# Patient Record
Sex: FEMALE | Race: WHITE | NOT HISPANIC OR LATINO | Employment: OTHER | URBAN - METROPOLITAN AREA
[De-identification: names, ages, dates, MRNs, and addresses within clinical notes are randomized per-mention and may not be internally consistent; named-entity substitution may affect disease eponyms.]

---

## 2023-02-13 ENCOUNTER — HOSPITAL ENCOUNTER (EMERGENCY)
Facility: HOSPITAL | Age: 83
Discharge: HOME | End: 2023-02-14
Attending: EMERGENCY MEDICINE | Admitting: EMERGENCY MEDICINE
Payer: MEDICARE

## 2023-02-13 DIAGNOSIS — R42 VERTIGO: ICD-10-CM

## 2023-02-13 DIAGNOSIS — S00.03XA CONTUSION OF SCALP, INITIAL ENCOUNTER: ICD-10-CM

## 2023-02-13 DIAGNOSIS — N39.0 ACUTE URINARY TRACT INFECTION: Primary | ICD-10-CM

## 2023-02-13 LAB
BASOPHILS # BLD AUTO: 0.1 10*3/MM3 (ref 0–0.2)
BASOPHILS NFR BLD AUTO: 1 % (ref 0–1.5)
DEPRECATED RDW RBC AUTO: 48.6 FL (ref 37–54)
EOSINOPHIL # BLD AUTO: 0.2 10*3/MM3 (ref 0–0.4)
EOSINOPHIL NFR BLD AUTO: 2.9 % (ref 0.3–6.2)
ERYTHROCYTE [DISTWIDTH] IN BLOOD BY AUTOMATED COUNT: 16.2 % (ref 12.3–15.4)
GLUCOSE BLDC GLUCOMTR-MCNC: 190 MG/DL (ref 70–105)
HCT VFR BLD AUTO: 36.3 % (ref 34–46.6)
HGB BLD-MCNC: 11.2 G/DL (ref 12–15.9)
LYMPHOCYTES # BLD AUTO: 2.6 10*3/MM3 (ref 0.7–3.1)
LYMPHOCYTES NFR BLD AUTO: 31.9 % (ref 19.6–45.3)
MCH RBC QN AUTO: 26.7 PG (ref 26.6–33)
MCHC RBC AUTO-ENTMCNC: 31 G/DL (ref 31.5–35.7)
MCV RBC AUTO: 86.2 FL (ref 79–97)
MONOCYTES # BLD AUTO: 0.7 10*3/MM3 (ref 0.1–0.9)
MONOCYTES NFR BLD AUTO: 8.8 % (ref 5–12)
NEUTROPHILS NFR BLD AUTO: 4.6 10*3/MM3 (ref 1.7–7)
NEUTROPHILS NFR BLD AUTO: 55.4 % (ref 42.7–76)
NRBC BLD AUTO-RTO: 0 /100 WBC (ref 0–0.2)
PLATELET # BLD AUTO: 371 10*3/MM3 (ref 140–450)
PMV BLD AUTO: 9.1 FL (ref 6–12)
RBC # BLD AUTO: 4.21 10*6/MM3 (ref 3.77–5.28)
WBC NRBC COR # BLD: 8.3 10*3/MM3 (ref 3.4–10.8)

## 2023-02-13 PROCEDURE — 80053 COMPREHEN METABOLIC PANEL: CPT | Performed by: EMERGENCY MEDICINE

## 2023-02-13 PROCEDURE — 85025 COMPLETE CBC W/AUTO DIFF WBC: CPT | Performed by: EMERGENCY MEDICINE

## 2023-02-13 PROCEDURE — 99284 EMERGENCY DEPT VISIT MOD MDM: CPT

## 2023-02-13 PROCEDURE — 36415 COLL VENOUS BLD VENIPUNCTURE: CPT

## 2023-02-13 PROCEDURE — 82962 GLUCOSE BLOOD TEST: CPT

## 2023-02-13 PROCEDURE — 96365 THER/PROPH/DIAG IV INF INIT: CPT

## 2023-02-13 RX ORDER — MECLIZINE HYDROCHLORIDE 25 MG/1
25 TABLET ORAL ONCE
Status: COMPLETED | OUTPATIENT
Start: 2023-02-13 | End: 2023-02-14

## 2023-02-14 ENCOUNTER — APPOINTMENT (OUTPATIENT)
Dept: CT IMAGING | Facility: HOSPITAL | Age: 83
End: 2023-02-14
Payer: MEDICARE

## 2023-02-14 VITALS
OXYGEN SATURATION: 98 % | TEMPERATURE: 98 F | DIASTOLIC BLOOD PRESSURE: 62 MMHG | HEIGHT: 67 IN | WEIGHT: 135 LBS | SYSTOLIC BLOOD PRESSURE: 118 MMHG | RESPIRATION RATE: 16 BRPM | HEART RATE: 64 BPM | BODY MASS INDEX: 21.19 KG/M2

## 2023-02-14 LAB
ALBUMIN SERPL-MCNC: 4.4 G/DL (ref 3.5–5.2)
ALBUMIN/GLOB SERPL: 1.3 G/DL
ALP SERPL-CCNC: 74 U/L (ref 39–117)
ALT SERPL W P-5'-P-CCNC: 11 U/L (ref 1–33)
ANION GAP SERPL CALCULATED.3IONS-SCNC: 13 MMOL/L (ref 5–15)
AST SERPL-CCNC: 12 U/L (ref 1–32)
BACTERIA UR QL AUTO: ABNORMAL /HPF
BILIRUB SERPL-MCNC: 0.2 MG/DL (ref 0–1.2)
BILIRUB UR QL STRIP: NEGATIVE
BUN SERPL-MCNC: 23 MG/DL (ref 8–23)
BUN/CREAT SERPL: 22.5 (ref 7–25)
CALCIUM SPEC-SCNC: 8.8 MG/DL (ref 8.6–10.5)
CHLORIDE SERPL-SCNC: 105 MMOL/L (ref 98–107)
CLARITY UR: ABNORMAL
CO2 SERPL-SCNC: 25 MMOL/L (ref 22–29)
COLOR UR: YELLOW
CREAT SERPL-MCNC: 1.02 MG/DL (ref 0.57–1)
EGFRCR SERPLBLD CKD-EPI 2021: 54.4 ML/MIN/1.73
GLOBULIN UR ELPH-MCNC: 3.3 GM/DL
GLUCOSE SERPL-MCNC: 110 MG/DL (ref 65–99)
GLUCOSE UR STRIP-MCNC: ABNORMAL MG/DL
HGB UR QL STRIP.AUTO: ABNORMAL
HYALINE CASTS UR QL AUTO: ABNORMAL /LPF
KETONES UR QL STRIP: NEGATIVE
LEUKOCYTE ESTERASE UR QL STRIP.AUTO: ABNORMAL
NITRITE UR QL STRIP: POSITIVE
PH UR STRIP.AUTO: 5.5 [PH] (ref 5–8)
POTASSIUM SERPL-SCNC: 3.8 MMOL/L (ref 3.5–5.2)
PROT SERPL-MCNC: 7.7 G/DL (ref 6–8.5)
PROT UR QL STRIP: ABNORMAL
RBC # UR STRIP: ABNORMAL /HPF
REF LAB TEST METHOD: ABNORMAL
SODIUM SERPL-SCNC: 143 MMOL/L (ref 136–145)
SP GR UR STRIP: 1.01 (ref 1–1.03)
SQUAMOUS #/AREA URNS HPF: ABNORMAL /HPF
UROBILINOGEN UR QL STRIP: ABNORMAL
WBC # UR STRIP: ABNORMAL /HPF
WBC CASTS #/AREA URNS LPF: ABNORMAL /LPF

## 2023-02-14 PROCEDURE — 81001 URINALYSIS AUTO W/SCOPE: CPT | Performed by: EMERGENCY MEDICINE

## 2023-02-14 PROCEDURE — 96365 THER/PROPH/DIAG IV INF INIT: CPT

## 2023-02-14 PROCEDURE — 25010000002 CEFTRIAXONE PER 250 MG: Performed by: EMERGENCY MEDICINE

## 2023-02-14 PROCEDURE — 87086 URINE CULTURE/COLONY COUNT: CPT | Performed by: EMERGENCY MEDICINE

## 2023-02-14 PROCEDURE — 70450 CT HEAD/BRAIN W/O DYE: CPT

## 2023-02-14 RX ORDER — MECLIZINE HYDROCHLORIDE 25 MG/1
25 TABLET ORAL 3 TIMES DAILY PRN
Qty: 15 TABLET | Refills: 0 | Status: SHIPPED | OUTPATIENT
Start: 2023-02-14

## 2023-02-14 RX ORDER — CEFDINIR 300 MG/1
300 CAPSULE ORAL 2 TIMES DAILY
Qty: 10 CAPSULE | Refills: 0 | Status: SHIPPED | OUTPATIENT
Start: 2023-02-14

## 2023-02-14 RX ADMIN — CEFTRIAXONE 1 G: 1 INJECTION, POWDER, FOR SOLUTION INTRAMUSCULAR; INTRAVENOUS at 03:03

## 2023-02-14 RX ADMIN — MECLIZINE HYDROCHLORIDE 25 MG: 25 TABLET ORAL at 00:20

## 2023-02-14 NOTE — ED PROVIDER NOTES
Subjective   History of Present Illness  82 female sent in from [facility] for dizziness and fall.  The patient states that she became lightheaded after she took melatonin and Benadryl.  She states she had had some vertigo recently.  She states she sat down on her buttocks without discomfort to her pelvis or lower back.  She reports then she fell over backwards and struck the back of her head.  She reports no loss of consciousness.  She reports that she was not hypoglycemic.  She reports that she has had no focal neurologic defects or lateralizing neurologic signs she reports she has had some chronic decreased in hearing but nothing acute she.  She denies chest pain shortness of breath diaphoresis or palpitations.  No current nausea or vomiting        Review of Systems   Constitutional: Negative for chills and fever.   Respiratory: Negative for chest tightness.    Cardiovascular: Negative for chest pain.   Gastrointestinal: Negative for abdominal pain and nausea.   Genitourinary: Positive for dysuria. Negative for hematuria.   Neurological: Positive for dizziness, weakness and light-headedness. Negative for syncope.   Hematological: Bruises/bleeds easily.   All other systems reviewed and are negative.      No past medical history on file.  History of hypothyroidism, osteoporosis, hypertension, hypocalcemia, hyperlipidemia, gastric ulcer, degenerative joint disease, gastroesophageal reflux disease, coronary artery disease, anemia, type 2 diabetes, history of toe fracture  Allergies   Allergen Reactions   • Penicillins Unknown - High Severity       No past surgical history on file.    No family history on file.    Social History     Socioeconomic History   • Marital status:        No reported safety issues    Objective   Physical Exam  Alert Jessica Coma Scale 15   HEENT: Pupils equal and reactive to light. Conjunctivae are not injected. Normal tympanic membranes. Oropharynx and nares are normal.   Neck:  Supple. Midline trachea. No JVD. No goiter.   Chest: Clear and equal breath sounds bilaterally, regular rate and rhythm without murmur or rub.   Abdomen: Positive bowel sounds, nontender, nondistended. No rebound or peritoneal signs. No CVA tenderness.   Extremities/neuro: Right-hand-dominant cranial nerves intact and symmetrical.  Some reproduction of vertigo is noted with rapid head motion.  No focal neurologic defects.  No drift no clubbing. cyanosis or edema. Motor sensory exam is normal. The full range of motion is intact   Skin: Warm and dry, no rashes or petechia.   Lymphatic: No regional lymphadenopathy. No calf pain, swelling or Homans sign    Procedures           ED Course      Labs Reviewed   COMPREHENSIVE METABOLIC PANEL - Abnormal; Notable for the following components:       Result Value    Glucose 110 (*)     Creatinine 1.02 (*)     eGFR 54.4 (*)     All other components within normal limits    Narrative:     GFR Normal >60  Chronic Kidney Disease <60  Kidney Failure <15    The GFR formula is only valid for adults with stable renal function between ages 18 and 70.   URINALYSIS W/ CULTURE IF INDICATED - Abnormal; Notable for the following components:    Appearance, UA Cloudy (*)     Glucose,  mg/dL (2+) (*)     Blood, UA Small (1+) (*)     Protein, UA Trace (*)     Leuk Esterase, UA Large (3+) (*)     Nitrite, UA Positive (*)     All other components within normal limits    Narrative:     In absence of clinical symptoms, the presence of pyuria, bacteria, and/or nitrites on the urinalysis result does not correlate with infection.   CBC WITH AUTO DIFFERENTIAL - Abnormal; Notable for the following components:    Hemoglobin 11.2 (*)     MCHC 31.0 (*)     RDW 16.2 (*)     All other components within normal limits   POCT GLUCOSE FINGERSTICK - Abnormal; Notable for the following components:    Glucose 190 (*)     All other components within normal limits   URINALYSIS, MICROSCOPIC ONLY   CBC AND  DIFFERENTIAL    Narrative:     The following orders were created for panel order CBC & Differential.  Procedure                               Abnormality         Status                     ---------                               -----------         ------                     CBC Auto Differential[[id number]]        Abnormal            Final result                 Please view results for these tests on the individual orders.     Medications   cefTRIAXone (ROCEPHIN) 1 g in sodium chloride 0.9 % 100 mL IVPB (has no administration in time range)   meclizine (ANTIVERT) tablet 25 mg (25 mg Oral Given 2/14/23 0020)          CT head negative for stroke intercranial hemorrhage                                Medical Decision Making  The patient was injected with ceftriaxone in the emergency department.  She had previously been given meclizine and felt better.  The patient be given a prescription for cefdinir and meclizine.  The patient was advised to follow-up closely with her primary care provider the patient was stable at discharge and vocalized understanding of discharge instructions warning    Amount and/or Complexity of Data Reviewed  External Data Reviewed: notes.     Details: From [facility] facility documentation was reviewed  Labs: ordered. Decision-making details documented in ED Course.  Radiology: ordered and independent interpretation performed. Decision-making details documented in ED Course.      Risk  Prescription drug management.    Risk Details: Risk of decompensation of neurologic symptoms, risk of sepsis from resistant urinary tract infection were considered        Final diagnoses:   Acute urinary tract infection   Vertigo   Contusion of scalp, initial encounter       ED Disposition  ED Disposition     ED Disposition   Discharge    Condition   Stable    Comment   --             Antonette Boss, APRN  [address] [facility] [place] [address]  [phone]               Medication List       New Prescriptions    cefdinir 300 MG capsule  Commonly known as: OMNICEF  Take 1 capsule by mouth 2 (Two) Times a Day.     meclizine 25 MG tablet  Commonly known as: ANTIVERT  Take 1 tablet by mouth 3 (Three) Times a Day As Needed for Dizziness or Nausea.           Where to Get Your Medications      Information about where to get these medications is not yet available    Ask your nurse or doctor about these medications  · cefdinir 300 MG capsule  · meclizine 25 MG tablet          Anegl Tellez MD  02/14/23 0114

## 2023-02-14 NOTE — DISCHARGE INSTRUCTIONS
Rest next 2 days, plenty of fluids  Make sure to take all of the antibiotic  Meclizine as needed for dizziness  Tylenol or ibuprofen as needed for fever and discomfort  Follow-up with your primary care provider

## 2023-02-14 NOTE — ED NOTES
Patient came in from [facility] and stated that she fell on her buttocks and then fell back and hit her head. Patient reports taking plavix.

## 2023-02-14 NOTE — ED NOTES
Patient reports she took melatonin and benadryl approx 2130 prior to feeling like her blood sugar was low.

## 2023-02-15 LAB — BACTERIA SPEC AEROBE CULT: NORMAL

## 2023-05-02 ENCOUNTER — APPOINTMENT (OUTPATIENT)
Dept: GENERAL RADIOLOGY | Facility: HOSPITAL | Age: 83
DRG: 247 | End: 2023-05-02
Payer: MEDICARE

## 2023-05-02 ENCOUNTER — HOSPITAL ENCOUNTER (INPATIENT)
Facility: HOSPITAL | Age: 83
LOS: 1 days | Discharge: NURSING, REHAB OR LONG-TERM CARE | DRG: 247 | End: 2023-05-04
Attending: EMERGENCY MEDICINE | Admitting: EMERGENCY MEDICINE
Payer: MEDICARE

## 2023-05-02 ENCOUNTER — APPOINTMENT (OUTPATIENT)
Dept: CT IMAGING | Facility: HOSPITAL | Age: 83
DRG: 247 | End: 2023-05-02
Payer: MEDICARE

## 2023-05-02 DIAGNOSIS — R07.9 CHEST PAIN, UNSPECIFIED TYPE: Primary | ICD-10-CM

## 2023-05-02 DIAGNOSIS — Z95.5 STATUS POST CORONARY ARTERY STENT PLACEMENT: ICD-10-CM

## 2023-05-02 DIAGNOSIS — R94.39 ABNORMAL NUCLEAR STRESS TEST: ICD-10-CM

## 2023-05-02 DIAGNOSIS — I25.110 CORONARY ARTERY DISEASE INVOLVING NATIVE CORONARY ARTERY OF NATIVE HEART WITH UNSTABLE ANGINA PECTORIS: ICD-10-CM

## 2023-05-02 LAB
ALBUMIN SERPL-MCNC: 4.4 G/DL (ref 3.5–5.2)
ALBUMIN/GLOB SERPL: 1.6 G/DL
ALP SERPL-CCNC: 84 U/L (ref 39–117)
ALT SERPL W P-5'-P-CCNC: 11 U/L (ref 1–33)
ANION GAP SERPL CALCULATED.3IONS-SCNC: 12 MMOL/L (ref 5–15)
APTT PPP: 25.9 SECONDS (ref 61–76.5)
AST SERPL-CCNC: 16 U/L (ref 1–32)
BASOPHILS # BLD AUTO: 0.1 10*3/MM3 (ref 0–0.2)
BASOPHILS NFR BLD AUTO: 1.1 % (ref 0–1.5)
BILIRUB SERPL-MCNC: 0.3 MG/DL (ref 0–1.2)
BILIRUB UR QL STRIP: NEGATIVE
BUN SERPL-MCNC: 17 MG/DL (ref 8–23)
BUN/CREAT SERPL: 18.3 (ref 7–25)
CALCIUM SPEC-SCNC: 8.1 MG/DL (ref 8.6–10.5)
CHLORIDE SERPL-SCNC: 105 MMOL/L (ref 98–107)
CHOLEST SERPL-MCNC: 146 MG/DL (ref 0–200)
CLARITY UR: CLEAR
CO2 SERPL-SCNC: 26 MMOL/L (ref 22–29)
COLOR UR: YELLOW
CREAT SERPL-MCNC: 0.93 MG/DL (ref 0.57–1)
D DIMER PPP FEU-MCNC: 0.79 MG/L (FEU) (ref 0–0.84)
DEPRECATED RDW RBC AUTO: 46.8 FL (ref 37–54)
EGFRCR SERPLBLD CKD-EPI 2021: 60.7 ML/MIN/1.73
EOSINOPHIL # BLD AUTO: 0.1 10*3/MM3 (ref 0–0.4)
EOSINOPHIL NFR BLD AUTO: 1.6 % (ref 0.3–6.2)
ERYTHROCYTE [DISTWIDTH] IN BLOOD BY AUTOMATED COUNT: 14.8 % (ref 12.3–15.4)
GLOBULIN UR ELPH-MCNC: 2.7 GM/DL
GLUCOSE BLDC GLUCOMTR-MCNC: 130 MG/DL (ref 70–105)
GLUCOSE BLDC GLUCOMTR-MCNC: 132 MG/DL (ref 70–105)
GLUCOSE BLDC GLUCOMTR-MCNC: 156 MG/DL (ref 70–105)
GLUCOSE SERPL-MCNC: 249 MG/DL (ref 65–99)
GLUCOSE UR STRIP-MCNC: ABNORMAL MG/DL
HBA1C MFR BLD: 11 % (ref 4.8–5.6)
HCT VFR BLD AUTO: 34.9 % (ref 34–46.6)
HDLC SERPL-MCNC: 72 MG/DL (ref 40–60)
HGB BLD-MCNC: 11.3 G/DL (ref 12–15.9)
HGB UR QL STRIP.AUTO: NEGATIVE
HOLD SPECIMEN: NORMAL
HOLD SPECIMEN: NORMAL
INR PPP: 0.97 (ref 0.93–1.1)
KETONES UR QL STRIP: NEGATIVE
LDLC SERPL CALC-MCNC: 55 MG/DL (ref 0–100)
LDLC/HDLC SERPL: 0.74 {RATIO}
LEUKOCYTE ESTERASE UR QL STRIP.AUTO: NEGATIVE
LYMPHOCYTES # BLD AUTO: 1.6 10*3/MM3 (ref 0.7–3.1)
LYMPHOCYTES NFR BLD AUTO: 22 % (ref 19.6–45.3)
MAGNESIUM SERPL-MCNC: 1.7 MG/DL (ref 1.6–2.4)
MCH RBC QN AUTO: 27.2 PG (ref 26.6–33)
MCHC RBC AUTO-ENTMCNC: 32.4 G/DL (ref 31.5–35.7)
MCV RBC AUTO: 84.2 FL (ref 79–97)
MONOCYTES # BLD AUTO: 0.4 10*3/MM3 (ref 0.1–0.9)
MONOCYTES NFR BLD AUTO: 5.1 % (ref 5–12)
NEUTROPHILS NFR BLD AUTO: 5.2 10*3/MM3 (ref 1.7–7)
NEUTROPHILS NFR BLD AUTO: 70.2 % (ref 42.7–76)
NITRITE UR QL STRIP: NEGATIVE
NRBC BLD AUTO-RTO: 0 /100 WBC (ref 0–0.2)
NT-PROBNP SERPL-MCNC: 308.5 PG/ML (ref 0–1800)
PH UR STRIP.AUTO: 7.5 [PH] (ref 5–8)
PLATELET # BLD AUTO: 295 10*3/MM3 (ref 140–450)
PMV BLD AUTO: 9.9 FL (ref 6–12)
POTASSIUM SERPL-SCNC: 4.7 MMOL/L (ref 3.5–5.2)
PROT SERPL-MCNC: 7.1 G/DL (ref 6–8.5)
PROT UR QL STRIP: NEGATIVE
PROTHROMBIN TIME: 10.4 SECONDS (ref 9.6–11.7)
RBC # BLD AUTO: 4.14 10*6/MM3 (ref 3.77–5.28)
SODIUM SERPL-SCNC: 143 MMOL/L (ref 136–145)
SP GR UR STRIP: 1.01 (ref 1–1.03)
T3FREE SERPL-MCNC: 1.98 PG/ML (ref 2–4.4)
T4 FREE SERPL-MCNC: 1.49 NG/DL (ref 0.93–1.7)
TRIGL SERPL-MCNC: 105 MG/DL (ref 0–150)
TROPONIN T SERPL HS-MCNC: 22 NG/L
TROPONIN T SERPL HS-MCNC: 25 NG/L
TSH SERPL DL<=0.05 MIU/L-ACNC: 1.9 UIU/ML (ref 0.27–4.2)
UROBILINOGEN UR QL STRIP: ABNORMAL
VLDLC SERPL-MCNC: 19 MG/DL (ref 5–40)
WBC NRBC COR # BLD: 7.4 10*3/MM3 (ref 3.4–10.8)
WHOLE BLOOD HOLD COAG: NORMAL
WHOLE BLOOD HOLD SPECIMEN: NORMAL

## 2023-05-02 PROCEDURE — 71045 X-RAY EXAM CHEST 1 VIEW: CPT

## 2023-05-02 PROCEDURE — 85025 COMPLETE CBC W/AUTO DIFF WBC: CPT | Performed by: NURSE PRACTITIONER

## 2023-05-02 PROCEDURE — 84439 ASSAY OF FREE THYROXINE: CPT | Performed by: NURSE PRACTITIONER

## 2023-05-02 PROCEDURE — 93005 ELECTROCARDIOGRAM TRACING: CPT

## 2023-05-02 PROCEDURE — 97162 PT EVAL MOD COMPLEX 30 MIN: CPT

## 2023-05-02 PROCEDURE — G0378 HOSPITAL OBSERVATION PER HR: HCPCS

## 2023-05-02 PROCEDURE — 83880 ASSAY OF NATRIURETIC PEPTIDE: CPT | Performed by: NURSE PRACTITIONER

## 2023-05-02 PROCEDURE — 85730 THROMBOPLASTIN TIME PARTIAL: CPT | Performed by: NURSE PRACTITIONER

## 2023-05-02 PROCEDURE — 84443 ASSAY THYROID STIM HORMONE: CPT | Performed by: NURSE PRACTITIONER

## 2023-05-02 PROCEDURE — P9612 CATHETERIZE FOR URINE SPEC: HCPCS

## 2023-05-02 PROCEDURE — G0108 DIAB MANAGE TRN  PER INDIV: HCPCS

## 2023-05-02 PROCEDURE — 99285 EMERGENCY DEPT VISIT HI MDM: CPT

## 2023-05-02 PROCEDURE — 82948 REAGENT STRIP/BLOOD GLUCOSE: CPT

## 2023-05-02 PROCEDURE — 83036 HEMOGLOBIN GLYCOSYLATED A1C: CPT | Performed by: NURSE PRACTITIONER

## 2023-05-02 PROCEDURE — 81003 URINALYSIS AUTO W/O SCOPE: CPT | Performed by: NURSE PRACTITIONER

## 2023-05-02 PROCEDURE — 84484 ASSAY OF TROPONIN QUANT: CPT | Performed by: NURSE PRACTITIONER

## 2023-05-02 PROCEDURE — 87102 FUNGUS ISOLATION CULTURE: CPT | Performed by: NURSE PRACTITIONER

## 2023-05-02 PROCEDURE — 83735 ASSAY OF MAGNESIUM: CPT | Performed by: NURSE PRACTITIONER

## 2023-05-02 PROCEDURE — 85379 FIBRIN DEGRADATION QUANT: CPT | Performed by: NURSE PRACTITIONER

## 2023-05-02 PROCEDURE — 85610 PROTHROMBIN TIME: CPT | Performed by: NURSE PRACTITIONER

## 2023-05-02 PROCEDURE — 70486 CT MAXILLOFACIAL W/O DYE: CPT

## 2023-05-02 PROCEDURE — 84481 FREE ASSAY (FT-3): CPT | Performed by: NURSE PRACTITIONER

## 2023-05-02 PROCEDURE — 80061 LIPID PANEL: CPT | Performed by: NURSE PRACTITIONER

## 2023-05-02 PROCEDURE — 80053 COMPREHEN METABOLIC PANEL: CPT | Performed by: NURSE PRACTITIONER

## 2023-05-02 RX ORDER — SODIUM CHLORIDE 0.9 % (FLUSH) 0.9 %
10 SYRINGE (ML) INJECTION AS NEEDED
Status: DISCONTINUED | OUTPATIENT
Start: 2023-05-02 | End: 2023-05-04 | Stop reason: HOSPADM

## 2023-05-02 RX ORDER — ASPIRIN 81 MG/1
324 TABLET, CHEWABLE ORAL ONCE
Status: COMPLETED | OUTPATIENT
Start: 2023-05-02 | End: 2023-05-02

## 2023-05-02 RX ORDER — LACTULOSE 10 G/10G
20 SOLUTION ORAL DAILY
COMMUNITY

## 2023-05-02 RX ORDER — SODIUM CHLORIDE 0.9 % (FLUSH) 0.9 %
10 SYRINGE (ML) INJECTION EVERY 12 HOURS SCHEDULED
Status: DISCONTINUED | OUTPATIENT
Start: 2023-05-02 | End: 2023-05-04 | Stop reason: HOSPADM

## 2023-05-02 RX ORDER — ROSUVASTATIN CALCIUM 5 MG/1
5 TABLET, COATED ORAL DAILY
Status: DISCONTINUED | OUTPATIENT
Start: 2023-05-03 | End: 2023-05-04 | Stop reason: HOSPADM

## 2023-05-02 RX ORDER — LEVOTHYROXINE SODIUM 0.12 MG/1
125 TABLET ORAL DAILY
COMMUNITY

## 2023-05-02 RX ORDER — LISINOPRIL 5 MG/1
5 TABLET ORAL DAILY
COMMUNITY

## 2023-05-02 RX ORDER — FAMOTIDINE 20 MG/1
20 TABLET, FILM COATED ORAL 2 TIMES DAILY PRN
COMMUNITY

## 2023-05-02 RX ORDER — GABAPENTIN 300 MG/1
300 CAPSULE ORAL NIGHTLY
Status: DISCONTINUED | OUTPATIENT
Start: 2023-05-02 | End: 2023-05-04 | Stop reason: HOSPADM

## 2023-05-02 RX ORDER — NICOTINE POLACRILEX 4 MG
15 LOZENGE BUCCAL
Status: DISCONTINUED | OUTPATIENT
Start: 2023-05-02 | End: 2023-05-04 | Stop reason: HOSPADM

## 2023-05-02 RX ORDER — LACTULOSE 10 G/15ML
10 SOLUTION ORAL DAILY
Status: DISCONTINUED | OUTPATIENT
Start: 2023-05-03 | End: 2023-05-04 | Stop reason: HOSPADM

## 2023-05-02 RX ORDER — NITROGLYCERIN 0.4 MG/1
0.4 TABLET SUBLINGUAL
Status: DISCONTINUED | OUTPATIENT
Start: 2023-05-02 | End: 2023-05-04 | Stop reason: HOSPADM

## 2023-05-02 RX ORDER — TRAMADOL HYDROCHLORIDE 50 MG/1
50 TABLET ORAL 4 TIMES DAILY PRN
COMMUNITY

## 2023-05-02 RX ORDER — INSULIN LISPRO 100 [IU]/ML
2-9 INJECTION, SOLUTION INTRAVENOUS; SUBCUTANEOUS
Status: DISCONTINUED | OUTPATIENT
Start: 2023-05-02 | End: 2023-05-04 | Stop reason: HOSPADM

## 2023-05-02 RX ORDER — TRAMADOL HYDROCHLORIDE 50 MG/1
50 TABLET ORAL 4 TIMES DAILY PRN
Status: DISCONTINUED | OUTPATIENT
Start: 2023-05-02 | End: 2023-05-04 | Stop reason: HOSPADM

## 2023-05-02 RX ORDER — TRAZODONE HYDROCHLORIDE 50 MG/1
50 TABLET ORAL NIGHTLY
Status: DISCONTINUED | OUTPATIENT
Start: 2023-05-02 | End: 2023-05-04 | Stop reason: HOSPADM

## 2023-05-02 RX ORDER — BISACODYL 5 MG/1
5 TABLET, DELAYED RELEASE ORAL DAILY PRN
Status: DISCONTINUED | OUTPATIENT
Start: 2023-05-02 | End: 2023-05-04 | Stop reason: HOSPADM

## 2023-05-02 RX ORDER — PANTOPRAZOLE SODIUM 40 MG/1
40 TABLET, DELAYED RELEASE ORAL 2 TIMES DAILY
Status: DISCONTINUED | OUTPATIENT
Start: 2023-05-03 | End: 2023-05-04 | Stop reason: HOSPADM

## 2023-05-02 RX ORDER — LISINOPRIL 5 MG/1
5 TABLET ORAL DAILY
Status: DISCONTINUED | OUTPATIENT
Start: 2023-05-03 | End: 2023-05-04 | Stop reason: HOSPADM

## 2023-05-02 RX ORDER — POLYETHYLENE GLYCOL 3350 17 G/17G
17 POWDER, FOR SOLUTION ORAL DAILY PRN
Status: DISCONTINUED | OUTPATIENT
Start: 2023-05-02 | End: 2023-05-04 | Stop reason: HOSPADM

## 2023-05-02 RX ORDER — FLUTICASONE PROPIONATE 50 MCG
1 SPRAY, SUSPENSION (ML) NASAL 2 TIMES DAILY
COMMUNITY

## 2023-05-02 RX ORDER — ROSUVASTATIN CALCIUM 5 MG/1
5 TABLET, COATED ORAL DAILY
COMMUNITY

## 2023-05-02 RX ORDER — FAMOTIDINE 20 MG/1
20 TABLET, FILM COATED ORAL 2 TIMES DAILY PRN
Status: DISCONTINUED | OUTPATIENT
Start: 2023-05-02 | End: 2023-05-04 | Stop reason: HOSPADM

## 2023-05-02 RX ORDER — ATENOLOL 50 MG/1
50 TABLET ORAL DAILY
COMMUNITY

## 2023-05-02 RX ORDER — IBUPROFEN 600 MG/1
1 TABLET ORAL
Status: DISCONTINUED | OUTPATIENT
Start: 2023-05-02 | End: 2023-05-04 | Stop reason: HOSPADM

## 2023-05-02 RX ORDER — PANTOPRAZOLE SODIUM 40 MG/1
40 TABLET, DELAYED RELEASE ORAL 2 TIMES DAILY
COMMUNITY

## 2023-05-02 RX ORDER — ONDANSETRON 4 MG/1
4 TABLET, FILM COATED ORAL EVERY 6 HOURS PRN
Status: DISCONTINUED | OUTPATIENT
Start: 2023-05-02 | End: 2023-05-04 | Stop reason: HOSPADM

## 2023-05-02 RX ORDER — INSULIN DEGLUDEC 100 U/ML
17 INJECTION, SOLUTION SUBCUTANEOUS EVERY EVENING
COMMUNITY

## 2023-05-02 RX ORDER — ATENOLOL 50 MG/1
50 TABLET ORAL DAILY
Status: DISCONTINUED | OUTPATIENT
Start: 2023-05-03 | End: 2023-05-04 | Stop reason: HOSPADM

## 2023-05-02 RX ORDER — CLOPIDOGREL BISULFATE 75 MG/1
75 TABLET ORAL DAILY
Status: DISCONTINUED | OUTPATIENT
Start: 2023-05-03 | End: 2023-05-04 | Stop reason: HOSPADM

## 2023-05-02 RX ORDER — ACETAMINOPHEN 325 MG/1
650 TABLET ORAL EVERY 4 HOURS PRN
Status: DISCONTINUED | OUTPATIENT
Start: 2023-05-02 | End: 2023-05-04 | Stop reason: HOSPADM

## 2023-05-02 RX ORDER — INSULIN DEGLUDEC 100 U/ML
24 INJECTION, SOLUTION SUBCUTANEOUS EVERY MORNING
COMMUNITY

## 2023-05-02 RX ORDER — BISACODYL 10 MG
10 SUPPOSITORY, RECTAL RECTAL DAILY PRN
Status: DISCONTINUED | OUTPATIENT
Start: 2023-05-02 | End: 2023-05-04 | Stop reason: HOSPADM

## 2023-05-02 RX ORDER — GABAPENTIN 300 MG/1
300 CAPSULE ORAL NIGHTLY
COMMUNITY

## 2023-05-02 RX ORDER — ONDANSETRON 2 MG/ML
4 INJECTION INTRAMUSCULAR; INTRAVENOUS EVERY 6 HOURS PRN
Status: DISCONTINUED | OUTPATIENT
Start: 2023-05-02 | End: 2023-05-04 | Stop reason: HOSPADM

## 2023-05-02 RX ORDER — OXYBUTYNIN CHLORIDE 5 MG/1
5 TABLET ORAL 2 TIMES DAILY
Status: DISCONTINUED | OUTPATIENT
Start: 2023-05-02 | End: 2023-05-04 | Stop reason: HOSPADM

## 2023-05-02 RX ORDER — DEXTROSE MONOHYDRATE 25 G/50ML
25 INJECTION, SOLUTION INTRAVENOUS
Status: DISCONTINUED | OUTPATIENT
Start: 2023-05-02 | End: 2023-05-04 | Stop reason: HOSPADM

## 2023-05-02 RX ORDER — OXYBUTYNIN CHLORIDE 5 MG/1
5 TABLET ORAL 2 TIMES DAILY
COMMUNITY

## 2023-05-02 RX ORDER — LEVOTHYROXINE SODIUM 0.12 MG/1
125 TABLET ORAL DAILY
Status: DISCONTINUED | OUTPATIENT
Start: 2023-05-03 | End: 2023-05-04 | Stop reason: HOSPADM

## 2023-05-02 RX ORDER — CLOPIDOGREL BISULFATE 75 MG/1
75 TABLET ORAL DAILY
COMMUNITY

## 2023-05-02 RX ORDER — CHLORCYCLIZINE HYDROCHLORIDE AND PSEUDOEPHEDRINE HYDROCHLORIDE 25; 60 MG/1; MG/1
1 TABLET ORAL EVERY 8 HOURS PRN
COMMUNITY

## 2023-05-02 RX ORDER — TRAZODONE HYDROCHLORIDE 50 MG/1
50 TABLET ORAL NIGHTLY
COMMUNITY

## 2023-05-02 RX ORDER — SODIUM CHLORIDE 9 MG/ML
40 INJECTION, SOLUTION INTRAVENOUS AS NEEDED
Status: DISCONTINUED | OUTPATIENT
Start: 2023-05-02 | End: 2023-05-04 | Stop reason: HOSPADM

## 2023-05-02 RX ADMIN — ASPIRIN 81 MG CHEWABLE TABLET 324 MG: 81 TABLET CHEWABLE at 09:41

## 2023-05-02 RX ADMIN — GABAPENTIN 300 MG: 300 CAPSULE ORAL at 21:07

## 2023-05-02 RX ADMIN — TRAMADOL HYDROCHLORIDE 50 MG: 50 TABLET, COATED ORAL at 21:06

## 2023-05-02 RX ADMIN — Medication 10 ML: at 21:08

## 2023-05-02 RX ADMIN — Medication 10 ML: at 12:30

## 2023-05-02 RX ADMIN — TRAZODONE HYDROCHLORIDE 50 MG: 50 TABLET ORAL at 21:06

## 2023-05-02 RX ADMIN — OXYBUTYNIN CHLORIDE 5 MG: 5 TABLET ORAL at 21:07

## 2023-05-02 NOTE — Clinical Note
First balloon inflation max pressure = 10 shima. First balloon inflation duration = 4 seconds. Second inflation of balloon - Max pressure = 12 shima. 2nd Inflation of balloon - Duration = 4 seconds. Third inflation of balloon - Max pressure = 10 shima. 3rd Inflation of balloon - Duration = 4 seconds. Fourth inflation of balloon - Max pressure = 12 shima. 4th Inflation of balloon - Duration = 4 seconds.

## 2023-05-02 NOTE — THERAPY EVALUATION
Patient Name: Pamela Zavala  : 1940    MRN: [id number]                              Today's Date: 2023       Admit Date: 2023    Visit Dx:     ICD-10-CM ICD-9-CM   1. Chest pain, unspecified type  R07.9 786.50     Patient Active Problem List   Diagnosis   • Chest pain     History reviewed. No pertinent past medical history.  History reviewed. No pertinent surgical history.   General Information     Row Name 23 1542          Physical Therapy Time and Intention    Document Type evaluation  -CM     Mode of Treatment physical therapy  -CM     Row Name 23 1542          General Information    Patient Profile Reviewed yes  -CM     Prior Level of Function independent:;community mobility;gait  has been using friend's rollator wx around asst living; would like to get one of her own; not on O2 at baseline  -CM     Existing Precautions/Restrictions no known precautions/restrictions  -CM     Barriers to Rehab medically complex  -CM     Row Name 23 1542          Living Environment    People in Home facility resident  mansion on main assisted living  -CM     Row Name 23 1542          Home Main Entrance    Number of Stairs, Main Entrance none  -CM     Row Name 23 1542          Stairs Within Home, Primary    Number of Stairs, Within Home, Primary none  -CM     Row Name 23 1542          Cognition    Orientation Status (Cognition) oriented x 4;other (see comments)  noted to have cognitive deficits; asks same questions repeatedly in sequence at times  -CM     Row Name 23 1542          Safety Issues, Functional Mobility    Impairments Affecting Function (Mobility) balance;strength;cognition  -CM           User Key  (r) = Recorded By, (t) = Taken By, (c) = Cosigned By    Initials Name Provider Type    Litzy Mcmullen, PT Physical Therapist               Mobility     Row Name 23 1544          Bed Mobility    Bed Mobility bed mobility (all) activities  -CM     All  Activities, [place] (Bed Mobility) independent  -CM     Row Name 05/02/23 1544          Bed-Chair Transfer    Bed-Chair [place] (Transfers) standby assist  -CM     Row Name 05/02/23 1544          Sit-Stand Transfer    Sit-Stand [place] (Transfers) standby assist  -CM     Row Name 05/02/23 1544          Gait/Stairs (Locomotion)    [place] Level (Gait) contact guard  -CM     Assistive Device (Gait) other (see comments)  one hand held; non skid socks  -CM     Distance in Feet (Gait) 150 ft; mild forward flexed posture; slow tejal; wide base of support  -CM           User Key  (r) = Recorded By, (t) = Taken By, (c) = Cosigned By    Initials Name Provider Type    Litzy Mcmullen, PT Physical Therapist               Obj/Interventions     Row Name 05/02/23 1545          Range of Motion Comprehensive    General Range of Motion bilateral upper extremity ROM WFL;bilateral lower extremity ROM WFL  -CM     Row Name 05/02/23 1545          Strength Comprehensive (MMT)    General Manual Muscle Testing (MMT) Assessment lower extremity strength deficits identified  -CM     Comment, General Manual Muscle Testing (MMT) Assessment BLEs 3/5 at hips, 3+/5 other LE mm groups  -CM     Row Name 05/02/23 1545          Balance    Balance Assessment sitting static balance;sitting dynamic balance;standing static balance;standing dynamic balance  -CM     Static Sitting Balance independent  -CM     Dynamic Sitting Balance independent  -CM     Position, Sitting Balance unsupported;sitting edge of bed  -CM     Static Standing Balance contact guard  -CM     Dynamic Standing Balance contact guard;minimal assist  -CM     Position/Device Used, Standing Balance supported  -CM     Row Name 05/02/23 154          Sensory Assessment (Somatosensory)    Sensory Assessment (Somatosensory) other (see comments);right-sided sensation intact   reports some peripheral neuropathy in B hands/feet; after amb, began to c/o L sided facial  tingling & in LUE, mild in LLE. No pronator drift; no facial asymmetry/droop. Reported some resolution after BP returned to baseline, but not full resolution  -CM           User Key  (r) = Recorded By, (t) = Taken By, (c) = Cosigned By    Initials Name Provider Type    Litzy Mcmullen, PT Physical Therapist               Goals/Plan    No documentation.                Clinical Impression     Row Name 05/02/23 1548          Pain    Pretreatment Pain Rating 0/10 - no pain  -CM     Posttreatment Pain Rating 0/10 - no pain  -CM     Pain Intervention(s) Ambulation/increased activity;Repositioned;Emotional support  -CM     Row Name 05/02/23 1548          Plan of Care Review    Plan of Care Reviewed With patient  -CM     Outcome Evaluation 81 yo female adm 5/2/23 for acute chest pain, weakness, soa. First 2 troponin readings elevated at 25, then 22. Third not yet taken. BP in ER was 201/89, w/ O2 sats 88%. CT head (-) for sinus infection. Pt reported in ER that she has undergone 4 rounds of abx due to sinus infection. Was also c/o in ER of chest pain radiating into LUE and L jaw. At baseline, pt lives in [facility] and is able to amb around facility w/ a friend's rollator wx. Reports she has had falls in past due to loss of balance, but has never undergone PT for this. Today, pt presents w/ mild LE weakness. Able to amb 150 ft w/ one hand held, mild gait deviations. Her BP did elevate to 195/79 after amb. Pt then began to c/o acute tingling in L side of face, LUE, and mildly in LLE. She showed no other signs of deficits in regard to neuro function (no facial drooping, no asymmetry in strength, etc). Some of her tingling in LUE improved w/ BP returning to more normal level. Recommend back to assisted living w/ home health and rollator wx at d/c. No need for IP PT, as pt is close to her baseline level. Will sign off.  -CM     Row Name 05/02/23 2335          Therapy Assessment/Plan (PT)    Criteria for Skilled  Interventions Met (PT) does not meet criteria for skilled intervention;no  -CM     Therapy Frequency (PT) evaluation only  -CM     Row Name 05/02/23 1554          Vital Signs    Intra Systolic BP Rehab 195  -CM     Intra Treatment Diastolic BP 79  -CM     Post Systolic BP Rehab 160  -CM     Post Treatment Diastolic BP 79  -CM     Pretreatment Heart Rate (beats/min) 74  -CM     Intratreatment Heart Rate (beats/min) 72  -CM     Posttreatment Heart Rate (beats/min) 74  -CM     Pre SpO2 (%) 99  -CM     O2 Delivery Pre Treatment supplemental O2  2L; when placed on room air, maintained sats at 100%  -CM     Intra SpO2 (%) 100  -CM     O2 Delivery Intra Treatment room air  -CM     Post SpO2 (%) 100  -CM     O2 Delivery Post Treatment room air  -CM     Row Name 05/02/23 1554          Positioning and Restraints    Pre-Treatment Position in bed  sitting eob  -CM     Post Treatment Position chair  -CM     In Chair notified nsg;sitting;call light within reach;encouraged to call for assist  -CM           User Key  (r) = Recorded By, (t) = Taken By, (c) = Cosigned By    Initials Name Provider Type    Litzy Mcmullen, PT Physical Therapist               Outcome Measures     Row Name 05/02/23 1556 05/02/23 1200       How much help from another person do you currently need...    Turning from your back to your side while in flat bed without using bedrails? 4  -CM 4  -MR    Moving from lying on back to sitting on the side of a flat bed without bedrails? 4  -CM 4  -MR    Moving to and from a bed to a chair (including a wheelchair)? 4  -CM 4  -MR    Standing up from a chair using your arms (e.g., wheelchair, bedside chair)? 4  -CM 4  -MR    Climbing 3-5 steps with a railing? 3  -CM 3  -MR    To walk in hospital room? 3  -CM 3  -MR    AM-PAC 6 Clicks Score (PT) 22  -CM 22  -MR    Highest level of mobility 7 --> Walked 25 feet or more  -CM 7 --> Walked 25 feet or more  -MR    Row Name 05/02/23 1556          Modified [place] Scale     Pre-Stroke Modified Kyle Scale 6 - Unable to determine (UTD) from the medical record documentation  -     Modified Kyle Scale 3 - Moderate disability.  Requiring some help, but able to walk without assistance.  -     Row Name 05/02/23 1556          Functional Assessment    Outcome Measure Options Modified Kyle  -           User Key  (r) = Recorded By, (t) = Taken By, (c) = Cosigned By    Initials Name Provider Type    CM Litzy Leavitt, PT Physical Therapist    MR Cee Savannah Latham, RN Registered Nurse                             Physical Therapy Education     Title: PT OT SLP Therapies (Done)     Topic: Physical Therapy (Done)     Point: Mobility training (Done)     Learning Progress Summary           Patient Acceptance, E,TB, VU,NR by  at 5/2/2023 1557                               User Key     Initials Effective Dates Name Provider Type Discipline     06/16/21 -  Litzy Leavitt, PT Physical Therapist PT              PT Recommendation and Plan     Plan of Care Reviewed With: patient  Outcome Evaluation: 83 yo female adm 5/2/23 for acute chest pain, weakness, soa. First 2 troponin readings elevated at 25, then 22. Third not yet taken. BP in ER was 201/89, w/ O2 sats 88%. CT head (-) for sinus infection. Pt reported in ER that she has undergone 4 rounds of abx due to sinus infection. Was also c/o in ER of chest pain radiating into LUE and L jaw. At baseline, pt lives in [facility] and is able to amb around facility w/ a friend's rollator wx. Reports she has had falls in past due to loss of balance, but has never undergone PT for this. Today, pt presents w/ mild LE weakness. Able to amb 150 ft w/ one hand held, mild gait deviations. Her BP did elevate to 195/79 after amb. Pt then began to c/o acute tingling in L side of face, LUE, and mildly in LLE. She showed no other signs of deficits in regard to neuro function (no facial drooping, no asymmetry in strength, etc). Some of her tingling in  LUE improved w/ BP returning to more normal level. Recommend back to assisted living w/ home health and rollator wx at d/c. No need for IP PT, as pt is close to her baseline level. Will sign off.     Time Calculation:    PT Charges     Row Name 05/02/23 1557             Time Calculation    Start Time 1410  -CM      Stop Time 1440  -CM      Time Calculation (min) 30 min  -CM      PT Received On 05/02/23  -CM         Time Calculation- PT    Total Timed Code Minutes- PT 0 minute(s)  -CM            User Key  (r) = Recorded By, (t) = Taken By, (c) = Cosigned By    Initials Name Provider Type    Litzy Mcmullen, PT Physical Therapist              Therapy Charges for Today     Code Description Service Date Service Provider Modifiers Qty    81745966810 HC PT EVAL MOD COMPLEXITY 4 5/2/2023 Litzy Leavitt, PT GP 1          PT G-Codes  Outcome Measure Options: Modified [place]  AM-PAC 6 Clicks Score (PT): 22  Modified [place] Scale: 3 - Moderate disability.  Requiring some help, but able to walk without assistance.  PT Discharge Summary  Anticipated Discharge Disposition (PT): assisted living, home with home health    Litzy Leavitt, PT  5/2/2023

## 2023-05-02 NOTE — CONSULTS
"Diabetes Education  Assessment/Teaching    Patient Name:  Pamela Zavala  YOB: 1940  MRN: [id number]  Admit Date:  5/2/2023      Assessment Date:  5/2/2023  Flowsheet Row Most Recent Value   General Information     Referral From: MD order  [Consult received due to A1c >7%. A1c this adm 11.0%. Adm bs 249.]   Height 167.6 cm (66\")   Height Method Stated   Weight 65 kg (143 lb 4.8 oz)   Weight Method Bed scale   Pregnancy Assessment    Diabetes History    What type of diabetes do you have? Type 2   Do you test your blood sugar at home? no  [Pt states has bs meter but it is an older meter and she has not been checking bs.]   Have you had low blood sugar? (<70mg/dl) no   Education Preferences    Nutrition Information    Assessment Topics    DM Goals           Flowsheet Row Most Recent Value   DM Education Needs    Meter Needs meter  [Instructed pt in use of [employer]k Guide Me. Pt needed some help with getting test strip from test strip container. Pt states nursing staff would be able to help pt with procedure.]   Meter Type Accuchek   Frequency of Testing 3 times a day  [Discussed checking bs 2-3 times/day and sharing results with MD.]   Blood Glucose Target Range Reviewed A1c result of 11.0%. Discussed healthy bs range and healthy A1c target. Discussed importance of bs control.   Medication Oral, Insulin, Pen  [Pt taking Tresiba 24 units in am and Metformin 1000 mg bid. Pt states she was told to take 10 units of Tresiba at hs in addition to the am dose. Pt states she was concerned she would go low so she continued taking 24 units daily.]   Problem Solving Hypoglycemia, Signs, Hyperglycemia, Symptoms, Treatment   Reducing Risks A1C testing  [Gave A1c info sheet.]   Healthy Eating --  [Pt eating 3 meals/day]   Motivation Engaged   Teaching Method Explanation, Discussion, Demonstration, Handouts   Patient Response Verbalized understanding, Needs reinforcement            Other Comments:  Met with pt at " bedside. Pt lives at [facility]. She administers her own medications/insulin shots. Pt agreeable to begin checking bs routinely and states she will have nursing staff at [facility] help her with procedure. She states hands are numb and has difficulty with using fingers/hands. Rxs started for Accuchek Guide Me meter, test strips and lancets. Pt states has PCP. Discussed importance of follow up regarding diabetes management. Pt with no additional questions at this time.       Electronically signed by:  Lisa Cadena RN  05/02/23 19:08 EDT

## 2023-05-02 NOTE — PLAN OF CARE
Goal Outcome Evaluation:  Plan of Care Reviewed With: patient           Outcome Evaluation: 81 yo female adm 5/2/23 for acute chest pain, weakness, soa. First 2 troponin readings elevated at 25, then 22. Third not yet taken. BP in ER was 201/89, w/ O2 sats 88%. CT head (-) for sinus infection. Pt reported in ER that she has undergone 4 rounds of abx due to sinus infection. Was also c/o in ER of chest pain radiating into LUE and L jaw. At baseline, pt lives in [facility] and is able to amb around facility w/ a friend's rollator wx. Reports she has had falls in past due to loss of balance, but has never undergone PT for this. Today, pt presents w/ mild LE weakness. Able to amb 150 ft w/ one hand held, mild gait deviations. Her BP did elevate to 195/79 after amb. Pt then began to c/o acute tingling in L side of face, LUE, and mildly in LLE. She showed no other signs of deficits in regard to neuro function (no facial drooping, no asymmetry in strength, etc). Some of her tingling in LUE improved w/ BP returning to more normal level. Recommend back to assisted living w/ home health and rollator wx at d/c. No need for IP PT, as pt is close to her baseline level. Will sign off.     ** NP was notified immediately after event involving onset of tingling in L face and LUE .

## 2023-05-02 NOTE — ED PROVIDER NOTES
Subjective   History of Present Illness  Chief complaint: chest pain      Context: Patient is an 82-year-old female who presents via EMS from [facility] on [facility] with complaints of intermittent chest pain for the last week that is substernal and describes it as a dull ache that radiates to her left jaw and left arm.  She states she typically has some difficulty with mobility but had increased weakness this morning without falls.  Has had some lightheadedness.  She states she has had a sinus infection for the last 6 months with 4 rounds of antibiotics and does continue to have some nasal congestion.  She states she blew her nose twice this morning and did note a blood clot but has not had a persistent bloody nose.  She states she has had 3 stents x10 years ago and reports she was told she had a 70% blockage at that time and it would eventually need to be stented but has not had any further follow-up.  She denies any swelling to her legs or feet prior history of DVT PE or exogenous hormone use.  She also states she is a diabetic and does not check her blood sugar        PCP: philip winn               Review of Systems   Constitutional: Negative for fever.   HENT: Positive for congestion.    Respiratory: Negative for shortness of breath.    Cardiovascular: Positive for chest pain. Negative for palpitations and leg swelling.   Neurological: Positive for weakness. Negative for syncope.       Past medical history significant for hypothyroidism and osteoporosis hypertension hyperlipidemia gastric ulcer arthritis GERD  CAD anemia diabetes      Home medication list per facility atenolol Plavix Flonase gabapentin lactulose lisinopril metformin Mucinex oxybutynin Protonix rosuvastatin Synthroid trazodone Tresiba Pepcid tramadol Stahist    Allergies   Allergen Reactions   • Penicillins Unknown - High Severity       History reviewed. No pertinent surgical history.    History reviewed. No pertinent family history.    Social  History     Socioeconomic History   • Marital status:    Tobacco Use   • Smoking status: Never   • Smokeless tobacco: Never   Vaping Use   • Vaping Use: Never used   Substance and Sexual Activity   • Alcohol use: Yes     Alcohol/week: 1.0 standard drink     Types: 1 Glasses of wine per week   • Drug use: Defer   • Sexual activity: Defer           Objective   Physical Exam     Vital signs and triage nurse note reviewed.   Constitutional: Awake, alert; well-developed and well-nourished.  Nontoxic in appearance  HEENT: Normocephalic, atraumatic;   with intact EOM; oropharynx is pink and moist without exudate or erythema.   Neck: Supple, full range of motion without pain; no JVD   Cardiovascular: Regular rate and rhythm, normal S1-S2.   Pulmonary: Respiratory effort regular nonlabored, breath sounds clear to auscultation all fields.   Abdomen: Soft, nontender nondistended with normoactive bowel sounds; no rebound or guarding.   Musculoskeletal: Independent range of motion of all extremities with no palpable tenderness or edema.   Neuro: Alert oriented x3, speech is clear and appropriate, GCS 15   Skin:  Fleshtone warm, dry, intact;        Procedures                 ED Course  ED Course as of 05/02/23 1206   Tue May 02, 2023   1033 Lab reports they have not yet run her CMP and troponin and are putting it on the instrument now [JW]      ED Course User Index  [JW] Lily Abebe APRN           Labs Reviewed   COMPREHENSIVE METABOLIC PANEL - Abnormal; Notable for the following components:       Result Value    Glucose 249 (*)     Calcium 8.1 (*)     All other components within normal limits    Narrative:     GFR Normal >60  Chronic Kidney Disease <60  Kidney Failure <15    The GFR formula is only valid for adults with stable renal function between ages 18 and 70.   APTT - Abnormal; Notable for the following components:    PTT 25.9 (*)     All other components within normal limits   SINGLE HSTROPONIN T - Abnormal;  Notable for the following components:    HS Troponin T 25 (*)     All other components within normal limits    Narrative:     High Sensitive Troponin T Reference Range:  <10.0 ng/L- Negative Female for AMI  <15.0 ng/L- Negative Male for AMI  >=10 - Abnormal Female indicating possible myocardial injury.  >=15 - Abnormal Male indicating possible myocardial injury.   Clinicians would have to utilize clinical acumen, EKG, Troponin, and serial changes to determine if it is an Acute Myocardial Infarction or myocardial injury due to an underlying chronic condition.        URINALYSIS W/ CULTURE IF INDICATED - Abnormal; Notable for the following components:    Glucose,  mg/dL (Trace) (*)     All other components within normal limits    Narrative:     In absence of clinical symptoms, the presence of pyuria, bacteria, and/or nitrites on the urinalysis result does not correlate with infection.  Urine microscopic not indicated.   CBC WITH AUTO DIFFERENTIAL - Abnormal; Notable for the following components:    Hemoglobin 11.3 (*)     All other components within normal limits   LIPID PANEL - Abnormal; Notable for the following components:    HDL Cholesterol 72 (*)     All other components within normal limits    Narrative:     Cholesterol Reference Ranges  (U.S. Department of Health and Human Services ATP III Classifications)    Desirable          <200 mg/dL  Borderline High    200-239 mg/dL  High Risk          >240 mg/dL      Triglyceride Reference Ranges  (U.S. Department of Health and Human Services ATP III Classifications)    Normal           <150 mg/dL  Borderline High  150-199 mg/dL  High             200-499 mg/dL  Very High        >500 mg/dL    HDL Reference Ranges  (U.S. Department of Health and Human Services ATP III Classifications)    Low     <40 mg/dl (major risk factor for CHD)  High    >60 mg/dl ('negative' risk factor for CHD)        LDL Reference Ranges  (U.S. Department of Health and Human Services ATP III  Classifications)    Optimal          <100 mg/dL  Near Optimal     100-129 mg/dL  Borderline High  130-159 mg/dL  High             160-189 mg/dL  Very High        >189 mg/dL   HEMOGLOBIN A1C - Abnormal; Notable for the following components:    Hemoglobin A1C 11.00 (*)     All other components within normal limits   PROTIME-INR - Normal   TSH - Normal   BNP (IN-HOUSE) - Normal    Narrative:     Among patients with dyspnea, NT-proBNP is highly sensitive for the detection of acute congestive heart failure. In addition NT-proBNP of <300 pg/ml effectively rules out acute congestive heart failure with 99% negative predictive value.    Results may be falsely decreased if patient taking Biotin.     MAGNESIUM - Normal   T4, FREE - Normal    Narrative:     Results may be falsely increased if patient taking Biotin.     RAINBOW DRAW    Narrative:     The following orders were created for panel order [facility] Draw.  Procedure                               Abnormality         Status                     ---------                               -----------         ------                     Green Top (Gel)[[id number]]                                  Final result               Lavender Top[[id number]]                                     Final result               Gold Top - SST[[id number]]                                   Final result               Light Blue Top[[id number]]                                   Final result                 Please view results for these tests on the individual orders.   TROPONIN   T3, FREE   POCT GLUCOSE FINGERSTICK   POCT GLUCOSE FINGERSTICK   POCT GLUCOSE FINGERSTICK   GREEN TOP   LAVENDER TOP   GOLD TOP - SST   LIGHT BLUE TOP   CBC AND DIFFERENTIAL    Narrative:     The following orders were created for panel order CBC & Differential.  Procedure                               Abnormality         Status                     ---------                               -----------         ------                      CBC Auto Differential[[id number]]        Abnormal            Final result                 Please view results for these tests on the individual orders.     Medications   sodium chloride 0.9 % flush 10 mL (has no administration in time range)   nitroglycerin (NITROSTAT) SL tablet 0.4 mg (has no administration in time range)   sodium chloride 0.9 % flush 10 mL (has no administration in time range)   sodium chloride 0.9 % flush 10 mL (has no administration in time range)   sodium chloride 0.9 % infusion 40 mL (has no administration in time range)   acetaminophen (TYLENOL) tablet 650 mg (has no administration in time range)   ondansetron (ZOFRAN) tablet 4 mg (has no administration in time range)     Or   ondansetron (ZOFRAN) injection 4 mg (has no administration in time range)   polyethylene glycol (MIRALAX) packet 17 g (has no administration in time range)     And   bisacodyl (DULCOLAX) EC tablet 5 mg (has no administration in time range)     And   bisacodyl (DULCOLAX) suppository 10 mg (has no administration in time range)   dextrose (GLUTOSE) oral gel 15 g (has no administration in time range)   dextrose (D50W) (25 g/50 mL) IV injection 25 g (has no administration in time range)   glucagon (GLUCAGEN) injection 1 mg (has no administration in time range)   insulin lispro (HUMALOG/ADMELOG) injection 2-9 Units (has no administration in time range)   aspirin chewable tablet 324 mg (324 mg Oral Given 5/2/23 0941)     XR Chest 1 View    Result Date: 5/2/2023  Impression: No acute process. Electronically Signed: Smith Mcege  5/2/2023 9:50 AM EDT  Workstation ID: [id number]    Prior to Admission medications    Medication Sig Start Date End Date Taking? Authorizing Provider   atenolol (TENORMIN) 50 MG tablet Take 1 tablet by mouth Daily.   Yes Provider, MD Andrade   Chlorcyclizine-Pseudoephed (Stahist AD) 25-60 MG tablet Take 1 tablet by mouth Every 8 (Eight) Hours As Needed.   Yes ProviderAndrade MD   clopidogrel  (PLAVIX) 75 MG tablet Take 1 tablet by mouth Daily.   Yes Andrade La MD   famotidine (PEPCID) 20 MG tablet Take 1 tablet by mouth 2 (Two) Times a Day As Needed for Heartburn.   Yes Andrade La MD   fluticasone (FLONASE) 50 MCG/ACT nasal spray 1 spray into the nostril(s) as directed by provider 2 (Two) Times a Day.   Yes Andrade La MD   Insulin Degludec (Tresiba) 100 UNIT/ML solution injection Inject 24 Units under the skin into the appropriate area as directed Every Morning.   Yes Andrade La MD   Insulin Degludec (Tresiba) 100 UNIT/ML solution injection Inject 17 Units under the skin into the appropriate area as directed Every Evening.   Yes Andrade La MD   lactulose (CEPHULAC) 10 g packet Take 2 packets by mouth Daily.   Yes Andrade La MD   levothyroxine (SYNTHROID, LEVOTHROID) 125 MCG tablet Take 1 tablet by mouth Daily.   Yes Andrade La MD   lisinopril (PRINIVIL,ZESTRIL) 5 MG tablet Take 1 tablet by mouth Daily.   Yes Andrade La MD   metFORMIN (GLUCOPHAGE) 1000 MG tablet Take 1 tablet by mouth 2 (Two) Times a Day With Meals.   Yes Andrade La MD   oxybutynin (DITROPAN) 5 MG tablet Take 1 tablet by mouth 2 (Two) Times a Day.   Yes Andrade La MD   pantoprazole (PROTONIX) 40 MG EC tablet Take 1 tablet by mouth 2 (Two) Times a Day.   Yes Andrade La MD   rosuvastatin (CRESTOR) 5 MG tablet Take 1 tablet by mouth Daily.   Yes Andrade La MD   traMADol (ULTRAM) 50 MG tablet Take 1 tablet by mouth 4 (Four) Times a Day As Needed for Moderate Pain.   Yes Andrade La MD   traZODone (DESYREL) 50 MG tablet Take 1 tablet by mouth Every Night.   Yes Andrade La MD   gabapentin (NEURONTIN) 300 MG capsule Take 1 capsule by mouth Every Night.    Andrade La MD   cefdinir (OMNICEF) 300 MG capsule Take 1 capsule by mouth 2 (Two) Times a Day. 2/14/23 5/2/23  Angel Tellez MD  "  meclizine (ANTIVERT) 25 MG tablet Take 1 tablet by mouth 3 (Three) Times a Day As Needed for Dizziness or Nausea. 2/14/23 5/2/23  Angel Telelz MD                                     Medical Decision Making      /78 (BP Location: Left arm, Patient Position: Lying)   Pulse 79   Temp 97.3 °F (36.3 °C) (Oral)   Resp 16   Ht 167.6 cm (66\")   Wt 65 kg (143 lb 4.8 oz)   SpO2 (!) 88% Comment: now on nasal cannula 2 L  BMI 23.13 kg/m²      Chart review: Patient was seen February 2023 for dizziness falls and UTI, urine culture was mixed tip    Radiology interpretation:  X-rays reviewed independently and interpreted by me: No focal infiltrates or pulmonary edema  Further interpretation by radiologist as above  Lab interpretation:  Labs all viewed by me and significant for, urine negative, initial troponin 25, glucose 249    EKG viewed and interpreted by me and corroborated by Dr. Baxter, sinus rhythm without acute ST depression or elevation                Appropriate PPE worn during exam.  Patient had an IV established labs chest x-ray EKG was obtained to evaluate for STEMI non-STEMI angina infection.  She was given aspirin, she states she took her Plavix prior to arrival.  Sublingual nitroglycerin was ordered although patient refused stating she does not have any chest pain and her jaw pain is minor.  On reexam she is sleeping and resting comfortably.  Prior cath report is unavailable but due to her reported history she had a 70% blockage approximately 10 years ago that would eventually need intervention.  She does not currently follow with cardiology locally and will be placed in observation for further evaluation and management, I discussed with Viv BRADFORD who assumed care.         i discussed findings with patient who voices understanding of placement observation  This document is intended for medical expert use only. Reading of this document by patients and/or patient's family without participating " medical staff guidance may result in misinterpretation and unintended morbidity.  Any interpretation of such data is the responsibility of the patient and/or family member responsible for the patient in concert with their primary or specialist providers, not to be left for sources of online searches such as [employer], [employer] or similar queries. Relying on these approaches to knowledge may result in misinterpretation, misguided goals of care and even death should patients or family members try recommendations outside of the realm of   professional medical care in a supervised inpatient environment.       Discussed with Dr. Baxter              Chest pain, unspecified type: acute illness or injury  Amount and/or Complexity of Data Reviewed  Labs: ordered.  Radiology: ordered.      Risk  OTC drugs.  Prescription drug management.  Decision regarding hospitalization.          Final diagnoses:   Chest pain, unspecified type       ED Disposition  ED Disposition     ED Disposition   Decision to Admit    Condition   --    Comment   --             No follow-up provider specified.       Medication List      No changes were made to your prescriptions during this visit.          Lily Abebe, APRN  05/02/23 1201

## 2023-05-02 NOTE — Clinical Note
First balloon inflation max pressure = 12 shima. First balloon inflation duration = 4 seconds. Second inflation of balloon - Max pressure = 14 shima. 2nd Inflation of balloon - Duration = 4 seconds.

## 2023-05-03 ENCOUNTER — APPOINTMENT (OUTPATIENT)
Dept: NUCLEAR MEDICINE | Facility: HOSPITAL | Age: 83
DRG: 247 | End: 2023-05-03
Payer: MEDICARE

## 2023-05-03 PROBLEM — Z87.891 FORMER SMOKER: Status: ACTIVE | Noted: 2023-05-03

## 2023-05-03 PROBLEM — E03.9 HYPOTHYROIDISM: Status: ACTIVE | Noted: 2023-05-03

## 2023-05-03 PROBLEM — I25.110 CORONARY ARTERY DISEASE INVOLVING NATIVE CORONARY ARTERY OF NATIVE HEART WITH UNSTABLE ANGINA PECTORIS: Chronic | Status: ACTIVE | Noted: 2023-05-03

## 2023-05-03 PROBLEM — R94.39 ABNORMAL NUCLEAR STRESS TEST: Status: ACTIVE | Noted: 2023-05-03

## 2023-05-03 PROBLEM — E11.9 TYPE 2 DIABETES MELLITUS: Status: ACTIVE | Noted: 2023-05-03

## 2023-05-03 PROBLEM — I10 ESSENTIAL HYPERTENSION: Status: ACTIVE | Noted: 2023-05-03

## 2023-05-03 PROBLEM — E78.5 HLD (HYPERLIPIDEMIA): Status: ACTIVE | Noted: 2023-05-03

## 2023-05-03 PROBLEM — I25.110 CORONARY ARTERY DISEASE INVOLVING NATIVE CORONARY ARTERY OF NATIVE HEART WITH UNSTABLE ANGINA PECTORIS: Status: ACTIVE | Noted: 2023-05-03

## 2023-05-03 PROBLEM — E11.40 DIABETIC NEUROPATHY: Status: ACTIVE | Noted: 2023-05-03

## 2023-05-03 LAB
ACT BLD: 233 SECONDS (ref 89–137)
ANION GAP SERPL CALCULATED.3IONS-SCNC: 12 MMOL/L (ref 5–15)
BASOPHILS # BLD AUTO: 0.1 10*3/MM3 (ref 0–0.2)
BASOPHILS NFR BLD AUTO: 1.4 % (ref 0–1.5)
BH CV NUCLEAR PRIOR STUDY: 3
BH CV REST NUCLEAR ISOTOPE DOSE: 11 MCI
BH CV STRESS BP STAGE 1: NORMAL
BH CV STRESS COMMENTS STAGE 1: NORMAL
BH CV STRESS DOSE REGADENOSON STAGE 1: 0.4
BH CV STRESS DURATION MIN STAGE 1: 0
BH CV STRESS DURATION SEC STAGE 1: 10
BH CV STRESS HR STAGE 1: 99
BH CV STRESS NUCLEAR ISOTOPE DOSE: 32.6 MCI
BH CV STRESS PROTOCOL 1: NORMAL
BH CV STRESS RECOVERY BP: NORMAL MMHG
BH CV STRESS RECOVERY HR: 94 BPM
BH CV STRESS STAGE 1: 1
BILIRUB UR QL STRIP: NEGATIVE
BUN SERPL-MCNC: 17 MG/DL (ref 8–23)
BUN/CREAT SERPL: 23 (ref 7–25)
CALCIUM SPEC-SCNC: 7.9 MG/DL (ref 8.6–10.5)
CHLORIDE SERPL-SCNC: 103 MMOL/L (ref 98–107)
CLARITY UR: ABNORMAL
CO2 SERPL-SCNC: 22 MMOL/L (ref 22–29)
COLOR UR: YELLOW
CREAT SERPL-MCNC: 0.74 MG/DL (ref 0.57–1)
DEPRECATED RDW RBC AUTO: 44.2 FL (ref 37–54)
DEPRECATED RDW RBC AUTO: 45.9 FL (ref 37–54)
EGFRCR SERPLBLD CKD-EPI 2021: 79.9 ML/MIN/1.73
EOSINOPHIL # BLD AUTO: 0.2 10*3/MM3 (ref 0–0.4)
EOSINOPHIL NFR BLD AUTO: 2.6 % (ref 0.3–6.2)
ERYTHROCYTE [DISTWIDTH] IN BLOOD BY AUTOMATED COUNT: 14.5 % (ref 12.3–15.4)
ERYTHROCYTE [DISTWIDTH] IN BLOOD BY AUTOMATED COUNT: 14.7 % (ref 12.3–15.4)
GLUCOSE BLDC GLUCOMTR-MCNC: 149 MG/DL (ref 70–105)
GLUCOSE BLDC GLUCOMTR-MCNC: 164 MG/DL (ref 70–105)
GLUCOSE BLDC GLUCOMTR-MCNC: 55 MG/DL (ref 70–105)
GLUCOSE SERPL-MCNC: 167 MG/DL (ref 65–99)
GLUCOSE UR STRIP-MCNC: ABNORMAL MG/DL
HCT VFR BLD AUTO: 33 % (ref 34–46.6)
HCT VFR BLD AUTO: 36 % (ref 34–46.6)
HGB BLD-MCNC: 10.8 G/DL (ref 12–15.9)
HGB BLD-MCNC: 11.2 G/DL (ref 12–15.9)
HGB UR QL STRIP.AUTO: ABNORMAL
KETONES UR QL STRIP: NEGATIVE
LEUKOCYTE ESTERASE UR QL STRIP.AUTO: ABNORMAL
LV EF NUC BP: 74 %
LYMPHOCYTES # BLD AUTO: 2.5 10*3/MM3 (ref 0.7–3.1)
LYMPHOCYTES NFR BLD AUTO: 28.4 % (ref 19.6–45.3)
MAXIMAL PREDICTED HEART RATE: 136 BPM
MCH RBC QN AUTO: 26.4 PG (ref 26.6–33)
MCH RBC QN AUTO: 27.7 PG (ref 26.6–33)
MCHC RBC AUTO-ENTMCNC: 31.1 G/DL (ref 31.5–35.7)
MCHC RBC AUTO-ENTMCNC: 32.7 G/DL (ref 31.5–35.7)
MCV RBC AUTO: 84.8 FL (ref 79–97)
MCV RBC AUTO: 84.9 FL (ref 79–97)
MONOCYTES # BLD AUTO: 0.6 10*3/MM3 (ref 0.1–0.9)
MONOCYTES NFR BLD AUTO: 6.5 % (ref 5–12)
NEUTROPHILS NFR BLD AUTO: 5.3 10*3/MM3 (ref 1.7–7)
NEUTROPHILS NFR BLD AUTO: 61.1 % (ref 42.7–76)
NITRITE UR QL STRIP: NEGATIVE
NRBC BLD AUTO-RTO: 0 /100 WBC (ref 0–0.2)
PERCENT MAX PREDICTED HR: 72.79 %
PH UR STRIP.AUTO: 6 [PH] (ref 5–8)
PLATELET # BLD AUTO: 298 10*3/MM3 (ref 140–450)
PLATELET # BLD AUTO: 322 10*3/MM3 (ref 140–450)
PMV BLD AUTO: 10 FL (ref 6–12)
PMV BLD AUTO: 9.8 FL (ref 6–12)
POTASSIUM SERPL-SCNC: 4.3 MMOL/L (ref 3.5–5.2)
PROT UR QL STRIP: ABNORMAL
QT INTERVAL: 423 MS
RBC # BLD AUTO: 3.89 10*6/MM3 (ref 3.77–5.28)
RBC # BLD AUTO: 4.24 10*6/MM3 (ref 3.77–5.28)
SODIUM SERPL-SCNC: 137 MMOL/L (ref 136–145)
SP GR UR STRIP: 1.02 (ref 1–1.03)
STRESS BASELINE BP: NORMAL MMHG
STRESS BASELINE HR: 64 BPM
STRESS PERCENT HR: 86 %
STRESS POST PEAK BP: NORMAL MMHG
STRESS POST PEAK HR: 99 BPM
STRESS TARGET HR: 116 BPM
UROBILINOGEN UR QL STRIP: ABNORMAL
WBC NRBC COR # BLD: 13.6 10*3/MM3 (ref 3.4–10.8)
WBC NRBC COR # BLD: 8.6 10*3/MM3 (ref 3.4–10.8)

## 2023-05-03 PROCEDURE — 93458 L HRT ARTERY/VENTRICLE ANGIO: CPT | Performed by: INTERNAL MEDICINE

## 2023-05-03 PROCEDURE — 25010000002 FENTANYL CITRATE (PF) 100 MCG/2ML SOLUTION: Performed by: INTERNAL MEDICINE

## 2023-05-03 PROCEDURE — 80048 BASIC METABOLIC PNL TOTAL CA: CPT | Performed by: INTERNAL MEDICINE

## 2023-05-03 PROCEDURE — 027034Z DILATION OF CORONARY ARTERY, ONE ARTERY WITH DRUG-ELUTING INTRALUMINAL DEVICE, PERCUTANEOUS APPROACH: ICD-10-PCS | Performed by: INTERNAL MEDICINE

## 2023-05-03 PROCEDURE — C1887 CATHETER, GUIDING: HCPCS | Performed by: INTERNAL MEDICINE

## 2023-05-03 PROCEDURE — 25510000001 IOPAMIDOL PER 1 ML: Performed by: INTERNAL MEDICINE

## 2023-05-03 PROCEDURE — B240ZZ3 ULTRASONOGRAPHY OF SINGLE CORONARY ARTERY, INTRAVASCULAR: ICD-10-PCS | Performed by: INTERNAL MEDICINE

## 2023-05-03 PROCEDURE — 81001 URINALYSIS AUTO W/SCOPE: CPT | Performed by: HOSPITALIST

## 2023-05-03 PROCEDURE — 25010000002 METOCLOPRAMIDE PER 10 MG: Performed by: HOSPITALIST

## 2023-05-03 PROCEDURE — B2111ZZ FLUOROSCOPY OF MULTIPLE CORONARY ARTERIES USING LOW OSMOLAR CONTRAST: ICD-10-PCS | Performed by: INTERNAL MEDICINE

## 2023-05-03 PROCEDURE — 99152 MOD SED SAME PHYS/QHP 5/>YRS: CPT | Performed by: INTERNAL MEDICINE

## 2023-05-03 PROCEDURE — 82948 REAGENT STRIP/BLOOD GLUCOSE: CPT

## 2023-05-03 PROCEDURE — 92978 ENDOLUMINL IVUS OCT C 1ST: CPT | Performed by: INTERNAL MEDICINE

## 2023-05-03 PROCEDURE — 78452 HT MUSCLE IMAGE SPECT MULT: CPT

## 2023-05-03 PROCEDURE — 25010000002 ONDANSETRON PER 1 MG: Performed by: INTERNAL MEDICINE

## 2023-05-03 PROCEDURE — G0378 HOSPITAL OBSERVATION PER HR: HCPCS

## 2023-05-03 PROCEDURE — 85027 COMPLETE CBC AUTOMATED: CPT | Performed by: INTERNAL MEDICINE

## 2023-05-03 PROCEDURE — 4A023N7 MEASUREMENT OF CARDIAC SAMPLING AND PRESSURE, LEFT HEART, PERCUTANEOUS APPROACH: ICD-10-PCS | Performed by: INTERNAL MEDICINE

## 2023-05-03 PROCEDURE — C1894 INTRO/SHEATH, NON-LASER: HCPCS | Performed by: INTERNAL MEDICINE

## 2023-05-03 PROCEDURE — C1725 CATH, TRANSLUMIN NON-LASER: HCPCS | Performed by: INTERNAL MEDICINE

## 2023-05-03 PROCEDURE — 85025 COMPLETE CBC W/AUTO DIFF WBC: CPT | Performed by: STUDENT IN AN ORGANIZED HEALTH CARE EDUCATION/TRAINING PROGRAM

## 2023-05-03 PROCEDURE — 25010000002 HEPARIN (PORCINE) PER 1000 UNITS: Performed by: INTERNAL MEDICINE

## 2023-05-03 PROCEDURE — C1769 GUIDE WIRE: HCPCS | Performed by: INTERNAL MEDICINE

## 2023-05-03 PROCEDURE — C1760 CLOSURE DEV, VASC: HCPCS

## 2023-05-03 PROCEDURE — 93017 CV STRESS TEST TRACING ONLY: CPT

## 2023-05-03 PROCEDURE — C1753 CATH, INTRAVAS ULTRASOUND: HCPCS | Performed by: INTERNAL MEDICINE

## 2023-05-03 PROCEDURE — 25010000002 HYDROMORPHONE 1 MG/ML SOLUTION: Performed by: HOSPITALIST

## 2023-05-03 PROCEDURE — C9600 PERC DRUG-EL COR STENT SING: HCPCS | Performed by: INTERNAL MEDICINE

## 2023-05-03 PROCEDURE — 92928 PRQ TCAT PLMT NTRAC ST 1 LES: CPT | Performed by: INTERNAL MEDICINE

## 2023-05-03 PROCEDURE — 85347 COAGULATION TIME ACTIVATED: CPT

## 2023-05-03 PROCEDURE — C1874 STENT, COATED/COV W/DEL SYS: HCPCS | Performed by: INTERNAL MEDICINE

## 2023-05-03 PROCEDURE — A9502 TC99M TETROFOSMIN: HCPCS | Performed by: EMERGENCY MEDICINE

## 2023-05-03 PROCEDURE — 99153 MOD SED SAME PHYS/QHP EA: CPT | Performed by: INTERNAL MEDICINE

## 2023-05-03 PROCEDURE — 93018 CV STRESS TEST I&R ONLY: CPT | Performed by: INTERNAL MEDICINE

## 2023-05-03 PROCEDURE — 25010000002 REGADENOSON 0.4 MG/5ML SOLUTION: Performed by: EMERGENCY MEDICINE

## 2023-05-03 PROCEDURE — 63710000001 INSULIN GLARGINE PER 5 UNITS: Performed by: INTERNAL MEDICINE

## 2023-05-03 PROCEDURE — 99223 1ST HOSP IP/OBS HIGH 75: CPT | Performed by: INTERNAL MEDICINE

## 2023-05-03 PROCEDURE — 25010000002 MIDAZOLAM PER 1 MG: Performed by: INTERNAL MEDICINE

## 2023-05-03 PROCEDURE — 78452 HT MUSCLE IMAGE SPECT MULT: CPT | Performed by: INTERNAL MEDICINE

## 2023-05-03 PROCEDURE — 0 TECHNETIUM TETROFOSMIN KIT: Performed by: EMERGENCY MEDICINE

## 2023-05-03 DEVICE — XIENCE SKYPOINT™ EVEROLIMUS ELUTING CORONARY STENT SYSTEM 2.25 MM X 33 MM / RAPID-EXCHANGE
Type: IMPLANTABLE DEVICE | Site: CORONARY | Status: FUNCTIONAL
Brand: XIENCE SKYPOINT™

## 2023-05-03 RX ORDER — ASPIRIN 81 MG/1
81 TABLET ORAL DAILY
Status: DISCONTINUED | OUTPATIENT
Start: 2023-05-03 | End: 2023-05-04 | Stop reason: HOSPADM

## 2023-05-03 RX ORDER — REGADENOSON 0.08 MG/ML
0.4 INJECTION, SOLUTION INTRAVENOUS
Status: COMPLETED | OUTPATIENT
Start: 2023-05-03 | End: 2023-05-03

## 2023-05-03 RX ORDER — ONDANSETRON 2 MG/ML
4 INJECTION INTRAMUSCULAR; INTRAVENOUS EVERY 6 HOURS PRN
Status: DISCONTINUED | OUTPATIENT
Start: 2023-05-03 | End: 2023-05-04 | Stop reason: HOSPADM

## 2023-05-03 RX ORDER — METOCLOPRAMIDE HYDROCHLORIDE 5 MG/ML
10 INJECTION INTRAMUSCULAR; INTRAVENOUS ONCE
Status: COMPLETED | OUTPATIENT
Start: 2023-05-03 | End: 2023-05-03

## 2023-05-03 RX ORDER — ONDANSETRON 4 MG/1
4 TABLET, FILM COATED ORAL EVERY 6 HOURS PRN
Status: DISCONTINUED | OUTPATIENT
Start: 2023-05-03 | End: 2023-05-04 | Stop reason: HOSPADM

## 2023-05-03 RX ORDER — SODIUM CHLORIDE 9 MG/ML
INJECTION, SOLUTION INTRAVENOUS
Status: COMPLETED | OUTPATIENT
Start: 2023-05-03 | End: 2023-05-03

## 2023-05-03 RX ORDER — FENTANYL CITRATE 50 UG/ML
INJECTION, SOLUTION INTRAMUSCULAR; INTRAVENOUS
Status: DISCONTINUED | OUTPATIENT
Start: 2023-05-03 | End: 2023-05-03 | Stop reason: HOSPADM

## 2023-05-03 RX ORDER — HEPARIN SODIUM 1000 [USP'U]/ML
INJECTION, SOLUTION INTRAVENOUS; SUBCUTANEOUS
Status: DISCONTINUED | OUTPATIENT
Start: 2023-05-03 | End: 2023-05-03 | Stop reason: HOSPADM

## 2023-05-03 RX ORDER — MIDAZOLAM HYDROCHLORIDE 1 MG/ML
INJECTION INTRAMUSCULAR; INTRAVENOUS
Status: DISCONTINUED | OUTPATIENT
Start: 2023-05-03 | End: 2023-05-03 | Stop reason: HOSPADM

## 2023-05-03 RX ORDER — SODIUM CHLORIDE 0.9 % (FLUSH) 0.9 %
3 SYRINGE (ML) INJECTION EVERY 12 HOURS SCHEDULED
Status: DISCONTINUED | OUTPATIENT
Start: 2023-05-03 | End: 2023-05-04 | Stop reason: HOSPADM

## 2023-05-03 RX ORDER — LIDOCAINE HYDROCHLORIDE 20 MG/ML
INJECTION, SOLUTION INFILTRATION; PERINEURAL
Status: DISCONTINUED | OUTPATIENT
Start: 2023-05-03 | End: 2023-05-03 | Stop reason: HOSPADM

## 2023-05-03 RX ORDER — SODIUM CHLORIDE 9 MG/ML
40 INJECTION, SOLUTION INTRAVENOUS AS NEEDED
Status: DISCONTINUED | OUTPATIENT
Start: 2023-05-03 | End: 2023-05-04 | Stop reason: HOSPADM

## 2023-05-03 RX ORDER — SODIUM CHLORIDE 0.9 % (FLUSH) 0.9 %
3-10 SYRINGE (ML) INJECTION AS NEEDED
Status: DISCONTINUED | OUTPATIENT
Start: 2023-05-03 | End: 2023-05-04 | Stop reason: HOSPADM

## 2023-05-03 RX ORDER — CLOPIDOGREL BISULFATE 75 MG/1
TABLET ORAL
Status: DISCONTINUED | OUTPATIENT
Start: 2023-05-03 | End: 2023-05-03 | Stop reason: HOSPADM

## 2023-05-03 RX ORDER — ACETAMINOPHEN 325 MG/1
650 TABLET ORAL EVERY 4 HOURS PRN
Status: DISCONTINUED | OUTPATIENT
Start: 2023-05-03 | End: 2023-05-04 | Stop reason: HOSPADM

## 2023-05-03 RX ORDER — DIPHENHYDRAMINE HCL 25 MG
25 CAPSULE ORAL EVERY 6 HOURS PRN
Status: DISCONTINUED | OUTPATIENT
Start: 2023-05-03 | End: 2023-05-04 | Stop reason: HOSPADM

## 2023-05-03 RX ADMIN — OXYBUTYNIN CHLORIDE 5 MG: 5 TABLET ORAL at 10:10

## 2023-05-03 RX ADMIN — TETROFOSMIN 1 DOSE: 1.38 INJECTION, POWDER, LYOPHILIZED, FOR SOLUTION INTRAVENOUS at 08:30

## 2023-05-03 RX ADMIN — CLOPIDOGREL BISULFATE 75 MG: 75 TABLET ORAL at 10:10

## 2023-05-03 RX ADMIN — ASPIRIN 81 MG: 81 TABLET, COATED ORAL at 12:40

## 2023-05-03 RX ADMIN — Medication 10 ML: at 22:23

## 2023-05-03 RX ADMIN — OXYBUTYNIN CHLORIDE 5 MG: 5 TABLET ORAL at 22:16

## 2023-05-03 RX ADMIN — METOCLOPRAMIDE 10 MG: 5 INJECTION, SOLUTION INTRAMUSCULAR; INTRAVENOUS at 22:55

## 2023-05-03 RX ADMIN — PANTOPRAZOLE SODIUM 40 MG: 40 TABLET, DELAYED RELEASE ORAL at 22:16

## 2023-05-03 RX ADMIN — ROSUVASTATIN CALCIUM 5 MG: 5 TABLET, COATED ORAL at 10:10

## 2023-05-03 RX ADMIN — INSULIN GLARGINE 17 UNITS: 100 INJECTION, SOLUTION SUBCUTANEOUS at 22:17

## 2023-05-03 RX ADMIN — LEVOTHYROXINE SODIUM 125 MCG: 0.12 TABLET ORAL at 10:10

## 2023-05-03 RX ADMIN — REGADENOSON 0.4 MG: 0.08 INJECTION, SOLUTION INTRAVENOUS at 08:30

## 2023-05-03 RX ADMIN — Medication 3 ML: at 22:23

## 2023-05-03 RX ADMIN — LISINOPRIL 5 MG: 5 TABLET ORAL at 10:10

## 2023-05-03 RX ADMIN — TRAZODONE HYDROCHLORIDE 50 MG: 50 TABLET ORAL at 22:16

## 2023-05-03 RX ADMIN — HYDROMORPHONE HYDROCHLORIDE 0.25 MG: 1 INJECTION, SOLUTION INTRAMUSCULAR; INTRAVENOUS; SUBCUTANEOUS at 23:21

## 2023-05-03 RX ADMIN — ONDANSETRON 4 MG: 2 INJECTION INTRAMUSCULAR; INTRAVENOUS at 22:17

## 2023-05-03 RX ADMIN — TETROFOSMIN 1 DOSE: 1.38 INJECTION, POWDER, LYOPHILIZED, FOR SOLUTION INTRAVENOUS at 07:03

## 2023-05-03 RX ADMIN — DEXTROSE MONOHYDRATE 25 G: 25 INJECTION, SOLUTION INTRAVENOUS at 07:35

## 2023-05-03 RX ADMIN — PANTOPRAZOLE SODIUM 40 MG: 40 TABLET, DELAYED RELEASE ORAL at 10:10

## 2023-05-03 RX ADMIN — Medication 10 ML: at 08:00

## 2023-05-03 RX ADMIN — GABAPENTIN 300 MG: 300 CAPSULE ORAL at 22:17

## 2023-05-03 RX ADMIN — ATENOLOL 50 MG: 50 TABLET ORAL at 10:10

## 2023-05-03 NOTE — ACP (ADVANCE CARE PLANNING)
Patient requested information regarding Advanced Care Planning. CM discussed the importance of Advanced Care Planning and appointing a Health Care Representative that can make decisions if patient is unable to make their own health care decisions. CM explained that the Health Care Representative decisions include but are not limited to: agreeing to medical treatment, refusing medical treatment, stopping medical treatment, and arranging comfort care. Patient aware that the Health Care Representative will be appointed if the physician deems patient unable to make their own decisions. Patient verbalized understanding.     Patient listed their primary Health Care Representative. Patient listed their backup Health Care Representative if the primary Health Care Representative was not able or available to act in patient's behalf.     Patient would NOT like to continue treatment if physician believes that patient will not recover.     Patient stated both Health Care Representatives are aware of the decisions patient has made.      CM signed Advance Directive. CM had 2nd witness sign Advance Directive. CM made a copy for patient. CM made a copy for patient's paper chart. CM faxed copy to [facility]. CM updated HCR in patient's demographics.       Jeanette Lopez RN, BSN  Obs/[facility]uy   [address] [address]  Phone: [phone]  Fax: [phone]

## 2023-05-03 NOTE — H&P
[facility] H&P    Patient Name: Pamela Zavala  : 1940  MRN: [id number]  Primary Care Physician: Tracy Jeffers APRN  Date of admission: 2023     Patient Care Team:  Tracy Jeffers APRN as PCP - General (Nurse Practitioner)          Subjective   History Present Illness     Chief Complaint:   Chief Complaint   Patient presents with   • Chest Pain       History of Present Illness  Obtained from ED provider HPI on 2023:  Context: Patient is an 82-year-old female who presents via EMS from [facility] with complaints of intermittent chest pain for the last week that is substernal and describes it as a dull ache that radiates to her left jaw and left arm.  She states she typically has some difficulty with mobility but had increased weakness this morning without falls.  Has had some lightheadedness.  She states she has had a sinus infection for the last 6 months with 4 rounds of antibiotics and does continue to have some nasal congestion.  She states she blew her nose twice this morning and did note a blood clot but has not had a persistent bloody nose.  She states she has had 3 stents x10 years ago and reports she was told she had a 70% blockage at that time and it would eventually need to be stented but has not had any further follow-up.  She denies any swelling to her legs or feet prior history of DVT PE or exogenous hormone use.  She also states she is a diabetic and does not check her blood sugar     23:  Patient confirms the HPI noted above including several week history of intermittent episodes of dull chest discomfort.  Pain occurs without provoking or palliative factors and is noted as substernal at times radiating towards her left arm and on the day of presentation also radiating on the left side of her face and towards her jaw.  She also notes significant fatigue as well as a chronic nonproductive cough which she relates to some postnasal drip.  Additionally patient  reports she no longer checks her blood sugar and does not follow a diabetic diet closely.      ROS   Review of Systems   Constitutional: Negative.   HENT: Negative.    Eyes: Negative.    Cardiovascular: Positive for chest pain.   Respiratory: Positive for cough. Negative for sputum production.    Skin: Negative.    Musculoskeletal: Negative.    Gastrointestinal: Negative.    Genitourinary: Negative.    Neurological: Negative.    Psychiatric/Behavioral: Negative.        Personal History     Past Medical History:   Past Medical History:   Diagnosis Date   • CAD (coronary artery disease)    • Diabetic neuropathy    • Essential hypertension    • HLD (hyperlipidemia)    • Hypothyroidism    • Recurrent sinusitis    • Renal disease    • Thyroid cancer    • Type 2 diabetes mellitus        Surgical History:      Past Surgical History:   Procedure Laterality Date   • CORONARY STENT PLACEMENT      x 3   • HYSTERECTOMY     • THYROIDECTOMY             Family History: family history includes Cancer in her father; Heart disease in her maternal grandmother and sister. Otherwise pertinent FHx was reviewed and unremarkable.     Social History:  reports that she quit smoking about 23 years ago. Her smoking use included cigarettes. She has a 60.00 pack-year smoking history. She has never used smokeless tobacco. She reports current alcohol use of about 1.0 standard drink per week. Drug use questions deferred to the physician.      Medications:  Prior to Admission medications    Medication Sig Start Date End Date Taking? Authorizing Provider   atenolol (TENORMIN) 50 MG tablet Take 1 tablet by mouth Daily.   Yes ProviderAndrade MD   Chlorcyclizine-Pseudoephed (Stahist AD) 25-60 MG tablet Take 1 tablet by mouth Every 8 (Eight) Hours As Needed.   Yes Andrade La MD   clopidogrel (PLAVIX) 75 MG tablet Take 1 tablet by mouth Daily.   Yes ProviderAndrade MD   famotidine (PEPCID) 20 MG tablet Take 1 tablet by mouth 2 (Two)  Times a Day As Needed for Heartburn.   Yes Andrade La MD   fluticasone (FLONASE) 50 MCG/ACT nasal spray 1 spray into the nostril(s) as directed by provider 2 (Two) Times a Day.   Yes Andrade La MD   Insulin Degludec (Tresiba) 100 UNIT/ML solution injection Inject 24 Units under the skin into the appropriate area as directed Every Morning.   Yes Andrade La MD   Insulin Degludec (Tresiba) 100 UNIT/ML solution injection Inject 17 Units under the skin into the appropriate area as directed Every Evening.   Yes Andrade La MD   lactulose (CEPHULAC) 10 g packet Take 2 packets by mouth Daily.   Yes Andrade La MD   levothyroxine (SYNTHROID, LEVOTHROID) 125 MCG tablet Take 1 tablet by mouth Daily.   Yes Andrade La MD   lisinopril (PRINIVIL,ZESTRIL) 5 MG tablet Take 1 tablet by mouth Daily.   Yes Andrade La MD   metFORMIN (GLUCOPHAGE) 1000 MG tablet Take 1 tablet by mouth 2 (Two) Times a Day With Meals.   Yes Andrade La MD   oxybutynin (DITROPAN) 5 MG tablet Take 1 tablet by mouth 2 (Two) Times a Day.   Yes Andrade La MD   pantoprazole (PROTONIX) 40 MG EC tablet Take 1 tablet by mouth 2 (Two) Times a Day.   Yes Andrade La MD   rosuvastatin (CRESTOR) 5 MG tablet Take 1 tablet by mouth Daily.   Yes Andrade La MD   traMADol (ULTRAM) 50 MG tablet Take 1 tablet by mouth 4 (Four) Times a Day As Needed for Moderate Pain.   Yes Andrade La MD   traZODone (DESYREL) 50 MG tablet Take 1 tablet by mouth Every Night.   Yes Andrade La MD   gabapentin (NEURONTIN) 300 MG capsule Take 1 capsule by mouth Every Night.    ProviderAndrade MD       Allergies:    Allergies   Allergen Reactions   • Penicillins Unknown - High Severity       Objective   Objective     Vital Signs  Temp:  [97.8 °F (36.6 °C)-98.1 °F (36.7 °C)] 97.8 °F (36.6 °C)  Heart Rate:  [60-85] 70  Resp:  [16-18] 16  BP: (126-168)/(54-81)  147/73  SpO2:  [96 %-100 %] 98 %  on  Flow (L/min):  [2] 2;   Device (Oxygen Therapy): room air  Body mass index is 24.2 kg/m².    Physical Exam  Physical Exam  Vitals reviewed.   Constitutional:       General: She is not in acute distress.     Appearance: Normal appearance. She is normal weight. She is not ill-appearing, toxic-appearing or diaphoretic.   HENT:      Head: Normocephalic.      Right Ear: External ear normal.      Left Ear: External ear normal.      Nose: Nose normal.      Mouth/Throat:      Mouth: Mucous membranes are moist.   Eyes:      Extraocular Movements: Extraocular movements intact.   Cardiovascular:      Rate and Rhythm: Normal rate and regular rhythm.      Pulses: Normal pulses.   Pulmonary:      Effort: Pulmonary effort is normal.      Breath sounds: Normal breath sounds.   Abdominal:      General: Bowel sounds are normal.      Palpations: Abdomen is soft.   Musculoskeletal:         General: Normal range of motion.      Cervical back: Normal range of motion.      Right lower leg: No edema.      Left lower leg: No edema.   Skin:     General: Skin is warm and dry.      Capillary Refill: Capillary refill takes less than 2 seconds.   Neurological:      General: No focal deficit present.      Mental Status: She is alert.   Psychiatric:         Mood and Affect: Mood normal.         Behavior: Behavior normal.         Thought Content: Thought content normal.         Judgment: Judgment normal.     Results Review:  I have personally reviewed most recent cardiac tracings, lab results and radiology images and interpretations and agree with findings, most notably: Troponin, proBNP, D-dimer, chest x-ray, EKG, lipid panel, CBC, CMP    Results from last 7 days   Lab Units 05/02/23  0915   WBC 10*3/mm3 7.40   HEMOGLOBIN g/dL 11.3*   HEMATOCRIT % 34.9   PLATELETS 10*3/mm3 295   INR  0.97     Results from last 7 days   Lab Units 05/02/23  1232 05/02/23  0915   SODIUM mmol/L  --  143   POTASSIUM mmol/L  --  4.7    CHLORIDE mmol/L  --  105   CO2 mmol/L  --  26.0   BUN mg/dL  --  17   CREATININE mg/dL  --  0.93   GLUCOSE mg/dL  --  249*   CALCIUM mg/dL  --  8.1*   ALT (SGPT) U/L  --  11   AST (SGOT) U/L  --  16   HSTROP T ng/L 22* 25*   PROBNP pg/mL  --  308.5     Estimated Creatinine Clearance: 48.3 mL/min (by C-G formula based on SCr of 0.93 mg/dL).  Brief Urine Lab Results  (Last result in the past 365 days)      Color   Clarity   Blood   Leuk Est   Nitrite   Protein   CREAT   Urine HCG        05/02/23 0941 Yellow   Clear   Negative   Negative   Negative   Negative                 Microbiology Results (last 10 days)     ** No results found for the last 240 hours. **          ECG/EMG Results (most recent)     Procedure Component Value Units Date/Time    ECG 12 Lead Chest Pain [[id number]] Collected: 05/02/23 0903     Updated: 05/03/23 0623     QT Interval 423 ms     Narrative:      HEART RATE= 71  bpm  RR Interval= 840  ms  NM Interval= 187  ms  P Horizontal Axis= 14  deg  P Front Axis= 61  deg  QRSD Interval= 97  ms  QT Interval= 423  ms  QRS Axis= 19  deg  T Wave Axis= 45  deg  - NORMAL ECG -  Sinus rhythm  No previous ECG available for comparison  Electronically Signed By: Freddy Baxter ([facility]) 03-May-2023 06:23:33  Date and Time of Study: 2023-05-02 09:03:55    SCANNED - TELEMETRY   [[id number]] Resulted: 05/02/23     Updated: 05/03/23 0702    SCANNED - TELEMETRY   [[id number]] Resulted: 05/02/23     Updated: 05/03/23 0705                  XR Chest 1 View    Result Date: 5/2/2023  Impression: No acute process. Electronically Signed: Smith Mcgee  5/2/2023 9:50 AM EDT  Workstation ID: [id number]    CT Facial Bones Without Contrast    Result Date: 5/2/2023  Impression: 1. No abscess or osteomyelitis within the face. Electronically Signed: Eligio Rueda  5/2/2023 2:02 PM EDT  Workstation ID: [id number]        Estimated Creatinine Clearance: 48.3 mL/min (by C-G formula based on SCr of 0.93 mg/dL).    Assessment & Plan   Assessment/Plan        Active Hospital Problems    Diagnosis  POA   • **Chest pain [R07.9]  Yes   • Coronary artery disease involving native coronary artery of native heart with unstable angina pectoris [I25.110]  Yes   • Abnormal nuclear stress test [R94.39]  Yes   • Former smoker [Z87.891]  Not Applicable   • Type 2 diabetes mellitus [E11.9]  Yes   • Diabetic neuropathy [E11.40]  Yes   • Essential hypertension [I10]  Yes   • HLD (hyperlipidemia) [E78.5]  Yes   • Postoperative hypothyroidism [E89.0]  Yes      Resolved Hospital Problems   No resolved problems to display.     Chest pain        Lab Results   Component Value Date     TROPONINT 22 (H) 05/02/2023     TROPONINT 25 (H) 05/02/2023   -proBNP: 308.5  -D-dimer: 0.79  -Chest X-ray: No acute process  -EKG showed sinus rhythm at 71 without obvious acute changes though some baseline artifact is present with a QTc of 462  -Lipid panel showed a total cholesterol of 146 with an LDL of 55 and an HDL of 72  -Stress Test showed a hyperdynamic EF greater than 70% with abnormal left ventricular wall motion consistent with moderate hypokinesis of the inferior wall with perfusion imaging showing a large size infarct in the inferior wall with moderate candy-infarct ischemia consistent with a high risk study  -Cardiology consulted, cardiac catheterization recommended  -Telemetry  -NPO  -Continue Plavix  -Hospitalist consult     Diabetes mellitus  -Poorly controlled         Lab Results   Component Value Date     GLUCOSE 249 (H) 05/02/2023     GLUCOSE 110 (H) 02/13/2023   -Hold metformin  -Basal, prandial and correctional insulin ordered  -Diabetic diet  -Monitor AC and HS  -Diabetes educator    Hypertension  -Well controlled   BP Readings from Last 1 Encounters:   05/03/23 147/73   - Continue atenolol and lisinopril  - Monitor while admitted     Hypothyroidism  -Levothyroxine    GERD  -Pepcid            VTE Prophylaxis -   Mechanical Order History:      Ordered        05/02/23 1115  Place  Sequential Compression Device  Once            05/02/23 1115  Maintain Sequential Compression Device  Continuous                    Pharmalogical Order History:     None          CODE STATUS:    Code Status and Medical Interventions:   Ordered at: 05/02/23 1101     Level Of Support Discussed With:    Patient     Code Status (Patient has no pulse and is not breathing):    CPR (Attempt to Resuscitate)     Medical Interventions (Patient has pulse or is breathing):    Full Support       This patient has been examined wearing personal protective equipment.     I discussed the patient's findings and my recommendations with patient and nursing staff.      Signature:Electronically signed by Lang Mendoza PA-C, 05/03/23, 12:38 PM EDT.

## 2023-05-03 NOTE — CASE MANAGEMENT/SOCIAL WORK
Discharge Planning Assessment   [place]     Patient Name: Pamela Zavala  MRN: [id number]  Today's Date: 5/3/2023    Admit Date: 5/2/2023    Plan: Return to [facility] [place].   Discharge Needs Assessment     Row Name 05/03/23 1356       Living Environment    People in Home other (see comments)  [facility] [place]    Current Living Arrangements assisted living facility    Primary Care Provided by self    Provides Primary Care For no one    Family Caregiver if Needed child([place]), adult;grandchild([place]), adult    Family Caregiver Names Gianna and Markus    Quality of Family Relationships helpful;involved;supportive    Able to Return to Prior Arrangements yes       Resource/Environmental Concerns    Resource/Environmental Concerns none    Transportation Concerns none       Transition Planning    Patient/Family Anticipates Transition to home  [facility] [place]    Patient/Family Anticipated Services at Transition none    Transportation Anticipated family or friend will provide       Discharge Needs Assessment    Readmission Within the Last 30 Days no previous admission in last 30 days    Equipment Currently Used at Home cane, straight;rollator;walker, rolling;glucometer    Concerns to be Addressed discharge planning    Anticipated Changes Related to Illness none    Equipment Needed After Discharge none               Discharge Plan     Row Name 05/03/23 6400       Plan    Plan Return to [facility] [place].    Patient/Family in Agreement with Plan yes    Plan Comments Patient lives at [facility] [place]. Patient able to return and would like to return. Patient does not drive, patient's son will transport at discharge. Patient performs ADL. PCP and pharmacy confirmed. Denies financial assistance needs for medication and/or food. Denies HH and/or rehab needs, patient states she does yoga weekly at [facility]. CM reviewed ACP with patient, please refer to note. D/C barriers: 5/3-cardiac cath               Demographic  Summary     Row Name 05/03/23 1355       General Information    Admission Type observation    Arrived From emergency department    Required Notices Provided Observation Status Notice    Referral Source admission list    Reason for Consult discharge planning;health care directive    Preferred Language English               Functional Status     Row Name 05/03/23 1356       Functional Status    Usual Activity Tolerance good    Current Activity Tolerance good       Functional Status, IADL    Medications independent    Meal Preparation independent    Housekeeping independent    Laundry independent    Shopping independent              Met with patient in room.    Maintained distance greater than six feet and spent less than 15 minutes in the room.    Jeanette Lopez RN, BSN  Obs/3C/Float   [address] [address]  Phone: [phone]  Fax: [phone]

## 2023-05-03 NOTE — CONSULTS
Referring Provider: Freddy Baxter MD    Reason for Consultation:  Chest pain, abnormal stress test      Patient Care Team:  Tracy Jeffers APRN as PCP - General (Nurse Practitioner)      SUBJECTIVE     Chief Complaint:  Chest pain    History of present illness:  Pamela Zavala is a 82 y.o. female with a history of coronary artery disease status post stent placement x 3 approximately 10 years ago, hypertension, hyperlipidemia, Type 2 diabetes mellitus, former tobacco abuse, thyroid cancer status post thyroidectomy, and postoperative hypothyroidism who presented to the ER at [facility] on 5/2/2023 complaining of chest pain. The patient reports a 1-month history of intermittent chest discomfort.  She says it comes and goes and noticed that last night it worsened.  She lives in an assisted living facility and has noticed that she has been able to do less over the last month.  She states last night the pain radiated into her left arm and left jaw which was new for her. She denies any current chest pain, but reports numbness in her left jaw. She denies any shortness of breath, diaphoresis, dizziness, nausea or vomiting.     She states when she had her 3 stents placed approximately 10 years ago she was told one of her arteries had a 70% blockage and that she would need a stent once it got to 80% or more. She states she moved to the area in Jan. 2023 and has not established care with a cardiologist yet.     Workup in the ER revealed flat high sensitivity troponin of 25 and 22. Her EKG showed no acute findings. She was admitted for further evaluation. After admission the patient had a nuclear stress test which showed abnormal LV wall motion consistent with moderate hypokinesis of the inferior wall and a large-sized infarct located in the inferior wall with moderate candy-infarct ischemia. Cardiology was consulted for cardiac catheterization.     When I saw the patient she was chest pain-free but continues to  have jaw numbness.  She does not know if this is similar to her presentation 10 years ago.  Denies any diaphoresis or numbness or tingling in her arms.      Review of systems:  REVIEW OF SYSTEMS:    Constitutional: No weakness,fatigue, fever, rigors, chills   Eyes: No vision changes, eye pain   ENT/oropharynx: No difficulty swallowing, sore throat, epistaxis, changes in hearing   Cardiovascular: +chest pain, chest tightness, palpitations, paroxysmal nocturnal dyspnea, orthopnea, diaphoresis, dizziness / syncopal episode   Respiratory: No shortness of breath, dyspnea on exertion, cough, wheezing hemoptysis   Gastrointestinal: No abdominal pain, nausea, vomiting, diarrhea, bloody stools   Genitourinary: No hematuria, dysuria   Neurological: No headache, tremors, numbness,  one-sided weakness    Musculoskeletal:  Left-sided jaw and neck pain   Integument: No rash, edema         Personal History:      Past Medical History:   Diagnosis Date   • CAD (coronary artery disease)    • Diabetic neuropathy    • Essential hypertension    • HLD (hyperlipidemia)    • Hypothyroidism    • Recurrent sinusitis    • Renal disease    • Thyroid cancer    • Type 2 diabetes mellitus        Past Surgical History:   Procedure Laterality Date   • CORONARY STENT PLACEMENT      x 3   • HYSTERECTOMY     • THYROIDECTOMY         Family History   Problem Relation Age of Onset   • Cancer Father    • Heart disease Sister    • Heart disease Maternal Grandmother        Social History     Tobacco Use   • Smoking status: Former     Packs/day: 2.00     Years: 30.00     Pack years: 60.00     Types: Cigarettes     Quit date:      Years since quittin.3   • Smokeless tobacco: Never   Vaping Use   • Vaping Use: Never used   Substance Use Topics   • Alcohol use: Yes     Alcohol/week: 1.0 standard drink     Types: 1 Glasses of wine per week   • Drug use: Defer        Medications Prior to Admission   Medication Sig Dispense Refill Last Dose   • atenolol  (TENORMIN) 50 MG tablet Take 1 tablet by mouth Daily.   5/2/2023   • Chlorcyclizine-Pseudoephed (Stahist AD) 25-60 MG tablet Take 1 tablet by mouth Every 8 (Eight) Hours As Needed.      • clopidogrel (PLAVIX) 75 MG tablet Take 1 tablet by mouth Daily.   5/2/2023   • famotidine (PEPCID) 20 MG tablet Take 1 tablet by mouth 2 (Two) Times a Day As Needed for Heartburn.      • fluticasone (FLONASE) 50 MCG/ACT nasal spray 1 spray into the nostril(s) as directed by provider 2 (Two) Times a Day.      • Insulin Degludec (Tresiba) 100 UNIT/ML solution injection Inject 24 Units under the skin into the appropriate area as directed Every Morning.   5/2/2023   • Insulin Degludec (Tresiba) 100 UNIT/ML solution injection Inject 17 Units under the skin into the appropriate area as directed Every Evening.      • lactulose (CEPHULAC) 10 g packet Take 2 packets by mouth Daily.   5/2/2023   • levothyroxine (SYNTHROID, LEVOTHROID) 125 MCG tablet Take 1 tablet by mouth Daily.   5/2/2023   • lisinopril (PRINIVIL,ZESTRIL) 5 MG tablet Take 1 tablet by mouth Daily.   5/2/2023   • metFORMIN (GLUCOPHAGE) 1000 MG tablet Take 1 tablet by mouth 2 (Two) Times a Day With Meals.   5/2/2023   • oxybutynin (DITROPAN) 5 MG tablet Take 1 tablet by mouth 2 (Two) Times a Day.   5/2/2023   • pantoprazole (PROTONIX) 40 MG EC tablet Take 1 tablet by mouth 2 (Two) Times a Day.   5/2/2023   • rosuvastatin (CRESTOR) 5 MG tablet Take 1 tablet by mouth Daily.   5/2/2023   • traMADol (ULTRAM) 50 MG tablet Take 1 tablet by mouth 4 (Four) Times a Day As Needed for Moderate Pain.      • traZODone (DESYREL) 50 MG tablet Take 1 tablet by mouth Every Night.      • gabapentin (NEURONTIN) 300 MG capsule Take 1 capsule by mouth Every Night.           Allergies:     Penicillins    Scheduled Meds:atenolol, 50 mg, Oral, Daily  clopidogrel, 75 mg, Oral, Daily  gabapentin, 300 mg, Oral, Nightly  insulin glargine, 17 Units, Subcutaneous, Nightly  insulin glargine, 24 Units,  "Subcutaneous, Daily  insulin lispro, 2-9 Units, Subcutaneous, TID With Meals  lactulose, 10 g, Oral, Daily  levothyroxine, 125 mcg, Oral, Daily  lisinopril, 5 mg, Oral, Daily  oxybutynin, 5 mg, Oral, BID  pantoprazole, 40 mg, Oral, BID  rosuvastatin, 5 mg, Oral, Daily  sodium chloride, 10 mL, Intravenous, Q12H  traZODone, 50 mg, Oral, Nightly      Continuous Infusions:   PRN Meds:•  acetaminophen  •  polyethylene glycol **AND** bisacodyl **AND** bisacodyl  •  dextrose  •  dextrose  •  famotidine  •  glucagon (human recombinant)  •  nitroglycerin  •  ondansetron **OR** ondansetron  •  [COMPLETED] Insert Peripheral IV **AND** sodium chloride  •  sodium chloride  •  sodium chloride  •  traMADol      OBJECTIVE    Vital Signs  Vitals:    05/03/23 0745 05/03/23 0753 05/03/23 0800 05/03/23 1010   BP:  128/72  150/67   BP Location:  Right arm  Right arm   Patient Position:  Lying  Sitting   Pulse: 74 60 84    Resp:       Temp:       TempSrc:       SpO2:       Weight:       Height:           Flowsheet Rows    Flowsheet Row First Filed Value   Admission Height 170.2 cm (67\") Documented at 05/02/2023 0855   Admission Weight 61.2 kg (135 lb) Documented at 05/02/2023 0855            Intake/Output Summary (Last 24 hours) at 5/3/2023 1039  Last data filed at 5/3/2023 1010  Gross per 24 hour   Intake 450 ml   Output 450 ml   Net 0 ml        Telemetry: Sinus rhythm    Physical Exam:  The patient is alert, oriented and in no distress.  Vital signs as noted above.  Head and neck revealed no carotid bruits or jugular venous distention.  No thyromegaly or lymph adenopathy is present  Lungs clear.  No wheezing.  Breath sounds are normal bilaterally.  Heart normal first and second heart sounds.No murmur.  No precordial rub is present.  No gallop is present.  Abdomen soft and nontender.  No organomegaly is present.  Extremities with good peripheral pulses without any pedal edema.  Skin warm and dry.  Musculoskeletal system is grossly " normal  CNS grossly normal        Results Review:  I have personally reviewed the results from the time of this admission to 5/3/2023 10:39 EDT and agree with these findings:  [x]  Laboratory  []  Microbiology  [x]  Radiology  [x]  EKG/Telemetry   [x]  Cardiology/Vascular   []  Pathology  [x]  Old records  []  Other:    Most notable findings include:     Lab Results (last 24 hours)     Procedure Component Value Units Date/Time    POC Glucose Once [[id number]]  (Abnormal) Collected: 05/03/23 0752    Specimen: Blood Updated: 05/03/23 0754     Glucose 164 mg/dL      Comment: Serial Number: [id number]:  [id number]       POC Glucose Once [[id number]]  (Abnormal) Collected: 05/03/23 0724    Specimen: Blood Updated: 05/03/23 0726     Glucose 55 mg/dL      Comment: Serial Number: [id number]:  [id number]       POC Glucose Once [[id number]]  (Abnormal) Collected: 05/02/23 2108    Specimen: Blood Updated: 05/02/23 2109     Glucose 156 mg/dL      Comment: Serial Number: [id number]:  [id number]       POC Glucose Once [[id number]]  (Abnormal) Collected: 05/02/23 1636    Specimen: Blood Updated: 05/02/23 1638     Glucose 130 mg/dL      Comment: Serial Number: [id number]:  [id number]       T3, Free [[id number]]  (Abnormal) Collected: 05/02/23 0915    Specimen: Blood Updated: 05/02/23 1632     T3, Free 1.98 pg/mL     Narrative:      Results may be falsely increased if patient taking Biotin.      High Sensitivity Troponin T 2Hr [[id number]] Updated: 05/02/23 1611    Specimen: Blood from Arm, Right     D-dimer, Quantitative [[id number]]  (Normal) Collected: 05/02/23 1534    Specimen: Blood from Arm, Right Updated: 05/02/23 1606     D-Dimer, Quantitative 0.79 mg/L (FEU)     Narrative:      According to the assay 's published package insert, a normal (<0.50 mg/L (FEU)) D-dimer result in conjunction with a non-high clinical probability assessment, excludes deep vein thrombosis (DVT) and pulmonary embolism  "(PE) with high sensitivity.    D-dimer values increase with age and this can make VTE exclusion of an older population difficult. To address this, the [employer], based on best available evidence and recent guidelines, recommends that clinicians use age-adjusted D-dimer thresholds in patients greater than 50 years of age with: a) a low probability of PE who do not meet all Pulmonary Embolism Rule Out Criteria, or b) in those with intermediate probability of PE.   The formula for an age-adjusted D-dimer cut-off is \"age/100\".  For example, a 60 year old patient would have an age-adjusted cut-off of 0.60 mg/L (FEU) and an 80 year old 0.80 mg/L (FEU).    CANDIDA AURIS SCREEN - Swab, Axilla Right, Axilla Left and Groin [[id number]] Collected: 05/02/23 1503    Specimen: Swab from Axilla Right, Axilla Left and Groin Updated: 05/02/23 1515    High Sensitivity Troponin T [[id number]]  (Abnormal) Collected: 05/02/23 1232    Specimen: Blood from Arm, Right Updated: 05/02/23 1321     HS Troponin T 22 ng/L     Narrative:      High Sensitive Troponin T Reference Range:  <10.0 ng/L- Negative Female for AMI  <15.0 ng/L- Negative Male for AMI  >=10 - Abnormal Female indicating possible myocardial injury.  >=15 - Abnormal Male indicating possible myocardial injury.   Clinicians would have to utilize clinical acumen, EKG, Troponin, and serial changes to determine if it is an Acute Myocardial Infarction or myocardial injury due to an underlying chronic condition.         POC Glucose Once [[id number]]  (Abnormal) Collected: 05/02/23 1231    Specimen: Blood Updated: 05/02/23 1233     Glucose 132 mg/dL      Comment: Serial Number: [id number]:  [id number]       BNP [[id number]]  (Normal) Collected: 05/02/23 0915    Specimen: Blood Updated: 05/02/23 1136     proBNP 308.5 pg/mL     Narrative:      Among patients with dyspnea, NT-proBNP is highly sensitive for the detection of acute congestive heart failure. In addition " NT-proBNP of <300 pg/ml effectively rules out acute congestive heart failure with 99% negative predictive value.    Results may be falsely decreased if patient taking Biotin.      T4, Free [[id number]]  (Normal) Collected: 05/02/23 0915    Specimen: Blood Updated: 05/02/23 1136     Free T4 1.49 ng/dL     Narrative:      Results may be falsely increased if patient taking Biotin.      Hemoglobin A1c [[id number]]  (Abnormal) Collected: 05/02/23 0915    Specimen: Blood Updated: 05/02/23 1134     Hemoglobin A1C 11.00 %     Lipid Panel [[id number]]  (Abnormal) Collected: 05/02/23 0915    Specimen: Blood Updated: 05/02/23 1132     Total Cholesterol 146 mg/dL      Triglycerides 105 mg/dL      HDL Cholesterol 72 mg/dL      LDL Cholesterol  55 mg/dL      VLDL Cholesterol 19 mg/dL      LDL/HDL Ratio 0.74    Narrative:      Cholesterol Reference Ranges  (U.S. Department of Health and Human Services ATP III Classifications)    Desirable          <200 mg/dL  Borderline High    200-239 mg/dL  High Risk          >240 mg/dL      Triglyceride Reference Ranges  (U.S. Department of Health and Human Services ATP III Classifications)    Normal           <150 mg/dL  Borderline High  150-199 mg/dL  High             200-499 mg/dL  Very High        >500 mg/dL    HDL Reference Ranges  (U.S. Department of Health and Human Services ATP III Classifications)    Low     <40 mg/dl (major risk factor for CHD)  High    >60 mg/dl ('negative' risk factor for CHD)        LDL Reference Ranges  (U.S. Department of Health and Human Services ATP III Classifications)    Optimal          <100 mg/dL  Near Optimal     100-129 mg/dL  Borderline High  130-159 mg/dL  High             160-189 mg/dL  Very High        >189 mg/dL    Magnesium [[id number]]  (Normal) Collected: 05/02/23 0915    Specimen: Blood Updated: 05/02/23 1120     Magnesium 1.7 mg/dL     Single High Sensitivity Troponin T [[id number]]  (Abnormal) Collected: 05/02/23 0915    Specimen: Blood Updated:  05/02/23 1054     HS Troponin T 25 ng/L     Narrative:      High Sensitive Troponin T Reference Range:  <10.0 ng/L- Negative Female for AMI  <15.0 ng/L- Negative Male for AMI  >=10 - Abnormal Female indicating possible myocardial injury.  >=15 - Abnormal Male indicating possible myocardial injury.   Clinicians would have to utilize clinical acumen, EKG, Troponin, and serial changes to determine if it is an Acute Myocardial Infarction or myocardial injury due to an underlying chronic condition.         TSH [[id number]]  (Normal) Collected: 05/02/23 0915    Specimen: Blood Updated: 05/02/23 1054     TSH 1.900 uIU/mL     Comprehensive Metabolic Panel [[id number]]  (Abnormal) Collected: 05/02/23 0915    Specimen: Blood Updated: 05/02/23 1047     Glucose 249 mg/dL      BUN 17 mg/dL      Creatinine 0.93 mg/dL      Sodium 143 mmol/L      Potassium 4.7 mmol/L      Chloride 105 mmol/L      CO2 26.0 mmol/L      Calcium 8.1 mg/dL      Total Protein 7.1 g/dL      Albumin 4.4 g/dL      ALT (SGPT) 11 U/L      AST (SGOT) 16 U/L      Alkaline Phosphatase 84 U/L      Total Bilirubin 0.3 mg/dL      Globulin 2.7 gm/dL      A/G Ratio 1.6 g/dL      BUN/Creatinine Ratio 18.3     Anion Gap 12.0 mmol/L      eGFR 60.7 mL/min/1.73     Narrative:      GFR Normal >60  Chronic Kidney Disease <60  Kidney Failure <15    The GFR formula is only valid for adults with stable renal function between ages 18 and 70.          Imaging Results (Last 24 Hours)     Procedure Component Value Units Date/Time    CT Facial Bones Without Contrast [[id number]] Collected: 05/02/23 1358     Updated: 05/02/23 1404    Narrative:      CT FACIAL BONES WO CONTRAST    Date of Exam: 5/2/2023 1:30 PM EDT    Indication: Maxillofacial pain.    Comparison: None available.    Technique: Axial CT images were obtained from the inferior aspect of the mandible through the frontal sinuses without contrast administration.  Sagittal and coronal reconstructions were performed.  Automated  exposure control and iterative reconstruction   methods were used.      Findings:  No acute fracture or dislocation of the facial bones. No evidence of abscess or osteomyelitis is identified. The tongue is normal in appearance. No cervical abnormal fluid collection is identified. No abnormal soft tissue gas. Scattered nonenlarged   cervical lymph nodes. Limited views of the brain parenchyma are unremarkable. No aggressive appearing lytic or sclerotic bone lesions are identified. Maxillofacial sinuses are unremarkable. Mastoid air cells are normal.      Impression:      Impression:    1. No abscess or osteomyelitis within the face.        Electronically Signed: Eligio Rueda    5/2/2023 2:02 PM EDT    Workstation ID: [id number]          LAB RESULTS (LAST 7 DAYS)    CBC  Results from last 7 days   Lab Units 05/02/23  0915   WBC 10*3/mm3 7.40   RBC 10*6/mm3 4.14   HEMOGLOBIN g/dL 11.3*   HEMATOCRIT % 34.9   MCV fL 84.2   PLATELETS 10*3/mm3 295       BMP  Results from last 7 days   Lab Units 05/02/23  0915   SODIUM mmol/L 143   POTASSIUM mmol/L 4.7   CHLORIDE mmol/L 105   CO2 mmol/L 26.0   BUN mg/dL 17   CREATININE mg/dL 0.93   GLUCOSE mg/dL 249*   MAGNESIUM mg/dL 1.7       CMP   Results from last 7 days   Lab Units 05/02/23  0915   SODIUM mmol/L 143   POTASSIUM mmol/L 4.7   CHLORIDE mmol/L 105   CO2 mmol/L 26.0   BUN mg/dL 17   CREATININE mg/dL 0.93   GLUCOSE mg/dL 249*   ALBUMIN g/dL 4.4   BILIRUBIN mg/dL 0.3   ALK PHOS U/L 84   AST (SGOT) U/L 16   ALT (SGPT) U/L 11       BNP        TROPONIN  Results from last 7 days   Lab Units 05/02/23  1232   HSTROP T ng/L 22*       CoAg  Results from last 7 days   Lab Units 05/02/23  0915   INR  0.97   APTT seconds 25.9*       Creatinine Clearance  Estimated Creatinine Clearance: 48.3 mL/min (by C-G formula based on SCr of 0.93 mg/dL).    ABG          Radiology  XR Chest 1 View    Result Date: 5/2/2023  Impression: No acute process. Electronically Signed: Smith Mcgee  5/2/2023 9:50  AM EDT  Workstation ID: [id number]    CT Facial Bones Without Contrast    Result Date: 5/2/2023  Impression: 1. No abscess or osteomyelitis within the face. Electronically Signed: Eligio Rueda  5/2/2023 2:02 PM EDT  Workstation ID: [id number]        EKG  I personally viewed and interpreted the patient's EKG/Telemetry data:  ECG 12 Lead Chest Pain   Final Result   HEART RATE= 71  bpm   RR Interval= 840  ms   KY Interval= 187  ms   P Horizontal Axis= 14  deg   P Front Axis= 61  deg   QRSD Interval= 97  ms   QT Interval= 423  ms   QRS Axis= 19  deg   T Wave Axis= 45  deg   - NORMAL ECG -   Sinus rhythm   No previous ECG available for comparison   Electronically Signed By: Freddy Baxter ([place]) 03-May-2023 06:23:33   Date and Time of Study: 2023-05-02 09:03:55      SCANNED - TELEMETRY     Final Result      SCANNED - TELEMETRY     Final Result            Echocardiogram:          Stress Test:  Results for orders placed during the hospital encounter of 05/02/23    Stress Test With Myocardial Perfusion One Day    Interpretation Summary  •  Left ventricular ejection fraction is hyperdynamic (Calculated EF > 70%).  •  Abnormal LV wall motion consistent with moderate hypokinesis of the inferior wall.  •  Myocardial perfusion imaging indicates a large-sized infarct located in the inferior wall with moderate candy-infarct ischemia.  •  Impressions are consistent with a high risk study.  •  Findings consistent with an abnormal ECG stress test.        Cardiac Catheterization:  No results found for this or any previous visit.        Other:      ASSESSMENT & PLAN:    Principal Problem:    Chest pain  Active Problems:    Coronary artery disease involving native coronary artery of native heart with unstable angina pectoris    Type 2 diabetes mellitus    Diabetic neuropathy    Essential hypertension    HLD (hyperlipidemia)    Postoperative hypothyroidism    Abnormal nuclear stress test    Former smoker    Chest pain  She has had worsening  angina over the last day  Stress test was concerning for inferior lateral ischemia with inferior wall hypokinesis  Continues to have jaw numbness  Plan for cardiac catheterization later on today  Discussed risks and benefits of procedure and she is agreeable to proceed  Continue with aspirin and Plavix  Continue statin    Hypertension  Currently on atenolol and lisinopril  Blood pressure is well controlled    Hyperlipidemia  Currently on rosuvastatin  LDL is 55    Diabetes  Uncontrolled diabetes with an HbA1c of 11  On Lantus and sliding scale insulin  Diabetes educator has been consulted    Hypothyroidism  Continue levothyroxine    Electronically signed by Nuria Mcgraw MD, 05/03/23, 3:39 PM EDT.

## 2023-05-03 NOTE — CONSULTS
Diabetes Education    Patient Name:  Pamela Zavala  YOB: 1940  MRN: [id number]  Admit Date:  5/2/2023        MD consult for A1c >7.0%. Patient was seen by educator yesterday and discussed with PA. Patient does not need to be seen for further education.      Electronically signed by:  Pamela Nolasco RN  05/03/23 07:50 EDT

## 2023-05-03 NOTE — CONSULTS
[facility] Medicine Services   Consult Note    Patient Name: Pamela Zavala  : 1940  MRN: [id number]  Primary Care Physician:  Tracy Jeffers APRN  Referring Physician: CLAUDE Goodman  Date of admission: 2023  Date and Time of Care: 2023 at 12:48 EDT    Inpatient Hospitalist Consult  Consult performed by: Jacey Maloney APRN  Consult ordered by: Lang Mendoza PA-C          Subjective      Reason for Consult/ Chief Complaint: Chest pain and abnormal myoview    Consult Requested By: TIM Kelsey    History of Present Illness: Pamela Zavala is a 82 y.o. female presented to [facility] ED 2023 via EMS from [facility] on [facility] with complaints of intermittent chest pain for the last week that is substernal and describes it as a dull ache that radiates to her left jaw and left arm.  She states she typically has some difficulty with mobility but had increased weakness this morning without falls.  Has had some lightheadedness.  She states she has had a sinus infection for the last 6 months with 4 rounds of antibiotics and does continue to have some nasal congestion.  She states she blew her nose twice this morning and did note a blood clot but has not had a persistent bloody nose.  She states she has had 3 stents x10 years ago and reports she was told she had a 70% blockage at that time and it would eventually need to be stented but has not had any further follow-up.  She denies any swelling to her legs or feet prior history of DVT PE or exogenous hormone use.  She also states she is a diabetic and does not check her blood sugar.    Workup in the ER revealed flat high sensitivity troponin of 25 and 22. Her EKG showed no acute findings. She was admitted for further evaluation. 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 Cardiology was consulted for cardiac catheterization.     XR Chest 1 View    Result Date: 5/2/2023  Impression: No acute process. Electronically Signed: Smith Mcgee  5/2/2023 9:50 AM EDT  Workstation ID: [id number]    CT Facial Bones Without Contrast    Result Date: 5/2/2023  Impression: 1. No abscess or osteomyelitis within the face. Electronically Signed: Eligio Rueda  5/2/2023 2:02 PM EDT  Workstation ID: [id number]    ECG 12 Lead Chest Pain   Final Result   HEART RATE= 71  bpm   RR Interval= 840  ms   NE Interval= 187  ms   P Horizontal Axis= 14  deg   P Front Axis= 61  deg   QRSD Interval= 97  ms   QT Interval= 423  ms   QRS Axis= 19  deg   T Wave Axis= 45  deg   - NORMAL ECG -   Sinus rhythm   No previous ECG available for comparison   Electronically Signed By: Freddy Baxter (Wale) 03-May-2023 06:23:33   Date and Time of Study: 2023-05-02 09:03:55      SCANNED - TELEMETRY     Final Result      SCANNED - TELEMETRY     Final Result            Review of Systems   HENT:        Left side jaw still slightly numb/tingling. Better than when she came in. Denies any falls/injuries to jaw/head.   All other systems reviewed and are negative.       Personal History     Past Medical History:   Diagnosis Date   • CAD (coronary artery disease)    • Diabetic neuropathy    • Essential hypertension    • HLD (hyperlipidemia)    • Hypothyroidism    • Recurrent sinusitis    • Renal disease    • Thyroid cancer    • Type 2 diabetes mellitus        Past Surgical History:   Procedure Laterality Date   • CORONARY STENT PLACEMENT      x 3   • HYSTERECTOMY     • THYROIDECTOMY         Family History: family history includes Cancer in her father; Heart disease in her maternal grandmother and sister. Otherwise pertinent FHx was reviewed and not pertinent to current issue.    Social History:  reports that she quit smoking about 23 years ago. Her smoking use included cigarettes. She has a 60.00 pack-year  smoking history. She has never used smokeless tobacco. She reports current alcohol use of about 1.0 standard drink per week. Drug use questions deferred to the physician.    Home Medications:   Chlorcyclizine-Pseudoephed, Insulin Degludec, atenolol, clopidogrel, famotidine, fluticasone, gabapentin, lactulose, levothyroxine, lisinopril, metFORMIN, oxybutynin, pantoprazole, rosuvastatin, traMADol, and traZODone    Allergies:  Allergies   Allergen Reactions   • Penicillins Unknown - High Severity         Objective      Vitals:  Temp:  [97.8 °F (36.6 °C)-98.1 °F (36.7 °C)] 97.8 °F (36.6 °C)  Heart Rate:  [60-85] 70  Resp:  [16-18] 16  BP: (126-168)/(54-81) 147/73  Flow (L/min):  [2] 2    Physical Exam  Vitals reviewed.   Constitutional:       Appearance: Normal appearance.   HENT:      Mouth/Throat:      Mouth: Mucous membranes are moist.   Eyes:      Pupils: Pupils are equal, round, and reactive to light.   Cardiovascular:      Rate and Rhythm: Normal rate and regular rhythm.      Pulses: Normal pulses.      Heart sounds: Normal heart sounds.   Pulmonary:      Effort: Pulmonary effort is normal.      Breath sounds: Normal breath sounds.   Abdominal:      General: Bowel sounds are normal.      Palpations: Abdomen is soft.   Musculoskeletal:         General: Normal range of motion.   Skin:     General: Skin is warm and dry.   Neurological:      Mental Status: She is alert and oriented to person, place, and time.   Psychiatric:         Behavior: Behavior normal.          Result Review    Result Review:  I have personally reviewed the results from the time of this admission to 5/3/2023 12:47 EDT and agree with these findings:  [x]  Laboratory  [x]  Microbiology  [x]  Radiology  [x]  EKG/Telemetry   []  Cardiology/Vascular   []  Pathology  []  Old records  []  Other:      Assessment & Plan        Active Hospital Problems:  Active Hospital Problems    Diagnosis    • **Chest pain    • Coronary artery disease involving native  coronary artery of native heart with unstable angina pectoris    • Abnormal nuclear stress test    • Former smoker    • Type 2 diabetes mellitus    • Diabetic neuropathy    • Essential hypertension    • HLD (hyperlipidemia)    • Postoperative hypothyroidism      Plan:     Chest pain  Abnormal Myoview  - , Trop 22 and 25  - CXR negative  - Jaw tingling/numbness: likely cardiac related, CT of facial bones neg  -EKG showed sinus rhythm at 71 without obvious acute changes though some baseline artifact is present with a QTc of 462  -Lipid panel showed a total cholesterol of 146 with an LDL of 55 and an HDL of 72  -Stress Test showed a hyperdynamic EF greater than 70% with abnormal left ventricular wall motion consistent with moderate hypokinesis of the inferior wall with perfusion imaging showing a large size infarct in the inferior wall with moderate candy-infarct ischemia consistent with a high risk study  -NPO  -Continue Plavix, ASA, statin  - Cardiology following: Plan for cardiac catheterization this evening  - Continuous cardiac monitoring    Diabetes mellitus  -Poorly controlled: A1c 11  -Hold metformin  -Basal, prandial and correctional insulin ordered  -Diabetic diet  -Monitor AC and HS  -Diabetes educator    Hypothyroidism  S/P thyroidectomy (thyroid cancer)  -TSH 1.9 on 5/2/2023  -Levothyroxine     GERD  -Pepcid    VTE Prophylaxis  - Mechanical SCD's    Code status discussed at length per patient's wishes. She wishes to be a DNR/DNI. She would like to have an advanced directive so she can establish a DNR/DNI at [facility] on [facility] as well. She reports her next of kin are all aware of her wishes too.  consulted for advance directive.    Level Of Support Discussed With: Patient  Code Status (Patient has no pulse and is not breathing): No CPR (Do Not Attempt to Resuscitate)  Medical Interventions (Patient has pulse or is breathing): Full Support  Release to patient: Routine  Release        Signature: Electronically signed by CLAUDE Scales, 05/03/23, 12:47 EDT.  [facility] Hospitalist Team

## 2023-05-03 NOTE — NURSING NOTE
Pt is sitting on side of bed doing her crossword puzzle book. Pt was informed that if she needs to get up to notify the nurse or tech.

## 2023-05-04 VITALS
OXYGEN SATURATION: 98 % | SYSTOLIC BLOOD PRESSURE: 119 MMHG | HEART RATE: 74 BPM | BODY MASS INDEX: 21.72 KG/M2 | TEMPERATURE: 98.1 F | RESPIRATION RATE: 22 BRPM | HEIGHT: 66 IN | WEIGHT: 135.14 LBS | DIASTOLIC BLOOD PRESSURE: 74 MMHG

## 2023-05-04 PROBLEM — R07.9 CHEST PAIN, UNSPECIFIED TYPE: Status: ACTIVE | Noted: 2023-05-04

## 2023-05-04 LAB
ANION GAP SERPL CALCULATED.3IONS-SCNC: 12 MMOL/L (ref 5–15)
BACTERIA UR QL AUTO: ABNORMAL /HPF
BUN SERPL-MCNC: 19 MG/DL (ref 8–23)
BUN/CREAT SERPL: 20.7 (ref 7–25)
CALCIUM SPEC-SCNC: 8.1 MG/DL (ref 8.6–10.5)
CHLORIDE SERPL-SCNC: 102 MMOL/L (ref 98–107)
CO2 SERPL-SCNC: 23 MMOL/L (ref 22–29)
CREAT SERPL-MCNC: 0.92 MG/DL (ref 0.57–1)
EGFRCR SERPLBLD CKD-EPI 2021: 61.5 ML/MIN/1.73
GLUCOSE BLDC GLUCOMTR-MCNC: 178 MG/DL (ref 70–105)
GLUCOSE BLDC GLUCOMTR-MCNC: 241 MG/DL (ref 70–105)
GLUCOSE BLDC GLUCOMTR-MCNC: 273 MG/DL (ref 70–105)
GLUCOSE SERPL-MCNC: 299 MG/DL (ref 65–99)
HYALINE CASTS UR QL AUTO: ABNORMAL /LPF
POTASSIUM SERPL-SCNC: 4.3 MMOL/L (ref 3.5–5.2)
RBC # UR STRIP: ABNORMAL /HPF
REF LAB TEST METHOD: ABNORMAL
SODIUM SERPL-SCNC: 137 MMOL/L (ref 136–145)
SQUAMOUS #/AREA URNS HPF: ABNORMAL /HPF
WBC # UR STRIP: ABNORMAL /HPF

## 2023-05-04 PROCEDURE — 82948 REAGENT STRIP/BLOOD GLUCOSE: CPT

## 2023-05-04 PROCEDURE — 63710000001 INSULIN GLARGINE PER 5 UNITS: Performed by: INTERNAL MEDICINE

## 2023-05-04 PROCEDURE — 63710000001 INSULIN LISPRO (HUMAN) PER 5 UNITS: Performed by: INTERNAL MEDICINE

## 2023-05-04 PROCEDURE — 99232 SBSQ HOSP IP/OBS MODERATE 35: CPT | Performed by: INTERNAL MEDICINE

## 2023-05-04 RX ORDER — LANCETS
1 EACH MISCELLANEOUS
Qty: 100 EACH | Refills: 2 | Status: SHIPPED | OUTPATIENT
Start: 2023-05-04

## 2023-05-04 RX ORDER — ONDANSETRON 4 MG/1
4 TABLET, FILM COATED ORAL EVERY 6 HOURS PRN
Qty: 20 TABLET | Refills: 0 | Status: SHIPPED | OUTPATIENT
Start: 2023-05-04

## 2023-05-04 RX ORDER — ASPIRIN 81 MG/1
81 TABLET ORAL DAILY
Qty: 30 TABLET | Refills: 0 | Status: SHIPPED | OUTPATIENT
Start: 2023-05-05

## 2023-05-04 RX ORDER — SULFAMETHOXAZOLE AND TRIMETHOPRIM 800; 160 MG/1; MG/1
1 TABLET ORAL 2 TIMES DAILY
Qty: 28 TABLET | Refills: 0 | Status: SHIPPED | OUTPATIENT
Start: 2023-05-04

## 2023-05-04 RX ORDER — BLOOD-GLUCOSE METER
1 EACH MISCELLANEOUS ONCE
Qty: 1 KIT | Refills: 0 | Status: SHIPPED | OUTPATIENT
Start: 2023-05-04 | End: 2023-05-04

## 2023-05-04 RX ADMIN — Medication 3 ML: at 08:05

## 2023-05-04 RX ADMIN — CLOPIDOGREL BISULFATE 75 MG: 75 TABLET ORAL at 08:10

## 2023-05-04 RX ADMIN — OXYBUTYNIN CHLORIDE 5 MG: 5 TABLET ORAL at 08:03

## 2023-05-04 RX ADMIN — LEVOTHYROXINE SODIUM 125 MCG: 0.12 TABLET ORAL at 08:03

## 2023-05-04 RX ADMIN — ATENOLOL 50 MG: 50 TABLET ORAL at 08:03

## 2023-05-04 RX ADMIN — INSULIN GLARGINE 24 UNITS: 100 INJECTION, SOLUTION SUBCUTANEOUS at 08:02

## 2023-05-04 RX ADMIN — PANTOPRAZOLE SODIUM 40 MG: 40 TABLET, DELAYED RELEASE ORAL at 08:03

## 2023-05-04 RX ADMIN — ROSUVASTATIN CALCIUM 5 MG: 5 TABLET, COATED ORAL at 08:03

## 2023-05-04 RX ADMIN — INSULIN LISPRO 2 UNITS: 100 INJECTION, SOLUTION INTRAVENOUS; SUBCUTANEOUS at 12:07

## 2023-05-04 RX ADMIN — LACTULOSE 10 G: 20 SOLUTION ORAL at 08:02

## 2023-05-04 RX ADMIN — LISINOPRIL 5 MG: 5 TABLET ORAL at 08:03

## 2023-05-04 RX ADMIN — Medication 10 ML: at 08:03

## 2023-05-04 RX ADMIN — INSULIN LISPRO 4 UNITS: 100 INJECTION, SOLUTION INTRAVENOUS; SUBCUTANEOUS at 07:59

## 2023-05-04 RX ADMIN — ASPIRIN 81 MG: 81 TABLET, COATED ORAL at 08:03

## 2023-05-04 NOTE — PROGRESS NOTES
Referring Provider: Johanny Nagel MD    Reason for follow-up: Coronary artery disease     Patient Care Team:  Tracy Jeffers APRN as PCP - General (Nurse Practitioner)      SUBJECTIVE  Underwent cardiac catheterization with PCI of the RCA yesterday.  No further episodes of chest pain and says that jaw pain has improved.  No access site issues.  Did have some hematuria which has been chronic for her.     ROS  Review of all systems negative except as indicated.    Since I have last seen, the patient has been without any chest discomfort, shortness of breath, palpitations, dizziness or syncope.  Denies having any headache, abdominal pain, nausea, vomiting, diarrhea, constipation, loss of weight or loss of appetite.  Denies having any excessive bruising, hematuria or blood in the stool.  ROS      Personal History:    Past Medical History:   Diagnosis Date   • CAD (coronary artery disease)    • Diabetic neuropathy    • Essential hypertension    • HLD (hyperlipidemia)    • Hypothyroidism    • Recurrent sinusitis    • Renal disease    • Thyroid cancer    • Type 2 diabetes mellitus        Past Surgical History:   Procedure Laterality Date   • CARDIAC CATHETERIZATION N/A 5/3/2023    Procedure: Left Heart Cath and coronary angiogram;  Surgeon: John Baldwin MD;  Location: [facility] INVASIVE LOCATION;  Service: Cardiovascular;  Laterality: N/A;   • CORONARY STENT PLACEMENT      x 3   • HYSTERECTOMY     • THYROIDECTOMY         Family History   Problem Relation Age of Onset   • Cancer Father    • Heart disease Sister    • Heart disease Maternal Grandmother        Social History     Tobacco Use   • Smoking status: Former     Packs/day: 2.00     Years: 30.00     Pack years: 60.00     Types: Cigarettes     Quit date:      Years since quittin.3   • Smokeless tobacco: Never   Vaping Use   • Vaping Use: Never used   Substance Use Topics   • Alcohol use: Yes     Alcohol/week: 1.0 standard drink     Types: 1 Glasses of  wine per week   • Drug use: Defer        Medications Prior to Admission   Medication Sig Dispense Refill Last Dose   • atenolol (TENORMIN) 50 MG tablet Take 1 tablet by mouth Daily.   5/2/2023   • Chlorcyclizine-Pseudoephed (Stahist AD) 25-60 MG tablet Take 1 tablet by mouth Every 8 (Eight) Hours As Needed.      • clopidogrel (PLAVIX) 75 MG tablet Take 1 tablet by mouth Daily.   5/2/2023   • famotidine (PEPCID) 20 MG tablet Take 1 tablet by mouth 2 (Two) Times a Day As Needed for Heartburn.      • fluticasone (FLONASE) 50 MCG/ACT nasal spray 1 spray into the nostril(s) as directed by provider 2 (Two) Times a Day.      • Insulin Degludec (Tresiba) 100 UNIT/ML solution injection Inject 24 Units under the skin into the appropriate area as directed Every Morning.   5/2/2023   • Insulin Degludec (Tresiba) 100 UNIT/ML solution injection Inject 17 Units under the skin into the appropriate area as directed Every Evening.      • lactulose (CEPHULAC) 10 g packet Take 2 packets by mouth Daily.   5/2/2023   • levothyroxine (SYNTHROID, LEVOTHROID) 125 MCG tablet Take 1 tablet by mouth Daily.   5/2/2023   • lisinopril (PRINIVIL,ZESTRIL) 5 MG tablet Take 1 tablet by mouth Daily.   5/2/2023   • metFORMIN (GLUCOPHAGE) 1000 MG tablet Take 1 tablet by mouth 2 (Two) Times a Day With Meals.   5/2/2023   • oxybutynin (DITROPAN) 5 MG tablet Take 1 tablet by mouth 2 (Two) Times a Day.   5/2/2023   • pantoprazole (PROTONIX) 40 MG EC tablet Take 1 tablet by mouth 2 (Two) Times a Day.   5/2/2023   • rosuvastatin (CRESTOR) 5 MG tablet Take 1 tablet by mouth Daily.   5/2/2023   • traMADol (ULTRAM) 50 MG tablet Take 1 tablet by mouth 4 (Four) Times a Day As Needed for Moderate Pain.      • traZODone (DESYREL) 50 MG tablet Take 1 tablet by mouth Every Night.      • gabapentin (NEURONTIN) 300 MG capsule Take 1 capsule by mouth Every Night.          Allergies:  Penicillins    Scheduled Meds:aspirin, 81 mg, Oral, Daily  atenolol, 50 mg, Oral,  "Daily  clopidogrel, 75 mg, Oral, Daily  gabapentin, 300 mg, Oral, Nightly  insulin glargine, 17 Units, Subcutaneous, Nightly  insulin glargine, 24 Units, Subcutaneous, Daily  insulin lispro, 2-9 Units, Subcutaneous, TID With Meals  lactulose, 10 g, Oral, Daily  levothyroxine, 125 mcg, Oral, Daily  lisinopril, 5 mg, Oral, Daily  oxybutynin, 5 mg, Oral, BID  pantoprazole, 40 mg, Oral, BID  rosuvastatin, 5 mg, Oral, Daily  sodium chloride, 10 mL, Intravenous, Q12H  sodium chloride, 3 mL, Intravenous, Q12H  traZODone, 50 mg, Oral, Nightly      Continuous Infusions:   PRN Meds:.•  acetaminophen  •  acetaminophen  •  polyethylene glycol **AND** bisacodyl **AND** bisacodyl  •  dextrose  •  dextrose  •  diphenhydrAMINE  •  famotidine  •  glucagon (human recombinant)  •  nitroglycerin  •  ondansetron **OR** ondansetron  •  ondansetron **OR** ondansetron  •  [COMPLETED] Insert Peripheral IV **AND** sodium chloride  •  sodium chloride  •  sodium chloride  •  sodium chloride  •  sodium chloride  •  traMADol      OBJECTIVE    Vital Signs  Vitals:    05/04/23 0142 05/04/23 0500 05/04/23 0803 05/04/23 1044   BP: 131/64 144/74 115/61 119/74   BP Location: Left arm Left arm  Left arm   Patient Position: Lying Lying  Sitting   Pulse: 73 89 78 74   Resp: 16 22 22   Temp: 97.7 °F (36.5 °C) 98 °F (36.7 °C)  98.1 °F (36.7 °C)   TempSrc: Oral Oral  Oral   SpO2: 94% 98%  98%   Weight:  61.3 kg (135 lb 2.3 oz)     Height:           Flowsheet Rows    Flowsheet Row First Filed Value   Admission Height 170.2 cm (67\") Documented at 05/02/2023 0855   Admission Weight 61.2 kg (135 lb) Documented at 05/02/2023 0855            Intake/Output Summary (Last 24 hours) at 5/4/2023 1222  Last data filed at 5/4/2023 0845  Gross per 24 hour   Intake 720 ml   Output 400 ml   Net 320 ml          Telemetry: Normal sinus rhythm  Physical Exam:  The patient is alert, oriented and in no distress.  Vital signs as noted above.  Head and neck revealed no carotid " bruits or jugular venous distention.  No thyromegaly or lymphadenopathy is present  Lungs clear.  No wheezing.  Breath sounds are normal bilaterally.  Heart normal first and second heart sounds.  No murmur. No precordial rub is present.  No gallop is present.  Abdomen soft and nontender.  No organomegaly is present.  Extremities with good peripheral pulses without any pedal edema.  Skin warm and dry.  Musculoskeletal system is grossly normal.  CNS grossly normal.       Results Review:  I have personally reviewed the results from the time of this admission to 5/4/2023 12:22 EDT and agree with these findings:  []  Laboratory  []  Microbiology  []  Radiology  []  EKG/Telemetry   []  Cardiology/Vascular   []  Pathology  []  Old records  []  Other:    Most notable findings include:    Lab Results (last 24 hours)     Procedure Component Value Units Date/Time    POC Glucose Once [[id number]]  (Abnormal) Collected: 05/04/23 1045    Specimen: Blood Updated: 05/04/23 1046     Glucose 178 mg/dL      Comment: Serial Number: [id number]:  [id number]       POC Glucose Once [[id number]]  (Abnormal) Collected: 05/04/23 0721    Specimen: Blood Updated: 05/04/23 0722     Glucose 241 mg/dL      Comment: Serial Number: [id number]:  [id number]       POC Glucose Once [[id number]]  (Abnormal) Collected: 05/04/23 0212    Specimen: Blood Updated: 05/04/23 0213     Glucose 273 mg/dL      Comment: Serial Number: [id number]:  [id number]       Urinalysis, Microscopic Only - Urine, Clean Catch [[id number]]  (Abnormal) Collected: 05/03/23 2332    Specimen: Urine, Clean Catch Updated: 05/04/23 0020     RBC, UA Too Numerous to Count /HPF      WBC, UA 21-30 /HPF      Bacteria, UA 4+ /HPF      Squamous Epithelial Cells, UA 0-2 /HPF      Hyaline Casts, UA None Seen /LPF      Methodology Manual Light Microscopy    Basic Metabolic Panel [[id number]]  (Abnormal) Collected: 05/03/23 2326    Specimen: Blood Updated: 05/04/23 0000     Glucose  299 mg/dL      BUN 19 mg/dL      Creatinine 0.92 mg/dL      Sodium 137 mmol/L      Potassium 4.3 mmol/L      Chloride 102 mmol/L      CO2 23.0 mmol/L      Calcium 8.1 mg/dL      BUN/Creatinine Ratio 20.7     Anion Gap 12.0 mmol/L      eGFR 61.5 mL/min/1.73     Narrative:      GFR Normal >60  Chronic Kidney Disease <60  Kidney Failure <15    The GFR formula is only valid for adults with stable renal function between ages 18 and 70.    Urinalysis With Microscopic If Indicated (No Culture) - Urine, Clean Catch [[id number]]  (Abnormal) Collected: 05/03/23 2332    Specimen: Urine, Clean Catch Updated: 05/03/23 2359     Color, UA Yellow     Appearance, UA Cloudy     pH, UA 6.0     Specific Gravity, UA 1.016     Glucose,  mg/dL (2+)     Ketones, UA Negative     Bilirubin, UA Negative     Blood, UA Large (3+)     Protein, UA Trace     Leuk Esterase, UA Moderate (2+)     Nitrite, UA Negative     Urobilinogen, UA 0.2 E.U./dL    CBC (No Diff) [[id number]]  (Abnormal) Collected: 05/03/23 2326    Specimen: Blood Updated: 05/03/23 2358     WBC 13.60 10*3/mm3      RBC 4.24 10*6/mm3      Hemoglobin 11.2 g/dL      Hematocrit 36.0 %      MCV 84.9 fL      MCH 26.4 pg      MCHC 31.1 g/dL      RDW 14.7 %      RDW-SD 44.2 fl      MPV 9.8 fL      Platelets 322 10*3/mm3     Basic Metabolic Panel [[id number]]  (Abnormal) Collected: 05/03/23 1947    Specimen: Blood Updated: 05/03/23 2033     Glucose 167 mg/dL      BUN 17 mg/dL      Creatinine 0.74 mg/dL      Sodium 137 mmol/L      Potassium 4.3 mmol/L      Comment: Slight hemolysis detected by analyzer. Results may be affected.        Chloride 103 mmol/L      CO2 22.0 mmol/L      Calcium 7.9 mg/dL      BUN/Creatinine Ratio 23.0     Anion Gap 12.0 mmol/L      eGFR 79.9 mL/min/1.73     Narrative:      GFR Normal >60  Chronic Kidney Disease <60  Kidney Failure <15    The GFR formula is only valid for adults with stable renal function between ages 18 and 70.    POC Glucose Once [[id number]]   (Abnormal) Collected: 05/03/23 2003    Specimen: Blood Updated: 05/03/23 2008     Glucose 149 mg/dL      Comment: Serial Number: [id number]:  [id number]       CBC & Differential [[id number]]  (Abnormal) Collected: 05/03/23 1947    Specimen: Blood Updated: 05/03/23 2004    Narrative:      The following orders were created for panel order CBC & Differential.  Procedure                               Abnormality         Status                     ---------                               -----------         ------                     CBC Auto Differential[[id number]]        Abnormal            Final result                 Please view results for these tests on the individual orders.    CBC Auto Differential [[id number]]  (Abnormal) Collected: 05/03/23 1947    Specimen: Blood Updated: 05/03/23 2004     WBC 8.60 10*3/mm3      RBC 3.89 10*6/mm3      Hemoglobin 10.8 g/dL      Hematocrit 33.0 %      MCV 84.8 fL      MCH 27.7 pg      MCHC 32.7 g/dL      RDW 14.5 %      RDW-SD 45.9 fl      MPV 10.0 fL      Platelets 298 10*3/mm3      Neutrophil % 61.1 %      Lymphocyte % 28.4 %      Monocyte % 6.5 %      Eosinophil % 2.6 %      Basophil % 1.4 %      Neutrophils, Absolute 5.30 10*3/mm3      Lymphocytes, Absolute 2.50 10*3/mm3      Monocytes, Absolute 0.60 10*3/mm3      Eosinophils, Absolute 0.20 10*3/mm3      Basophils, Absolute 0.10 10*3/mm3      nRBC 0.0 /100 WBC     POC Activated Clotting Time [[id number]]  (Abnormal) Collected: 05/03/23 1747    Specimen: Arterial Blood Updated: 05/03/23 1806     Activated Clotting Time  233 Seconds      Comment: Serial Number: [id number]:  [id number]       CANDIDA AURIS SCREEN - Swab, Axilla Right, Axilla Left and Groin [[id number]]  (Normal) Collected: 05/02/23 1503    Specimen: Swab from Axilla Right, Axilla Left and Groin Updated: 05/03/23 1515     Candida Auris Screen Culture No Candida auris isolated at 24 hours          Imaging Results (Last 24 Hours)     ** No results found for the  last 24 hours. **          LAB RESULTS (LAST 7 DAYS)    CBC  Results from last 7 days   Lab Units 05/03/23 2326 05/03/23 1947 05/02/23  0915   WBC 10*3/mm3 13.60* 8.60 7.40   RBC 10*6/mm3 4.24 3.89 4.14   HEMOGLOBIN g/dL 11.2* 10.8* 11.3*   HEMATOCRIT % 36.0 33.0* 34.9   MCV fL 84.9 84.8 84.2   PLATELETS 10*3/mm3 322 298 295       BMP  Results from last 7 days   Lab Units 05/03/23 2326 05/03/23 1947 05/02/23  0915   SODIUM mmol/L 137 137 143   POTASSIUM mmol/L 4.3 4.3 4.7   CHLORIDE mmol/L 102 103 105   CO2 mmol/L 23.0 22.0 26.0   BUN mg/dL 19 17 17   CREATININE mg/dL 0.92 0.74 0.93   GLUCOSE mg/dL 299* 167* 249*   MAGNESIUM mg/dL  --   --  1.7       CMP   Results from last 7 days   Lab Units 05/03/23 2326 05/03/23 1947 05/02/23 0915   SODIUM mmol/L 137 137 143   POTASSIUM mmol/L 4.3 4.3 4.7   CHLORIDE mmol/L 102 103 105   CO2 mmol/L 23.0 22.0 26.0   BUN mg/dL 19 17 17   CREATININE mg/dL 0.92 0.74 0.93   GLUCOSE mg/dL 299* 167* 249*   ALBUMIN g/dL  --   --  4.4   BILIRUBIN mg/dL  --   --  0.3   ALK PHOS U/L  --   --  84   AST (SGOT) U/L  --   --  16   ALT (SGPT) U/L  --   --  11       BNP        TROPONIN  Results from last 7 days   Lab Units 05/02/23  1232   HSTROP T ng/L 22*       CoAg  Results from last 7 days   Lab Units 05/02/23  0915   INR  0.97   APTT seconds 25.9*       Creatinine Clearance  Estimated Creatinine Clearance: 44.1 mL/min (by C-G formula based on SCr of 0.92 mg/dL).    ABG        Radiology  CT Facial Bones Without Contrast    Result Date: 5/2/2023  Impression: 1. No abscess or osteomyelitis within the face. Electronically Signed: Eligio Rueda  5/2/2023 2:02 PM EDT  Workstation ID: [id number]        EKG  I personally viewed and interpreted the patient's EKG/Telemetry data:  ECG 12 Lead Chest Pain   Final Result   HEART RATE= 71  bpm   RR Interval= 840  ms   OR Interval= 187  ms   P Horizontal Axis= 14  deg   P Front Axis= 61  deg   QRSD Interval= 97  ms   QT Interval= 423  ms   QRS Axis= 19   deg   T Wave Axis= 45  deg   - NORMAL ECG -   Sinus rhythm   No previous ECG available for comparison   Electronically Signed By: Freddy Baxter (Wale) 03-May-2023 06:23:33   Date and Time of Study: 2023-05-02 09:03:55      SCANNED - TELEMETRY     Final Result      SCANNED - TELEMETRY     Final Result      SCANNED - TELEMETRY     Final Result      SCANNED - TELEMETRY     Final Result      SCANNED - TELEMETRY     Final Result      SCANNED - TELEMETRY     Final Result      SCANNED - TELEMETRY     Final Result      SCANNED - TELEMETRY     Final Result            Echocardiogram:          Stress Test:  Results for orders placed during the hospital encounter of 05/02/23    Stress Test With Myocardial Perfusion One Day    Interpretation Summary  •  Left ventricular ejection fraction is hyperdynamic (Calculated EF > 70%).  •  Abnormal LV wall motion consistent with moderate hypokinesis of the inferior wall.  •  Myocardial perfusion imaging indicates a large-sized infarct located in the inferior wall with moderate candy-infarct ischemia.  •  Impressions are consistent with a high risk study.  •  Findings consistent with an abnormal ECG stress test.         Cardiac Catheterization:  Results for orders placed during the hospital encounter of 05/02/23    Cardiac Catheterization/Vascular Study    Narrative  OPERATORS  John Baldwin M.D. (Attending Cardiologist)      PROCEDURES PERFORMED  Ultrasound guided Vascular access  Left Heart Catheterization  Coronary Angiogram 86881  PCI with DIRK placement to RCA 40369  IVUS of RCA 98015  Moderate sedation 45mins    INDICATIONS FOR PROCEDURE  82 years old woman with multiple cardiovascular risk factors presented with chest pain/unstable angina.  She had a nuclear stress test which was abnormal in the inferior wall.  After discussing the risk and benefit of the procedure she was brought into the Cath Lab for cardiac catheterization.    PROCEDURE IN DETAIL  Informed consent was obtained from  the patient after explaining the risks, benefits, and alternative options of the procedure. After obtaining informed consent, the patient was brought to the cath lab and was prepped in a sterile fashion. Lidocaine 2% was used for local anesthesia into the right femoral access site. The right femoral artery was accessed with a micropuncture needle via modified Seldinger technique under ultrasound guidance. A 6F was inserted successfully. Afterwards, 6F JR4 and JL4 diagnostic catheters were advanced over a wire into the ascending aorta and were used to engage the ostia of the left main and RCA respectively. JR4 used to cross the AV and obtain LV pressures and gradient across the AV measured via pullback technique. Images of the right and left coronary systems were obtained.    HEMODYNAMICS  LV: 151/2, 7 mmHg  AO: 150/64, 98 mmHg  No significant gradient across aortic valve during pullback of JR4 catheter.  LV gram was not performed due to echocardiogram being available.    FINDINGS    Coronary Angiogram    Right dominant circulation    Left main: Left main is a large caliber vessel which gives rise to the Left Anterior Descending and the Left circumflex.  Left main is angiographically free from any significant disease.    Left Anterior Descending Artery: LAD is a medium caliber vessel which gives rise to several septal perforators and several diagonal branches.  LAD has diffuse luminal irregularities but no significant obstruction.    Left Circumflex: Patent stents in the mid left circumflex and first obtuse marginal branch.  Remaining left circumflex is angiographically free from any significant disease.    Right Coronary Artery: The RCA is a small caliber vessel gives rise to PDA and PLV.  Mid RCA has focal 80 to 85% stenosis with EDITH-3 flow.    Percutaneous coronary intervention  100 units/kg of heparin was administered and ACT of more than 250 was documented.  A 6 Faroese JR4 guide catheter with sideholes was used  to engage the ostium of the RCA.  0.014 run-through wire was advanced past the lesion in the mid RCA into the distal RCA.  I then predilated the lesion with 2 x 12 mm mini trek balloon.  This was followed by placement of 2.25 x 33 mm Xience miryam point stent.  This was followed by advancement of [employer] intravascular ultrasound into the distal RCA and a slow manual pullback was performed.  IVUS confirmed distal RCA size of 2.5 mm and proximal RCA 3.5 mm.  Mid RCA was 3.2 x 2.5 mm in dimension.  I then postdilated the stent with a 3 x 15 mm noncompliant balloon at 14 shima.  Final angiography showed EDITH-3 flow and 0% stenosis in the RCA.    All the catheters were exchanged over a wire and subsequently removed. Angiogram of the femoral access site was obtained and did not show complications. The patient tolerated the procedure well without any complications. The pictures were reviewed at the end of the procedure. A 6 Irish Angio-Seal closure device was applied to achieve hemostasis.    ESTIMATED BLOOD LOSS:  10 ml    COMPLICATIONS:  None    PROCEDURE DATA:  Contrast Used: 50 mL  Sedation Time: 45 minutes    IMPRESSIONS  Patent stents in the left circumflex and obtuse marginal 1.  IVUS guided PCI of mid RCA with placement of drug-eluting stent.  Normal LVEDP    RECOMMENDATIONS  -Start dual antiplatelet therapy.  -High intensity statin, beta-blocker.  - Referral to cardiac rehab         Other:         ASSESSMENT & PLAN:    Principal Problem:    Chest pain  Active Problems:    Coronary artery disease involving native coronary artery of native heart with unstable angina pectoris    Type 2 diabetes mellitus    Diabetic neuropathy    Essential hypertension    HLD (hyperlipidemia)    Postoperative hypothyroidism    Abnormal nuclear stress test    Former smoker    Chest pain, unspecified type    CAD  Status post PCI of the RCA with IVUS guidance.  Xience 2.25 x 33 mm stent postdilated with a 3.0 NC balloon  Continue  with aspirin and Plavix  Continue statin  Cardiac rehab     Hypertension  Currently on atenolol and lisinopril  Blood pressure is well controlled     Hyperlipidemia  Currently on rosuvastatin  LDL is 55     Diabetes  Uncontrolled diabetes with an HbA1c of 11  On Lantus and sliding scale insulin  Diabetes educator has been consulted     Hypothyroidism  Continue levothyroxine       Okay for discharge from cardiac standpoint.  Follow-up outpatient  Nuria Mcgraw MD  05/04/23  12:22 EDT

## 2023-05-04 NOTE — PLAN OF CARE
Goal Outcome Evaluation:  Plan of Care Reviewed With: patient           Outcome Evaluation: Pt is post heart cath with stent placement. Pt arrived to PCU this shift. A short while after arrivng pt started having some abdominal pain that was prettt severe along with N/V. Pt recevied pain meds and zofran, provider was notified. Reglan was ordered as well as some extra liver labs. Pt pain got worse and pt revealed a history of kidney stones several years ago. Provider notified and pain meds were ordered as well as a UA. pain meds helped to calm pt and pt has not had but slight pain since. Pt is currently resting. when UA came back this RN notified the provider of results and increased WBC's. Provider left a note for dayshift hospitalist to decide weather to treat it as a UTI or not. Pts VSS so far this shift. Pts cath site WDL. Pt currently resting comfortably.

## 2023-05-04 NOTE — CASE MANAGEMENT/SOCIAL WORK
Discharge Planning Assessment  [facility]     Patient Name: Pamela Zavala  MRN: [id number]  Today's Date: 5/4/2023    Admit Date: 5/2/2023    Plan: Anticipate return to [facility].         Discharge Plan     Row Name 05/04/23 1328       Plan    Plan Anticipate return to [facility].    Patient/Family in Agreement with Plan yes    Plan Comments  spoke to patient at bedside. IMM letter reviewed with patient, verbal consent and declined copy. Patient reports her son will transport at d/c.    Final Discharge Disposition Code 01 - home or self-care    Final Note [facility]                  Expected Discharge Date and Time     Expected Discharge Date Expected Discharge Time    May 4, 2023              Patient Forms     Row Name 05/04/23 1329       Patient Forms    Important Message from Medicare (IMM) Delivered  5/4/23    Delivered to Patient    Method of delivery In person  reviewed with patient, verbal consent and declined copy              Case Management Discharge Note      Final Note: [facility]         Selected Continued Care - Admitted Since 5/2/2023          Transportation Services  Private: Car    Final Discharge Disposition Code: 01 - home or self-care    Met with patient in room. Maintained distance greater than six feet and spent less than 15 minutes in the room.    ASHLEIGH HaN, [place] [address]    Office: [phone]  Fax: [phone]

## 2023-05-04 NOTE — DISCHARGE SUMMARY
[facility] Medicine Services  Discharge Summary    Date of Service: 2023  Patient Name: Pamela Zavala  : 1940  MRN: [id number]    Date of Admission: 2023  Discharge Diagnosis: Chest pain  Date of Discharge: 2023  Primary Care Physician: Tracy Jeffers APRN      Presenting Problem:   Chest pain [R07.9]  Chest pain, unspecified type [R07.9]    Active and Resolved Hospital Problems:  Active Hospital Problems    Diagnosis POA   • **Chest pain [R07.9] Yes   • Chest pain, unspecified type [R07.9] Yes   • Coronary artery disease involving native coronary artery of native heart with unstable angina pectoris [I25.110] Yes   • Abnormal nuclear stress test [R94.39] Yes   • Former smoker [Z87.891] Not Applicable   • Type 2 diabetes mellitus [E11.9] Yes   • Diabetic neuropathy [E11.40] Yes   • Essential hypertension [I10] Yes   • HLD (hyperlipidemia) [E78.5] Yes   • Postoperative hypothyroidism [E89.0] Yes      Resolved Hospital Problems   No resolved problems to display.         Hospital Course     Hospital Course:  Pamela Zavala is a 82 y.o. female who had cardiac cath on 5/3/2023 and was cleared by cardiology to be discharged home.    Cardiology note 5/3/2023  Chest pain  Active Problems:    Coronary artery disease involving native coronary artery of native heart with unstable angina pectoris    Type 2 diabetes mellitus    Diabetic neuropathy    Essential hypertension    HLD (hyperlipidemia)    Postoperative hypothyroidism    Abnormal nuclear stress test    Former smoker     Chest pain  She has had worsening angina over the last day  Stress test was concerning for inferior lateral ischemia with inferior wall hypokinesis  Continues to have jaw numbness  Plan for cardiac catheterization later on today  Discussed risks and benefits of procedure and she is agreeable to proceed  Continue with aspirin and Plavix  Continue statin     Hypertension  Currently on atenolol and  lisinopril  Blood pressure is well controlled     Hyperlipidemia  Currently on rosuvastatin  LDL is 55     Diabetes  Uncontrolled diabetes with an HbA1c of 11  On Lantus and sliding scale insulin  Diabetes educator has been consulted     Hypothyroidism  Continue levothyroxine      It was reported via nursing staff overnight that patient started having some abdominal pain  along with N/V. Pt recevied pain meds and zofran, provider was notified.  pt revealed a history of kidney stones several years ago. Provider notified and pain meds were ordered as well as a UA.   Pt is currently resting and denies any pain, nausea or vomiting.  She wants very bad to go back to her assisted living home.  UA looks abnormal however by reviewing previous labs seems like this is her baseline.  Patient admits to dysuria every now and then.  Patient states that she has very good nursing staff at her home and insisted to go home today so I will discharge her  on oral antibiotics and will set up any needed outpatient follow-up appointments    Reasons For Change In Medications and Indications for New Medications:      Day of Discharge     Vital Signs:  Temp:  [97.7 °F (36.5 °C)-98.2 °F (36.8 °C)] 98.1 °F (36.7 °C)  Heart Rate:  [60-89] 74  Resp:  [14-22] 22  BP: (115-182)/(59-88) 119/74  Flow (L/min):  [2] 2    Physical Exam:  Constitutional:       Appearance: Normal appearance.   HENT:      Mouth/Throat:      Mouth: Mucous membranes are moist.   Eyes:      Pupils: Pupils are equal, round, and reactive to light.   Cardiovascular:      Rate and Rhythm: Normal rate and regular rhythm.      Pulses: Normal pulses.      Heart sounds: Normal heart sounds.   Pulmonary:      Effort: Pulmonary effort is normal.      Breath sounds: Normal breath sounds.   Abdominal:      General: Bowel sounds are normal.      Palpations: Abdomen is soft.   Musculoskeletal:         General: Normal range of motion.   Skin:     General: Skin is warm and dry.    Neurological:      Mental Status: She is alert and oriented to person, place, and time.   Psychiatric:         Behavior: Behavior normal.       Pertinent  and/or Most Recent Results     LAB RESULTS:      Lab 05/03/23 2326 05/03/23 1947 05/02/23 0915   WBC 13.60* 8.60 7.40   HEMOGLOBIN 11.2* 10.8* 11.3*   HEMATOCRIT 36.0 33.0* 34.9   PLATELETS 322 298 295   NEUTROS ABS  --  5.30 5.20   LYMPHS ABS  --  2.50 1.60   MONOS ABS  --  0.60 0.40   EOS ABS  --  0.20 0.10   MCV 84.9 84.8 84.2   PROTIME  --   --  10.4   APTT  --   --  25.9*         Lab 05/03/23 2326 05/03/23 1947 05/02/23 0915   SODIUM 137 137 143   POTASSIUM 4.3 4.3 4.7   CHLORIDE 102 103 105   CO2 23.0 22.0 26.0   ANION GAP 12.0 12.0 12.0   BUN 19 17 17   CREATININE 0.92 0.74 0.93   EGFR 61.5 79.9 60.7   GLUCOSE 299* 167* 249*   CALCIUM 8.1* 7.9* 8.1*   MAGNESIUM  --   --  1.7   HEMOGLOBIN A1C  --   --  11.00*   TSH  --   --  1.900         Lab 05/02/23 0915   TOTAL PROTEIN 7.1   ALBUMIN 4.4   GLOBULIN 2.7   ALT (SGPT) 11   AST (SGOT) 16   BILIRUBIN 0.3   ALK PHOS 84         Lab 05/02/23  1232 05/02/23 0915   PROBNP  --  308.5   HSTROP T 22* 25*   PROTIME  --  10.4   INR  --  0.97         Lab 05/02/23 0915   CHOLESTEROL 146   LDL CHOL 55   HDL CHOL 72*   TRIGLYCERIDES 105             Brief Urine Lab Results  (Last result in the past 365 days)      Color   Clarity   Blood   Leuk Est   Nitrite   Protein   CREAT   Urine HCG        05/03/23 2332 Yellow   Cloudy   Large (3+)   Moderate (2+)   Negative   Trace               Microbiology Results (last 10 days)     Procedure Component Value - Date/Time    CANDIDA AURIS SCREEN - Swab, Axilla Right, Axilla Left and Groin [[id number]]  (Normal) Collected: 05/02/23 1503    Lab Status: Preliminary result Specimen: Swab from Axilla Right, Axilla Left and Groin Updated: 05/03/23 1515     Candida Auris Screen Culture No Candida auris isolated at 24 hours          XR Chest 1 View    Result Date: 5/2/2023  Impression:  Impression: No acute process. Electronically Signed: Smith Mcgee  5/2/2023 9:50 AM EDT  Workstation ID: [id number]    CT Facial Bones Without Contrast    Result Date: 5/2/2023  Impression: Impression: 1. No abscess or osteomyelitis within the face. Electronically Signed: Eligio Rueda  5/2/2023 2:02 PM EDT  Workstation ID: [id number]                  Labs Pending at Discharge:  Pending Labs     Order Current Status    CANDIDA AURIS SCREEN - Swab, Axilla Right, Axilla Left and Groin Preliminary result          Procedures Performed  Procedure(s):  Left Heart Cath and coronary angiogram         Consults:   Consults     Date and Time Order Name Status Description    5/3/2023 12:40 PM Inpatient Hospitalist Consult Completed     5/3/2023 10:21 AM Inpatient Cardiology Consult Completed             Discharge Details        Discharge Medications      New Medications      Instructions Start Date   Accu-Chek Guide Me w/Device kit   1 kit, Does not apply, Once, Dx: E11.65      Accu-Chek Softclix Lancets lancets   1 each, Other, 3 Times Daily Before Meals, Dx: E11.65. Use as instructed      aspirin 81 MG EC tablet   81 mg, Oral, Daily   Start Date: May 5, 2023     glucose blood test strip  Commonly known as: Accu-Chek Guide   1 each, Other, 3 Times Daily Before Meals, Dx: E11.65. Use as instructed      ondansetron 4 MG tablet  Commonly known as: ZOFRAN   4 mg, Oral, Every 6 Hours PRN      sulfamethoxazole-trimethoprim 800-160 MG per tablet  Commonly known as: Bactrim DS   1 tablet, Oral, 2 Times Daily         Continue These Medications      Instructions Start Date   atenolol 50 MG tablet  Commonly known as: TENORMIN   50 mg, Oral, Daily      clopidogrel 75 MG tablet  Commonly known as: PLAVIX   75 mg, Oral, Daily      famotidine 20 MG tablet  Commonly known as: PEPCID   20 mg, Oral, 2 Times Daily PRN      fluticasone 50 MCG/ACT nasal spray  Commonly known as: FLONASE   1 spray, Nasal, 2 Times Daily      gabapentin 300 MG  capsule  Commonly known as: NEURONTIN   300 mg, Oral, Nightly      lactulose 10 g packet  Commonly known as: CEPHULAC   20 g, Oral, Daily      levothyroxine 125 MCG tablet  Commonly known as: SYNTHROID, LEVOTHROID   125 mcg, Oral, Daily      lisinopril 5 MG tablet  Commonly known as: PRINIVIL,ZESTRIL   5 mg, Oral, Daily      metFORMIN 1000 MG tablet  Commonly known as: GLUCOPHAGE   1,000 mg, Oral, 2 Times Daily With Meals      oxybutynin 5 MG tablet  Commonly known as: DITROPAN   5 mg, Oral, 2 Times Daily      pantoprazole 40 MG EC tablet  Commonly known as: PROTONIX   40 mg, Oral, 2 Times Daily      rosuvastatin 5 MG tablet  Commonly known as: CRESTOR   5 mg, Oral, Daily      Stahist AD 25-60 MG tablet  Generic drug: Chlorcyclizine-Pseudoephed   1 tablet, Oral, Every 8 Hours PRN      traMADol 50 MG tablet  Commonly known as: ULTRAM   50 mg, Oral, 4 Times Daily PRN      traZODone 50 MG tablet  Commonly known as: DESYREL   50 mg, Oral, Nightly      Tresiba 100 UNIT/ML solution injection  Generic drug: Insulin Degludec   24 Units, Subcutaneous, Every Morning      Tresiba 100 UNIT/ML solution injection  Generic drug: Insulin Degludec   17 Units, Subcutaneous, Every Evening             Allergies   Allergen Reactions   • Penicillins Unknown - High Severity         Discharge Disposition:   Skilled Nursing Facility ([place] - External)    Diet:  Hospital:  Diet Order   Procedures   • Diet: Cardiac Diets, Diabetic Diets; Healthy Heart (2-3 Na+); Consistent Carbohydrate; Texture: Regular Texture (IDDSI 7); Fluid Consistency: Thin (IDDSI 0)         Discharge Activity:         CODE STATUS:  Code Status and Medical Interventions:   Ordered at: 05/03/23 5595     Level Of Support Discussed With:    Patient     Code Status (Patient has no pulse and is not breathing):    No CPR (Do Not Attempt to Resuscitate)     Medical Interventions (Patient has pulse or is breathing):    Full Support     Release to patient:    Routine Release          Future Appointments   Date Time Provider Department Center   5/18/2023  3:15 PM Nuria Mcgraw MD [facility]       Additional Instructions for the Follow-ups that You Need to Schedule     Ambulatory Referral to Cardiac Rehab   As directed            Time spent on Discharge including face to face service:  43 minutes        Signature: Electronically signed by Parker Nagel MD, 05/04/23, 11:35 EDT.  [facility] Team

## 2023-05-04 NOTE — PLAN OF CARE
Goal Outcome Evaluation:  Plan of Care Reviewed With: patient        Progress: improving       Problem: Adult Inpatient Plan of Care  Goal: Plan of Care Review  Outcome: Ongoing, Progressing  Flowsheets (Taken 5/4/2023 1103)  Progress: improving  Plan of Care Reviewed With: patient  Goal: Patient-Specific Goal (Individualized)  Outcome: Ongoing, Progressing  Goal: Absence of Hospital-Acquired Illness or Injury  Outcome: Ongoing, Progressing  Intervention: Identify and Manage Fall Risk  Recent Flowsheet Documentation  Taken 5/4/2023 1044 by Rosalie Castillo RN  Safety Promotion/Fall Prevention:   safety round/check completed   room organization consistent   nonskid shoes/slippers when out of bed   fall prevention program maintained   clutter free environment maintained   assistive device/personal items within reach  Taken 5/4/2023 0800 by Rosalie Castillo RN  Safety Promotion/Fall Prevention:   safety round/check completed   room organization consistent   nonskid shoes/slippers when out of bed   fall prevention program maintained   clutter free environment maintained   assistive device/personal items within reach  Intervention: Prevent Skin Injury  Recent Flowsheet Documentation  Taken 5/4/2023 1044 by Rosalie Castillo RN  Body Position:   position changed independently   weight shifting  Taken 5/4/2023 0800 by Rosalie Castillo RN  Body Position:   position changed independently   sitting up in bed   weight shifting  Skin Protection:   adhesive use limited   tubing/devices free from skin contact  Intervention: Prevent and Manage VTE (Venous Thromboembolism) Risk  Recent Flowsheet Documentation  Taken 5/4/2023 1044 by Rosalie Castillo RN  Activity Management:   activity encouraged   up ad brynn   ambulated in room   ambulated to bathroom   up in chair  Taken 5/4/2023 0800 by Rosalie Castillo RN  Activity Management: activity encouraged  VTE Prevention/Management: sequential compression  devices off  Range of Motion: active ROM (range of motion) encouraged  Intervention: Prevent Infection  Recent Flowsheet Documentation  Taken 5/4/2023 1044 by Rosalie Castillo RN  Infection Prevention:   single patient room provided   personal protective equipment utilized   hand hygiene promoted  Taken 5/4/2023 0800 by Rosalie Castillo RN  Infection Prevention:   single patient room provided   personal protective equipment utilized   hand hygiene promoted  Goal: Optimal Comfort and Wellbeing  Outcome: Ongoing, Progressing  Intervention: Monitor Pain and Promote Comfort  Recent Flowsheet Documentation  Taken 5/4/2023 0800 by Rosalie Castillo RN  Pain Management Interventions:   care clustered   pain management plan reviewed with patient/caregiver   pillow support provided   position adjusted   quiet environment facilitated   relaxation techniques promoted  Intervention: Provide Person-Centered Care  Recent Flowsheet Documentation  Taken 5/4/2023 0800 by Rosalie Castillo RN  Trust Relationship/Rapport:   care explained   questions answered   questions encouraged  Goal: Readiness for Transition of Care  Outcome: Ongoing, Progressing     Problem: Chest Pain  Goal: Resolution of Chest Pain Symptoms  Outcome: Ongoing, Progressing     Problem: Fatigue  Goal: Improved Activity Tolerance  Outcome: Ongoing, Progressing  Intervention: Promote Improved Energy  Recent Flowsheet Documentation  Taken 5/4/2023 1044 by Rosalie Castillo RN  Activity Management:   activity encouraged   up ad brynn   ambulated in room   ambulated to bathroom   up in chair  Taken 5/4/2023 0800 by Rosalie Castillo RN  Activity Management: activity encouraged  Sleep/Rest Enhancement:   consistent schedule promoted   regular sleep/rest pattern promoted

## 2023-05-07 LAB — BACTERIA ISLT: NORMAL

## 2023-05-16 ENCOUNTER — TELEPHONE (OUTPATIENT)
Dept: CARDIAC REHAB | Facility: HOSPITAL | Age: 83
End: 2023-05-16
Payer: MEDICARE

## 2023-05-18 ENCOUNTER — OFFICE VISIT (OUTPATIENT)
Dept: CARDIOLOGY | Facility: CLINIC | Age: 83
End: 2023-05-18
Payer: MEDICARE

## 2023-05-18 VITALS
WEIGHT: 138 LBS | DIASTOLIC BLOOD PRESSURE: 65 MMHG | HEIGHT: 66 IN | OXYGEN SATURATION: 97 % | HEART RATE: 80 BPM | BODY MASS INDEX: 22.18 KG/M2 | SYSTOLIC BLOOD PRESSURE: 150 MMHG

## 2023-05-18 DIAGNOSIS — I25.10 CORONARY ARTERY DISEASE INVOLVING NATIVE CORONARY ARTERY OF NATIVE HEART WITHOUT ANGINA PECTORIS: Primary | ICD-10-CM

## 2023-05-18 DIAGNOSIS — R31.9 HEMATURIA, UNSPECIFIED TYPE: ICD-10-CM

## 2023-05-18 DIAGNOSIS — Z79.4 TYPE 2 DIABETES MELLITUS WITH DIABETIC NEUROPATHY, WITH LONG-TERM CURRENT USE OF INSULIN: Chronic | ICD-10-CM

## 2023-05-18 DIAGNOSIS — E11.40 TYPE 2 DIABETES MELLITUS WITH DIABETIC NEUROPATHY, WITH LONG-TERM CURRENT USE OF INSULIN: Chronic | ICD-10-CM

## 2023-05-18 DIAGNOSIS — I10 ESSENTIAL HYPERTENSION: Chronic | ICD-10-CM

## 2023-05-18 DIAGNOSIS — E78.2 MIXED HYPERLIPIDEMIA: Chronic | ICD-10-CM

## 2023-05-18 RX ORDER — LISINOPRIL 10 MG/1
10 TABLET ORAL DAILY
Qty: 90 TABLET | Refills: 3 | Status: SHIPPED | COMMUNITY
Start: 2023-05-18

## 2023-05-18 NOTE — PROGRESS NOTES
Encounter Date:05/18/2023      Patient ID: Pamela Zavala is a 82 y.o. female.    Chief Complaint   Patient presents with   • Follow-up     [facility]          History of Present Illness  Pamela Zavala is a 82 y.o. female with a history of coronary artery disease status post stent placement x 3 approximately 10 years ago, hypertension, hyperlipidemia, Type 2 diabetes mellitus, former tobacco abuse, thyroid cancer status post thyroidectomy, and postoperative hypothyroidism who presented to the ER at [facility] on 5/2/2023 complaining of chest pain.  She had a positive stress test for which she underwent cardiac catheterization and PCI of the RCA with IVUS guidance with a Xience 2.25 x 33 mm stent postdilated with a 3.0 NC balloon.  Her chest pain improved with this but she does still have some residual left-sided jaw numbness.  She has remained compliant with her aspirin and Plavix.  No bleeding issues.  She is not able to do rehab because she lives in an assisted living facility.  She however walks regularly there and does do yoga daily.  They also have therapist that, there are no treadmill in the basement that she would like to start using.  She denies any orthopnea or PND.  No dizziness or lightheadedness.  She does have a headache today and her blood pressure is mildly elevated.    The following portions of the patient's history were reviewed and updated as appropriate: allergies, current medications, past family history, past medical history, past social history, past surgical history, and problem list.    Review of Systems   Constitutional: Negative for malaise/fatigue.   Cardiovascular: Positive for chest pain and leg swelling. Negative for dyspnea on exertion and palpitations.   Respiratory: Negative for cough and shortness of breath.    Gastrointestinal: Positive for nausea. Negative for abdominal pain and vomiting.   Neurological: Positive for numbness. Negative for dizziness, focal  weakness, headaches and light-headedness.   All other systems reviewed and are negative.        Current Outpatient Medications:   •  Accu-Chek Softclix Lancets lancets, 1 each by Other route 3 (Three) Times a Day Before Meals. Dx: E11.65. Use as instructed, Disp: 100 each, Rfl: 2  •  atenolol (TENORMIN) 50 MG tablet, Take 1 tablet by mouth Daily., Disp: , Rfl:   •  Chlorcyclizine-Pseudoephed (Stahist AD) 25-60 MG tablet, Take 1 tablet by mouth Every 8 (Eight) Hours As Needed., Disp: , Rfl:   •  clopidogrel (PLAVIX) 75 MG tablet, Take 1 tablet by mouth Daily., Disp: , Rfl:   •  famotidine (PEPCID) 20 MG tablet, Take 1 tablet by mouth 2 (Two) Times a Day As Needed for Heartburn., Disp: , Rfl:   •  fluticasone (FLONASE) 50 MCG/ACT nasal spray, 1 spray into the nostril(s) as directed by provider 2 (Two) Times a Day., Disp: , Rfl:   •  gabapentin (NEURONTIN) 300 MG capsule, Take 1 capsule by mouth Every Night., Disp: , Rfl:   •  glucose blood (Accu-Chek Guide) test strip, 1 each by Other route 3 (Three) Times a Day Before Meals. Dx: E11.65. Use as instructed, Disp: 100 each, Rfl: 2  •  Insulin Degludec (TRESIBA) 100 UNIT/ML solution injection, Inject 24 Units under the skin into the appropriate area as directed Every Morning., Disp: , Rfl:   •  Insulin Degludec (TRESIBA) 100 UNIT/ML solution injection, Inject 17 Units under the skin into the appropriate area as directed Every Evening., Disp: , Rfl:   •  levothyroxine (SYNTHROID, LEVOTHROID) 125 MCG tablet, Take 1 tablet by mouth Daily., Disp: , Rfl:   •  lisinopril (PRINIVIL,ZESTRIL) 10 MG tablet, Take 1 tablet by mouth Daily., Disp: 90 tablet, Rfl: 3  •  metFORMIN (GLUCOPHAGE) 1000 MG tablet, Take 1 tablet by mouth 2 (Two) Times a Day With Meals., Disp: , Rfl:   •  ondansetron (ZOFRAN) 4 MG tablet, Take 1 tablet by mouth Every 6 (Six) Hours As Needed for Nausea or Vomiting., Disp: 20 tablet, Rfl: 0  •  oxybutynin (DITROPAN) 5 MG tablet, Take 1 tablet by mouth 2 (Two)  Times a Day., Disp: , Rfl:   •  pantoprazole (PROTONIX) 40 MG EC tablet, Take 1 tablet by mouth 2 (Two) Times a Day., Disp: , Rfl:   •  rosuvastatin (CRESTOR) 5 MG tablet, Take 1 tablet by mouth Daily., Disp: , Rfl:   •  traMADol (ULTRAM) 50 MG tablet, Take 1 tablet by mouth 4 (Four) Times a Day As Needed for Moderate Pain., Disp: , Rfl:   •  traZODone (DESYREL) 50 MG tablet, Take 1 tablet by mouth Every Night., Disp: , Rfl:   •  aspirin 81 MG EC tablet, Take 1 tablet by mouth Daily., Disp: 30 tablet, Rfl: 0  •  lactulose (CEPHULAC) 10 g packet, Take 2 packets by mouth Daily., Disp: , Rfl:     Allergies   Allergen Reactions   • Penicillins Unknown - High Severity       Family History   Problem Relation Age of Onset   • Cancer Father    • Heart disease Sister    • Heart disease Maternal Grandmother        Past Surgical History:   Procedure Laterality Date   • CARDIAC CATHETERIZATION N/A 5/3/2023    Procedure: Left Heart Cath and coronary angiogram;  Surgeon: John Baldiwn MD;  Location: [facility] CATH INVASIVE LOC[facility];  Service: Cardiovascular;  Laterality: N/A;   • CORONARY STENT PLACEMENT      x 3   • HYSTERECTOMY     • THYROIDECTOMY         Past Medical History:   Diagnosis Date   • CAD (coronary artery disease)    • Diabetic neuropathy    • Essential hypertension    • HLD (hyperlipidemia)    • Hypothyroidism    • Recurrent sinusitis    • Renal disease    • Thyroid cancer    • Type 2 diabetes mellitus        Social History     Socioeconomic History   • Marital status:    Tobacco Use   • Smoking status: Former     Packs/day: 2.00     Years: 30.00     Pack years: 60.00     Types: Cigarettes     Quit date:      Years since quittin.3     Passive exposure: Past   • Smokeless tobacco: Never   Vaping Use   • Vaping Use: Never used   Substance and Sexual Activity   • Alcohol use: Yes     Alcohol/week: 1.0 standard drink     Types: 1 Glasses of wine per week   • Drug use: Defer   • Sexual activity: Defer  "        Procedures      Objective:       Physical Exam    /65 (BP Location: Right arm, Patient Position: Sitting, Cuff Size: Adult)   Pulse 80   Ht 167.6 cm (66\")   Wt 62.6 kg (138 lb)   SpO2 97%   BMI 22.27 kg/m²   The patient is alert, oriented and in no distress.    Vital signs as noted above.    Head and neck revealed no carotid bruits or jugular venous distension.  No thyromegaly or lymphadenopathy is present.    Lungs clear.  No wheezing.  Breath sounds are normal bilaterally.    Heart normal first and second heart sounds.  No murmur..  No pericardial rub is present.  No gallop is present.    Abdomen soft and nontender.  No organomegaly is present.    Extremities revealed good peripheral pulses without any pedal edema.    Skin warm and dry.    Musculoskeletal system is grossly normal.    CNS grossly normal.           Diagnosis Plan   1. Coronary artery disease involving native coronary artery of native heart without angina pectoris        2. Essential hypertension        3. Mixed hyperlipidemia        4. Type 2 diabetes mellitus with diabetic neuropathy, with long-term current use of insulin        5. Hematuria, unspecified type        LAB RESULTS (LAST 7 DAYS)    CBC        BMP        CMP         BNP        TROPONIN        CoAg        Creatinine Clearance  Estimated Creatinine Clearance: 45 mL/min (by C-G formula based on SCr of 0.92 mg/dL).    ABG        Radiology  No radiology results for the last day         Assessment and Plan       Diagnoses and all orders for this visit:    1. Coronary artery disease involving native coronary artery of native heart without angina pectoris (Primary)    2. Essential hypertension    3. Mixed hyperlipidemia    4. Type 2 diabetes mellitus with diabetic neuropathy, with long-term current use of insulin    5. Hematuria, unspecified type       CAD  Status post PCI of the RCA with IVUS guidance.  Xience 2.25 x 33 mm stent postdilated with a 3.0 NC balloon  Continue " with aspirin and Plavix  Continue statin  Currently not able to do cardiac rehab but is active at her assisted living facility     Hypertension  Currently on atenolol and lisinopril  Increase lisinopril to 10 mg     Hyperlipidemia  Currently on rosuvastatin  LDL is 55     Diabetes  Uncontrolled diabetes with an HbA1c of 11  On Lantus     History of hematuria  Has urology appointment upcoming  Explained to her that her Plavix cannot be held at this time due to recent PCI.       Nuria Mcgraw MD

## 2023-05-22 ENCOUNTER — DOCUMENTATION (OUTPATIENT)
Dept: CARDIAC REHAB | Facility: HOSPITAL | Age: 83
End: 2023-05-22
Payer: MEDICARE

## 2023-05-22 NOTE — PROGRESS NOTES
Pt called and states spoke with her doctor who said she can do rehab at the facility where she lives ([facility] [address])

## 2023-05-25 ENCOUNTER — TELEPHONE (OUTPATIENT)
Dept: DIABETES SERVICES | Facility: HOSPITAL | Age: 83
End: 2023-05-25
Payer: MEDICARE

## 2023-05-25 NOTE — TELEPHONE ENCOUNTER
Son returned educator call. He states pt is at [facility]. He states pt giving own insulin injections and nursing staff is checking pt's blood glucose. Son states he thinks pt did receive the new Accuchek Guide meter and testing supplies that were sent through at discharge. Son states pt not needing additional assistance at this time.

## 2023-07-28 ENCOUNTER — TRANSCRIBE ORDERS (OUTPATIENT)
Dept: ADMINISTRATIVE | Facility: HOSPITAL | Age: 83
End: 2023-07-28
Payer: MEDICARE

## 2023-07-28 DIAGNOSIS — N20.0 RENAL CALCULUS: Primary | ICD-10-CM

## 2023-08-11 ENCOUNTER — OFFICE VISIT (OUTPATIENT)
Dept: ENDOCRINOLOGY | Facility: CLINIC | Age: 83
End: 2023-08-11
Payer: MEDICARE

## 2023-08-11 VITALS
HEIGHT: 65 IN | DIASTOLIC BLOOD PRESSURE: 82 MMHG | WEIGHT: 134 LBS | OXYGEN SATURATION: 98 % | HEART RATE: 86 BPM | BODY MASS INDEX: 22.33 KG/M2 | SYSTOLIC BLOOD PRESSURE: 132 MMHG

## 2023-08-11 DIAGNOSIS — E89.0 POSTSURGICAL HYPOTHYROIDISM: ICD-10-CM

## 2023-08-11 DIAGNOSIS — I25.10 CORONARY ARTERY DISEASE INVOLVING NATIVE CORONARY ARTERY OF NATIVE HEART WITHOUT ANGINA PECTORIS: ICD-10-CM

## 2023-08-11 DIAGNOSIS — Z85.850 HISTORY OF THYROID CANCER: ICD-10-CM

## 2023-08-11 DIAGNOSIS — Z79.4 TYPE 2 DIABETES MELLITUS WITH HYPERGLYCEMIA, WITH LONG-TERM CURRENT USE OF INSULIN: Primary | ICD-10-CM

## 2023-08-11 DIAGNOSIS — I10 PRIMARY HYPERTENSION: ICD-10-CM

## 2023-08-11 DIAGNOSIS — E11.65 TYPE 2 DIABETES MELLITUS WITH HYPERGLYCEMIA, WITH LONG-TERM CURRENT USE OF INSULIN: Primary | ICD-10-CM

## 2023-08-11 DIAGNOSIS — E78.5 HYPERLIPIDEMIA, UNSPECIFIED HYPERLIPIDEMIA TYPE: ICD-10-CM

## 2023-08-11 LAB — GLUCOSE BLDC GLUCOMTR-MCNC: 145 MG/DL (ref 70–105)

## 2023-08-11 PROCEDURE — 82948 REAGENT STRIP/BLOOD GLUCOSE: CPT | Performed by: INTERNAL MEDICINE

## 2023-08-11 RX ORDER — INSULIN ASPART 100 [IU]/ML
6 INJECTION, SOLUTION INTRAVENOUS; SUBCUTANEOUS
Qty: 15 ML | Refills: 5 | Status: SHIPPED | OUTPATIENT
Start: 2023-08-11

## 2023-08-11 RX ORDER — INSULIN DEGLUDEC INJECTION 100 U/ML
25 INJECTION, SOLUTION SUBCUTANEOUS
Qty: 15 ML | Refills: 5 | Status: SHIPPED | OUTPATIENT
Start: 2023-08-11

## 2023-08-11 RX ORDER — INSULIN ASPART 100 [IU]/ML
INJECTION, SOLUTION INTRAVENOUS; SUBCUTANEOUS
Qty: 15 ML | Refills: 5 | Status: SHIPPED | OUTPATIENT
Start: 2023-08-11

## 2023-08-11 NOTE — PROGRESS NOTES
-----------------------------------------------------------------  ENDOCRINE CLINIC NOTE  -----------------------------------------------------------------        PATIENT NAME: Pamela Zavala  PATIENT : 1940 AGE: 82 y.o.  MRN NUMBER: [id number]  PRIMARY CARE: Marisol Rodgers APRN    ==========================================================================    CHIEF COMPLAINT: T2DM  DATE OF SERVICE: 2023         ==========================================================================    HPI / SUBJECTIVE    82 y.o. female is seen in the clinic today for optimization of care for type 2 diabetes.  Patient reports that she was diagnosed with type 2 diabetes around age 58.  Patient last known A1c was 11% in May 2023.  Patient current therapy includes:  Insulin Tresiba 10 units in the evening and 24 units in the morning  Metformin 1000 mg twice a day  NovoLog 4 units with each meal along with sliding scale, 2 units for every 50 above 150  Currently checking blood sugars multiple times a day but at least twice a day and maintaining logs.  Currently consuming 3 meals a day along with a bedtime snack.  Patient reports to have the last eye care around 5 years ago, motivated to get diabetic screening but is currently following urologist for kidney stones.  Patient do have underlying history of neuropathy in both feet's and hands and currently on gabapentin therapy.  Patient is also maintained on rosuvastatin therapy.  Underlying history of coronary artery disease s/p PCI.  Patient also have underlying history of thyroid cancer, at age 33.  Patient is s/p surgery and currently on levothyroxine therapy.  Patient was previously following endocrinologist in the community in [facility], will try to get records.  Currently on levothyroxine 125 mcg p.o. replacement therapy due to postsurgical hypothyroidism.    ==========================================================================                                                 PAST MEDICAL HISTORY    Past Medical History:   Diagnosis Date    CAD (coronary artery disease)     Diabetic neuropathy     Essential hypertension     HLD (hyperlipidemia)     Hypothyroidism     Recurrent sinusitis     Renal disease     Thyroid cancer     Type 2 diabetes mellitus        ==========================================================================    PAST SURGICAL HISTORY    Past Surgical History:   Procedure Laterality Date    CARDIAC CATHETERIZATION N/A 5/3/2023    Procedure: Left Heart Cath and coronary angiogram;  Surgeon: John Baldwin MD;  Location: [facility] INVASIVE LOCATION;  Service: Cardiovascular;  Laterality: N/A;    CORONARY STENT PLACEMENT      x 3    HYSTERECTOMY      THYROIDECTOMY         ==========================================================================    FAMILY HISTORY    Family History   Problem Relation Age of Onset    Cancer Father     Heart disease Sister     Heart disease Maternal Grandmother        ==========================================================================    SOCIAL HISTORY    Social History     Socioeconomic History    Marital status:     Number of children: 2    Highest education level: 11th grade   Tobacco Use    Smoking status: Former     Packs/day: 2.00     Years: 30.00     Pack years: 60.00     Types: Cigarettes     Quit date:      Years since quittin.6     Passive exposure: Past    Smokeless tobacco: Never   Vaping Use    Vaping Use: Never used   Substance and Sexual Activity    Alcohol use: Yes     Alcohol/week: 1.0 standard drink     Types: 1 Glasses of wine per week    Drug use: Defer    Sexual activity: Defer       ==========================================================================    MEDICATIONS      Current Outpatient Medications:     Accu-Chek Softclix Lancets lancets, 1 each by Other route 3 (Three) Times a Day Before Meals. Dx: E11.65. Use as instructed, Disp: 100 each, Rfl: 2    atenolol  (TENORMIN) 50 MG tablet, Take 1 tablet by mouth Daily., Disp: , Rfl:     clopidogrel (PLAVIX) 75 MG tablet, Take 1 tablet by mouth Daily., Disp: , Rfl:     fluticasone (FLONASE) 50 MCG/ACT nasal spray, 1 spray into the nostril(s) as directed by provider 2 (Two) Times a Day., Disp: , Rfl:     gabapentin (NEURONTIN) 300 MG capsule, Take 1 capsule by mouth Every Night., Disp: , Rfl:     glucose blood (Accu-Chek Guide) test strip, 1 each by Other route 3 (Three) Times a Day Before Meals. Dx: E11.65. Use as instructed, Disp: 100 each, Rfl: 2    Insulin Degludec (TRESIBA) 100 UNIT/ML solution injection, Inject 24 Units under the skin into the appropriate area as directed Every Morning., Disp: , Rfl:     Insulin Degludec (TRESIBA) 100 UNIT/ML solution injection, Inject 10 Units under the skin into the appropriate area as directed Every Evening., Disp: , Rfl:     lactulose (CEPHULAC) 10 g packet, Take 2 packets by mouth Daily., Disp: , Rfl:     levothyroxine (SYNTHROID, LEVOTHROID) 125 MCG tablet, Take 1 tablet by mouth Daily., Disp: , Rfl:     lisinopril (PRINIVIL,ZESTRIL) 10 MG tablet, Take 1 tablet by mouth Daily. Pt states she is taking 5mg, Disp: 90 tablet, Rfl: 3    metFORMIN (GLUCOPHAGE) 1000 MG tablet, Take 1 tablet by mouth 2 (Two) Times a Day With Meals., Disp: , Rfl:     oxybutynin (DITROPAN) 5 MG tablet, Take 1 tablet by mouth 2 (Two) Times a Day., Disp: , Rfl:     pantoprazole (PROTONIX) 40 MG EC tablet, Take 1 tablet by mouth 2 (Two) Times a Day., Disp: , Rfl:     rosuvastatin (CRESTOR) 5 MG tablet, Take 1 tablet by mouth Daily., Disp: , Rfl:     traMADol (ULTRAM) 50 MG tablet, Take 1 tablet by mouth 4 (Four) Times a Day As Needed for Moderate Pain., Disp: , Rfl:     traZODone (DESYREL) 50 MG tablet, Take 1 tablet by mouth Every Night., Disp: , Rfl:     aspirin 81 MG EC tablet, Take 1 tablet by mouth Daily. (Patient not taking: Reported on 8/11/2023), Disp: 30 tablet, Rfl: 0    Chlorcyclizine-Pseudoephed  (Stahist AD) 25-60 MG tablet, Take 1 tablet by mouth Every 8 (Eight) Hours As Needed. (Patient not taking: Reported on 8/11/2023), Disp: , Rfl:     famotidine (PEPCID) 20 MG tablet, Take 1 tablet by mouth 2 (Two) Times a Day As Needed for Heartburn. (Patient not taking: Reported on 8/11/2023), Disp: , Rfl:     ondansetron (ZOFRAN) 4 MG tablet, Take 1 tablet by mouth Every 6 (Six) Hours As Needed for Nausea or Vomiting. (Patient not taking: Reported on 8/11/2023), Disp: 20 tablet, Rfl: 0    ==========================================================================    ALLERGIES    Allergies   Allergen Reactions    Penicillins Unknown - High Severity     Lips swell and hives       ==========================================================================    OBJECTIVE    Vitals:    08/11/23 1054   Pulse: 86   SpO2: 98%     Body mass index is 22.3 kg/mý.     General: Alert, cooperative, no acute distress  Thyroid:  no enlargement/tenderness/palpable nodules  Lungs: Clear to auscultation bilaterally, respirations unlabored  Heart: Regular rate and rhythm, S1 and S2 normal, no murmur, rub or gallop  Abdomen: Soft, NT, ND and Bowel sounds Positive  Extremities:  Extremities normal, atraumatic, no cyanosis or edema    ==========================================================================    LAB EVALUATION    Lab Results   Component Value Date    GLUCOSE 299 (H) 05/03/2023    BUN 19 05/03/2023    CREATININE 0.92 05/03/2023    BCR 20.7 05/03/2023    K 4.3 05/03/2023    CO2 23.0 05/03/2023    CALCIUM 8.1 (L) 05/03/2023    ALBUMIN 4.4 05/02/2023    AST 16 05/02/2023    ALT 11 05/02/2023    CHOL 146 05/02/2023    TRIG 105 05/02/2023    HDL 72 (H) 05/02/2023    LDL 55 05/02/2023     Lab Results   Component Value Date    HGBA1C 11.00 (H) 05/02/2023     Lab Results   Component Value Date    CREATININE 0.92 05/03/2023     Lab Results   Component Value Date    TSH 1.900 05/02/2023    FREET4 1.49 05/02/2023        ==========================================================================    ASSESSMENT AND PLAN    Assessment:  # Type 2 diabetes with hyperglycemia but also concerns for hypoglycemia  # Hypertension  # Hyperlipidemia  # Coronary artery disease s/p PCI  # History of thyroid cancer s/p surgery  # Postsurgical hypothyroidism    Plan:  - Patient is currently maintained on metformin and insulin (basal with bolus) therapy  - Reviewed blood glucose readings there is some evidence of early morning hypoglycemia but otherwise blood glucose is within acceptable limit  - Target A1c for this patient is to stay between 7 and 7.5, typically above the age 80 target is to maintain A1c less than 8% but patient have underlying history of coronary artery disease therefore I will try to target between 7 and 7.5%  - Last A1c was 11% in May 2023 but reviewing the number does not look like that patient have A1c around 11 therefore we will repeat A1c today  - Patient is a good candidate for both GLP-1 receptor agonist therapy and SGLT2 inhibitor therapy but patient have underlying history of constipation and given her multiple comorbidities she may not be able to tolerate GLP-1 receptor agonist and also patient have a history of kidney stones followed with history of very frequent recurrent UTIs she may not be a good candidate for SGLT2 inhibitor therapy as well  - For now given the concerns of hypoglycemia we will redistribute insulin therapy and adjusted therapy now is:  Insulin Tresiba 25 units nightly  Insulin NovoLog 6 units with each meal with sliding scale #1  Continue metformin therapy without any changes  -Patient to follow-up in my clinic with this changes in 4 to 6 weeks time  - Patient is currently maintained on levothyroxine therapy, will check thyroid function levels along with thyroglobulin  - Patient will try to get me the name of the physician who was following patient in the community at [facility], then we  "will try to get the records and evaluate about previous history of thyroid cancer      Thank you for courtesy of consultation.    Return to clinic: 4 - 6 weeks    Entire assessment and plan was discussed and counseled the patient in detail to which patient verbalized understanding and agreed with care.  Answered all queries and concerns.    This note was created using voice recognition software and is inherently subject to errors including those of syntax and \"sound-alike\" substitutions which may escape proofreading.  In such instances, original meaning may be extrapolated by contextual derivation.    Note: Portions of this note may have been copied from previous notes but documentation have been reviewed and edited as necessary to support clinical decision making for today's visit.    ==========================================================================    INFORMATION PROVIDED TO PATIENT    Patient Instructions   Please,    # Diabetes Management:    Please start insulin therapy as:    - Insulin Tresiba (Slow acting insulin) 25 Units in the evening before bedtime.  (Please miss the dose of insulin but tonight that is 8/11/2023, missed the morning dose of insulin Tresiba on 8/12/2023 and started on insulin Tresiba 25 units in the evening before bedtime on 8/12/2023.)  - Insulin lispro / aspart (Fast acting / meal time insulin): 6 Units with each meal.    Meal time insulin need to be taken 15 mins before meal. You can bring your insulin with you if you are planning to eat out.    If you plan to miss the meal please miss the meal time insulin as well.    Also take following non-insulin therapies:  - Metformin 1000 mg twice a day    Check your blood glucose four times a day: before breakfast, before lunch, before dinner and before bedtime. Maintain blood glucose logs.    Use correction insulin if your blood is high along with your meal time insulin. Use this correction insulin before meal only, not to be taken " "without meal.    Corrections for High Blood Sugar  Use the following guide to "correct" for pre-meal high blood sugar.  This insulin will help "correct" the high reading.  You will still need to take as per scheduled meal insulin.    If your   Blood Sugar Reading is.. Number of additional   units of Insulin Lispro to Take.  (Correction)    Take 0 additional unit   151-200 Take 1 additional unit   201-250 Take 2 additional unit   251-300 Take 3 additional unit   301-350 Take 4 additional unit   351-400 Take 5 additional unit   More than 400 Take 6 additional unit         Low Blood Glucose:    - If you feel sweaty, anxious, shakiness, feel weak, trouble walking, feels like passing out or trouble seeing thing, please check your blood glucose and if its less than 70 or if your feel symptoms of low blood glucose then take one of the following:    *3 or 4 glucose tablets  *« cup of juice or regular soda (not sugar-free)  *2 tablespoons of raisins  *4 or 5 saltine crackers  *1 tablespoon of sugar  *1 tablespoon of honey or corn syrup  *6 to 8 hard candies     Please get blood work done today.    - Continue your levothyroxine therapy without any changes.    - Please try to provide me with the name of the physician/endocrinologist who was treating in the [place] area, you can either call the office or you can bring the name during the next visit.    Follow-up in my clinic in 4 weeks time.    Thank you for your visit today.    If you have any questions or concerns please feel free to reach out of the office.       ==========================================================================  Driss Dan MD  Department of Endocrine, Diabetes and Metabolism  [facility], [place]  ==========================================================================  "

## 2023-08-11 NOTE — PATIENT INSTRUCTIONS
"Please,    # Diabetes Management:    Please start insulin therapy as:    - Insulin Tresiba (Slow acting insulin) 25 Units in the evening before bedtime.  (Please miss the dose of insulin but tonight that is 8/11/2023, missed the morning dose of insulin Tresiba on 8/12/2023 and started on insulin Tresiba 25 units in the evening before bedtime on 8/12/2023.)  - Insulin lispro / aspart (Fast acting / meal time insulin): 6 Units with each meal.    Meal time insulin need to be taken 15 mins before meal. You can bring your insulin with you if you are planning to eat out.    If you plan to miss the meal please miss the meal time insulin as well.    Also take following non-insulin therapies:  - Metformin 1000 mg twice a day    Check your blood glucose four times a day: before breakfast, before lunch, before dinner and before bedtime. Maintain blood glucose logs.    Use correction insulin if your blood is high along with your meal time insulin. Use this correction insulin before meal only, not to be taken without meal.    Corrections for High Blood Sugar  Use the following guide to "correct" for pre-meal high blood sugar.  This insulin will help "correct" the high reading.  You will still need to take as per scheduled meal insulin.    If your   Blood Sugar Reading is.. Number of additional   units of Insulin Lispro to Take.  (Correction)    Take 0 additional unit   151-200 Take 1 additional unit   201-250 Take 2 additional unit   251-300 Take 3 additional unit   301-350 Take 4 additional unit   351-400 Take 5 additional unit   More than 400 Take 6 additional unit         Low Blood Glucose:    - If you feel sweaty, anxious, shakiness, feel weak, trouble walking, feels like passing out or trouble seeing thing, please check your blood glucose and if its less than 70 or if your feel symptoms of low blood glucose then take one of the following:    *3 or 4 glucose tablets  *« cup of juice or regular soda (not sugar-free)  *2 " tablespoons of raisins  *4 or 5 saltine crackers  *1 tablespoon of sugar  *1 tablespoon of honey or corn syrup  *6 to 8 hard candies     Please get blood work done today.    - Continue your levothyroxine therapy without any changes.    - Please try to provide me with the name of the physician/endocrinologist who was treating in the [place] area, you can either call the office or you can bring the name during the next visit.    Follow-up in my clinic in 4 weeks time.    Thank you for your visit today.    If you have any questions or concerns please feel free to reach out of the office.

## 2023-08-16 ENCOUNTER — HOSPITAL ENCOUNTER (OUTPATIENT)
Dept: CT IMAGING | Facility: HOSPITAL | Age: 83
Discharge: HOME | End: 2023-08-16
Admitting: UROLOGY
Payer: MEDICARE

## 2023-08-16 DIAGNOSIS — N20.0 RENAL CALCULUS: ICD-10-CM

## 2023-08-16 PROCEDURE — 74176 CT ABD & PELVIS W/O CONTRAST: CPT

## 2023-08-21 RX ORDER — INSULIN LISPRO 100 [IU]/ML
6 INJECTION, SOLUTION INTRAVENOUS; SUBCUTANEOUS 3 TIMES DAILY
Qty: 15 ML | Refills: 5 | Status: SHIPPED | OUTPATIENT
Start: 2023-08-21

## 2023-08-21 NOTE — TELEPHONE ENCOUNTER
Hub staff attempted to follow warm transfer process and was unsuccessful     Caller: [facility]    Relationship to patient: NURSING HOME    Best call back number: [phone]    Patient is needing: INSURANCE WONT COVER NOVOLOG AND WILL COVER HUMALOG. CAN THE PATIENT GET A PRESCRIPTION FOR HUMALOG INSTEAD? CAN THEY ALSO GET RID OF 6 UNITS AND JUST DO THE SLIDING SCALE?

## 2023-09-05 ENCOUNTER — LAB (OUTPATIENT)
Dept: LAB | Facility: HOSPITAL | Age: 83
End: 2023-09-05
Payer: MEDICARE

## 2023-09-05 DIAGNOSIS — Z85.850 HISTORY OF THYROID CANCER: ICD-10-CM

## 2023-09-05 DIAGNOSIS — Z79.4 TYPE 2 DIABETES MELLITUS WITH HYPERGLYCEMIA, WITH LONG-TERM CURRENT USE OF INSULIN: ICD-10-CM

## 2023-09-05 DIAGNOSIS — E89.0 POSTSURGICAL HYPOTHYROIDISM: ICD-10-CM

## 2023-09-05 DIAGNOSIS — E11.65 TYPE 2 DIABETES MELLITUS WITH HYPERGLYCEMIA, WITH LONG-TERM CURRENT USE OF INSULIN: ICD-10-CM

## 2023-09-05 LAB
ALBUMIN UR-MCNC: 1.5 MG/DL
ANION GAP SERPL CALCULATED.3IONS-SCNC: 12 MMOL/L (ref 5–15)
BUN SERPL-MCNC: 22 MG/DL (ref 8–23)
BUN/CREAT SERPL: 25.3 (ref 7–25)
CALCIUM SPEC-SCNC: 8.3 MG/DL (ref 8.6–10.5)
CHLORIDE SERPL-SCNC: 103 MMOL/L (ref 98–107)
CO2 SERPL-SCNC: 25 MMOL/L (ref 22–29)
CREAT SERPL-MCNC: 0.87 MG/DL (ref 0.57–1)
CREAT UR-MCNC: 52.3 MG/DL
EGFRCR SERPLBLD CKD-EPI 2021: 65.8 ML/MIN/1.73
GLUCOSE SERPL-MCNC: 165 MG/DL (ref 65–99)
HBA1C MFR BLD: 7.9 % (ref 4.8–5.6)
MICROALBUMIN/CREAT UR: 28.7 MG/G
POTASSIUM SERPL-SCNC: 4 MMOL/L (ref 3.5–5.2)
SODIUM SERPL-SCNC: 140 MMOL/L (ref 136–145)
T4 FREE SERPL-MCNC: 1.76 NG/DL (ref 0.93–1.7)
TSH SERPL DL<=0.05 MIU/L-ACNC: 1.14 UIU/ML (ref 0.27–4.2)

## 2023-09-05 PROCEDURE — 83036 HEMOGLOBIN GLYCOSYLATED A1C: CPT

## 2023-09-05 PROCEDURE — 82043 UR ALBUMIN QUANTITATIVE: CPT

## 2023-09-05 PROCEDURE — 36415 COLL VENOUS BLD VENIPUNCTURE: CPT

## 2023-09-05 PROCEDURE — 84432 ASSAY OF THYROGLOBULIN: CPT

## 2023-09-05 PROCEDURE — 82570 ASSAY OF URINE CREATININE: CPT

## 2023-09-05 PROCEDURE — 84439 ASSAY OF FREE THYROXINE: CPT

## 2023-09-05 PROCEDURE — 80048 BASIC METABOLIC PNL TOTAL CA: CPT

## 2023-09-05 PROCEDURE — 84443 ASSAY THYROID STIM HORMONE: CPT

## 2023-09-08 ENCOUNTER — LAB (OUTPATIENT)
Dept: LAB | Facility: HOSPITAL | Age: 83
End: 2023-09-08
Payer: MEDICARE

## 2023-09-08 ENCOUNTER — TRANSCRIBE ORDERS (OUTPATIENT)
Dept: ADMINISTRATIVE | Facility: HOSPITAL | Age: 83
End: 2023-09-08
Payer: MEDICARE

## 2023-09-08 ENCOUNTER — HOSPITAL ENCOUNTER (OUTPATIENT)
Dept: CARDIOLOGY | Facility: HOSPITAL | Age: 83
Discharge: HOME | End: 2023-09-08
Payer: MEDICARE

## 2023-09-08 DIAGNOSIS — N20.0 RENAL STONE: ICD-10-CM

## 2023-09-08 DIAGNOSIS — N20.0 RENAL STONE: Primary | ICD-10-CM

## 2023-09-08 LAB
ANION GAP SERPL CALCULATED.3IONS-SCNC: 10 MMOL/L (ref 5–15)
BASOPHILS # BLD AUTO: 0.1 10*3/MM3 (ref 0–0.2)
BASOPHILS NFR BLD AUTO: 1.4 % (ref 0–1.5)
BUN SERPL-MCNC: 19 MG/DL (ref 8–23)
BUN/CREAT SERPL: 20.2 (ref 7–25)
CALCIUM SPEC-SCNC: 7.9 MG/DL (ref 8.6–10.5)
CHLORIDE SERPL-SCNC: 104 MMOL/L (ref 98–107)
CO2 SERPL-SCNC: 26 MMOL/L (ref 22–29)
CREAT SERPL-MCNC: 0.94 MG/DL (ref 0.57–1)
DEPRECATED RDW RBC AUTO: 58.2 FL (ref 37–54)
EGFRCR SERPLBLD CKD-EPI 2021: 60 ML/MIN/1.73
EOSINOPHIL # BLD AUTO: 0.3 10*3/MM3 (ref 0–0.4)
EOSINOPHIL NFR BLD AUTO: 3.6 % (ref 0.3–6.2)
ERYTHROCYTE [DISTWIDTH] IN BLOOD BY AUTOMATED COUNT: 18.9 % (ref 12.3–15.4)
GLUCOSE SERPL-MCNC: 127 MG/DL (ref 65–99)
HCT VFR BLD AUTO: 31.3 % (ref 34–46.6)
HGB BLD-MCNC: 9.9 G/DL (ref 12–15.9)
LYMPHOCYTES # BLD AUTO: 3.1 10*3/MM3 (ref 0.7–3.1)
LYMPHOCYTES NFR BLD AUTO: 35.9 % (ref 19.6–45.3)
MCH RBC QN AUTO: 26.4 PG (ref 26.6–33)
MCHC RBC AUTO-ENTMCNC: 31.5 G/DL (ref 31.5–35.7)
MCV RBC AUTO: 83.7 FL (ref 79–97)
MONOCYTES # BLD AUTO: 0.6 10*3/MM3 (ref 0.1–0.9)
MONOCYTES NFR BLD AUTO: 7.4 % (ref 5–12)
NEUTROPHILS NFR BLD AUTO: 4.4 10*3/MM3 (ref 1.7–7)
NEUTROPHILS NFR BLD AUTO: 51.7 % (ref 42.7–76)
NRBC BLD AUTO-RTO: 0 /100 WBC (ref 0–0.2)
PLATELET # BLD AUTO: 333 10*3/MM3 (ref 140–450)
PMV BLD AUTO: 9.7 FL (ref 6–12)
POTASSIUM SERPL-SCNC: 5.1 MMOL/L (ref 3.5–5.2)
RBC # BLD AUTO: 3.74 10*6/MM3 (ref 3.77–5.28)
SODIUM SERPL-SCNC: 140 MMOL/L (ref 136–145)
WBC NRBC COR # BLD: 8.5 10*3/MM3 (ref 3.4–10.8)

## 2023-09-08 PROCEDURE — 36415 COLL VENOUS BLD VENIPUNCTURE: CPT

## 2023-09-08 PROCEDURE — 85025 COMPLETE CBC W/AUTO DIFF WBC: CPT

## 2023-09-08 PROCEDURE — 93005 ELECTROCARDIOGRAM TRACING: CPT | Performed by: UROLOGY

## 2023-09-08 PROCEDURE — 80048 BASIC METABOLIC PNL TOTAL CA: CPT

## 2023-09-09 LAB
QT INTERVAL: 416 MS
QTC INTERVAL: 450 MS

## 2023-09-12 ENCOUNTER — TELEPHONE (OUTPATIENT)
Dept: ENDOCRINOLOGY | Facility: CLINIC | Age: 83
End: 2023-09-12

## 2023-09-12 ENCOUNTER — OFFICE VISIT (OUTPATIENT)
Dept: ENDOCRINOLOGY | Facility: CLINIC | Age: 83
End: 2023-09-12
Payer: MEDICARE

## 2023-09-12 VITALS
WEIGHT: 133.6 LBS | HEART RATE: 79 BPM | HEIGHT: 65 IN | BODY MASS INDEX: 22.26 KG/M2 | OXYGEN SATURATION: 99 % | RESPIRATION RATE: 20 BRPM | SYSTOLIC BLOOD PRESSURE: 146 MMHG | DIASTOLIC BLOOD PRESSURE: 74 MMHG

## 2023-09-12 DIAGNOSIS — Z79.4 TYPE 2 DIABETES MELLITUS WITH HYPERGLYCEMIA, WITH LONG-TERM CURRENT USE OF INSULIN: Primary | ICD-10-CM

## 2023-09-12 DIAGNOSIS — E11.65 TYPE 2 DIABETES MELLITUS WITH HYPERGLYCEMIA, WITH LONG-TERM CURRENT USE OF INSULIN: Primary | ICD-10-CM

## 2023-09-12 DIAGNOSIS — E78.5 HYPERLIPIDEMIA, UNSPECIFIED HYPERLIPIDEMIA TYPE: ICD-10-CM

## 2023-09-12 DIAGNOSIS — Z85.850 HISTORY OF THYROID CANCER: ICD-10-CM

## 2023-09-12 DIAGNOSIS — E89.0 POSTSURGICAL HYPOTHYROIDISM: ICD-10-CM

## 2023-09-12 DIAGNOSIS — I25.10 CORONARY ARTERY DISEASE INVOLVING NATIVE CORONARY ARTERY OF NATIVE HEART WITHOUT ANGINA PECTORIS: ICD-10-CM

## 2023-09-12 DIAGNOSIS — I10 PRIMARY HYPERTENSION: ICD-10-CM

## 2023-09-12 LAB — THYROGLOB SERPL-MCNC: <2 NG/ML

## 2023-09-12 RX ORDER — INSULIN DEGLUDEC INJECTION 100 U/ML
22 INJECTION, SOLUTION SUBCUTANEOUS
Qty: 15 ML | Refills: 5 | Status: SHIPPED | OUTPATIENT
Start: 2023-09-12

## 2023-09-12 RX ORDER — LEVOTHYROXINE SODIUM 112 UG/1
112 TABLET ORAL DAILY
Qty: 30 TABLET | Refills: 11 | Status: SHIPPED | OUTPATIENT
Start: 2023-09-12 | End: 2024-09-11

## 2023-09-12 RX ORDER — INSULIN LISPRO 100 [IU]/ML
7 INJECTION, SOLUTION INTRAVENOUS; SUBCUTANEOUS 3 TIMES DAILY
Qty: 15 ML | Refills: 5 | Status: SHIPPED | OUTPATIENT
Start: 2023-09-12

## 2023-09-12 NOTE — TELEPHONE ENCOUNTER
Caller: Pamela Zavala    Relationship to patient: Self    Best call back number: [phone]    Patient is needing: PT WAS TOLD HAVE METFORMIN PUT ON HOLD FOR 7 DAYS . NURSES CALLED BACK STATING THEY NEED AN ORDER FOR WHAT DR. LOZANO ADVISED PT TODAY. PLEASE CALL BACK [phone] . HARD TO HEAR PT WHEN CALLING FROM PHONE NUMBER LISTED

## 2023-09-12 NOTE — TELEPHONE ENCOUNTER
Caller: Pamela Zavala    Relationship: Self    Best call back number: [phone]    What orders are you requesting (i.e. lab or imaging): ORDERS TO STOP MEDICATION    In what timeframe would the patient need to come in: ASAP    Where will you receive your lab/imaging services: [facility]    Additional notes: [facility] CALLED IN TO ASK FOR ORDERS TO STOP METFORMIN FAXED [phone]

## 2023-09-12 NOTE — PATIENT INSTRUCTIONS
Please,     # Diabetes Management:     Please adjust insulin therapy as:     - Insulin Tresiba (Slow acting insulin) 22 Units in the evening before bedtime.    - Insulin lispro / aspart (Fast acting / meal time insulin): 7 Units with each meal.     Meal time insulin need to be taken 15 mins before meal. You can bring your insulin with you if you are planning to eat out.     If you plan to miss the meal please miss the meal time insulin as well.     Also take following non-insulin therapies:  - Metformin 1000 mg twice a day     Check your blood glucose four times a day: before breakfast, before lunch, before dinner and before bedtime. Maintain blood glucose logs.     Use correction insulin if your blood is high along with your meal time insulin. Use this correction insulin before meal only, not to be taken without meal.     Corrections for High Blood Sugar  Use the following guide to “correct” for pre-meal high blood sugar.  This insulin will help “correct” the high reading.  You will still need to take as per scheduled meal insulin.     If your   Blood Sugar Reading is…. Number of additional   units of Insulin Lispro to Take…  (Correction)    Take 0 additional unit   151-200 Take 1 additional unit   201-250 Take 2 additional unit   251-300 Take 3 additional unit   301-350 Take 4 additional unit   351-400 Take 5 additional unit   More than 400 Take 6 additional unit            Low Blood Glucose:    - If you feel sweaty, anxious, shakiness, feel weak, trouble walking, feels like passing out or trouble seeing thing, please check your blood glucose and if its less than 70 or if your feel symptoms of low blood glucose then take one of the following:     *3 or 4 glucose tablets  *½ cup of juice or regular soda (not sugar-free)  *2 tablespoons of raisins  *4 or 5 saltine crackers  *1 tablespoon of sugar  *1 tablespoon of honey or corn syrup  *6 to 8 hard candies     #Levothyroxine Dosing:     - Change levothyroxine  therapy to 112 mcg one pill by mouth daily at 6:00 AM  - Take it every day, on an empty stomach, 30 minutes before eating  - Avoid taking it with iron, calcium, other medications (blocks absorption), wait at least 4 hrs after taking levothyroxine to take these medications  - If you miss a pill, you can take 2 the next day and return to schedule from next day     Preparing for Surgery:  - Since patient is planned to undergo surgery in the next 1 week time, stop metformin therapy from 9/12/2023 until surgery.  Once patient is discharged from the hospital and is returned home you can restart metformin after 48 hours.      Follow-up in my clinic in 3 months.    Lab work before next visit.     Thank you for your visit today.     If you have any questions or concerns please feel free to reach out of the office.

## 2023-09-12 NOTE — PROGRESS NOTES
-----------------------------------------------------------------  ENDOCRINE CLINIC NOTE  -----------------------------------------------------------------        PATIENT NAME: Pamela Zavala  PATIENT : 1940 AGE: 82 y.o.  MRN NUMBER: [id number]  PRIMARY CARE: Marisol Rodgers APRN    ==========================================================================    CHIEF COMPLAINT: Type 2 Diabetes Management  DATE OF SERVICE: 2023         ==========================================================================    HPI / SUBJECTIVE    82 y.o. female is seen in the clinic today for follow up for T2DM.  Diagnosed with type 2 diabetes around age 58.  Last known A1c 7.9% - 2023.  Patient current therapy includes:  Insulin Tresiba 25 units nightly  Insulin NovoLog 6 units with each meal with sliding scale #1  Metformin 1000 mg twice a day  Patient is currently living in assisted living facility.  Consuming 3 meals a day along with bedtime snack.  Patient's think it is checking blood sugars and maintaining log.  Patient reports to have the last eye care around 5 years ago, motivated to get diabetic screening.  Patient do have underlying history of neuropathy in both feet's and hands and currently on gabapentin therapy.  Patient is also maintained on rosuvastatin therapy.  Underlying history of coronary artery disease s/p PCI.  Patient also have underlying history of thyroid cancer, at age 33.  Patient is s/p surgery and currently on levothyroxine therapy.    Currently on levothyroxine 125 mcg p.o. replacement therapy due to postsurgical hypothyroidism.    ==========================================================================                                                PAST MEDICAL HISTORY    Past Medical History:   Diagnosis Date    CAD (coronary artery disease)     Diabetic neuropathy     Essential hypertension     History of stomach ulcers     HLD (hyperlipidemia)     Hypothyroidism      Kidney stones     Recurrent sinusitis     Renal disease     Thyroid cancer     Type 2 diabetes mellitus        ==========================================================================    PAST SURGICAL HISTORY    Past Surgical History:   Procedure Laterality Date    CARDIAC CATHETERIZATION N/A 5/3/2023    Procedure: Left Heart Cath and coronary angiogram;  Surgeon: John Baldwin MD;  Location: [facility] INVASIVE LOCATION;  Service: Cardiovascular;  Laterality: N/A;    CORONARY STENT PLACEMENT      x 3    HYSTERECTOMY      THYROIDECTOMY         ==========================================================================    FAMILY HISTORY    Family History   Problem Relation Age of Onset    Diabetes Mother     Cancer Father     Heart disease Sister     Diabetes Sister     Heart disease Maternal Grandmother        ==========================================================================    SOCIAL HISTORY    Social History     Socioeconomic History    Marital status:     Number of children: 2    Highest education level: 11th grade   Tobacco Use    Smoking status: Former     Packs/day: 2.00     Years: 30.00     Pack years: 60.00     Types: Cigarettes     Quit date:      Years since quittin.7     Passive exposure: Past    Smokeless tobacco: Never   Vaping Use    Vaping Use: Never used   Substance and Sexual Activity    Alcohol use: Yes     Alcohol/week: 1.0 standard drink     Types: 1 Glasses of wine per week    Drug use: Defer    Sexual activity: Defer       ==========================================================================    MEDICATIONS      Current Outpatient Medications:     Accu-Chek Softclix Lancets lancets, 1 each by Other route 3 (Three) Times a Day Before Meals. Dx: E11.65. Use as instructed, Disp: 100 each, Rfl: 2    atenolol (TENORMIN) 50 MG tablet, Take 1 tablet by mouth Daily., Disp: , Rfl:     clopidogrel (PLAVIX) 75 MG tablet, Take 1 tablet by mouth Daily., Disp: , Rfl:      fluticasone (FLONASE) 50 MCG/ACT nasal spray, 1 spray into the nostril(s) as directed by provider 2 (Two) Times a Day., Disp: , Rfl:     gabapentin (NEURONTIN) 300 MG capsule, Take 1 capsule by mouth Every Night., Disp: , Rfl:     glucose blood (Accu-Chek Guide) test strip, 1 each by Other route 3 (Three) Times a Day Before Meals. Dx: E11.65. Use as instructed, Disp: 100 each, Rfl: 2    insulin degludec (Tresiba FlexTouch) 100 UNIT/ML solution pen-injector injection, Inject 22 Units under the skin into the appropriate area as directed every night at bedtime., Disp: 15 mL, Rfl: 5    Insulin Lispro, 1 Unit Dial, (HumaLOG KwikPen) 100 UNIT/ML solution pen-injector, Inject 7 Units under the skin into the appropriate area as directed 3 (Three) Times a Day. Inject 6 Units under the skin into the appropriate area as directed 3 (Three) Times a Day With Meals., Disp: 15 mL, Rfl: 5    lactulose (CEPHULAC) 10 g packet, Take 2 packets by mouth Daily., Disp: , Rfl:     lisinopril (PRINIVIL,ZESTRIL) 10 MG tablet, Take 1 tablet by mouth Daily. Pt states she is taking 5mg, Disp: 90 tablet, Rfl: 3    metFORMIN (GLUCOPHAGE) 1000 MG tablet, Take 1 tablet by mouth 2 (Two) Times a Day With Meals., Disp: , Rfl:     oxybutynin (DITROPAN) 5 MG tablet, Take 1 tablet by mouth 2 (Two) Times a Day., Disp: , Rfl:     pantoprazole (PROTONIX) 40 MG EC tablet, Take 1 tablet by mouth 2 (Two) Times a Day., Disp: , Rfl:     rosuvastatin (CRESTOR) 5 MG tablet, Take 1 tablet by mouth Daily., Disp: , Rfl:     traMADol (ULTRAM) 50 MG tablet, Take 1 tablet by mouth 4 (Four) Times a Day As Needed for Moderate Pain., Disp: , Rfl:     traZODone (DESYREL) 50 MG tablet, Take 1 tablet by mouth Every Night., Disp: , Rfl:     levothyroxine (Synthroid) 112 MCG tablet, Take 1 tablet by mouth Daily., Disp: 30 tablet, Rfl: 11    ==========================================================================    ALLERGIES    Allergies   Allergen Reactions    Penicillins  Unknown - High Severity     Lips swell and hives       ==========================================================================    OBJECTIVE    Vitals:    09/12/23 1303   BP: 146/74   Pulse: 79   Resp: 20   SpO2: 99%     Body mass index is 22.23 kg/m².     General: Alert, cooperative, no acute distress  Thyroid:  no enlargement/tenderness/palpable nodules  Lungs: Clear to auscultation bilaterally, respirations unlabored  Heart: Regular rate and rhythm, S1 and S2 normal, no murmur, rub or gallop  Abdomen: Soft, NT, ND and Bowel sounds Positive  Extremities:  Extremities normal, atraumatic, no cyanosis or edema    ==========================================================================    LAB EVALUATION    Lab Results   Component Value Date    GLUCOSE 127 (H) 09/08/2023    BUN 19 09/08/2023    CREATININE 0.94 09/08/2023    BCR 20.2 09/08/2023    K 5.1 09/08/2023    CO2 26.0 09/08/2023    CALCIUM 7.9 (L) 09/08/2023    ALBUMIN 4.4 05/02/2023    AST 16 05/02/2023    ALT 11 05/02/2023    CHOL 146 05/02/2023    TRIG 105 05/02/2023    HDL 72 (H) 05/02/2023    LDL 55 05/02/2023     Lab Results   Component Value Date    HGBA1C 7.90 (H) 09/05/2023    HGBA1C 11.00 (H) 05/02/2023     Lab Results   Component Value Date    MICROALBUR 1.5 09/05/2023    CREATININE 0.94 09/08/2023     Lab Results   Component Value Date    TSH 1.140 09/05/2023    FREET4 1.76 (H) 09/05/2023       ==========================================================================    ASSESSMENT AND PLAN    # Type 2 diabetes with hyperglycemia but also concerns for hypoglycemia  # Hypertension  # Hyperlipidemia  # Coronary artery disease s/p PCI  -Reviewed blood glucose readings  - They still concern for morning hypoglycemia but patient have significant postprandial hyperglycemia  - Last A1c is improved as compared to the previous A1c, follow-up A1c 7.9% on September 5, 2023  - Target A1c is to achieve less than 8% in the short-term even though overall target  "is to maintain between 7 and 7.5%  - Patient to have multiple medical problems and have chronic constipation that is why GLP-1 receptor agonist therapy is not ideal for her also patient have a history of recurrent UTIs and therefore SGLT2 inhibitor therapy is currently not being used  - We will further adjust insulin therapy for now and monitor  - New therapy is going to be:  Insulin Tresiba 22 units at night  Insulin NovoLog 7 units with each meal plus sliding scale #1  Continue metformin therapy for now  - Patient is planned to undergo urological intervention in 1 week time therefore will hold metformin therapy from now that is 9/12/2023 till patient had the procedure and once patient is discharged from hospital patient can be resumed on metformin therapy after 48 hours    # History of thyroid cancer s/p surgery  # Postsurgical hypothyroidism  - Lab work reviewed from September 2023 which suggest mild iatrogenic hyperthyroidism, will decrease dose of levothyroxine 125 mcg p.o. daily 212 mcg p.o. daily  - Thyroglobulin levels otherwise negative  - Repeat thyroid work-up before next visit       Thank you for courtesy of consultation.    Return to clinic: 3 months    Entire assessment and plan was discussed and counseled the patient in detail to which patient verbalized understanding and agreed with care.  Answered all queries and concerns.    This note was created using voice recognition software and is inherently subject to errors including those of syntax and \"sound-alike\" substitutions which may escape proofreading.  In such instances, original meaning may be extrapolated by contextual derivation.    Note: Portions of this note may have been copied from previous notes but documentation have been reviewed and edited as necessary to support clinical decision making for today's visit.    ==========================================================================    INFORMATION PROVIDED TO PATIENT    Patient " Instructions   Please,     # Diabetes Management:     Please adjust insulin therapy as:     - Insulin Tresiba (Slow acting insulin) 22 Units in the evening before bedtime.    - Insulin lispro / aspart (Fast acting / meal time insulin): 7 Units with each meal.     Meal time insulin need to be taken 15 mins before meal. You can bring your insulin with you if you are planning to eat out.     If you plan to miss the meal please miss the meal time insulin as well.     Also take following non-insulin therapies:  - Metformin 1000 mg twice a day     Check your blood glucose four times a day: before breakfast, before lunch, before dinner and before bedtime. Maintain blood glucose logs.     Use correction insulin if your blood is high along with your meal time insulin. Use this correction insulin before meal only, not to be taken without meal.     Corrections for High Blood Sugar  Use the following guide to “correct” for pre-meal high blood sugar.  This insulin will help “correct” the high reading.  You will still need to take as per scheduled meal insulin.     If your   Blood Sugar Reading is…. Number of additional   units of Insulin Lispro to Take…  (Correction)    Take 0 additional unit   151-200 Take 1 additional unit   201-250 Take 2 additional unit   251-300 Take 3 additional unit   301-350 Take 4 additional unit   351-400 Take 5 additional unit   More than 400 Take 6 additional unit            Low Blood Glucose:    - If you feel sweaty, anxious, shakiness, feel weak, trouble walking, feels like passing out or trouble seeing thing, please check your blood glucose and if its less than 70 or if your feel symptoms of low blood glucose then take one of the following:     *3 or 4 glucose tablets  *½ cup of juice or regular soda (not sugar-free)  *2 tablespoons of raisins  *4 or 5 saltine crackers  *1 tablespoon of sugar  *1 tablespoon of honey or corn syrup  *6 to 8 hard candies     #Levothyroxine Dosing:     - Change  levothyroxine therapy to 112 mcg one pill by mouth daily at 6:00 AM  - Take it every day, on an empty stomach, 30 minutes before eating  - Avoid taking it with iron, calcium, other medications (blocks absorption), wait at least 4 hrs after taking levothyroxine to take these medications  - If you miss a pill, you can take 2 the next day and return to schedule from next day     Preparing for Surgery:  - Since patient is planned to undergo surgery in the next 1 week time, stop metformin therapy from 9/12/2023 until surgery.  Once patient is discharged from the hospital and is returned home you can restart metformin after 48 hours.      Follow-up in my clinic in 3 months.    Lab work before next visit.     Thank you for your visit today.     If you have any questions or concerns please feel free to reach out of the office.          ==========================================================================  Driss Dan MD  Department of Endocrine, Diabetes and Metabolism  [facility] [place], [place]  ==========================================================================

## 2023-09-25 ENCOUNTER — HOSPITAL ENCOUNTER (OUTPATIENT)
Dept: GENERAL RADIOLOGY | Facility: HOSPITAL | Age: 83
Discharge: HOME | End: 2023-09-25
Admitting: UROLOGY
Payer: MEDICARE

## 2023-09-25 ENCOUNTER — TRANSCRIBE ORDERS (OUTPATIENT)
Dept: ADMINISTRATIVE | Facility: HOSPITAL | Age: 83
End: 2023-09-25
Payer: MEDICARE

## 2023-09-25 DIAGNOSIS — N20.0 CALCULUS, RENAL: Primary | ICD-10-CM

## 2023-09-25 DIAGNOSIS — N20.0 CALCULUS, RENAL: ICD-10-CM

## 2023-09-25 PROCEDURE — 74018 RADEX ABDOMEN 1 VIEW: CPT

## 2023-10-09 RX ORDER — OXYCODONE HYDROCHLORIDE AND ACETAMINOPHEN 5; 325 MG/1; MG/1
1 TABLET ORAL DAILY PRN
Status: ON HOLD | COMMUNITY
End: 2023-10-18 | Stop reason: SDUPTHER

## 2023-10-09 NOTE — PRE-PROCEDURE INSTRUCTIONS
Pre-op, dial antibacterial soap shower, oral care, arrival time,NPO, and meds to take/ hold prior to surgery instructions given to son and written instructions faxed to [facility] facility. Spoke with Tracy [facility]ujillo staff at [facility] to clarify all instructions.

## 2023-10-11 ENCOUNTER — HOSPITAL ENCOUNTER (OUTPATIENT)
Dept: CARDIOLOGY | Facility: HOSPITAL | Age: 83
Discharge: HOME | End: 2023-10-11
Payer: MEDICARE

## 2023-10-11 ENCOUNTER — LAB (OUTPATIENT)
Dept: LAB | Facility: HOSPITAL | Age: 83
End: 2023-10-11
Payer: MEDICARE

## 2023-10-11 LAB
ANION GAP SERPL CALCULATED.3IONS-SCNC: 11.2 MMOL/L (ref 5–15)
BUN SERPL-MCNC: 19 MG/DL (ref 8–23)
BUN/CREAT SERPL: 17.8 (ref 7–25)
CALCIUM SPEC-SCNC: 8.2 MG/DL (ref 8.6–10.5)
CHLORIDE SERPL-SCNC: 105 MMOL/L (ref 98–107)
CO2 SERPL-SCNC: 25.8 MMOL/L (ref 22–29)
CREAT SERPL-MCNC: 1.07 MG/DL (ref 0.57–1)
DEPRECATED RDW RBC AUTO: 48.1 FL (ref 37–54)
EGFRCR SERPLBLD CKD-EPI 2021: 51.3 ML/MIN/1.73
ERYTHROCYTE [DISTWIDTH] IN BLOOD BY AUTOMATED COUNT: 15.7 % (ref 12.3–15.4)
GLUCOSE SERPL-MCNC: 94 MG/DL (ref 65–99)
HCT VFR BLD AUTO: 31.7 % (ref 34–46.6)
HGB BLD-MCNC: 10.3 G/DL (ref 12–15.9)
MCH RBC QN AUTO: 27.5 PG (ref 26.6–33)
MCHC RBC AUTO-ENTMCNC: 32.5 G/DL (ref 31.5–35.7)
MCV RBC AUTO: 84.5 FL (ref 79–97)
PLATELET # BLD AUTO: 464 10*3/MM3 (ref 140–450)
PMV BLD AUTO: 11.3 FL (ref 6–12)
POTASSIUM SERPL-SCNC: 4.5 MMOL/L (ref 3.5–5.2)
RBC # BLD AUTO: 3.75 10*6/MM3 (ref 3.77–5.28)
SODIUM SERPL-SCNC: 142 MMOL/L (ref 136–145)
WBC NRBC COR # BLD: 10.96 10*3/MM3 (ref 3.4–10.8)

## 2023-10-11 PROCEDURE — 93010 ELECTROCARDIOGRAM REPORT: CPT | Performed by: INTERNAL MEDICINE

## 2023-10-11 PROCEDURE — 80048 BASIC METABOLIC PNL TOTAL CA: CPT | Performed by: UROLOGY

## 2023-10-11 PROCEDURE — 93005 ELECTROCARDIOGRAM TRACING: CPT

## 2023-10-11 PROCEDURE — 36415 COLL VENOUS BLD VENIPUNCTURE: CPT | Performed by: UROLOGY

## 2023-10-11 PROCEDURE — 85027 COMPLETE CBC AUTOMATED: CPT | Performed by: UROLOGY

## 2023-10-12 LAB
QT INTERVAL: 379 MS
QTC INTERVAL: 437 MS

## 2023-10-18 ENCOUNTER — ANESTHESIA EVENT (OUTPATIENT)
Dept: PERIOP | Facility: HOSPITAL | Age: 83
End: 2023-10-18
Payer: MEDICARE

## 2023-10-18 ENCOUNTER — HOSPITAL ENCOUNTER (INPATIENT)
Facility: HOSPITAL | Age: 83
LOS: 7 days | Discharge: NURSING, REHAB OR LONG-TERM CARE | DRG: 853 | End: 2023-10-25
Attending: UROLOGY | Admitting: INTERNAL MEDICINE
Payer: MEDICARE

## 2023-10-18 ENCOUNTER — APPOINTMENT (OUTPATIENT)
Dept: CT IMAGING | Facility: HOSPITAL | Age: 83
DRG: 853 | End: 2023-10-18
Payer: MEDICARE

## 2023-10-18 ENCOUNTER — ANESTHESIA (OUTPATIENT)
Dept: PERIOP | Facility: HOSPITAL | Age: 83
End: 2023-10-18
Payer: MEDICARE

## 2023-10-18 DIAGNOSIS — N20.1 URETER, CALCULUS: ICD-10-CM

## 2023-10-18 DIAGNOSIS — N20.0 KIDNEY STONES: Primary | ICD-10-CM

## 2023-10-18 PROBLEM — A41.9 SEPSIS: Status: ACTIVE | Noted: 2023-10-18

## 2023-10-18 PROBLEM — A41.9 SEPSIS WITHOUT ACUTE ORGAN DYSFUNCTION: Status: ACTIVE | Noted: 2023-10-18

## 2023-10-18 PROBLEM — N39.0 UTI (URINARY TRACT INFECTION), BACTERIAL: Status: ACTIVE | Noted: 2023-10-18

## 2023-10-18 PROBLEM — A49.9 UTI (URINARY TRACT INFECTION), BACTERIAL: Status: ACTIVE | Noted: 2023-10-18

## 2023-10-18 LAB
ALBUMIN SERPL-MCNC: 2.9 G/DL (ref 3.5–5.2)
ALBUMIN SERPL-MCNC: 3 G/DL (ref 3.5–5.2)
ALBUMIN/GLOB SERPL: 1.3 G/DL
ALBUMIN/GLOB SERPL: 1.4 G/DL
ALP SERPL-CCNC: 72 U/L (ref 39–117)
ALP SERPL-CCNC: 74 U/L (ref 39–117)
ALT SERPL W P-5'-P-CCNC: 29 U/L (ref 1–33)
ALT SERPL W P-5'-P-CCNC: 66 U/L (ref 1–33)
AMORPH URATE CRY URNS QL MICRO: ABNORMAL /HPF
AMYLASE SERPL-CCNC: 38 U/L (ref 28–100)
ANION GAP SERPL CALCULATED.3IONS-SCNC: 15 MMOL/L (ref 5–15)
ANION GAP SERPL CALCULATED.3IONS-SCNC: 16 MMOL/L (ref 5–15)
ARTERIAL PATENCY WRIST A: POSITIVE
AST SERPL-CCNC: 195 U/L (ref 1–32)
AST SERPL-CCNC: 76 U/L (ref 1–32)
ATMOSPHERIC PRESS: ABNORMAL MM[HG]
BACTERIA UR QL AUTO: ABNORMAL /HPF
BASE EXCESS BLDA CALC-SCNC: -3.4 MMOL/L (ref 0–3)
BASOPHILS # BLD AUTO: 0 10*3/MM3 (ref 0–0.2)
BASOPHILS # BLD MANUAL: 0.08 10*3/MM3 (ref 0–0.2)
BASOPHILS NFR BLD AUTO: 0.2 % (ref 0–1.5)
BASOPHILS NFR BLD MANUAL: 1 % (ref 0–1.5)
BDY SITE: ABNORMAL
BILIRUB SERPL-MCNC: 0.7 MG/DL (ref 0–1.2)
BILIRUB SERPL-MCNC: 0.9 MG/DL (ref 0–1.2)
BILIRUB UR QL STRIP: NEGATIVE
BUN SERPL-MCNC: 17 MG/DL (ref 8–23)
BUN SERPL-MCNC: 18 MG/DL (ref 8–23)
BUN/CREAT SERPL: 19.6 (ref 7–25)
BUN/CREAT SERPL: 20 (ref 7–25)
CA-I BLDA-SCNC: 1.02 MMOL/L (ref 1.15–1.33)
CA-I SERPL ISE-MCNC: 1 MMOL/L (ref 1.2–1.3)
CALCIUM SPEC-SCNC: 7 MG/DL (ref 8.6–10.5)
CALCIUM SPEC-SCNC: 7 MG/DL (ref 8.6–10.5)
CHLORIDE SERPL-SCNC: 104 MMOL/L (ref 98–107)
CHLORIDE SERPL-SCNC: 105 MMOL/L (ref 98–107)
CHOLEST SERPL-MCNC: 98 MG/DL (ref 0–200)
CK SERPL-CCNC: 96 U/L (ref 20–180)
CLARITY UR: ABNORMAL
CO2 SERPL-SCNC: 21 MMOL/L (ref 22–29)
CO2 SERPL-SCNC: 21 MMOL/L (ref 22–29)
COLOR UR: ABNORMAL
CREAT BLDA-MCNC: 0.91 MG/DL
CREAT SERPL-MCNC: 0.85 MG/DL (ref 0.57–1)
CREAT SERPL-MCNC: 0.92 MG/DL (ref 0.57–1)
D-LACTATE SERPL-SCNC: 1.8 MMOL/L (ref 0.2–2)
D-LACTATE SERPL-SCNC: 2.9 MMOL/L (ref 0.5–2)
D-LACTATE SERPL-SCNC: 4.7 MMOL/L (ref 0.5–2)
DEPRECATED RDW RBC AUTO: 55.1 FL (ref 37–54)
DEPRECATED RDW RBC AUTO: 56.9 FL (ref 37–54)
EGFRCR SERPLBLD CKD-EPI 2021: 61.5 ML/MIN/1.73
EGFRCR SERPLBLD CKD-EPI 2021: 62.3 ML/MIN/1.73
EGFRCR SERPLBLD CKD-EPI 2021: 67.7 ML/MIN/1.73
EOSINOPHIL # BLD AUTO: 0.1 10*3/MM3 (ref 0–0.4)
EOSINOPHIL # BLD MANUAL: 0.08 10*3/MM3 (ref 0–0.4)
EOSINOPHIL NFR BLD AUTO: 1.2 % (ref 0.3–6.2)
EOSINOPHIL NFR BLD MANUAL: 1 % (ref 0.3–6.2)
ERYTHROCYTE [DISTWIDTH] IN BLOOD BY AUTOMATED COUNT: 18.1 % (ref 12.3–15.4)
ERYTHROCYTE [DISTWIDTH] IN BLOOD BY AUTOMATED COUNT: 18.2 % (ref 12.3–15.4)
GEN 5 2HR TROPONIN T REFLEX: 19 NG/L
GLOBULIN UR ELPH-MCNC: 2.1 GM/DL
GLOBULIN UR ELPH-MCNC: 2.3 GM/DL
GLUCOSE BLDC GLUCOMTR-MCNC: 124 MG/DL (ref 70–105)
GLUCOSE BLDC GLUCOMTR-MCNC: 142 MG/DL (ref 70–105)
GLUCOSE BLDC GLUCOMTR-MCNC: 80 MG/DL (ref 70–105)
GLUCOSE BLDC GLUCOMTR-MCNC: 90 MG/DL (ref 70–105)
GLUCOSE BLDC GLUCOMTR-MCNC: 96 MG/DL (ref 70–105)
GLUCOSE BLDC GLUCOMTR-MCNC: 96 MG/DL (ref 74–100)
GLUCOSE BLDC GLUCOMTR-MCNC: 96 MG/DL (ref 74–100)
GLUCOSE SERPL-MCNC: 103 MG/DL (ref 65–99)
GLUCOSE SERPL-MCNC: 132 MG/DL (ref 65–99)
GLUCOSE UR STRIP-MCNC: NEGATIVE MG/DL
HBA1C MFR BLD: 7.6 % (ref 4.8–5.6)
HCO3 BLDA-SCNC: 22.4 MMOL/L (ref 21–28)
HCT VFR BLD AUTO: 26.7 % (ref 34–46.6)
HCT VFR BLD AUTO: 30.3 % (ref 34–46.6)
HCT VFR BLDA CALC: 24 % (ref 38–51)
HDLC SERPL-MCNC: 54 MG/DL (ref 40–60)
HEMODILUTION: NO
HGB BLD-MCNC: 8.4 G/DL (ref 12–15.9)
HGB BLD-MCNC: 9.5 G/DL (ref 12–15.9)
HGB BLDA-MCNC: 8.3 G/DL (ref 12–17)
HGB UR QL STRIP.AUTO: ABNORMAL
HOLD SPECIMEN: NORMAL
HYALINE CASTS UR QL AUTO: ABNORMAL /LPF
INHALED O2 CONCENTRATION: 32 %
INR PPP: 1.36 (ref 0.93–1.1)
KETONES UR QL STRIP: NEGATIVE
LDLC SERPL CALC-MCNC: 28 MG/DL (ref 0–100)
LDLC/HDLC SERPL: 0.51 {RATIO}
LEUKOCYTE ESTERASE UR QL STRIP.AUTO: ABNORMAL
LYMPHOCYTES # BLD AUTO: 0.5 10*3/MM3 (ref 0.7–3.1)
LYMPHOCYTES # BLD MANUAL: 0.81 10*3/MM3 (ref 0.7–3.1)
LYMPHOCYTES NFR BLD AUTO: 8.5 % (ref 19.6–45.3)
LYMPHOCYTES NFR BLD MANUAL: 6 % (ref 5–12)
MAGNESIUM SERPL-MCNC: 0.8 MG/DL (ref 1.6–2.4)
MAGNESIUM SERPL-MCNC: 2.1 MG/DL (ref 1.6–2.4)
MCH RBC QN AUTO: 26.9 PG (ref 26.6–33)
MCH RBC QN AUTO: 27.3 PG (ref 26.6–33)
MCHC RBC AUTO-ENTMCNC: 31.4 G/DL (ref 31.5–35.7)
MCHC RBC AUTO-ENTMCNC: 31.6 G/DL (ref 31.5–35.7)
MCV RBC AUTO: 85.1 FL (ref 79–97)
MCV RBC AUTO: 87.1 FL (ref 79–97)
MODALITY: ABNORMAL
MONOCYTES # BLD AUTO: 0 10*3/MM3 (ref 0.1–0.9)
MONOCYTES # BLD: 0.49 10*3/MM3 (ref 0.1–0.9)
MONOCYTES NFR BLD AUTO: 0.3 % (ref 5–12)
NEUTROPHILS # BLD AUTO: 6.64 10*3/MM3 (ref 1.7–7)
NEUTROPHILS NFR BLD AUTO: 5.6 10*3/MM3 (ref 1.7–7)
NEUTROPHILS NFR BLD AUTO: 89.8 % (ref 42.7–76)
NEUTROPHILS NFR BLD MANUAL: 82 % (ref 42.7–76)
NITRITE UR QL STRIP: NEGATIVE
NRBC BLD AUTO-RTO: 0.3 /100 WBC (ref 0–0.2)
NT-PROBNP SERPL-MCNC: 1290 PG/ML (ref 0–1800)
PCO2 BLDA: 42.8 MM HG (ref 35–48)
PH BLDA: 7.33 PH UNITS (ref 7.35–7.45)
PH UR STRIP.AUTO: 7.5 [PH] (ref 5–8)
PHOSPHATE SERPL-MCNC: 2.6 MG/DL (ref 2.5–4.5)
PHOSPHATE SERPL-MCNC: 2.9 MG/DL (ref 2.5–4.5)
PLATELET # BLD AUTO: 172 10*3/MM3 (ref 140–450)
PLATELET # BLD AUTO: 251 10*3/MM3 (ref 140–450)
PMV BLD AUTO: 8.9 FL (ref 6–12)
PMV BLD AUTO: 9.3 FL (ref 6–12)
PO2 BLDA: 61.1 MM HG (ref 83–108)
POIKILOCYTOSIS BLD QL SMEAR: ABNORMAL
POTASSIUM BLDA-SCNC: 2.6 MMOL/L (ref 3.5–4.5)
POTASSIUM SERPL-SCNC: 2.8 MMOL/L (ref 3.5–5.2)
POTASSIUM SERPL-SCNC: 3.1 MMOL/L (ref 3.5–5.2)
POTASSIUM SERPL-SCNC: 3.4 MMOL/L (ref 3.5–5.2)
PROCALCITONIN SERPL-MCNC: 93.69 NG/ML (ref 0–0.25)
PROT SERPL-MCNC: 5 G/DL (ref 6–8.5)
PROT SERPL-MCNC: 5.3 G/DL (ref 6–8.5)
PROT UR QL STRIP: ABNORMAL
PROTHROMBIN TIME: 14.5 SECONDS (ref 9.6–11.7)
RBC # BLD AUTO: 3.14 10*6/MM3 (ref 3.77–5.28)
RBC # BLD AUTO: 3.48 10*6/MM3 (ref 3.77–5.28)
RBC # UR STRIP: ABNORMAL /HPF
REF LAB TEST METHOD: ABNORMAL
SAO2 % BLDCOA: 89.1 % (ref 94–98)
SCAN SLIDE: NORMAL
SMALL PLATELETS BLD QL SMEAR: ADEQUATE
SODIUM BLD-SCNC: 139 MMOL/L (ref 138–146)
SODIUM SERPL-SCNC: 140 MMOL/L (ref 136–145)
SODIUM SERPL-SCNC: 142 MMOL/L (ref 136–145)
SP GR UR STRIP: 1.02 (ref 1–1.03)
SQUAMOUS #/AREA URNS HPF: ABNORMAL /HPF
T4 FREE SERPL-MCNC: 1.72 NG/DL (ref 0.93–1.7)
TRIGL SERPL-MCNC: 81 MG/DL (ref 0–150)
TROPONIN T DELTA: 2 NG/L
TROPONIN T SERPL HS-MCNC: 17 NG/L
TSH SERPL DL<=0.05 MIU/L-ACNC: 4.24 UIU/ML (ref 0.27–4.2)
UROBILINOGEN UR QL STRIP: ABNORMAL
VARIANT LYMPHS NFR BLD MANUAL: 10 % (ref 19.6–45.3)
VLDLC SERPL-MCNC: 16 MG/DL (ref 5–40)
WBC # UR STRIP: ABNORMAL /HPF
WBC MORPH BLD: NORMAL
WBC NRBC COR # BLD: 6.3 10*3/MM3 (ref 3.4–10.8)
WBC NRBC COR # BLD: 8.1 10*3/MM3 (ref 3.4–10.8)
WHOLE BLOOD HOLD COAG: NORMAL

## 2023-10-18 PROCEDURE — 25010000002 ONDANSETRON PER 1 MG: Performed by: NURSE ANESTHETIST, CERTIFIED REGISTERED

## 2023-10-18 PROCEDURE — 82550 ASSAY OF CK (CPK): CPT | Performed by: INTERNAL MEDICINE

## 2023-10-18 PROCEDURE — 25010000002 MAGNESIUM SULFATE 2 GM/50ML SOLUTION: Performed by: INTERNAL MEDICINE

## 2023-10-18 PROCEDURE — 80061 LIPID PANEL: CPT | Performed by: INTERNAL MEDICINE

## 2023-10-18 PROCEDURE — 84439 ASSAY OF FREE THYROXINE: CPT | Performed by: INTERNAL MEDICINE

## 2023-10-18 PROCEDURE — 83735 ASSAY OF MAGNESIUM: CPT | Performed by: INTERNAL MEDICINE

## 2023-10-18 PROCEDURE — 82948 REAGENT STRIP/BLOOD GLUCOSE: CPT

## 2023-10-18 PROCEDURE — 25010000002 ENOXAPARIN PER 10 MG: Performed by: INTERNAL MEDICINE

## 2023-10-18 PROCEDURE — 81001 URINALYSIS AUTO W/SCOPE: CPT | Performed by: INTERNAL MEDICINE

## 2023-10-18 PROCEDURE — 0TC18ZZ EXTIRPATION OF MATTER FROM LEFT KIDNEY, VIA NATURAL OR ARTIFICIAL OPENING ENDOSCOPIC: ICD-10-PCS | Performed by: UROLOGY

## 2023-10-18 PROCEDURE — 82150 ASSAY OF AMYLASE: CPT | Performed by: INTERNAL MEDICINE

## 2023-10-18 PROCEDURE — 94799 UNLISTED PULMONARY SVC/PX: CPT

## 2023-10-18 PROCEDURE — 0TPB8DZ REMOVAL OF INTRALUMINAL DEVICE FROM BLADDER, VIA NATURAL OR ARTIFICIAL OPENING ENDOSCOPIC: ICD-10-PCS | Performed by: UROLOGY

## 2023-10-18 PROCEDURE — C1751 CATH, INF, PER/CENT/MIDLINE: HCPCS

## 2023-10-18 PROCEDURE — 84100 ASSAY OF PHOSPHORUS: CPT | Performed by: INTERNAL MEDICINE

## 2023-10-18 PROCEDURE — 25010000002 HYDROMORPHONE 1 MG/ML SOLUTION: Performed by: NURSE ANESTHETIST, CERTIFIED REGISTERED

## 2023-10-18 PROCEDURE — 80053 COMPREHEN METABOLIC PANEL: CPT | Performed by: INTERNAL MEDICINE

## 2023-10-18 PROCEDURE — 82330 ASSAY OF CALCIUM: CPT | Performed by: INTERNAL MEDICINE

## 2023-10-18 PROCEDURE — 84443 ASSAY THYROID STIM HORMONE: CPT | Performed by: INTERNAL MEDICINE

## 2023-10-18 PROCEDURE — 87040 BLOOD CULTURE FOR BACTERIA: CPT | Performed by: INTERNAL MEDICINE

## 2023-10-18 PROCEDURE — 83036 HEMOGLOBIN GLYCOSYLATED A1C: CPT | Performed by: INTERNAL MEDICINE

## 2023-10-18 PROCEDURE — 25010000002 MEROPENEM PER 100 MG: Performed by: STUDENT IN AN ORGANIZED HEALTH CARE EDUCATION/TRAINING PROGRAM

## 2023-10-18 PROCEDURE — 80053 COMPREHEN METABOLIC PANEL: CPT | Performed by: STUDENT IN AN ORGANIZED HEALTH CARE EDUCATION/TRAINING PROGRAM

## 2023-10-18 PROCEDURE — 02HV33Z INSERTION OF INFUSION DEVICE INTO SUPERIOR VENA CAVA, PERCUTANEOUS APPROACH: ICD-10-PCS | Performed by: UROLOGY

## 2023-10-18 PROCEDURE — C2617 STENT, NON-COR, TEM W/O DEL: HCPCS | Performed by: UROLOGY

## 2023-10-18 PROCEDURE — 82803 BLOOD GASES ANY COMBINATION: CPT

## 2023-10-18 PROCEDURE — 85025 COMPLETE CBC W/AUTO DIFF WBC: CPT | Performed by: INTERNAL MEDICINE

## 2023-10-18 PROCEDURE — 85610 PROTHROMBIN TIME: CPT | Performed by: STUDENT IN AN ORGANIZED HEALTH CARE EDUCATION/TRAINING PROGRAM

## 2023-10-18 PROCEDURE — 83605 ASSAY OF LACTIC ACID: CPT | Performed by: INTERNAL MEDICINE

## 2023-10-18 PROCEDURE — 84132 ASSAY OF SERUM POTASSIUM: CPT | Performed by: INTERNAL MEDICINE

## 2023-10-18 PROCEDURE — 25810000003 SODIUM CHLORIDE 0.9 % SOLUTION

## 2023-10-18 PROCEDURE — 93010 ELECTROCARDIOGRAM REPORT: CPT | Performed by: INTERNAL MEDICINE

## 2023-10-18 PROCEDURE — 82365 CALCULUS SPECTROSCOPY: CPT | Performed by: UROLOGY

## 2023-10-18 PROCEDURE — 0T7B8DZ DILATION OF BLADDER WITH INTRALUMINAL DEVICE, VIA NATURAL OR ARTIFICIAL OPENING ENDOSCOPIC: ICD-10-PCS | Performed by: UROLOGY

## 2023-10-18 PROCEDURE — 87086 URINE CULTURE/COLONY COUNT: CPT | Performed by: UROLOGY

## 2023-10-18 PROCEDURE — 80051 ELECTROLYTE PANEL: CPT

## 2023-10-18 PROCEDURE — 85007 BL SMEAR W/DIFF WBC COUNT: CPT | Performed by: STUDENT IN AN ORGANIZED HEALTH CARE EDUCATION/TRAINING PROGRAM

## 2023-10-18 PROCEDURE — 85025 COMPLETE CBC W/AUTO DIFF WBC: CPT | Performed by: STUDENT IN AN ORGANIZED HEALTH CARE EDUCATION/TRAINING PROGRAM

## 2023-10-18 PROCEDURE — 25810000003 LACTATED RINGERS PER 1000 ML: Performed by: ANESTHESIOLOGY

## 2023-10-18 PROCEDURE — 70450 CT HEAD/BRAIN W/O DYE: CPT

## 2023-10-18 PROCEDURE — 87641 MR-STAPH DNA AMP PROBE: CPT | Performed by: STUDENT IN AN ORGANIZED HEALTH CARE EDUCATION/TRAINING PROGRAM

## 2023-10-18 PROCEDURE — 85018 HEMOGLOBIN: CPT

## 2023-10-18 PROCEDURE — 82565 ASSAY OF CREATININE: CPT

## 2023-10-18 PROCEDURE — 93005 ELECTROCARDIOGRAM TRACING: CPT | Performed by: ANESTHESIOLOGY

## 2023-10-18 PROCEDURE — 84145 PROCALCITONIN (PCT): CPT | Performed by: INTERNAL MEDICINE

## 2023-10-18 PROCEDURE — 25810000003 SODIUM CHLORIDE 0.9 % SOLUTION: Performed by: INTERNAL MEDICINE

## 2023-10-18 PROCEDURE — 36600 WITHDRAWAL OF ARTERIAL BLOOD: CPT

## 2023-10-18 PROCEDURE — 25010000002 CEFTRIAXONE PER 250 MG: Performed by: UROLOGY

## 2023-10-18 PROCEDURE — C1758 CATHETER, URETERAL: HCPCS | Performed by: UROLOGY

## 2023-10-18 PROCEDURE — 84100 ASSAY OF PHOSPHORUS: CPT | Performed by: STUDENT IN AN ORGANIZED HEALTH CARE EDUCATION/TRAINING PROGRAM

## 2023-10-18 PROCEDURE — 74176 CT ABD & PELVIS W/O CONTRAST: CPT

## 2023-10-18 PROCEDURE — 83605 ASSAY OF LACTIC ACID: CPT

## 2023-10-18 PROCEDURE — 25010000002 CALCIUM GLUCONATE 2-0.675 GM/100ML-% SOLUTION: Performed by: INTERNAL MEDICINE

## 2023-10-18 PROCEDURE — 82330 ASSAY OF CALCIUM: CPT

## 2023-10-18 PROCEDURE — 25010000002 FENTANYL CITRATE (PF) 100 MCG/2ML SOLUTION: Performed by: NURSE ANESTHETIST, CERTIFIED REGISTERED

## 2023-10-18 PROCEDURE — 25010000002 PROPOFOL 500 MG/50ML EMULSION: Performed by: NURSE ANESTHETIST, CERTIFIED REGISTERED

## 2023-10-18 PROCEDURE — 84484 ASSAY OF TROPONIN QUANT: CPT | Performed by: INTERNAL MEDICINE

## 2023-10-18 PROCEDURE — 83880 ASSAY OF NATRIURETIC PEPTIDE: CPT | Performed by: INTERNAL MEDICINE

## 2023-10-18 DEVICE — URETERAL STENT
Type: IMPLANTABLE DEVICE | Site: URETER | Status: FUNCTIONAL
Brand: PERCUFLEX™ PLUS

## 2023-10-18 RX ORDER — DILTIAZEM HCL/D5W 125 MG/125
5-15 PLASTIC BAG, INJECTION (ML) INTRAVENOUS
Status: DISCONTINUED | OUTPATIENT
Start: 2023-10-18 | End: 2023-10-18

## 2023-10-18 RX ORDER — LEVOTHYROXINE SODIUM 0.12 MG/1
125 TABLET ORAL DAILY
Status: DISCONTINUED | OUTPATIENT
Start: 2023-10-19 | End: 2023-10-19

## 2023-10-18 RX ORDER — NICOTINE POLACRILEX 4 MG
15 LOZENGE BUCCAL
Status: DISCONTINUED | OUTPATIENT
Start: 2023-10-18 | End: 2023-10-25 | Stop reason: HOSPADM

## 2023-10-18 RX ORDER — ACETAMINOPHEN 650 MG/1
650 SUPPOSITORY RECTAL EVERY 4 HOURS PRN
Status: DISCONTINUED | OUTPATIENT
Start: 2023-10-18 | End: 2023-10-25 | Stop reason: HOSPADM

## 2023-10-18 RX ORDER — MAGNESIUM SULFATE HEPTAHYDRATE 40 MG/ML
2 INJECTION, SOLUTION INTRAVENOUS
Status: COMPLETED | OUTPATIENT
Start: 2023-10-18 | End: 2023-10-19

## 2023-10-18 RX ORDER — LABETALOL HYDROCHLORIDE 5 MG/ML
5 INJECTION, SOLUTION INTRAVENOUS
Status: DISCONTINUED | OUTPATIENT
Start: 2023-10-18 | End: 2023-10-18 | Stop reason: HOSPADM

## 2023-10-18 RX ORDER — SODIUM CHLORIDE 0.9 % (FLUSH) 0.9 %
20 SYRINGE (ML) INJECTION AS NEEDED
Status: DISCONTINUED | OUTPATIENT
Start: 2023-10-18 | End: 2023-10-25

## 2023-10-18 RX ORDER — EPHEDRINE SULFATE 5 MG/ML
INJECTION INTRAVENOUS AS NEEDED
Status: DISCONTINUED | OUTPATIENT
Start: 2023-10-18 | End: 2023-10-18 | Stop reason: SURG

## 2023-10-18 RX ORDER — INSULIN LISPRO 100 [IU]/ML
7 INJECTION, SOLUTION INTRAVENOUS; SUBCUTANEOUS 3 TIMES DAILY
Status: DISCONTINUED | OUTPATIENT
Start: 2023-10-18 | End: 2023-10-18

## 2023-10-18 RX ORDER — LIDOCAINE HYDROCHLORIDE 10 MG/ML
0.5 INJECTION, SOLUTION INFILTRATION; PERINEURAL ONCE AS NEEDED
Status: DISCONTINUED | OUTPATIENT
Start: 2023-10-18 | End: 2023-10-18 | Stop reason: HOSPADM

## 2023-10-18 RX ORDER — HYDROCODONE BITARTRATE AND ACETAMINOPHEN 5; 325 MG/1; MG/1
1 TABLET ORAL ONCE AS NEEDED
Status: COMPLETED | OUTPATIENT
Start: 2023-10-18 | End: 2023-10-18

## 2023-10-18 RX ORDER — ROSUVASTATIN CALCIUM 5 MG/1
5 TABLET, COATED ORAL DAILY
Status: DISCONTINUED | OUTPATIENT
Start: 2023-10-18 | End: 2023-10-25 | Stop reason: HOSPADM

## 2023-10-18 RX ORDER — SODIUM CHLORIDE 0.9 % (FLUSH) 0.9 %
10 SYRINGE (ML) INJECTION EVERY 12 HOURS SCHEDULED
Status: DISCONTINUED | OUTPATIENT
Start: 2023-10-19 | End: 2023-10-25

## 2023-10-18 RX ORDER — NITROGLYCERIN 0.4 MG/1
0.4 TABLET SUBLINGUAL
Status: DISCONTINUED | OUTPATIENT
Start: 2023-10-18 | End: 2023-10-25 | Stop reason: HOSPADM

## 2023-10-18 RX ORDER — FENTANYL CITRATE 50 UG/ML
INJECTION, SOLUTION INTRAMUSCULAR; INTRAVENOUS AS NEEDED
Status: DISCONTINUED | OUTPATIENT
Start: 2023-10-18 | End: 2023-10-18 | Stop reason: SURG

## 2023-10-18 RX ORDER — BISACODYL 5 MG/1
5 TABLET, DELAYED RELEASE ORAL DAILY PRN
Status: DISCONTINUED | OUTPATIENT
Start: 2023-10-18 | End: 2023-10-25 | Stop reason: HOSPADM

## 2023-10-18 RX ORDER — ONDANSETRON 2 MG/ML
4 INJECTION INTRAMUSCULAR; INTRAVENOUS ONCE AS NEEDED
Status: DISCONTINUED | OUTPATIENT
Start: 2023-10-18 | End: 2023-10-18 | Stop reason: HOSPADM

## 2023-10-18 RX ORDER — MAGNESIUM SULFATE HEPTAHYDRATE 40 MG/ML
2 INJECTION, SOLUTION INTRAVENOUS
Status: COMPLETED | OUTPATIENT
Start: 2023-10-18 | End: 2023-10-18

## 2023-10-18 RX ORDER — PROPOFOL 10 MG/ML
INJECTION, EMULSION INTRAVENOUS AS NEEDED
Status: DISCONTINUED | OUTPATIENT
Start: 2023-10-18 | End: 2023-10-18 | Stop reason: SURG

## 2023-10-18 RX ORDER — SODIUM CHLORIDE 9 MG/ML
150 INJECTION, SOLUTION INTRAVENOUS CONTINUOUS
Status: DISCONTINUED | OUTPATIENT
Start: 2023-10-18 | End: 2023-10-20

## 2023-10-18 RX ORDER — LACTULOSE 10 G/15ML
10 SOLUTION ORAL 2 TIMES DAILY
Status: DISCONTINUED | OUTPATIENT
Start: 2023-10-18 | End: 2023-10-25 | Stop reason: HOSPADM

## 2023-10-18 RX ORDER — INSULIN LISPRO 100 [IU]/ML
2-7 INJECTION, SOLUTION INTRAVENOUS; SUBCUTANEOUS
Status: DISCONTINUED | OUTPATIENT
Start: 2023-10-18 | End: 2023-10-25 | Stop reason: HOSPADM

## 2023-10-18 RX ORDER — NOREPINEPHRINE BITARTRATE 0.03 MG/ML
INJECTION, SOLUTION INTRAVENOUS
Status: DISPENSED
Start: 2023-10-18 | End: 2023-10-19

## 2023-10-18 RX ORDER — TRAMADOL HYDROCHLORIDE 50 MG/1
50 TABLET ORAL 4 TIMES DAILY PRN
Status: DISCONTINUED | OUTPATIENT
Start: 2023-10-18 | End: 2023-10-25 | Stop reason: HOSPADM

## 2023-10-18 RX ORDER — HYDRALAZINE HYDROCHLORIDE 20 MG/ML
5 INJECTION INTRAMUSCULAR; INTRAVENOUS
Status: DISCONTINUED | OUTPATIENT
Start: 2023-10-18 | End: 2023-10-18 | Stop reason: HOSPADM

## 2023-10-18 RX ORDER — EPHEDRINE SULFATE 5 MG/ML
5 INJECTION INTRAVENOUS ONCE AS NEEDED
Status: DISCONTINUED | OUTPATIENT
Start: 2023-10-18 | End: 2023-10-18 | Stop reason: HOSPADM

## 2023-10-18 RX ORDER — TRAZODONE HYDROCHLORIDE 50 MG/1
50 TABLET ORAL NIGHTLY
Status: DISCONTINUED | OUTPATIENT
Start: 2023-10-18 | End: 2023-10-25 | Stop reason: HOSPADM

## 2023-10-18 RX ORDER — HYDROCODONE BITARTRATE AND ACETAMINOPHEN 5; 325 MG/1; MG/1
1 TABLET ORAL EVERY 4 HOURS PRN
Status: DISPENSED | OUTPATIENT
Start: 2023-10-18 | End: 2023-10-25

## 2023-10-18 RX ORDER — SODIUM CHLORIDE 0.9 % (FLUSH) 0.9 %
10 SYRINGE (ML) INJECTION AS NEEDED
Status: DISCONTINUED | OUTPATIENT
Start: 2023-10-18 | End: 2023-10-25 | Stop reason: HOSPADM

## 2023-10-18 RX ORDER — IBUPROFEN 600 MG/1
1 TABLET ORAL
Status: DISCONTINUED | OUTPATIENT
Start: 2023-10-18 | End: 2023-10-25 | Stop reason: HOSPADM

## 2023-10-18 RX ORDER — NALOXONE HCL 0.4 MG/ML
0.4 VIAL (ML) INJECTION AS NEEDED
Status: DISCONTINUED | OUTPATIENT
Start: 2023-10-18 | End: 2023-10-18 | Stop reason: HOSPADM

## 2023-10-18 RX ORDER — OXYCODONE HYDROCHLORIDE AND ACETAMINOPHEN 5; 325 MG/1; MG/1
1 TABLET ORAL DAILY PRN
Qty: 10 TABLET | Refills: 0 | Status: SHIPPED | OUTPATIENT
Start: 2023-10-18 | End: 2023-10-25 | Stop reason: HOSPADM

## 2023-10-18 RX ORDER — ACETAMINOPHEN 325 MG/1
650 TABLET ORAL EVERY 6 HOURS PRN
Status: DISCONTINUED | OUTPATIENT
Start: 2023-10-18 | End: 2023-10-25 | Stop reason: HOSPADM

## 2023-10-18 RX ORDER — LEVOTHYROXINE SODIUM 112 UG/1
112 TABLET ORAL DAILY
Status: DISCONTINUED | OUTPATIENT
Start: 2023-10-19 | End: 2023-10-18

## 2023-10-18 RX ORDER — ENOXAPARIN SODIUM 100 MG/ML
40 INJECTION SUBCUTANEOUS DAILY
Status: DISCONTINUED | OUTPATIENT
Start: 2023-10-18 | End: 2023-10-19

## 2023-10-18 RX ORDER — GLYCOPYRROLATE 0.2 MG/ML
INJECTION INTRAMUSCULAR; INTRAVENOUS AS NEEDED
Status: DISCONTINUED | OUTPATIENT
Start: 2023-10-18 | End: 2023-10-18 | Stop reason: SURG

## 2023-10-18 RX ORDER — POLYETHYLENE GLYCOL 3350 17 G/17G
17 POWDER, FOR SOLUTION ORAL DAILY PRN
Status: DISCONTINUED | OUTPATIENT
Start: 2023-10-18 | End: 2023-10-25 | Stop reason: HOSPADM

## 2023-10-18 RX ORDER — DIPHENHYDRAMINE HYDROCHLORIDE 50 MG/ML
12.5 INJECTION INTRAMUSCULAR; INTRAVENOUS
Status: DISCONTINUED | OUTPATIENT
Start: 2023-10-18 | End: 2023-10-18 | Stop reason: HOSPADM

## 2023-10-18 RX ORDER — SODIUM CHLORIDE 9 MG/ML
40 INJECTION, SOLUTION INTRAVENOUS AS NEEDED
Status: DISCONTINUED | OUTPATIENT
Start: 2023-10-18 | End: 2023-10-25 | Stop reason: HOSPADM

## 2023-10-18 RX ORDER — OXYBUTYNIN CHLORIDE 5 MG/1
5 TABLET ORAL 2 TIMES DAILY
Status: DISCONTINUED | OUTPATIENT
Start: 2023-10-18 | End: 2023-10-25 | Stop reason: HOSPADM

## 2023-10-18 RX ORDER — SODIUM CHLORIDE 0.9 % (FLUSH) 0.9 %
10 SYRINGE (ML) INJECTION AS NEEDED
Status: DISCONTINUED | OUTPATIENT
Start: 2023-10-18 | End: 2023-10-18 | Stop reason: HOSPADM

## 2023-10-18 RX ORDER — SODIUM CHLORIDE 0.9 % (FLUSH) 0.9 %
10 SYRINGE (ML) INJECTION EVERY 12 HOURS SCHEDULED
Status: DISCONTINUED | OUTPATIENT
Start: 2023-10-18 | End: 2023-10-25 | Stop reason: HOSPADM

## 2023-10-18 RX ORDER — FLUMAZENIL 0.1 MG/ML
0.2 INJECTION INTRAVENOUS AS NEEDED
Status: DISCONTINUED | OUTPATIENT
Start: 2023-10-18 | End: 2023-10-18 | Stop reason: HOSPADM

## 2023-10-18 RX ORDER — CALCIUM GLUCONATE 20 MG/ML
2000 INJECTION, SOLUTION INTRAVENOUS ONCE
Status: COMPLETED | OUTPATIENT
Start: 2023-10-18 | End: 2023-10-18

## 2023-10-18 RX ORDER — BISACODYL 10 MG
10 SUPPOSITORY, RECTAL RECTAL DAILY PRN
Status: DISCONTINUED | OUTPATIENT
Start: 2023-10-18 | End: 2023-10-25 | Stop reason: HOSPADM

## 2023-10-18 RX ORDER — DEXTROSE MONOHYDRATE 25 G/50ML
25 INJECTION, SOLUTION INTRAVENOUS
Status: DISCONTINUED | OUTPATIENT
Start: 2023-10-18 | End: 2023-10-25 | Stop reason: HOSPADM

## 2023-10-18 RX ORDER — ONDANSETRON 2 MG/ML
INJECTION INTRAMUSCULAR; INTRAVENOUS AS NEEDED
Status: DISCONTINUED | OUTPATIENT
Start: 2023-10-18 | End: 2023-10-18 | Stop reason: SURG

## 2023-10-18 RX ORDER — NOREPINEPHRINE BITARTRATE 0.03 MG/ML
INJECTION, SOLUTION INTRAVENOUS
Status: COMPLETED
Start: 2023-10-18 | End: 2023-10-18

## 2023-10-18 RX ORDER — SODIUM CHLORIDE, SODIUM LACTATE, POTASSIUM CHLORIDE, CALCIUM CHLORIDE 600; 310; 30; 20 MG/100ML; MG/100ML; MG/100ML; MG/100ML
1000 INJECTION, SOLUTION INTRAVENOUS CONTINUOUS
Status: DISCONTINUED | OUTPATIENT
Start: 2023-10-18 | End: 2023-10-18

## 2023-10-18 RX ORDER — SODIUM CHLORIDE 9 MG/ML
150 INJECTION, SOLUTION INTRAVENOUS CONTINUOUS
Status: DISCONTINUED | OUTPATIENT
Start: 2023-10-18 | End: 2023-10-18

## 2023-10-18 RX ORDER — OXYCODONE HYDROCHLORIDE 5 MG/1
5 TABLET ORAL EVERY 4 HOURS PRN
Status: DISCONTINUED | OUTPATIENT
Start: 2023-10-18 | End: 2023-10-18 | Stop reason: HOSPADM

## 2023-10-18 RX ORDER — GABAPENTIN 300 MG/1
300 CAPSULE ORAL NIGHTLY
Status: DISCONTINUED | OUTPATIENT
Start: 2023-10-18 | End: 2023-10-25 | Stop reason: HOSPADM

## 2023-10-18 RX ORDER — DIPHENHYDRAMINE HYDROCHLORIDE 50 MG/ML
12.5 INJECTION INTRAMUSCULAR; INTRAVENOUS ONCE AS NEEDED
Status: DISCONTINUED | OUTPATIENT
Start: 2023-10-18 | End: 2023-10-18 | Stop reason: HOSPADM

## 2023-10-18 RX ORDER — IPRATROPIUM BROMIDE AND ALBUTEROL SULFATE 2.5; .5 MG/3ML; MG/3ML
3 SOLUTION RESPIRATORY (INHALATION) ONCE AS NEEDED
Status: DISCONTINUED | OUTPATIENT
Start: 2023-10-18 | End: 2023-10-18 | Stop reason: HOSPADM

## 2023-10-18 RX ORDER — POTASSIUM CHLORIDE 1.5 G/1.58G
40 POWDER, FOR SOLUTION ORAL EVERY 4 HOURS
Status: DISCONTINUED | OUTPATIENT
Start: 2023-10-18 | End: 2023-10-18 | Stop reason: ALTCHOICE

## 2023-10-18 RX ORDER — PANTOPRAZOLE SODIUM 40 MG/1
40 TABLET, DELAYED RELEASE ORAL 2 TIMES DAILY
Status: DISCONTINUED | OUTPATIENT
Start: 2023-10-18 | End: 2023-10-25 | Stop reason: HOSPADM

## 2023-10-18 RX ORDER — NOREPINEPHRINE BITARTRATE 0.03 MG/ML
.02-.5 INJECTION, SOLUTION INTRAVENOUS
Status: DISCONTINUED | OUTPATIENT
Start: 2023-10-18 | End: 2023-10-20

## 2023-10-18 RX ORDER — CEFDINIR 300 MG/1
300 CAPSULE ORAL 2 TIMES DAILY
Qty: 14 CAPSULE | Refills: 0 | Status: SHIPPED | OUTPATIENT
Start: 2023-10-18

## 2023-10-18 RX ORDER — POTASSIUM CHLORIDE 7.45 MG/ML
10 INJECTION INTRAVENOUS
Status: COMPLETED | OUTPATIENT
Start: 2023-10-19 | End: 2023-10-19

## 2023-10-18 RX ORDER — AMOXICILLIN 250 MG
2 CAPSULE ORAL 2 TIMES DAILY
Status: DISCONTINUED | OUTPATIENT
Start: 2023-10-18 | End: 2023-10-25 | Stop reason: HOSPADM

## 2023-10-18 RX ADMIN — FENTANYL CITRATE 50 MCG: 50 INJECTION, SOLUTION INTRAMUSCULAR; INTRAVENOUS at 13:26

## 2023-10-18 RX ADMIN — Medication 5 MG/HR: at 16:44

## 2023-10-18 RX ADMIN — SODIUM CHLORIDE 150 ML/HR: 9 INJECTION, SOLUTION INTRAVENOUS at 18:09

## 2023-10-18 RX ADMIN — SODIUM CHLORIDE, POTASSIUM CHLORIDE, SODIUM LACTATE AND CALCIUM CHLORIDE 1000 ML: 600; 310; 30; 20 INJECTION, SOLUTION INTRAVENOUS at 10:30

## 2023-10-18 RX ADMIN — OXYBUTYNIN CHLORIDE 5 MG: 5 TABLET ORAL at 20:19

## 2023-10-18 RX ADMIN — ENOXAPARIN SODIUM 40 MG: 100 INJECTION SUBCUTANEOUS at 19:27

## 2023-10-18 RX ADMIN — GLYCOPYRROLATE 0.2 MG: 0.2 INJECTION INTRAMUSCULAR; INTRAVENOUS at 12:58

## 2023-10-18 RX ADMIN — PANTOPRAZOLE SODIUM 40 MG: 40 TABLET, DELAYED RELEASE ORAL at 20:19

## 2023-10-18 RX ADMIN — ROSUVASTATIN 5 MG: 10 TABLET, FILM COATED ORAL at 20:19

## 2023-10-18 RX ADMIN — MAGNESIUM SULFATE HEPTAHYDRATE 2 G: 2 INJECTION, SOLUTION INTRAVENOUS at 22:59

## 2023-10-18 RX ADMIN — ONDANSETRON 4 MG: 2 INJECTION INTRAMUSCULAR; INTRAVENOUS at 12:59

## 2023-10-18 RX ADMIN — MAGNESIUM SULFATE HEPTAHYDRATE 2 G: 2 INJECTION, SOLUTION INTRAVENOUS at 20:19

## 2023-10-18 RX ADMIN — POTASSIUM CHLORIDE 40 MEQ: 1.5 POWDER, FOR SOLUTION ORAL at 20:21

## 2023-10-18 RX ADMIN — Medication 10 ML: at 20:21

## 2023-10-18 RX ADMIN — SODIUM CHLORIDE 2000 ML: 9 INJECTION, SOLUTION INTRAVENOUS at 20:46

## 2023-10-18 RX ADMIN — ACETAMINOPHEN 650 MG: 650 SUPPOSITORY RECTAL at 17:38

## 2023-10-18 RX ADMIN — GABAPENTIN 300 MG: 300 CAPSULE ORAL at 20:19

## 2023-10-18 RX ADMIN — HYDROCODONE BITARTRATE AND ACETAMINOPHEN 1 TABLET: 5; 325 TABLET ORAL at 14:34

## 2023-10-18 RX ADMIN — SODIUM CHLORIDE 500 ML: 9 INJECTION, SOLUTION INTRAVENOUS at 19:27

## 2023-10-18 RX ADMIN — LIDOCAINE HYDROCHLORIDE 60 MG: 20 INJECTION, SOLUTION INTRAVENOUS at 12:47

## 2023-10-18 RX ADMIN — Medication 0.5 MCG/KG/MIN: at 22:51

## 2023-10-18 RX ADMIN — SODIUM CHLORIDE 125 ML/HR: 9 INJECTION, SOLUTION INTRAVENOUS at 19:19

## 2023-10-18 RX ADMIN — MEROPENEM 1000 MG: 1 INJECTION, POWDER, FOR SOLUTION INTRAVENOUS at 23:00

## 2023-10-18 RX ADMIN — SODIUM CHLORIDE, POTASSIUM CHLORIDE, SODIUM LACTATE AND CALCIUM CHLORIDE: 600; 310; 30; 20 INJECTION, SOLUTION INTRAVENOUS at 13:18

## 2023-10-18 RX ADMIN — HYDROMORPHONE HYDROCHLORIDE 0.5 MG: 1 INJECTION, SOLUTION INTRAMUSCULAR; INTRAVENOUS; SUBCUTANEOUS at 14:34

## 2023-10-18 RX ADMIN — MAGNESIUM SULFATE HEPTAHYDRATE 2 G: 2 INJECTION, SOLUTION INTRAVENOUS at 21:09

## 2023-10-18 RX ADMIN — PROPOFOL 100 MG: 10 INJECTION, EMULSION INTRAVENOUS at 12:47

## 2023-10-18 RX ADMIN — CEFTRIAXONE SODIUM 1000 MG: 1 INJECTION, POWDER, FOR SOLUTION INTRAMUSCULAR; INTRAVENOUS at 12:44

## 2023-10-18 RX ADMIN — EPHEDRINE SULFATE 5 MG: 5 INJECTION INTRAVENOUS at 12:57

## 2023-10-18 RX ADMIN — FENTANYL CITRATE 50 MCG: 50 INJECTION, SOLUTION INTRAMUSCULAR; INTRAVENOUS at 12:47

## 2023-10-18 RX ADMIN — CALCIUM GLUCONATE 2000 MG: 20 INJECTION, SOLUTION INTRAVENOUS at 22:32

## 2023-10-18 RX ADMIN — TRAZODONE HYDROCHLORIDE 50 MG: 50 TABLET ORAL at 20:19

## 2023-10-18 NOTE — OP NOTE
Urology Operative Note    10/18/2023    Pamela Zavala  82 y.o.  1940  female  [id number]      Surgeon(s) and Role:  Juan Alaniz MD - Primary     Pre-operative Diagnosis: Left renal stones    Post-operative Diagnosis: Same    Complications: None    Procedures:  Cystoscopy  Left ureteroscopy  Laser Lithotripsy  Basket-extraction of stone fragments  Stent replacement  Fluoroscopy with Interpretation     Indications   Pamela Zavala is a 82 y.o. female who was found to have large left renal stone status post lithotripsy and stent placement here for planned staged procedure to clear remaining fragments    During the informed consent process, the procedure was discussed in detail including the risks of bleeding, infection, damage to surrounding structures and need for staged procedure.      Findings     Fluoroscopy with interpretation: Stent removed and wire placed in the upper pole.  Stent replaced with curl in the middle the renal pelvis    Description of procedure:  The patient was properly identified in the preoperative holding area and taken to the operating room where general anesthesia was induced. The patient was placed in the cystolithotomy position and prepped and draped in a sterile fashion. The patient was given antibiotics intravenously before the start of the surgery. After ensuring that all of the required equipment was ready and available a surgical timeout was performed.     A 21 Turkmen rigid cystoscope was inserted into the bladder under direct visualization.  Stent was grasped and removed.  A Sensor wire was passed up the ureter under fluoroscopic guidance.  Flexible ureteroscope was advanced alongside the wire no stones were seen in the ureter laser was then used to break fragments of stone from the kidney.  There were numerous small fragments of stones, basket was used to take multiple trips up and down the ureter to clear the largest fragments.  The only thing remaining were small passable  fragments.  No more stones were seen within the ureter under direct visualization.   The cystoscope was then replaced over the wire and a 7 Sinhala x 26cm stent was placed under direct and fluoroscopic visualization.  The bladder was then emptied and scope removed.    There were no apparent complications. The patient woke up in the operating room and was taken to the recovery room in stable condition.     I was present and scrubbed for the entire procedure.     Specimens: stone    Estimated Blood Loss: minimal      Plan   -Follow up with Dr. Alaniz next week for stent removal         Juan Alaniz MD  [facility]  [facility], [address]  [place] [address] [phone]

## 2023-10-18 NOTE — PROGRESS NOTES
When patient taken to phase 2 had run of SVT refractory to esmolol so was started on adenosine then Cardizem drip and then admitted to the PCU.  Cardiology consulted and hospitalist consulted to assume care.  Discussed with family. Appreciate medical team's recommendations and care    Juan Alaniz MD

## 2023-10-18 NOTE — CONSULTS
[facility] Medicine Services - Consult Note    Patient Name: Pamela Zavala  : 1940  MRN: [id number]  Primary Care Physician:  Marisol Rodgers APRN  Referring Physician: Juan Alaniz MD  Date of admission: 10/18/2023    Consults    Subjective      Reason for Consult/ Chief Complaint: fever    History of Present Illness: Pamela Zavala is a 82 y.o. white female with multiple medical problems, a past history significant for CAD, hypertension, hyperlipidemia hypothyroidism type 2 diabetes who was found to have large left renal stone status post lithotripsy and stent placement here for planned staged procedure to clear remaining fragments     When patient taken to phase 2 had run of SVT refractory to esmolol so was started on adenosine, then Cardizem drip and then admitted to the PCU.  Cardiology consulted.    She developed  Fever.  We were consulted for medical management.  Case discussed with family members at bedside.  Patient wants CPR but does not want to be intubated.    Review of Systems   Constitutional: Positive for decreased appetite, fever and malaise/fatigue.   HENT: Negative.     Eyes: Negative.    Cardiovascular:  Positive for dyspnea on exertion and palpitations.   Respiratory:  Positive for shortness of breath.    Endocrine: Negative.    Hematologic/Lymphatic: Negative.    Skin: Negative.    Musculoskeletal:  Positive for muscle weakness.   Gastrointestinal: Negative.    Genitourinary: Negative.    Neurological:  Positive for weakness.   Psychiatric/Behavioral: Negative.     Allergic/Immunologic: Negative.    All other systems reviewed and are negative.      Personal History     Past Medical History:   Diagnosis Date    Anemia     Arthritis     CAD (coronary artery disease)     Chronic back pain     Diabetic neuropathy     Essential hypertension     GERD (gastroesophageal reflux disease)     History of gastric ulcer     History of stomach ulcers     HLD  (hyperlipidemia)     Hypocalcemia     Hypoparathyroidism     Hypothyroidism     Insomnia     Kidney stones     Osteoporosis     Recurrent sinusitis     Renal disease     Thyroid cancer     Type 2 diabetes mellitus        Past Surgical History:   Procedure Laterality Date    CARDIAC CATHETERIZATION N/A 05/03/2023    Procedure: Left Heart Cath and coronary angiogram;  Surgeon: John Baldwin MD;  Location: [facility] CATH INVASIVE LOCATION;  Service: Cardiovascular;  Laterality: N/A;    CORONARY STENT PLACEMENT      x 3    HYSTERECTOMY      LUNG LOBECTOMY      THYROIDECTOMY         Family History: family history includes Cancer in her father; Diabetes in her mother and sister; Heart disease in her maternal grandmother and sister. Otherwise pertinent FHx was reviewed and not pertinent to current issue.    Social History:  reports that she quit smoking about 23 years ago. Her smoking use included cigarettes. She has a 60.00 pack-year smoking history. She has been exposed to tobacco smoke. She has never used smokeless tobacco. She reports current alcohol use of about 1.0 standard drink of alcohol per week. Drug use questions deferred to the physician.    Home Medications:   Accu-Chek Softclix Lancets, Insulin Lispro, Insulin Lispro (1 Unit Dial), atenolol, cefdinir, clopidogrel, fluticasone, gabapentin, glucose blood, insulin degludec, lactulose, levothyroxine, lisinopril, metFORMIN, oxyCODONE-acetaminophen, oxybutynin, pantoprazole, rosuvastatin, traMADol, and traZODone    Allergies:  Allergies   Allergen Reactions    Penicillins Unknown - High Severity     Lips swell and hives    Shellfish-Derived Products Unknown - Low Severity    Sulfamethizole Unknown - Low Severity    Trimethoprim Unknown - Low Severity         Objective      Vitals:  Temp:  [98.1 °F (36.7 °C)-103 °F (39.4 °C)] 103 °F (39.4 °C)  Heart Rate:  [] 124  Resp:  [11-20] 20  BP: (101-188)/() 111/52  Flow (L/min):  [3-6] 3    Physical Exam  Vitals  and nursing note reviewed.   Constitutional:       General: She is not in acute distress.     Appearance: She is well-developed. She is ill-appearing. She is not toxic-appearing or diaphoretic.   HENT:      Head: Normocephalic and atraumatic.      Nose: Nose normal. No congestion or rhinorrhea.      Mouth/Throat:      Mouth: Mucous membranes are moist.      Pharynx: No oropharyngeal exudate.   Eyes:      General: No scleral icterus.        Right eye: No discharge.         Left eye: No discharge.      Extraocular Movements: Extraocular movements intact.      Pupils: Pupils are equal, round, and reactive to light.   Neck:      Thyroid: No thyromegaly.      Vascular: No carotid bruit or JVD.      Trachea: No tracheal deviation.   Cardiovascular:      Rate and Rhythm: Regular rhythm. Tachycardia present.      Pulses: Normal pulses.      Heart sounds: No murmur heard.     No friction rub. No gallop.   Pulmonary:      Effort: Pulmonary effort is normal. No respiratory distress.      Breath sounds: Normal breath sounds. No stridor. No wheezing, rhonchi or rales.   Chest:      Chest wall: No tenderness.   Abdominal:      General: Bowel sounds are normal. There is no distension.      Palpations: Abdomen is soft. There is no mass.      Tenderness: There is no abdominal tenderness. There is no guarding or rebound.      Hernia: No hernia is present.   Musculoskeletal:         General: No swelling, tenderness, deformity or signs of injury. Normal range of motion.      Cervical back: Normal range of motion and neck supple. No rigidity. No muscular tenderness.      Right lower leg: No edema.      Left lower leg: No edema.   Lymphadenopathy:      Cervical: No cervical adenopathy.   Skin:     General: Skin is warm and dry.      Coloration: Skin is pale. Skin is not jaundiced.      Findings: No bruising or erythema.   Neurological:      General: No focal deficit present.      Mental Status: She is alert.      Cranial Nerves: No  cranial nerve deficit.      Sensory: No sensory deficit.      Motor: Weakness present. No abnormal muscle tone.      Coordination: Coordination normal.          Result Review    Result Review:  I have personally reviewed the results from the time of this admission to 10/18/2023 18:05 EDT and agree with these findings:  []  Laboratory  []  Microbiology  []  Radiology  []  EKG/Telemetry   []  Cardiology/Vascular   []  Pathology  []  Old records  []  Other:  Most notable findings include: CMP:CBC:              Current Facility-Administered Medications:     acetaminophen (TYLENOL) suppository 650 mg, 650 mg, Rectal, Q4H PRN, Surjit Morris MD, 650 mg at 10/18/23 1738    acetaminophen (TYLENOL) tablet 650 mg, 650 mg, Oral, Q6H PRN, Surjit Morris MD    sennosides-docusate (PERICOLACE) 8.6-50 MG per tablet 2 tablet, 2 tablet, Oral, BID **AND** polyethylene glycol (MIRALAX) packet 17 g, 17 g, Oral, Daily PRN **AND** bisacodyl (DULCOLAX) EC tablet 5 mg, 5 mg, Oral, Daily PRN **AND** bisacodyl (DULCOLAX) suppository 10 mg, 10 mg, Rectal, Daily PRN, Surjit Morris MD    Calcium Replacement - Follow Nurse / BPA Driven Protocol, , Does not apply, PRN, Surjit Morris MD    [START ON 10/19/2023] cefTRIAXone (ROCEPHIN) 2,000 mg in sodium chloride 0.9 % 100 mL IVPB, 2,000 mg, Intravenous, Q24H, Surjit Morris MD    dilTIAZem (CARDIZEM) 125 mg in 125 mL D5W infusion, 5-15 mg/hr, Intravenous, Titrated, Surjit Morris MD, Stopped at 10/18/23 1809    Enoxaparin Sodium (LOVENOX) syringe 40 mg, 40 mg, Subcutaneous, Daily, Surjit Morris MD    gabapentin (NEURONTIN) capsule 300 mg, 300 mg, Oral, Nightly, Surjit Morris MD    HYDROcodone-acetaminophen (NORCO) 5-325 MG per tablet 1 tablet, 1 tablet, Oral, Q4H PRN, Surjit Morris MD    insulin glargine (LANTUS, SEMGLEE) injection 15 Units, 15 Units, Subcutaneous, Nightly, Surjit Morris MD    Insulin Lispro (1 Unit Dial) (HUMALOG)  solution pen-injector 7 Units, 7 Units, Subcutaneous, TID, Surjit Morris MD    Insulin Lispro solution cartridge 4 Units, 4 Units, Subcutaneous, TID AC, Surjit Morris MD    lactated ringers infusion 1,000 mL, 1,000 mL, Intravenous, Continuous, [place] MD, Stopped at 10/18/23 1326    lactulose (CHRONULAC) 10 GM/15ML solution 10 g, 10 g, Oral, BID, Surjit Morris MD    levothyroxine (SYNTHROID, LEVOTHROID) tablet 112 mcg, 112 mcg, Oral, Daily, Surjit Morris MD    Magnesium Standard Dose Replacement - Follow Nurse / BPA Driven Protocol, , Does not apply, PRN, Surjit Morris MD    nitroglycerin (NITROSTAT) SL tablet 0.4 mg, 0.4 mg, Sublingual, Q5 Min PRN, Surjit Morris MD    oxybutynin (DITROPAN) tablet 5 mg, 5 mg, Oral, BID, Surjit Morris MD    pantoprazole (PROTONIX) EC tablet 40 mg, 40 mg, Oral, BID, Surjit Morris MD    Phosphorus Replacement - Follow Nurse / BPA Driven Protocol, , Does not apply, Alexandra ANDINO Federico N, MD    Potassium Replacement - Follow Nurse / BPA Driven Protocol, , Does not apply, PRAlexandra REYNOSO Federico N, MD    rosuvastatin (CRESTOR) tablet 5 mg, 5 mg, Oral, Daily, Surjit Morris MD    sodium chloride 0.9 % flush 10 mL, 10 mL, Intravenous, Q12H, Surjit Morris MD    sodium chloride 0.9 % flush 10 mL, 10 mL, Intravenous, PRN, Surjit Morris MD    sodium chloride 0.9 % infusion 40 mL, 40 mL, Intravenous, PRN, Surjit Morris MD    sodium chloride 0.9 % infusion, 150 mL/hr, Intravenous, Continuous, Surjit Morris MD, Last Rate: 150 mL/hr at 10/18/23 1809, 150 mL/hr at 10/18/23 1809    sodium chloride 0.9 % infusion, 125 mL/hr, Intravenous, Continuous, Surjit Morris MD    traMADol (ULTRAM) tablet 50 mg, 50 mg, Oral, 4x Daily PRN, Surjit Morris MD    traZODone (DESYREL) tablet 50 mg, 50 mg, Oral, Nightly, Surjit Morris MD     Assessment & Plan        [facility]  Problems:  Active Hospital Problems    Diagnosis     **Sepsis without acute organ dysfunction     UTI (urinary tract infection), bacterial     Coronary artery disease involving native coronary artery of native heart with unstable angina pectoris     Kidney stones     Type 2 diabetes mellitus     Former smoker     Diabetic neuropathy     Essential hypertension     HLD (hyperlipidemia)     Postoperative hypothyroidism     History of thyroid cancer      Sepsis, UTI status post stent removal kidney stones  Plan:   Admit to PCU  IV fluids, monitor BNP  Monitor lactic acid.  Procalcitonin  Monitor CBC  Monitor renal function  IV antibiotics Rocephin IV started  Blood cultures,  urine cultures  GI DVT prophylaxis    AF-continue Cardizem drip, cardiology consulted, serial enzymes,    Hypertension, blood pressure soft, hold BP meds-patient on atenolol lisinopril at home    Hyperlipidemia check CK continue statins rest or    Hypothyroidism continue Synthroid check TSH    Type 2 diabetes, diabetic diet regular insulin sliding scale check A1c.  Hold metformin    Urinary incontinence continue Ditropan    kidney stones-status post stent removal kidney stones      Signature: Electronically signed by Surjit Morris MD, 10/18/23, 6:09 PM EDT.

## 2023-10-18 NOTE — ANESTHESIA PROCEDURE NOTES
Airway  Urgency: elective    Date/Time: 10/18/2023 12:49 PM  Airway not difficult    General Information and Staff    Patient location during procedure: OR    Indications and Patient Condition  Indications for airway management: airway protection    Preoxygenated: yes  MILS not maintained throughout  Mask difficulty assessment: 0 - not attempted    Final Airway Details  Final airway type: supraglottic airway      Successful airway: I-gel  Size 3     Number of attempts at approach: 1  Assessment: lips, teeth, and gum same as pre-op and atraumatic intubation

## 2023-10-18 NOTE — PLAN OF CARE
Goal Outcome Evaluation:        Pt admitted from outpatient surgery post cysto with stent change with Dr. Alaniz. Pt had rhythm change going into svt with HR in the 150s.  Pacu reported to nurse that adenosine was given 6 followed by 12. No change in HR so patient then given cardizem bolus followed cardizem gtt.     When patient arrived to pcu patient had a temp of 103. Patient is very drowsy and only oriented to self. Pt is also unable to follow commands at this time. Patient's son is at bedside and states she has good days and bad days with her memory loss. Patient is from assisted living and patient's son states she normally does everything herself and the AL helps with medications.     Patient screened positive for sepsis protocol. Notified Dr. Morris and orders placed.

## 2023-10-18 NOTE — ANESTHESIA POSTPROCEDURE EVALUATION
Patient: Pamela Zavala    Procedure Summary       Date: 10/18/23 Room / Location: [facility] OR 01 / [facility] MAIN OR    Anesthesia Start: 1241 Anesthesia Stop: 1326    Procedure: CYSTOSCOPY URETEROSCOPY RETROGRADE PYELOGRAM HOLMIUM LASER STENT INSERTION (Left) Diagnosis:     Surgeons: Juan Alaniz MD Provider: Andrew Lombardo MD    Anesthesia Type: general ASA Status: 3            Anesthesia Type: general    Vitals  Vitals Value Taken Time   /74 10/18/23 1413   Temp 98.1 °F (36.7 °C) 10/18/23 1325   Pulse 85 10/18/23 1416   Resp 18 10/18/23 1355   SpO2 93 % 10/18/23 1416   Vitals shown include unfiled device data.        Post Anesthesia Care and Evaluation    Patient location during evaluation: PACU  Patient participation: complete - patient participated  Level of consciousness: awake  Pain scale: See nurse's notes for pain score.  Pain management: adequate    Airway patency: patent  Anesthetic complications: No anesthetic complications  PONV Status: none  Cardiovascular status: acceptable  Respiratory status: acceptable and spontaneous ventilation  Hydration status: acceptable    Comments: Patient seen and examined postoperatively; vital signs stable; SpO2 greater than or equal to 90%; cardiopulmonary status stable; nausea/vomiting adequately controlled; pain adequately controlled; no apparent anesthesia complications;     Tachycardia HR 150s noted on arrival from PACU to Phase 2.  EKG -> SVT  HR unresponsive to esmolol 50 mg IV.  Cardiology consulted.  Spoke to Dr Emile Miller.  Rec adenosine then cardizem gtt if necessary.    SVT refractory to adenosine 6 mg iv, 12 mg iv.  Cardizem 15 mg iv bolus then 5 mg/hr.  Admit to PCU.

## 2023-10-18 NOTE — ANESTHESIA PREPROCEDURE EVALUATION
Anesthesia Evaluation     Patient summary reviewed and Nursing notes reviewed   NPO Solid Status: > 8 hours  NPO Liquid Status: > 8 hours           Airway   Mallampati: II  TM distance: >3 FB  Neck ROM: full  No difficulty expected  Dental    (+) edentulous    Pulmonary    Cardiovascular     (+) hypertension, CAD, cardiac stents , hyperlipidemia      Neuro/Psych  GI/Hepatic/Renal/Endo    (+) GERD, renal disease-, diabetes mellitus, thyroid problem     Musculoskeletal     Abdominal    Substance History      OB/GYN          Other   arthritis,   history of cancer    ROS/Med Hx Other: Patent stents in the left circumflex and obtuse marginal 1.  IVUS guided PCI of mid RCA with placement of drug-eluting stent.  Normal LVEDP     RECOMMENDATIONS  -Start dual antiplatelet therapy.  -High intensity statin, beta-blocker.  - Referral to cardiac rehab                Anesthesia Plan    ASA 3     general     intravenous induction     Anesthetic plan, risks, benefits, and alternatives have been provided, discussed and informed consent has been obtained with: patient.    Plan discussed with CRNA.    CODE STATUS:

## 2023-10-19 ENCOUNTER — APPOINTMENT (OUTPATIENT)
Dept: GENERAL RADIOLOGY | Facility: HOSPITAL | Age: 83
DRG: 853 | End: 2023-10-19
Payer: MEDICARE

## 2023-10-19 PROBLEM — E89.0 POSTOPERATIVE HYPOTHYROIDISM: Status: ACTIVE | Noted: 2023-06-21

## 2023-10-19 PROBLEM — E11.9 TYPE 2 DIABETES MELLITUS: Status: ACTIVE | Noted: 2023-06-21

## 2023-10-19 PROBLEM — E78.5 HYPERLIPIDEMIA: Status: ACTIVE | Noted: 2023-06-21

## 2023-10-19 PROBLEM — I10 ESSENTIAL HYPERTENSION: Status: ACTIVE | Noted: 2023-06-21

## 2023-10-19 PROBLEM — E11.40 DIABETIC NEUROPATHY: Status: ACTIVE | Noted: 2023-06-21

## 2023-10-19 PROBLEM — I25.10 CORONARY ARTERIOSCLEROSIS: Status: ACTIVE | Noted: 2023-05-03

## 2023-10-19 LAB
ABO GROUP BLD: NORMAL
ALBUMIN SERPL-MCNC: 2.6 G/DL (ref 3.5–5.2)
ALBUMIN/GLOB SERPL: 1.2 G/DL
ALP SERPL-CCNC: 88 U/L (ref 39–117)
ALT SERPL W P-5'-P-CCNC: 149 U/L (ref 1–33)
AMMONIA BLD-SCNC: 14 UMOL/L (ref 11–51)
ANION GAP SERPL CALCULATED.3IONS-SCNC: 10 MMOL/L (ref 5–15)
AST SERPL-CCNC: 398 U/L (ref 1–32)
BILIRUB SERPL-MCNC: 0.4 MG/DL (ref 0–1.2)
BLD GP AB SCN SERPL QL: NEGATIVE
BUN SERPL-MCNC: 19 MG/DL (ref 8–23)
BUN/CREAT SERPL: 25 (ref 7–25)
CALCIUM SPEC-SCNC: 6.7 MG/DL (ref 8.6–10.5)
CHLORIDE SERPL-SCNC: 106 MMOL/L (ref 98–107)
CO2 SERPL-SCNC: 20 MMOL/L (ref 22–29)
CREAT SERPL-MCNC: 0.76 MG/DL (ref 0.57–1)
D-LACTATE SERPL-SCNC: 1.4 MMOL/L (ref 0.5–2)
DEPRECATED RDW RBC AUTO: 59.1 FL (ref 37–54)
EGFRCR SERPLBLD CKD-EPI 2021: 77.4 ML/MIN/1.73
ERYTHROCYTE [DISTWIDTH] IN BLOOD BY AUTOMATED COUNT: 18.5 % (ref 12.3–15.4)
GLOBULIN UR ELPH-MCNC: 2.2 GM/DL
GLUCOSE BLDC GLUCOMTR-MCNC: 112 MG/DL (ref 70–105)
GLUCOSE BLDC GLUCOMTR-MCNC: 120 MG/DL (ref 70–105)
GLUCOSE BLDC GLUCOMTR-MCNC: 147 MG/DL (ref 70–105)
GLUCOSE BLDC GLUCOMTR-MCNC: 175 MG/DL (ref 70–105)
GLUCOSE SERPL-MCNC: 147 MG/DL (ref 65–99)
HCT VFR BLD AUTO: 22.8 % (ref 34–46.6)
HCT VFR BLD AUTO: 26.6 % (ref 34–46.6)
HCT VFR BLD AUTO: 28.6 % (ref 34–46.6)
HCT VFR BLD AUTO: 28.9 % (ref 34–46.6)
HGB BLD-MCNC: 7 G/DL (ref 12–15.9)
HGB BLD-MCNC: 8.3 G/DL (ref 12–15.9)
HGB BLD-MCNC: 8.9 G/DL (ref 12–15.9)
HGB BLD-MCNC: 8.9 G/DL (ref 12–15.9)
LYMPHOCYTES # BLD MANUAL: 0.2 10*3/MM3 (ref 0.7–3.1)
LYMPHOCYTES NFR BLD MANUAL: 2 % (ref 5–12)
MCH RBC QN AUTO: 26.6 PG (ref 26.6–33)
MCHC RBC AUTO-ENTMCNC: 30.7 G/DL (ref 31.5–35.7)
MCV RBC AUTO: 86.6 FL (ref 79–97)
MONOCYTES # BLD: 0.39 10*3/MM3 (ref 0.1–0.9)
MRSA DNA SPEC QL NAA+PROBE: NORMAL
MYELOCYTES NFR BLD MANUAL: 1 % (ref 0–0)
NEUTROPHILS # BLD AUTO: 18.82 10*3/MM3 (ref 1.7–7)
NEUTROPHILS NFR BLD MANUAL: 88 % (ref 42.7–76)
NEUTS BAND NFR BLD MANUAL: 8 % (ref 0–5)
PLATELET # BLD AUTO: 207 10*3/MM3 (ref 140–450)
PMV BLD AUTO: 9.3 FL (ref 6–12)
POIKILOCYTOSIS BLD QL SMEAR: ABNORMAL
POTASSIUM SERPL-SCNC: 4.2 MMOL/L (ref 3.5–5.2)
PROT SERPL-MCNC: 4.8 G/DL (ref 6–8.5)
RBC # BLD AUTO: 3.34 10*6/MM3 (ref 3.77–5.28)
RH BLD: POSITIVE
SCAN SLIDE: NORMAL
SMALL PLATELETS BLD QL SMEAR: ADEQUATE
SODIUM SERPL-SCNC: 136 MMOL/L (ref 136–145)
T&S EXPIRATION DATE: NORMAL
VARIANT LYMPHS NFR BLD MANUAL: 1 % (ref 19.6–45.3)
WBC MORPH BLD: NORMAL
WBC NRBC COR # BLD: 19.6 10*3/MM3 (ref 3.4–10.8)

## 2023-10-19 PROCEDURE — 86900 BLOOD TYPING SEROLOGIC ABO: CPT | Performed by: STUDENT IN AN ORGANIZED HEALTH CARE EDUCATION/TRAINING PROGRAM

## 2023-10-19 PROCEDURE — 97162 PT EVAL MOD COMPLEX 30 MIN: CPT

## 2023-10-19 PROCEDURE — 86901 BLOOD TYPING SEROLOGIC RH(D): CPT | Performed by: STUDENT IN AN ORGANIZED HEALTH CARE EDUCATION/TRAINING PROGRAM

## 2023-10-19 PROCEDURE — 82140 ASSAY OF AMMONIA: CPT | Performed by: NURSE PRACTITIONER

## 2023-10-19 PROCEDURE — P9016 RBC LEUKOCYTES REDUCED: HCPCS

## 2023-10-19 PROCEDURE — 85014 HEMATOCRIT: CPT | Performed by: STUDENT IN AN ORGANIZED HEALTH CARE EDUCATION/TRAINING PROGRAM

## 2023-10-19 PROCEDURE — 25010000002 CEFTRIAXONE PER 250 MG: Performed by: INTERNAL MEDICINE

## 2023-10-19 PROCEDURE — 85018 HEMOGLOBIN: CPT | Performed by: STUDENT IN AN ORGANIZED HEALTH CARE EDUCATION/TRAINING PROGRAM

## 2023-10-19 PROCEDURE — 25010000002 MAGNESIUM SULFATE 2 GM/50ML SOLUTION: Performed by: INTERNAL MEDICINE

## 2023-10-19 PROCEDURE — 25010000002 CALCIUM GLUCONATE 2-0.675 GM/100ML-% SOLUTION: Performed by: NURSE PRACTITIONER

## 2023-10-19 PROCEDURE — 25010000002 THIAMINE HCL 200 MG/2ML SOLUTION: Performed by: NURSE PRACTITIONER

## 2023-10-19 PROCEDURE — 86923 COMPATIBILITY TEST ELECTRIC: CPT

## 2023-10-19 PROCEDURE — 85025 COMPLETE CBC W/AUTO DIFF WBC: CPT | Performed by: INTERNAL MEDICINE

## 2023-10-19 PROCEDURE — 86900 BLOOD TYPING SEROLOGIC ABO: CPT

## 2023-10-19 PROCEDURE — 25010000002 POTASSIUM CHLORIDE 10 MEQ/100ML SOLUTION: Performed by: NURSE PRACTITIONER

## 2023-10-19 PROCEDURE — 97166 OT EVAL MOD COMPLEX 45 MIN: CPT

## 2023-10-19 PROCEDURE — 25810000003 SODIUM CHLORIDE 0.9 % SOLUTION 250 ML FLEX CONT: Performed by: NURSE PRACTITIONER

## 2023-10-19 PROCEDURE — 86901 BLOOD TYPING SEROLOGIC RH(D): CPT

## 2023-10-19 PROCEDURE — 25010000002 VANCOMYCIN HCL 1.25 G RECONSTITUTED SOLUTION 1 EACH VIAL: Performed by: STUDENT IN AN ORGANIZED HEALTH CARE EDUCATION/TRAINING PROGRAM

## 2023-10-19 PROCEDURE — 25810000003 SODIUM CHLORIDE 0.9 % SOLUTION: Performed by: INTERNAL MEDICINE

## 2023-10-19 PROCEDURE — 71045 X-RAY EXAM CHEST 1 VIEW: CPT

## 2023-10-19 PROCEDURE — 25810000003 SODIUM CHLORIDE 0.9 % SOLUTION 250 ML FLEX CONT: Performed by: STUDENT IN AN ORGANIZED HEALTH CARE EDUCATION/TRAINING PROGRAM

## 2023-10-19 PROCEDURE — 25010000002 PHENYLEPHRINE 10 MG/ML SOLUTION 5 ML VIAL: Performed by: NURSE PRACTITIONER

## 2023-10-19 PROCEDURE — 80053 COMPREHEN METABOLIC PANEL: CPT | Performed by: INTERNAL MEDICINE

## 2023-10-19 PROCEDURE — 86850 RBC ANTIBODY SCREEN: CPT | Performed by: STUDENT IN AN ORGANIZED HEALTH CARE EDUCATION/TRAINING PROGRAM

## 2023-10-19 PROCEDURE — 36430 TRANSFUSION BLD/BLD COMPNT: CPT

## 2023-10-19 PROCEDURE — 82948 REAGENT STRIP/BLOOD GLUCOSE: CPT

## 2023-10-19 PROCEDURE — 97535 SELF CARE MNGMENT TRAINING: CPT

## 2023-10-19 PROCEDURE — 85007 BL SMEAR W/DIFF WBC COUNT: CPT | Performed by: INTERNAL MEDICINE

## 2023-10-19 PROCEDURE — 83605 ASSAY OF LACTIC ACID: CPT | Performed by: INTERNAL MEDICINE

## 2023-10-19 RX ORDER — LORAZEPAM 1 MG/1
1 TABLET ORAL
Status: DISCONTINUED | OUTPATIENT
Start: 2023-10-19 | End: 2023-10-21

## 2023-10-19 RX ORDER — THIAMINE HYDROCHLORIDE 100 MG/ML
200 INJECTION, SOLUTION INTRAMUSCULAR; INTRAVENOUS EVERY 8 HOURS SCHEDULED
Status: COMPLETED | OUTPATIENT
Start: 2023-10-19 | End: 2023-10-24

## 2023-10-19 RX ORDER — MULTIPLE VITAMINS W/ MINERALS TAB 9MG-400MCG
1 TAB ORAL DAILY
Status: DISCONTINUED | OUTPATIENT
Start: 2023-10-19 | End: 2023-10-25 | Stop reason: HOSPADM

## 2023-10-19 RX ORDER — DEXMEDETOMIDINE HYDROCHLORIDE 4 UG/ML
.2-1.5 INJECTION, SOLUTION INTRAVENOUS
Status: DISCONTINUED | OUTPATIENT
Start: 2023-10-20 | End: 2023-10-20

## 2023-10-19 RX ORDER — LEVOTHYROXINE SODIUM 112 UG/1
112 TABLET ORAL DAILY
Status: DISCONTINUED | OUTPATIENT
Start: 2023-10-20 | End: 2023-10-25 | Stop reason: HOSPADM

## 2023-10-19 RX ORDER — CALCIUM GLUCONATE 20 MG/ML
2000 INJECTION, SOLUTION INTRAVENOUS EVERY 12 HOURS
Status: COMPLETED | OUTPATIENT
Start: 2023-10-19 | End: 2023-10-19

## 2023-10-19 RX ORDER — FOLIC ACID 1 MG/1
1 TABLET ORAL DAILY
Status: DISCONTINUED | OUTPATIENT
Start: 2023-10-19 | End: 2023-10-25 | Stop reason: HOSPADM

## 2023-10-19 RX ORDER — LORAZEPAM 2 MG/ML
2 INJECTION INTRAMUSCULAR
Status: DISCONTINUED | OUTPATIENT
Start: 2023-10-19 | End: 2023-10-21

## 2023-10-19 RX ORDER — LORAZEPAM 2 MG/ML
1 INJECTION INTRAMUSCULAR
Status: DISCONTINUED | OUTPATIENT
Start: 2023-10-19 | End: 2023-10-21

## 2023-10-19 RX ORDER — LORAZEPAM 1 MG/1
1 TABLET ORAL EVERY 6 HOURS
Status: DISCONTINUED | OUTPATIENT
Start: 2023-10-20 | End: 2023-10-19

## 2023-10-19 RX ORDER — LORAZEPAM 1 MG/1
2 TABLET ORAL EVERY 6 HOURS
Status: DISCONTINUED | OUTPATIENT
Start: 2023-10-19 | End: 2023-10-19

## 2023-10-19 RX ORDER — LORAZEPAM 1 MG/1
2 TABLET ORAL
Status: DISCONTINUED | OUTPATIENT
Start: 2023-10-19 | End: 2023-10-21

## 2023-10-19 RX ADMIN — Medication 10 ML: at 20:09

## 2023-10-19 RX ADMIN — Medication 10 ML: at 08:29

## 2023-10-19 RX ADMIN — Medication 0.5 MCG/KG/MIN: at 08:21

## 2023-10-19 RX ADMIN — LORAZEPAM 2 MG: 1 TABLET ORAL at 09:48

## 2023-10-19 RX ADMIN — POTASSIUM CHLORIDE 10 MEQ: 7.46 INJECTION, SOLUTION INTRAVENOUS at 03:44

## 2023-10-19 RX ADMIN — FOLIC ACID 1 MG: 1 TABLET ORAL at 09:18

## 2023-10-19 RX ADMIN — SENNOSIDES AND DOCUSATE SODIUM 2 TABLET: 50; 8.6 TABLET ORAL at 08:22

## 2023-10-19 RX ADMIN — CALCIUM GLUCONATE 2000 MG: 20 INJECTION, SOLUTION INTRAVENOUS at 09:19

## 2023-10-19 RX ADMIN — MULTIPLE VITAMINS W/ MINERALS TAB 1 TABLET: TAB at 09:18

## 2023-10-19 RX ADMIN — MAGNESIUM SULFATE HEPTAHYDRATE 2 G: 2 INJECTION, SOLUTION INTRAVENOUS at 02:08

## 2023-10-19 RX ADMIN — VANCOMYCIN HYDROCHLORIDE 1250 MG: 1.25 INJECTION, POWDER, LYOPHILIZED, FOR SOLUTION INTRAVENOUS at 01:39

## 2023-10-19 RX ADMIN — PHENYLEPHRINE HYDROCHLORIDE 0.2 MCG/KG/MIN: 10 INJECTION INTRAVENOUS at 12:08

## 2023-10-19 RX ADMIN — OXYBUTYNIN CHLORIDE 5 MG: 5 TABLET ORAL at 08:23

## 2023-10-19 RX ADMIN — POTASSIUM CHLORIDE 10 MEQ: 7.46 INJECTION, SOLUTION INTRAVENOUS at 01:40

## 2023-10-19 RX ADMIN — ROSUVASTATIN 5 MG: 10 TABLET, FILM COATED ORAL at 08:22

## 2023-10-19 RX ADMIN — SODIUM CHLORIDE 150 ML/HR: 9 INJECTION, SOLUTION INTRAVENOUS at 17:07

## 2023-10-19 RX ADMIN — CALCIUM GLUCONATE 2000 MG: 20 INJECTION, SOLUTION INTRAVENOUS at 22:00

## 2023-10-19 RX ADMIN — THIAMINE HYDROCHLORIDE 200 MG: 100 INJECTION, SOLUTION INTRAMUSCULAR; INTRAVENOUS at 15:08

## 2023-10-19 RX ADMIN — Medication 0.5 MCG/KG/MIN: at 17:08

## 2023-10-19 RX ADMIN — OXYBUTYNIN CHLORIDE 5 MG: 5 TABLET ORAL at 20:08

## 2023-10-19 RX ADMIN — TRAZODONE HYDROCHLORIDE 50 MG: 50 TABLET ORAL at 20:07

## 2023-10-19 RX ADMIN — Medication 0.5 MCG/KG/MIN: at 13:01

## 2023-10-19 RX ADMIN — PHENYLEPHRINE HYDROCHLORIDE 2 MCG/KG/MIN: 10 INJECTION INTRAVENOUS at 02:49

## 2023-10-19 RX ADMIN — GABAPENTIN 300 MG: 300 CAPSULE ORAL at 20:08

## 2023-10-19 RX ADMIN — DEXMEDETOMIDINE HYDROCHLORIDE 0.2 MCG/KG/HR: 4 INJECTION, SOLUTION INTRAVENOUS at 23:51

## 2023-10-19 RX ADMIN — PANTOPRAZOLE SODIUM 40 MG: 40 TABLET, DELAYED RELEASE ORAL at 08:22

## 2023-10-19 RX ADMIN — POTASSIUM CHLORIDE 10 MEQ: 7.46 INJECTION, SOLUTION INTRAVENOUS at 00:40

## 2023-10-19 RX ADMIN — MAGNESIUM SULFATE HEPTAHYDRATE 2 G: 2 INJECTION, SOLUTION INTRAVENOUS at 02:52

## 2023-10-19 RX ADMIN — Medication 10 ML: at 08:30

## 2023-10-19 RX ADMIN — PANTOPRAZOLE SODIUM 40 MG: 40 TABLET, DELAYED RELEASE ORAL at 20:09

## 2023-10-19 RX ADMIN — POTASSIUM CHLORIDE 10 MEQ: 7.46 INJECTION, SOLUTION INTRAVENOUS at 02:49

## 2023-10-19 RX ADMIN — SENNOSIDES AND DOCUSATE SODIUM 2 TABLET: 50; 8.6 TABLET ORAL at 20:07

## 2023-10-19 RX ADMIN — Medication 0.5 MCG/KG/MIN: at 21:59

## 2023-10-19 RX ADMIN — THIAMINE HYDROCHLORIDE 200 MG: 100 INJECTION, SOLUTION INTRAMUSCULAR; INTRAVENOUS at 22:00

## 2023-10-19 RX ADMIN — CEFTRIAXONE 2000 MG: 2 INJECTION, POWDER, FOR SOLUTION INTRAMUSCULAR; INTRAVENOUS at 11:19

## 2023-10-19 NOTE — NURSING NOTE
Rapid response called for decreased LOC and hypotension on arrival to room from CT. BP 69/42 when rapid response was called. RN sternal rubbed pt with no response. Pt receiving first liter of ordered 2L sepsis fluid bolus. Pt on maintenance fluids and getting magnesium replaced as well on rapid response arrival. ABG obtained and labs drawn. Antibiotics ordered. CT head wo contrast ordered. Pt moved to ICU for vasopressors. Family at bedside, agreeable to plan of care.

## 2023-10-19 NOTE — THERAPY EVALUATION
Patient Name: Pamela Zavala  : 1940    MRN: [id number]                              Today's Date: 10/19/2023       Admit Date: 10/18/2023    Visit Dx:     ICD-10-CM ICD-9-CM   1. Ureter, calculus  N20.1 592.1     Patient Active Problem List   Diagnosis    Chest pain    Coronary arteriosclerosis    Type 2 diabetes mellitus    Diabetic neuropathy    Essential hypertension    Hyperlipidemia    Postoperative hypothyroidism    Thallium stress test abnormal    Ex-smoker    Chest pain, unspecified type    History of thyroid cancer    Sepsis without acute organ dysfunction    Kidney stones    UTI (urinary tract infection), bacterial    Sepsis     Past Medical History:   Diagnosis Date    Anemia     Arthritis     CAD (coronary artery disease)     Chronic back pain     Diabetic neuropathy     Essential hypertension     GERD (gastroesophageal reflux disease)     History of gastric ulcer     History of stomach ulcers     HLD (hyperlipidemia)     Hypocalcemia     Hypoparathyroidism     Hypothyroidism     Insomnia     Kidney stones     Osteoporosis     Recurrent sinusitis     Renal disease     Thyroid cancer     Type 2 diabetes mellitus      Past Surgical History:   Procedure Laterality Date    CARDIAC CATHETERIZATION N/A 2023    Procedure: Left Heart Cath and coronary angiogram;  Surgeon: John Baldwin MD;  Location: [facility] INVASIVE LOCATION;  Service: Cardiovascular;  Laterality: N/A;    CORONARY STENT PLACEMENT      x 3    CYSTOSCOPY, URETEROSCOPY, RETROGRADE PYELOGRAM, STENT INSERTION Left 10/18/2023    Procedure: CYSTOSCOPY URETEROSCOPY RETROGRADE PYELOGRAM HOLMIUM LASER STENT INSERTION;  Surgeon: Juan Alaniz MD;  Location: [facility] OR;  Service: Urology;  Laterality: Left;    HYSTERECTOMY      LUNG LOBECTOMY      THYROIDECTOMY        General Information       Row Name 10/19/23 1552          Physical Therapy Time and Intention    Document Type evaluation  -OD     Mode of Treatment physical therapy   -OD       Row Name 10/19/23 1552          General Information    Patient Profile Reviewed yes  -OD     Prior Level of Function independent:;ADL's;all household mobility  Lives in residential  -OD     Existing Precautions/Restrictions fall;oxygen therapy device and L/min  -OD     Barriers to Rehab medically complex;cognitive status  -OD       Row Name 10/19/23 1552          Living Environment    People in Home facility resident;other (see comments)  [place]  -OD       Row Name 10/19/23 1552          Home Main Entrance    Number of Stairs, Main Entrance none  -OD       Row Name 10/19/23 1552          Stairs Within Home, Primary    Number of Stairs, Within Home, Primary none  -OD       Row Name 10/19/23 1552          Cognition    Orientation Status (Cognition) oriented to;person;place;situation;verbal cues/prompts needed for orientation;time  -OD       Row Name 10/19/23 1552          Safety Issues, Functional Mobility    Safety Issues Affecting Function (Mobility) awareness of need for assistance;judgment;problem-solving  -OD     Impairments Affecting Function (Mobility) balance;endurance/activity tolerance;strength  -OD               User Key  (r) = Recorded By, (t) = Taken By, (c) = Cosigned By      Initials Name Provider Type    OD Akua Solomon, PT Physical Therapist                   Mobility       Row Name 10/19/23 1553          Bed Mobility    Bed Mobility bed mobility (all) activities  -OD     All Activities, [place] (Bed Mobility) minimum assist (75% patient effort)  -OD     Assistive Device (Bed Mobility) head of bed elevated;bed rails  -OD       Row Name 10/19/23 1553          Bed-Chair Transfer    Bed-Chair [place] (Transfers) moderate assist (50% patient effort)  -OD       Row Name 10/19/23 1553          Sit-Stand Transfer    Sit-Stand [place] (Transfers) minimum assist (75% patient effort)  -OD       Row Name 10/19/23 1553          Gait/Stairs (Locomotion)    [place] Level (Gait)  moderate assist (50% patient effort);other (see comments)  Steps to turn and sit in bedside chair  -OD     Distance in Feet (Gait) 2  -OD               User Key  (r) = Recorded By, (t) = Taken By, (c) = Cosigned By      Initials Name Provider Type    Akua Traore PT Physical Therapist                   Obj/Interventions       Row Name 10/19/23 1556          Range of Motion Comprehensive    General Range of Motion bilateral lower extremity ROM WFL  -OD       Row Name 10/19/23 1556          Strength Comprehensive (MMT)    General Manual Muscle Testing (MMT) Assessment lower extremity strength deficits identified  -OD     Comment, General Manual Muscle Testing (MMT) Assessment BLE weakness grossly 3/5  -OD       Row Name 10/19/23 1556          Balance    Balance Interventions sitting;minimal challenge;standing;moderate challenge;supported  -OD       Row Name 10/19/23 1556          Sensory Assessment (Somatosensory)    Sensory Assessment (Somatosensory) other (see comments)  Pt reports neuropathy in fingers and toes basleine  -OD               User Key  (r) = Recorded By, (t) = Taken By, (c) = Cosigned By      Initials Name Provider Type    Akua Traore PT Physical Therapist                   Goals/Plan       Row Name 10/19/23 1614          Bed Mobility Goal 1 (PT)    Activity/Assistive Device (Bed Mobility Goal 1, PT) bed mobility activities, all  -OD     [place] Level/Cues Needed (Bed Mobility Goal 1, PT) independent  -OD     Time Frame (Bed Mobility Goal 1, PT) long term goal (LTG);2 weeks  -OD       Row Name 10/19/23 1614          Transfer Goal 1 (PT)    Activity/Assistive Device (Transfer Goal 1, PT) transfers, all;walker, rolling  -OD     [place] Level/Cues Needed (Transfer Goal 1, PT) modified independence  -OD     Time Frame (Transfer Goal 1, PT) long term goal (LTG);2 weeks  -OD       Row Name 10/19/23 1614          Gait Training Goal 1 (PT)    Activity/Assistive Device (Gait Training Goal  1, PT) gait (walking locomotion);assistive device use;decrease fall risk;walker, rolling  -OD     [place] Level (Gait Training Goal 1, PT) modified independence  -OD     Distance (Gait Training Goal 1, PT) 100 feet  -OD     Time Frame (Gait Training Goal 1, PT) long term goal (LTG);2 weeks  -OD       Row Name 10/19/23 7343          Therapy Assessment/Plan (PT)    Planned Therapy Interventions (PT) balance training;bed mobility training;gait training;postural re-education;transfer training;ROM (range of motion);neuromuscular re-education;home exercise program;patient/family education;strengthening;stretching  -OD               User Key  (r) = Recorded By, (t) = Taken By, (c) = Cosigned By      Initials Name Provider Type    OD Akua Solomon, PT Physical Therapist                   Clinical Impression       Row Name 10/19/23 1550          Pain Scale: FACES Pre/Post-Treatment    Pain: FACES Scale, Pretreatment 0-->no hurt  -OD     Posttreatment Pain Rating 0-->no hurt  -OD       Row Name 10/19/23 7912          Plan of Care Review    Plan of Care Reviewed With patient  -OD     Progress no change  -OD     Outcome Evaluation Pt is an 83 y/o F admitted to [facility] on 10/18/23 for large L renal stone s/p lithotripsy and stent placement that requires further intervention. Pt is POD#1 s/p cystoscopy ureteroscopy with laser stent insertion with Dr. Alaniz. Following the procedure, RR called for severe hypotension. Pt dx with septic shock and acute hypoxic respiratory failure, transferred to ICU. CT head  (-) for acute abnormalities, CT abdomen (+) for hemorrhage. At baseline, pt lives at [facility] where she is indep with bed mobility and ADLs, reporting intermittent use of RW and receives home health therapy services 2-3x/wk. Pt currently is AAOx3 with cues for time and mild confusion noted throughout. Pt currently has CBI, several IV poles, and RN reports weaning off pressors, requiring 2 skilled therapists for safety  this date. Pt required Keanu for bed mobility, modA for STS, and modA for transfer to bedside chair. Pt required cues for sequencing and maintaining safety, as pt continued trying to reach for IV poles for stabilization. Pt is below baseline function and would benefit from SNF upon d/c for being able to return safely home to assisted living facility.  -OD       Row Name 10/19/23 1558          Therapy Assessment/Plan (PT)    Rehab Potential (PT) good, to achieve stated therapy goals  -OD     Criteria for Skilled Interventions Met (PT) yes;meets criteria  -OD     Therapy Frequency (PT) 3 times/wk  -OD     Predicted Duration of Therapy Intervention (PT) until d/c  -OD       Row Name 10/19/23 5758          Vital Signs    Pre Systolic BP Rehab 112  -OD     Pre Treatment Diastolic BP 86  -OD     Post Systolic BP Rehab 120  -OD     Post Treatment Diastolic BP 84  -OD     Pre SpO2 (%) 98  -OD     O2 Delivery Pre Treatment nasal cannula  3L  -OD     Intra SpO2 (%) 91  -OD     O2 Delivery Intra Treatment nasal cannula  -OD     Post SpO2 (%) 94  -OD     O2 Delivery Post Treatment nasal cannula  -OD     Pre Patient Position Supine  -OD     Intra Patient Position Standing  -OD     Post Patient Position Sitting  -OD       Row Name 10/19/23 2822          Positioning and Restraints    Pre-Treatment Position in bed  -OD     Post Treatment Position chair  -OD     In Chair notified nsg;reclined;call light within reach;encouraged to call for assist;exit alarm on;with family/caregiver  -OD               User Key  (r) = Recorded By, (t) = Taken By, (c) = Cosigned By      Initials Name Provider Type    OD Akua Solomon, PT Physical Therapist                   Outcome Measures       Row Name 10/19/23 0455          How much help from another person do you currently need...    Turning from your back to your side while in flat bed without using bedrails? 3  -OD     Moving from lying on back to sitting on the side of a flat bed without  bedrails? 3  -OD     Moving to and from a bed to a chair (including a wheelchair)? 2  -OD     Standing up from a chair using your arms (e.g., wheelchair, bedside chair)? 2  -OD     Climbing 3-5 steps with a railing? 2  -OD     To walk in hospital room? 3  -OD     AM-PAC 6 Clicks Score (PT) 15  -OD     Highest level of mobility 4 --> Transferred to chair/commode  -OD       Row Name 10/19/23 1615 10/19/23 1553       Functional Assessment    Outcome Measure Options AM-PAC 6 Clicks Basic Mobility (PT)  -OD AM-PAC 6 Clicks Daily Activity (OT)  -LS              User Key  (r) = Recorded By, (t) = Taken By, (c) = Cosigned By      Initials Name Provider Type    LS Sánchez Yeboah, OT Occupational Therapist    OD Akua Solomon, PT Physical Therapist                                 Physical Therapy Education       Title: PT OT SLP Therapies (Done)       Topic: Physical Therapy (Done)       Point: Mobility training (Done)       Learning Progress Summary             Patient Acceptance, E, VU by OD at 10/19/2023 1615                         Point: Home exercise program (Done)       Learning Progress Summary             Patient Acceptance, E, VU by OD at 10/19/2023 1615                         Point: Body mechanics (Done)       Learning Progress Summary             Patient Acceptance, E, VU by OD at 10/19/2023 1615                         Point: Precautions (Done)       Learning Progress Summary             Patient Acceptance, E, VU by OD at 10/19/2023 1615                                         User Key       Initials Effective Dates Name Provider Type Discipline    OD 05/11/23 -  kAua Solomon, PT Physical Therapist PT                  PT Recommendation and Plan  Planned Therapy Interventions (PT): balance training, bed mobility training, gait training, postural re-education, transfer training, ROM (range of motion), neuromuscular re-education, home exercise program, patient/family education, strengthening, stretching  Plan of  Care Reviewed With: patient  Progress: no change  Outcome Evaluation: Pt is an 81 y/o F admitted to [facility] on 10/18/23 for large L renal stone s/p lithotripsy and stent placement that requires further intervention. Pt is POD#1 s/p cystoscopy ureteroscopy with laser stent insertion with Dr. Alaniz. Following the procedure, RR called for severe hypotension. Pt dx with septic shock and acute hypoxic respiratory failure, transferred to ICU. CT head  (-) for acute abnormalities, CT abdomen (+) for hemorrhage. At baseline, pt lives at [facility] where she is indep with bed mobility and ADLs, reporting intermittent use of RW and receives home health therapy services 2-3x/wk. Pt currently is AAOx3 with cues for time and mild confusion noted throughout. Pt currently has CBI, several IV poles, and RN reports weaning off pressors, requiring 2 skilled therapists for safety this date. Pt required Keanu for bed mobility, modA for STS, and modA for transfer to bedside chair. Pt required cues for sequencing and maintaining safety, as pt continued trying to reach for IV poles for stabilization. Pt is below baseline function and would benefit from SNF upon d/c for being able to return safely home to assisted living facility.     Time Calculation:         PT Charges       Row Name 10/19/23 1615             Time Calculation    Start Time 1016  -OD      Stop Time 1042  -OD      Time Calculation (min) 26 min  -OD      PT Received On 10/19/23  -OD      PT - Next Appointment 10/20/23  -OD      PT Goal Re-Cert Due Date 11/02/23  -OD         Time Calculation- PT    Total Timed Code Minutes- PT 0 minute(s)  -OD                User Key  (r) = Recorded By, (t) = Taken By, (c) = Cosigned By      Initials Name Provider Type    OD Akua Solomon PT Physical Therapist                  Therapy Charges for Today       Code Description Service Date Service Provider Modifiers Qty    [id number] HC PT EVAL MOD COMPLEXITY 4 10/19/2023 Akua Solomon PT  GP 1            PT G-Codes  Outcome Measure Options: AM-PAC 6 Clicks Basic Mobility (PT)  AM-PAC 6 Clicks Score (PT): 15  AM-PAC 6 Clicks Score (OT): 13  PT Discharge Summary  Anticipated Discharge Disposition (PT): skilled nursing facility    Akua Lackey, PT  10/19/2023

## 2023-10-19 NOTE — CONSULTS
PICC Line Insertion Procedure Note    Procedure: Insertion of #5 FR/16G PICC    Indications:  Pt./DrElaine Preference, vesicants    Active Time Out:  Correct patient: Yes  Correct procedure: Yes  Correct site: Yes  Verified with: ARCADIO Davis    Procedure Details:  Informed consent was obtained for the procedure.  Risk include, but are not limited to infection, air embolism, catheter tip moving, catheter blockage and phlebitis.     Maximum sterile technique was used including antiseptics, cap, gloves, gown, hand hygiene, mask, and sheet.    Ultrasound Guidance: Yes    #5 FR/16G PICC inserted to the R Upper Arm vein per hospital protocol by Tj Elizabeth RN.   Non-pulsatile blood return: yes    Lot #: [id number]  Expiration date: 2025-01-31    Complications:  none    Findings:  Catheter inserted to 40 cm, with 1 cm exposed.   Mid upper arm circumference is 28 cm.   Catheter was flushed with 30 cc NS and sterile dressing applied.  Patient tolerated procedure well.  PICC tip verified by:       [x] Sapiens 3cg       [] Chest X-ray    Recommendations:  Verbal and/or written Care/Maintenance instructions provided to patient.   Primary nurse notified.    Lang Elizabeth RN  10/18/23  23:40 EDT

## 2023-10-19 NOTE — PLAN OF CARE
Goal Outcome Evaluation:         Pt is alert to self and to situation. Pt remains on 3L NC. Pt has been sinus tach throughout shift. Pt remains on CBI. Pt received 1 unit of PRBC. No BM throughout shift.     Levo and kerri gtts on.

## 2023-10-19 NOTE — PROGRESS NOTES
"Critical Care Progress Note     Pamela Zavala : 1940 MRN:[id number] LOS:1  Rm: 2304/1     Principal Problem: Sepsis without acute organ dysfunction     Reason for follow up: All the medical problems listed below    Summary     Pamela Zavala is a 82 y.o. old female patient with PMH of CAD, hypertension, hyperlipidemia hypothyroidism type 2 diabetes who presented to the hospital with large left renal stone after a lithotripsy and stent placement, and underwent a planned staged procedure to clear remaining fragments.  When in phase 2, patient had run of SVT refractory to esmolol and was treated with adenosine, and subsequently started on a Cardizem gtt.  Was admitted to the hospitalist group on PCU.  Cardiology was consulted.  She developed fever and sepsis work-up was initiated.  It is documented that patient wants CPR but did not want to be intubated.  On the evening of 10/18/2023 a rapid response was called as patient was unresponsive with hypotension.  He had gone down for CT abdomen pelvis, and upon return to her room she was unresponsive.  Labs were obtained and ABG showed hypoxia, as well as multiple electrolyte imbalances.  Patient was given fluid challenge, but remained hypotensive.  She was transferred to ICU for initiation of Levophed.  Subsequently CT abdomen pelvis was read and showed \"a large cyst seen within the posterior aspect of the left kidney which was present on the previous exam from 2023 though appears to demonstrate a subtle fluid level on today's exam likely representing a new small amount of hemorrhage within the cyst. Stranding is seen surrounding the kidneys bilaterally which is new as compared to the previous study which is new as compared to the previous study and may represent increased back pressure hemorrhage, or inflammatory change. Hemorrhage is suspected given the amount of stranding on the left and presence of new hyperdensity within the left kidney. A small amount of " "hyperdensity is seen within the proximal collecting system of the left kidney consistent with a small amount of hemorrhage   with probable tiny stones. There is no evidence of hydronephrosis. A left ureteral stent is present which appears in appropriate position\".  Urology was called by hospitalist, and suggested CBI and supportive measures at this time.     ACP: Patient has advanced directive on file at this facility.  Her family has elected to make her a DO NOT RESUSCITATE/DO NOT INTUBATE.    Significant Events     10/19/23 : Remains on vasopressors, CBI in place, urology with no surgical plans at this time.  Patient admits to taking 4 glasses to a bottle of wine daily, CIWA initiated.  1 additional unit of PRBCs ordered.    Assessment / Plan     Shock, multifactorial  -Likely shock multifactorial, related to ABLA and sepsis components.  -Likely 2/2 UTI, possibly following lithotripsy/stent placement  -Blood cultures pending  -Urine culture-pending  -Remains on Levophed gtt/Aguila gtt  -Persistent hypotension in spite of adequate fluid resuscitation  -C/w additional fluids as needed. Avoid fluid overload  -Titrate vasopressors for a target MAP of 65  -De-escalated Merrem and vancomycin to Rocephin monotherapy x 14 days.     ABLA  Potential hemorrhage within the posterior aspect of left kidney  -Hgb 11 -> 7.0, one unit PRBC transfused, serial H&Hs until normalized, transfuse for hgb < 7.0  -CT reviewed.  -Urology following; supportive measures per Hospitalist conversation  -CBI  continues.    Recent nephrolithiasis  -S/p lithotripsy and stent placement for left large renal stone  -S/p staged procedure to clear remaining fragments 10/18/2023  -CT abd- Hemorrhage is suspected given the amount of stranding on the left and presence of new hyperdensity within the left kidney   -Urology following, no urgent plans for surgical intervention.    Hypokalemia  Hypocalcemia  Hypomagnesemia  -Electrolyte replacement per " protocol  -Monitor for arrhythmias     Essential hypertension  -Patient currently hypotensive, requiring Levophed  -Resume home antihypertensives once clinically appropriate     Type 2 diabetes mellitus, controlled  -A1c 7.60  -Hold home metformin while in ICU  -Every 6 hours Accu-Cheks  -Sliding scale insulin for coverage     Alcohol abuse  -[facility] protocol with ativan as needed.  -Thiamine/Folic acid/MVI    Hyperlipidemia: Continue home rosuvastatin  Hypothyroidism: Continue home levothyroxine    Code status:   Medical Intervention Limits: NO intubation (DNI)  Code Status (Patient has no pulse and is not breathing): No CPR (Do Not Attempt to Resuscitate)  Medical Interventions (Patient has pulse or is breathing): Limited Support       Nutrition:   Diet: Diabetic Diets; Consistent Carbohydrate; Texture: Regular Texture (IDDSI 7); Fluid Consistency: Thin (IDDSI 0)   Patient isn't on Tube Feeding     DVT prophylaxis:  Mechanical DVT prophylaxis orders are present.     Subjective / Review of systems     Review of Systems   Constitutional:  Negative for chills and fever.   HENT:  Negative for congestion and sore throat.    Respiratory:  Negative for cough and shortness of breath.    Cardiovascular:  Negative for chest pain and palpitations.   Gastrointestinal:  Negative for abdominal pain and nausea.   Endocrine: Negative for heat intolerance and polyuria.   Genitourinary:  Positive for flank pain and hematuria. Negative for dysuria and urgency.   Musculoskeletal:  Negative for arthralgias and myalgias.   Skin:  Negative for rash and wound.   Neurological:  Positive for tremors. Negative for weakness and numbness.   Hematological:  Negative for adenopathy. Does not bruise/bleed easily.   Psychiatric/Behavioral:  Negative for confusion. The patient is not nervous/anxious.         Objective / Physical Exam     Vital signs:  Temp: 97.6 °F (36.4 °C)  BP: 136/73  Heart Rate: 110  Resp: 22  SpO2: 96 %  Weight: 62.5 kg (137 lb  12.6 oz)    Admission Weight: Weight: 59 kg (130 lb)  Current Weight: Weight: 62.5 kg (137 lb 12.6 oz)    Input/Output in last 24 hours:    Intake/Output Summary (Last 24 hours) at 10/19/2023 0900  Last data filed at 10/19/2023 0840  Gross per 24 hour   Intake 6319 ml   Output -2100 ml   Net 8419 ml      Net IO Since Admission: 8,419 mL [10/19/23 0900]     Physical Exam  Vitals and nursing note reviewed.   Constitutional:       General: She is not in acute distress.     Appearance: Normal appearance. She is ill-appearing. She is not toxic-appearing or diaphoretic.   HENT:      Head: Normocephalic and atraumatic.      Nose: No congestion or rhinorrhea.      Mouth/Throat:      Mouth: Mucous membranes are moist.      Pharynx: Oropharynx is clear. No oropharyngeal exudate or posterior oropharyngeal erythema.   Eyes:      General: No scleral icterus.     Extraocular Movements: Extraocular movements intact.      Conjunctiva/sclera: Conjunctivae normal.      Pupils: Pupils are equal, round, and reactive to light.   Cardiovascular:      Rate and Rhythm: Regular rhythm. Tachycardia present.      Pulses: Normal pulses.      Heart sounds: Normal heart sounds. No murmur heard.     No gallop.   Pulmonary:      Effort: Pulmonary effort is normal. No respiratory distress.      Breath sounds: Normal breath sounds. No wheezing or rales.   Abdominal:      General: Bowel sounds are normal. There is no distension.      Palpations: Abdomen is soft.      Tenderness: There is no abdominal tenderness. There is no rebound.   Genitourinary:     Comments: Continuous bladder irrigation in place.  Musculoskeletal:         General: No tenderness.      Cervical back: Normal range of motion. No rigidity.      Right lower leg: No edema.      Left lower leg: No edema.   Skin:     General: Skin is warm and dry.      Findings: No rash.   Neurological:      General: No focal deficit present.      Mental Status: She is alert and oriented to person,  place, and time. Mental status is at baseline.        Radiology and Labs     Results from last 7 days   Lab Units 10/19/23  0453 10/19/23  0109 10/18/23  2202 10/18/23  2156 10/18/23  1904   WBC 10*3/mm3 19.60*  --  8.10  --  6.30   HEMOGLOBIN g/dL 8.9* 8.3* 8.4*  --  9.5*   HEMOGLOBIN, POC g/dL  --   --   --  8.3*  --    HEMATOCRIT % 28.9* 26.6* 26.7*  --  30.3*   HEMATOCRIT POC %  --   --   --  24*  --    PLATELETS 10*3/mm3 207  --  172  --  251      Results from last 7 days   Lab Units 10/18/23  2202   PROTIME Seconds 14.5*   INR  1.36*      Results from last 7 days   Lab Units 10/19/23  0453 10/18/23  2202 10/18/23  2156 10/18/23  2051 10/18/23  1901   SODIUM mmol/L 136  --   --  140 142   POTASSIUM mmol/L 4.2 2.8*  --  3.1* 3.4*   CHLORIDE mmol/L 106  --   --  104 105   CO2 mmol/L 20.0*  --   --  21.0* 21.0*   BUN mg/dL 19  --   --  18 17   CREATININE mg/dL 0.76  --  0.91 0.92 0.85   GLUCOSE mg/dL 147*  --   --  103* 132*   MAGNESIUM mg/dL  --  2.1  --   --  0.8*   PHOSPHORUS mg/dL  --   --   --  2.6 2.9      Results from last 7 days   Lab Units 10/19/23  0453 10/18/23  2051 10/18/23  1901   ALK PHOS U/L 88 72 74   AST (SGOT) U/L 398* 195* 76*   ALT (SGPT) U/L 149* 66* 29     Results from last 7 days   Lab Units 10/18/23  2156   PH, ARTERIAL pH units 7.327*   PCO2, ARTERIAL mm Hg 42.8   PO2 ART mm Hg 61.1*   O2 SATURATION ART % 89.1*   FIO2 % 32   HCO3 ART mmol/L 22.4   BASE EXCESS ART mmol/L -3.4*     XR Chest 1 View    Result Date: 10/19/2023  Impression: Right arm approach PICC tip terminates at the caval atrial junction. No visible pneumothorax. No acute chest finding. Electronically Signed: Virginia Acevedo MD  10/19/2023 10:27 AM EDT  Workstation ID: [id number]    CT Head Without Contrast    Result Date: 10/19/2023  Impression: No evidence of hemorrhage, mass effect or midline shift. No acute process identified. Electronically Signed: Brae Hui MD  10/19/2023 12:02 AM EDT  Workstation ID: [id number]    CT  Abdomen Pelvis Without Contrast    Result Date: 10/18/2023  Impression: 1.There is a large cyst seen within the posterior aspect of the left kidney which was present on the previous exam from 8/16/2023 though appears to demonstrate a subtle fluid level on today's exam likely representing a new small amount of hemorrhage within the cyst. Stranding is seen surrounding the kidneys bilaterally which is new as compared to the previous study which is new as compared to the previous study and may represent increased back pressure hemorrhage, or inflammatory change. Hemorrhage is suspected given the amount of stranding on the left and presence of new hyperdensity within the left kidney. A small amount of hyperdensity is seen within the proximal collecting system of the left kidney consistent with a small amount of hemorrhage with probable tiny stones. There is no evidence of hydronephrosis. A left ureteral stent is present which appears in appropriate position. The bladder appears distended which may be physiologic. Correlate for findings of bladder outlet obstruction. 2.Mild patchy airspace changes present within the bilateral lower lobes, likely related to atelectasis. Pneumonia cannot be excluded. 3.Ancillary findings as described above. Electronically Signed: Brea Hui MD  10/18/2023 11:08 PM EDT  Workstation ID: [id number]     Current medications     Scheduled Meds:   calcium gluconate, 2,000 mg, Intravenous, Q12H  folic acid, 1 mg, Oral, Daily  gabapentin, 300 mg, Oral, Nightly  insulin glargine, 15 Units, Subcutaneous, Nightly  insulin lispro, 2-7 Units, Subcutaneous, TID With Meals  lactulose, 10 g, Oral, BID  [START ON 10/20/2023] levothyroxine, 112 mcg, Oral, Daily  LORazepam, 2 mg, Oral, Q6H   Followed by  [START ON 10/20/2023] LORazepam, 1 mg, Oral, Q6H  meropenem, 1,000 mg, Intravenous, Q12H  multivitamin with minerals, 1 tablet, Oral, Daily  Norepinephrine-Sodium Chloride, , ,   oxybutynin, 5 mg, Oral,  BID  pantoprazole, 40 mg, Oral, BID  rosuvastatin, 5 mg, Oral, Daily  senna-docusate sodium, 2 tablet, Oral, BID  sodium chloride, 10 mL, Intravenous, Q12H  sodium chloride, 10 mL, Intravenous, Q12H  sodium chloride, 10 mL, Intravenous, Q12H  sodium chloride, 10 mL, Intravenous, Q12H  thiamine (B-1) IV, 200 mg, Intravenous, Q8H   Followed by  [START ON 10/24/2023] thiamine, 100 mg, Oral, Daily  traZODone, 50 mg, Oral, Nightly  [START ON 10/20/2023] vancomycin, 1,000 mg, Intravenous, Q24H        Continuous Infusions:   norepinephrine, 0.02-0.5 mcg/kg/min, Last Rate: 0.5 mcg/kg/min (10/19/23 0821)  Pharmacy to dose vancomycin,   phenylephrine, 0.5-3 mcg/kg/min, Last Rate: 1.1 mcg/kg/min (10/19/23 0841)  sodium chloride, 150 mL/hr, Last Rate: 150 mL/hr (10/18/23 2236)          Patient continues to be critically ill, remains at risk of clinical deterioration or death and needed high complexity decision making. I have spent a total of 38 minutes providing critical care services to this patient including but not limited to: review of labs/ microbiology/imaging/medications, serial monitoring of vital signs, , review of other consultant's notes, review of events in the last 24 hrs, monitoring input/output, review of treatment plan with bedside nurse, RT and other treatment team, management of life support and nutrition needs. I also spoke with patient about the plan of care and answered all questions.    Time spent in performing separately billable procedures and updating family is not included in the critical care time.         CLAUDE Chambers   Critical Care  10/19/23   09:00 EDT

## 2023-10-19 NOTE — PLAN OF CARE
Goal Outcome Evaluation:  Plan of Care Reviewed With: patient        Progress: no change  Outcome Evaluation: Pt is an 83 y/o F admitted to [facility] on 10/18/23 for large L renal stone s/p lithotripsy and stent placement that requires further intervention. Pt is POD#1 s/p cystoscopy ureteroscopy with laser stent insertion with Dr. Alaniz. Following the procedure, RR called for severe hypotension. Pt dx with septic shock and acute hypoxic respiratory failure, transferred to ICU. CT head  (-) for acute abnormalities, CT abdomen (+) for hemorrhage. At baseline, pt lives at [facility] where she is indep with bed mobility and ADLs, reporting intermittent use of RW and receives home health therapy services 2-3x/wk. Pt currently is AAOx3 with cues for time and mild confusion noted throughout. Pt currently has CBI, several IV poles, and RN reports weaning off pressors, requiring 2 skilled therapists for safety this date. Pt required Keanu for bed mobility, modA for STS, and modA for transfer to bedside chair. Pt required cues for sequencing and maintaining safety, as pt continued trying to reach for IV poles for stabilization. Pt is below baseline function and would benefit from SNF upon d/c for being able to return safely home to assisted living facility.      Anticipated Discharge Disposition (PT): skilled nursing facility

## 2023-10-19 NOTE — PROGRESS NOTES
Urology Progress Note    Patient Identification:  Name:  Pamela Zavala  Age:  82 y.o.  Sex:  female  :  1940  MRN:  [id number]    Chief Complaint:  Patient feeling better    History of Present Illness: Patient admitted with sepsis following left ureteroscopic stone extraction with laser lithotripsy and left stent exchange.  She did have high fever to 103 but has defervesced.  She is still on pressors but these are being weaned off and her blood pressure is stabilized.  Urine is pink-tinged on slow to moderate CBI.    Problem List:    Sepsis without acute organ dysfunction    Coronary artery disease involving native coronary artery of native heart with unstable angina pectoris    Type 2 diabetes mellitus    Diabetic neuropathy    Essential hypertension    HLD (hyperlipidemia)    Postoperative hypothyroidism    Former smoker    History of thyroid cancer    Kidney stones    UTI (urinary tract infection), bacterial    Sepsis     Past Medical History:  Past Medical History:   Diagnosis Date    Anemia     Arthritis     CAD (coronary artery disease)     Chronic back pain     Diabetic neuropathy     Essential hypertension     GERD (gastroesophageal reflux disease)     History of gastric ulcer     History of stomach ulcers     HLD (hyperlipidemia)     Hypocalcemia     Hypoparathyroidism     Hypothyroidism     Insomnia     Kidney stones     Osteoporosis     Recurrent sinusitis     Renal disease     Thyroid cancer     Type 2 diabetes mellitus      Past Surgical History:  Past Surgical History:   Procedure Laterality Date    CARDIAC CATHETERIZATION N/A 2023    Procedure: Left Heart Cath and coronary angiogram;  Surgeon: John Baldwin MD;  Location: [facility] CATH INVASIVE LOCATION;  Service: Cardiovascular;  Laterality: N/A;    CORONARY STENT PLACEMENT      x 3    HYSTERECTOMY      LUNG LOBECTOMY      THYROIDECTOMY       Home Meds:  Medications Prior to Admission   Medication Sig Dispense Refill Last Dose     Accu-Chek Softclix Lancets lancets 1 each by Other route 3 (Three) Times a Day Before Meals. Dx: E11.65. Use as instructed 100 each 2     atenolol (TENORMIN) 50 MG tablet Take 1 tablet by mouth Daily. Take dos   10/18/2023    clopidogrel (PLAVIX) 75 MG tablet Take 1 tablet by mouth Daily.   10/16/2023    fluticasone (FLONASE) 50 MCG/ACT nasal spray 1 spray into the nostril(s) as directed by provider 2 (Two) Times a Day.   10/18/2023    gabapentin (NEURONTIN) 300 MG capsule Take 1 capsule by mouth Every Night.   10/17/2023    glucose blood (Accu-Chek Guide) test strip 1 each by Other route 3 (Three) Times a Day Before Meals. Dx: E11.65. Use as instructed 100 each 2     insulin degludec (Tresiba FlexTouch) 100 UNIT/ML solution pen-injector injection Inject 22 Units under the skin into the appropriate area as directed every night at bedtime. 15 mL 5 10/17/2023    Insulin Lispro 100 UNIT/ML solution cartridge Inject  under the skin into the appropriate area as directed 3 (Three) Times a Day Before Meals. Per sliding scale   10/17/2023    Insulin Lispro, 1 Unit Dial, (HumaLOG KwikPen) 100 UNIT/ML solution pen-injector Inject 7 Units under the skin into the appropriate area as directed 3 (Three) Times a Day. Inject 6 Units under the skin into the appropriate area as directed 3 (Three) Times a Day With Meals. 15 mL 5 10/17/2023    lactulose (CEPHULAC) 10 g packet Take 1 packet by mouth Daily.   10/17/2023    levothyroxine (Synthroid) 112 MCG tablet Take 1 tablet by mouth Daily. 30 tablet 11 10/18/2023    lisinopril (PRINIVIL,ZESTRIL) 10 MG tablet Take 0.5 tablets by mouth Daily. Pt states she is taking 5mg 90 tablet 3 10/17/2023    metFORMIN (GLUCOPHAGE) 1000 MG tablet Take 1 tablet by mouth 2 (Two) Times a Day With Meals.   10/16/2023    oxybutynin (DITROPAN) 5 MG tablet Take 1 tablet by mouth 2 (Two) Times a Day.   10/17/2023    pantoprazole (PROTONIX) 40 MG EC tablet Take 1 tablet by mouth 2 (Two) Times a Day. Take dos    10/18/2023    rosuvastatin (CRESTOR) 5 MG tablet Take 1 tablet by mouth Daily.   10/17/2023    traMADol (ULTRAM) 50 MG tablet Take 1 tablet by mouth 4 (Four) Times a Day As Needed for Moderate Pain.   10/17/2023    traZODone (DESYREL) 50 MG tablet Take 1 tablet by mouth Every Night.   10/17/2023    [DISCONTINUED] oxyCODONE-acetaminophen (PERCOCET) 5-325 MG per tablet Take 1 tablet by mouth Daily As Needed.   10/17/2023     Current Meds:    Current Facility-Administered Medications:     acetaminophen (TYLENOL) suppository 650 mg, 650 mg, Rectal, Q4H PRN, Surjit Morris MD, 650 mg at 10/18/23 1738    acetaminophen (TYLENOL) tablet 650 mg, 650 mg, Oral, Q6H PRN, Surjit Morris MD    sennosides-docusate (PERICOLACE) 8.6-50 MG per tablet 2 tablet, 2 tablet, Oral, BID **AND** polyethylene glycol (MIRALAX) packet 17 g, 17 g, Oral, Daily PRN **AND** bisacodyl (DULCOLAX) EC tablet 5 mg, 5 mg, Oral, Daily PRN **AND** bisacodyl (DULCOLAX) suppository 10 mg, 10 mg, Rectal, Daily PRN, Surjit Morris MD    Calcium Replacement - Follow Nurse / BPA Driven Protocol, , Does not apply, PRN, Surjit Morris MD    dextrose (D50W) (25 g/50 mL) IV injection 25 g, 25 g, Intravenous, Q15 Min PRN, Surjit Morris MD    dextrose (GLUTOSE) oral gel 15 g, 15 g, Oral, Q15 Min PRN, Surjit Morris MD    Enoxaparin Sodium (LOVENOX) syringe 40 mg, 40 mg, Subcutaneous, Daily, Surjit Morris MD, 40 mg at 10/18/23 1927    gabapentin (NEURONTIN) capsule 300 mg, 300 mg, Oral, Nightly, Surjit Morris MD, 300 mg at 10/18/23 2019    glucagon (GLUCAGEN) injection 1 mg, 1 mg, Intramuscular, Q15 Min PRN, Surjit Morris MD    HYDROcodone-acetaminophen (NORCO) 5-325 MG per tablet 1 tablet, 1 tablet, Oral, Q4H PRN, Surjit Morris MD    insulin glargine (LANTUS, SEMGLEE) injection 15 Units, 15 Units, Subcutaneous, Nightly, Surjit Morris MD    insulin lispro (HUMALOG/ADMELOG) injection 2-7 Units, 2-7  Units, Subcutaneous, TID With Meals, Surjit Morris MD    lactulose (CHRONULAC) 10 GM/15ML solution 10 g, 10 g, Oral, BID, Surjit Morris MD    levothyroxine (SYNTHROID, LEVOTHROID) tablet 125 mcg, 125 mcg, Oral, Daily, Surjit Morris MD    Magnesium Standard Dose Replacement - Follow Nurse / BPA Driven Protocol, , Does not apply, PRN, Surjit Morris MD    meropenem (MERREM) 1,000 mg in sodium chloride 0.9 % 100 mL IVPB, 1,000 mg, Intravenous, Q12H, Ursula Durham DO    nitroglycerin (NITROSTAT) SL tablet 0.4 mg, 0.4 mg, Sublingual, Q5 Min PRN, Surjit Morris MD    norepinephrine (LEVOPHED) 8 mg in 250 mL NS infusion (premix), 0.02-0.5 mcg/kg/min, Intravenous, Titrated, Ita Vásquez APRN, Last Rate: 56.7 mL/hr at 10/18/23 2251, 0.5 mcg/kg/min at 10/18/23 2251    Norepinephrine-Sodium Chloride (LEVOPHED) 8-0.9 MG/250ML-% infusion solution  - ADS Override Pull, , , ,     oxybutynin (DITROPAN) tablet 5 mg, 5 mg, Oral, BID, Surjit Morris MD, 5 mg at 10/18/23 2019    pantoprazole (PROTONIX) EC tablet 40 mg, 40 mg, Oral, BID, Surjit Morris MD, 40 mg at 10/18/23 2019    Pharmacy to dose vancomycin, , Does not apply, Continuous PRN, Ursula Durham DO    phenylephrine (LORETO-SYNEPHRINE) 50 mg in sodium chloride 0.9 % 250 mL infusion, 0.5-3 mcg/kg/min, Intravenous, Titrated, Ita Vásquez APRN, Last Rate: 31.9 mL/hr at 10/19/23 0610, 1.7 mcg/kg/min at 10/19/23 0610    Phosphorus Replacement - Follow Nurse / BPA Driven Protocol, , Does not apply, PRN, Surjit Morris MD    Potassium Replacement - Follow Nurse / BPA Driven Protocol, , Does not apply, PRN, Surjit Morris MD    rosuvastatin (CRESTOR) tablet 5 mg, 5 mg, Oral, Daily, Surjit Morris MD, 5 mg at 10/18/23 2019    sodium chloride 0.9 % flush 10 mL, 10 mL, Intravenous, Q12H, Surjit Morris MD, 10 mL at 10/18/23 2021    sodium chloride 0.9 % flush 10 mL, 10 mL, Intravenous, PRN,  "Surjit Morris MD    sodium chloride 0.9 % flush 10 mL, 10 mL, Intravenous, Q12H, Lang Chery L, DO    sodium chloride 0.9 % flush 10 mL, 10 mL, Intravenous, Q12H, HowellJoey vitaleer L, DO    sodium chloride 0.9 % flush 10 mL, 10 mL, Intravenous, Q12H, HowellJoey vitaleer L, DO    sodium chloride 0.9 % flush 10 mL, 10 mL, Intravenous, PRN, Lang Chery L, DO    sodium chloride 0.9 % flush 20 mL, 20 mL, Intravenous, PRN, HowellLang vitale L, DO    sodium chloride 0.9 % infusion 40 mL, 40 mL, Intravenous, PRN, Surjit Morris MD    sodium chloride 0.9 % infusion 40 mL, 40 mL, Intravenous, PRN, Lang Chery L, DO    sodium chloride 0.9 % infusion, 150 mL/hr, Intravenous, Continuous, Surjit Morris MD, Last Rate: 150 mL/hr at 10/18/23 2236, 150 mL/hr at 10/18/23 2236    traMADol (ULTRAM) tablet 50 mg, 50 mg, Oral, 4x Daily PRN, Surjit Morris MD    traZODone (DESYREL) tablet 50 mg, 50 mg, Oral, Nightly, Surjit Morris MD, 50 mg at 10/18/23 2019    [START ON 10/20/2023] vancomycin (VANCOCIN) 1,000 mg in sodium chloride 0.9 % 250 mL IVPB-VTB, 1,000 mg, Intravenous, Q24H, Ursula Durham DO  Allergies:  Penicillins, Shellfish-derived products, Sulfamethizole, and Trimethoprim    Review of Systems     Objective:  tMax 24 hours:  Temp (24hrs), Av.9 °F (37.2 °C), Min:96.1 °F (35.6 °C), Max:103 °F (39.4 °C)    Vital Sign Ranges:  Temp:  [96.1 °F (35.6 °C)-103 °F (39.4 °C)] 96.1 °F (35.6 °C)  Heart Rate:  [] 90  Resp:  [11-20] 20  BP: ()/() 110/55  Intake and Output Last 3 Shifts:  I/O last 3 completed shifts:  In: 5672 [I.V.:5672]  Out: -     Exam:  /55   Pulse 90   Temp 96.1 °F (35.6 °C) (Oral)   Resp 20   Ht 167.6 cm (66\")   Wt 62.5 kg (137 lb 12.6 oz)   SpO2 94%   BMI 22.24 kg/m²    General Appearance:    Alert, cooperative, no acute distress, general         appearance is normal   Head:    Normocephalic, without obvious " abnormality, atraumatic   Eyes:            Pupils/Irises normal. Exterior inspection conjunctivae       and lids normal.   Ears:    Normal external inspection   Nose:   Exterior inspection of nose is normal   Throat:   Lips, mucosa, and tongue normal   Lungs:     Respirations unlabored; normal effort, no audible     abnormality   CV:   Regular rhythm and normal rate, no edema   Abdomen:     examination of the abdomen is normal with     no masses, tenderness, or distension    : King with pink-tinged urine on moderate CBI     Data Review:  All labs (24hrs):    Lab Results (last 24 hours)       Procedure Component Value Units Date/Time    CBC & Differential [[id number]]  (Abnormal) Collected: 10/19/23 0453    Specimen: Blood Updated: 10/19/23 0605    Narrative:      The following orders were created for panel order CBC & Differential.  Procedure                               Abnormality         Status                     ---------                               -----------         ------                     CBC Auto Differential[[id number]]        Abnormal            Final result               Scan Slide[[id number]]                                       Final result                 Please view results for these tests on the individual orders.    CBC Auto Differential [[id number]]  (Abnormal) Collected: 10/19/23 0453    Specimen: Blood Updated: 10/19/23 0605     WBC 19.60 10*3/mm3      RBC 3.34 10*6/mm3      Hemoglobin 8.9 g/dL      Hematocrit 28.9 %      MCV 86.6 fL      MCH 26.6 pg      MCHC 30.7 g/dL      RDW 18.5 %      RDW-SD 59.1 fl      MPV 9.3 fL      Platelets 207 10*3/mm3     Narrative:      The previously reported component NRBC is no longer being reported. Previous result was 0.2 /100 WBC (Reference Range: 0.0-0.2 /100 WBC) on 10/19/2023 at 0508 EDT.    Scan Slide [[id number]] Collected: 10/19/23 0453    Specimen: Blood Updated: 10/19/23 0605     Scan Slide --     Comment: See Manual Differential Results        Manual Differential [[id number]]  (Abnormal) Collected: 10/19/23 0453    Specimen: Blood Updated: 10/19/23 0605     Neutrophil % 88.0 %      Lymphocyte % 1.0 %      Monocyte % 2.0 %      Bands %  8.0 %      Myelocyte % 1.0 %      Neutrophils Absolute 18.82 10*3/mm3      Lymphocytes Absolute 0.20 10*3/mm3      Monocytes Absolute 0.39 10*3/mm3      Poikilocytes Slight/1+     WBC Morphology Normal     Platelet Estimate Adequate    Comprehensive Metabolic Panel [[id number]]  (Abnormal) Collected: 10/19/23 0453    Specimen: Blood Updated: 10/19/23 0530     Glucose 147 mg/dL      BUN 19 mg/dL      Creatinine 0.76 mg/dL      Sodium 136 mmol/L      Potassium 4.2 mmol/L      Comment: Slight hemolysis detected by analyzer. Results may be affected.        Chloride 106 mmol/L      CO2 20.0 mmol/L      Calcium 6.7 mg/dL      Total Protein 4.8 g/dL      Albumin 2.6 g/dL      ALT (SGPT) 149 U/L      AST (SGOT) 398 U/L      Alkaline Phosphatase 88 U/L      Total Bilirubin 0.4 mg/dL      Globulin 2.2 gm/dL      A/G Ratio 1.2 g/dL      BUN/Creatinine Ratio 25.0     Anion Gap 10.0 mmol/L      eGFR 77.4 mL/min/1.73     Narrative:      GFR Normal >60  Chronic Kidney Disease <60  Kidney Failure <15    The GFR formula is only valid for adults with stable renal function between ages 18 and 70.    STAT Lactic Acid, Reflex [[id number]]  (Normal) Collected: 10/19/23 0109    Specimen: Blood Updated: 10/19/23 0149     Lactate 1.4 mmol/L     Hemoglobin & Hematocrit, Blood [[id number]]  (Abnormal) Collected: 10/19/23 0109    Specimen: Blood Updated: 10/19/23 0124     Hemoglobin 8.3 g/dL      Hematocrit 26.6 %     MRSA Screen, PCR (Inpatient) - Swab, Nares [[id number]]  (Normal) Collected: 10/18/23 2223    Specimen: Swab from Nares Updated: 10/19/23 0105     MRSA PCR No MRSA Detected    Narrative:      The negative predictive value of this diagnostic test is high and should only be used to consider de-escalating anti-MRSA therapy. A positive result may  indicate colonization with MRSA and must be correlated clinically.    CBC & Differential [[id number]]  (Abnormal) Collected: 10/18/23 2202    Specimen: Blood Updated: 10/18/23 2344    Narrative:      The following orders were created for panel order CBC & Differential.  Procedure                               Abnormality         Status                     ---------                               -----------         ------                     CBC Auto Differential[[id number]]        Abnormal            Final result               Scan Slide[[id number]]                                       Final result                 Please view results for these tests on the individual orders.    CBC Auto Differential [[id number]]  (Abnormal) Collected: 10/18/23 2202    Specimen: Blood Updated: 10/18/23 2344     WBC 8.10 10*3/mm3      RBC 3.14 10*6/mm3      Hemoglobin 8.4 g/dL      Hematocrit 26.7 %      MCV 85.1 fL      MCH 26.9 pg      MCHC 31.6 g/dL      RDW 18.1 %      RDW-SD 56.9 fl      MPV 8.9 fL      Platelets 172 10*3/mm3     Scan Slide [[id number]] Collected: 10/18/23 2202    Specimen: Blood Updated: 10/18/23 2344     Scan Slide --     Comment: See Manual Differential Results       Manual Differential [[id number]]  (Abnormal) Collected: 10/18/23 2202    Specimen: Blood Updated: 10/18/23 2344     Neutrophil % 82.0 %      Lymphocyte % 10.0 %      Monocyte % 6.0 %      Eosinophil % 1.0 %      Basophil % 1.0 %      Neutrophils Absolute 6.64 10*3/mm3      Lymphocytes Absolute 0.81 10*3/mm3      Monocytes Absolute 0.49 10*3/mm3      Eosinophils Absolute 0.08 10*3/mm3      Basophils Absolute 0.08 10*3/mm3      Poikilocytes Slight/1+     WBC Morphology Normal     Platelet Estimate Adequate    Urinalysis With Microscopic If Indicated (No Culture) - Urine, Clean Catch [[id number]]  (Abnormal) Collected: 10/18/23 2223    Specimen: Urine, Clean Catch Updated: 10/18/23 2339     Color, UA Red     Comment: Any Substance that causes an abnormal  urine color can alter the accuracy of the chemical reactions.        Appearance, UA Turbid     pH, UA 7.5     Specific Gravity, UA 1.025     Glucose, UA Negative     Ketones, UA Negative     Bilirubin, UA Negative     Blood, UA Large (3+)     Protein, UA >=300 mg/dL (3+)     Leuk Esterase, UA --     Comment: The Leukoesterase test cannot be performed due to the centrifugation of the specimen.           Nitrite, UA Negative     Urobilinogen, UA 0.2 E.U./dL    Urinalysis, Microscopic Only - Urine, Clean Catch [[id number]]  (Abnormal) Collected: 10/18/23 2223    Specimen: Urine, Clean Catch Updated: 10/18/23 2339     RBC, UA Too Numerous to Count /HPF      WBC, UA 21-50 /HPF      Bacteria, UA Trace /HPF      Squamous Epithelial Cells, UA None Seen /HPF      Hyaline Casts, UA 3-6 /LPF      Amorphous Crystals, UA Large/3+ /HPF      Methodology Automated Microscopy    Extra Tubes [[id number]] Collected: 10/18/23 2202    Specimen: Blood Updated: 10/18/23 2315    Narrative:      The following orders were created for panel order Extra Tubes.  Procedure                               Abnormality         Status                     ---------                               -----------         ------                     Gold Top - SST[[id number]]                                   Final result               Light Blue Top[[id number]]                                   Final result                 Please view results for these tests on the individual orders.    Gold Top - SST [[id number]] Collected: 10/18/23 2202    Specimen: Blood Updated: 10/18/23 2315     Extra Tube Hold for add-ons.     Comment: Auto resulted.       Light Blue Top [[id number]] Collected: 10/18/23 2202    Specimen: Blood Updated: 10/18/23 2315     Extra Tube Hold for add-ons.     Comment: Auto resulted       Protime-INR [[id number]]  (Abnormal) Collected: 10/18/23 2202    Specimen: Blood Updated: 10/18/23 2240     Protime 14.5 Seconds      INR 1.36    T4, Free [[id number]]   (Abnormal) Collected: 10/18/23 2051    Specimen: Blood Updated: 10/18/23 2238     Free T4 1.72 ng/dL     Narrative:      Results may be falsely increased if patient taking Biotin.      Magnesium [[id number]]  (Normal) Collected: 10/18/23 2202    Specimen: Blood Updated: 10/18/23 2238     Magnesium 2.1 mg/dL     STAT Lactic Acid, Reflex [[id number]]  (Abnormal) Collected: 10/18/23 2202    Specimen: Blood Updated: 10/18/23 2238     Lactate 2.9 mmol/L     Phosphorus [[id number]]  (Normal) Collected: 10/18/23 2051    Specimen: Blood Updated: 10/18/23 2233     Phosphorus 2.6 mg/dL     Comprehensive Metabolic Panel [[id number]]  (Abnormal) Collected: 10/18/23 2051    Specimen: Blood Updated: 10/18/23 2233     Glucose 103 mg/dL      BUN 18 mg/dL      Creatinine 0.92 mg/dL      Sodium 140 mmol/L      Potassium 3.1 mmol/L      Chloride 104 mmol/L      CO2 21.0 mmol/L      Calcium 7.0 mg/dL      Total Protein 5.0 g/dL      Albumin 2.9 g/dL      ALT (SGPT) 66 U/L      AST (SGOT) 195 U/L      Alkaline Phosphatase 72 U/L      Total Bilirubin 0.9 mg/dL      Globulin 2.1 gm/dL      A/G Ratio 1.4 g/dL      BUN/Creatinine Ratio 19.6     Anion Gap 15.0 mmol/L      eGFR 61.5 mL/min/1.73     Narrative:      GFR Normal >60  Chronic Kidney Disease <60  Kidney Failure <15    The GFR formula is only valid for adults with stable renal function between ages 18 and 70.    Potassium [[id number]]  (Abnormal) Collected: 10/18/23 2202    Specimen: Blood Updated: 10/18/23 2232     Potassium 2.8 mmol/L     POCT Electrolytes +HGB +HCT [[id number]]  (Abnormal) Collected: 10/18/23 2156    Specimen: Arterial Blood Updated: 10/18/23 2225     Sodium 139 mmol/L      POC Potassium 2.6 mmol/L      Ionized Calcium 1.02 mmol/L      Comment: Serial Number: [id number]:  [id number]        Glucose 96 mg/dL      Hematocrit 24 %      Hemoglobin 8.3 g/dL     POC Creatinine [[id number]] Collected: 10/18/23 2156    Specimen: Arterial Blood Updated: 10/18/23 2201      Creatinine 0.91 mg/dL      Comment: Serial Number: [id number]:  [id number]        eGFR 62.3 mL/min/1.73     Blood Gas, Arterial - [[id number]]  (Abnormal) Collected: 10/18/23 2156    Specimen: Arterial Blood Updated: 10/18/23 2201     Site Right Radial     Gentry's Test Positive     pH, Arterial 7.327 pH units      pCO2, Arterial 42.8 mm Hg      pO2, Arterial 61.1 mm Hg      HCO3, Arterial 22.4 mmol/L      Base Excess, Arterial -3.4 mmol/L      Comment: Serial Number: [id number]:  [id number]        O2 Saturation, Arterial 89.1 %      Barometric Pressure for Blood Gas --     Comment: N/A        Modality Cannula     FIO2 32 %      Hemodilution No    POC Lactate [[id number]]  (Normal) Collected: 10/18/23 2156    Specimen: Arterial Blood Updated: 10/18/23 2201     Lactate 1.8 mmol/L      Comment: Serial Number: [id number]:  [id number]       POC Glucose Once [[id number]]  (Normal) Collected: 10/18/23 2156    Specimen: Arterial Blood Updated: 10/18/23 2201     Glucose 96 mg/dL      Comment: Serial Number: [id number]:  [id number]       POC Glucose Once [[id number]]  (Normal) Collected: 10/18/23 2146    Specimen: Blood Updated: 10/18/23 2148     Glucose 90 mg/dL      Comment: Serial Number: [id number]:  [id number]       High Sensitivity Troponin T 2Hr [[id number]]  (Abnormal) Collected: 10/18/23 2051    Specimen: Blood Updated: 10/18/23 2135     HS Troponin T 19 ng/L      Troponin T Delta 2 ng/L     Narrative:      High Sensitive Troponin T Reference Range:  <10.0 ng/L- Negative Female for AMI  <15.0 ng/L- Negative Male for AMI  >=10 - Abnormal Female indicating possible myocardial injury.  >=15 - Abnormal Male indicating possible myocardial injury.   Clinicians would have to utilize clinical acumen, EKG, Troponin, and serial changes to determine if it is an Acute Myocardial Infarction or myocardial injury due to an underlying chronic condition.         POC Glucose Once [[id number]]  (Normal) Collected: 10/18/23 2052     Specimen: Blood Updated: 10/18/23 2054     Glucose 96 mg/dL      Comment: Serial Number: [id number]:  [id number]       Magnesium [[id number]]  (Abnormal) Collected: 10/18/23 1901    Specimen: Blood Updated: 10/18/23 1955     Magnesium 0.8 mg/dL     BNP [[id number]]  (Normal) Collected: 10/18/23 1901    Specimen: Blood Updated: 10/18/23 1951     proBNP 1,290.0 pg/mL     Narrative:      This assay is used as an aid in the diagnosis of individuals suspected of having heart failure. It can be used as an aid in the diagnosis of acute decompensated heart failure (ADHF) in patients presenting with signs and symptoms of ADHF to the emergency department (ED). In addition, NT-proBNP of <300 pg/mL indicates ADHF is not likely.    Age Range Result Interpretation  NT-proBNP Concentration (pg/mL:      <50             Positive            >450                   Gray                 300-450                    Negative             <300    50-75           Positive            >900                  Gray                300-900                  Negative            <300      >75             Positive            >1800                  Gray                300-1800                  Negative            <300    Procalcitonin [[id number]]  (Abnormal) Collected: 10/18/23 1901    Specimen: Blood Updated: 10/18/23 1951     Procalcitonin 93.69 ng/mL     Narrative:      As a Marker for Sepsis (Non-Neonates):    1. <0.5 ng/mL represents a low risk of severe sepsis and/or septic shock.  2. >2 ng/mL represents a high risk of severe sepsis and/or septic shock.    As a Marker for Lower Respiratory Tract Infections that require antibiotic therapy:    PCT on Admission    Antibiotic Therapy       6-12 Hrs later    >0.5                Strongly Recommended  >0.25 - <0.5        Recommended   0.1 - 0.25          Discouraged              Remeasure/reassess PCT  <0.1                Strongly Discouraged     Remeasure/reassess PCT    As 28 day mortality risk  "marker: \"Change in Procalcitonin Result\" (>80% or <=80%) if Day 0 (or Day 1) and Day 4 values are available. Refer to [email or web address]    Change in PCT <=80%  A decrease of PCT levels below or equal to 80% defines a positive change in PCT test result representing a higher risk for 28-day all-cause mortality of patients diagnosed with severe sepsis for septic shock.    Change in PCT >80%  A decrease of PCT levels of more than 80% defines a negative change in PCT result representing a lower risk for 28-day all-cause mortality of patients diagnosed with severe sepsis or septic shock.       High Sensitivity Troponin T [[id number]]  (Abnormal) Collected: 10/18/23 1901    Specimen: Blood Updated: 10/18/23 1951     HS Troponin T 17 ng/L     Narrative:      High Sensitive Troponin T Reference Range:  <10.0 ng/L- Negative Female for AMI  <15.0 ng/L- Negative Male for AMI  >=10 - Abnormal Female indicating possible myocardial injury.  >=15 - Abnormal Male indicating possible myocardial injury.   Clinicians would have to utilize clinical acumen, EKG, Troponin, and serial changes to determine if it is an Acute Myocardial Infarction or myocardial injury due to an underlying chronic condition.         TSH [[id number]]  (Abnormal) Collected: 10/18/23 1901    Specimen: Blood Updated: 10/18/23 1951     TSH 4.240 uIU/mL     Comprehensive Metabolic Panel [[id number]]  (Abnormal) Collected: 10/18/23 1901    Specimen: Blood Updated: 10/18/23 1947     Glucose 132 mg/dL      BUN 17 mg/dL      Creatinine 0.85 mg/dL      Sodium 142 mmol/L      Potassium 3.4 mmol/L      Chloride 105 mmol/L      CO2 21.0 mmol/L      Calcium 7.0 mg/dL      Total Protein 5.3 g/dL      Albumin 3.0 g/dL      ALT (SGPT) 29 U/L      AST (SGOT) 76 U/L      Alkaline Phosphatase 74 U/L      Total Bilirubin 0.7 mg/dL      Globulin 2.3 gm/dL      A/G Ratio 1.3 g/dL      BUN/Creatinine Ratio 20.0     Anion Gap 16.0 mmol/L      eGFR 67.7 mL/min/1.73     " Narrative:      GFR Normal >60  Chronic Kidney Disease <60  Kidney Failure <15    The GFR formula is only valid for adults with stable renal function between ages 18 and 70.    Lipid Panel [[id number]] Collected: 10/18/23 1901    Specimen: Blood Updated: 10/18/23 1947     Total Cholesterol 98 mg/dL      Triglycerides 81 mg/dL      HDL Cholesterol 54 mg/dL      LDL Cholesterol  28 mg/dL      VLDL Cholesterol 16 mg/dL      LDL/HDL Ratio 0.51    Narrative:      Cholesterol Reference Ranges  ([place] Department of Health and Human Services ATP III Classifications)    Desirable          <200 mg/dL  Borderline High    200-239 mg/dL  High Risk          >240 mg/dL      Triglyceride Reference Ranges  (U.S. Department of Health and Human Services ATP III Classifications)    Normal           <150 mg/dL  Borderline High  150-199 mg/dL  High             200-499 mg/dL  Very High        >500 mg/dL    HDL Reference Ranges  ([place].S. Department of Health and Human Services ATP III Classifications)    Low     <40 mg/dl (major risk factor for CHD)  High    >60 mg/dl ('negative' risk factor for CHD)        LDL Reference Ranges  (U.S. Department of Health and Human Services ATP III Classifications)    Optimal          <100 mg/dL  Near Optimal     100-129 mg/dL  Borderline High  130-159 mg/dL  High             160-189 mg/dL  Very High        >189 mg/dL    CK [[id number]]  (Normal) Collected: 10/18/23 1901    Specimen: Blood Updated: 10/18/23 1947     Creatine Kinase 96 U/L     Amylase [[id number]]  (Normal) Collected: 10/18/23 1901    Specimen: Blood Updated: 10/18/23 1947     Amylase 38 U/L     Phosphorus [[id number]]  (Normal) Collected: 10/18/23 1901    Specimen: Blood Updated: 10/18/23 1947     Phosphorus 2.9 mg/dL     Hemoglobin A1c [[id number]]  (Abnormal) Collected: 10/18/23 1904    Specimen: Blood Updated: 10/18/23 1946     Hemoglobin A1C 7.60 %     Lactic Acid, Plasma [[id number]]  (Abnormal) Collected: 10/18/23 1901    Specimen: Blood  Updated: 10/18/23 1944     Lactate 4.7 mmol/L     Calcium, Ionized [[id number]]  (Abnormal) Collected: 10/18/23 1904    Specimen: Blood Updated: 10/18/23 1933     Ionized Calcium 1.00 mmol/L     CBC & Differential [[id number]]  (Abnormal) Collected: 10/18/23 1904    Specimen: Blood Updated: 10/18/23 1920    Narrative:      The following orders were created for panel order CBC & Differential.  Procedure                               Abnormality         Status                     ---------                               -----------         ------                     CBC Auto Differential[[id number]]        Abnormal            Final result                 Please view results for these tests on the individual orders.    CBC Auto Differential [[id number]]  (Abnormal) Collected: 10/18/23 1904    Specimen: Blood Updated: 10/18/23 1920     WBC 6.30 10*3/mm3      RBC 3.48 10*6/mm3      Hemoglobin 9.5 g/dL      Hematocrit 30.3 %      MCV 87.1 fL      MCH 27.3 pg      MCHC 31.4 g/dL      RDW 18.2 %      RDW-SD 55.1 fl      MPV 9.3 fL      Platelets 251 10*3/mm3      Neutrophil % 89.8 %      Lymphocyte % 8.5 %      Monocyte % 0.3 %      Eosinophil % 1.2 %      Basophil % 0.2 %      Neutrophils, Absolute 5.60 10*3/mm3      Lymphocytes, Absolute 0.50 10*3/mm3      Monocytes, Absolute 0.00 10*3/mm3      Eosinophils, Absolute 0.10 10*3/mm3      Basophils, Absolute 0.00 10*3/mm3      nRBC 0.3 /100 WBC     Blood Culture - Blood, Arm, Left [[id number]] Collected: 10/18/23 1901    Specimen: Blood from Arm, Left Updated: 10/18/23 1910    Blood Culture - Blood, Hand, Left [[id number]] Collected: 10/18/23 1905    Specimen: Blood from Hand, Left Updated: 10/18/23 1910    STONE ANALYSIS - Calculus, Ureter, Left [[id number]] Collected: 10/18/23 1304    Specimen: Calculus from Ureter, Left Updated: 10/18/23 1829    POC Glucose Once [[id number]]  (Abnormal) Collected: 10/18/23 1742    Specimen: Blood Updated: 10/18/23 1743     Glucose 142 mg/dL       Comment: Serial Number: [id number]:  [id number]       POC Glucose Once [[id number]]  (Normal) Collected: 10/18/23 1400    Specimen: Blood Updated: 10/18/23 1402     Glucose 80 mg/dL      Comment: Serial Number: [id number]:  [id number]       POC Glucose Once [[id number]]  (Abnormal) Collected: 10/18/23 1137    Specimen: Blood Updated: 10/18/23 1139     Glucose 124 mg/dL      Comment: Serial Number: [id number]:  [id number]             Radiology:   Imaging Results (Last 72 Hours)       Procedure Component Value Units Date/Time    CT Head Without Contrast [[id number]] Collected: 10/19/23 0001     Updated: 10/19/23 0005    Narrative:      CT HEAD WO CONTRAST    Date of Exam: 10/18/2023 11:45 PM EDT    Indication: Altered mental status, nontraumatic (Ped 0-17y).    Comparison: 5/2/2023.    Technique: Axial CT images were obtained of the head without contrast administration.  Coronal reconstructions were performed.  Automated exposure control and iterative reconstruction methods were used.      Findings:  There is no evidence of hemorrhage. There is no mass effect or midline shift.    There is no extracerebral collection.    Ventricles are normal in size and configuration for patient's stated age.      Posterior fossa is within normal limits.    Calvarium and skull base appear intact.   Visualized sinuses show no air fluid levels. Mild mucosal thickening is present within the anterior ethmoid air cells on the left as well as the left maxillary sinus. Visualized orbits are unremarkable.      Impression:      Impression:  No evidence of hemorrhage, mass effect or midline shift. No acute process identified.        Electronically Signed: Brea Hui MD    10/19/2023 12:02 AM EDT    Workstation ID: [id number]    CT Abdomen Pelvis Without Contrast [[id number]] Collected: 10/18/23 2301     Updated: 10/18/23 2311    Narrative:      CT ABDOMEN PELVIS WO CONTRAST    Date of Exam: 10/18/2023 9:20 PM EDT    Indication:  Sepsis.    Comparison: 9/25/2023, 8/16/2023.    Technique: Axial CT images were obtained of the abdomen and pelvis without the administration of contrast. Sagittal and coronal reconstructions were performed.  Automated exposure control and iterative reconstruction methods were used.      Findings:  Lung Bases:     Mild patchy airspace changes are present within the bilateral lower lobes. No significant pleural effusion identified.    Limited evaluation of the solid organs due to lack of intravenous contrast.    Liver:  Liver is normal in size and CT density. No focal lesions.    Biliary/Gallbladder:    The gallbladder is normal without evidence of radiopaque stones. The biliary tree is nondilated.    Spleen:  Spleen is normal in size and CT density.    Pancreas:    Pancreas is normal. There is no evidence of pancreatic mass or peripancreatic fluid.    Kidneys:    Kidneys are normal in size. There is a left ureteral stent which appears in appropriate position. No evidence of hydronephrosis. A large fluid collection is seen along the posterior aspect of the left kidney measuring up to 7.9 x 10.1 cm (series 2 image   42). A subtle fluid level is present. A significant portion of the fluid is simple though there is a small hyperdense component present with Hounsfield units measuring 13.5. Stranding is seen surrounding the kidneys bilaterally which is new as compared   to the previous study.. A small simple cyst is seen arising from the inferior pole of the right kidney. No significant stones identified within the kidneys though tiny stones are likely present within the proximal collecting system of the left kidney   (series 2 image 55).. A small amount of hyperdense material is seen within the proximal collecting system on the left along the calyces..    Adrenals:    Adrenal glands are unremarkable.    Retroperitoneal/Lymph Nodes/Vasculature:    No retroperitoneal adenopathy is identified. Atherosclerotic calcifications  are present.    Gastrointestinal/Mesentery:    The bowel loops are non-dilated without wall thickening or mass. The appendix is not well visualized. No secondary findings of appendicitis.. No evidence of obstruction. No free air. No mesenteric fluid collections identified. No significant stool burden   identified.    Bladder:    The bladder appears distended. No wall thickening identified.     Genital:     Redemonstration of a lesion arising from the right adnexa, unchanged as compared to the previous study. No evidence of surrounding stranding.          Bony Structures:     Visualized bony structures are consistent with the patient's age. Degenerative changes are present within the spine with a levoscoliotic curvature. Degenerative changes are present within the bilateral hip joints.        Impression:      Impression:  1.There is a large cyst seen within the posterior aspect of the left kidney which was present on the previous exam from 8/16/2023 though appears to demonstrate a subtle fluid level on today's exam likely representing a new small amount of hemorrhage   within the cyst. Stranding is seen surrounding the kidneys bilaterally which is new as compared to the previous study which is new as compared to the previous study and may represent increased back pressure hemorrhage, or inflammatory change. Hemorrhage   is suspected given the amount of stranding on the left and presence of new hyperdensity within the left kidney. A small amount of hyperdensity is seen within the proximal collecting system of the left kidney consistent with a small amount of hemorrhage   with probable tiny stones. There is no evidence of hydronephrosis. A left ureteral stent is present which appears in appropriate position. The bladder appears distended which may be physiologic. Correlate for findings of bladder outlet obstruction.   2.Mild patchy airspace changes present within the bilateral lower lobes, likely related to  atelectasis. Pneumonia cannot be excluded.  3.Ancillary findings as described above.        Electronically Signed: Brea Hui MD    10/18/2023 11:08 PM EDT    Workstation ID: [id number]            Assessment/Plan:    Principal Problem:    Sepsis without acute organ dysfunction  Active Problems:    Coronary artery disease involving native coronary artery of native heart with unstable angina pectoris    Type 2 diabetes mellitus    Diabetic neuropathy    Essential hypertension    HLD (hyperlipidemia)    Postoperative hypothyroidism    Former smoker    History of thyroid cancer    Kidney stones    UTI (urinary tract infection), bacterial    Sepsis    Left renal calculi status post ureteroscopic management with stent exchange  Presumed urosepsis following the procedure    Plan  Continue antibiotics per primary  Await urine culture  Continue King catheter today  Following with you      Guillermo Morfin MD  10/19/2023  07:21 EDT

## 2023-10-19 NOTE — CONSULTS
Critical Care Consult Note   Pamela Zavala : 1940 MRN:[id number] LOS:0 ROOM: 2304/1     Reason for admission: Sepsis without acute organ dysfunction     Assessment / Plan     Septic Shock: ( WBC>12 or <4 or >10% bands, HR>90, Lactic acid>2, Change in mental status, and MAP<65 or SBP<90 )  -Likely 2/2 UTI, possibly following lithotripsy/stent placement  -Blood cultures pending  -UA pending  -Started on Levophed gtt  -Persistent hypotension in spite of adequate fluid resuscitation  -C/w additional fluids as needed. Avoid fluid overload  -Titrate vasopressors for a target MAP of 65  -Continue Merrem and vancomycin    Hypotension- multifactoral: Septic shock v. ?Hypovolemia 2/2 acute blood loss  Hemorrhage within the posterior aspect of left kidney  -S/p lithotripsy and stent placement for left large renal stone  -S/p staged procedure to clear remaining fragments 10/18/2023  -Hgb 11 -> 8.4; serial H&Hs until normalized  -Urology following; supportive measures per Hospitalist conversation  -CBI  -C/w IVFs    Hypokalemia  Hypocalcemia  Hypomagnesemia  -Electrolyte replacement per protocol  -Monitor for arrhythmias    Essential hypertension  -Patient currently hypotensive, requiring Levophed  -Resume home antihypertensives once clinically appropriate    Type 2 diabetes mellitus, controlled  -A1c 7.60  -Hold home metformin while in ICU  -Every 6 hours Accu-Cheks  -Sliding scale insulin for coverage    Hyperlipidemia  -Continue home rosuvastatin    Hypothyroidism  -Continue home levothyroxine    Medical Intervention Limits: NO intubation (DNI)  Code Status (Patient has no pulse and is not breathing): No CPR (Do Not Attempt to Resuscitate)  Medical Interventions (Patient has pulse or is breathing): Limited Support       Nutrition: Diet: Diabetic Diets; Consistent Carbohydrate; Texture: Regular Texture (IDDSI 7); Fluid Consistency: Thin (IDDSI 0) Patient isn't on Tube Feeding     DVT prophylaxis:  Medical DVT  "prophylaxis orders are present.     History of Present illness     A 82 y.o. old female patient with PMH of CAD, hypertension, hyperlipidemia hypothyroidism type 2 diabetes who presented to the hospital with large left renal stone after a lithotripsy and stent placement, and underwent a planned staged procedure to clear remaining fragments.  When in phase 2, patient had run of SVT refractory to esmolol and was treated with adenosine, and subsequently started on a Cardizem gtt.  Was admitted to the hospitalist group on PCU.  Cardiology was consulted.  She developed fever and sepsis work-up was initiated.  It is documented that patient wants CPR but did not want to be intubated.  On the evening of 10/18/2023 a rapid response was called as patient was unresponsive with hypotension.  He had gone down for CT abdomen pelvis, and upon return to her room she was unresponsive.  Labs were obtained and ABG showed hypoxia, as well as multiple electrolyte imbalances.  Patient was given fluid challenge, but remained hypotensive.  She was transferred to ICU for initiation of Levophed.  Subsequently CT abdomen pelvis was read and showed \"a large cyst seen within the posterior aspect of the left kidney which was present on the previous exam from 8/16/2023 though appears to demonstrate a subtle fluid level on today's exam likely representing a new small amount of hemorrhage within the cyst. Stranding is seen surrounding the kidneys bilaterally which is new as compared to the previous study which is new as compared to the previous study and may represent increased back pressure hemorrhage, or inflammatory change. Hemorrhage is suspected given the amount of stranding on the left and presence of new hyperdensity within the left kidney. A small amount of hyperdensity is seen within the proximal collecting system of the left kidney consistent with a small amount of hemorrhage   with probable tiny stones. There is no evidence of " "hydronephrosis. A left ureteral stent is present which appears in appropriate position\".  Urology was called by hospitalist, and suggested CBI and supportive measures at this time.    ACP: Patient has advanced directive on file at this facility.  Her family has elected to make her a DO NOT RESUSCITATE/DO NOT INTUBATE.    Patient was seen and examined on 10/18/23 at 23:36 EDT .    Subjective / Review of systems     Review of Systems   Unable to perform ROS: Acuity of condition        Past Medical/Surgical/Social/Family History & Allergies     Past Medical History:   Diagnosis Date    Anemia     Arthritis     CAD (coronary artery disease)     Chronic back pain     Diabetic neuropathy     Essential hypertension     GERD (gastroesophageal reflux disease)     History of gastric ulcer     History of stomach ulcers     HLD (hyperlipidemia)     Hypocalcemia     Hypoparathyroidism     Hypothyroidism     Insomnia     Kidney stones     Osteoporosis     Recurrent sinusitis     Renal disease     Thyroid cancer     Type 2 diabetes mellitus       Past Surgical History:   Procedure Laterality Date    CARDIAC CATHETERIZATION N/A 2023    Procedure: Left Heart Cath and coronary angiogram;  Surgeon: John Baldwin MD;  Location: [facility] CATH INVASIVE LOCATION;  Service: Cardiovascular;  Laterality: N/A;    CORONARY STENT PLACEMENT      x 3    HYSTERECTOMY      LUNG LOBECTOMY      THYROIDECTOMY        Social History     Socioeconomic History    Marital status:     Number of children: 2    Highest education level: 11th grade   Tobacco Use    Smoking status: Former     Packs/day: 2.00     Years: 30.00     Additional pack years: 0.00     Total pack years: 60.00     Types: Cigarettes     Quit date:      Years since quittin.8     Passive exposure: Past    Smokeless tobacco: Never   Vaping Use    Vaping Use: Never used   Substance and Sexual Activity    Alcohol use: Yes     Alcohol/week: 1.0 standard drink of alcohol     " Types: 1 Glasses of wine per week    Drug use: Defer    Sexual activity: Defer      Family History   Problem Relation Age of Onset    Diabetes Mother     Cancer Father     Heart disease Sister     Diabetes Sister     Heart disease Maternal Grandmother       Allergies   Allergen Reactions    Penicillins Unknown - High Severity     Lips swell and hives    Shellfish-Derived Products Unknown - Low Severity    Sulfamethizole Unknown - Low Severity    Trimethoprim Unknown - Low Severity        Home Medications     Prior to Admission medications    Medication Sig Start Date End Date Taking? Authorizing Provider   Accu-Chek Softclix Lancets lancets 1 each by Other route 3 (Three) Times a Day Before Meals. Dx: E11.65. Use as instructed 5/4/23  Yes Johanny Nagel MD   atenolol (TENORMIN) 50 MG tablet Take 1 tablet by mouth Daily. Take dos   Yes Andrade La MD   clopidogrel (PLAVIX) 75 MG tablet Take 1 tablet by mouth Daily.   Yes Andrade La MD   fluticasone (FLONASE) 50 MCG/ACT nasal spray 1 spray into the nostril(s) as directed by provider 2 (Two) Times a Day.   Yes Andrade La MD   gabapentin (NEURONTIN) 300 MG capsule Take 1 capsule by mouth Every Night.   Yes Andrade La MD   glucose blood (Accu-Chek Guide) test strip 1 each by Other route 3 (Three) Times a Day Before Meals. Dx: E11.65. Use as instructed 5/4/23  Yes Johanny Nagel MD   insulin degludec (Tresiba FlexTouch) 100 UNIT/ML solution pen-injector injection Inject 22 Units under the skin into the appropriate area as directed every night at bedtime. 9/12/23  Yes Driss Dan MD   Insulin Lispro 100 UNIT/ML solution cartridge Inject  under the skin into the appropriate area as directed 3 (Three) Times a Day Before Meals. Per sliding scale   Yes Andrade La MD   Insulin Lispro, 1 Unit Dial, (HumaLOG KwikPen) 100 UNIT/ML solution pen-injector Inject 7 Units under the skin into the appropriate area as directed 3  (Three) Times a Day. Inject 6 Units under the skin into the appropriate area as directed 3 (Three) Times a Day With Meals. 9/12/23  Yes Driss Dan MD   lactulose (CEPHULAC) 10 g packet Take 1 packet by mouth Daily.   Yes Andrade La MD   levothyroxine (Synthroid) 112 MCG tablet Take 1 tablet by mouth Daily. 9/12/23 9/11/24 Yes Driss Dan MD   lisinopril (PRINIVIL,ZESTRIL) 10 MG tablet Take 0.5 tablets by mouth Daily. Pt states she is taking 5mg 5/18/23  Yes Nuria Mcgraw MD   metFORMIN (GLUCOPHAGE) 1000 MG tablet Take 1 tablet by mouth 2 (Two) Times a Day With Meals.   Yes Andrade La MD   oxybutynin (DITROPAN) 5 MG tablet Take 1 tablet by mouth 2 (Two) Times a Day.   Yes Andrade La MD   oxyCODONE-acetaminophen (PERCOCET) 5-325 MG per tablet Take 1 tablet by mouth Daily As Needed for Severe Pain. 10/18/23  Yes Juan Alaniz MD   pantoprazole (PROTONIX) 40 MG EC tablet Take 1 tablet by mouth 2 (Two) Times a Day. Take dos   Yes Andrade La MD   rosuvastatin (CRESTOR) 5 MG tablet Take 1 tablet by mouth Daily.   Yes Andrade La MD   traMADol (ULTRAM) 50 MG tablet Take 1 tablet by mouth 4 (Four) Times a Day As Needed for Moderate Pain.   Yes Andrade La MD   traZODone (DESYREL) 50 MG tablet Take 1 tablet by mouth Every Night.   Yes Andrade La MD   oxyCODONE-acetaminophen (PERCOCET) 5-325 MG per tablet Take 1 tablet by mouth Daily As Needed.  10/18/23 Yes Andrade La MD   cefdinir (OMNICEF) 300 MG capsule Take 1 capsule by mouth 2 (Two) Times a Day. 10/18/23   Juan Alaniz MD        Objective / Physical Exam     Vital signs:  Temp: 97.7 °F (36.5 °C)  BP: (!) 74/41  Heart Rate: 81  Resp: 20  SpO2: 94 %  Weight: 62.5 kg (137 lb 12.6 oz)    Admission Weight: Weight: 59 kg (130 lb)    Physical Exam  Constitutional:       Appearance: She is toxic-appearing.   HENT:      Head: Normocephalic.      Nose: Nose normal.      Mouth/Throat:       Mouth: Mucous membranes are moist.   Eyes:      Extraocular Movements: Extraocular movements intact.      Pupils: Pupils are equal, round, and reactive to light.   Cardiovascular:      Rate and Rhythm: Normal rate and regular rhythm.      Pulses: Normal pulses.      Heart sounds: Normal heart sounds.   Pulmonary:      Effort: Pulmonary effort is normal.      Breath sounds: Decreased air movement present.   Abdominal:      General: Abdomen is flat. Bowel sounds are normal.      Palpations: Abdomen is soft.   Musculoskeletal:         General: Normal range of motion.   Skin:     General: Skin is warm.      Coloration: Skin is pale.   Neurological:      Mental Status: She is unresponsive.   Psychiatric:      Comments: Patient currently unresponsive          Labs     Results from last 7 days   Lab Units 10/18/23  2202 10/18/23  2156 10/18/23  1904   WBC 10*3/mm3 8.10  --  6.30   HEMATOCRIT % 26.7*  --  30.3*   HEMATOCRIT POC %  --  24*  --    PLATELETS 10*3/mm3 172  --  251      Results from last 7 days   Lab Units 10/18/23  2202 10/18/23  2156 10/18/23  2051 10/18/23  1901   SODIUM mmol/L  --   --  140 142   POTASSIUM mmol/L 2.8*  --  3.1* 3.4*   CHLORIDE mmol/L  --   --  104 105   CO2 mmol/L  --   --  21.0* 21.0*   BUN mg/dL  --   --  18 17   CREATININE mg/dL  --  0.91 0.92 0.85        Imaging     CT Abdomen Pelvis Without Contrast    Result Date: 10/18/2023  Impression: 1.There is a large cyst seen within the posterior aspect of the left kidney which was present on the previous exam from 8/16/2023 though appears to demonstrate a subtle fluid level on today's exam likely representing a new small amount of hemorrhage within the cyst. Stranding is seen surrounding the kidneys bilaterally which is new as compared to the previous study which is new as compared to the previous study and may represent increased back pressure hemorrhage, or inflammatory change. Hemorrhage is suspected given the amount of stranding on the  left and presence of new hyperdensity within the left kidney. A small amount of hyperdensity is seen within the proximal collecting system of the left kidney consistent with a small amount of hemorrhage with probable tiny stones. There is no evidence of hydronephrosis. A left ureteral stent is present which appears in appropriate position. The bladder appears distended which may be physiologic. Correlate for findings of bladder outlet obstruction. 2.Mild patchy airspace changes present within the bilateral lower lobes, likely related to atelectasis. Pneumonia cannot be excluded. 3.Ancillary findings as described above. Electronically Signed: Brea Hui MD  10/18/2023 11:08 PM EDT  Workstation ID: [id number]      Current Medications     Scheduled Meds:  enoxaparin, 40 mg, Subcutaneous, Daily  gabapentin, 300 mg, Oral, Nightly  insulin glargine, 15 Units, Subcutaneous, Nightly  insulin lispro, 2-7 Units, Subcutaneous, TID With Meals  lactulose, 10 g, Oral, BID  [START ON 10/19/2023] levothyroxine, 125 mcg, Oral, Daily  magnesium sulfate, 2 g, Intravenous, Q2H  meropenem, 1,000 mg, Intravenous, Once  [START ON 10/19/2023] meropenem, 1,000 mg, Intravenous, Q12H  Norepinephrine-Sodium Chloride, , ,   oxybutynin, 5 mg, Oral, BID  pantoprazole, 40 mg, Oral, BID  [START ON 10/19/2023] potassium chloride, 10 mEq, Intravenous, Q1H  rosuvastatin, 5 mg, Oral, Daily  senna-docusate sodium, 2 tablet, Oral, BID  sodium chloride, 10 mL, Intravenous, Q12H  traZODone, 50 mg, Oral, Nightly  [START ON 10/19/2023] vancomycin, 1,250 mg, Intravenous, Q24H         Continuous Infusions:  norepinephrine, 0.02-0.5 mcg/kg/min, Last Rate: 0.5 mcg/kg/min (10/18/23 2251)  Pharmacy to dose vancomycin,   sodium chloride, 150 mL/hr, Last Rate: 150 mL/hr (10/18/23 2236)      Patient continues to be critically ill, remains at risk of clinical deterioration or death and needed high complexity decision making. I have spent a total of 40 minutes  providing critical care services to this patient including but not limited to: review of labs/ microbiology/imaging/medications, serial monitoring of vital signs, review of other consultant's notes, review of events in the last 24 hrs, monitoring input/output, review of treatment plan with bedside nurse, RT and other treatment team, management of life support and nutrition needs. I also spoke with patient granddaughter about the plan of care and answered all questions.    Time spent in performing separately billable procedures and updating family is not included in the critical care time.        CLAUDE Kenyon   Critical Care  10/18/23   23:36 EDT

## 2023-10-19 NOTE — CASE MANAGEMENT/SOCIAL WORK
Discharge Planning Assessment   [place]     Patient Name: Pamela Zavala  MRN: [id number]  Today's Date: 10/19/2023    Admit Date: 10/18/2023    Plan: DC Plan: Returnt to [facility] [address] assisted [facility]. Patient current with [facility] and [facility] for DME.   Discharge Needs Assessment       Row Name 10/19/23 1010       Living Environment    People in Home other (see comments)  Assisted Living    Current Living Arrangements assisted living facility  [facility]    Potentially Unsafe Housing Conditions none    In the past 12 months has the electric, gas, oil, or water company threatened to shut off services in your home? No    Primary Care Provided by self;other (see comments)  and facility staff    Provides Primary Care For no one, unable/limited ability to care for self    Family Caregiver if Needed child([place]), adult    Family Caregiver Names Son Markus Zavala    Quality of Family Relationships supportive;involved    Able to Return to Prior Arrangements yes    Living Arrangement Comments Patient is agreeable to return to [facility] [address]       Resource/Environmental Concerns    Resource/Environmental Concerns none    Transportation Concerns none       Food Insecurity    Within the past 12 months, you worried that your food would run out before you got the money to buy more. Never true    Within the past 12 months, the food you bought just didn't last and you didn't have money to get more. Never true       Transition Planning    Patient/Family Anticipates Transition to home    Patient/Family Anticipated Services at Transition home health care    Transportation Anticipated family or friend will provide       Discharge Needs Assessment    Readmission Within the Last 30 Days no previous admission in last 30 days    Current Outpatient/Agency/Support Group homecare agency;assisted living facility    Equipment Currently Used at Home rollator;cane, straight;shower chair    Concerns to be Addressed discharge planning     Anticipated Changes Related to Illness none    Equipment Needed After Discharge lift device  patient requesting a lift chair and states her  with [facility] is trying to help to get her one.    Outpatient/Agency/Support Group Needs outpatient therapy    Discharge Facility/Level of Care Needs home with home health    Provided Post Acute Provider List? N/A    Provided Post Acute Provider Quality & Resource List? N/A    Patient's Choice of Community Agency(s) Patient is current with [facility]    Current Discharge Risk physical impairment;other (see comments)  Regular ETOH consumption - 4 glasses per day reported by nursing.                   Discharge Plan       Row Name 10/19/23 1013       Plan    Plan DC Plan: Returnt to [facility] [address] assisted [facility]. Patient current with [facility] and [facility] for DME.    Patient/Family in Agreement with Plan yes    Provided Post Acute Provider List? N/A    Provided Post Acute Provider Quality & Resource List? N/A    Plan Comments CM spoke with patient at bedside to discuss admission assessment and discharge planning. Patient confirms PCP and pharmacy. Patient confirms she is agreeable to enrolling in meds to bed program. CM enrolled patient and updated pharmacy in [employer]. Patient denies any difficulty affording medications at this time. Patient expressed she is working with her HH representative and PCP to obtain a lift chair currently. Patient states her son will provide transportation back to assisted living.CM will continue to follow for any further needs and adjust discharge plan accordingly. DC Barriers:O2@2L nc, PICC, Levophed gtt, Neosynepherine gtt, IVF's, IV abx x2, continuous bladder irrigation, CIWA, and pending cultures.               Expected Discharge Date and Time       Expected Discharge Date Expected Discharge Time    Oct 24, 2023            Demographic Summary       Row Name 10/19/23 1008       General Information    Admission Type inpatient     Arrived From operating room;home    Required Notices Provided Important Message from Medicare;Observation Status Notice    Referral Source admission list    Reason for Consult discharge planning    Preferred Language English       Contact Information    Permission Granted to Share Info With                    Functional Status       Row Name 10/19/23 1008       Functional Status    Usual Activity Tolerance moderate    Current Activity Tolerance other (see comments)  CHAO       Physical Activity    On average, how many days per week do you engage in moderate to strenuous exercise (like a brisk walk)? 2 days    On average, how many minutes do you engage in exercise at this level? 30 min    Number of minutes of exercise per week 60       Functional Status, IADL    Medications independent    Meal Preparation assistive person    Housekeeping assistive person    Laundry assistive person    Shopping assistive equipment and person    IADL Comments Lives at [facility] [address] assisted [facility]       Mental Status    General Appearance WDL WDL       Mental Status Summary    Recent Changes in Mental Status/Cognitive Functioning no changes       Employment/    Employment Status retired    Current or Previous Occupation service industry           Current or Previous  Service none               Sakina Gaviria RN     Office Phone: [phone]  Office Cell:     [phone]

## 2023-10-19 NOTE — THERAPY EVALUATION
Patient Name: Pamela Zavala  : 1940    MRN: [id number]                              Today's Date: 10/19/2023       Admit Date: 10/18/2023    Visit Dx:     ICD-10-CM ICD-9-CM   1. Ureter, calculus  N20.1 592.1     Patient Active Problem List   Diagnosis    Chest pain    Coronary arteriosclerosis    Type 2 diabetes mellitus    Diabetic neuropathy    Essential hypertension    Hyperlipidemia    Postoperative hypothyroidism    Thallium stress test abnormal    Ex-smoker    Chest pain, unspecified type    History of thyroid cancer    Sepsis without acute organ dysfunction    Kidney stones    UTI (urinary tract infection), bacterial    Sepsis     Past Medical History:   Diagnosis Date    Anemia     Arthritis     CAD (coronary artery disease)     Chronic back pain     Diabetic neuropathy     Essential hypertension     GERD (gastroesophageal reflux disease)     History of gastric ulcer     History of stomach ulcers     HLD (hyperlipidemia)     Hypocalcemia     Hypoparathyroidism     Hypothyroidism     Insomnia     Kidney stones     Osteoporosis     Recurrent sinusitis     Renal disease     Thyroid cancer     Type 2 diabetes mellitus      Past Surgical History:   Procedure Laterality Date    CARDIAC CATHETERIZATION N/A 2023    Procedure: Left Heart Cath and coronary angiogram;  Surgeon: John Baldwin MD;  Location: [facility] INVASIVE LOCATION;  Service: Cardiovascular;  Laterality: N/A;    CORONARY STENT PLACEMENT      x 3    CYSTOSCOPY, URETEROSCOPY, RETROGRADE PYELOGRAM, STENT INSERTION Left 10/18/2023    Procedure: CYSTOSCOPY URETEROSCOPY RETROGRADE PYELOGRAM HOLMIUM LASER STENT INSERTION;  Surgeon: Juan Alaniz MD;  Location: [facility] OR;  Service: Urology;  Laterality: Left;    HYSTERECTOMY      LUNG LOBECTOMY      THYROIDECTOMY        General Information       Row Name 10/19/23 1547          OT Time and Intention    Document Type evaluation  -LS     Mode of Treatment occupational therapy  -LS        Row Name 10/19/23 1547          General Information    Patient Profile Reviewed yes  -LS     Prior Level of Function independent:;ADL's;all household mobility  Assisted living  -     Existing Precautions/Restrictions fall  -     Barriers to Rehab medically complex;cognitive status  -       Row Name 10/19/23 1547          Living Environment    People in Home other (see comments)  Assisted living  -       Row Name 10/19/23 1547          Cognition    Orientation Status (Cognition) oriented to;person;place;situation;verbal cues/prompts needed for orientation;time  -       Row Name 10/19/23 1547          Safety Issues, Functional Mobility    Safety Issues Affecting Function (Mobility) awareness of need for assistance;judgment;problem-solving  -     Impairments Affecting Function (Mobility) balance;endurance/activity tolerance;strength  -               User Key  (r) = Recorded By, (t) = Taken By, (c) = Cosigned By      Initials Name Provider Type    LS Sánchez Yeboah OT Occupational Therapist                     Mobility/ADL's       Row Name 10/19/23 1549          Bed Mobility    Bed Mobility bed mobility (all) activities  -     All Activities, [place] (Bed Mobility) minimum assist (75% patient effort)  -     Assistive Device (Bed Mobility) head of bed elevated;bed rails  -       Row Name 10/19/23 1549          Transfers    Transfers sit-stand transfer;bed-chair transfer  -       Row Name 10/19/23 1549          Bed-Chair Transfer    Bed-Chair [place] (Transfers) moderate assist (50% patient effort)  -       Row Name 10/19/23 1549          Sit-Stand Transfer    Sit-Stand [place] (Transfers) minimum assist (75% patient effort)  -       Row Name 10/19/23 1549          Functional Mobility    Functional Mobility- Ind. Level moderate assist (50% patient effort)  -       Row Name 10/19/23 1549          Activities of Daily Living    BADL Assessment/Intervention lower body dressing  -        Row Name 10/19/23 1549          Lower Body Dressing Assessment/Training    [place] Level (Lower Body Dressing) doff;don;socks;maximum assist (25% patient effort)  -LS     Position (Lower Body Dressing) edge of bed sitting  -LS               User Key  (r) = Recorded By, (t) = Taken By, (c) = Cosigned By      Initials Name Provider Type    LS Sánchez Yeboah OT Occupational Therapist                   Obj/Interventions       Row Name 10/19/23 1551          Sensory Assessment (Somatosensory)    Sensory Assessment BUE and BLE neuropathy  -       Row Name 10/19/23 1551          Range of Motion Comprehensive    General Range of Motion bilateral upper extremity ROM WFL  -       Row Name 10/19/23 1551          Strength Comprehensive (MMT)    Comment, General Manual Muscle Testing (MMT) Assessment BUEs grossly 3/5  -       Row Name 10/19/23 1551          Balance    Balance Assessment sitting static balance;sitting dynamic balance;standing static balance;standing dynamic balance  -LS     Static Sitting Balance standby assist  -LS     Dynamic Sitting Balance contact guard  -LS     Position, Sitting Balance sitting edge of bed  -LS     Static Standing Balance minimal assist  -LS     Dynamic Standing Balance moderate assist  -LS     Position/Device Used, Standing Balance supported  -LS               User Key  (r) = Recorded By, (t) = Taken By, (c) = Cosigned By      Initials Name Provider Type     Sánchez Yeboah OT Occupational Therapist                   Goals/Plan       Row Name 10/19/23 9365          Bed Mobility Goal 1 (OT)    Activity/Assistive Device (Bed Mobility Goal 1, OT) bed mobility activities, all  -LS     [place] Level/Cues Needed (Bed Mobility Goal 1, OT) supervision required  -     Time Frame (Bed Mobility Goal 1, OT) long term goal (LTG);2 weeks  -       Row Name 10/19/23 1553          Transfer Goal 1 (OT)    Activity/Assistive Device (Transfer Goal 1, OT)  sit-to-stand/stand-to-sit;bed-to-chair/chair-to-bed;commode  -LS     [place] Level/Cues Needed (Transfer Goal 1, OT) supervision required  -LS     Time Frame (Transfer Goal 1, OT) long term goal (LTG);2 weeks  -       Row Name 10/19/23 6573          Dressing Goal 1 (OT)    Activity/Device (Dressing Goal 1, OT) dressing skills, all  -LS     [place]/Cues Needed (Dressing Goal 1, OT) supervision required  -LS     Time Frame (Dressing Goal 1, OT) 2 weeks;long term goal (LTG)  -       Row Name 10/19/23 8013          Toileting Goal 1 (OT)    Activity/Device (Toileting Goal 1, OT) adjust/manage clothing;perform perineal hygiene  -LS     [place] Level/Cues Needed (Toileting Goal 1, OT) modified independence  -LS     Time Frame (Toileting Goal 1, OT) long term goal (LTG);2 weeks  -       Row Name 10/19/23 1686          Therapy Assessment/Plan (OT)    Planned Therapy Interventions (OT) activity tolerance training;transfer/mobility retraining;patient/caregiver education/training;IADL retraining;occupation/activity based interventions;ROM/therapeutic exercise;strengthening exercise;neuromuscular control/coordination retraining  -               User Key  (r) = Recorded By, (t) = Taken By, (c) = Cosigned By      Initials Name Provider Type    Sánchez Montgomery OT Occupational Therapist                   Clinical Impression       Row Name 10/19/23 1961          Pain Assessment    Additional Documentation Pain Scale: FACES Pre/Post-Treatment (Group)  -       Row Name 10/19/23 6834          Pain Scale: FACES Pre/Post-Treatment    Pain: FACES Scale, Pretreatment 0-->no hurt  -LS     Posttreatment Pain Rating 0-->no hurt  -       Row Name 10/19/23 7050          Plan of Care Review    Plan of Care Reviewed With patient  -LS     Outcome Evaluation Pamela Zavala is a 82 y.o. female who was found to have large left renal stone status post lithotripsy and stent placement here for planned staged procedure to clear  remaining fragments. She has a PMH including CAD, hypertension, hyperlipidemia, hypothyroidism, and type 2 diabetes. According to pt's son, she has good days and bad days with her memory loss. Patient is from assisted living and states she normally does everything herself and the AL helps with medications. She does use a rollator. After procedure, pt admitted to PCU level however became unresponsive and rapid response was called. Per Dr. Vergara, pt appeared to be in septic shock and was transferred to ICU. This date, pt oriented x3, with moments of confusion noted. She required Min A to come to EOB sitting, then Max A for lower body dressing. She has CBI as well as mutiple IV lines running, requiring skilled hands of two therapists for safety this date. Min A required to come to standing, then Mod A with verbal cues to transfer from bed to chair. She impulsively reached for unstable items for support several times requiring cues to hold onto therapist. With multiple lines and tubes, unable to utilize a walker with the limited space, however would certainly benefit from using. OT will follow, recommending SNF prior to return to [facility].  -       Row Name 10/19/23 1551          Therapy Assessment/Plan (OT)    Rehab Potential (OT) good, to achieve stated therapy goals  -     Criteria for Skilled Therapeutic Interventions Met (OT) yes;skilled treatment is necessary  -     Therapy Frequency (OT) 3 times/wk  -     Predicted Duration of Therapy Intervention (OT) until dc  -       Row Name 10/19/23 6881          Therapy Plan Review/Discharge Plan (OT)    Anticipated Discharge Disposition (OT) skilled nursing facility  -       Row Name 10/19/23 2321          Vital Signs    Pre SpO2 (%) 98  3L  -LS     O2 Delivery Pre Treatment nasal cannula  -LS     Intra SpO2 (%) 91  -LS     O2 Delivery Intra Treatment nasal cannula  -LS     Post SpO2 (%) 94  -LS     O2 Delivery Post Treatment nasal cannula  -LS     Pre Patient  Position Supine  -LS     Intra Patient Position Standing  -LS     Post Patient Position Sitting  -LS       Row Name 10/19/23 1551          Positioning and Restraints    Pre-Treatment Position in bed  -LS     Post Treatment Position chair  -LS     In Chair notified nsg;reclined;call light within reach;encouraged to call for assist;exit alarm on;with family/caregiver  -               User Key  (r) = Recorded By, (t) = Taken By, (c) = Cosigned By      Initials Name Provider Type    Sánchez Montgomery OT Occupational Therapist                   Outcome Measures       Row Name 10/19/23 1553          How much help from another is currently needed...    Putting on and taking off regular lower body clothing? 2  -LS     Bathing (including washing, rinsing, and drying) 2  -LS     Toileting (which includes using toilet bed pan or urinal) 1  -LS     Putting on and taking off regular upper body clothing 2  -LS     Taking care of personal grooming (such as brushing teeth) 2  -LS     Eating meals 4  -LS     AM-PAC 6 Clicks Score (OT) 13  -       Row Name 10/19/23 1553          Functional Assessment    Outcome Measure Options AM-PAC 6 Clicks Daily Activity (OT)  -               User Key  (r) = Recorded By, (t) = Taken By, (c) = Cosigned By      Initials Name Provider Type    Sánchez Montgomery OT Occupational Therapist                    Occupational Therapy Education       Title: PT OT SLP Therapies (Done)       Topic: Occupational Therapy (Done)       Point: ADL training (Done)       Description:   Instruct learner(s) on proper safety adaptation and remediation techniques during self care or transfers.   Instruct in proper use of assistive devices.                  Learning Progress Summary             Patient Acceptance, E,TB, VU by  at 10/19/2023 1554                                         User Key       Initials Effective Dates Name Provider Type [facility] 09/22/22 -  Sánchez Yeboah OT Occupational Therapist OT                   OT Recommendation and Plan  Planned Therapy Interventions (OT): activity tolerance training, transfer/mobility retraining, patient/caregiver education/training, IADL retraining, occupation/activity based interventions, ROM/therapeutic exercise, strengthening exercise, neuromuscular control/coordination retraining  Therapy Frequency (OT): 3 times/wk  Plan of Care Review  Plan of Care Reviewed With: patient  Outcome Evaluation: Pamela Zavala is a 82 y.o. female who was found to have large left renal stone status post lithotripsy and stent placement here for planned staged procedure to clear remaining fragments. She has a PMH including CAD, hypertension, hyperlipidemia, hypothyroidism, and type 2 diabetes. According to pt's son, she has good days and bad days with her memory loss. Patient is from assisted living and states she normally does everything herself and the AL helps with medications. She does use a rollator. After procedure, pt admitted to PCU level however became unresponsive and rapid response was called. Per Dr. Vergara, pt appeared to be in septic shock and was transferred to ICU. This date, pt oriented x3, with moments of confusion noted. She required Min A to come to EOB sitting, then Max A for lower body dressing. She has CBI as well as mutiple IV lines running, requiring skilled hands of two therapists for safety this date. Min A required to come to standing, then Mod A with verbal cues to transfer from bed to chair. She impulsively reached for unstable items for support several times requiring cues to hold onto therapist. With multiple lines and tubes, unable to utilize a walker with the limited space, however would certainly benefit from using. OT will follow, recommending SNF prior to return to [facility].     Time Calculation:         Time Calculation- OT       Row Name 10/19/23 1558             Time Calculation- OT    OT Start Time 1010  -LS      OT Stop Time 1042  -LS      OT Time  Calculation (min) 32 min  -LS      Total Timed Code Minutes- OT 10 minute(s)  -LS      OT Received On 10/19/23  -      OT - Next Appointment 10/23/23  -      OT Goal Re-Cert Due Date 11/02/23  -         Timed Charges    48557 - OT Self Care/Mgmt Minutes 10  -LS         Untimed Charges    OT Eval/Re-eval Minutes 22  -LS         Total Minutes    Timed Charges Total Minutes 10  -LS      Untimed Charges Total Minutes 22  -LS       Total Minutes 32  -LS                User Key  (r) = Recorded By, (t) = Taken By, (c) = Cosigned By      Initials Name Provider Type     Sánchez Yeboah OT Occupational Therapist                  Therapy Charges for Today       Code Description Service Date Service Provider Modifiers Qty    [id number] HC OT SELF CARE/MGMT/TRAIN EA 15 MIN 10/19/2023 Sánchez Yeboah OT GO 1    [id number]  OT EVAL MOD COMPLEXITY 4 10/19/2023 Sánchez Yeboah OT GO 1                 Sánchez Yeboah OT  10/19/2023

## 2023-10-19 NOTE — PLAN OF CARE
Goal Outcome Evaluation:  Plan of Care Reviewed With: patient           Outcome Evaluation: Pamela Zavala is a 82 y.o. female who was found to have large left renal stone status post lithotripsy and stent placement here for planned staged procedure to clear remaining fragments. She has a PMH including CAD, hypertension, hyperlipidemia, hypothyroidism, and type 2 diabetes. According to pt's son, she has good days and bad days with her memory loss. Patient is from assisted living and states she normally does everything herself and the AL helps with medications. She does use a rollator. After procedure, pt admitted to PCU level however became unresponsive and rapid response was called. Per Dr. Vergara, pt appeared to be in septic shock and was transferred to ICU. This date, pt oriented x3, with moments of confusion noted. She required Min A to come to EOB sitting, then Max A for lower body dressing. She has CBI as well as mutiple IV lines running, requiring skilled hands of two therapists for safety this date. Min A required to come to standing, then Mod A with verbal cues to transfer from bed to chair. She impulsively reached for unstable items for support several times requiring cues to hold onto therapist. With multiple lines and tubes, unable to utilize a walker with the limited space, however would certainly benefit from using. OT will follow, recommending SNF prior to return to [facility].      Anticipated Discharge Disposition (OT): skilled nursing facility

## 2023-10-19 NOTE — PROGRESS NOTES
"Pharmacy Antimicrobial Dosing Service    Subjective:  Pamela Zavala is a 82 y.o.female admitted s/p lithotripsy and stent placement here for planned staged procedure to clear remaining fragments . Pharmacy has been consulted to dose Vancomycin for possible sepsis.    PMH: T2DM      Assessment/Plan    1. Day #1 Vancomycin: Goal -600 mcg*h/mL. 1250mg (~20mg/kg ABW) IV x1 dose followed by 1000mg (~16mg/kg ABW) IV q24h.  Random level ordered for 10/20 at 1200.    2. Day #2 Meropenem: 1000mg IV q12h for estCrCl 26-49 mL/min.    Will continue to monitor drug levels, renal function, culture and sensitivities, and patient clinical status.       Objective:  Relevant clinical data and objective history reviewed:  167.6 cm (66\")   62.5 kg (137 lb 12.6 oz)   Ideal body weight: 59.3 kg (130 lb 11.7 oz)  Adjusted ideal body weight: 60.6 kg (133 lb 8.9 oz)  Body mass index is 22.24 kg/m².        Results from last 7 days   Lab Units 10/18/23  2156 10/18/23  2051 10/18/23  1901   CREATININE mg/dL 0.91 0.92 0.85     Estimated Creatinine Clearance: 45.4 mL/min (by C-G formula based on SCr of 0.91 mg/dL).  I/O last 3 completed shifts:  In: 600 [I.V.:600]  Out: -     Results from last 7 days   Lab Units 10/18/23  2202 10/18/23  1904   WBC 10*3/mm3 8.10 6.30     Temperature    10/18/23 1900 10/18/23 2215 10/19/23 0000   Temp: 98.6 °F (37 °C) 97.7 °F (36.5 °C) 97.6 °F (36.4 °C)     Baseline culture/source/susceptibility:  Microbiology Results (last 10 days)       Procedure Component Value - Date/Time    MRSA Screen, PCR (Inpatient) - Swab, Nares [[id number]]  (Normal) Collected: 10/18/23 2223    Lab Status: Final result Specimen: Swab from Nares Updated: 10/19/23 0105     MRSA PCR No MRSA Detected    Narrative:      The negative predictive value of this diagnostic test is high and should only be used to consider de-escalating anti-MRSA therapy. A positive result may indicate colonization with MRSA and must be correlated clinically. "            Henri Altman  10/19/23 02:23 EDT

## 2023-10-19 NOTE — PLAN OF CARE
Rapid response called around 2000 due to patient non-responsiveness.  Appears to be in Septic shock. Transfer to ICU    #Septic Shock  #Acute Hypoxic Respiratory Failure  #Hypokalemia  #Hypomagnesemia  #A. Fib    - POD #0 ureteroscopy and lithotripsy with stent replacement by urology  - suspect urosepsis  - BP 70/40, 1L down on sepsis bolus that is ordered  - lactic 4.7 > 1.8, BP remains soft  - Despite fluids patient hypotensive at 70/45mmhg with minimal responsiveness  - procal 93.69  - stop cardizem drip, rate controlled  - Vanc ordered stat, pharm to dose  - Merrem ordered stat, pharm to dose  - Continue to replace K and mag  - constricted right pupil, CT head stat  - ABG shows hypoxia, O2 increased by RT  - CT a/p recently done, results peding  - ordered CBC, CMP, lactic, mag, phos, pt/INR, and UA   - rema blood came out with UA per ICU team  - hgb 8.4, base around 11  - type and screen  - urology following  - Patient to be moved to ICU and started on pressors along with fluid  - Case discussed with ICU team and family at bedside who agree with plan, patient DNR/ DNI per daughter at bedside  - called down to CT to have CT a/p done earlier read as stat which showed hemorrhage  - Case discussed with urology who recommended supportive care and 3 way chapa with CBI, no acute intervention necessary. Orders placed    35 minutes critical care time spent dealing with patient    Electronically signed by Ursula Durham DO, 10/18/23, 10:36 PM EDT.

## 2023-10-19 NOTE — CODE DOCUMENTATION
Rapid response called for decreased LOC and hypotension. Patient receiving sepsis fluid bolus on rapid response team's arrival. Patient unresponsive to pain. ABG obtained and labs drawn. Patient moved to ICU for vasopressors. Family at bedside, agreeable to plan of care.

## 2023-10-20 LAB
ANION GAP SERPL CALCULATED.3IONS-SCNC: 8 MMOL/L (ref 5–15)
BACTERIA SPEC AEROBE CULT: NO GROWTH
BH BB BLOOD EXPIRATION DATE: NORMAL
BH BB BLOOD TYPE BARCODE: 5100
BH BB DISPENSE STATUS: NORMAL
BH BB PRODUCT CODE: NORMAL
BH BB UNIT NUMBER: NORMAL
BUN SERPL-MCNC: 30 MG/DL (ref 8–23)
BUN/CREAT SERPL: 31.9 (ref 7–25)
BURR CELLS BLD QL SMEAR: ABNORMAL
CALCIUM SPEC-SCNC: 7.1 MG/DL (ref 8.6–10.5)
CHLORIDE SERPL-SCNC: 122 MMOL/L (ref 98–107)
CO2 SERPL-SCNC: 18 MMOL/L (ref 22–29)
CREAT SERPL-MCNC: 0.94 MG/DL (ref 0.57–1)
CROSSMATCH INTERPRETATION: NORMAL
DEPRECATED RDW RBC AUTO: 56.9 FL (ref 37–54)
DOHLE BOD BLD QL SMEAR: PRESENT
EGFRCR SERPLBLD CKD-EPI 2021: 60 ML/MIN/1.73
ERYTHROCYTE [DISTWIDTH] IN BLOOD BY AUTOMATED COUNT: 18.7 % (ref 12.3–15.4)
GLUCOSE BLDC GLUCOMTR-MCNC: 106 MG/DL (ref 70–105)
GLUCOSE BLDC GLUCOMTR-MCNC: 108 MG/DL (ref 70–105)
GLUCOSE BLDC GLUCOMTR-MCNC: 202 MG/DL (ref 70–105)
GLUCOSE BLDC GLUCOMTR-MCNC: 68 MG/DL (ref 70–105)
GLUCOSE BLDC GLUCOMTR-MCNC: 92 MG/DL (ref 70–105)
GLUCOSE SERPL-MCNC: 94 MG/DL (ref 65–99)
HCT VFR BLD AUTO: 29.6 % (ref 34–46.6)
HCT VFR BLD AUTO: 32.8 % (ref 34–46.6)
HGB BLD-MCNC: 10.6 G/DL (ref 12–15.9)
HGB BLD-MCNC: 9.8 G/DL (ref 12–15.9)
LAB AP CASE REPORT: NORMAL
LYMPHOCYTES # BLD MANUAL: 1 10*3/MM3 (ref 0.7–3.1)
LYMPHOCYTES NFR BLD MANUAL: 1 % (ref 5–12)
MCH RBC QN AUTO: 29.5 PG (ref 26.6–33)
MCHC RBC AUTO-ENTMCNC: 33.2 G/DL (ref 31.5–35.7)
MCV RBC AUTO: 89 FL (ref 79–97)
METAMYELOCYTES NFR BLD MANUAL: 19 % (ref 0–0)
MONOCYTES # BLD: 0.25 10*3/MM3 (ref 0.1–0.9)
NEUTROPHILS # BLD AUTO: 18.92 10*3/MM3 (ref 1.7–7)
NEUTROPHILS NFR BLD MANUAL: 64 % (ref 42.7–76)
NEUTS BAND NFR BLD MANUAL: 12 % (ref 0–5)
NEUTS VAC BLD QL SMEAR: ABNORMAL
PATH REPORT.FINAL DX SPEC: NORMAL
PATHOLOGY REVIEW: YES
PLATELET # BLD AUTO: 127 10*3/MM3 (ref 140–450)
PMV BLD AUTO: 9.7 FL (ref 6–12)
POIKILOCYTOSIS BLD QL SMEAR: ABNORMAL
POTASSIUM SERPL-SCNC: 4.2 MMOL/L (ref 3.5–5.2)
QTC INTERVAL: NORMAL MS
RBC # BLD AUTO: 3.33 10*6/MM3 (ref 3.77–5.28)
SCAN SLIDE: NORMAL
SMALL PLATELETS BLD QL SMEAR: ABNORMAL
SODIUM SERPL-SCNC: 148 MMOL/L (ref 136–145)
TOXIC GRANULATION: ABNORMAL
UNIT  ABO: NORMAL
UNIT  RH: NORMAL
VARIANT LYMPHS NFR BLD MANUAL: 4 % (ref 19.6–45.3)
WBC NRBC COR # BLD: 24.9 10*3/MM3 (ref 3.4–10.8)

## 2023-10-20 PROCEDURE — 85025 COMPLETE CBC W/AUTO DIFF WBC: CPT | Performed by: INTERNAL MEDICINE

## 2023-10-20 PROCEDURE — 25010000002 CALCIUM GLUCONATE 2-0.675 GM/100ML-% SOLUTION: Performed by: NURSE PRACTITIONER

## 2023-10-20 PROCEDURE — 85018 HEMOGLOBIN: CPT | Performed by: STUDENT IN AN ORGANIZED HEALTH CARE EDUCATION/TRAINING PROGRAM

## 2023-10-20 PROCEDURE — P9047 ALBUMIN (HUMAN), 25%, 50ML: HCPCS | Performed by: NURSE PRACTITIONER

## 2023-10-20 PROCEDURE — 25010000002 THIAMINE HCL 200 MG/2ML SOLUTION: Performed by: NURSE PRACTITIONER

## 2023-10-20 PROCEDURE — 25010000002 CEFTRIAXONE PER 250 MG: Performed by: INTERNAL MEDICINE

## 2023-10-20 PROCEDURE — 85014 HEMATOCRIT: CPT | Performed by: STUDENT IN AN ORGANIZED HEALTH CARE EDUCATION/TRAINING PROGRAM

## 2023-10-20 PROCEDURE — 93010 ELECTROCARDIOGRAM REPORT: CPT | Performed by: INTERNAL MEDICINE

## 2023-10-20 PROCEDURE — 25010000002 ALBUMIN HUMAN 25% PER 50 ML: Performed by: NURSE PRACTITIONER

## 2023-10-20 PROCEDURE — 85007 BL SMEAR W/DIFF WBC COUNT: CPT | Performed by: INTERNAL MEDICINE

## 2023-10-20 PROCEDURE — 82948 REAGENT STRIP/BLOOD GLUCOSE: CPT

## 2023-10-20 PROCEDURE — 25810000003 DEXTROSE-NACL PER 500 ML: Performed by: NURSE PRACTITIONER

## 2023-10-20 PROCEDURE — 80048 BASIC METABOLIC PNL TOTAL CA: CPT | Performed by: INTERNAL MEDICINE

## 2023-10-20 PROCEDURE — 25810000003 SODIUM CHLORIDE 0.9 % SOLUTION: Performed by: INTERNAL MEDICINE

## 2023-10-20 PROCEDURE — 93005 ELECTROCARDIOGRAM TRACING: CPT | Performed by: NURSE PRACTITIONER

## 2023-10-20 PROCEDURE — 25810000003 SODIUM CHLORIDE 0.9 % SOLUTION: Performed by: NURSE PRACTITIONER

## 2023-10-20 RX ORDER — ALBUMIN (HUMAN) 12.5 G/50ML
25 SOLUTION INTRAVENOUS EVERY 6 HOURS
Status: COMPLETED | OUTPATIENT
Start: 2023-10-20 | End: 2023-10-21

## 2023-10-20 RX ORDER — CALCIUM GLUCONATE 20 MG/ML
2000 INJECTION, SOLUTION INTRAVENOUS EVERY 12 HOURS
Status: COMPLETED | OUTPATIENT
Start: 2023-10-20 | End: 2023-10-20

## 2023-10-20 RX ORDER — DEXTROSE AND SODIUM CHLORIDE 5; .9 G/100ML; G/100ML
75 INJECTION, SOLUTION INTRAVENOUS CONTINUOUS
Status: DISCONTINUED | OUTPATIENT
Start: 2023-10-20 | End: 2023-10-21

## 2023-10-20 RX ORDER — MIDODRINE HYDROCHLORIDE 5 MG/1
10 TABLET ORAL EVERY 8 HOURS SCHEDULED
Status: DISCONTINUED | OUTPATIENT
Start: 2023-10-20 | End: 2023-10-23

## 2023-10-20 RX ORDER — NOREPINEPHRINE BITARTRATE 0.03 MG/ML
.02-.3 INJECTION, SOLUTION INTRAVENOUS
Status: DISCONTINUED | OUTPATIENT
Start: 2023-10-20 | End: 2023-10-21

## 2023-10-20 RX ADMIN — TRAMADOL HYDROCHLORIDE 50 MG: 50 TABLET, COATED ORAL at 17:09

## 2023-10-20 RX ADMIN — MULTIPLE VITAMINS W/ MINERALS TAB 1 TABLET: TAB at 09:32

## 2023-10-20 RX ADMIN — Medication 10 ML: at 09:35

## 2023-10-20 RX ADMIN — SENNOSIDES AND DOCUSATE SODIUM 2 TABLET: 50; 8.6 TABLET ORAL at 09:32

## 2023-10-20 RX ADMIN — CALCIUM GLUCONATE 2000 MG: 20 INJECTION, SOLUTION INTRAVENOUS at 21:41

## 2023-10-20 RX ADMIN — OXYBUTYNIN CHLORIDE 5 MG: 5 TABLET ORAL at 20:32

## 2023-10-20 RX ADMIN — FOLIC ACID 1 MG: 1 TABLET ORAL at 09:32

## 2023-10-20 RX ADMIN — Medication 0.15 MCG/KG/MIN: at 10:10

## 2023-10-20 RX ADMIN — TRAZODONE HYDROCHLORIDE 50 MG: 50 TABLET ORAL at 20:32

## 2023-10-20 RX ADMIN — CALCIUM GLUCONATE 2000 MG: 20 INJECTION, SOLUTION INTRAVENOUS at 11:25

## 2023-10-20 RX ADMIN — Medication 10 ML: at 20:54

## 2023-10-20 RX ADMIN — DEXTROSE MONOHYDRATE 25 G: 25 INJECTION, SOLUTION INTRAVENOUS at 07:45

## 2023-10-20 RX ADMIN — Medication 0.5 MCG/KG/MIN: at 02:06

## 2023-10-20 RX ADMIN — OXYBUTYNIN CHLORIDE 5 MG: 5 TABLET ORAL at 09:32

## 2023-10-20 RX ADMIN — ROSUVASTATIN 5 MG: 10 TABLET, FILM COATED ORAL at 09:32

## 2023-10-20 RX ADMIN — CEFTRIAXONE 2000 MG: 2 INJECTION, POWDER, FOR SOLUTION INTRAMUSCULAR; INTRAVENOUS at 11:25

## 2023-10-20 RX ADMIN — SODIUM CHLORIDE 500 ML: 9 INJECTION, SOLUTION INTRAVENOUS at 05:20

## 2023-10-20 RX ADMIN — SODIUM CHLORIDE 150 ML/HR: 9 INJECTION, SOLUTION INTRAVENOUS at 02:07

## 2023-10-20 RX ADMIN — DEXTROSE AND SODIUM CHLORIDE 75 ML/HR: 5; 900 INJECTION, SOLUTION INTRAVENOUS at 18:33

## 2023-10-20 RX ADMIN — SODIUM CHLORIDE 150 ML/HR: 9 INJECTION, SOLUTION INTRAVENOUS at 05:53

## 2023-10-20 RX ADMIN — MIDODRINE HYDROCHLORIDE 10 MG: 5 TABLET ORAL at 15:00

## 2023-10-20 RX ADMIN — THIAMINE HYDROCHLORIDE 200 MG: 100 INJECTION, SOLUTION INTRAMUSCULAR; INTRAVENOUS at 05:53

## 2023-10-20 RX ADMIN — THIAMINE HYDROCHLORIDE 200 MG: 100 INJECTION, SOLUTION INTRAMUSCULAR; INTRAVENOUS at 15:00

## 2023-10-20 RX ADMIN — THIAMINE HYDROCHLORIDE 200 MG: 100 INJECTION, SOLUTION INTRAMUSCULAR; INTRAVENOUS at 21:00

## 2023-10-20 RX ADMIN — DICLOFENAC SODIUM 2 G: 10 GEL TOPICAL at 20:54

## 2023-10-20 RX ADMIN — SODIUM CHLORIDE 150 ML/HR: 9 INJECTION, SOLUTION INTRAVENOUS at 15:12

## 2023-10-20 RX ADMIN — PANTOPRAZOLE SODIUM 40 MG: 40 TABLET, DELAYED RELEASE ORAL at 20:32

## 2023-10-20 RX ADMIN — SENNOSIDES AND DOCUSATE SODIUM 2 TABLET: 50; 8.6 TABLET ORAL at 20:32

## 2023-10-20 RX ADMIN — MIDODRINE HYDROCHLORIDE 10 MG: 5 TABLET ORAL at 21:00

## 2023-10-20 RX ADMIN — ALBUMIN (HUMAN) 25 G: 0.25 INJECTION, SOLUTION INTRAVENOUS at 19:48

## 2023-10-20 RX ADMIN — HYDROCODONE BITARTRATE AND ACETAMINOPHEN 1 TABLET: 5; 325 TABLET ORAL at 20:32

## 2023-10-20 RX ADMIN — LACTULOSE 10 G: 20 SOLUTION ORAL at 20:32

## 2023-10-20 RX ADMIN — PANTOPRAZOLE SODIUM 40 MG: 40 TABLET, DELAYED RELEASE ORAL at 09:32

## 2023-10-20 RX ADMIN — GABAPENTIN 300 MG: 300 CAPSULE ORAL at 20:32

## 2023-10-20 RX ADMIN — Medication 10 ML: at 09:34

## 2023-10-20 NOTE — PLAN OF CARE
Goal Outcome Evaluation:         Pt remains on 3 L NC entire shift. HR remained tachycardic entire shift, pt developed A-fib, NP aware, rate was stable and then pt rapidly went into afib with RVR, NP also aware. EKG and fluid bolus administered, and heart rate and rhythm improved. Urine has cleared up and is now straw colored, bloody at start of shift. Pt increasingly restless last night, dex gtt started and pt now calm. Aguila gtt off, levo gtt titrated downwards. Good urine output this shift, no BM. Pt status and vitals signs stable

## 2023-10-20 NOTE — PROGRESS NOTES
"Critical Care Progress Note     Pamela Zavala : 1940 MRN:[id number] LOS:2  Rm: 2304/1     Principal Problem: Sepsis without acute organ dysfunction     Reason for follow up: All the medical problems listed below    Summary     Pamela Zavala is a 82 y.o. old female patient with PMH of CAD, hypertension, hyperlipidemia hypothyroidism type 2 diabetes who presented to the hospital with large left renal stone after a lithotripsy and stent placement, and underwent a planned staged procedure to clear remaining fragments.  When in phase 2, patient had run of SVT refractory to esmolol and was treated with adenosine, and subsequently started on a Cardizem gtt.  Was admitted to the hospitalist group on PCU.  Cardiology was consulted.  She developed fever and sepsis work-up was initiated.  It is documented that patient wants CPR but did not want to be intubated.  On the evening of 10/18/2023 a rapid response was called as patient was unresponsive with hypotension.  He had gone down for CT abdomen pelvis, and upon return to her room she was unresponsive.  Labs were obtained and ABG showed hypoxia, as well as multiple electrolyte imbalances.  Patient was given fluid challenge, but remained hypotensive.  She was transferred to ICU for initiation of Levophed.  Subsequently CT abdomen pelvis was read and showed \"a large cyst seen within the posterior aspect of the left kidney which was present on the previous exam from 2023 though appears to demonstrate a subtle fluid level on today's exam likely representing a new small amount of hemorrhage within the cyst. Stranding is seen surrounding the kidneys bilaterally which is new as compared to the previous study which is new as compared to the previous study and may represent increased back pressure hemorrhage, or inflammatory change. Hemorrhage is suspected given the amount of stranding on the left and presence of new hyperdensity within the left kidney. A small amount of " "hyperdensity is seen within the proximal collecting system of the left kidney consistent with a small amount of hemorrhage   with probable tiny stones. There is no evidence of hydronephrosis. A left ureteral stent is present which appears in appropriate position\".  Urology was called by hospitalist, and suggested CBI and supportive measures at this time.     ACP: Patient has advanced directive on file at this facility.  Her family has elected to make her a DO NOT RESUSCITATE/DO NOT INTUBATE.    Significant Events     10/20/23 : Afeb x 48 hrs now.  VSS--weaning off Aguila--minimal dose now.  Unforturnately, got Precedex overnight and is lethargic but awake this am.  I/O's not accurately recorded 2/2 CBI.  Na up to 148 today, CO2 18, Cr normal.  WBC up slightly more to 24.9 today, 12% bands.  Hb down about 1g since midnight.  Ucx NGTD.  Bcx's NGTD.  Continue high-dose Rocephin for now.    Assessment / Plan     Shock, multifactorial--hemorrhagic and septic  -Likely shock multifactorial, related to ABLA and acute cystitis/pyelonephritis  -S/p lithotripsy/stent placement  -Blood cultures NG  -Urine culture-NG  -Remains on Levophed gtt/Aguila gtt  -Persistent hypotension in spite of adequate fluid resuscitation  -C/w additional fluids as needed. Avoid fluid overload  -Titrate vasopressors for a target MAP of 65  -De-escalated Merrem and vancomycin to Rocephin monotherapy x 14 days.       ABLA  Potential hemorrhage within the posterior aspect of left kidney  -Hgb 11 -> 7.0, one unit PRBC transfused, serial H&Hs until normalized, transfuse for hgb < 7.0  -CT reviewed.  -Urology following; supportive measures per Hospitalist conversation  -CBI  continues.      Recent nephrolithiasis  -S/p lithotripsy and stent placement for left large renal stone  -S/p staged procedure to clear remaining fragments 10/18/2023  -CT abd- Hemorrhage is suspected given the amount of stranding on the left and presence of new hyperdensity within the left " kidney   -Urology following, no urgent plans for surgical intervention.      Hypokalemia  Hypocalcemia  Hypomagnesemia  -Electrolyte replacement per protocol  -Monitor for arrhythmias       Essential hypertension  -Patient currently hypotensive, requiring Levophed  -Resume home antihypertensives once clinically appropriate       Type 2 diabetes mellitus, controlled  -A1c 7.60  -Hold home metformin while in ICU  -Every 6 hours Accu-Cheks  -Sliding scale insulin for coverage       Alcohol abuse  -[facility] protocol with ativan as needed.  -Thiamine/Folic acid/MVI      Hyperlipidemia: Continue home rosuvastatin  Hypothyroidism: Continue home levothyroxine        Critical Care Time:  34 mins.        Code status:   Medical Intervention Limits: NO intubation (DNI)  Code Status (Patient has no pulse and is not breathing): No CPR (Do Not Attempt to Resuscitate)  Medical Interventions (Patient has pulse or is breathing): Limited Support       Nutrition:   Diet: Diabetic Diets; Consistent Carbohydrate; Texture: Regular Texture (IDDSI 7); Fluid Consistency: Thin (IDDSI 0)   Patient isn't on Tube Feeding     DVT prophylaxis:  Mechanical DVT prophylaxis orders are present.     Subjective / Review of systems     Pt denies SOB or CP.  Denies abd pain.  Denies f/c/s/n/v.    Objective / Physical Exam     Vital signs:  Temp: 99.5 °F (37.5 °C)  BP: 133/71  Heart Rate: 108  Resp: 28  SpO2: 96 %  Weight: 72.2 kg (159 lb 2.8 oz)    Admission Weight: Weight: 59 kg (130 lb)  Current Weight: Weight: 72.2 kg (159 lb 2.8 oz)    Input/Output in last 24 hours:    Intake/Output Summary (Last 24 hours) at 10/20/2023 1054  Last data filed at 10/20/2023 0939  Gross per 24 hour   Intake 7127.5 ml   Output -5450 ml   Net 89810.5 ml      Net IO Since Admission: 21,236.5 mL [10/20/23 1054]     Physical Exam  Vitals and nursing note reviewed.   Constitutional:       General: She is not in acute distress.     Appearance: Normal appearance. She is  ill-appearing. She is not toxic-appearing or diaphoretic.   HENT:      Head: Normocephalic and atraumatic.      Nose: No congestion or rhinorrhea.      Mouth/Throat:      Mouth: Mucous membranes are moist.      Pharynx: Oropharynx is clear. No oropharyngeal exudate or posterior oropharyngeal erythema.   Eyes:      General: No scleral icterus.     Extraocular Movements: Extraocular movements intact.      Conjunctiva/sclera: Conjunctivae normal.      Pupils: Pupils are equal, round, and reactive to light.   Cardiovascular:      Rate and Rhythm: Regular rhythm. Tachycardia present.      Pulses: Normal pulses.      Heart sounds: Normal heart sounds. No murmur heard.     No gallop.   Pulmonary:      Effort: Pulmonary effort is normal. No respiratory distress.      Breath sounds: Normal breath sounds. No wheezing or rales.   Abdominal:      General: Bowel sounds are normal. There is no distension.      Palpations: Abdomen is soft.      Tenderness: There is no abdominal tenderness. There is no rebound.   Genitourinary:     Comments: Continuous bladder irrigation in place.  Musculoskeletal:         General: No tenderness.      Cervical back: Normal range of motion. No rigidity.      Right lower leg: No edema.      Left lower leg: No edema.   Skin:     General: Skin is warm and dry.      Findings: No rash.   Neurological:      General: No focal deficit present.      Mental Status: She is alert and oriented to person, place, and time. Mental status is at baseline.        Radiology and Labs     Results from last 7 days   Lab Units 10/20/23  0653 10/20/23  0027 10/19/23  1749 10/19/23  1135 10/19/23  0453 10/19/23  0109 10/18/23  2202 10/18/23  2156 10/18/23  1904   WBC 10*3/mm3 24.90*  --   --   --  19.60*  --  8.10  --  6.30   HEMOGLOBIN g/dL 9.8* 10.6* 8.9* 7.0* 8.9*   < > 8.4*  --  9.5*   HEMOGLOBIN, POC   --   --   --   --   --   --   --    < >  --    HEMATOCRIT % 29.6* 32.8* 28.6* 22.8* 28.9*   < > 26.7*  --  30.3*    HEMATOCRIT POC   --   --   --   --   --   --   --    < >  --    PLATELETS 10*3/mm3 127*  --   --   --  207  --  172  --  251    < > = values in this interval not displayed.      Results from last 7 days   Lab Units 10/18/23  2202   PROTIME Seconds 14.5*   INR  1.36*      Results from last 7 days   Lab Units 10/20/23  0603 10/19/23  0453 10/18/23  2202 10/18/23  2156 10/18/23  2051 10/18/23  1901   SODIUM mmol/L 148* 136  --   --  140 142   POTASSIUM mmol/L 4.2 4.2 2.8*  --  3.1* 3.4*   CHLORIDE mmol/L 122* 106  --   --  104 105   CO2 mmol/L 18.0* 20.0*  --   --  21.0* 21.0*   BUN mg/dL 30* 19  --   --  18 17   CREATININE mg/dL 0.94 0.76  --  0.91 0.92 0.85   GLUCOSE mg/dL 94 147*  --   --  103* 132*   MAGNESIUM mg/dL  --   --  2.1  --   --  0.8*   PHOSPHORUS mg/dL  --   --   --   --  2.6 2.9      Results from last 7 days   Lab Units 10/19/23  0453 10/18/23  2051 10/18/23  1901   ALK PHOS U/L 88 72 74   AST (SGOT) U/L 398* 195* 76*   ALT (SGPT) U/L 149* 66* 29     Results from last 7 days   Lab Units 10/18/23  2156   PH, ARTERIAL pH units 7.327*   PCO2, ARTERIAL mm Hg 42.8   PO2 ART mm Hg 61.1*   O2 SATURATION ART % 89.1*   FIO2 % 32   HCO3 ART mmol/L 22.4   BASE EXCESS ART mmol/L -3.4*     XR Chest 1 View    Result Date: 10/19/2023  Impression: Right arm approach PICC tip terminates at the caval atrial junction. No visible pneumothorax. No acute chest finding. Electronically Signed: Virginia Acevedo MD  10/19/2023 10:27 AM EDT  Workstation ID: [id number]    CT Head Without Contrast    Result Date: 10/19/2023  Impression: No evidence of hemorrhage, mass effect or midline shift. No acute process identified. Electronically Signed: Brea Hui MD  10/19/2023 12:02 AM EDT  Workstation ID: [id number]    CT Abdomen Pelvis Without Contrast    Result Date: 10/18/2023  Impression: 1.There is a large cyst seen within the posterior aspect of the left kidney which was present on the previous exam from 8/16/2023 though appears to  demonstrate a subtle fluid level on today's exam likely representing a new small amount of hemorrhage within the cyst. Stranding is seen surrounding the kidneys bilaterally which is new as compared to the previous study which is new as compared to the previous study and may represent increased back pressure hemorrhage, or inflammatory change. Hemorrhage is suspected given the amount of stranding on the left and presence of new hyperdensity within the left kidney. A small amount of hyperdensity is seen within the proximal collecting system of the left kidney consistent with a small amount of hemorrhage with probable tiny stones. There is no evidence of hydronephrosis. A left ureteral stent is present which appears in appropriate position. The bladder appears distended which may be physiologic. Correlate for findings of bladder outlet obstruction. 2.Mild patchy airspace changes present within the bilateral lower lobes, likely related to atelectasis. Pneumonia cannot be excluded. 3.Ancillary findings as described above. Electronically Signed: Brea Hui MD  10/18/2023 11:08 PM EDT  Workstation ID: [id number]     Current medications     Scheduled Meds:   calcium gluconate, 2,000 mg, Intravenous, Q12H  cefTRIAXone, 2,000 mg, Intravenous, Q24H  folic acid, 1 mg, Oral, Daily  gabapentin, 300 mg, Oral, Nightly  insulin lispro, 2-7 Units, Subcutaneous, TID With Meals  lactulose, 10 g, Oral, BID  levothyroxine, 112 mcg, Oral, Daily  midodrine, 10 mg, Oral, Q8H  multivitamin with minerals, 1 tablet, Oral, Daily  oxybutynin, 5 mg, Oral, BID  pantoprazole, 40 mg, Oral, BID  rosuvastatin, 5 mg, Oral, Daily  senna-docusate sodium, 2 tablet, Oral, BID  sodium chloride, 10 mL, Intravenous, Q12H  sodium chloride, 10 mL, Intravenous, Q12H  sodium chloride, 10 mL, Intravenous, Q12H  sodium chloride, 10 mL, Intravenous, Q12H  thiamine (B-1) IV, 200 mg, Intravenous, Q8H   Followed by  [START ON 10/24/2023] thiamine, 100 mg, Oral,  Daily  traZODone, 50 mg, Oral, Nightly        Continuous Infusions:   phenylephrine, 0.5-3 mcg/kg/min, Last Rate: Stopped (10/19/23 6480)  sodium chloride, 150 mL/hr, Last Rate: 150 mL/hr (10/20/23 0553)                Lang Chery DO   Critical Care  10/20/23   10:54 EDT

## 2023-10-20 NOTE — SIGNIFICANT NOTE
Social Work Assessment   [place]     Patient Name: Pamela Zavala  MRN: [id number]  Today's Date: 10/20/2023    Admit Date: 10/18/2023     Discharge Plan       Row Name 10/20/23 1603       Plan    Plan SNF pending patient/family choices. List provided. Precert will be required. PASRR approved. From [facility] on [address]. Current with Amy [facility] and [facility] for DME.           10/20/23 1603   PASRR   PASRR Status Approved/Complete     TRAV Carrillo, W  Medical Social Worker  Ph [phone]  Fax [phone]  [email or web address]

## 2023-10-20 NOTE — PROGRESS NOTES
Urology Progress Note    Patient Identification:  Name:  Pamela Zavala  Age:  82 y.o.  Sex:  female  :  1940  MRN:  [id number]    Chief Complaint:  Patient feeling better    History of Present Illness: Patient admitted with sepsis following left ureteroscopic stone extraction with laser lithotripsy and left stent exchange.  Patient is feeling better.  She is still on pressors but these are being decreased.  Urine is yellow on minimal CBI.    Problem List:    Sepsis without acute organ dysfunction    Coronary arteriosclerosis    Type 2 diabetes mellitus    Diabetic neuropathy    Essential hypertension    Hyperlipidemia    Postoperative hypothyroidism    Ex-smoker    History of thyroid cancer    Kidney stones    UTI (urinary tract infection), bacterial    Sepsis     Past Medical History:  Past Medical History:   Diagnosis Date    Anemia     Arthritis     CAD (coronary artery disease)     Chronic back pain     Diabetic neuropathy     Essential hypertension     GERD (gastroesophageal reflux disease)     History of gastric ulcer     History of stomach ulcers     HLD (hyperlipidemia)     Hypocalcemia     Hypoparathyroidism     Hypothyroidism     Insomnia     Kidney stones     Osteoporosis     Recurrent sinusitis     Renal disease     Thyroid cancer     Type 2 diabetes mellitus      Past Surgical History:  Past Surgical History:   Procedure Laterality Date    CARDIAC CATHETERIZATION N/A 2023    Procedure: Left Heart Cath and coronary angiogram;  Surgeon: John Baldwin MD;  Location: [facility] INVASIVE LOCATION;  Service: Cardiovascular;  Laterality: N/A;    CORONARY STENT PLACEMENT      x 3    CYSTOSCOPY, URETEROSCOPY, RETROGRADE PYELOGRAM, STENT INSERTION Left 10/18/2023    Procedure: CYSTOSCOPY URETEROSCOPY RETROGRADE PYELOGRAM HOLMIUM LASER STENT INSERTION;  Surgeon: Juan Alaniz MD;  Location: [facility] OR;  Service: Urology;  Laterality: Left;    HYSTERECTOMY      LUNG LOBECTOMY      THYROIDECTOMY        Home Meds:  Medications Prior to Admission   Medication Sig Dispense Refill Last Dose    Accu-Chek Softclix Lancets lancets 1 each by Other route 3 (Three) Times a Day Before Meals. Dx: E11.65. Use as instructed 100 each 2     atenolol (TENORMIN) 50 MG tablet Take 1 tablet by mouth Daily. Take dos   10/18/2023    clopidogrel (PLAVIX) 75 MG tablet Take 1 tablet by mouth Daily.   10/16/2023    fluticasone (FLONASE) 50 MCG/ACT nasal spray 1 spray into the nostril(s) as directed by provider 2 (Two) Times a Day.   10/18/2023    gabapentin (NEURONTIN) 300 MG capsule Take 1 capsule by mouth Every Night.   10/17/2023    glucose blood (Accu-Chek Guide) test strip 1 each by Other route 3 (Three) Times a Day Before Meals. Dx: E11.65. Use as instructed 100 each 2     insulin degludec (Tresiba FlexTouch) 100 UNIT/ML solution pen-injector injection Inject 22 Units under the skin into the appropriate area as directed every night at bedtime. 15 mL 5 10/17/2023    Insulin Lispro 100 UNIT/ML solution cartridge Inject  under the skin into the appropriate area as directed 3 (Three) Times a Day Before Meals. Per sliding scale   10/17/2023    Insulin Lispro, 1 Unit Dial, (HumaLOG KwikPen) 100 UNIT/ML solution pen-injector Inject 7 Units under the skin into the appropriate area as directed 3 (Three) Times a Day. Inject 6 Units under the skin into the appropriate area as directed 3 (Three) Times a Day With Meals. 15 mL 5 10/17/2023    lactulose (CHRONULAC) 10 GM/15ML solution Take 15 mL by mouth Daily.   10/17/2023    levothyroxine (Synthroid) 112 MCG tablet Take 1 tablet by mouth Daily. 30 tablet 11 10/18/2023    lisinopril (PRINIVIL,ZESTRIL) 5 MG tablet Take 1 tablet by mouth Daily. 90 tablet 3 10/17/2023    metFORMIN (GLUCOPHAGE) 1000 MG tablet Take 1 tablet by mouth 2 (Two) Times a Day With Meals.   10/16/2023    oxybutynin (DITROPAN) 5 MG tablet Take 1 tablet by mouth 2 (Two) Times a Day.   10/17/2023    pantoprazole (PROTONIX)  40 MG EC tablet Take 1 tablet by mouth 2 (Two) Times a Day. Take dos   10/18/2023    rosuvastatin (CRESTOR) 5 MG tablet Take 1 tablet by mouth Daily.   10/17/2023    traMADol (ULTRAM) 50 MG tablet Take 1 tablet by mouth 4 (Four) Times a Day As Needed for Moderate Pain.   10/17/2023    traZODone (DESYREL) 50 MG tablet Take 1 tablet by mouth Every Night.   10/17/2023    [DISCONTINUED] oxyCODONE-acetaminophen (PERCOCET) 5-325 MG per tablet Take 1 tablet by mouth Daily As Needed.   10/17/2023     Current Meds:    Current Facility-Administered Medications:     acetaminophen (TYLENOL) suppository 650 mg, 650 mg, Rectal, Q4H PRN, Surjit Morris MD, 650 mg at 10/18/23 1738    acetaminophen (TYLENOL) tablet 650 mg, 650 mg, Oral, Q6H PRN, Surjit Morris MD    sennosides-docusate (PERICOLACE) 8.6-50 MG per tablet 2 tablet, 2 tablet, Oral, BID, 2 tablet at 10/20/23 0932 **AND** polyethylene glycol (MIRALAX) packet 17 g, 17 g, Oral, Daily PRN **AND** bisacodyl (DULCOLAX) EC tablet 5 mg, 5 mg, Oral, Daily PRN **AND** bisacodyl (DULCOLAX) suppository 10 mg, 10 mg, Rectal, Daily PRN, Surjit Morris MD    calcium gluconate 2000-675 MG/100ML NACL IVPB, 2,000 mg, Intravenous, Q12H, Leoncio Kumar APRN, 2,000 mg at 10/20/23 1125    Calcium Replacement - Follow Nurse / BPA Driven Protocol, , Does not apply, PRN, Surjit Morris MD    cefTRIAXone (ROCEPHIN) 2,000 mg in sodium chloride 0.9 % 100 mL IVPB, 2,000 mg, Intravenous, Q24H, Lang Chery DO, Last Rate: 200 mL/hr at 10/20/23 1125, 2,000 mg at 10/20/23 1125    dextrose (D50W) (25 g/50 mL) IV injection 25 g, 25 g, Intravenous, Q15 Min PRN, Surjit Morris MD, 25 g at 10/20/23 0745    dextrose (GLUTOSE) oral gel 15 g, 15 g, Oral, Q15 Min PRN, Surjit Morris MD    folic acid (FOLVITE) tablet 1 mg, 1 mg, Oral, Daily, Leoncio Kumar APRN, 1 mg at 10/20/23 0932    gabapentin (NEURONTIN) capsule 300 mg, 300 mg, Oral, Nightly, Alexandra,  Surjit REYNOSO MD, 300 mg at 10/19/23 2008    glucagon (GLUCAGEN) injection 1 mg, 1 mg, Intramuscular, Q15 Min PRN, Surjit Morris MD    HYDROcodone-acetaminophen (NORCO) 5-325 MG per tablet 1 tablet, 1 tablet, Oral, Q4H PRN, Surjit Morris MD    insulin lispro (HUMALOG/ADMELOG) injection 2-7 Units, 2-7 Units, Subcutaneous, TID With Meals, Surjit Morris MD    lactulose (CHRONULAC) 10 GM/15ML solution 10 g, 10 g, Oral, BID, Surjit Morris MD    levothyroxine (SYNTHROID, LEVOTHROID) tablet 112 mcg, 112 mcg, Oral, Daily, eLoncio Kumar APRN    LORazepam (ATIVAN) tablet 1 mg, 1 mg, Oral, Q1H PRN **OR** LORazepam (ATIVAN) injection 1 mg, 1 mg, Intravenous, Q1H PRN **OR** LORazepam (ATIVAN) tablet 2 mg, 2 mg, Oral, Q1H PRN **OR** LORazepam (ATIVAN) injection 2 mg, 2 mg, Intravenous, Q1H PRN **OR** LORazepam (ATIVAN) injection 2 mg, 2 mg, Intravenous, Q15 Min PRN **OR** LORazepam (ATIVAN) injection 2 mg, 2 mg, Intramuscular, Q15 Min PRN, Leoncio Kumar APREDGARDO    Magnesium Standard Dose Replacement - Follow Nurse / BPA Driven Protocol, , Does not apply, PRN, Surjit Morris MD    midodrine (PROAMATINE) tablet 10 mg, 10 mg, Oral, Q8H, Leoncio Kumar APRN    multivitamin with minerals 1 tablet, 1 tablet, Oral, Daily, Leoncio Kumar APRN, 1 tablet at 10/20/23 0932    nitroglycerin (NITROSTAT) SL tablet 0.4 mg, 0.4 mg, Sublingual, Q5 Min PRN, Surjit Morris MD    norepinephrine (LEVOPHED) 8 mg in 250 mL NS infusion (premix), 0.02-0.3 mcg/kg/min, Intravenous, Titrated, Leoncio Kumar APRN, Last Rate: 16.25 mL/hr at 10/20/23 1144, 0.12 mcg/kg/min at 10/20/23 1144    oxybutynin (DITROPAN) tablet 5 mg, 5 mg, Oral, BID, Surjit Morris MD, 5 mg at 10/20/23 0932    pantoprazole (PROTONIX) EC tablet 40 mg, 40 mg, Oral, BID, Surjit Morris MD, 40 mg at 10/20/23 0932    phenylephrine (LORETO-SYNEPHRINE) 50 mg in sodium chloride 0.9 % 250 mL infusion, 0.5-3 mcg/kg/min, Intravenous,  Fahad Ramirez Vanessa Lynn, APRN, Stopped at 10/19/23 2330    Phosphorus Replacement - Follow Nurse / BPA Driven Protocol, , Does not apply, Alexandra ANDINO Federico N, MD    Potassium Replacement - Follow Nurse / BPA Driven Protocol, , Does not apply, Alexandra ANDINO Federico N, MD    rosuvastatin (CRESTOR) tablet 5 mg, 5 mg, Oral, Daily, Surjit Morris MD, 5 mg at 10/20/23 0932    sodium chloride 0.9 % flush 10 mL, 10 mL, Intravenous, Q12H, Surjit Morris MD, 10 mL at 10/20/23 0935    sodium chloride 0.9 % flush 10 mL, 10 mL, Intravenous, PRN, Surjit Morris MD    sodium chloride 0.9 % flush 10 mL, 10 mL, Intravenous, Q12H, Montfort, Christopher L, DO, 10 mL at 10/20/23 0935    sodium chloride 0.9 % flush 10 mL, 10 mL, Intravenous, Q12H, Montfort, Christopher L, DO, 10 mL at 10/20/23 0935    sodium chloride 0.9 % flush 10 mL, 10 mL, Intravenous, Q12H, Montfort, Christopher L, DO, 10 mL at 10/20/23 0934    sodium chloride 0.9 % flush 10 mL, 10 mL, Intravenous, PRN, Montfort, Christopher L, DO    sodium chloride 0.9 % flush 20 mL, 20 mL, Intravenous, PRN, Ivan Cheryopher L, DO    sodium chloride 0.9 % infusion 40 mL, 40 mL, Intravenous, PRNAlexandra Federico N, MD    sodium chloride 0.9 % infusion 40 mL, 40 mL, Intravenous, PRN, Montfort, Christopher L, DO    sodium chloride 0.9 % infusion, 150 mL/hr, Intravenous, Continuous, Surjit Morris MD, Last Rate: 150 mL/hr at 10/20/23 0553, 150 mL/hr at 10/20/23 0553    thiamine (B-1) injection 200 mg, 200 mg, Intravenous, Q8H, 200 mg at 10/20/23 0553 **FOLLOWED BY** [START ON 10/24/2023] thiamine (VITAMIN B-1) tablet 100 mg, 100 mg, Oral, Daily, Leoncio Kumar APRN    traMADol (ULTRAM) tablet 50 mg, 50 mg, Oral, 4x Daily PRN, Surjit Morris MD    traZODone (DESYREL) tablet 50 mg, 50 mg, Oral, Nightly, Surjit Morris MD, 50 mg at 10/19/23 2007  Allergies:  Penicillins, Shellfish-derived products, Sulfamethizole, and Trimethoprim    Review  "of Systems     Objective:  tMax 24 hours:  Temp (24hrs), Av.3 °F (36.8 °C), Min:97.6 °F (36.4 °C), Max:99.5 °F (37.5 °C)    Vital Sign Ranges:  Temp:  [97.6 °F (36.4 °C)-99.5 °F (37.5 °C)] 98.1 °F (36.7 °C)  Heart Rate:  [] 107  Resp:  [20-38] 28  BP: ()/() 122/63  Intake and Output Last 3 Shifts:  I/O last 3 completed shifts:  In: 30110.5 [P.O.:930; I.V.:73634; Blood:427.5]  Out: -7550     Exam:  /63   Pulse 107   Temp 98.1 °F (36.7 °C) (Oral)   Resp 28   Ht 167.6 cm (66\")   Wt 72.2 kg (159 lb 2.8 oz)   SpO2 95%   BMI 25.69 kg/m²    General Appearance:    Alert, cooperative, no acute distress, general         appearance is normal   Head:    Normocephalic, without obvious abnormality, atraumatic   Eyes:            Pupils/Irises normal. Exterior inspection conjunctivae       and lids normal.   Ears:    Normal external inspection   Nose:   Exterior inspection of nose is normal   Throat:   Lips, mucosa, and tongue normal   Lungs:     Respirations unlabored; normal effort, no audible     abnormality   CV:   Regular rhythm and normal rate, no edema   Abdomen:     examination of the abdomen is normal with     no masses, tenderness, or distension    : King with yellow urine on minimal CBI     Data Review:  All labs (24hrs):    Lab Results (last 24 hours)       Procedure Component Value Units Date/Time    Pathology Consultation [[id number]] Collected: 10/20/23 0653    Specimen: Blood, Venous Line Updated: 10/20/23 1215     Final Diagnosis --     Leukocytosis with left shift  Normocytic anemia  Mild thrombocytopenia  No blasts identified       Case Report --     Surgical Pathology Report                         Case: [id number]                                  Authorizing Provider:  Surjit Morris MD    Collected:           10/20/2023 06:53 AM          Ordering Location:     [facility]       Received:            10/20/2023 07:37 AM                                 INTENSIVE " CARE UNIT                                                          Pathologist:           Nestor Tuttle MD                                                             Specimen:    Blood, Venous Line                                                                         POC Glucose Once [[id number]]  (Abnormal) Collected: 10/20/23 1124    Specimen: Blood Updated: 10/20/23 1129     Glucose 106 mg/dL      Comment: Serial Number: [id number]:  [id number]       Urine Culture - Urine, Urine, Clean Catch [[id number]]  (Normal) Collected: 10/18/23 2223    Specimen: Urine, Clean Catch Updated: 10/20/23 1041     Urine Culture No growth    POC Glucose Once [[id number]]  (Abnormal) Collected: 10/20/23 0946    Specimen: Blood Updated: 10/20/23 0948     Glucose 108 mg/dL      Comment: Serial Number: [id number]:  [id number]       CBC & Differential [[id number]]  (Abnormal) Collected: 10/20/23 0653    Specimen: Blood Updated: 10/20/23 0736    Narrative:      The following orders were created for panel order CBC & Differential.  Procedure                               Abnormality         Status                     ---------                               -----------         ------                     CBC Auto Differential[[id number]]        Abnormal            Final result               Scan Slide[[id number]]                                       Final result                 Please view results for these tests on the individual orders.    CBC Auto Differential [[id number]]  (Abnormal) Collected: 10/20/23 0653    Specimen: Blood Updated: 10/20/23 0736     WBC 24.90 10*3/mm3      RBC 3.33 10*6/mm3      Hemoglobin 9.8 g/dL      Hematocrit 29.6 %      MCV 89.0 fL      MCH 29.5 pg      MCHC 33.2 g/dL      Comment: Result checked          RDW 18.7 %      RDW-SD 56.9 fl      MPV 9.7 fL      Platelets 127 10*3/mm3     Narrative:      The previously reported component NRBC is no longer being reported. Previous result was 0.0 /100 WBC  (Reference Range: 0.0-0.2 /100 WBC) on 10/20/2023 at 0710 EDT.    Scan Slide [[id number]] Collected: 10/20/23 0653    Specimen: Blood Updated: 10/20/23 0736     Scan Slide --     Comment: See Manual Differential Results       Manual Differential [[id number]]  (Abnormal) Collected: 10/20/23 0653    Specimen: Blood Updated: 10/20/23 0736     Neutrophil % 64.0 %      Lymphocyte % 4.0 %      Monocyte % 1.0 %      Bands %  12.0 %      Metamyelocyte % 19.0 %      Neutrophils Absolute 18.92 10*3/mm3      Lymphocytes Absolute 1.00 10*3/mm3      Monocytes Absolute 0.25 10*3/mm3      [place] Cells Slight/1+     Poikilocytes Slight/1+     Dohle Bodies Present     Toxic Granulation Slight/1+     Vacuolated Neutrophils Mod/2+     Platelet Estimate Decreased    Path Consult Reflex [[id number]] Collected: 10/20/23 0653    Specimen: Blood Updated: 10/20/23 0736     Pathology Review Yes    POC Glucose Once [[id number]]  (Abnormal) Collected: 10/20/23 0734    Specimen: Blood Updated: 10/20/23 0735     Glucose 68 mg/dL      Comment: Serial Number: [id number]:  [id number]       Basic Metabolic Panel [[id number]]  (Abnormal) Collected: 10/20/23 0603    Specimen: Blood Updated: 10/20/23 0649     Glucose 94 mg/dL      BUN 30 mg/dL      Creatinine 0.94 mg/dL      Sodium 148 mmol/L      Potassium 4.2 mmol/L      Chloride 122 mmol/L      Comment: Result checked          CO2 18.0 mmol/L      Calcium 7.1 mg/dL      BUN/Creatinine Ratio 31.9     Anion Gap 8.0 mmol/L      eGFR 60.0 mL/min/1.73     Narrative:      GFR Normal >60  Chronic Kidney Disease <60  Kidney Failure <15    The GFR formula is only valid for adults with stable renal function between ages 18 and 70.    Hemoglobin & Hematocrit, Blood [[id number]]  (Abnormal) Collected: 10/20/23 0027    Specimen: Blood Updated: 10/20/23 0035     Hemoglobin 10.6 g/dL      Hematocrit 32.8 %     POC Glucose Once [[id number]]  (Abnormal) Collected: 10/19/23 2216    Specimen: Blood Updated: 10/19/23 2217      Glucose 175 mg/dL      Comment: Serial Number: [id number]:  [id number]       Blood Culture - Blood, Arm, Left [[id number]]  (Normal) Collected: 10/18/23 1901    Specimen: Blood from Arm, Left Updated: 10/19/23 1916     Blood Culture No growth at 24 hours    Narrative:      Less than seven (7) mL's of blood was collected.  Insufficient quantity may yield false negative results.    Blood Culture - Blood, Hand, Left [[id number]]  (Normal) Collected: 10/18/23 1905    Specimen: Blood from Hand, Left Updated: 10/19/23 1916     Blood Culture No growth at 24 hours    Narrative:      Less than seven (7) mL's of blood was collected.  Insufficient quantity may yield false negative results.    Hemoglobin & Hematocrit, Blood [[id number]]  (Abnormal) Collected: 10/19/23 1749    Specimen: Blood Updated: 10/19/23 1801     Hemoglobin 8.9 g/dL      Hematocrit 28.6 %      Comment: Result checked         POC Glucose Once [[id number]]  (Abnormal) Collected: 10/19/23 1713    Specimen: Blood Updated: 10/19/23 1715     Glucose 147 mg/dL      Comment: Serial Number: [id number]:  [id number]             Radiology:   Imaging Results (Last 72 Hours)       Procedure Component Value Units Date/Time    XR Chest 1 View [[id number]] Collected: 10/19/23 1027     Updated: 10/19/23 1029    Narrative:      XR CHEST 1 VW    Date of Exam: 10/19/2023 10:12 AM EDT    Indication: Shortness of breath    Comparison: AP portable chest 5/2/2023.    Findings:  Heart size is normal. No acute lung consolidations. Chronic appearing interstitial markings in both lungs. Right arm approach PICC tip terminates at the caval atrial junction. No visible pneumothorax. No acute osseous abnormality.      Impression:      Impression:  Right arm approach PICC tip terminates at the caval atrial junction. No visible pneumothorax.  No acute chest finding.      Electronically Signed: Virginia Acevedo MD    10/19/2023 10:27 AM EDT    Workstation ID: [id number]    CT Head  Without Contrast [[id number]] Collected: 10/19/23 0001     Updated: 10/19/23 0005    Narrative:      CT HEAD WO CONTRAST    Date of Exam: 10/18/2023 11:45 PM EDT    Indication: Altered mental status, nontraumatic (Ped 0-17y).    Comparison: 5/2/2023.    Technique: Axial CT images were obtained of the head without contrast administration.  Coronal reconstructions were performed.  Automated exposure control and iterative reconstruction methods were used.      Findings:  There is no evidence of hemorrhage. There is no mass effect or midline shift.    There is no extracerebral collection.    Ventricles are normal in size and configuration for patient's stated age.      Posterior fossa is within normal limits.    Calvarium and skull base appear intact.   Visualized sinuses show no air fluid levels. Mild mucosal thickening is present within the anterior ethmoid air cells on the left as well as the left maxillary sinus. Visualized orbits are unremarkable.      Impression:      Impression:  No evidence of hemorrhage, mass effect or midline shift. No acute process identified.        Electronically Signed: Brea Hui MD    10/19/2023 12:02 AM EDT    Workstation ID: [id number]    CT Abdomen Pelvis Without Contrast [[id number]] Collected: 10/18/23 2301     Updated: 10/18/23 2311    Narrative:      CT ABDOMEN PELVIS WO CONTRAST    Date of Exam: 10/18/2023 9:20 PM EDT    Indication: Sepsis.    Comparison: 9/25/2023, 8/16/2023.    Technique: Axial CT images were obtained of the abdomen and pelvis without the administration of contrast. Sagittal and coronal reconstructions were performed.  Automated exposure control and iterative reconstruction methods were used.      Findings:  Lung Bases:     Mild patchy airspace changes are present within the bilateral lower lobes. No significant pleural effusion identified.    Limited evaluation of the solid organs due to lack of intravenous contrast.    Liver:  Liver is normal in size and CT  density. No focal lesions.    Biliary/Gallbladder:    The gallbladder is normal without evidence of radiopaque stones. The biliary tree is nondilated.    Spleen:  Spleen is normal in size and CT density.    Pancreas:    Pancreas is normal. There is no evidence of pancreatic mass or peripancreatic fluid.    Kidneys:    Kidneys are normal in size. There is a left ureteral stent which appears in appropriate position. No evidence of hydronephrosis. A large fluid collection is seen along the posterior aspect of the left kidney measuring up to 7.9 x 10.1 cm (series 2 image   42). A subtle fluid level is present. A significant portion of the fluid is simple though there is a small hyperdense component present with Hounsfield units measuring 13.5. Stranding is seen surrounding the kidneys bilaterally which is new as compared   to the previous study.. A small simple cyst is seen arising from the inferior pole of the right kidney. No significant stones identified within the kidneys though tiny stones are likely present within the proximal collecting system of the left kidney   (series 2 image 55).. A small amount of hyperdense material is seen within the proximal collecting system on the left along the calyces..    Adrenals:    Adrenal glands are unremarkable.    Retroperitoneal/Lymph Nodes/Vasculature:    No retroperitoneal adenopathy is identified. Atherosclerotic calcifications are present.    Gastrointestinal/Mesentery:    The bowel loops are non-dilated without wall thickening or mass. The appendix is not well visualized. No secondary findings of appendicitis.. No evidence of obstruction. No free air. No mesenteric fluid collections identified. No significant stool burden   identified.    Bladder:    The bladder appears distended. No wall thickening identified.     Genital:     Redemonstration of a lesion arising from the right adnexa, unchanged as compared to the previous study. No evidence of surrounding stranding.           Bony Structures:     Visualized bony structures are consistent with the patient's age. Degenerative changes are present within the spine with a levoscoliotic curvature. Degenerative changes are present within the bilateral hip joints.        Impression:      Impression:  1.There is a large cyst seen within the posterior aspect of the left kidney which was present on the previous exam from 8/16/2023 though appears to demonstrate a subtle fluid level on today's exam likely representing a new small amount of hemorrhage   within the cyst. Stranding is seen surrounding the kidneys bilaterally which is new as compared to the previous study which is new as compared to the previous study and may represent increased back pressure hemorrhage, or inflammatory change. Hemorrhage   is suspected given the amount of stranding on the left and presence of new hyperdensity within the left kidney. A small amount of hyperdensity is seen within the proximal collecting system of the left kidney consistent with a small amount of hemorrhage   with probable tiny stones. There is no evidence of hydronephrosis. A left ureteral stent is present which appears in appropriate position. The bladder appears distended which may be physiologic. Correlate for findings of bladder outlet obstruction.   2.Mild patchy airspace changes present within the bilateral lower lobes, likely related to atelectasis. Pneumonia cannot be excluded.  3.Ancillary findings as described above.        Electronically Signed: Brea Hui MD    10/18/2023 11:08 PM EDT    Workstation ID: [id number]            Assessment/Plan:    Principal Problem:    Sepsis without acute organ dysfunction  Active Problems:    Coronary arteriosclerosis    Type 2 diabetes mellitus    Diabetic neuropathy    Essential hypertension    Hyperlipidemia    Postoperative hypothyroidism    Ex-smoker    History of thyroid cancer    Kidney stones    UTI (urinary tract infection), bacterial     Sepsis    Left renal calculi status post ureteroscopic management with stent exchange  Presumed urosepsis following the procedure    Plan  Continue antibiotics per primary  Await urine culture  Continue King catheter today  Following with you      Guillermo Morfin MD  10/20/2023  12:38 EDT

## 2023-10-20 NOTE — CONSULTS
Nutrition Services    Patient Name: Pamela Zavala  YOB: 1940  MRN: [id number]  Admission date: 10/18/2023    Comment:  -- Continue current diet and encourage good PO intakes     -- Add Boost Glucose Control BID (Provides 380 kcals, 32 g protein if consumed)       PPE Documentation        PPE Worn By Provider gloves   PPE Worn By Patient  None      CLINICAL NUTRITION ASSESSMENT      Reason for Assessment 10/20: MST of 5      H&P      Past Medical History:   Diagnosis Date    Anemia     Arthritis     CAD (coronary artery disease)     Chronic back pain     Diabetic neuropathy     Essential hypertension     GERD (gastroesophageal reflux disease)     History of gastric ulcer     History of stomach ulcers     HLD (hyperlipidemia)     Hypocalcemia     Hypoparathyroidism     Hypothyroidism     Insomnia     Kidney stones     Osteoporosis     Recurrent sinusitis     Renal disease     Thyroid cancer     Type 2 diabetes mellitus        Past Surgical History:   Procedure Laterality Date    CARDIAC CATHETERIZATION N/A 05/03/2023    Procedure: Left Heart Cath and coronary angiogram;  Surgeon: John Baldwin MD;  Location: [facility] CATH INVASIVE LOCATION;  Service: Cardiovascular;  Laterality: N/A;    CORONARY STENT PLACEMENT      x 3    CYSTOSCOPY, URETEROSCOPY, RETROGRADE PYELOGRAM, STENT INSERTION Left 10/18/2023    Procedure: CYSTOSCOPY URETEROSCOPY RETROGRADE PYELOGRAM HOLMIUM LASER STENT INSERTION;  Surgeon: Juan Alaniz MD;  Location: [facility] MAIN OR;  Service: Urology;  Laterality: Left;    HYSTERECTOMY      LUNG LOBECTOMY      THYROIDECTOMY          Current Problems   Sepsis, UTI   - Urology following    AF     Hypertension     Hyperlipidemia      Hypothyroidism      Type 2 diabetes     Urinary incontinence      Kidney stone       Encounter Information        Trending Narrative     10/20: Admitted for planned cystoscopy ureteroscopy retrograde pyelogram holmium laser stent insertion 10/18.  Rapid response  "10/18, transferred to critical care.  Patient working with therapy NFPE completed and not consistent with nutrition diagnosis of malnutrition at this time using AND/ASPEN criteria.         Anthropometrics        Current Height, Weight Height: 167.6 cm (66\")  Weight: 72.2 kg (159 lb 2.8 oz) (10/20/23 0600)       Usual Body Weight (UBW) Unable to obtain from patient        Trending Weight Hx     This admission: 10/20: 130-137# range (159# - question validity)              PTA: Stable weight x 8 months     Wt Readings from Last 30 Encounters:   10/20/23 0600 72.2 kg (159 lb 2.8 oz)   10/18/23 2215 62.5 kg (137 lb 12.6 oz)   10/18/23 1700 60.5 kg (133 lb 6.1 oz)   10/18/23 1034 59.2 kg (130 lb 9.6 oz)   10/09/23 1548 59 kg (130 lb)   09/12/23 1303 60.6 kg (133 lb 9.6 oz)   08/11/23 1054 60.8 kg (134 lb)   05/18/23 1504 62.6 kg (138 lb)   05/04/23 0500 61.3 kg (135 lb 2.3 oz)   05/03/23 1955 61.3 kg (135 lb 2.3 oz)   05/03/23 0600 68 kg (149 lb 14.6 oz)   05/03/23 0223 68 kg (149 lb 14.6 oz)   05/02/23 1145 65 kg (143 lb 4.8 oz)   05/02/23 0855 61.2 kg (135 lb)   02/13/23 2238 61.2 kg (135 lb)   05/22/13 1202 64 kg (141 lb 0.1 oz)      BMI kg/m2 Body mass index is 25.69 kg/m².       Labs        Pertinent Labs    Results from last 7 days   Lab Units 10/20/23  0603 10/19/23  0453 10/18/23  2202 10/18/23  2156 10/18/23  2051 10/18/23  1901   SODIUM mmol/L 148* 136  --   --  140 142   POTASSIUM mmol/L 4.2 4.2 2.8*  --  3.1* 3.4*   CHLORIDE mmol/L 122* 106  --   --  104 105   CO2 mmol/L 18.0* 20.0*  --   --  21.0* 21.0*   BUN mg/dL 30* 19  --   --  18 17   CREATININE mg/dL 0.94 0.76  --  0.91 0.92 0.85   CALCIUM mg/dL 7.1* 6.7*  --   --  7.0* 7.0*   BILIRUBIN mg/dL  --  0.4  --   --  0.9 0.7   ALK PHOS U/L  --  88  --   --  72 74   ALT (SGPT) U/L  --  149*  --   --  66* 29   AST (SGOT) U/L  --  398*  --   --  195* 76*   GLUCOSE mg/dL 94 147*  --   --  103* 132*     Results from last 7 days   Lab Units 10/20/23  0653 " 10/19/23  0109 10/18/23  2202 10/18/23  2156 10/18/23  2051 10/18/23  1904 10/18/23  1901   MAGNESIUM mg/dL  --   --  2.1  --   --   --  0.8*   PHOSPHORUS mg/dL  --   --   --   --  2.6  --  2.9   HEMOGLOBIN g/dL 9.8*   < > 8.4*  --   --    < >  --    HEMOGLOBIN, POC   --   --   --    < >  --   --   --    HEMATOCRIT % 29.6*   < > 26.7*  --   --    < >  --    HEMATOCRIT POC   --   --   --    < >  --   --   --    TRIGLYCERIDES mg/dL  --   --   --   --   --   --  81    < > = values in this interval not displayed.     Lab Results   Component Value Date    HGBA1C 7.60 (H) 10/18/2023        Medications    Scheduled Medications cefTRIAXone, 2,000 mg, Intravenous, Q24H  folic acid, 1 mg, Oral, Daily  gabapentin, 300 mg, Oral, Nightly  insulin lispro, 2-7 Units, Subcutaneous, TID With Meals  lactulose, 10 g, Oral, BID  levothyroxine, 112 mcg, Oral, Daily  multivitamin with minerals, 1 tablet, Oral, Daily  oxybutynin, 5 mg, Oral, BID  pantoprazole, 40 mg, Oral, BID  rosuvastatin, 5 mg, Oral, Daily  senna-docusate sodium, 2 tablet, Oral, BID  sodium chloride, 10 mL, Intravenous, Q12H  sodium chloride, 10 mL, Intravenous, Q12H  sodium chloride, 10 mL, Intravenous, Q12H  sodium chloride, 10 mL, Intravenous, Q12H  thiamine (B-1) IV, 200 mg, Intravenous, Q8H   Followed by  [START ON 10/24/2023] thiamine, 100 mg, Oral, Daily  traZODone, 50 mg, Oral, Nightly        Infusions dexmedetomidine, 0.2-1.5 mcg/kg/hr, Last Rate: 0.1 mcg/kg/hr (10/20/23 3383)  norepinephrine, 0.02-0.5 mcg/kg/min, Last Rate: 0.4 mcg/kg/min (10/20/23 0648)  phenylephrine, 0.5-3 mcg/kg/min, Last Rate: Stopped (10/19/23 0807)  sodium chloride, 150 mL/hr, Last Rate: 150 mL/hr (10/20/23 3572)        PRN Medications   acetaminophen    acetaminophen    senna-docusate sodium **AND** polyethylene glycol **AND** bisacodyl **AND** bisacodyl    Calcium Replacement - Follow Nurse / BPA Driven Protocol    dextrose    dextrose    glucagon (human recombinant)     HYDROcodone-acetaminophen    LORazepam **OR** LORazepam **OR** LORazepam **OR** LORazepam **OR** LORazepam **OR** LORazepam    Magnesium Standard Dose Replacement - Follow Nurse / BPA Driven Protocol    nitroglycerin    Phosphorus Replacement - Follow Nurse / BPA Driven Protocol    Potassium Replacement - Follow Nurse / BPA Driven Protocol    sodium chloride    sodium chloride    sodium chloride    sodium chloride    sodium chloride    traMADol     Physical Findings        Trending Physical   Appearance, NFPE 10/20: NFPE completed and not consistent with nutrition diagnosis of malnutrition at this time using AND/ASPEN criteria      --  Edema  No edema documented     Bowel Function Last documented BM 10/18 (x 2 days ago)     Tubes No feeding tube      Chewing/Swallowing No known issues      Skin Intact      --  Current Nutrition Orders & Evaluation of Intake       Oral Nutrition     Food Allergies Shellfish    Current PO Diet Diet: Diabetic Diets; Consistent Carbohydrate; Texture: Regular Texture (IDDSI 7); Fluid Consistency: Thin (IDDSI 0)   Supplement None ordered    PO Evaluation     Trending % PO Intake 10/20: 42% average PO intakes x 3 documented meals since admission    --  Nutritional Risk Screening        NRS-2002 Score          Nutrition Diagnosis         Nutrition Dx Problem 1 Inadequate oral intake related to intake less than needs as evidenced by PO intakes less than 50% since admission.        Nutrition Dx Problem 2        Intervention Goal         Intervention Goal(s) PO intakes at least 60%  Accept ONS     Nutrition Intervention        RD Action Add ONS     Nutrition Prescription          Diet Prescription Consistent CHO    Supplement Prescription Boost Glucose Control BID    --  Monitor/Evaluation        Monitor Per protocol, I&O, PO intake, Supplement intake, Pertinent labs, Weight, Skin status, GI status, Symptoms, POC/GOC       Electronically signed by:  Denisa Melton RD  10/20/23 08:01  EDT

## 2023-10-20 NOTE — CASE MANAGEMENT/SOCIAL WORK
Continued Stay Note  [facility]     Patient Name: Pamela Zavala  MRN: [id number]  Today's Date: 10/20/2023    Admit Date: 10/18/2023    Plan: DC Plan: SNF pending patient/family choices. List provided. Precert will be required. PASRR requested. From [facility] on Main. Current with [facility] and [facility] for DME.   Discharge Plan       Row Name 10/20/23 1439       Plan    Plan DC Plan: SNF pending patient/family choices. List provided. Precert will be required. PASRR requested. From [facility] on Main. Current with [facility] and [facility] for DME.    Patient/Family in Agreement with Plan yes    Provided Post Acute Provider List? Yes    Post Acute Provider List Inpatient Rehab    Provided Post Acute Provider Quality & Resource List? Yes    Post Acute Provider Quality and Resource List Inpatient Rehab    Delivered To Support Person    Method of Delivery Telephone    Plan Comments CM attempted to speak with patient at bedside for DC planning, but patient was very drowsy. CM contacted [facility]'s POA Markus Zavala to discuss with him. PT/OT is recommending SNF at DC. Markus is agreeable to SNF and states he will return soon to review the list left at bedside and make a selection.CM will continue to follow for any further needs and adjust discharge plan accordingly. DC Barriers: Cardiac monitoring, O2@3L nc, PICC, Ureteral drain/stent, Continuous bladder irrigation, Levophed gtt (weaning), IVF's, elevated WBC count, and IV abx.               Expected Discharge Date and Time       Expected Discharge Date Expected Discharge Time    Oct 24, 2023               Sakina Gaviria RN    Office Phone: [phone]  Office Cell:     [phone]

## 2023-10-21 ENCOUNTER — APPOINTMENT (OUTPATIENT)
Dept: GENERAL RADIOLOGY | Facility: HOSPITAL | Age: 83
DRG: 853 | End: 2023-10-21
Payer: MEDICARE

## 2023-10-21 LAB
ALBUMIN SERPL-MCNC: 3.2 G/DL (ref 3.5–5.2)
ALBUMIN/GLOB SERPL: 1.6 G/DL
ALP SERPL-CCNC: 197 U/L (ref 39–117)
ALT SERPL W P-5'-P-CCNC: 80 U/L (ref 1–33)
ANION GAP SERPL CALCULATED.3IONS-SCNC: 8 MMOL/L (ref 5–15)
ANISOCYTOSIS BLD QL: ABNORMAL
AST SERPL-CCNC: 93 U/L (ref 1–32)
ATMOSPHERIC PRESS: ABNORMAL MM[HG]
BASE EXCESS BLDV CALC-SCNC: -10.2 MMOL/L (ref -2–2)
BASOPHILS # BLD MANUAL: 0.16 10*3/MM3 (ref 0–0.2)
BASOPHILS NFR BLD MANUAL: 1 % (ref 0–1.5)
BDY SITE: ABNORMAL
BILIRUB SERPL-MCNC: 0.9 MG/DL (ref 0–1.2)
BUN SERPL-MCNC: 25 MG/DL (ref 8–23)
BUN/CREAT SERPL: 25.5 (ref 7–25)
CALCIUM SPEC-SCNC: 7.5 MG/DL (ref 8.6–10.5)
CHLORIDE SERPL-SCNC: 121 MMOL/L (ref 98–107)
CO2 BLDA-SCNC: 17.6 MMOL/L (ref 22–29)
CO2 SERPL-SCNC: 17 MMOL/L (ref 22–29)
CREAT SERPL-MCNC: 0.98 MG/DL (ref 0.57–1)
DEPRECATED RDW RBC AUTO: 58.6 FL (ref 37–54)
DOHLE BOD BLD QL SMEAR: PRESENT
EGFRCR SERPLBLD CKD-EPI 2021: 57 ML/MIN/1.73
EOSINOPHIL # BLD MANUAL: 0.16 10*3/MM3 (ref 0–0.4)
EOSINOPHIL NFR BLD MANUAL: 1 % (ref 0.3–6.2)
ERYTHROCYTE [DISTWIDTH] IN BLOOD BY AUTOMATED COUNT: 18.7 % (ref 12.3–15.4)
GLOBULIN UR ELPH-MCNC: 2 GM/DL
GLUCOSE BLDC GLUCOMTR-MCNC: 154 MG/DL (ref 70–105)
GLUCOSE BLDC GLUCOMTR-MCNC: 159 MG/DL (ref 70–105)
GLUCOSE BLDC GLUCOMTR-MCNC: 173 MG/DL (ref 70–105)
GLUCOSE SERPL-MCNC: 207 MG/DL (ref 65–99)
HCO3 BLDV-SCNC: 16.4 MMOL/L (ref 22–26)
HCT VFR BLD AUTO: 26.3 % (ref 34–46.6)
HGB BLD-MCNC: 8.2 G/DL (ref 12–15.9)
IRON 24H UR-MRATE: 6 MCG/DL (ref 37–145)
IRON SATN MFR SERPL: 3 % (ref 20–50)
LYMPHOCYTES # BLD MANUAL: 1.3 10*3/MM3 (ref 0.7–3.1)
LYMPHOCYTES NFR BLD MANUAL: 4 % (ref 5–12)
MAGNESIUM SERPL-MCNC: 1.7 MG/DL (ref 1.6–2.4)
MCH RBC QN AUTO: 27 PG (ref 26.6–33)
MCHC RBC AUTO-ENTMCNC: 31.4 G/DL (ref 31.5–35.7)
MCV RBC AUTO: 85.9 FL (ref 79–97)
METAMYELOCYTES NFR BLD MANUAL: 1 % (ref 0–0)
MODALITY: ABNORMAL
MONOCYTES # BLD: 0.65 10*3/MM3 (ref 0.1–0.9)
NEUTROPHILS # BLD AUTO: 13.77 10*3/MM3 (ref 1.7–7)
NEUTROPHILS NFR BLD MANUAL: 78 % (ref 42.7–76)
NEUTS BAND NFR BLD MANUAL: 7 % (ref 0–5)
NEUTS VAC BLD QL SMEAR: ABNORMAL
PCO2 BLDV: 38.3 MM HG (ref 42–51)
PH BLDV: 7.24 PH UNITS (ref 7.32–7.43)
PHOSPHATE SERPL-MCNC: 2.5 MG/DL (ref 2.5–4.5)
PLATELET # BLD AUTO: 85 10*3/MM3 (ref 140–450)
PMV BLD AUTO: 11.1 FL (ref 6–12)
PO2 BLDV: 52.6 MM HG (ref 40–42)
POIKILOCYTOSIS BLD QL SMEAR: ABNORMAL
POTASSIUM SERPL-SCNC: 3.7 MMOL/L (ref 3.5–5.2)
PROT SERPL-MCNC: 5.2 G/DL (ref 6–8.5)
QT INTERVAL: 312 MS
QTC INTERVAL: 496 MS
RBC # BLD AUTO: 3.06 10*6/MM3 (ref 3.77–5.28)
RETICS # AUTO: 0.02 10*6/MM3 (ref 0.02–0.13)
RETICS/RBC NFR AUTO: 0.71 % (ref 0.7–1.9)
SAO2 % BLDCOV: 80.9 % (ref 45–75)
SCAN SLIDE: NORMAL
SMALL PLATELETS BLD QL SMEAR: ABNORMAL
SODIUM SERPL-SCNC: 146 MMOL/L (ref 136–145)
TIBC SERPL-MCNC: 200 MCG/DL (ref 298–536)
TRANSFERRIN SERPL-MCNC: 134 MG/DL (ref 200–360)
VARIANT LYMPHS NFR BLD MANUAL: 1 % (ref 0–5)
VARIANT LYMPHS NFR BLD MANUAL: 7 % (ref 19.6–45.3)
WBC NRBC COR # BLD: 16.2 10*3/MM3 (ref 3.4–10.8)

## 2023-10-21 PROCEDURE — 94761 N-INVAS EAR/PLS OXIMETRY MLT: CPT

## 2023-10-21 PROCEDURE — 94799 UNLISTED PULMONARY SVC/PX: CPT

## 2023-10-21 PROCEDURE — 63710000001 INSULIN LISPRO (HUMAN) PER 5 UNITS: Performed by: INTERNAL MEDICINE

## 2023-10-21 PROCEDURE — 84466 ASSAY OF TRANSFERRIN: CPT | Performed by: INTERNAL MEDICINE

## 2023-10-21 PROCEDURE — 80053 COMPREHEN METABOLIC PANEL: CPT | Performed by: NURSE PRACTITIONER

## 2023-10-21 PROCEDURE — 25010000002 ALBUMIN HUMAN 25% PER 50 ML: Performed by: NURSE PRACTITIONER

## 2023-10-21 PROCEDURE — 83735 ASSAY OF MAGNESIUM: CPT | Performed by: NURSE PRACTITIONER

## 2023-10-21 PROCEDURE — 82803 BLOOD GASES ANY COMBINATION: CPT

## 2023-10-21 PROCEDURE — P9047 ALBUMIN (HUMAN), 25%, 50ML: HCPCS | Performed by: NURSE PRACTITIONER

## 2023-10-21 PROCEDURE — 85025 COMPLETE CBC W/AUTO DIFF WBC: CPT | Performed by: NURSE PRACTITIONER

## 2023-10-21 PROCEDURE — 71045 X-RAY EXAM CHEST 1 VIEW: CPT

## 2023-10-21 PROCEDURE — 25010000002 THIAMINE HCL 200 MG/2ML SOLUTION: Performed by: NURSE PRACTITIONER

## 2023-10-21 PROCEDURE — 25010000002 HYDRALAZINE PER 20 MG: Performed by: NURSE PRACTITIONER

## 2023-10-21 PROCEDURE — 25010000002 FUROSEMIDE PER 20 MG: Performed by: INTERNAL MEDICINE

## 2023-10-21 PROCEDURE — 83540 ASSAY OF IRON: CPT | Performed by: INTERNAL MEDICINE

## 2023-10-21 PROCEDURE — 82948 REAGENT STRIP/BLOOD GLUCOSE: CPT

## 2023-10-21 PROCEDURE — 25810000003 SODIUM CHLORIDE 0.9 % SOLUTION: Performed by: INTERNAL MEDICINE

## 2023-10-21 PROCEDURE — 25010000002 CEFTRIAXONE PER 250 MG: Performed by: INTERNAL MEDICINE

## 2023-10-21 PROCEDURE — 94664 DEMO&/EVAL PT USE INHALER: CPT

## 2023-10-21 PROCEDURE — 25010000002 NA FERRIC GLUC CPLX PER 12.5 MG: Performed by: INTERNAL MEDICINE

## 2023-10-21 PROCEDURE — 84100 ASSAY OF PHOSPHORUS: CPT | Performed by: NURSE PRACTITIONER

## 2023-10-21 PROCEDURE — 85007 BL SMEAR W/DIFF WBC COUNT: CPT | Performed by: NURSE PRACTITIONER

## 2023-10-21 PROCEDURE — 85045 AUTOMATED RETICULOCYTE COUNT: CPT | Performed by: INTERNAL MEDICINE

## 2023-10-21 RX ORDER — HYDRALAZINE HYDROCHLORIDE 20 MG/ML
10 INJECTION INTRAMUSCULAR; INTRAVENOUS EVERY 6 HOURS PRN
Status: DISCONTINUED | OUTPATIENT
Start: 2023-10-21 | End: 2023-10-25 | Stop reason: HOSPADM

## 2023-10-21 RX ORDER — IPRATROPIUM BROMIDE AND ALBUTEROL SULFATE 2.5; .5 MG/3ML; MG/3ML
3 SOLUTION RESPIRATORY (INHALATION) ONCE
Status: COMPLETED | OUTPATIENT
Start: 2023-10-21 | End: 2023-10-21

## 2023-10-21 RX ORDER — POLYETHYLENE GLYCOL 3350 17 G/17G
17 POWDER, FOR SOLUTION ORAL DAILY
Status: DISCONTINUED | OUTPATIENT
Start: 2023-10-21 | End: 2023-10-25 | Stop reason: HOSPADM

## 2023-10-21 RX ORDER — FUROSEMIDE 10 MG/ML
80 INJECTION INTRAMUSCULAR; INTRAVENOUS ONCE
Status: COMPLETED | OUTPATIENT
Start: 2023-10-21 | End: 2023-10-21

## 2023-10-21 RX ORDER — LISINOPRIL 5 MG/1
5 TABLET ORAL
Status: DISCONTINUED | OUTPATIENT
Start: 2023-10-21 | End: 2023-10-22

## 2023-10-21 RX ADMIN — HYDROCODONE BITARTRATE AND ACETAMINOPHEN 1 TABLET: 5; 325 TABLET ORAL at 20:09

## 2023-10-21 RX ADMIN — INSULIN LISPRO 2 UNITS: 100 INJECTION, SOLUTION INTRAVENOUS; SUBCUTANEOUS at 18:17

## 2023-10-21 RX ADMIN — SENNOSIDES AND DOCUSATE SODIUM 2 TABLET: 50; 8.6 TABLET ORAL at 20:09

## 2023-10-21 RX ADMIN — IPRATROPIUM BROMIDE AND ALBUTEROL SULFATE 3 ML: .5; 3 SOLUTION RESPIRATORY (INHALATION) at 15:30

## 2023-10-21 RX ADMIN — LISINOPRIL 5 MG: 5 TABLET ORAL at 17:59

## 2023-10-21 RX ADMIN — OXYBUTYNIN CHLORIDE 5 MG: 5 TABLET ORAL at 20:09

## 2023-10-21 RX ADMIN — ROSUVASTATIN 5 MG: 10 TABLET, FILM COATED ORAL at 08:38

## 2023-10-21 RX ADMIN — HYDRALAZINE HYDROCHLORIDE 10 MG: 20 INJECTION INTRAMUSCULAR; INTRAVENOUS at 16:39

## 2023-10-21 RX ADMIN — SENNOSIDES AND DOCUSATE SODIUM 2 TABLET: 50; 8.6 TABLET ORAL at 08:44

## 2023-10-21 RX ADMIN — POLYETHYLENE GLYCOL 3350 17 G: 17 POWDER, FOR SOLUTION ORAL at 12:03

## 2023-10-21 RX ADMIN — LEVOTHYROXINE SODIUM 112 MCG: 0.11 TABLET ORAL at 05:03

## 2023-10-21 RX ADMIN — THIAMINE HYDROCHLORIDE 200 MG: 100 INJECTION, SOLUTION INTRAMUSCULAR; INTRAVENOUS at 05:03

## 2023-10-21 RX ADMIN — PANTOPRAZOLE SODIUM 40 MG: 40 TABLET, DELAYED RELEASE ORAL at 20:09

## 2023-10-21 RX ADMIN — THIAMINE HYDROCHLORIDE 200 MG: 100 INJECTION, SOLUTION INTRAMUSCULAR; INTRAVENOUS at 14:58

## 2023-10-21 RX ADMIN — Medication 10 ML: at 08:44

## 2023-10-21 RX ADMIN — MIDODRINE HYDROCHLORIDE 10 MG: 5 TABLET ORAL at 05:03

## 2023-10-21 RX ADMIN — HYDROCODONE BITARTRATE AND ACETAMINOPHEN 1 TABLET: 5; 325 TABLET ORAL at 00:35

## 2023-10-21 RX ADMIN — GABAPENTIN 300 MG: 300 CAPSULE ORAL at 20:09

## 2023-10-21 RX ADMIN — FOLIC ACID 1 MG: 1 TABLET ORAL at 08:39

## 2023-10-21 RX ADMIN — Medication 10 ML: at 08:40

## 2023-10-21 RX ADMIN — THIAMINE HYDROCHLORIDE 200 MG: 100 INJECTION, SOLUTION INTRAMUSCULAR; INTRAVENOUS at 23:42

## 2023-10-21 RX ADMIN — HYDROCODONE BITARTRATE AND ACETAMINOPHEN 1 TABLET: 5; 325 TABLET ORAL at 23:42

## 2023-10-21 RX ADMIN — TRAZODONE HYDROCHLORIDE 50 MG: 50 TABLET ORAL at 20:09

## 2023-10-21 RX ADMIN — FUROSEMIDE 80 MG: 10 INJECTION, SOLUTION INTRAMUSCULAR; INTRAVENOUS at 12:04

## 2023-10-21 RX ADMIN — HYDROCODONE BITARTRATE AND ACETAMINOPHEN 1 TABLET: 5; 325 TABLET ORAL at 05:03

## 2023-10-21 RX ADMIN — SODIUM CHLORIDE 250 MG: 9 INJECTION, SOLUTION INTRAVENOUS at 14:58

## 2023-10-21 RX ADMIN — CEFTRIAXONE 2000 MG: 2 INJECTION, POWDER, FOR SOLUTION INTRAMUSCULAR; INTRAVENOUS at 11:57

## 2023-10-21 RX ADMIN — OXYBUTYNIN CHLORIDE 5 MG: 5 TABLET ORAL at 08:39

## 2023-10-21 RX ADMIN — PANTOPRAZOLE SODIUM 40 MG: 40 TABLET, DELAYED RELEASE ORAL at 08:39

## 2023-10-21 RX ADMIN — MULTIPLE VITAMINS W/ MINERALS TAB 1 TABLET: TAB at 08:39

## 2023-10-21 RX ADMIN — ALBUMIN (HUMAN) 25 G: 0.25 INJECTION, SOLUTION INTRAVENOUS at 00:35

## 2023-10-21 NOTE — PROGRESS NOTES
"Critical Care Progress Note     Pamela Zavala : 1940 MRN:[id number] LOS:3  Rm: 2304/1     Principal Problem: Sepsis without acute organ dysfunction     Reason for follow up: All the medical problems listed below    Summary     Pamela Zavala is a 82 y.o. old female patient with PMH of CAD, hypertension, hyperlipidemia hypothyroidism type 2 diabetes who presented to the hospital with large left renal stone after a lithotripsy and stent placement, and underwent a planned staged procedure to clear remaining fragments.  When in phase 2, patient had run of SVT refractory to esmolol and was treated with adenosine, and subsequently started on a Cardizem gtt.  Was admitted to the hospitalist group on PCU.  Cardiology was consulted.  She developed fever and sepsis work-up was initiated.  It is documented that patient wants CPR but did not want to be intubated.  On the evening of 10/18/2023 a rapid response was called as patient was unresponsive with hypotension.  He had gone down for CT abdomen pelvis, and upon return to her room she was unresponsive.  Labs were obtained and ABG showed hypoxia, as well as multiple electrolyte imbalances.  Patient was given fluid challenge, but remained hypotensive.  She was transferred to ICU for initiation of Levophed.  Subsequently CT abdomen pelvis was read and showed \"a large cyst seen within the posterior aspect of the left kidney which was present on the previous exam from 2023 though appears to demonstrate a subtle fluid level on today's exam likely representing a new small amount of hemorrhage within the cyst. Stranding is seen surrounding the kidneys bilaterally which is new as compared to the previous study which is new as compared to the previous study and may represent increased back pressure hemorrhage, or inflammatory change. Hemorrhage is suspected given the amount of stranding on the left and presence of new hyperdensity within the left kidney. A small amount of " "hyperdensity is seen within the proximal collecting system of the left kidney consistent with a small amount of hemorrhage   with probable tiny stones. There is no evidence of hydronephrosis. A left ureteral stent is present which appears in appropriate position\".  Urology was called by hospitalist, and suggested CBI and supportive measures at this time.     ACP: Patient has advanced directive on file at this facility.  Her family has elected to make her a DO NOT RESUSCITATE/DO NOT INTUBATE.    Significant Events     10/21/23 : Remains afebrile.  No longer requiring vasopressor support, hemodynamically stable. Continue Midodrine.  Remains volume overloaded with ~23L positive although I/O's not accurately recorded. Breath sounds coarse today, will diurese x1. O2 needs stable. Stable for transfer out of the ICU.  Dr. Coronado to assume primary medical management    Assessment / Plan     Shock, multifactorial--hemorrhagic and septic  -Likely shock multifactorial, related to ABLA and acute cystitis/pyelonephritis with cytokine storm following stent placement  -S/p lithotripsy/stent placement  -Blood cultures pending, negative to date  -Urine culture negative  -Hemodynamically stable, off pressors  -Continue midodrine  -De-escalated Merrem and vancomycin to Rocephin monotherapy x 14 days.       ABLA  Potential hemorrhage within the posterior aspect of left kidney  -Hgb 11 -> 7.0, one unit PRBC transfused this admission, transfuse for hgb < 7.0  -CT reviewed.  -Urology following  -CBI discontinued      Recent nephrolithiasis  -S/p lithotripsy and stent placement for left large renal stone  -S/p staged procedure to clear remaining fragments 10/18/2023  -CT abd- Hemorrhage is suspected given the amount of stranding on the left and presence of new hyperdensity within the left kidney   -Urology following, no urgent plans for surgical intervention.      Hypokalemia  Hypocalcemia  Hypomagnesemia  -Electrolyte replacement per " "protocol  -Monitor for arrhythmias       Essential hypertension  -Hold home medications due to marginal BP requiring midodrine  -Resume home antihypertensives once clinically appropriate       Type 2 diabetes mellitus, controlled  -A1c 7.60  -Hold home metformin while in ICU  -Every 6 hours Accu-Cheks  -Sliding scale insulin for coverage       Alcohol abuse disorder  -[facility] protocol  -Thiamine/Folic acid/MVI      Hyperlipidemia: Continue home rosuvastatin  Hypothyroidism: Continue home levothyroxine      Code status:   Medical Intervention Limits: NO intubation (DNI)  Code Status (Patient has no pulse and is not breathing): No CPR (Do Not Attempt to Resuscitate)  Medical Interventions (Patient has pulse or is breathing): Limited Support       Nutrition:   Diet: Diabetic Diets; Consistent Carbohydrate; Texture: Regular Texture (IDDSI 7); Fluid Consistency: Thin (IDDSI 0)   Patient isn't on Tube Feeding     DVT prophylaxis:  Mechanical DVT prophylaxis orders are present.     Subjective / Review of systems     Pt denies SOB or CP.  Reports mild abdominal discomfort \"I got to go to the bathroom\"    Objective / Physical Exam     Vital signs:  Temp: 96.8 °F (36 °C)  BP: 112/59  Heart Rate: 95  Resp: 28  SpO2: 94 %  Weight: 72.2 kg (159 lb 2.8 oz)    Admission Weight: Weight: 59 kg (130 lb)  Current Weight: Weight: 72.2 kg (159 lb 2.8 oz)    Input/Output in last 24 hours:    Intake/Output Summary (Last 24 hours) at 10/21/2023 0943  Last data filed at 10/20/2023 2240  Gross per 24 hour   Intake 2087 ml   Output -75 ml   Net 2162 ml      Net IO Since Admission: 23,398.5 mL [10/21/23 0943]     Physical Exam  Vitals and nursing note reviewed.   Constitutional:       General: She is not in acute distress.     Appearance: Normal appearance. She is not ill-appearing, toxic-appearing or diaphoretic.   HENT:      Head: Normocephalic and atraumatic.   Eyes:      General:         Right eye: No discharge.         Left eye: No " discharge.   Neck:      Comments: No JVD  Trachea midline  Cardiovascular:      Heart sounds: Normal heart sounds. No murmur heard.     No gallop.      Comments: Sinus rhythm  Pulmonary:      Effort: Pulmonary effort is normal. No respiratory distress.      Breath sounds: Wheezing and rales present.   Abdominal:      General: Bowel sounds are normal. There is no distension.      Palpations: Abdomen is soft.      Tenderness: There is abdominal tenderness. There is no rebound.   Musculoskeletal:         General: No deformity.      Cervical back: No rigidity.      Right lower leg: Edema present.      Left lower leg: Edema present.   Skin:     General: Skin is warm and dry.   Neurological:      General: No focal deficit present.      Mental Status: She is alert and oriented to person, place, and time. Mental status is at baseline.        Radiology and Labs     Results from last 7 days   Lab Units 10/21/23  0537 10/20/23  0653 10/20/23  0027 10/19/23  1749 10/19/23  1135 10/19/23  0453 10/19/23  0109 10/18/23  2202 10/18/23  2156 10/18/23  1904   WBC 10*3/mm3 16.20* 24.90*  --   --   --  19.60*  --  8.10  --  6.30   HEMOGLOBIN g/dL 8.2* 9.8* 10.6* 8.9* 7.0* 8.9*   < > 8.4*  --  9.5*   HEMOGLOBIN, POC   --   --   --   --   --   --   --   --    < >  --    HEMATOCRIT % 26.3* 29.6* 32.8* 28.6* 22.8* 28.9*   < > 26.7*  --  30.3*   HEMATOCRIT POC   --   --   --   --   --   --   --   --    < >  --    PLATELETS 10*3/mm3 85* 127*  --   --   --  207  --  172  --  251    < > = values in this interval not displayed.      Results from last 7 days   Lab Units 10/18/23  2202   PROTIME Seconds 14.5*   INR  1.36*      Results from last 7 days   Lab Units 10/21/23  0537 10/21/23  0452 10/20/23  0603 10/19/23  0453 10/18/23  2202 10/18/23  2156 10/18/23  2051 10/18/23  1901   SODIUM mmol/L 146*  --  148* 136  --   --  140 142   POTASSIUM mmol/L 3.7  --  4.2 4.2 2.8*  --  3.1* 3.4*   CHLORIDE mmol/L 121*  --  122* 106  --   --  104 105    CO2 mmol/L 17.0*  --  18.0* 20.0*  --   --  21.0* 21.0*   BUN mg/dL 25*  --  30* 19  --   --  18 17   CREATININE mg/dL 0.98  --  0.94 0.76  --  0.91 0.92 0.85   GLUCOSE mg/dL 207*  --  94 147*  --   --  103* 132*   MAGNESIUM mg/dL  --  1.7  --   --  2.1  --   --  0.8*   PHOSPHORUS mg/dL  --  2.5  --   --   --   --  2.6 2.9      Results from last 7 days   Lab Units 10/21/23  0537 10/19/23  0453 10/18/23  2051 10/18/23  1901   ALK PHOS U/L 197* 88 72 74   AST (SGOT) U/L 93* 398* 195* 76*   ALT (SGPT) U/L 80* 149* 66* 29     Results from last 7 days   Lab Units 10/18/23  2156   PH, ARTERIAL pH units 7.327*   PCO2, ARTERIAL mm Hg 42.8   PO2 ART mm Hg 61.1*   O2 SATURATION ART % 89.1*   FIO2 % 32   HCO3 ART mmol/L 22.4   BASE EXCESS ART mmol/L -3.4*     XR Chest 1 View    Result Date: 10/19/2023  Impression: Right arm approach PICC tip terminates at the caval atrial junction. No visible pneumothorax. No acute chest finding. Electronically Signed: Virginia Acevedo MD  10/19/2023 10:27 AM EDT  Workstation ID: [id number]     Current medications     Scheduled Meds:   cefTRIAXone, 2,000 mg, Intravenous, Q24H  folic acid, 1 mg, Oral, Daily  gabapentin, 300 mg, Oral, Nightly  insulin lispro, 2-7 Units, Subcutaneous, TID With Meals  lactulose, 10 g, Oral, BID  levothyroxine, 112 mcg, Oral, Daily  midodrine, 10 mg, Oral, Q8H  multivitamin with minerals, 1 tablet, Oral, Daily  oxybutynin, 5 mg, Oral, BID  pantoprazole, 40 mg, Oral, BID  rosuvastatin, 5 mg, Oral, Daily  senna-docusate sodium, 2 tablet, Oral, BID  sodium chloride, 10 mL, Intravenous, Q12H  sodium chloride, 10 mL, Intravenous, Q12H  sodium chloride, 10 mL, Intravenous, Q12H  sodium chloride, 10 mL, Intravenous, Q12H  thiamine (B-1) IV, 200 mg, Intravenous, Q8H   Followed by  [START ON 10/24/2023] thiamine, 100 mg, Oral, Daily  traZODone, 50 mg, Oral, Nightly        Continuous Infusions:   dextrose 5 % and sodium chloride 0.9 %, 75 mL/hr, Last Rate: Stopped (10/21/23  0613)  norepinephrine, 0.02-0.3 mcg/kg/min, Last Rate: 0.02 mcg/kg/min (10/21/23 2296)                CLAUDE Gallagher   Critical Care  10/21/23   09:43 EDT

## 2023-10-21 NOTE — PROGRESS NOTES
CC: I feel tired    Patient resting in bed, family at bedside    3 way chapa intact, slow drip clear urine  Abdomen soft  Pain controlled with ureteral stent     Afebrile, , remains on levophed    Cr 0.98  WBC 16.2  Hgb 8.2    Plan:  Continue 3 way chapa, titrate as needed  Continue IV Rocephin per primary   Will follow     No acute urologic surgical intervention

## 2023-10-21 NOTE — PROGRESS NOTES
LOS: 3 days   Patient Care Team:  Marisol Rodgers APRN as PCP - General (Pulmonary Disease)    Subjective     Interval History: Weaned off pressors    Patient Complaints: Generally weak, no specific complaints of pain    History taken from: patient    Review of Systems   Constitutional:  Positive for activity change. Negative for appetite change, diaphoresis, fatigue and fever.   HENT:  Negative for facial swelling.    Eyes:  Negative for visual disturbance.   Respiratory:  Negative for cough, shortness of breath, wheezing and stridor.    Cardiovascular:  Negative for chest pain, palpitations and leg swelling.   Gastrointestinal:  Negative for abdominal pain, constipation, diarrhea, nausea and vomiting.   Endocrine: Negative for polyuria.   Genitourinary:  Negative for dysuria.   Musculoskeletal:  Negative for arthralgias, back pain and neck pain.   Skin:  Negative for pallor, rash and wound.   Neurological:  Positive for weakness. Negative for dizziness, tremors and headaches.   Psychiatric/Behavioral:  Positive for confusion.            Objective     Vital Signs  Temp:  [96.4 °F (35.8 °C)-99.2 °F (37.3 °C)] 96.4 °F (35.8 °C)  Heart Rate:  [] 102  BP: ()/() 129/62    Physical Exam:     General Appearance:    Alert, cooperative, in no acute distress, chronically ill-appearing   Head:    Normocephalic, without obvious abnormality, atraumatic   Eyes:            Lids and lashes normal, conjunctivae and sclerae normal, no   icterus, no pallor, corneas clear, PERRLA   Ears:    Ears appear intact with no abnormalities noted   Throat:   No oral lesions, no thrush, oral mucosa moist   Neck:   No adenopathy, supple, trachea midline, no thyromegaly, no   carotid bruit, no JVD   Lungs:     Clear to auscultation,respirations regular, even and                  unlabored    Heart:    Regular rhythm and normal rate, normal S1 and S2, no            murmur, no gallop, no rub, no click   Chest Wall:    No  abnormalities observed   Abdomen:     Normal bowel sounds, no masses, no organomegaly, soft        Non-tender non-distended, no guarding,   Extremities:   Moves all extremities well, no edema, no cyanosis, no             Redness   Pulses:   Pulses palpable and equal bilaterally   Skin:   No bleeding, bruising or rash   Lymph nodes:   No palpable adenopathy   Neurologic:   Cranial nerves 2 - 12 grossly intact, sensation intact, DTR       present and equal bilaterally        Results Review:    Lab Results (last 24 hours)       Procedure Component Value Units Date/Time    Blood Gas, Venous - [[id number]]  (Abnormal) Collected: 10/21/23 1113    Specimen: Venous Blood Updated: 10/21/23 1118     Site Left Brachial     pH, Venous 7.240 pH Units      pCO2, Venous 38.3 mm Hg      pO2, Venous 52.6 mm Hg      HCO3, Venous 16.4 mmol/L      Base Excess, Venous -10.2 mmol/L      Comment: Serial Number: [id number]:  [id number]        O2 Saturation, Venous 80.9 %      CO2 Content 17.6 mmol/L      Barometric Pressure for Blood Gas --     Comment: N/A        Modality Cannula    POC Glucose Once [[id number]]  (Abnormal) Collected: 10/21/23 0741    Specimen: Blood Updated: 10/21/23 0743     Glucose 159 mg/dL      Comment: Serial Number: [id number]:  [id number]       CBC & Differential [[id number]]  (Abnormal) Collected: 10/21/23 0537    Specimen: Blood Updated: 10/21/23 0712    Narrative:      The following orders were created for panel order CBC & Differential.  Procedure                               Abnormality         Status                     ---------                               -----------         ------                     CBC Auto Differential[[id number]]        Abnormal            Final result               Scan Slide[[id number]]                                       Final result                 Please view results for these tests on the individual orders.    CBC Auto Differential [[id number]]  (Abnormal) Collected:  10/21/23 0537    Specimen: Blood Updated: 10/21/23 0712     WBC 16.20 10*3/mm3      RBC 3.06 10*6/mm3      Hemoglobin 8.2 g/dL      Hematocrit 26.3 %      MCV 85.9 fL      MCH 27.0 pg      MCHC 31.4 g/dL      RDW 18.7 %      RDW-SD 58.6 fl      MPV 11.1 fL      Platelets 85 10*3/mm3     Narrative:      The previously reported component NRBC is no longer being reported. Previous result was 0.1 /100 WBC (Reference Range: 0.0-0.2 /100 WBC) on 10/21/2023 at 0602 EDT.    Scan Slide [[id number]] Collected: 10/21/23 0537    Specimen: Blood Updated: 10/21/23 0712     Scan Slide --     Comment: See Manual Differential Results       Manual Differential [[id number]]  (Abnormal) Collected: 10/21/23 0537    Specimen: Blood Updated: 10/21/23 0712     Neutrophil % 78.0 %      Lymphocyte % 7.0 %      Monocyte % 4.0 %      Eosinophil % 1.0 %      Basophil % 1.0 %      Bands %  7.0 %      Metamyelocyte % 1.0 %      Atypical Lymphocyte % 1.0 %      Neutrophils Absolute 13.77 10*3/mm3      Lymphocytes Absolute 1.30 10*3/mm3      Monocytes Absolute 0.65 10*3/mm3      Eosinophils Absolute 0.16 10*3/mm3      Basophils Absolute 0.16 10*3/mm3      Anisocytosis Slight/1+     Poikilocytes Slight/1+     Dohle Bodies Present     Vacuolated Neutrophils Slight/1+     Platelet Estimate Decreased    Comprehensive Metabolic Panel [[id number]]  (Abnormal) Collected: 10/21/23 0537    Specimen: Blood Updated: 10/21/23 0608     Glucose 207 mg/dL      BUN 25 mg/dL      Creatinine 0.98 mg/dL      Sodium 146 mmol/L      Potassium 3.7 mmol/L      Chloride 121 mmol/L      CO2 17.0 mmol/L      Calcium 7.5 mg/dL      Total Protein 5.2 g/dL      Albumin 3.2 g/dL      ALT (SGPT) 80 U/L      AST (SGOT) 93 U/L      Alkaline Phosphatase 197 U/L      Total Bilirubin 0.9 mg/dL      Globulin 2.0 gm/dL      A/G Ratio 1.6 g/dL      BUN/Creatinine Ratio 25.5     Anion Gap 8.0 mmol/L      eGFR 57.0 mL/min/1.73     Narrative:      GFR Normal >60  Chronic Kidney Disease  <60  Kidney Failure <15    The GFR formula is only valid for adults with stable renal function between ages 18 and 70.    Magnesium [[id number]]  (Normal) Collected: 10/21/23 0452    Specimen: Blood Updated: 10/21/23 0522     Magnesium 1.7 mg/dL     Phosphorus [[id number]]  (Normal) Collected: 10/21/23 0452    Specimen: Blood Updated: 10/21/23 0519     Phosphorus 2.5 mg/dL     POC Glucose Once [[id number]]  (Abnormal) Collected: 10/20/23 2024    Specimen: Blood Updated: 10/20/23 2025     Glucose 202 mg/dL      Comment: Serial Number: [id number]:  [id number]       Blood Culture - Blood, Arm, Left [[id number]]  (Normal) Collected: 10/18/23 1901    Specimen: Blood from Arm, Left Updated: 10/20/23 1915     Blood Culture No growth at 2 days    Narrative:      Less than seven (7) mL's of blood was collected.  Insufficient quantity may yield false negative results.    Blood Culture - Blood, Hand, Left [[id number]]  (Normal) Collected: 10/18/23 1905    Specimen: Blood from Hand, Left Updated: 10/20/23 1915     Blood Culture No growth at 2 days    Narrative:      Less than seven (7) mL's of blood was collected.  Insufficient quantity may yield false negative results.    POC Glucose Once [[id number]]  (Normal) Collected: 10/20/23 1630    Specimen: Blood Updated: 10/20/23 1632     Glucose 92 mg/dL      Comment: Serial Number: [id number]:  [id number]       Pathology Consultation [[id number]] Collected: 10/20/23 0653    Specimen: Blood, Venous Line Updated: 10/20/23 1215     Final Diagnosis --     Leukocytosis with left shift  Normocytic anemia  Mild thrombocytopenia  No blasts identified       Case Report --     Surgical Pathology Report                         Case: [id number]                                  Authorizing Provider:  Surjit Morris MD    Collected:           10/20/2023 06:53 AM          Ordering Location:     [facility]       Received:            10/20/2023 07:37 AM                                  [facility]                                                          Pathologist:           Nestor Tuttle MD                                                             Specimen:    Blood, Venous Line                                                                                  Imaging Results (Last 24 Hours)       ** No results found for the last 24 hours. **                 I reviewed the patient's new clinical results.    Medication Review:   Scheduled Meds:cefTRIAXone, 2,000 mg, Intravenous, Q24H  folic acid, 1 mg, Oral, Daily  gabapentin, 300 mg, Oral, Nightly  insulin lispro, 2-7 Units, Subcutaneous, TID With Meals  lactulose, 10 g, Oral, BID  levothyroxine, 112 mcg, Oral, Daily  midodrine, 10 mg, Oral, Q8H  multivitamin with minerals, 1 tablet, Oral, Daily  oxybutynin, 5 mg, Oral, BID  pantoprazole, 40 mg, Oral, BID  polyethylene glycol, 17 g, Oral, Daily  rosuvastatin, 5 mg, Oral, Daily  senna-docusate sodium, 2 tablet, Oral, BID  sodium chloride, 10 mL, Intravenous, Q12H  sodium chloride, 10 mL, Intravenous, Q12H  sodium chloride, 10 mL, Intravenous, Q12H  sodium chloride, 10 mL, Intravenous, Q12H  thiamine (B-1) IV, 200 mg, Intravenous, Q8H   Followed by  [START ON 10/24/2023] thiamine, 100 mg, Oral, Daily  traZODone, 50 mg, Oral, Nightly      Continuous Infusions:   PRN Meds:.  acetaminophen    acetaminophen    senna-docusate sodium **AND** polyethylene glycol **AND** bisacodyl **AND** bisacodyl    Calcium Replacement - Follow Nurse / BPA Driven Protocol    dextrose    dextrose    Diclofenac Sodium    glucagon (human recombinant)    HYDROcodone-acetaminophen    Magnesium Standard Dose Replacement - Follow Nurse / BPA Driven Protocol    nitroglycerin    Phosphorus Replacement - Follow Nurse / BPA Driven Protocol    Potassium Replacement - Follow Nurse / BPA Driven Protocol    sodium chloride    sodium chloride    sodium chloride    sodium chloride    sodium chloride    traMADol      Assessment & Plan       Sepsis without acute organ dysfunction    Coronary arteriosclerosis    Type 2 diabetes mellitus    Diabetic neuropathy    Essential hypertension    Hyperlipidemia    Postoperative hypothyroidism    Ex-smoker    History of thyroid cancer    Kidney stones    UTI (urinary tract infection), bacterial    Sepsis    Septic shock-improving with fluid resuscitation and IV antibiotics.  Able to be weaned off pressors.  Urine culture is negative.  Final blood cultures are pending.  Plan to continue Rocephin for now.  White blood cell count is trending down.    Anemia hemoglobin has trended down.  We will check TIBC and anticipate giving IV iron.  No visible signs of active blood loss.  Hemorrhage within the kidney-stable  Hypokalemia-replaced  Hypocalcemia-replaced hypomagnesemia-replaced  Hypotension-midodrine  Alcohol abuse-thiamine  Overactive bladder-oxybutynin  Hyperlipidemia-statin  Hypothyroidism-levothyroxine        Plan for disposition:Skilled rehab    Sarah Beth Coronado MD  10/21/23  12:09 EDT

## 2023-10-22 LAB
ACANTHOCYTES BLD QL SMEAR: ABNORMAL
ALBUMIN SERPL-MCNC: 2.7 G/DL (ref 3.5–5.2)
ALBUMIN/GLOB SERPL: 1.4 G/DL
ALP SERPL-CCNC: 264 U/L (ref 39–117)
ALT SERPL W P-5'-P-CCNC: 59 U/L (ref 1–33)
ANION GAP SERPL CALCULATED.3IONS-SCNC: 11 MMOL/L (ref 5–15)
ANISOCYTOSIS BLD QL: ABNORMAL
AST SERPL-CCNC: 46 U/L (ref 1–32)
BILIRUB SERPL-MCNC: 0.9 MG/DL (ref 0–1.2)
BUN SERPL-MCNC: 17 MG/DL (ref 8–23)
BUN/CREAT SERPL: 21.5 (ref 7–25)
BURR CELLS BLD QL SMEAR: ABNORMAL
CALCIUM SPEC-SCNC: 6.9 MG/DL (ref 8.6–10.5)
CHLORIDE SERPL-SCNC: 114 MMOL/L (ref 98–107)
CO2 SERPL-SCNC: 22 MMOL/L (ref 22–29)
CREAT SERPL-MCNC: 0.79 MG/DL (ref 0.57–1)
DACRYOCYTES BLD QL SMEAR: ABNORMAL
DEPRECATED RDW RBC AUTO: 58.6 FL (ref 37–54)
EGFRCR SERPLBLD CKD-EPI 2021: 73.9 ML/MIN/1.73
EOSINOPHIL # BLD MANUAL: 0.16 10*3/MM3 (ref 0–0.4)
EOSINOPHIL NFR BLD MANUAL: 1 % (ref 0.3–6.2)
ERYTHROCYTE [DISTWIDTH] IN BLOOD BY AUTOMATED COUNT: 18.8 % (ref 12.3–15.4)
FERRITIN SERPL-MCNC: 323.7 NG/ML (ref 13–150)
GLOBULIN UR ELPH-MCNC: 2 GM/DL
GLUCOSE BLDC GLUCOMTR-MCNC: 115 MG/DL (ref 70–105)
GLUCOSE BLDC GLUCOMTR-MCNC: 166 MG/DL (ref 70–105)
GLUCOSE BLDC GLUCOMTR-MCNC: 174 MG/DL (ref 70–105)
GLUCOSE SERPL-MCNC: 169 MG/DL (ref 65–99)
HCT VFR BLD AUTO: 24.6 % (ref 34–46.6)
HGB BLD-MCNC: 7.8 G/DL (ref 12–15.9)
LARGE PLATELETS: ABNORMAL
LYMPHOCYTES # BLD MANUAL: 0.94 10*3/MM3 (ref 0.7–3.1)
MAGNESIUM SERPL-MCNC: 1.3 MG/DL (ref 1.6–2.4)
MCH RBC QN AUTO: 26.7 PG (ref 26.6–33)
MCHC RBC AUTO-ENTMCNC: 31.6 G/DL (ref 31.5–35.7)
MCV RBC AUTO: 84.6 FL (ref 79–97)
METAMYELOCYTES NFR BLD MANUAL: 4 % (ref 0–0)
MICROCYTES BLD QL: ABNORMAL
NEUTROPHILS # BLD AUTO: 13.88 10*3/MM3 (ref 1.7–7)
NEUTROPHILS NFR BLD MANUAL: 76 % (ref 42.7–76)
NEUTS BAND NFR BLD MANUAL: 13 % (ref 0–5)
OVALOCYTES BLD QL SMEAR: ABNORMAL
PHOSPHATE SERPL-MCNC: 1.2 MG/DL (ref 2.5–4.5)
PLATELET # BLD AUTO: 73 10*3/MM3 (ref 140–450)
PMV BLD AUTO: 10.6 FL (ref 6–12)
POIKILOCYTOSIS BLD QL SMEAR: ABNORMAL
POTASSIUM SERPL-SCNC: 2.8 MMOL/L (ref 3.5–5.2)
PROT SERPL-MCNC: 4.7 G/DL (ref 6–8.5)
RBC # BLD AUTO: 2.91 10*6/MM3 (ref 3.77–5.28)
SCAN SLIDE: NORMAL
SMALL PLATELETS BLD QL SMEAR: ABNORMAL
SODIUM SERPL-SCNC: 147 MMOL/L (ref 136–145)
VARIANT LYMPHS NFR BLD MANUAL: 6 % (ref 19.6–45.3)
WBC MORPH BLD: NORMAL
WBC NRBC COR # BLD: 15.6 10*3/MM3 (ref 3.4–10.8)

## 2023-10-22 PROCEDURE — 84100 ASSAY OF PHOSPHORUS: CPT | Performed by: NURSE PRACTITIONER

## 2023-10-22 PROCEDURE — 85025 COMPLETE CBC W/AUTO DIFF WBC: CPT | Performed by: NURSE PRACTITIONER

## 2023-10-22 PROCEDURE — 82948 REAGENT STRIP/BLOOD GLUCOSE: CPT

## 2023-10-22 PROCEDURE — 99223 1ST HOSP IP/OBS HIGH 75: CPT | Performed by: INTERNAL MEDICINE

## 2023-10-22 PROCEDURE — 25010000002 NA FERRIC GLUC CPLX PER 12.5 MG: Performed by: INTERNAL MEDICINE

## 2023-10-22 PROCEDURE — 83735 ASSAY OF MAGNESIUM: CPT | Performed by: NURSE PRACTITIONER

## 2023-10-22 PROCEDURE — 82728 ASSAY OF FERRITIN: CPT | Performed by: INTERNAL MEDICINE

## 2023-10-22 PROCEDURE — 84100 ASSAY OF PHOSPHORUS: CPT | Performed by: INTERNAL MEDICINE

## 2023-10-22 PROCEDURE — 25810000003 SODIUM CHLORIDE 0.9 % SOLUTION: Performed by: NURSE PRACTITIONER

## 2023-10-22 PROCEDURE — 80053 COMPREHEN METABOLIC PANEL: CPT | Performed by: NURSE PRACTITIONER

## 2023-10-22 PROCEDURE — 25010000002 THIAMINE HCL 200 MG/2ML SOLUTION: Performed by: NURSE PRACTITIONER

## 2023-10-22 PROCEDURE — 85007 BL SMEAR W/DIFF WBC COUNT: CPT | Performed by: NURSE PRACTITIONER

## 2023-10-22 PROCEDURE — 25010000002 CEFTRIAXONE PER 250 MG: Performed by: INTERNAL MEDICINE

## 2023-10-22 PROCEDURE — 25010000002 MAGNESIUM SULFATE 2 GM/50ML SOLUTION: Performed by: INTERNAL MEDICINE

## 2023-10-22 PROCEDURE — 63710000001 INSULIN LISPRO (HUMAN) PER 5 UNITS: Performed by: INTERNAL MEDICINE

## 2023-10-22 PROCEDURE — 25810000003 SODIUM CHLORIDE 0.9 % SOLUTION: Performed by: INTERNAL MEDICINE

## 2023-10-22 PROCEDURE — 25010000002 POTASSIUM CHLORIDE 10 MEQ/100ML SOLUTION: Performed by: INTERNAL MEDICINE

## 2023-10-22 RX ORDER — NOREPINEPHRINE BITARTRATE 0.03 MG/ML
.02-.3 INJECTION, SOLUTION INTRAVENOUS
Status: DISCONTINUED | OUTPATIENT
Start: 2023-10-22 | End: 2023-10-23 | Stop reason: HOSPADM

## 2023-10-22 RX ORDER — POTASSIUM CHLORIDE 7.45 MG/ML
10 INJECTION INTRAVENOUS
Status: COMPLETED | OUTPATIENT
Start: 2023-10-22 | End: 2023-10-22

## 2023-10-22 RX ORDER — MAGNESIUM SULFATE HEPTAHYDRATE 40 MG/ML
2 INJECTION, SOLUTION INTRAVENOUS
Status: COMPLETED | OUTPATIENT
Start: 2023-10-22 | End: 2023-10-22

## 2023-10-22 RX ORDER — FENTANYL/ROPIVACAINE/NS/PF 2-625MCG/1
15 PLASTIC BAG, INJECTION (ML) EPIDURAL
Status: COMPLETED | OUTPATIENT
Start: 2023-10-22 | End: 2023-10-22

## 2023-10-22 RX ORDER — DILTIAZEM HCL/D5W 125 MG/125
5-15 PLASTIC BAG, INJECTION (ML) INTRAVENOUS
Status: DISCONTINUED | OUTPATIENT
Start: 2023-10-22 | End: 2023-10-22

## 2023-10-22 RX ADMIN — POTASSIUM CHLORIDE 10 MEQ: 7.46 INJECTION, SOLUTION INTRAVENOUS at 11:48

## 2023-10-22 RX ADMIN — METOPROLOL TARTRATE 25 MG: 25 TABLET, FILM COATED ORAL at 21:19

## 2023-10-22 RX ADMIN — ROSUVASTATIN 5 MG: 10 TABLET, FILM COATED ORAL at 08:36

## 2023-10-22 RX ADMIN — OXYBUTYNIN CHLORIDE 5 MG: 5 TABLET ORAL at 21:19

## 2023-10-22 RX ADMIN — THIAMINE HYDROCHLORIDE 200 MG: 100 INJECTION, SOLUTION INTRAMUSCULAR; INTRAVENOUS at 05:32

## 2023-10-22 RX ADMIN — Medication 10 ML: at 01:22

## 2023-10-22 RX ADMIN — MULTIPLE VITAMINS W/ MINERALS TAB 1 TABLET: TAB at 08:35

## 2023-10-22 RX ADMIN — HYDROCODONE BITARTRATE AND ACETAMINOPHEN 1 TABLET: 5; 325 TABLET ORAL at 03:37

## 2023-10-22 RX ADMIN — FOLIC ACID 1 MG: 1 TABLET ORAL at 08:36

## 2023-10-22 RX ADMIN — Medication 10 ML: at 08:36

## 2023-10-22 RX ADMIN — SODIUM CHLORIDE 250 MG: 9 INJECTION, SOLUTION INTRAVENOUS at 17:31

## 2023-10-22 RX ADMIN — Medication 5 MG/HR: at 01:19

## 2023-10-22 RX ADMIN — POTASSIUM CHLORIDE 10 MEQ: 7.46 INJECTION, SOLUTION INTRAVENOUS at 08:33

## 2023-10-22 RX ADMIN — MAGNESIUM SULFATE HEPTAHYDRATE 2 G: 2 INJECTION, SOLUTION INTRAVENOUS at 06:30

## 2023-10-22 RX ADMIN — POLYETHYLENE GLYCOL 3350 17 G: 17 POWDER, FOR SOLUTION ORAL at 08:34

## 2023-10-22 RX ADMIN — SODIUM CHLORIDE 500 ML: 9 INJECTION, SOLUTION INTRAVENOUS at 04:20

## 2023-10-22 RX ADMIN — MIDODRINE HYDROCHLORIDE 10 MG: 5 TABLET ORAL at 04:26

## 2023-10-22 RX ADMIN — SODIUM CHLORIDE 15 MMOL: 9 INJECTION, SOLUTION INTRAVENOUS at 07:22

## 2023-10-22 RX ADMIN — MIDODRINE HYDROCHLORIDE 10 MG: 5 TABLET ORAL at 21:19

## 2023-10-22 RX ADMIN — SENNOSIDES AND DOCUSATE SODIUM 2 TABLET: 50; 8.6 TABLET ORAL at 08:36

## 2023-10-22 RX ADMIN — GABAPENTIN 300 MG: 300 CAPSULE ORAL at 21:19

## 2023-10-22 RX ADMIN — LEVOTHYROXINE SODIUM 112 MCG: 0.11 TABLET ORAL at 05:33

## 2023-10-22 RX ADMIN — TRAMADOL HYDROCHLORIDE 50 MG: 50 TABLET, COATED ORAL at 21:19

## 2023-10-22 RX ADMIN — CEFTRIAXONE 2000 MG: 2 INJECTION, POWDER, FOR SOLUTION INTRAMUSCULAR; INTRAVENOUS at 11:04

## 2023-10-22 RX ADMIN — MIDODRINE HYDROCHLORIDE 10 MG: 5 TABLET ORAL at 13:06

## 2023-10-22 RX ADMIN — TRAZODONE HYDROCHLORIDE 50 MG: 50 TABLET ORAL at 21:19

## 2023-10-22 RX ADMIN — POTASSIUM CHLORIDE 10 MEQ: 7.46 INJECTION, SOLUTION INTRAVENOUS at 06:30

## 2023-10-22 RX ADMIN — POTASSIUM CHLORIDE 10 MEQ: 7.46 INJECTION, SOLUTION INTRAVENOUS at 11:03

## 2023-10-22 RX ADMIN — POTASSIUM CHLORIDE 10 MEQ: 7.46 INJECTION, SOLUTION INTRAVENOUS at 09:27

## 2023-10-22 RX ADMIN — POTASSIUM CHLORIDE 10 MEQ: 7.46 INJECTION, SOLUTION INTRAVENOUS at 12:43

## 2023-10-22 RX ADMIN — MAGNESIUM SULFATE HEPTAHYDRATE 2 G: 2 INJECTION, SOLUTION INTRAVENOUS at 08:33

## 2023-10-22 RX ADMIN — SODIUM CHLORIDE 15 MMOL: 9 INJECTION, SOLUTION INTRAVENOUS at 13:06

## 2023-10-22 RX ADMIN — PANTOPRAZOLE SODIUM 40 MG: 40 TABLET, DELAYED RELEASE ORAL at 08:36

## 2023-10-22 RX ADMIN — THIAMINE HYDROCHLORIDE 200 MG: 100 INJECTION, SOLUTION INTRAMUSCULAR; INTRAVENOUS at 13:06

## 2023-10-22 RX ADMIN — PANTOPRAZOLE SODIUM 40 MG: 40 TABLET, DELAYED RELEASE ORAL at 21:19

## 2023-10-22 RX ADMIN — SODIUM CHLORIDE 15 MMOL: 9 INJECTION, SOLUTION INTRAVENOUS at 11:03

## 2023-10-22 RX ADMIN — LISINOPRIL 5 MG: 5 TABLET ORAL at 08:51

## 2023-10-22 RX ADMIN — OXYBUTYNIN CHLORIDE 5 MG: 5 TABLET ORAL at 08:36

## 2023-10-22 RX ADMIN — MAGNESIUM SULFATE HEPTAHYDRATE 2 G: 2 INJECTION, SOLUTION INTRAVENOUS at 11:03

## 2023-10-22 RX ADMIN — INSULIN LISPRO 2 UNITS: 100 INJECTION, SOLUTION INTRAVENOUS; SUBCUTANEOUS at 18:38

## 2023-10-22 RX ADMIN — THIAMINE HYDROCHLORIDE 200 MG: 100 INJECTION, SOLUTION INTRAMUSCULAR; INTRAVENOUS at 21:19

## 2023-10-22 RX ADMIN — METOPROLOL TARTRATE 25 MG: 25 TABLET, FILM COATED ORAL at 08:51

## 2023-10-22 RX ADMIN — INSULIN LISPRO 2 UNITS: 100 INJECTION, SOLUTION INTRAVENOUS; SUBCUTANEOUS at 08:33

## 2023-10-22 NOTE — PROGRESS NOTES
Seen/examined  Family at bedside  Records reviewed  Labs reviewed  King draining clear urine    Appreciate all services' help

## 2023-10-22 NOTE — PLAN OF CARE
Goal Outcome Evaluation:      Pt went into afib 0053, talked to Michelle Victoria APRN, ordered cardizem gtt and cardiology consult was called in by RN. Pt became hypotensive, RN spoke with APRN once more and 500mL bolus ordered and midodrine was administered early per APRN. Pt remains on CBI urine clear yellow and no foul odor.

## 2023-10-22 NOTE — CONSULTS
Referring Provider: Sarah Beth Coronado MD    Reason for Consultation: Atrial fibrillation with rapid ventricular rate      Patient Care Team:  Marisol Rodgers APRN as PCP - General (Pulmonary Disease)      SUBJECTIVE     Chief Complaint: Sepsis and bleeding post urology procedure    History of present illness:  Pamela Zavala is a 82 y.o. female with coronary artery disease status post multiple PCI's and most recent in May 2023 with PCI to RCA, hypertension, hyperlipidemia, diabetes, thyroid cancer status post thyroidectomy who presented to the hospital for a planned stage urology procedure.  She has a large left renal stone requiring lithotripsy and stent placement.  Went into phase 2 of the procedure patient developed SVT refractory to esmolol and was treated with adenosine.  Postprocedure she developed fever and septic shock.  She also developed hemorrhage in the posterior aspect of the kidney with significant drop in hemoglobin requiring blood transfusion.  She required pressors for hypotension.  On 10/21/2023 patient was thought to be stable to be transferred out of the ICU however she developed atrial fibrillation with rapid ventricular rate.  Cardiology has been consulted for further management.  Of note she is not on any antiplatelets despite her recent PCI.      Review of systems:    Constitutional: No weakness, fatigue, fever, rigors, chills   Eyes: No vision changes, eye pain   ENT/oropharynx: No difficulty swallowing, sore throat, epistaxis, changes in hearing   Cardiovascular: No chest pain, chest tightness, palpitations, paroxysmal nocturnal dyspnea, orthopnea, diaphoresis, dizziness / syncopal episode   Respiratory: No shortness of breath, dyspnea on exertion, cough, wheezing, hemoptysis   Gastrointestinal: No abdominal pain, nausea, vomiting, diarrhea, bloody stools   Genitourinary: No hematuria, dysuria   Neurological: No headache, tremors, numbness, one-sided weakness    Musculoskeletal: No cramps,  myalgias, joint pain, joint swelling   Integument: No rash, edema        Personal History:      Past Medical History:   Diagnosis Date    Anemia     Arthritis     CAD (coronary artery disease)     Chronic back pain     Diabetic neuropathy     Essential hypertension     GERD (gastroesophageal reflux disease)     History of gastric ulcer     History of stomach ulcers     HLD (hyperlipidemia)     Hypocalcemia     Hypoparathyroidism     Hypothyroidism     Insomnia     Kidney stones     Osteoporosis     Recurrent sinusitis     Renal disease     Thyroid cancer     Type 2 diabetes mellitus        Past Surgical History:   Procedure Laterality Date    CARDIAC CATHETERIZATION N/A 2023    Procedure: Left Heart Cath and coronary angiogram;  Surgeon: John Baldwin MD;  Location: [facility] CATH INVASIVE LOCATION;  Service: Cardiovascular;  Laterality: N/A;    CORONARY STENT PLACEMENT      x 3    CYSTOSCOPY, URETEROSCOPY, RETROGRADE PYELOGRAM, STENT INSERTION Left 10/18/2023    Procedure: CYSTOSCOPY URETEROSCOPY RETROGRADE PYELOGRAM HOLMIUM LASER STENT INSERTION;  Surgeon: Juan Alaniz MD;  Location: [facility] MAIN OR;  Service: Urology;  Laterality: Left;    HYSTERECTOMY      LUNG LOBECTOMY      THYROIDECTOMY         Family History   Problem Relation Age of Onset    Diabetes Mother     Cancer Father     Heart disease Sister     Diabetes Sister     Heart disease Maternal Grandmother        Social History     Tobacco Use    Smoking status: Former     Packs/day: 2.00     Years: 30.00     Additional pack years: 0.00     Total pack years: 60.00     Types: Cigarettes     Quit date:      Years since quittin.8     Passive exposure: Past    Smokeless tobacco: Never   Vaping Use    Vaping Use: Never used   Substance Use Topics    Alcohol use: Yes     Alcohol/week: 1.0 standard drink of alcohol     Types: 1 Glasses of wine per week    Drug use: Defer        Home meds:  Prior to Admission medications    Medication Sig Start Date  End Date Taking? Authorizing Provider   Accu-Chek Softclix Lancets lancets 1 each by Other route 3 (Three) Times a Day Before Meals. Dx: E11.65. Use as instructed 5/4/23  Yes Johanny Nagel MD   atenolol (TENORMIN) 50 MG tablet Take 1 tablet by mouth Daily. Take dos   Yes Andrade La MD   clopidogrel (PLAVIX) 75 MG tablet Take 1 tablet by mouth Daily.   Yes Andrade La MD   fluticasone (FLONASE) 50 MCG/ACT nasal spray 1 spray into the nostril(s) as directed by provider 2 (Two) Times a Day.   Yes Andrade La MD   gabapentin (NEURONTIN) 300 MG capsule Take 1 capsule by mouth Every Night.   Yes Andrade La MD   glucose blood (Accu-Chek Guide) test strip 1 each by Other route 3 (Three) Times a Day Before Meals. Dx: E11.65. Use as instructed 5/4/23  Yes Johanny Nagel MD   insulin degludec (Tresiba FlexTouch) 100 UNIT/ML solution pen-injector injection Inject 22 Units under the skin into the appropriate area as directed every night at bedtime. 9/12/23  Yes Driss Dan MD   Insulin Lispro 100 UNIT/ML solution cartridge Inject  under the skin into the appropriate area as directed 3 (Three) Times a Day Before Meals. Per sliding scale   Yes Andrade La MD   Insulin Lispro, 1 Unit Dial, (HumaLOG KwikPen) 100 UNIT/ML solution pen-injector Inject 7 Units under the skin into the appropriate area as directed 3 (Three) Times a Day. Inject 6 Units under the skin into the appropriate area as directed 3 (Three) Times a Day With Meals. 9/12/23  Yes Driss Dan MD   lactulose (CHRONULAC) 10 GM/15ML solution Take 15 mL by mouth Daily.   Yes Andrade La MD   levothyroxine (Synthroid) 112 MCG tablet Take 1 tablet by mouth Daily. 9/12/23 9/11/24 Yes Driss Dan MD   lisinopril (PRINIVIL,ZESTRIL) 5 MG tablet Take 1 tablet by mouth Daily. 5/18/23  Yes Nuria Mcgraw MD   metFORMIN (GLUCOPHAGE) 1000 MG tablet Take 1 tablet by mouth 2 (Two) Times a Day With Meals.   Yes Provider,  MD Andrade   oxybutynin (DITROPAN) 5 MG tablet Take 1 tablet by mouth 2 (Two) Times a Day.   Yes ProviderAndrade MD   oxyCODONE-acetaminophen (PERCOCET) 5-325 MG per tablet Take 1 tablet by mouth Daily As Needed for Severe Pain. 10/18/23  Yes Juan Alaniz MD   pantoprazole (PROTONIX) 40 MG EC tablet Take 1 tablet by mouth 2 (Two) Times a Day. Take dos   Yes Andrade La MD   rosuvastatin (CRESTOR) 5 MG tablet Take 1 tablet by mouth Daily.   Yes ProviderAndrade MD   traMADol (ULTRAM) 50 MG tablet Take 1 tablet by mouth 4 (Four) Times a Day As Needed for Moderate Pain.   Yes Andrade La MD   traZODone (DESYREL) 50 MG tablet Take 1 tablet by mouth Every Night.   Yes Andrade La MD   cefdinir (OMNICEF) 300 MG capsule Take 1 capsule by mouth 2 (Two) Times a Day. 10/18/23   Juan Alaniz MD       Allergies:     Penicillins, Shellfish-derived products, Sulfamethizole, and Trimethoprim    Scheduled Meds:cefTRIAXone, 2,000 mg, Intravenous, Q24H  folic acid, 1 mg, Oral, Daily  gabapentin, 300 mg, Oral, Nightly  insulin lispro, 2-7 Units, Subcutaneous, TID With Meals  lactulose, 10 g, Oral, BID  levothyroxine, 112 mcg, Oral, Daily  lisinopril, 5 mg, Oral, Q24H  magnesium sulfate, 2 g, Intravenous, Q2H  midodrine, 10 mg, Oral, Q8H  multivitamin with minerals, 1 tablet, Oral, Daily  oxybutynin, 5 mg, Oral, BID  pantoprazole, 40 mg, Oral, BID  polyethylene glycol, 17 g, Oral, Daily  potassium chloride, 10 mEq, Intravenous, Q1H  potassium phosphate, 15 mmol, Intravenous, Q3H  rosuvastatin, 5 mg, Oral, Daily  senna-docusate sodium, 2 tablet, Oral, BID  sodium chloride, 10 mL, Intravenous, Q12H  sodium chloride, 10 mL, Intravenous, Q12H  sodium chloride, 10 mL, Intravenous, Q12H  sodium chloride, 10 mL, Intravenous, Q12H  thiamine (B-1) IV, 200 mg, Intravenous, Q8H   Followed by  [START ON 10/24/2023] thiamine, 100 mg, Oral, Daily  traZODone, 50 mg, Oral, Nightly      Continuous  "Infusions:dilTIAZem, 5-15 mg/hr, Last Rate: 5 mg/hr (10/22/23 0337)      PRN Meds:  acetaminophen    acetaminophen    senna-docusate sodium **AND** polyethylene glycol **AND** bisacodyl **AND** bisacodyl    Calcium Replacement - Follow Nurse / BPA Driven Protocol    dextrose    dextrose    Diclofenac Sodium    glucagon (human recombinant)    hydrALAZINE    HYDROcodone-acetaminophen    Magnesium Standard Dose Replacement - Follow Nurse / BPA Driven Protocol    nitroglycerin    Phosphorus Replacement - Follow Nurse / BPA Driven Protocol    Potassium Replacement - Follow Nurse / BPA Driven Protocol    sodium chloride    sodium chloride    sodium chloride    sodium chloride    sodium chloride    traMADol      OBJECTIVE    Vital Signs  Vitals:    10/22/23 0300 10/22/23 0400 10/22/23 0500 10/22/23 0600   BP: (!) 66/38 (!) 69/43 91/47 (!) 88/48   Pulse: 97 86 91 88   Resp:       Temp:  97.8 °F (36.6 °C)     TempSrc:  Axillary     SpO2: 95% 97% 98% 96%   Weight:       Height:           Flowsheet Rows      Flowsheet Row First Filed Value   Admission Height 170.2 cm (67\") Documented at 10/09/2023 1548   Admission Weight 59 kg (130 lb) Documented at 10/09/2023 1548              Intake/Output Summary (Last 24 hours) at 10/22/2023 0615  Last data filed at 10/22/2023 0420  Gross per 24 hour   Intake 852 ml   Output -2150 ml   Net 3002 ml        Telemetry: Atrial fibrillation    Physical Exam:  The patient is ill-appearing and laying in bed  Vital signs as noted above.  Head and neck revealed no carotid bruits or jugular venous distention.  No thyromegaly or lymphadenopathy is present  Lungs clear.  No wheezing.  Breath sounds are normal bilaterally.  Heart: Normal first and second heart sounds. No murmur.  No precordial rub is present.  No gallop is present.  Abdomen: Soft and nontender.  No organomegaly is present.  Extremities with good peripheral pulses without any pedal edema.  Skin: Warm and dry.  Musculoskeletal system is " grossly normal.  CNS grossly normal.       Results Review:  I have personally reviewed the results from the time of this admission to 10/22/2023 06:15 EDT and agree with these findings:  []  Laboratory  []  Microbiology  []  Radiology  []  EKG/Telemetry   []  Cardiology/Vascular   []  Pathology  []  Old records  []  Other:    Most notable findings include:     Lab Results (last 24 hours)       Procedure Component Value Units Date/Time    Phosphorus [[id number]]  (Abnormal) Collected: 10/22/23 0532    Specimen: Blood Updated: 10/22/23 0610     Phosphorus 1.2 mg/dL     Comprehensive Metabolic Panel [[id number]]  (Abnormal) Collected: 10/22/23 0532    Specimen: Blood Updated: 10/22/23 0604     Glucose 169 mg/dL      BUN 17 mg/dL      Creatinine 0.79 mg/dL      Sodium 147 mmol/L      Potassium 2.8 mmol/L      Chloride 114 mmol/L      CO2 22.0 mmol/L      Calcium 6.9 mg/dL      Total Protein 4.7 g/dL      Albumin 2.7 g/dL      ALT (SGPT) 59 U/L      AST (SGOT) 46 U/L      Alkaline Phosphatase 264 U/L      Total Bilirubin 0.9 mg/dL      Globulin 2.0 gm/dL      A/G Ratio 1.4 g/dL      BUN/Creatinine Ratio 21.5     Anion Gap 11.0 mmol/L      eGFR 73.9 mL/min/1.73     Narrative:      GFR Normal >60  Chronic Kidney Disease <60  Kidney Failure <15    The GFR formula is only valid for adults with stable renal function between ages 18 and 70.    Magnesium [[id number]]  (Abnormal) Collected: 10/22/23 0532    Specimen: Blood Updated: 10/22/23 0604     Magnesium 1.3 mg/dL     CBC & Differential [[id number]]  (Abnormal) Collected: 10/22/23 0432    Specimen: Blood Updated: 10/22/23 0524    Narrative:      The following orders were created for panel order CBC & Differential.  Procedure                               Abnormality         Status                     ---------                               -----------         ------                     CBC Auto Differential[[id number]]        Abnormal            Final result               Scan  Slide[[id number]]                                       Final result                 Please view results for these tests on the individual orders.    Scan Slide [[id number]] Collected: 10/22/23 0432    Specimen: Blood Updated: 10/22/23 0524     Scan Slide --     Comment: See Manual Differential Results       Manual Differential [[id number]]  (Abnormal) Collected: 10/22/23 0432    Specimen: Blood Updated: 10/22/23 0524     Neutrophil % 76.0 %      Lymphocyte % 6.0 %      Eosinophil % 1.0 %      Bands %  13.0 %      Metamyelocyte % 4.0 %      Neutrophils Absolute 13.88 10*3/mm3      Lymphocytes Absolute 0.94 10*3/mm3      Eosinophils Absolute 0.16 10*3/mm3      Acanthocytes Slight/1+     Anisocytosis Slight/1+     Cedar Cells Slight/1+     Dacrocytes Slight/1+     Microcytes Slight/1+     Ovalocytes Slight/1+     Poikilocytes Mod/2+     WBC Morphology Normal     Platelet Estimate Decreased     Large Platelets Slight/1+    CBC Auto Differential [[id number]]  (Abnormal) Collected: 10/22/23 0432    Specimen: Blood Updated: 10/22/23 0524     WBC 15.60 10*3/mm3      RBC 2.91 10*6/mm3      Hemoglobin 7.8 g/dL      Hematocrit 24.6 %      MCV 84.6 fL      MCH 26.7 pg      MCHC 31.6 g/dL      RDW 18.8 %      RDW-SD 58.6 fl      MPV 10.6 fL      Platelets 73 10*3/mm3     Narrative:      The previously reported component NRBC is no longer being reported. Previous result was 0.1 /100 WBC (Reference Range: 0.0-0.2 /100 WBC) on 10/22/2023 at [facility] EDT.    Blood Culture - Blood, Arm, Left [[id number]]  (Normal) Collected: 10/18/23 1901    Specimen: Blood from Arm, Left Updated: 10/21/23 1916     Blood Culture No growth at 3 days    Narrative:      Less than seven (7) mL's of blood was collected.  Insufficient quantity may yield false negative results.    Blood Culture - Blood, Hand, Left [[id number]]  (Normal) Collected: 10/18/23 1905    Specimen: Blood from Hand, Left Updated: 10/21/23 1916     Blood Culture No growth at 3 days     Narrative:      Less than seven (7) mL's of blood was collected.  Insufficient quantity may yield false negative results.    POC Glucose Once [[id number]]  (Abnormal) Collected: 10/21/23 1802    Specimen: Blood Updated: 10/21/23 1804     Glucose 173 mg/dL      Comment: Serial Number: [id number]:  [id number]       Reticulocytes [[id number]]  (Normal) Collected: 10/21/23 0537    Specimen: Blood Updated: 10/21/23 1440     Reticulocyte % 0.71 %      Reticulocyte Absolute 0.0215 10*6/mm3     Iron Profile [[id number]]  (Abnormal) Collected: 10/21/23 0537    Specimen: Blood Updated: 10/21/23 1238     Iron 6 mcg/dL      Iron Saturation (TSAT) 3 %      Transferrin 134 mg/dL      TIBC 200 mcg/dL     POC Glucose Once [[id number]]  (Abnormal) Collected: 10/21/23 1213    Specimen: Blood Updated: 10/21/23 1214     Glucose 154 mg/dL      Comment: Serial Number: [id number]:  [id number]       Blood Gas, Venous - [[id number]]  (Abnormal) Collected: 10/21/23 1113    Specimen: Venous Blood Updated: 10/21/23 1118     Site Left Brachial     pH, Venous 7.240 pH Units      pCO2, Venous 38.3 mm Hg      pO2, Venous 52.6 mm Hg      HCO3, Venous 16.4 mmol/L      Base Excess, Venous -10.2 mmol/L      Comment: Serial Number: [id number]:  [id number]        O2 Saturation, Venous 80.9 %      CO2 Content 17.6 mmol/L      Barometric Pressure for Blood Gas --     Comment: N/A        Modality Cannula    POC Glucose Once [[id number]]  (Abnormal) Collected: 10/21/23 0741    Specimen: Blood Updated: 10/21/23 0743     Glucose 159 mg/dL      Comment: Serial Number: [id number]:  [id number]       CBC & Differential [[id number]]  (Abnormal) Collected: 10/21/23 0537    Specimen: Blood Updated: 10/21/23 0712    Narrative:      The following orders were created for panel order CBC & Differential.  Procedure                               Abnormality         Status                     ---------                               -----------         ------                      CBC Auto Differential[[id number]]        Abnormal            Final result               Scan Slide[[id number]]                                       Final result                 Please view results for these tests on the individual orders.    CBC Auto Differential [[id number]]  (Abnormal) Collected: 10/21/23 0537    Specimen: Blood Updated: 10/21/23 0712     WBC 16.20 10*3/mm3      RBC 3.06 10*6/mm3      Hemoglobin 8.2 g/dL      Hematocrit 26.3 %      MCV 85.9 fL      MCH 27.0 pg      MCHC 31.4 g/dL      RDW 18.7 %      RDW-SD 58.6 fl      MPV 11.1 fL      Platelets 85 10*3/mm3     Narrative:      The previously reported component NRBC is no longer being reported. Previous result was 0.1 /100 WBC (Reference Range: 0.0-0.2 /100 WBC) on 10/21/2023 at 0602 EDT.    Scan Slide [[id number]] Collected: 10/21/23 0537    Specimen: Blood Updated: 10/21/23 0712     Scan Slide --     Comment: See Manual Differential Results       Manual Differential [[id number]]  (Abnormal) Collected: 10/21/23 0537    Specimen: Blood Updated: 10/21/23 0712     Neutrophil % 78.0 %      Lymphocyte % 7.0 %      Monocyte % 4.0 %      Eosinophil % 1.0 %      Basophil % 1.0 %      Bands %  7.0 %      Metamyelocyte % 1.0 %      Atypical Lymphocyte % 1.0 %      Neutrophils Absolute 13.77 10*3/mm3      Lymphocytes Absolute 1.30 10*3/mm3      Monocytes Absolute 0.65 10*3/mm3      Eosinophils Absolute 0.16 10*3/mm3      Basophils Absolute 0.16 10*3/mm3      Anisocytosis Slight/1+     Poikilocytes Slight/1+     Dohle Bodies Present     Vacuolated Neutrophils Slight/1+     Platelet Estimate Decreased            Imaging Results (Last 24 Hours)       Procedure Component Value Units Date/Time    XR Chest 1 View [[id number]] Collected: 10/21/23 1239     Updated: 10/21/23 1242    Narrative:      XR CHEST 1 VW    Date of Exam: 10/21/2023 11:15 AM CDT    Indication: dyspnea, hypoxia    Comparison: 10/19/2023    Findings:  Cardiomediastinal silhouette  right-sided PICC are unchanged. Slight worsening of bilateral central to upper lung airspace disease. No pneumothorax nor significant effusion. No acute osseous abnormality identified.      Impression:      Impression:  Slight worsening airspace disease.      Electronically Signed: Alan Arreaga MD    10/21/2023 11:40 AM CDT    Workstation ID: [id number]            LAB RESULTS (LAST 7 DAYS)    CBC  Results from last 7 days   Lab Units 10/22/23  0432 10/21/23  0537 10/20/23  0653 10/20/23  0027 10/19/23  1749 10/19/23  1135 10/19/23  0453 10/19/23  0109 10/18/23  2202 10/18/23  2156 10/18/23  1904   WBC 10*3/mm3 15.60* 16.20* 24.90*  --   --   --  19.60*  --  8.10  --  6.30   RBC 10*6/mm3 2.91* 3.06* 3.33*  --   --   --  3.34*  --  3.14*  --  3.48*   HEMOGLOBIN g/dL 7.8* 8.2* 9.8* 10.6* 8.9* 7.0* 8.9*   < > 8.4*  --  9.5*   HEMOGLOBIN, POC   --   --   --   --   --   --   --   --   --    < >  --    HEMATOCRIT % 24.6* 26.3* 29.6* 32.8* 28.6* 22.8* 28.9*   < > 26.7*  --  30.3*   HEMATOCRIT POC   --   --   --   --   --   --   --   --   --    < >  --    MCV fL 84.6 85.9 89.0  --   --   --  86.6  --  85.1  --  87.1   PLATELETS 10*3/mm3 73* 85* 127*  --   --   --  207  --  172  --  251    < > = values in this interval not displayed.       BMP  Results from last 7 days   Lab Units 10/22/23  0532 10/21/23  0537 10/21/23  0452 10/20/23  0603 10/19/23  0453 10/18/23  2202 10/18/23  2156 10/18/23  2051 10/18/23  1901   SODIUM mmol/L 147* 146*  --  148* 136  --   --  140 142   POTASSIUM mmol/L 2.8* 3.7  --  4.2 4.2 2.8*  --  3.1* 3.4*   CHLORIDE mmol/L 114* 121*  --  122* 106  --   --  104 105   CO2 mmol/L 22.0 17.0*  --  18.0* 20.0*  --   --  21.0* 21.0*   BUN mg/dL 17 25*  --  30* 19  --   --  18 17   CREATININE mg/dL 0.79 0.98  --  0.94 0.76  --  0.91 0.92 0.85   GLUCOSE mg/dL 169* 207*  --  94 147*  --   --  103* 132*   MAGNESIUM mg/dL 1.3*  --  1.7  --   --  2.1  --   --  0.8*   PHOSPHORUS mg/dL 1.2*  --  2.5  --   --   --    --  2.6 2.9       CMP   Results from last 7 days   Lab Units 10/22/23  0532 10/21/23  0537 10/20/23  0603 10/19/23  0937 10/19/23  0453 10/18/23  2202 10/18/23  2156 10/18/23  2051 10/18/23  1901   SODIUM mmol/L 147* 146* 148*  --  136  --   --  140 142   POTASSIUM mmol/L 2.8* 3.7 4.2  --  4.2 2.8*  --  3.1* 3.4*   CHLORIDE mmol/L 114* 121* 122*  --  106  --   --  104 105   CO2 mmol/L 22.0 17.0* 18.0*  --  20.0*  --   --  21.0* 21.0*   BUN mg/dL 17 25* 30*  --  19  --   --  18 17   CREATININE mg/dL 0.79 0.98 0.94  --  0.76  --  0.91 0.92 0.85   GLUCOSE mg/dL 169* 207* 94  --  147*  --   --  103* 132*   ALBUMIN g/dL 2.7* 3.2*  --   --  2.6*  --   --  2.9* 3.0*   BILIRUBIN mg/dL 0.9 0.9  --   --  0.4  --   --  0.9 0.7   ALK PHOS U/L 264* 197*  --   --  88  --   --  72 74   AST (SGOT) U/L 46* 93*  --   --  398*  --   --  195* 76*   ALT (SGPT) U/L 59* 80*  --   --  149*  --   --  66* 29   AMYLASE U/L  --   --   --   --   --   --   --   --  38   AMMONIA umol/L  --   --   --  14  --   --   --   --   --        BNP        TROPONIN  Results from last 7 days   Lab Units 10/18/23  2051 10/18/23  1901   CK TOTAL U/L  --  96   HSTROP T ng/L 19* 17*       CoAg  Results from last 7 days   Lab Units 10/18/23  2202   INR  1.36*       Creatinine Clearance  Estimated Creatinine Clearance: 54 mL/min (by C-G formula based on SCr of 0.79 mg/dL).    ABG  Results from last 7 days   Lab Units 10/18/23  2156   PH, ARTERIAL pH units 7.327*   PCO2, ARTERIAL mm Hg 42.8   PO2 ART mm Hg 61.1*   O2 SATURATION ART % 89.1*   BASE EXCESS ART mmol/L -3.4*         Radiology  XR Chest 1 View    Result Date: 10/21/2023  Impression: Slight worsening airspace disease. Electronically Signed: Alan Arreaga MD  10/21/2023 11:40 AM FamilyticT  Workstation ID: [id number]       EKG  I personally viewed and interpreted the patient's EKG/Telemetry data:  ECG 12 Lead Rhythm Change   Final Result   HEART RATE= 140  bpm   RR Interval= 419  ms   SC Interval=   ms   P  Horizontal Axis=   deg   P Front Axis=   deg   QRSD Interval= 75  ms   QT Interval=   ms   QTcB= Invalid  ms   QRS Axis= 73  deg   T Wave Axis= -89  deg   - ABNORMAL ECG -   Atrial fibrillation with rapid V-rate   Low voltage, precordial leads   Borderline T abnormalities, diffuse leads   When compared with ECG of 18-Oct-2023 15:46:09,   Significant change in rhythm   Significant repolarization change   Significant axis, voltage or hypertrophy change   Electronically Signed By: John Baldwin ([facility]) 20-Oct-2023 22:55:09   Date and Time of Study: 2023-10-20 05:19:28      ECG 12 Lead Rhythm Change   Final Result   HEART RATE= 151  bpm   RR Interval= 396  ms   MO Interval= 129  ms   P Horizontal Axis= -11  deg   P Front Axis= 61  deg   QRSD Interval= 84  ms   QT Interval= 312  ms   QTcB= 496  ms   QRS Axis= 45  deg   T Wave Axis= 247  deg   - ABNORMAL ECG -   Supraventricular tachycardia   Probable anteroseptal infarct, recent   When compared with ECG of 11-Oct-2023 13:54:31,   Significant change in rhythm: previously sinus   Electronically Signed By: Shirley Rosenthal ([facility]) 21-Oct-2023 17:04:20   Date and Time of Study: 2023-10-18 15:46:09      SCANNED - TELEMETRY     Final Result      SCANNED - TELEMETRY     Final Result      SCANNED - TELEMETRY     Final Result      SCANNED - TELEMETRY     Final Result      ECG 12 Lead   Final Result   HEART RATE= 80  bpm   RR Interval= 752  ms   MO Interval= 168  ms   P Horizontal Axis= 6  deg   P Front Axis= 92  deg   QRSD Interval= 73  ms   QT Interval= 379  ms   QTcB= 437  ms   QRS Axis= 48  deg   T Wave Axis= 61  deg   - ABNORMAL ECG -   Sinus rhythm   Consider anteroseptal infarct   When compared with ECG of 08-Sep-2023 10:41:09,   No significant change   Electronically Signed By: Nuria Mcgraw ([facility]) 12-Oct-2023 15:16:23   Date and Time of Study: 2023-10-11 13:54:31            Echocardiogram:          Stress Test:  Results for orders placed during the hospital encounter of  05/02/23    Stress Test With Myocardial Perfusion One Day    Interpretation Summary    Left ventricular ejection fraction is hyperdynamic (Calculated EF > 70%).    Abnormal LV wall motion consistent with moderate hypokinesis of the inferior wall.    Myocardial perfusion imaging indicates a large-sized infarct located in the inferior wall with moderate candy-infarct ischemia.    Impressions are consistent with a high risk study.    Findings consistent with an abnormal ECG stress test.        Cardiac Catheterization:  Results for orders placed during the hospital encounter of 05/02/23    Cardiac Catheterization/Vascular Study    Narrative  OPERATORS  John Baldwin M.D. (Attending Cardiologist)      PROCEDURES PERFORMED  Ultrasound guided Vascular access  Left Heart Catheterization  Coronary Angiogram [id number]  PCI with DIRK placement to RCA 97949  IVUS of RCA [id number]  Moderate sedation 45mins    INDICATIONS FOR PROCEDURE  82 years old woman with multiple cardiovascular risk factors presented with chest pain/unstable angina.  She had a nuclear stress test which was abnormal in the inferior wall.  After discussing the risk and benefit of the procedure she was brought into the Cath Lab for cardiac catheterization.    PROCEDURE IN DETAIL  Informed consent was obtained from the patient after explaining the risks, benefits, and alternative options of the procedure. After obtaining informed consent, the patient was brought to the cath lab and was prepped in a sterile fashion. Lidocaine 2% was used for local anesthesia into the right femoral access site. The right femoral artery was accessed with a micropuncture needle via modified Seldinger technique under ultrasound guidance. A 6F was inserted successfully. Afterwards, 6F JR4 and JL4 diagnostic catheters were advanced over a wire into the ascending aorta and were used to engage the ostia of the left main and RCA respectively. JR4 used to cross the AV and obtain LV pressures and  gradient across the AV measured via pullback technique. Images of the right and left coronary systems were obtained.    HEMODYNAMICS  LV: 151/2, 7 mmHg  AO: 150/64, 98 mmHg  No significant gradient across aortic valve during pullback of JR4 catheter.  LV gram was not performed due to echocardiogram being available.    FINDINGS    Coronary Angiogram    Right dominant circulation    Left main: Left main is a large caliber vessel which gives rise to the Left Anterior Descending and the Left circumflex.  Left main is angiographically free from any significant disease.    Left Anterior Descending Artery: LAD is a medium caliber vessel which gives rise to several septal perforators and several diagonal branches.  LAD has diffuse luminal irregularities but no significant obstruction.    Left Circumflex: Patent stents in the mid left circumflex and first obtuse marginal branch.  Remaining left circumflex is angiographically free from any significant disease.    Right Coronary Artery: The RCA is a small caliber vessel gives rise to PDA and PLV.  Mid RCA has focal 80 to 85% stenosis with EDITH-3 flow.    Percutaneous coronary intervention  100 units/kg of heparin was administered and ACT of more than 250 was documented.  A 6 Greek JR4 guide catheter with sideholes was used to engage the ostium of the RCA.  0.014 run-through wire was advanced past the lesion in the mid RCA into the distal RCA.  I then predilated the lesion with 2 x 12 mm mini trek balloon.  This was followed by placement of 2.25 x 33 mm Xience miryam point stent.  This was followed by advancement of Vantrix intravascular ultrasound into the distal RCA and a slow manual pullback was performed.  IVUS confirmed distal RCA size of 2.5 mm and proximal RCA 3.5 mm.  Mid RCA was 3.2 x 2.5 mm in dimension.  I then postdilated the stent with a 3 x 15 mm noncompliant balloon at 14 shima.  Final angiography showed EDITH-3 flow and 0% stenosis in the RCA.    All the  catheters were exchanged over a wire and subsequently removed. Angiogram of the femoral access site was obtained and did not show complications. The patient tolerated the procedure well without any complications. The pictures were reviewed at the end of the procedure. A 6 Romansh Angio-Seal closure device was applied to achieve hemostasis.    ESTIMATED BLOOD LOSS:  10 ml    COMPLICATIONS:  None    PROCEDURE DATA:  Contrast Used: 50 mL  Sedation Time: 45 minutes    IMPRESSIONS  Patent stents in the left circumflex and obtuse marginal 1.  IVUS guided PCI of mid RCA with placement of drug-eluting stent.  Normal LVEDP    RECOMMENDATIONS  -Start dual antiplatelet therapy.  -High intensity statin, beta-blocker.  - Referral to cardiac rehab        Other:      ASSESSMENT & PLAN:    Principal Problem:    Sepsis without acute organ dysfunction  Active Problems:    Coronary arteriosclerosis    Type 2 diabetes mellitus    Diabetic neuropathy    Essential hypertension    Hyperlipidemia    Postoperative hypothyroidism    Ex-smoker    History of thyroid cancer    Kidney stones    UTI (urinary tract infection), bacterial    Sepsis    Sepsis  Shock state has improved   White count is improving  Continue antibiotics  Continue midodrine for hypotension  Discontinue Cardizem    Hematuria  Status post lithotripsy and stent placement on the left side.  Status post staged procedures on 10/18/2023 leading to renal hemorrhage  Postprocedure sepsis and anemia of blood loss requiring blood transfusion.  H&H 7.8/24.6 now    Atrial fibrillation  [id number] score of 6  Unable to anticoagulate at the moment due to recent bleeding requiring blood transfusion.  Currently on Cardizem drip.  Recommend to discontinue Cardizem drip and start metoprolol instead.  Blood pressure is low  Start metoprolol tartrate 25 mg p.o. twice daily  Obtain clearance from urology for starting anticoagulation with heparin drip and eventually Eliquis.    CAD  Status  post PCI of the RCA in May 2023  DAPT has been held due to renal hemorrhage  Resume antiplatelet therapy as soon as possible as she has multiple stents in place  Continue statin.  Since she will require anticoagulation for atrial fibrillation we will discontinue aspirin and only resume Plavix once safe to do so from urology standpoint.    Hypertension  Currently hypotensive.  Discontinue Cardizem  Pressors have been weaned off.  She is requiring midodrine  Start metoprolol tartrate     Hyperlipidemia  Continue Crestor  LDL is 55     Diabetes  Uncontrolled diabetes  A1c has improved from 11-7.6 now  On Lantus     Critical hypokalemia with potassium of 2.8         John Baldwin MD  10/22/23  06:15 EDT

## 2023-10-22 NOTE — PROGRESS NOTES
LOS: 4 days   Patient Care Team:  Marisol Rodgers APRN as PCP - General (Pulmonary Disease)    Subjective     Interval History: Episode of rapid atrial fib this morning; clinically patient is markedly improved from yesterday.  She is more oriented, able to feed herself, and in no distress.  She is currently in a controlled atrial fib.    Patient Complaints: No specific complaints    History taken from: patient    Review of Systems   Constitutional:  Positive for activity change. Negative for appetite change, diaphoresis, fatigue and fever.   HENT:  Negative for facial swelling.    Eyes:  Negative for visual disturbance.   Respiratory:  Negative for cough, shortness of breath, wheezing and stridor.    Cardiovascular:  Negative for chest pain and leg swelling.           Objective     Vital Signs  Temp:  [97.4 °F (36.3 °C)-98.1 °F (36.7 °C)] 97.7 °F (36.5 °C)  Heart Rate:  [] 82  Resp:  [27] 27  BP: ()/() 107/59    Physical Exam:     General Appearance:    Alert, cooperative, in no acute distress,   Head:    Normocephalic, without obvious abnormality, atraumatic   Eyes:            Lids and lashes normal, conjunctivae and sclerae normal, no   icterus, no pallor, corneas clear, PERRLA   Ears:    Ears appear intact with no abnormalities noted   Throat:   No oral lesions, no thrush, oral mucosa moist   Neck:   No adenopathy, supple, trachea midline, no thyromegaly, no   carotid bruit, no JVD   Lungs:     Clear to auscultation,respirations regular, even and                  unlabored    Heart:  Irregularly irregular   Chest Wall:    No abnormalities observed   Abdomen:     Normal bowel sounds, no masses, no organomegaly, soft        Non-tender non-distended, no guarding,   Extremities:   Moves all extremities well, no edema, no cyanosis, no             Redness   Pulses:   Pulses palpable and equal bilaterally   Skin:   No bleeding, bruising or rash   Lymph nodes:   No palpable adenopathy    Neurologic:   Cranial nerves 2 - 12 grossly intact, sensation intact, DTR       present and equal bilaterally        Results Review:    Lab Results (last 24 hours)       Procedure Component Value Units Date/Time    POC Glucose Once [[id number]]  (Abnormal) Collected: 10/22/23 1111    Specimen: Blood Updated: 10/22/23 1113     Glucose 115 mg/dL      Comment: Serial Number: [id number]:  [id number]       POC Glucose Once [[id number]]  (Abnormal) Collected: 10/22/23 0824    Specimen: Blood Updated: 10/22/23 0826     Glucose 166 mg/dL      Comment: Serial Number: [id number]:  [id number]       Phosphorus [[id number]]  (Abnormal) Collected: 10/22/23 0532    Specimen: Blood Updated: 10/22/23 0610     Phosphorus 1.2 mg/dL     Comprehensive Metabolic Panel [[id number]]  (Abnormal) Collected: 10/22/23 0532    Specimen: Blood Updated: 10/22/23 0604     Glucose 169 mg/dL      BUN 17 mg/dL      Creatinine 0.79 mg/dL      Sodium 147 mmol/L      Potassium 2.8 mmol/L      Chloride 114 mmol/L      CO2 22.0 mmol/L      Calcium 6.9 mg/dL      Total Protein 4.7 g/dL      Albumin 2.7 g/dL      ALT (SGPT) 59 U/L      AST (SGOT) 46 U/L      Alkaline Phosphatase 264 U/L      Total Bilirubin 0.9 mg/dL      Globulin 2.0 gm/dL      A/G Ratio 1.4 g/dL      BUN/Creatinine Ratio 21.5     Anion Gap 11.0 mmol/L      eGFR 73.9 mL/min/1.73     Narrative:      GFR Normal >60  Chronic Kidney Disease <60  Kidney Failure <15    The GFR formula is only valid for adults with stable renal function between ages 18 and 70.    Magnesium [[id number]]  (Abnormal) Collected: 10/22/23 0532    Specimen: Blood Updated: 10/22/23 0604     Magnesium 1.3 mg/dL     CBC & Differential [[id number]]  (Abnormal) Collected: 10/22/23 0432    Specimen: Blood Updated: 10/22/23 0524    Narrative:      The following orders were created for panel order CBC & Differential.  Procedure                               Abnormality         Status                     ---------                                -----------         ------                     CBC Auto Differential[[id number]]        Abnormal            Final result               Scan Slide[[id number]]                                       Final result                 Please view results for these tests on the individual orders.    Scan Slide [[id number]] Collected: 10/22/23 0432    Specimen: Blood Updated: 10/22/23 0524     Scan Slide --     Comment: See Manual Differential Results       Manual Differential [[id number]]  (Abnormal) Collected: 10/22/23 0432    Specimen: Blood Updated: 10/22/23 0524     Neutrophil % 76.0 %      Lymphocyte % 6.0 %      Eosinophil % 1.0 %      Bands %  13.0 %      Metamyelocyte % 4.0 %      Neutrophils Absolute 13.88 10*3/mm3      Lymphocytes Absolute 0.94 10*3/mm3      Eosinophils Absolute 0.16 10*3/mm3      Acanthocytes Slight/1+     Anisocytosis Slight/1+     Clyde Cells Slight/1+     Dacrocytes Slight/1+     Microcytes Slight/1+     Ovalocytes Slight/1+     Poikilocytes Mod/2+     WBC Morphology Normal     Platelet Estimate Decreased     Large Platelets Slight/1+    CBC Auto Differential [[id number]]  (Abnormal) Collected: 10/22/23 0432    Specimen: Blood Updated: 10/22/23 0524     WBC 15.60 10*3/mm3      RBC 2.91 10*6/mm3      Hemoglobin 7.8 g/dL      Hematocrit 24.6 %      MCV 84.6 fL      MCH 26.7 pg      MCHC 31.6 g/dL      RDW 18.8 %      RDW-SD 58.6 fl      MPV 10.6 fL      Platelets 73 10*3/mm3     Narrative:      The previously reported component NRBC is no longer being reported. Previous result was 0.1 /100 WBC (Reference Range: 0.0-0.2 /100 WBC) on 10/22/2023 at 0442 EDT.    Blood Culture - Blood, Arm, Left [[id number]]  (Normal) Collected: 10/18/23 1901    Specimen: Blood from Arm, Left Updated: 10/21/23 1916     Blood Culture No growth at 3 days    Narrative:      Less than seven (7) mL's of blood was collected.  Insufficient quantity may yield false negative results.    Blood Culture - Blood,  Hand, Left [[id number]]  (Normal) Collected: 10/18/23 1905    Specimen: Blood from Hand, Left Updated: 10/21/23 1916     Blood Culture No growth at 3 days    Narrative:      Less than seven (7) mL's of blood was collected.  Insufficient quantity may yield false negative results.    POC Glucose Once [[id number]]  (Abnormal) Collected: 10/21/23 1802    Specimen: Blood Updated: 10/21/23 1804     Glucose 173 mg/dL      Comment: Serial Number: [id number]:  [id number]       Reticulocytes [[id number]]  (Normal) Collected: 10/21/23 0537    Specimen: Blood Updated: 10/21/23 1440     Reticulocyte % 0.71 %      Reticulocyte Absolute 0.0215 10*6/mm3              Imaging Results (Last 24 Hours)       ** No results found for the last 24 hours. **                 I reviewed the patient's new clinical results.    Medication Review:   Scheduled Meds:cefTRIAXone, 2,000 mg, Intravenous, Q24H  folic acid, 1 mg, Oral, Daily  gabapentin, 300 mg, Oral, Nightly  insulin lispro, 2-7 Units, Subcutaneous, TID With Meals  lactulose, 10 g, Oral, BID  levothyroxine, 112 mcg, Oral, Daily  lisinopril, 5 mg, Oral, Q24H  metoprolol tartrate, 25 mg, Oral, BID  midodrine, 10 mg, Oral, Q8H  multivitamin with minerals, 1 tablet, Oral, Daily  oxybutynin, 5 mg, Oral, BID  pantoprazole, 40 mg, Oral, BID  polyethylene glycol, 17 g, Oral, Daily  potassium phosphate, 15 mmol, Intravenous, Q3H  rosuvastatin, 5 mg, Oral, Daily  senna-docusate sodium, 2 tablet, Oral, BID  sodium chloride, 10 mL, Intravenous, Q12H  sodium chloride, 10 mL, Intravenous, Q12H  sodium chloride, 10 mL, Intravenous, Q12H  sodium chloride, 10 mL, Intravenous, Q12H  thiamine (B-1) IV, 200 mg, Intravenous, Q8H   Followed by  [START ON 10/24/2023] thiamine, 100 mg, Oral, Daily  traZODone, 50 mg, Oral, Nightly      Continuous Infusions:norepinephrine, 0.02-0.3 mcg/kg/min      PRN Meds:.  acetaminophen    acetaminophen    senna-docusate sodium **AND** polyethylene glycol **AND** bisacodyl  **AND** bisacodyl    Calcium Replacement - Follow Nurse / BPA Driven Protocol    dextrose    dextrose    Diclofenac Sodium    glucagon (human recombinant)    hydrALAZINE    HYDROcodone-acetaminophen    Magnesium Standard Dose Replacement - Follow Nurse / BPA Driven Protocol    nitroglycerin    Phosphorus Replacement - Follow Nurse / BPA Driven Protocol    Potassium Replacement - Follow Nurse / BPA Driven Protocol    sodium chloride    sodium chloride    sodium chloride    sodium chloride    sodium chloride    traMADol     Assessment & Plan       Sepsis without acute organ dysfunction    Coronary arteriosclerosis    Type 2 diabetes mellitus    Diabetic neuropathy    Essential hypertension    Hyperlipidemia    Postoperative hypothyroidism    Ex-smoker    History of thyroid cancer    Kidney stones    UTI (urinary tract infection), bacterial    Sepsis      UTI-continue Rocephin  Anemia -IV iron  Hypophosphatemia-replacing  Hypomagnesemia-replacing  Hypokalemia - replacing  Paroxysmal atrial fib with rapid ventricular rate-stopping diltiazem; starting oral metoprolol; start anticoagulation once bleeding and kidney is stable  Hypotension-midodrine; stopping lisinopril  Chronic coronary artery disease-restart antiplatelet agent when able  Hypothyroidism-continue levothyroxine  Hyperlipidemia-continue statin    Plan for disposition:Skilled nursing    Sarah Beth Coronado MD  10/22/23  14:00 EDT

## 2023-10-23 LAB
ALBUMIN SERPL-MCNC: 2.9 G/DL (ref 3.5–5.2)
ALBUMIN/GLOB SERPL: 1.3 G/DL
ALP SERPL-CCNC: 400 U/L (ref 39–117)
ALT SERPL W P-5'-P-CCNC: 54 U/L (ref 1–33)
ANION GAP SERPL CALCULATED.3IONS-SCNC: 10 MMOL/L (ref 5–15)
ANISOCYTOSIS BLD QL: ABNORMAL
AST SERPL-CCNC: 40 U/L (ref 1–32)
BACTERIA SPEC AEROBE CULT: NORMAL
BACTERIA SPEC AEROBE CULT: NORMAL
BASOPHILS # BLD MANUAL: 0.14 10*3/MM3 (ref 0–0.2)
BASOPHILS NFR BLD MANUAL: 1 % (ref 0–1.5)
BILIRUB SERPL-MCNC: 0.4 MG/DL (ref 0–1.2)
BUN SERPL-MCNC: 16 MG/DL (ref 8–23)
BUN/CREAT SERPL: 20.8 (ref 7–25)
CALCIUM SPEC-SCNC: 6.8 MG/DL (ref 8.6–10.5)
CHLORIDE SERPL-SCNC: 112 MMOL/L (ref 98–107)
CO2 SERPL-SCNC: 21 MMOL/L (ref 22–29)
CREAT SERPL-MCNC: 0.77 MG/DL (ref 0.57–1)
DACRYOCYTES BLD QL SMEAR: ABNORMAL
DEPRECATED RDW RBC AUTO: 56 FL (ref 37–54)
EGFRCR SERPLBLD CKD-EPI 2021: 76.2 ML/MIN/1.73
EOSINOPHIL # BLD MANUAL: 0.41 10*3/MM3 (ref 0–0.4)
EOSINOPHIL NFR BLD MANUAL: 3 % (ref 0.3–6.2)
ERYTHROCYTE [DISTWIDTH] IN BLOOD BY AUTOMATED COUNT: 18.8 % (ref 12.3–15.4)
GLOBULIN UR ELPH-MCNC: 2.2 GM/DL
GLUCOSE BLDC GLUCOMTR-MCNC: 122 MG/DL (ref 70–105)
GLUCOSE BLDC GLUCOMTR-MCNC: 157 MG/DL (ref 70–105)
GLUCOSE BLDC GLUCOMTR-MCNC: 197 MG/DL (ref 70–105)
GLUCOSE BLDC GLUCOMTR-MCNC: 251 MG/DL (ref 70–105)
GLUCOSE SERPL-MCNC: 264 MG/DL (ref 65–99)
HCT VFR BLD AUTO: 27.5 % (ref 34–46.6)
HGB BLD-MCNC: 8.8 G/DL (ref 12–15.9)
LYMPHOCYTES # BLD MANUAL: 3.92 10*3/MM3 (ref 0.7–3.1)
LYMPHOCYTES NFR BLD MANUAL: 3 % (ref 5–12)
MAGNESIUM SERPL-MCNC: 2.2 MG/DL (ref 1.6–2.4)
MCH RBC QN AUTO: 27.6 PG (ref 26.6–33)
MCHC RBC AUTO-ENTMCNC: 31.9 G/DL (ref 31.5–35.7)
MCV RBC AUTO: 86.4 FL (ref 79–97)
METAMYELOCYTES NFR BLD MANUAL: 1 % (ref 0–0)
MICROCYTES BLD QL: ABNORMAL
MONOCYTES # BLD: 0.41 10*3/MM3 (ref 0.1–0.9)
NEUTROPHILS # BLD AUTO: 8.51 10*3/MM3 (ref 1.7–7)
NEUTROPHILS NFR BLD MANUAL: 60 % (ref 42.7–76)
NEUTS BAND NFR BLD MANUAL: 3 % (ref 0–5)
NRBC SPEC MANUAL: 1 /100 WBC (ref 0–0.2)
PHOSPHATE SERPL-MCNC: 3 MG/DL (ref 2.5–4.5)
PLATELET # BLD AUTO: 76 10*3/MM3 (ref 140–450)
PMV BLD AUTO: 10.8 FL (ref 6–12)
POIKILOCYTOSIS BLD QL SMEAR: ABNORMAL
POTASSIUM SERPL-SCNC: 4.1 MMOL/L (ref 3.5–5.2)
PROT SERPL-MCNC: 5.1 G/DL (ref 6–8.5)
RBC # BLD AUTO: 3.18 10*6/MM3 (ref 3.77–5.28)
SCAN SLIDE: NORMAL
SMALL PLATELETS BLD QL SMEAR: ABNORMAL
SODIUM SERPL-SCNC: 143 MMOL/L (ref 136–145)
VARIANT LYMPHS NFR BLD MANUAL: 1 % (ref 0–5)
VARIANT LYMPHS NFR BLD MANUAL: 28 % (ref 19.6–45.3)
WBC MORPH BLD: NORMAL
WBC NRBC COR # BLD: 13.5 10*3/MM3 (ref 3.4–10.8)

## 2023-10-23 PROCEDURE — 63710000001 INSULIN LISPRO (HUMAN) PER 5 UNITS: Performed by: INTERNAL MEDICINE

## 2023-10-23 PROCEDURE — 25010000002 THIAMINE HCL 200 MG/2ML SOLUTION: Performed by: NURSE PRACTITIONER

## 2023-10-23 PROCEDURE — 97535 SELF CARE MNGMENT TRAINING: CPT

## 2023-10-23 PROCEDURE — 25010000002 CALCIUM GLUCONATE 2-0.675 GM/100ML-% SOLUTION: Performed by: NURSE PRACTITIONER

## 2023-10-23 PROCEDURE — 99232 SBSQ HOSP IP/OBS MODERATE 35: CPT | Performed by: INTERNAL MEDICINE

## 2023-10-23 PROCEDURE — 82948 REAGENT STRIP/BLOOD GLUCOSE: CPT

## 2023-10-23 PROCEDURE — 25010000002 CEFTRIAXONE PER 250 MG: Performed by: INTERNAL MEDICINE

## 2023-10-23 PROCEDURE — 97116 GAIT TRAINING THERAPY: CPT | Performed by: PHYSICAL THERAPIST

## 2023-10-23 PROCEDURE — 97530 THERAPEUTIC ACTIVITIES: CPT | Performed by: PHYSICAL THERAPIST

## 2023-10-23 RX ORDER — MIDODRINE HYDROCHLORIDE 5 MG/1
5 TABLET ORAL EVERY 8 HOURS SCHEDULED
Status: DISCONTINUED | OUTPATIENT
Start: 2023-10-23 | End: 2023-10-24

## 2023-10-23 RX ORDER — CLOPIDOGREL BISULFATE 75 MG/1
75 TABLET ORAL DAILY
Status: DISCONTINUED | OUTPATIENT
Start: 2023-10-23 | End: 2023-10-25 | Stop reason: HOSPADM

## 2023-10-23 RX ORDER — CALCIUM GLUCONATE 20 MG/ML
2000 INJECTION, SOLUTION INTRAVENOUS ONCE
Status: COMPLETED | OUTPATIENT
Start: 2023-10-23 | End: 2023-10-23

## 2023-10-23 RX ADMIN — MIDODRINE HYDROCHLORIDE 10 MG: 5 TABLET ORAL at 06:32

## 2023-10-23 RX ADMIN — TRAZODONE HYDROCHLORIDE 50 MG: 50 TABLET ORAL at 22:48

## 2023-10-23 RX ADMIN — OXYBUTYNIN CHLORIDE 5 MG: 5 TABLET ORAL at 09:15

## 2023-10-23 RX ADMIN — CALCIUM GLUCONATE 2000 MG: 20 INJECTION, SOLUTION INTRAVENOUS at 11:00

## 2023-10-23 RX ADMIN — Medication 10 ML: at 22:51

## 2023-10-23 RX ADMIN — Medication 10 ML: at 09:07

## 2023-10-23 RX ADMIN — PANTOPRAZOLE SODIUM 40 MG: 40 TABLET, DELAYED RELEASE ORAL at 22:47

## 2023-10-23 RX ADMIN — Medication 10 ML: at 22:52

## 2023-10-23 RX ADMIN — GABAPENTIN 300 MG: 300 CAPSULE ORAL at 22:49

## 2023-10-23 RX ADMIN — LACTULOSE 10 G: 20 SOLUTION ORAL at 22:49

## 2023-10-23 RX ADMIN — INSULIN LISPRO 2 UNITS: 100 INJECTION, SOLUTION INTRAVENOUS; SUBCUTANEOUS at 11:17

## 2023-10-23 RX ADMIN — FOLIC ACID 1 MG: 1 TABLET ORAL at 09:14

## 2023-10-23 RX ADMIN — Medication 10 ML: at 06:33

## 2023-10-23 RX ADMIN — THIAMINE HYDROCHLORIDE 200 MG: 100 INJECTION, SOLUTION INTRAMUSCULAR; INTRAVENOUS at 13:54

## 2023-10-23 RX ADMIN — LEVOTHYROXINE SODIUM 112 MCG: 0.11 TABLET ORAL at 06:32

## 2023-10-23 RX ADMIN — METOPROLOL TARTRATE 25 MG: 25 TABLET, FILM COATED ORAL at 09:14

## 2023-10-23 RX ADMIN — Medication 10 ML: at 06:32

## 2023-10-23 RX ADMIN — MULTIPLE VITAMINS W/ MINERALS TAB 1 TABLET: TAB at 09:14

## 2023-10-23 RX ADMIN — INSULIN LISPRO 4 UNITS: 100 INJECTION, SOLUTION INTRAVENOUS; SUBCUTANEOUS at 09:14

## 2023-10-23 RX ADMIN — CEFTRIAXONE 2000 MG: 2 INJECTION, POWDER, FOR SOLUTION INTRAMUSCULAR; INTRAVENOUS at 11:00

## 2023-10-23 RX ADMIN — PANTOPRAZOLE SODIUM 40 MG: 40 TABLET, DELAYED RELEASE ORAL at 09:15

## 2023-10-23 RX ADMIN — THIAMINE HYDROCHLORIDE 200 MG: 100 INJECTION, SOLUTION INTRAMUSCULAR; INTRAVENOUS at 06:32

## 2023-10-23 RX ADMIN — OXYBUTYNIN CHLORIDE 5 MG: 5 TABLET ORAL at 22:49

## 2023-10-23 RX ADMIN — Medication 10 ML: at 09:06

## 2023-10-23 RX ADMIN — CLOPIDOGREL BISULFATE 75 MG: 75 TABLET ORAL at 11:07

## 2023-10-23 RX ADMIN — ROSUVASTATIN 5 MG: 10 TABLET, FILM COATED ORAL at 09:14

## 2023-10-23 RX ADMIN — THIAMINE HYDROCHLORIDE 200 MG: 100 INJECTION, SOLUTION INTRAMUSCULAR; INTRAVENOUS at 22:47

## 2023-10-23 RX ADMIN — INSULIN LISPRO 2 UNITS: 100 INJECTION, SOLUTION INTRAVENOUS; SUBCUTANEOUS at 17:40

## 2023-10-23 RX ADMIN — METOPROLOL TARTRATE 25 MG: 25 TABLET, FILM COATED ORAL at 22:47

## 2023-10-23 RX ADMIN — SENNOSIDES AND DOCUSATE SODIUM 2 TABLET: 50; 8.6 TABLET ORAL at 22:47

## 2023-10-23 NOTE — CASE MANAGEMENT/SOCIAL WORK
Continued Stay Note   [place]     Patient Name: Pamela Zavala  MRN: [id number]  Today's Date: 10/23/2023    Admit Date: 10/18/2023    Plan: DC Plan: [facility] accepted and following. Precert started and approved 10/23/23. PASRR approved. [facility]. Current with [facility] and [facility] for DME.   Discharge Plan       Row Name 10/23/23 1639       Plan    Plan DC Plan: [facility] accepted and following. Precert started and approved 10/23/23. PASRR approved[facility] Current with [facility] and [facility] for DME.    Patient/Family in Agreement with Plan yes    Provided Post Acute Provider List? N/A    Provided Post Acute Provider Quality & Resource List? N/A    Plan Comments CM spoke with patient JOLENE Aparicio to obtain choices for SNF. Markus left  with choices as follows 1) [facility], and 2) [facility]. CM placed referrals for both facilities and notified liaisons. [facility] has declined secondary to bed availability. [facility] accepted. CM asked Jeanette SANTIAGO CM to start precert and it was completed. Precert auto approved for 10/24 - 10/26/23. Auth ID# [id number]. CM left VM for [facility] informing him of acceptance and precert approval. Bladder irrigation DC'd today per nursing.CM will continue to follow for any further needs and adjust discharge plan accordingly. DC Barriers: O2@4L nc, PICC, Ureteral drain and stent, IV abx, and cardiac monitoring.                 Expected Discharge Date and Time       Expected Discharge Date Expected Discharge Time    Oct 25, 2023               Sakina Gaviria RN    Office Phone: [phone]  Office Cell:     [phone]

## 2023-10-23 NOTE — PROGRESS NOTES
[facility] DAILY PROGRESS NOTE    Patient Identification  Name: Pamela Zavala  Age: 82 y.o.  Sex: female  :  1940  MRN: [id number]    Date: 10/23/2023             Subjective:  Interval History: Recovering in ICU    Objective:    Scheduled Meds:cefTRIAXone, 2,000 mg, Intravenous, Q24H  folic acid, 1 mg, Oral, Daily  gabapentin, 300 mg, Oral, Nightly  insulin lispro, 2-7 Units, Subcutaneous, TID With Meals  lactulose, 10 g, Oral, BID  levothyroxine, 112 mcg, Oral, Daily  metoprolol tartrate, 25 mg, Oral, BID  midodrine, 10 mg, Oral, Q8H  multivitamin with minerals, 1 tablet, Oral, Daily  oxybutynin, 5 mg, Oral, BID  pantoprazole, 40 mg, Oral, BID  polyethylene glycol, 17 g, Oral, Daily  rosuvastatin, 5 mg, Oral, Daily  senna-docusate sodium, 2 tablet, Oral, BID  sodium chloride, 10 mL, Intravenous, Q12H  sodium chloride, 10 mL, Intravenous, Q12H  sodium chloride, 10 mL, Intravenous, Q12H  sodium chloride, 10 mL, Intravenous, Q12H  thiamine (B-1) IV, 200 mg, Intravenous, Q8H   Followed by  [START ON 10/24/2023] thiamine, 100 mg, Oral, Daily  traZODone, 50 mg, Oral, Nightly      Continuous Infusions:norepinephrine, 0.02-0.3 mcg/kg/min      PRN Meds:  acetaminophen    acetaminophen    senna-docusate sodium **AND** polyethylene glycol **AND** bisacodyl **AND** bisacodyl    Calcium Replacement - Follow Nurse / BPA Driven Protocol    dextrose    dextrose    Diclofenac Sodium    glucagon (human recombinant)    hydrALAZINE    HYDROcodone-acetaminophen    Magnesium Standard Dose Replacement - Follow Nurse / BPA Driven Protocol    nitroglycerin    Phosphorus Replacement - Follow Nurse / BPA Driven Protocol    Potassium Replacement - Follow Nurse / BPA Driven Protocol    sodium chloride    sodium chloride    sodium chloride    sodium chloride    sodium chloride    traMADol    Vital signs in last 24 hours:  Temp:  [97.4 °F (36.3 °C)-98.6 °F (37 °C)] 97.8 °F (36.6 °C)  Heart Rate:  [64-99] 73  Resp:  [15-20] 18  BP:  "()/(40-84) 134/66    Intake/Output:    Intake/Output Summary (Last 24 hours) at 10/23/2023 0926  Last data filed at 10/23/2023 0600  Gross per 24 hour   Intake 3342 ml   Output 0 ml   Net 3342 ml       Exam:  /66 (BP Location: Left arm, Patient Position: Lying)   Pulse 73   Temp 97.8 °F (36.6 °C) (Oral)   Resp 18   Ht 167.6 cm (66\")   Wt 69.7 kg (153 lb 10.6 oz)   SpO2 95%   BMI 24.80 kg/m²     General Appearance:    Alert, cooperative, no distress, appears stated age               Abdomen:     Soft, ND   :    No suprapubic distention                Data Review:  All labs (24hrs):   Recent Results (from the past 24 hour(s))   POC Glucose Once    Collection Time: 10/22/23 11:11 AM    Specimen: Blood   Result Value Ref Range    Glucose 115 (H) 70 - 105 mg/dL   POC Glucose Once    Collection Time: 10/22/23  6:15 PM    Specimen: Blood   Result Value Ref Range    Glucose 174 (H) 70 - 105 mg/dL   Phosphorus    Collection Time: 10/22/23 11:53 PM    Specimen: Blood   Result Value Ref Range    Phosphorus 3.0 2.5 - 4.5 mg/dL   Comprehensive Metabolic Panel    Collection Time: 10/22/23 11:53 PM    Specimen: Blood   Result Value Ref Range    Glucose 264 (H) 65 - 99 mg/dL    BUN 16 8 - 23 mg/dL    Creatinine 0.77 0.57 - 1.00 mg/dL    Sodium 143 136 - 145 mmol/L    Potassium 4.1 3.5 - 5.2 mmol/L    Chloride 112 (H) 98 - 107 mmol/L    CO2 21.0 (L) 22.0 - 29.0 mmol/L    Calcium 6.8 (L) 8.6 - 10.5 mg/dL    Total Protein 5.1 (L) 6.0 - 8.5 g/dL    Albumin 2.9 (L) 3.5 - 5.2 g/dL    ALT (SGPT) 54 (H) 1 - 33 U/L    AST (SGOT) 40 (H) 1 - 32 U/L    Alkaline Phosphatase 400 (H) 39 - 117 U/L    Total Bilirubin 0.4 0.0 - 1.2 mg/dL    Globulin 2.2 gm/dL    A/G Ratio 1.3 g/dL    BUN/Creatinine Ratio 20.8 7.0 - 25.0    Anion Gap 10.0 5.0 - 15.0 mmol/L    eGFR 76.2 >60.0 mL/min/1.73   Magnesium    Collection Time: 10/22/23 11:53 PM    Specimen: Blood   Result Value Ref Range    Magnesium 2.2 1.6 - 2.4 mg/dL   CBC Auto " Differential    Collection Time: 10/22/23 11:53 PM    Specimen: Blood   Result Value Ref Range    WBC 13.50 (H) 3.40 - 10.80 10*3/mm3    RBC 3.18 (L) 3.77 - 5.28 10*6/mm3    Hemoglobin 8.8 (L) 12.0 - 15.9 g/dL    Hematocrit 27.5 (L) 34.0 - 46.6 %    MCV 86.4 79.0 - 97.0 fL    MCH 27.6 26.6 - 33.0 pg    MCHC 31.9 31.5 - 35.7 g/dL    RDW 18.8 (H) 12.3 - 15.4 %    RDW-SD 56.0 (H) 37.0 - 54.0 fl    MPV 10.8 6.0 - 12.0 fL    Platelets 76 (L) 140 - 450 10*3/mm3   Scan Slide    Collection Time: 10/22/23 11:53 PM    Specimen: Blood   Result Value Ref Range    Scan Slide     Manual Differential    Collection Time: 10/22/23 11:53 PM    Specimen: Blood   Result Value Ref Range    Neutrophil % 60.0 42.7 - 76.0 %    Lymphocyte % 28.0 19.6 - 45.3 %    Monocyte % 3.0 (L) 5.0 - 12.0 %    Eosinophil % 3.0 0.3 - 6.2 %    Basophil % 1.0 0.0 - 1.5 %    Bands %  3.0 0.0 - 5.0 %    Metamyelocyte % 1.0 (H) 0.0 - 0.0 %    Atypical Lymphocyte % 1.0 0.0 - 5.0 %    Neutrophils Absolute 8.51 (H) 1.70 - 7.00 10*3/mm3    Lymphocytes Absolute 3.92 (H) 0.70 - 3.10 10*3/mm3    Monocytes Absolute 0.41 0.10 - 0.90 10*3/mm3    Eosinophils Absolute 0.41 (H) 0.00 - 0.40 10*3/mm3    Basophils Absolute 0.14 0.00 - 0.20 10*3/mm3    nRBC 1.0 (H) 0.0 - 0.2 /100 WBC    Anisocytosis Slight/1+ None Seen    Dacrocytes Slight/1+ None Seen    Microcytes Slight/1+ None Seen    Poikilocytes Slight/1+ None Seen    WBC Morphology Normal Normal    Platelet Estimate Decreased Normal   POC Glucose Once    Collection Time: 10/23/23  9:05 AM    Specimen: Blood   Result Value Ref Range    Glucose 251 (H) 70 - 105 mg/dL      Imaging Results (Last 24 Hours)       ** No results found for the last 24 hours. **             Assessment:    Sepsis without acute organ dysfunction    Coronary arteriosclerosis    Type 2 diabetes mellitus    Diabetic neuropathy    Essential hypertension    Hyperlipidemia    Postoperative hypothyroidism    Ex-smoker    History of thyroid cancer     Kidney stones    UTI (urinary tract infection), bacterial    Sepsis      Sepsis after ureteroscopy    Plan:    Recovering in ICU  Cultures negative, continue Rocephin  Plan stent removal once fully recovered       Juan Alaniz MD  [facility]  [address], [address]  [place] [address]  Office: [phone]  Available via [employer] Secure Chat  10/23/23  09:26 EDT

## 2023-10-23 NOTE — DISCHARGE PLACEMENT REQUEST
"Frandy Cortes (82 y.o. Female)       Date of Birth   1940    Social Security Number       Address   [address] [place] [facility] [place] [address]    Home Phone       MRN   [id number]       Pentecostal   None    Marital Status                               Admission Date   10/18/23    Admission Type   Elective    Admitting Provider   Sarah Beth Coronado MD    Attending Provider   Sarah Beth Coronado MD    Department, Room/Bed   [facility] INTENSIVE CARE UNIT, 2304/1       Discharge Date       Discharge Disposition       Discharge Destination                                 Attending Provider: Sarah Beth Coronado MD    Allergies: Penicillins, Shellfish-derived Products, Sulfamethizole, Trimethoprim    Isolation: None   Infection: None   Code Status: No CPR    Ht: 167.6 cm (66\")   Wt: 69.7 kg (153 lb 10.6 oz)    Admission Cmt: None   Principal Problem: Sepsis without acute organ dysfunction [A41.9]                   Active Insurance as of 10/18/2023       Primary Coverage       Payor Plan Insurance Group Employer/Plan Group    [facility] MEDICARE REPLACEMENT [facility] MEDICARE REPLACEMENT [id number]       Payor Plan Address Payor Plan Phone Number Payor Plan Fax Number Effective Dates    [address] [address]   1/1/2023 - None Entered    [facility] [id number]         Subscriber Name Subscriber Birth Date Member ID       FRANDY CORTES 1940 [id number]               Secondary Coverage       Payor Plan Insurance Group Employer/Plan Group    [place]        Payor Plan Address Payor Plan Phone Number Payor Plan Fax Number Effective Dates    [address]   1/1/2021 - None Entered    [place] [address]         Subscriber Name Subscriber Birth Date Member ID       FRANDY CORTES 1940 [id number]                     Emergency Contacts        (Rel.) Home Phone Work Phone Mobile Phone    NICKOLASNEHAL HO (Grandchild) [phone] -- --    YANI CORTES " (Son) [phone] -- --    KAMINI CORTES (Relative) [phone] -- --                 History & Physical        Juan Alaniz MD at 10/18/23 1238          H&P reviewed.  The patient was examined and there are no changes to the H&P   Electronically signed by Juan Alaniz MD at 10/18/23 1238   Source Note        [Media Unavailable] Scan on 10/18/2023 by [employer], [employer]: H&P. [facility], 10/04/2023          Electronically signed by [employer] [employer] at 10/10/23 0500                 H&P signed by [employer] [employer] at 10/10/23 0500         [Media Unavailable] Scan on 10/18/2023 by [employer], [employer]: H&P. [facility], 10/04/2023          Electronically signed by [employer], [employer] at 10/10/23 0500

## 2023-10-23 NOTE — DISCHARGE PLACEMENT REQUEST
"Please see below clinical information. Thank you.  Dyana Eaton, AJAYW, MSSW, [facility]    Phone: [phone]  Cell: [phone]  Fax: [phone]  [email or web address]    Frandy Zavala (82 y.o. Female)       Date of Birth   1940    Social Security Number       Address   [address] [place] [address] [address] [facility] [place] [address]    Home Phone       MRN   [id number]       Anabaptist   None    Marital Status                               Admission Date   10/18/23    Admission Type   Elective    Admitting Provider   Sarah Beth Coronado MD    Attending Provider   Sarah Beth Coronado MD    Department, Room/Bed   [facility] INTENSIVE CARE UNIT, 2304/1       Discharge Date       Discharge Disposition       Discharge Destination                                 Attending Provider: Sarah Beth Coronado MD    Allergies: Penicillins, Shellfish-derived Products, Sulfamethizole, Trimethoprim    Isolation: None   Infection: None   Code Status: No CPR    Ht: 167.6 cm (66\")   Wt: 69.7 kg (153 lb 10.6 oz)    Admission Cmt: None   Principal Problem: Sepsis without acute organ dysfunction [A41.9]                   Active Insurance as of 10/18/2023       Primary Coverage       Payor Plan Insurance Group Employer/Plan Group    [facility] MEDICARE REPLACEMENT [facility] MEDICARE REPLACEMENT [id number]       Payor Plan Address Payor Plan Phone Number Payor Plan Fax Number Effective Dates    [address] [address]   1/1/2023 - None Entered    [facility] [id number]         Subscriber Name Subscriber Birth Date Member ID       FRANDY ZAVALA 1940 [id number]               Secondary Coverage       Payor Plan Insurance Group Employer/Plan Group    [place]        Payor Plan Address Payor Plan Phone Number Payor Plan Fax Number Effective Dates    [address]   1/1/2021 - None Entered    [place] [address]         Subscriber Name Subscriber Birth Date Member ID       FRANDY ZAVALA " 1940 [id number]                     Emergency Contacts        (Rel.) Home Phone Work Phone Mobile Phone    NEHAL KOEHLER (Grandchild) [phone] -- --    YANI CORTES (Son) [phone] -- --    RASHMIKAMINI NAAVRRO (Relative) [phone] -- --                 History & Physical        Juan Alaniz MD at 10/18/23 1238          H&P reviewed.  The patient was examined and there are no changes to the H&P   Electronically signed by Juan Alaniz MD at 10/18/23 1238   Source Note        [Media Unavailable] Scan on 10/18/2023 by [employer], [employer]: H&P. [facility], 10/04/2023          Electronically signed by [employer], [employer] at 10/10/23 0500                 H&P signed by [employer], [employer] at 10/10/23 0500         [Media Unavailable] Scan on 10/18/2023 by [employer], [employer]: H&P. [facility], 10/04/2023          Electronically signed by [employer], [employer] at 10/10/23 0500          Physician Progress Notes (last 24 hours)        Nuria Mcgraw MD at 10/23/23 0987          Referring Provider: Sarah Beth Coronado MD    Reason for follow-up: AFIB     Patient Care Team:  Marisol Rodgers APRN as PCP - General (Pulmonary Disease)      SUBJECTIVE    Feeling better today.  A little short of breath after ambulating with PT around the nursing station.  Denies any chest pain.  No palpitations or dizziness or lightheadedness     ROS  Review of all systems negative except as indicated.    Since I have last seen, the patient has been without any chest discomfort, shortness of breath, palpitations, dizziness or syncope.  Denies having any headache, abdominal pain, nausea, vomiting, diarrhea, constipation, loss of weight or loss of appetite.  Denies having any excessive bruising, hematuria or blood in the stool.  ROS      Personal History:    Past Medical History:   Diagnosis Date    Anemia     Arthritis     CAD (coronary artery disease)     Chronic back pain     Diabetic neuropathy     Essential  hypertension     GERD (gastroesophageal reflux disease)     History of gastric ulcer     History of stomach ulcers     HLD (hyperlipidemia)     Hypocalcemia     Hypoparathyroidism     Hypothyroidism     Insomnia     Kidney stones     Osteoporosis     Recurrent sinusitis     Renal disease     Thyroid cancer     Type 2 diabetes mellitus        Past Surgical History:   Procedure Laterality Date    CARDIAC CATHETERIZATION N/A 2023    Procedure: Left Heart Cath and coronary angiogram;  Surgeon: John Baldwin MD;  Location: [facility] CATH INVASIVE LOCATION;  Service: Cardiovascular;  Laterality: N/A;    CORONARY STENT PLACEMENT      x 3    CYSTOSCOPY, URETEROSCOPY, RETROGRADE PYELOGRAM, STENT INSERTION Left 10/18/2023    Procedure: CYSTOSCOPY URETEROSCOPY RETROGRADE PYELOGRAM HOLMIUM LASER STENT INSERTION;  Surgeon: Juan Alaniz MD;  Location: [facility] MAIN OR;  Service: Urology;  Laterality: Left;    HYSTERECTOMY      LUNG LOBECTOMY      THYROIDECTOMY         Family History   Problem Relation Age of Onset    Diabetes Mother     Cancer Father     Heart disease Sister     Diabetes Sister     Heart disease Maternal Grandmother        Social History     Tobacco Use    Smoking status: Former     Packs/day: 2.00     Years: 30.00     Additional pack years: 0.00     Total pack years: 60.00     Types: Cigarettes     Quit date:      Years since quittin.8     Passive exposure: Past    Smokeless tobacco: Never   Vaping Use    Vaping Use: Never used   Substance Use Topics    Alcohol use: Yes     Alcohol/week: 1.0 standard drink of alcohol     Types: 1 Glasses of wine per week    Drug use: Defer        Home meds:  Prior to Admission medications    Medication Sig Start Date End Date Taking? Authorizing Provider   Accu-Chek Softclix Lancets lancets 1 each by Other route 3 (Three) Times a Day Before Meals. Dx: E11.65. Use as instructed 23  Yes Johanny Nagel MD   atenolol (TENORMIN) 50 MG tablet Take 1 tablet by mouth  Daily. Take dos   Yes Andrade La MD   clopidogrel (PLAVIX) 75 MG tablet Take 1 tablet by mouth Daily.   Yes Andrade La MD   fluticasone (FLONASE) 50 MCG/ACT nasal spray 1 spray into the nostril(s) as directed by provider 2 (Two) Times a Day.   Yes Andrade La MD   gabapentin (NEURONTIN) 300 MG capsule Take 1 capsule by mouth Every Night.   Yes Andrade La MD   glucose blood (Accu-Chek Guide) test strip 1 each by Other route 3 (Three) Times a Day Before Meals. Dx: E11.65. Use as instructed 5/4/23  Yes Johanny Nagel MD   insulin degludec (Tresiba FlexTouch) 100 UNIT/ML solution pen-injector injection Inject 22 Units under the skin into the appropriate area as directed every night at bedtime. 9/12/23  Yes Driss Dan MD   Insulin Lispro 100 UNIT/ML solution cartridge Inject  under the skin into the appropriate area as directed 3 (Three) Times a Day Before Meals. Per sliding scale   Yes Andrade La MD   Insulin Lispro, 1 Unit Dial, (HumaLOG KwikPen) 100 UNIT/ML solution pen-injector Inject 7 Units under the skin into the appropriate area as directed 3 (Three) Times a Day. Inject 6 Units under the skin into the appropriate area as directed 3 (Three) Times a Day With Meals. 9/12/23  Yes Driss Dan MD   lactulose (CHRONULAC) 10 GM/15ML solution Take 15 mL by mouth Daily.   Yes Andrade La MD   levothyroxine (Synthroid) 112 MCG tablet Take 1 tablet by mouth Daily. 9/12/23 9/11/24 Yes Driss Dan MD   lisinopril (PRINIVIL,ZESTRIL) 5 MG tablet Take 1 tablet by mouth Daily. 5/18/23  Yes Nuria Mcgraw MD   metFORMIN (GLUCOPHAGE) 1000 MG tablet Take 1 tablet by mouth 2 (Two) Times a Day With Meals.   Yes Andrade La MD   oxybutynin (DITROPAN) 5 MG tablet Take 1 tablet by mouth 2 (Two) Times a Day.   Yes Andrade La MD   oxyCODONE-acetaminophen (PERCOCET) 5-325 MG per tablet Take 1 tablet by mouth Daily As Needed for Severe Pain. 10/18/23   Yes Juan Alaniz MD   pantoprazole (PROTONIX) 40 MG EC tablet Take 1 tablet by mouth 2 (Two) Times a Day. Take dos   Yes ProviderAndrade MD   rosuvastatin (CRESTOR) 5 MG tablet Take 1 tablet by mouth Daily.   Yes Provider, MD Andrade   traMADol (ULTRAM) 50 MG tablet Take 1 tablet by mouth 4 (Four) Times a Day As Needed for Moderate Pain.   Yes ProviderAndrade MD   traZODone (DESYREL) 50 MG tablet Take 1 tablet by mouth Every Night.   Yes Provider, MD Andrade   cefdinir (OMNICEF) 300 MG capsule Take 1 capsule by mouth 2 (Two) Times a Day. 10/18/23   Juan Alaniz MD       Allergies:  Penicillins, Shellfish-derived products, Sulfamethizole, and Trimethoprim    Scheduled Meds:cefTRIAXone, 2,000 mg, Intravenous, Q24H  folic acid, 1 mg, Oral, Daily  gabapentin, 300 mg, Oral, Nightly  insulin lispro, 2-7 Units, Subcutaneous, TID With Meals  lactulose, 10 g, Oral, BID  levothyroxine, 112 mcg, Oral, Daily  metoprolol tartrate, 25 mg, Oral, BID  midodrine, 10 mg, Oral, Q8H  multivitamin with minerals, 1 tablet, Oral, Daily  oxybutynin, 5 mg, Oral, BID  pantoprazole, 40 mg, Oral, BID  polyethylene glycol, 17 g, Oral, Daily  rosuvastatin, 5 mg, Oral, Daily  senna-docusate sodium, 2 tablet, Oral, BID  sodium chloride, 10 mL, Intravenous, Q12H  sodium chloride, 10 mL, Intravenous, Q12H  sodium chloride, 10 mL, Intravenous, Q12H  sodium chloride, 10 mL, Intravenous, Q12H  thiamine (B-1) IV, 200 mg, Intravenous, Q8H   Followed by  [START ON 10/24/2023] thiamine, 100 mg, Oral, Daily  traZODone, 50 mg, Oral, Nightly      Continuous Infusions:norepinephrine, 0.02-0.3 mcg/kg/min      PRN Meds:.  acetaminophen    acetaminophen    senna-docusate sodium **AND** polyethylene glycol **AND** bisacodyl **AND** bisacodyl    Calcium Replacement - Follow Nurse / BPA Driven Protocol    dextrose    dextrose    Diclofenac Sodium    glucagon (human recombinant)    hydrALAZINE    HYDROcodone-acetaminophen    Magnesium Standard  "Dose Replacement - Follow Nurse / BPA Driven Protocol    nitroglycerin    Phosphorus Replacement - Follow Nurse / BPA Driven Protocol    Potassium Replacement - Follow Nurse / BPA Driven Protocol    sodium chloride    sodium chloride    sodium chloride    sodium chloride    sodium chloride    traMADol      OBJECTIVE    Vital Signs  Vitals:    10/23/23 0500 10/23/23 0600 10/23/23 0700 10/23/23 0800   BP: 120/59 119/59 134/66    BP Location: Left arm Left arm Left arm    Patient Position: Lying Lying Lying    Pulse: 68 73 73    Resp: 18 18 18    Temp:    97.8 °F (36.6 °C)   TempSrc:    Oral   SpO2: 90% 91% 95%    Weight:       Height:           Flowsheet Rows      Flowsheet Row First Filed Value   Admission Height 170.2 cm (67\") Documented at 10/09/2023 1548   Admission Weight 59 kg (130 lb) Documented at 10/09/2023 1548              Intake/Output Summary (Last 24 hours) at 10/23/2023 0938  Last data filed at 10/23/2023 0600  Gross per 24 hour   Intake 3342 ml   Output 0 ml   Net 3342 ml          Telemetry: Normal sinus rhythm    Physical Exam:  The patient is alert, oriented and in no distress.  Frail  Vital signs as noted above.  Head and neck revealed no carotid bruits or jugular venous distention.  No thyromegaly or lymphadenopathy is present  Lungs clear.  No wheezing.  Breath sounds are normal bilaterally.  Heart normal first and second heart sounds.  No murmur. No precordial rub is present.  No gallop is present.  Abdomen soft and nontender.  No organomegaly is present.  Extremities with good peripheral pulses without any pedal edema.  Skin warm and dry.  Musculoskeletal system is grossly normal.  CNS grossly normal.       Results Review:  I have personally reviewed the results from the time of this admission to 10/23/2023 09:38 EDT and agree with these findings:  []  Laboratory  []  Microbiology  []  Radiology  []  EKG/Telemetry   []  Cardiology/Vascular   []  Pathology  []  Old records  []  Other:    Most " notable findings include:    Lab Results (last 24 hours)       Procedure Component Value Units Date/Time    POC Glucose Once [[id number]]  (Abnormal) Collected: 10/23/23 0905    Specimen: Blood Updated: 10/23/23 0906     Glucose 251 mg/dL      Comment: Serial Number: [id number]:  [id number]       CBC & Differential [[id number]]  (Abnormal) Collected: 10/22/23 2353    Specimen: Blood Updated: 10/23/23 0107    Narrative:      The following orders were created for panel order CBC & Differential.  Procedure                               Abnormality         Status                     ---------                               -----------         ------                     CBC Auto Differential[[id number]]        Abnormal            Final result               Scan Slide[[id number]]                                       Final result                 Please view results for these tests on the individual orders.    CBC Auto Differential [[id number]]  (Abnormal) Collected: 10/22/23 2353    Specimen: Blood Updated: 10/23/23 0107     WBC 13.50 10*3/mm3      RBC 3.18 10*6/mm3      Hemoglobin 8.8 g/dL      Hematocrit 27.5 %      MCV 86.4 fL      MCH 27.6 pg      MCHC 31.9 g/dL      RDW 18.8 %      RDW-SD 56.0 fl      MPV 10.8 fL      Platelets 76 10*3/mm3     Narrative:      The previously reported component NRBC is no longer being reported. Previous result was 0.1 /100 WBC (Reference Range: 0.0-0.2 /100 WBC) on 10/23/2023 at 0023 EDT.    Scan Slide [[id number]] Collected: 10/22/23 2353    Specimen: Blood Updated: 10/23/23 0107     Scan Slide --     Comment: See Manual Differential Results       Manual Differential [[id number]]  (Abnormal) Collected: 10/22/23 2353    Specimen: Blood Updated: 10/23/23 0107     Neutrophil % 60.0 %      Lymphocyte % 28.0 %      Monocyte % 3.0 %      Eosinophil % 3.0 %      Basophil % 1.0 %      Bands %  3.0 %      Metamyelocyte % 1.0 %      Atypical Lymphocyte % 1.0 %      Neutrophils Absolute 8.51  10*3/mm3      Lymphocytes Absolute 3.92 10*3/mm3      Monocytes Absolute 0.41 10*3/mm3      Eosinophils Absolute 0.41 10*3/mm3      Basophils Absolute 0.14 10*3/mm3      nRBC 1.0 /100 WBC      Anisocytosis Slight/1+     Dacrocytes Slight/1+     Microcytes Slight/1+     Poikilocytes Slight/1+     WBC Morphology Normal     Platelet Estimate Decreased    Phosphorus [[id number]]  (Normal) Collected: 10/22/23 2353    Specimen: Blood Updated: 10/23/23 0045     Phosphorus 3.0 mg/dL     Magnesium [[id number]]  (Normal) Collected: 10/22/23 2353    Specimen: Blood Updated: 10/23/23 0045     Magnesium 2.2 mg/dL     Comprehensive Metabolic Panel [[id number]]  (Abnormal) Collected: 10/22/23 2353    Specimen: Blood Updated: 10/23/23 0045     Glucose 264 mg/dL      BUN 16 mg/dL      Creatinine 0.77 mg/dL      Sodium 143 mmol/L      Potassium 4.1 mmol/L      Chloride 112 mmol/L      CO2 21.0 mmol/L      Calcium 6.8 mg/dL      Total Protein 5.1 g/dL      Albumin 2.9 g/dL      ALT (SGPT) 54 U/L      AST (SGOT) 40 U/L      Alkaline Phosphatase 400 U/L      Total Bilirubin 0.4 mg/dL      Globulin 2.2 gm/dL      A/G Ratio 1.3 g/dL      BUN/Creatinine Ratio 20.8     Anion Gap 10.0 mmol/L      eGFR 76.2 mL/min/1.73     Narrative:      GFR Normal >60  Chronic Kidney Disease <60  Kidney Failure <15    The GFR formula is only valid for adults with stable renal function between ages 18 and 70.    Blood Culture - Blood, Arm, Left [[id number]]  (Normal) Collected: 10/18/23 1901    Specimen: Blood from Arm, Left Updated: 10/22/23 1916     Blood Culture No growth at 4 days    Narrative:      Less than seven (7) mL's of blood was collected.  Insufficient quantity may yield false negative results.    Blood Culture - Blood, Hand, Left [[id number]]  (Normal) Collected: 10/18/23 1905    Specimen: Blood from Hand, Left Updated: 10/22/23 1916     Blood Culture No growth at 4 days    Narrative:      Less than seven (7) mL's of blood was collected.   Insufficient quantity may yield false negative results.    POC Glucose Once [[id number]]  (Abnormal) Collected: 10/22/23 1815    Specimen: Blood Updated: 10/22/23 1818     Glucose 174 mg/dL      Comment: Serial Number: [id number]:  [id number]       Ferritin [[id number]]  (Abnormal) Collected: 10/22/23 0532    Specimen: Blood Updated: 10/22/23 1454     Ferritin 323.70 ng/mL     Narrative:      Results may be falsely decreased if patient taking Biotin.      POC Glucose Once [[id number]]  (Abnormal) Collected: 10/22/23 1111    Specimen: Blood Updated: 10/22/23 1113     Glucose 115 mg/dL      Comment: Serial Number: [id number]:  [id number]               Imaging Results (Last 24 Hours)       ** No results found for the last 24 hours. **            LAB RESULTS (LAST 7 DAYS)    CBC  Results from last 7 days   Lab Units 10/22/23  2353 10/22/23  0432 10/21/23  0537 10/20/23  0653 10/20/23  0027 10/19/23  1749 10/19/23  1135 10/19/23  0453 10/19/23  0109 10/18/23  2202 10/18/23  2156 10/18/23  1904   WBC 10*3/mm3 13.50* 15.60* 16.20* 24.90*  --   --   --  19.60*  --  8.10  --  6.30   RBC 10*6/mm3 3.18* 2.91* 3.06* 3.33*  --   --   --  3.34*  --  3.14*  --  3.48*   HEMOGLOBIN g/dL 8.8* 7.8* 8.2* 9.8* 10.6* 8.9* 7.0* 8.9*   < > 8.4*  --  9.5*   HEMOGLOBIN, POC   --   --   --   --   --   --   --   --   --   --    < >  --    HEMATOCRIT % 27.5* 24.6* 26.3* 29.6* 32.8* 28.6* 22.8* 28.9*   < > 26.7*  --  30.3*   HEMATOCRIT POC   --   --   --   --   --   --   --   --   --   --    < >  --    MCV fL 86.4 84.6 85.9 89.0  --   --   --  86.6  --  85.1  --  87.1   PLATELETS 10*3/mm3 76* 73* 85* 127*  --   --   --  207  --  172  --  251    < > = values in this interval not displayed.       BMP  Results from last 7 days   Lab Units 10/22/23  2353 10/22/23  0532 10/21/23  0537 10/21/23  0452 10/20/23  0603 10/19/23  0453 10/18/23  2202 10/18/23  2156 10/18/23  2051 10/18/23  1901   SODIUM mmol/L 143 147* 146*  --  148* 136  --   --   140 142   POTASSIUM mmol/L 4.1 2.8* 3.7  --  4.2 4.2 2.8*  --  3.1* 3.4*   CHLORIDE mmol/L 112* 114* 121*  --  122* 106  --   --  104 105   CO2 mmol/L 21.0* 22.0 17.0*  --  18.0* 20.0*  --   --  21.0* 21.0*   BUN mg/dL 16 17 25*  --  30* 19  --   --  18 17   CREATININE mg/dL 0.77 0.79 0.98  --  0.94 0.76  --  0.91 0.92 0.85   GLUCOSE mg/dL 264* 169* 207*  --  94 147*  --   --  103* 132*   MAGNESIUM mg/dL 2.2 1.3*  --  1.7  --   --  2.1  --   --  0.8*   PHOSPHORUS mg/dL 3.0 1.2*  --  2.5  --   --   --   --  2.6 2.9       CMP   Results from last 7 days   Lab Units 10/22/23  2353 10/22/23  0532 10/21/23  0537 10/20/23  0603 10/19/23  0937 10/19/23  0453 10/18/23  2202 10/18/23  2156 10/18/23  2051 10/18/23  1901   SODIUM mmol/L 143 147* 146* 148*  --  136  --   --  140 142   POTASSIUM mmol/L 4.1 2.8* 3.7 4.2  --  4.2 2.8*  --  3.1* 3.4*   CHLORIDE mmol/L 112* 114* 121* 122*  --  106  --   --  104 105   CO2 mmol/L 21.0* 22.0 17.0* 18.0*  --  20.0*  --   --  21.0* 21.0*   BUN mg/dL 16 17 25* 30*  --  19  --   --  18 17   CREATININE mg/dL 0.77 0.79 0.98 0.94  --  0.76  --  0.91 0.92 0.85   GLUCOSE mg/dL 264* 169* 207* 94  --  147*  --   --  103* 132*   ALBUMIN g/dL 2.9* 2.7* 3.2*  --   --  2.6*  --   --  2.9* 3.0*   BILIRUBIN mg/dL 0.4 0.9 0.9  --   --  0.4  --   --  0.9 0.7   ALK PHOS U/L 400* 264* 197*  --   --  88  --   --  72 74   AST (SGOT) U/L 40* 46* 93*  --   --  398*  --   --  195* 76*   ALT (SGPT) U/L 54* 59* 80*  --   --  149*  --   --  66* 29   AMYLASE U/L  --   --   --   --   --   --   --   --   --  38   AMMONIA umol/L  --   --   --   --  14  --   --   --   --   --        BNP        TROPONIN  Results from last 7 days   Lab Units 10/18/23  2051 10/18/23  1901   CK TOTAL U/L  --  96   HSTROP T ng/L 19* 17*       CoAg  Results from last 7 days   Lab Units 10/18/23  2202   INR  1.36*       Creatinine Clearance  Estimated Creatinine Clearance: 59.8 mL/min (by C-G formula based on SCr of 0.77  mg/dL).    ABG  Results from last 7 days   Lab Units 10/18/23  2156   PH, ARTERIAL pH units 7.327*   PCO2, ARTERIAL mm Hg 42.8   PO2 ART mm Hg 61.1*   O2 SATURATION ART % 89.1*   BASE EXCESS ART mmol/L -3.4*       Radiology  XR Chest 1 View    Result Date: 10/21/2023  Impression: Slight worsening airspace disease. Electronically Signed: Alan Arreaga MD  10/21/2023 11:40 AM CDT  Workstation ID: [id number]       EKG  I personally viewed and interpreted the patient's EKG/Telemetry data:  ECG 12 Lead Rhythm Change   Final Result   HEART RATE= 140  bpm   RR Interval= 419  ms   IA Interval=   ms   P Horizontal Axis=   deg   P Front Axis=   deg   QRSD Interval= 75  ms   QT Interval=   ms   QTcB= Invalid  ms   QRS Axis= 73  deg   T Wave Axis= -89  deg   - ABNORMAL ECG -   Atrial fibrillation with rapid V-rate   Low voltage, precordial leads   Borderline T abnormalities, diffuse leads   When compared with ECG of 18-Oct-2023 15:46:09,   Significant change in rhythm   Significant repolarization change   Significant axis, voltage or hypertrophy change   Electronically Signed By: John Baldwin (BAMBI) 20-Oct-2023 22:55:09   Date and Time of Study: 2023-10-20 05:19:28      ECG 12 Lead Rhythm Change   Final Result   HEART RATE= 151  bpm   RR Interval= 396  ms   IA Interval= 129  ms   P Horizontal Axis= -11  deg   P Front Axis= 61  deg   QRSD Interval= 84  ms   QT Interval= 312  ms   QTcB= 496  ms   QRS Axis= 45  deg   T Wave Axis= 247  deg   - ABNORMAL ECG -   Supraventricular tachycardia   Probable anteroseptal infarct, recent   When compared with ECG of 11-Oct-2023 13:54:31,   Significant change in rhythm: previously sinus   Electronically Signed By: Shirlye Rosenthal (BAMBI) 21-Oct-2023 17:04:20   Date and Time of Study: 2023-10-18 15:46:09      SCANNED - TELEMETRY     Final Result      SCANNED - TELEMETRY     Final Result      SCANNED - TELEMETRY     Final Result      SCANNED - TELEMETRY     Final Result      SCANNED - TELEMETRY      Final Result      ECG 12 Lead   Final Result   HEART RATE= 80  bpm   RR Interval= 752  ms   WY Interval= 168  ms   P Horizontal Axis= 6  deg   P Front Axis= 92  deg   QRSD Interval= 73  ms   QT Interval= 379  ms   QTcB= 437  ms   QRS Axis= 48  deg   T Wave Axis= 61  deg   - ABNORMAL ECG -   Sinus rhythm   Consider anteroseptal infarct   When compared with ECG of 08-Sep-2023 10:41:09,   No significant change   Electronically Signed By: Nuria Mcgraw (BAMBI) 12-Oct-2023 15:16:23   Date and Time of Study: 2023-10-11 13:54:31            Echocardiogram:          Stress Test:  Results for orders placed during the hospital encounter of 05/02/23    Stress Test With Myocardial Perfusion One Day    Interpretation Summary    Left ventricular ejection fraction is hyperdynamic (Calculated EF > 70%).    Abnormal LV wall motion consistent with moderate hypokinesis of the inferior wall.    Myocardial perfusion imaging indicates a large-sized infarct located in the inferior wall with moderate candy-infarct ischemia.    Impressions are consistent with a high risk study.    Findings consistent with an abnormal ECG stress test.         Cardiac Catheterization:  Results for orders placed during the hospital encounter of 05/02/23    Cardiac Catheterization/Vascular Study    Narrative  OPERATORS  John Baldwin M.D. (Attending Cardiologist)      PROCEDURES PERFORMED  Ultrasound guided Vascular access  Left Heart Catheterization  Coronary Angiogram 02491  PCI with DIRK placement to RCA 81026  IVUS of RCA [id number]  Moderate sedation 45mins    INDICATIONS FOR PROCEDURE  82 years old woman with multiple cardiovascular risk factors presented with chest pain/unstable angina.  She had a nuclear stress test which was abnormal in the inferior wall.  After discussing the risk and benefit of the procedure she was brought into the Cath Lab for cardiac catheterization.    PROCEDURE IN DETAIL  Informed consent was obtained from the patient after explaining  the risks, benefits, and alternative options of the procedure. After obtaining informed consent, the patient was brought to the cath lab and was prepped in a sterile fashion. Lidocaine 2% was used for local anesthesia into the right femoral access site. The right femoral artery was accessed with a micropuncture needle via modified Seldinger technique under ultrasound guidance. A 6F was inserted successfully. Afterwards, 6F JR4 and JL4 diagnostic catheters were advanced over a wire into the ascending aorta and were used to engage the ostia of the left main and RCA respectively. JR4 used to cross the AV and obtain LV pressures and gradient across the AV measured via pullback technique. Images of the right and left coronary systems were obtained.    HEMODYNAMICS  LV: 151/2, 7 mmHg  AO: 150/64, 98 mmHg  No significant gradient across aortic valve during pullback of JR4 catheter.  LV gram was not performed due to echocardiogram being available.    FINDINGS    Coronary Angiogram    Right dominant circulation    Left main: Left main is a large caliber vessel which gives rise to the Left Anterior Descending and the Left circumflex.  Left main is angiographically free from any significant disease.    Left Anterior Descending Artery: LAD is a medium caliber vessel which gives rise to several septal perforators and several diagonal branches.  LAD has diffuse luminal irregularities but no significant obstruction.    Left Circumflex: Patent stents in the mid left circumflex and first obtuse marginal branch.  Remaining left circumflex is angiographically free from any significant disease.    Right Coronary Artery: The RCA is a small caliber vessel gives rise to PDA and PLV.  Mid RCA has focal 80 to 85% stenosis with EDITH-3 flow.    Percutaneous coronary intervention  100 units/kg of heparin was administered and ACT of more than 250 was documented.  A 6 [place] JR4 guide catheter with sideholes was used to engage the ostium of the  RCA.  0.014 run-through wire was advanced past the lesion in the mid RCA into the distal RCA.  I then predilated the lesion with 2 x 12 mm mini trek balloon.  This was followed by placement of 2.25 x 33 mm Xience miryam point stent.  This was followed by advancement of Hairdressr intravascular ultrasound into the distal RCA and a slow manual pullback was performed.  IVUS confirmed distal RCA size of 2.5 mm and proximal RCA 3.5 mm.  Mid RCA was 3.2 x 2.5 mm in dimension.  I then postdilated the stent with a 3 x 15 mm noncompliant balloon at 14 shima.  Final angiography showed EDITH-3 flow and 0% stenosis in the RCA.    All the catheters were exchanged over a wire and subsequently removed. Angiogram of the femoral access site was obtained and did not show complications. The patient tolerated the procedure well without any complications. The pictures were reviewed at the end of the procedure. A 6 [place] Angio-Seal closure device was applied to achieve hemostasis.    ESTIMATED BLOOD LOSS:  10 ml    COMPLICATIONS:  None    PROCEDURE DATA:  Contrast Used: 50 mL  Sedation Time: 45 minutes    IMPRESSIONS  Patent stents in the left circumflex and obtuse marginal 1.  IVUS guided PCI of mid RCA with placement of drug-eluting stent.  Normal LVEDP    RECOMMENDATIONS  -Start dual antiplatelet therapy.  -High intensity statin, beta-blocker.  - Referral to cardiac rehab         Other:         ASSESSMENT & PLAN:    Principal Problem:    Sepsis without acute organ dysfunction  Active Problems:    Coronary arteriosclerosis    Type 2 diabetes mellitus    Diabetic neuropathy    Essential hypertension    Hyperlipidemia    Postoperative hypothyroidism    Ex-smoker    History of thyroid cancer    Kidney stones    UTI (urinary tract infection), bacterial    Sepsis        Sepsis  Shock state has improved   White count Continues to improve  Continue antibiotics  We will decrease midodrine to 5 mg 3 times daily today    Hematuria  Status post  lithotripsy and stent placement on the left side.  Status post staged procedures on 10/18/2023 leading to renal hemorrhage  Postprocedure sepsis and anemia of blood loss requiring blood transfusion.  H&H stable now     Atrial fibrillation  [id number] score of 6  Back in sinus rhythm  Unable to anticoagulate at the moment due to recent bleeding requiring blood transfusion.  Started on metoprolol and rate controlled  Obtain clearance from urology for starting anticoagulation with heparin drip and eventually Eliquis.     CAD  Status post PCI of the RCA in May 2023  DAPT has been held due to renal hemorrhage  Resume Plavix today  We will stop aspirin given her need for anticoagulation for her A-fib     Hypertension  Holding antihypertensives due to hypotension  Continue midodrine     Hyperlipidemia  Continue Crestor  LDL is 55     Diabetes  Uncontrolled diabetes  A1c has improved from 11-7.6 now  On Lantus        Nuria Mcgraw MD  10/23/23  09:38 EDT                  Electronically signed by Nuria Mcgraw MD at 10/23/23 1108       Juan Alaniz MD at 10/23/23 0925            [facility] DAILY PROGRESS NOTE    Patient Identification  Name: Pamela Zavala  Age: 82 y.o.  Sex: female  :  1940  MRN: [id number]    Date: 10/23/2023             Subjective:  Interval History: Recovering in ICU    Objective:    Scheduled Meds:cefTRIAXone, 2,000 mg, Intravenous, Q24H  folic acid, 1 mg, Oral, Daily  gabapentin, 300 mg, Oral, Nightly  insulin lispro, 2-7 Units, Subcutaneous, TID With Meals  lactulose, 10 g, Oral, BID  levothyroxine, 112 mcg, Oral, Daily  metoprolol tartrate, 25 mg, Oral, BID  midodrine, 10 mg, Oral, Q8H  multivitamin with minerals, 1 tablet, Oral, Daily  oxybutynin, 5 mg, Oral, BID  pantoprazole, 40 mg, Oral, BID  polyethylene glycol, 17 g, Oral, Daily  rosuvastatin, 5 mg, Oral, Daily  senna-docusate sodium, 2 tablet, Oral, BID  sodium chloride, 10 mL, Intravenous, Q12H  sodium chloride, 10 mL,  "Intravenous, Q12H  sodium chloride, 10 mL, Intravenous, Q12H  sodium chloride, 10 mL, Intravenous, Q12H  thiamine (B-1) IV, 200 mg, Intravenous, Q8H   Followed by  [START ON 10/24/2023] thiamine, 100 mg, Oral, Daily  traZODone, 50 mg, Oral, Nightly      Continuous Infusions:norepinephrine, 0.02-0.3 mcg/kg/min      PRN Meds:  acetaminophen    acetaminophen    senna-docusate sodium **AND** polyethylene glycol **AND** bisacodyl **AND** bisacodyl    Calcium Replacement - Follow Nurse / BPA Driven Protocol    dextrose    dextrose    Diclofenac Sodium    glucagon (human recombinant)    hydrALAZINE    HYDROcodone-acetaminophen    Magnesium Standard Dose Replacement - Follow Nurse / BPA Driven Protocol    nitroglycerin    Phosphorus Replacement - Follow Nurse / BPA Driven Protocol    Potassium Replacement - Follow Nurse / BPA Driven Protocol    sodium chloride    sodium chloride    sodium chloride    sodium chloride    sodium chloride    traMADol    Vital signs in last 24 hours:  Temp:  [97.4 °F (36.3 °C)-98.6 °F (37 °C)] 97.8 °F (36.6 °C)  Heart Rate:  [64-99] 73  Resp:  [15-20] 18  BP: ()/(40-84) 134/66    Intake/Output:    Intake/Output Summary (Last 24 hours) at 10/23/2023 0926  Last data filed at 10/23/2023 0600  Gross per 24 hour   Intake 3342 ml   Output 0 ml   Net 3342 ml       Exam:  /66 (BP Location: Left arm, Patient Position: Lying)   Pulse 73   Temp 97.8 °F (36.6 °C) (Oral)   Resp 18   Ht 167.6 cm (66\")   Wt 69.7 kg (153 lb 10.6 oz)   SpO2 95%   BMI 24.80 kg/m²     General Appearance:    Alert, cooperative, no distress, appears stated age               Abdomen:     Soft, ND   :    No suprapubic distention                Data Review:  All labs (24hrs):   Recent Results (from the past 24 hour(s))   POC Glucose Once    Collection Time: 10/22/23 11:11 AM    Specimen: Blood   Result Value Ref Range    Glucose 115 (H) 70 - 105 mg/dL   POC Glucose Once    Collection Time: 10/22/23  6:15 PM    " Specimen: Blood   Result Value Ref Range    Glucose 174 (H) 70 - 105 mg/dL   Phosphorus    Collection Time: 10/22/23 11:53 PM    Specimen: Blood   Result Value Ref Range    Phosphorus 3.0 2.5 - 4.5 mg/dL   Comprehensive Metabolic Panel    Collection Time: 10/22/23 11:53 PM    Specimen: Blood   Result Value Ref Range    Glucose 264 (H) 65 - 99 mg/dL    BUN 16 8 - 23 mg/dL    Creatinine 0.77 0.57 - 1.00 mg/dL    Sodium 143 136 - 145 mmol/L    Potassium 4.1 3.5 - 5.2 mmol/L    Chloride 112 (H) 98 - 107 mmol/L    CO2 21.0 (L) 22.0 - 29.0 mmol/L    Calcium 6.8 (L) 8.6 - 10.5 mg/dL    Total Protein 5.1 (L) 6.0 - 8.5 g/dL    Albumin 2.9 (L) 3.5 - 5.2 g/dL    ALT (SGPT) 54 (H) 1 - 33 U/L    AST (SGOT) 40 (H) 1 - 32 U/L    Alkaline Phosphatase 400 (H) 39 - 117 U/L    Total Bilirubin 0.4 0.0 - 1.2 mg/dL    Globulin 2.2 gm/dL    A/G Ratio 1.3 g/dL    BUN/Creatinine Ratio 20.8 7.0 - 25.0    Anion Gap 10.0 5.0 - 15.0 mmol/L    eGFR 76.2 >60.0 mL/min/1.73   Magnesium    Collection Time: 10/22/23 11:53 PM    Specimen: Blood   Result Value Ref Range    Magnesium 2.2 1.6 - 2.4 mg/dL   CBC Auto Differential    Collection Time: 10/22/23 11:53 PM    Specimen: Blood   Result Value Ref Range    WBC 13.50 (H) 3.40 - 10.80 10*3/mm3    RBC 3.18 (L) 3.77 - 5.28 10*6/mm3    Hemoglobin 8.8 (L) 12.0 - 15.9 g/dL    Hematocrit 27.5 (L) 34.0 - 46.6 %    MCV 86.4 79.0 - 97.0 fL    MCH 27.6 26.6 - 33.0 pg    MCHC 31.9 31.5 - 35.7 g/dL    RDW 18.8 (H) 12.3 - 15.4 %    RDW-SD 56.0 (H) 37.0 - 54.0 fl    MPV 10.8 6.0 - 12.0 fL    Platelets 76 (L) 140 - 450 10*3/mm3   Scan Slide    Collection Time: 10/22/23 11:53 PM    Specimen: Blood   Result Value Ref Range    Scan Slide     Manual Differential    Collection Time: 10/22/23 11:53 PM    Specimen: Blood   Result Value Ref Range    Neutrophil % 60.0 42.7 - 76.0 %    Lymphocyte % 28.0 19.6 - 45.3 %    Monocyte % 3.0 (L) 5.0 - 12.0 %    Eosinophil % 3.0 0.3 - 6.2 %    Basophil % 1.0 0.0 - 1.5 %    Bands %   3.0 0.0 - 5.0 %    Metamyelocyte % 1.0 (H) 0.0 - 0.0 %    Atypical Lymphocyte % 1.0 0.0 - 5.0 %    Neutrophils Absolute 8.51 (H) 1.70 - 7.00 10*3/mm3    Lymphocytes Absolute 3.92 (H) 0.70 - 3.10 10*3/mm3    Monocytes Absolute 0.41 0.10 - 0.90 10*3/mm3    Eosinophils Absolute 0.41 (H) 0.00 - 0.40 10*3/mm3    Basophils Absolute 0.14 0.00 - 0.20 10*3/mm3    nRBC 1.0 (H) 0.0 - 0.2 /100 WBC    Anisocytosis Slight/1+ None Seen    Dacrocytes Slight/1+ None Seen    Microcytes Slight/1+ None Seen    Poikilocytes Slight/1+ None Seen    WBC Morphology Normal Normal    Platelet Estimate Decreased Normal   POC Glucose Once    Collection Time: 10/23/23  9:05 AM    Specimen: Blood   Result Value Ref Range    Glucose 251 (H) 70 - 105 mg/dL      Imaging Results (Last 24 Hours)       ** No results found for the last 24 hours. **             Assessment:    Sepsis without acute organ dysfunction    Coronary arteriosclerosis    Type 2 diabetes mellitus    Diabetic neuropathy    Essential hypertension    Hyperlipidemia    Postoperative hypothyroidism    Ex-smoker    History of thyroid cancer    Kidney stones    UTI (urinary tract infection), bacterial    Sepsis      Sepsis after ureteroscopy    Plan:    Recovering in ICU  Cultures negative, continue Rocephin  Plan stent removal once fully recovered       Juan Alaniz MD  [facility]  [address], [address]  [place] [address]  Office: [phone]  Available via [employer]  10/23/23  09:26 EDT       Electronically signed by Juan Alaniz MD at 10/23/23 0929       Sarah Beth Coronado MD at 10/22/23 1400               LOS: 4 days   Patient Care Team:  Marisol Rodgers APRN as PCP - General (Pulmonary Disease)    Subjective     Interval History: Episode of rapid atrial fib this morning; clinically patient is markedly improved from yesterday.  She is more oriented, able to feed herself, and in no distress.  She is currently in a controlled atrial fib.    Patient Complaints:  No specific complaints    History taken from: patient    Review of Systems   Constitutional:  Positive for activity change. Negative for appetite change, diaphoresis, fatigue and fever.   HENT:  Negative for facial swelling.    Eyes:  Negative for visual disturbance.   Respiratory:  Negative for cough, shortness of breath, wheezing and stridor.    Cardiovascular:  Negative for chest pain and leg swelling.           Objective     Vital Signs  Temp:  [97.4 °F (36.3 °C)-98.1 °F (36.7 °C)] 97.7 °F (36.5 °C)  Heart Rate:  [] 82  Resp:  [27] 27  BP: ()/() 107/59    Physical Exam:     General Appearance:    Alert, cooperative, in no acute distress,   Head:    Normocephalic, without obvious abnormality, atraumatic   Eyes:            Lids and lashes normal, conjunctivae and sclerae normal, no   icterus, no pallor, corneas clear, PERRLA   Ears:    Ears appear intact with no abnormalities noted   Throat:   No oral lesions, no thrush, oral mucosa moist   Neck:   No adenopathy, supple, trachea midline, no thyromegaly, no   carotid bruit, no JVD   Lungs:     Clear to auscultation,respirations regular, even and                  unlabored    Heart:  Irregularly irregular   Chest Wall:    No abnormalities observed   Abdomen:     Normal bowel sounds, no masses, no organomegaly, soft        Non-tender non-distended, no guarding,   Extremities:   Moves all extremities well, no edema, no cyanosis, no             Redness   Pulses:   Pulses palpable and equal bilaterally   Skin:   No bleeding, bruising or rash   Lymph nodes:   No palpable adenopathy   Neurologic:   Cranial nerves 2 - 12 grossly intact, sensation intact, DTR       present and equal bilaterally        Results Review:    Lab Results (last 24 hours)       Procedure Component Value Units Date/Time    POC Glucose Once [[id number]]  (Abnormal) Collected: 10/22/23 1111    Specimen: Blood Updated: 10/22/23 1113     Glucose 115 mg/dL      Comment: Serial Number:  [id number]:  [id number]       POC Glucose Once [[id number]]  (Abnormal) Collected: 10/22/23 0824    Specimen: Blood Updated: 10/22/23 0826     Glucose 166 mg/dL      Comment: Serial Number: [id number]:  [id number]       Phosphorus [[id number]]  (Abnormal) Collected: 10/22/23 0532    Specimen: Blood Updated: 10/22/23 0610     Phosphorus 1.2 mg/dL     Comprehensive Metabolic Panel [[id number]]  (Abnormal) Collected: 10/22/23 0532    Specimen: Blood Updated: 10/22/23 0604     Glucose 169 mg/dL      BUN 17 mg/dL      Creatinine 0.79 mg/dL      Sodium 147 mmol/L      Potassium 2.8 mmol/L      Chloride 114 mmol/L      CO2 22.0 mmol/L      Calcium 6.9 mg/dL      Total Protein 4.7 g/dL      Albumin 2.7 g/dL      ALT (SGPT) 59 U/L      AST (SGOT) 46 U/L      Alkaline Phosphatase 264 U/L      Total Bilirubin 0.9 mg/dL      Globulin 2.0 gm/dL      A/G Ratio 1.4 g/dL      BUN/Creatinine Ratio 21.5     Anion Gap 11.0 mmol/L      eGFR 73.9 mL/min/1.73     Narrative:      GFR Normal >60  Chronic Kidney Disease <60  Kidney Failure <15    The GFR formula is only valid for adults with stable renal function between ages 18 and 70.    Magnesium [[id number]]  (Abnormal) Collected: 10/22/23 0532    Specimen: Blood Updated: 10/22/23 0604     Magnesium 1.3 mg/dL     CBC & Differential [[id number]]  (Abnormal) Collected: 10/22/23 0432    Specimen: Blood Updated: 10/22/23 0524    Narrative:      The following orders were created for panel order CBC & Differential.  Procedure                               Abnormality         Status                     ---------                               -----------         ------                     CBC Auto Differential[[id number]]        Abnormal            Final result               Scan Slide[[id number]]                                       Final result                 Please view results for these tests on the individual orders.    Scan Slide [[id number]] Collected: 10/22/23 0432    Specimen:  Blood Updated: 10/22/23 0524     Scan Slide --     Comment: See Manual Differential Results       Manual Differential [[id number]]  (Abnormal) Collected: 10/22/23 0432    Specimen: Blood Updated: 10/22/23 0524     Neutrophil % 76.0 %      Lymphocyte % 6.0 %      Eosinophil % 1.0 %      Bands %  13.0 %      Metamyelocyte % 4.0 %      Neutrophils Absolute 13.88 10*3/mm3      Lymphocytes Absolute 0.94 10*3/mm3      Eosinophils Absolute 0.16 10*3/mm3      Acanthocytes Slight/1+     Anisocytosis Slight/1+     Clyde Cells Slight/1+     Dacrocytes Slight/1+     Microcytes Slight/1+     Ovalocytes Slight/1+     Poikilocytes Mod/2+     WBC Morphology Normal     Platelet Estimate Decreased     Large Platelets Slight/1+    CBC Auto Differential [[id number]]  (Abnormal) Collected: 10/22/23 0432    Specimen: Blood Updated: 10/22/23 0524     WBC 15.60 10*3/mm3      RBC 2.91 10*6/mm3      Hemoglobin 7.8 g/dL      Hematocrit 24.6 %      MCV 84.6 fL      MCH 26.7 pg      MCHC 31.6 g/dL      RDW 18.8 %      RDW-SD 58.6 fl      MPV 10.6 fL      Platelets 73 10*3/mm3     Narrative:      The previously reported component NRBC is no longer being reported. Previous result was 0.1 /100 WBC (Reference Range: 0.0-0.2 /100 WBC) on 10/22/2023 at 0442 EDT.    Blood Culture - Blood, Arm, Left [[id number]]  (Normal) Collected: 10/18/23 1901    Specimen: Blood from Arm, Left Updated: 10/21/23 1916     Blood Culture No growth at 3 days    Narrative:      Less than seven (7) mL's of blood was collected.  Insufficient quantity may yield false negative results.    Blood Culture - Blood, Hand, Left [[id number]]  (Normal) Collected: 10/18/23 1905    Specimen: Blood from Hand, Left Updated: 10/21/23 1916     Blood Culture No growth at 3 days    Narrative:      Less than seven (7) mL's of blood was collected.  Insufficient quantity may yield false negative results.    POC Glucose Once [[id number]]  (Abnormal) Collected: 10/21/23 1802    Specimen: Blood Updated:  10/21/23 1804     Glucose 173 mg/dL      Comment: Serial Number: [id number]:  [id number]       Reticulocytes [[id number]]  (Normal) Collected: 10/21/23 0537    Specimen: Blood Updated: 10/21/23 1440     Reticulocyte % 0.71 %      Reticulocyte Absolute 0.0215 10*6/mm3              Imaging Results (Last 24 Hours)       ** No results found for the last 24 hours. **                 I reviewed the patient's new clinical results.    Medication Review:   Scheduled Meds:cefTRIAXone, 2,000 mg, Intravenous, Q24H  folic acid, 1 mg, Oral, Daily  gabapentin, 300 mg, Oral, Nightly  insulin lispro, 2-7 Units, Subcutaneous, TID With Meals  lactulose, 10 g, Oral, BID  levothyroxine, 112 mcg, Oral, Daily  lisinopril, 5 mg, Oral, Q24H  metoprolol tartrate, 25 mg, Oral, BID  midodrine, 10 mg, Oral, Q8H  multivitamin with minerals, 1 tablet, Oral, Daily  oxybutynin, 5 mg, Oral, BID  pantoprazole, 40 mg, Oral, BID  polyethylene glycol, 17 g, Oral, Daily  potassium phosphate, 15 mmol, Intravenous, Q3H  rosuvastatin, 5 mg, Oral, Daily  senna-docusate sodium, 2 tablet, Oral, BID  sodium chloride, 10 mL, Intravenous, Q12H  sodium chloride, 10 mL, Intravenous, Q12H  sodium chloride, 10 mL, Intravenous, Q12H  sodium chloride, 10 mL, Intravenous, Q12H  thiamine (B-1) IV, 200 mg, Intravenous, Q8H   Followed by  [START ON 10/24/2023] thiamine, 100 mg, Oral, Daily  traZODone, 50 mg, Oral, Nightly      Continuous Infusions:norepinephrine, 0.02-0.3 mcg/kg/min      PRN Meds:.  acetaminophen    acetaminophen    senna-docusate sodium **AND** polyethylene glycol **AND** bisacodyl **AND** bisacodyl    Calcium Replacement - Follow Nurse / BPA Driven Protocol    dextrose    dextrose    Diclofenac Sodium    glucagon (human recombinant)    hydrALAZINE    HYDROcodone-acetaminophen    Magnesium Standard Dose Replacement - Follow Nurse / BPA Driven Protocol    nitroglycerin    Phosphorus Replacement - Follow Nurse / BPA Driven Protocol    Potassium  Replacement - Follow Nurse / BPA Driven Protocol    sodium chloride    sodium chloride    sodium chloride    sodium chloride    sodium chloride    traMADol     Assessment & Plan       Sepsis without acute organ dysfunction    Coronary arteriosclerosis    Type 2 diabetes mellitus    Diabetic neuropathy    Essential hypertension    Hyperlipidemia    Postoperative hypothyroidism    Ex-smoker    History of thyroid cancer    Kidney stones    UTI (urinary tract infection), bacterial    Sepsis      UTI-continue Rocephin  Anemia -IV iron  Hypophosphatemia-replacing  Hypomagnesemia-replacing  Hypokalemia - replacing  Paroxysmal atrial fib with rapid ventricular rate-stopping diltiazem; starting oral metoprolol; start anticoagulation once bleeding and kidney is stable  Hypotension-midodrine; stopping lisinopril  Chronic coronary artery disease-restart antiplatelet agent when able  Hypothyroidism-continue levothyroxine  Hyperlipidemia-continue statin    Plan for disposition:Skilled nursing    Sarah Beth Coronado MD  10/22/23  14:00 EDT            Electronically signed by Sarah Beth Coronado MD at 10/22/23 1414          Physical Therapy Notes (last 24 hours)        Otilia Leija, PT at 10/23/23 1248  Version 2 of 2         Subjective: Pt agreeable to therapeutic plan of care.  Pt reports she is ready to ambulate today.     Objective:     Bed mobility - Min-A  Transfers - Min-A, Assist x 2, and with rolling walker  Ambulation - 120 feet total, but pt took 2-3 standing rest breaks due to SOA with CGA and with rolling walker  *Gait belt applied and non-skid socks worn during treatment.       Vitals: Pt on 3L O2 NC.  Pt around 95% O2 sats.  Pt's BP also elevated end of session 164/79.     Pain: 0 VAS   Location: N/A  Intervention for pain: N/A    Education: Provided education on the importance of mobility in the acute care setting and Verbal/Tactile Cues    Assessment: Pamela Zavala presents with functional mobility impairments which indicate  "the need for skilled intervention. Tolerating session today without incident. Pt able to increase ambulation distance this date with 2-3 standing rest breaks due to SOA. Pt still slightly off balance and does require use of RW.  Endurance/activity tolerance as well as strength still low.  Pt still below baseline function and SNF recommended at d/c. Will continue to follow and progress as tolerated.     Plan/Recommendations:   Moderate Intensity Therapy recommended post-acute care. This is recommended as therapy feels the patient would require 3-4 days per week and wouldn't tolerate \"3 hour daily\" rehab intensity. SNF would be the preferred choice. If the patient does not agree to SNF, arrange HH or OP depending on home bound status. If patient is medically complex, consider LTACH.. Pt requires no DME at discharge.     Pt desires Skilled Rehab placement at discharge. Pt cooperative; agreeable to therapeutic recommendations and plan of care.         Basic Mobility 6-click:  Rollin = Total, A lot = 2, A little = 3; 4 = None  Supine>Sit:   1 = Total, A lot = 2, A little = 3; 4 = None   Sit>Stand with arms:  1 = Total, A lot = 2, A little = 3; 4 = None  Bed>Chair:   1 = Total, A lot = 2, A little = 3; 4 = None  Ambulate in room:  1 = Total, A lot = 2, A little = 3; 4 = None  3-5 Steps with railin = Total, A lot = 2, A little = 3; 4 = None  Score: 17    Post-Tx Position: Up in Chair, Alarms activated, and Call light and personal items within reach  PPE: gloves      Electronically signed by Otilia Leija, PT at 10/23/23 1258          Akua Solomon, PT   Physical Therapist  Specialty: Physical Therapy     Therapy Evaluation      Signed     Date of Service: 10/19/23 1616  Creation Time: 10/19/23 1616     Signed       Expand All Collapse All    Patient Name: Pamela Zavala                     : 1940                      MRN: [id number]                              Today's Date: 10/19/2023           "                       Admit Date: 10/18/2023                      Visit Dx:   Visit Diagnosis       ICD-10-CM ICD-9-CM   1. Ureter, calculus  N20.1 592.1         Problem List       Patient Active Problem List   Diagnosis    Chest pain    Coronary arteriosclerosis    Type 2 diabetes mellitus    Diabetic neuropathy    Essential hypertension    Hyperlipidemia    Postoperative hypothyroidism    Thallium stress test abnormal    Ex-smoker    Chest pain, unspecified type    History of thyroid cancer    Sepsis without acute organ dysfunction    Kidney stones    UTI (urinary tract infection), bacterial    Sepsis         Medical History        Past Medical History:   Diagnosis Date    Anemia      Arthritis      CAD (coronary artery disease)      Chronic back pain      Diabetic neuropathy      Essential hypertension      GERD (gastroesophageal reflux disease)      History of gastric ulcer      History of stomach ulcers      HLD (hyperlipidemia)      Hypocalcemia      Hypoparathyroidism      Hypothyroidism      Insomnia      Kidney stones      Osteoporosis      Recurrent sinusitis      Renal disease      Thyroid cancer      Type 2 diabetes mellitus           Surgical History         Past Surgical History:   Procedure Laterality Date    CARDIAC CATHETERIZATION N/A 05/03/2023     Procedure: Left Heart Cath and coronary angiogram;  Surgeon: John Baldwin MD;  Location: [facility] CATH INVASIVE LOCATION;  Service: Cardiovascular;  Laterality: N/A;    CORONARY STENT PLACEMENT         x 3    CYSTOSCOPY, URETEROSCOPY, RETROGRADE PYELOGRAM, STENT INSERTION Left 10/18/2023     Procedure: CYSTOSCOPY URETEROSCOPY RETROGRADE PYELOGRAM HOLMIUM LASER STENT INSERTION;  Surgeon: Juan Alaniz MD;  Location: [facility] MAIN OR;  Service: Urology;  Laterality: Left;    HYSTERECTOMY        LUNG LOBECTOMY        THYROIDECTOMY               General Information         Row Name 10/19/23 1119                 Physical Therapy Time and Intention     Document  Type evaluation  -OD       Mode of Treatment physical therapy  -OD          Row Name 10/19/23 1552                 General Information     Patient Profile Reviewed yes  -OD       Prior Level of Function independent:;ADL's;all household mobility  Lives in [facility]  -OD       Existing Precautions/Restrictions fall;oxygen therapy device and L/min  -OD       Barriers to Rehab medically complex;cognitive status  -OD          Row Name 10/19/23 1552                 Living Environment     People in Home facility resident;other (see comments)  [place]  -OD          Row Name 10/19/23 1552                 Home Main Entrance     Number of Stairs, Main Entrance none  -OD          Row Name 10/19/23 1552                 Stairs Within Home, Primary     Number of Stairs, Within Home, Primary none  -OD          Row Name 10/19/23 1552                 Cognition     Orientation Status (Cognition) oriented to;person;place;situation;verbal cues/prompts needed for orientation;time  -OD          Row Name 10/19/23 1552                 Safety Issues, Functional Mobility     Safety Issues Affecting Function (Mobility) awareness of need for assistance;judgment;problem-solving  -OD       Impairments Affecting Function (Mobility) balance;endurance/activity tolerance;strength  -OD                       User Key  (r) = Recorded By, (t) = Taken By, (c) = Cosigned By        Initials Name Provider Type     OD Akua Solomon PT Physical Therapist                            Mobility         Row Name 10/19/23 1382                 Bed Mobility     Bed Mobility bed mobility (all) activities  -OD       All Activities, [place] (Bed Mobility) minimum assist (75% patient effort)  -OD       Assistive Device (Bed Mobility) head of bed elevated;bed rails  -OD          Row Name 10/19/23 9453                 Bed-Chair Transfer     Bed-Chair [place] (Transfers) moderate assist (50% patient effort)  -OD          Row Name 10/19/23 8158                  Sit-Stand Transfer     Sit-Stand [place] (Transfers) minimum assist (75% patient effort)  -OD          Row Name 10/19/23 1553                 Gait/Stairs (Locomotion)     [place] Level (Gait) moderate assist (50% patient effort);other (see comments)  Steps to turn and sit in bedside chair  -OD       Distance in Feet (Gait) 2  -OD                       User Key  (r) = Recorded By, (t) = Taken By, (c) = Cosigned By        Initials Name Provider Type     OD Akua Solomon PT Physical Therapist                            Obj/Interventions         Row Name 10/19/23 1556                 Range of Motion Comprehensive     General Range of Motion bilateral lower extremity ROM WFL  -OD          Row Name 10/19/23 1556                 Strength Comprehensive (MMT)     General Manual Muscle Testing (MMT) Assessment lower extremity strength deficits identified  -OD       Comment, General Manual Muscle Testing (MMT) Assessment BLE weakness grossly 3/5  -OD          Row Name 10/19/23 1556                 Balance     Balance Interventions sitting;minimal challenge;standing;moderate challenge;supported  -OD          Row Name 10/19/23 1556                 Sensory Assessment (Somatosensory)     Sensory Assessment (Somatosensory) other (see comments)  Pt reports neuropathy in fingers and toes basleine  -OD                       User Key  (r) = Recorded By, (t) = Taken By, (c) = Cosigned By        Initials Name Provider Type     Akua Traore PT Physical Therapist                            Goals/Plan         Row Name 10/19/23 1614                 Bed Mobility Goal 1 (PT)     Activity/Assistive Device (Bed Mobility Goal 1, PT) bed mobility activities, all  -OD       [place] Level/Cues Needed (Bed Mobility Goal 1, PT) independent  -OD       Time Frame (Bed Mobility Goal 1, PT) long term goal (LTG);2 weeks  -OD          Row Name 10/19/23 1615                 Transfer Goal 1 (PT)     Activity/Assistive Device (Transfer  Goal 1, PT) transfers, all;walker, rolling  -OD       [place] Level/Cues Needed (Transfer Goal 1, PT) modified independence  -OD       Time Frame (Transfer Goal 1, PT) long term goal (LTG);2 weeks  -OD          Row Name 10/19/23 1614                 Gait Training Goal 1 (PT)     Activity/Assistive Device (Gait Training Goal 1, PT) gait (walking locomotion);assistive device use;decrease fall risk;walker, rolling  -OD       [place] Level (Gait Training Goal 1, PT) modified independence  -OD       Distance (Gait Training Goal 1, PT) 100 feet  -OD       Time Frame (Gait Training Goal 1, PT) long term goal (LTG);2 weeks  -OD          Row Name 10/19/23 1614                 Therapy Assessment/Plan (PT)     Planned Therapy Interventions (PT) balance training;bed mobility training;gait training;postural re-education;transfer training;ROM (range of motion);neuromuscular re-education;home exercise program;patient/family education;strengthening;stretching  -OD                    User Key  (r) = Recorded By, (t) = Taken By, (c) = Cosigned By        Initials Name Provider Type     OD Akua Solomon, PT Physical Therapist                         Clinical Impression         Row Name 10/19/23 1558                 Pain Scale: FACES Pre/Post-Treatment     Pain: FACES Scale, Pretreatment 0-->no hurt  -OD       Posttreatment Pain Rating 0-->no hurt  -OD          Row Name 10/19/23 1557                 Plan of Care Review     Plan of Care Reviewed With patient  -OD       Progress no change  -OD       Outcome Evaluation Pt is an 83 y/o F admitted to [facility] on 10/18/23 for large L renal stone s/p lithotripsy and stent placement that requires further intervention. Pt is POD#1 s/p cystoscopy ureteroscopy with laser stent insertion with Dr. Alaniz. Following the procedure, RR called for severe hypotension. Pt dx with septic shock and acute hypoxic respiratory failure, transferred to ICU. CT head  (-) for acute abnormalities, CT abdomen  (+) for hemorrhage. At baseline, pt lives at [facility] where she is indep with bed mobility and ADLs, reporting intermittent use of RW and receives home health therapy services 2-3x/wk. Pt currently is AAOx3 with cues for time and mild confusion noted throughout. Pt currently has CBI, several IV poles, and RN reports weaning off pressors, requiring 2 skilled therapists for safety this date. Pt required Keanu for bed mobility, modA for STS, and modA for transfer to bedside chair. Pt required cues for sequencing and maintaining safety, as pt continued trying to reach for IV poles for stabilization. Pt is below baseline function and would benefit from SNF upon d/c for being able to return safely home to assisted living facility.  -OD          Row Name 10/19/23 1559                 Therapy Assessment/Plan (PT)     Rehab Potential (PT) good, to achieve stated therapy goals  -OD       Criteria for Skilled Interventions Met (PT) yes;meets criteria  -OD       Therapy Frequency (PT) 3 times/wk  -OD       Predicted Duration of Therapy Intervention (PT) until d/c  -OD          Row Name 10/19/23 9017                 Vital Signs     Pre Systolic BP Rehab 112  -OD       Pre Treatment Diastolic BP 86  -OD       Post Systolic BP Rehab 120  -OD       Post Treatment Diastolic BP 84  -OD       Pre SpO2 (%) 98  -OD       O2 Delivery Pre Treatment nasal cannula  3L  -OD       Intra SpO2 (%) 91  -OD       O2 Delivery Intra Treatment nasal cannula  -OD       Post SpO2 (%) 94  -OD       O2 Delivery Post Treatment nasal cannula  -OD       Pre Patient Position Supine  -OD       Intra Patient Position Standing  -OD       Post Patient Position Sitting  -OD          Row Name 10/19/23 3124                 Positioning and Restraints     Pre-Treatment Position in bed  -OD       Post Treatment Position chair  -OD       In Chair notified nsg;reclined;call light within reach;encouraged to call for assist;exit alarm on;with family/caregiver  -OD                        User Key  (r) = Recorded By, (t) = Taken By, (c) = Cosigned By        Initials Name Provider Type     OD Akua Solomon, PT Physical Therapist                            Outcome Measures         Row Name 10/19/23 1615                 How much help from another person do you currently need...     Turning from your back to your side while in flat bed without using bedrails? 3  -OD       Moving from lying on back to sitting on the side of a flat bed without bedrails? 3  -OD       Moving to and from a bed to a chair (including a wheelchair)? 2  -OD       Standing up from a chair using your arms (e.g., wheelchair, bedside chair)? 2  -OD       Climbing 3-5 steps with a railing? 2  -OD       To walk in hospital room? 3  -OD       AM-PAC 6 Clicks Score (PT) 15  -OD       Highest level of mobility 4 --> Transferred to chair/commode  -OD          Row Name 10/19/23 1615 10/19/23 1553            Functional Assessment     Outcome Measure Options AM-PAC 6 Clicks Basic Mobility (PT)  -OD AM-PAC 6 Clicks Daily Activity (OT)  -LS                     User Key  (r) = Recorded By, (t) = Taken By, (c) = Cosigned By        Initials Name Provider Type     LS Sánchez Yeboah OT Occupational Therapist     Akua Traore, ANNE Physical Therapist                          Physical Therapy Education            Title: PT OT SLP Therapies (Done)         Topic: Physical Therapy (Done)         Point: Mobility training (Done)         Learning Progress Summary               Patient Acceptance, E, VU by OD at 10/19/2023 1615                               Point: Home exercise program (Done)         Learning Progress Summary               Patient Acceptance, E, VU by OD at 10/19/2023 1615                               Point: Body mechanics (Done)         Learning Progress Summary               Patient Acceptance, E, VU by OD at 10/19/2023 1615                               Point: Precautions (Done)         Learning Progress Summary                Patient Acceptance, E, VU by OD at 10/19/2023 1615                                                   User Key         Initials Effective Dates Name Provider Type Discipline     OD 05/11/23 -  Akua Solomon, PT Physical Therapist PT                          PT Recommendation and Plan  Planned Therapy Interventions (PT): balance training, bed mobility training, gait training, postural re-education, transfer training, ROM (range of motion), neuromuscular re-education, home exercise program, patient/family education, strengthening, stretching  Plan of Care Reviewed With: patient  Progress: no change  Outcome Evaluation: Pt is an 81 y/o F admitted to [facility] on 10/18/23 for large L renal stone s/p lithotripsy and stent placement that requires further intervention. Pt is POD#1 s/p cystoscopy ureteroscopy with laser stent insertion with Dr. Alaniz. Following the procedure, RR called for severe hypotension. Pt dx with septic shock and acute hypoxic respiratory failure, transferred to ICU. CT head  (-) for acute abnormalities, CT abdomen (+) for hemorrhage. At baseline, pt lives at [facility] where she is indep with bed mobility and ADLs, reporting intermittent use of RW and receives home health therapy services 2-3x/wk. Pt currently is AAOx3 with cues for time and mild confusion noted throughout. Pt currently has CBI, several IV poles, and RN reports weaning off pressors, requiring 2 skilled therapists for safety this date. Pt required Keanu for bed mobility, modA for STS, and modA for transfer to bedside chair. Pt required cues for sequencing and maintaining safety, as pt continued trying to reach for IV poles for stabilization. Pt is below baseline function and would benefit from SNF upon d/c for being able to return safely home to assisted living facility.      Time Calculation:        PT Charges         Row Name 10/19/23 1615                       Time Calculation     Start Time 1016  -OD         Stop Time  1042  -OD         Time Calculation (min) 26 min  -OD         PT Received On 10/19/23  -OD         PT - Next Appointment 10/20/23  -OD         PT Goal Re-Cert Due Date 23  -OD                 Time Calculation- PT     Total Timed Code Minutes- PT 0 minute(s)  -OD                      User Key  (r) = Recorded By, (t) = Taken By, (c) = Cosigned By        Initials Name Provider Type     OD Akua Solomon, PT Physical Therapist                       Therapy Charges for Today         Code Description Service Date Service Provider Modifiers Qty     41663573524 HC PT EVAL MOD COMPLEXITY 4 10/19/2023 Akua Solomon, PT GP 1                PT G-Codes  Outcome Measure Options: AM-PAC 6 Clicks Basic Mobility (PT)  AM-PAC 6 Clicks Score (PT): 15  AM-PAC 6 Clicks Score (OT): 13  PT Discharge Summary  Anticipated Discharge Disposition (PT): skilled nursing facility     Akua Solomon, PT                   10/19/2023                                        Occupational Therapy Notes (last 7 days)        Sánchez Yeboah, OT at 10/19/23 1600          Patient Name: Pamela Zavala  : 1940    MRN: [id number]                              Today's Date: 10/19/2023       Admit Date: 10/18/2023    Visit Dx:     ICD-10-CM ICD-9-CM   1. Ureter, calculus  N20.1 592.1     Patient Active Problem List   Diagnosis    Chest pain    Coronary arteriosclerosis    Type 2 diabetes mellitus    Diabetic neuropathy    Essential hypertension    Hyperlipidemia    Postoperative hypothyroidism    Thallium stress test abnormal    Ex-smoker    Chest pain, unspecified type    History of thyroid cancer    Sepsis without acute organ dysfunction    Kidney stones    UTI (urinary tract infection), bacterial    Sepsis     Past Medical History:   Diagnosis Date    Anemia     Arthritis     CAD (coronary artery disease)     Chronic back pain     Diabetic neuropathy     Essential hypertension     GERD (gastroesophageal reflux disease)     History of gastric ulcer      History of stomach ulcers     HLD (hyperlipidemia)     Hypocalcemia     Hypoparathyroidism     Hypothyroidism     Insomnia     Kidney stones     Osteoporosis     Recurrent sinusitis     Renal disease     Thyroid cancer     Type 2 diabetes mellitus      Past Surgical History:   Procedure Laterality Date    CARDIAC CATHETERIZATION N/A 05/03/2023    Procedure: Left Heart Cath and coronary angiogram;  Surgeon: John Baldwin MD;  Location: [facility] CATH INVASIVE LOCATION;  Service: Cardiovascular;  Laterality: N/A;    CORONARY STENT PLACEMENT      x 3    CYSTOSCOPY, URETEROSCOPY, RETROGRADE PYELOGRAM, STENT INSERTION Left 10/18/2023    Procedure: CYSTOSCOPY URETEROSCOPY RETROGRADE PYELOGRAM HOLMIUM LASER STENT INSERTION;  Surgeon: Juan Alaniz MD;  Location: [facility] MAIN OR;  Service: Urology;  Laterality: Left;    HYSTERECTOMY      LUNG LOBECTOMY      THYROIDECTOMY        General Information       Row Name 10/19/23 1547          OT Time and Intention    Document Type evaluation  -LS     Mode of Treatment occupational therapy  -       Row Name 10/19/23 1547          General Information    Patient Profile Reviewed yes  -LS     Prior Level of Function independent:;ADL's;all household mobility  Assisted living  -     Existing Precautions/Restrictions fall  -     Barriers to Rehab medically complex;cognitive status  -       Row Name 10/19/23 1547          Living Environment    People in Home other (see comments)  Assisted living  -       Row Name 10/19/23 1547          Cognition    Orientation Status (Cognition) oriented to;person;place;situation;verbal cues/prompts needed for orientation;time  -       Row Name 10/19/23 1547          Safety Issues, Functional Mobility    Safety Issues Affecting Function (Mobility) awareness of need for assistance;judgment;problem-solving  -     Impairments Affecting Function (Mobility) balance;endurance/activity tolerance;strength  -LS               User Key  (r) = Recorded  By, (t) = Taken By, (c) = Cosigned By      Initials Name Provider Type    Sánchez Montgomery OT Occupational Therapist                     Mobility/ADL's       Row Name 10/19/23 1549          Bed Mobility    Bed Mobility bed mobility (all) activities  -     All Activities, [place] (Bed Mobility) minimum assist (75% patient effort)  -     Assistive Device (Bed Mobility) head of bed elevated;bed rails  -       Row Name 10/19/23 1549          Transfers    Transfers sit-stand transfer;bed-chair transfer  -       Row Name 10/19/23 1549          Bed-Chair Transfer    Bed-Chair [place] (Transfers) moderate assist (50% patient effort)  -       Row Name 10/19/23 1549          Sit-Stand Transfer    Sit-Stand [place] (Transfers) minimum assist (75% patient effort)  -       Row Name 10/19/23 1549          Functional Mobility    Functional Mobility- Ind. Level moderate assist (50% patient effort)  -       Row Name 10/19/23 1549          Activities of Daily Living    BADL Assessment/Intervention lower body dressing  -       Row Name 10/19/23 1549          Lower Body Dressing Assessment/Training    [place] Level (Lower Body Dressing) doff;don;socks;maximum assist (25% patient effort)  -     Position (Lower Body Dressing) edge of bed sitting  -               User Key  (r) = Recorded By, (t) = Taken By, (c) = Cosigned By      Initials Name Provider Type    Sánchez Montgomery OT Occupational Therapist                   Obj/Interventions       Row Name 10/19/23 1551          Sensory Assessment (Somatosensory)    Sensory Assessment BUE and BLE neuropathy  -       Row Name 10/19/23 1551          Range of Motion Comprehensive    General Range of Motion bilateral upper extremity ROM WFL  -       Row Name 10/19/23 1551          Strength Comprehensive (MMT)    Comment, General Manual Muscle Testing (MMT) Assessment BUEs grossly 3/5  -       Row Name 10/19/23 1551          Balance    Balance  Assessment sitting static balance;sitting dynamic balance;standing static balance;standing dynamic balance  -LS     Static Sitting Balance standby assist  -LS     Dynamic Sitting Balance contact guard  -LS     Position, Sitting Balance sitting edge of bed  -LS     Static Standing Balance minimal assist  -LS     Dynamic Standing Balance moderate assist  -LS     Position/Device Used, Standing Balance supported  -LS               User Key  (r) = Recorded By, (t) = Taken By, (c) = Cosigned By      Initials Name Provider Type    LS Sánchez Yeboah OT Occupational Therapist                   Goals/Plan       Row Name 10/19/23 0013          Bed Mobility Goal 1 (OT)    Activity/Assistive Device (Bed Mobility Goal 1, OT) bed mobility activities, all  -LS     [place] Level/Cues Needed (Bed Mobility Goal 1, OT) supervision required  -LS     Time Frame (Bed Mobility Goal 1, OT) long term goal (LTG);2 weeks  -       Row Name 10/19/23 8443          Transfer Goal 1 (OT)    Activity/Assistive Device (Transfer Goal 1, OT) sit-to-stand/stand-to-sit;bed-to-chair/chair-to-bed;commode  -LS     [place] Level/Cues Needed (Transfer Goal 1, OT) supervision required  -LS     Time Frame (Transfer Goal 1, OT) long term goal (LTG);2 weeks  -       Row Name 10/19/23 4173          Dressing Goal 1 (OT)    Activity/Device (Dressing Goal 1, OT) dressing skills, all  -LS     [place]/Cues Needed (Dressing Goal 1, OT) supervision required  -LS     Time Frame (Dressing Goal 1, OT) 2 weeks;long term goal (LTG)  -       Row Name 10/19/23 6083          Toileting Goal 1 (OT)    Activity/Device (Toileting Goal 1, OT) adjust/manage clothing;perform perineal hygiene  -LS     [place] Level/Cues Needed (Toileting Goal 1, OT) modified independence  -LS     Time Frame (Toileting Goal 1, OT) long term goal (LTG);2 weeks  -       Row Name 10/19/23 1389          Therapy Assessment/Plan (OT)    Planned Therapy Interventions (OT) activity  tolerance training;transfer/mobility retraining;patient/caregiver education/training;IADL retraining;occupation/activity based interventions;ROM/therapeutic exercise;strengthening exercise;neuromuscular control/coordination retraining  -               User Key  (r) = Recorded By, (t) = Taken By, (c) = Cosigned By      Initials Name Provider Type    LS Sánchez Yeboah OT Occupational Therapist                   Clinical Impression       Row Name 10/19/23 6798          Pain Assessment    Additional Documentation Pain Scale: FACES Pre/Post-Treatment (Group)  -LS       Row Name 10/19/23 0569          Pain Scale: FACES Pre/Post-Treatment    Pain: FACES Scale, Pretreatment 0-->no hurt  -LS     Posttreatment Pain Rating 0-->no hurt  -LS       Row Name 10/19/23 5808          Plan of Care Review    Plan of Care Reviewed With patient  -LS     Outcome Evaluation Pamela Zavala is a 82 y.o. female who was found to have large left renal stone status post lithotripsy and stent placement here for planned staged procedure to clear remaining fragments. She has a PMH including CAD, hypertension, hyperlipidemia, hypothyroidism, and type 2 diabetes. According to pt's son, she has good days and bad days with her memory loss. Patient is from assisted living and states she normally does everything herself and the AL helps with medications. She does use a rollator. After procedure, pt admitted to PCU level however became unresponsive and rapid response was called. Per Dr. Vergara, pt appeared to be in septic shock and was transferred to ICU. This date, pt oriented x3, with moments of confusion noted. She required Min A to come to EOB sitting, then Max A for lower body dressing. She has CBI as well as mutiple IV lines running, requiring skilled hands of two therapists for safety this date. Min A required to come to standing, then Mod A with verbal cues to transfer from bed to chair. She impulsively reached for unstable items for support  several times requiring cues to hold onto therapist. With multiple lines and tubes, unable to utilize a walker with the limited space, however would certainly benefit from using. OT will follow, recommending SNF prior to return to [facility].  -       Row Name 10/19/23 1551          Therapy Assessment/Plan (OT)    Rehab Potential (OT) good, to achieve stated therapy goals  -     Criteria for Skilled Therapeutic Interventions Met (OT) yes;skilled treatment is necessary  -LS     Therapy Frequency (OT) 3 times/wk  -LS     Predicted Duration of Therapy Intervention (OT) until dc  -LS       Row Name 10/19/23 1551          Therapy Plan Review/Discharge Plan (OT)    Anticipated Discharge Disposition (OT) skilled nursing facility  -       Row Name 10/19/23 1551          Vital Signs    Pre SpO2 (%) 98  3L  -LS     O2 Delivery Pre Treatment nasal cannula  -LS     Intra SpO2 (%) 91  -LS     O2 Delivery Intra Treatment nasal cannula  -LS     Post SpO2 (%) 94  -LS     O2 Delivery Post Treatment nasal cannula  -LS     Pre Patient Position Supine  -LS     Intra Patient Position Standing  -LS     Post Patient Position Sitting  -LS       Row Name 10/19/23 8591          Positioning and Restraints    Pre-Treatment Position in bed  -LS     Post Treatment Position chair  -LS     In Chair notified nsg;reclined;call light within reach;encouraged to call for assist;exit alarm on;with family/caregiver  -               User Key  (r) = Recorded By, (t) = Taken By, (c) = Cosigned By      Initials Name Provider Type    Sánchez Montgomery OT Occupational Therapist                   Outcome Measures       Row Name 10/19/23 5136          How much help from another is currently needed...    Putting on and taking off regular lower body clothing? 2  -LS     Bathing (including washing, rinsing, and drying) 2  -LS     Toileting (which includes using toilet bed pan or urinal) 1  -LS     Putting on and taking off regular upper body clothing 2  -LS      Taking care of personal grooming (such as brushing teeth) 2  -     Eating meals 4  -     AM-PAC 6 Clicks Score (OT) 13  -       Row Name 10/19/23 1553          Functional Assessment    Outcome Measure Options AM-PAC 6 Clicks Daily Activity (OT)  -               User Key  (r) = Recorded By, (t) = Taken By, (c) = Cosigned By      Initials Name Provider Type     Sánchez Yeboah OT Occupational Therapist                    Occupational Therapy Education       Title: PT OT SLP Therapies (Done)       Topic: Occupational Therapy (Done)       Point: ADL training (Done)       Description:   Instruct learner(s) on proper safety adaptation and remediation techniques during self care or transfers.   Instruct in proper use of assistive devices.                  Learning Progress Summary             Patient Acceptance, E,TB, VU by  at 10/19/2023 1554                                         User Key       Initials Effective Dates Name Provider Type Discipline     09/22/22 -  Sánchez Yeboah OT Occupational Therapist OT                  OT Recommendation and Plan  Planned Therapy Interventions (OT): activity tolerance training, transfer/mobility retraining, patient/caregiver education/training, IADL retraining, occupation/activity based interventions, ROM/therapeutic exercise, strengthening exercise, neuromuscular control/coordination retraining  Therapy Frequency (OT): 3 times/wk  Plan of Care Review  Plan of Care Reviewed With: patient  Outcome Evaluation: Pamela Zavala is a 82 y.o. female who was found to have large left renal stone status post lithotripsy and stent placement here for planned staged procedure to clear remaining fragments. She has a PMH including CAD, hypertension, hyperlipidemia, hypothyroidism, and type 2 diabetes. According to pt's son, she has good days and bad days with her memory loss. Patient is from assisted living and states she normally does everything herself and the AL helps with  medications. She does use a rollator. After procedure, pt admitted to PCU level however became unresponsive and rapid response was called. Per Dr. Vergara, pt appeared to be in septic shock and was transferred to ICU. This date, pt oriented x3, with moments of confusion noted. She required Min A to come to EOB sitting, then Max A for lower body dressing. She has CBI as well as mutiple IV lines running, requiring skilled hands of two therapists for safety this date. Min A required to come to standing, then Mod A with verbal cues to transfer from bed to chair. She impulsively reached for unstable items for support several times requiring cues to hold onto therapist. With multiple lines and tubes, unable to utilize a walker with the limited space, however would certainly benefit from using. OT will follow, recommending SNF prior to return to [facility].     Time Calculation:         Time Calculation- OT       Row Name 10/19/23 1558             Time Calculation- OT    OT Start Time 1010  -LS      OT Stop Time 1042  -      OT Time Calculation (min) 32 min  -LS      Total Timed Code Minutes- OT 10 minute(s)  -LS      OT Received On 10/19/23  -      OT - Next Appointment 10/23/23  -      OT Goal Re-Cert Due Date 11/02/23  -         Timed Charges    41445 - OT Self Care/Mgmt Minutes 10  -LS         Untimed Charges    OT Eval/Re-eval Minutes 22  -LS         Total Minutes    Timed Charges Total Minutes 10  -LS      Untimed Charges Total Minutes 22  -LS       Total Minutes 32  -LS                User Key  (r) = Recorded By, (t) = Taken By, (c) = Cosigned By      Initials Name Provider Type     Sánchez Yeboah OT Occupational Therapist                  Therapy Charges for Today       Code Description Service Date Service Provider Modifiers Qty    58191908663 HC OT SELF CARE/MGMT/TRAIN EA 15 MIN 10/19/2023 Sánchez Yeboah OT GO 1    85068506958 HC OT EVAL MOD COMPLEXITY 4 10/19/2023 Sánchez Yeboah OT GO 1                 Sánchez  NANDINI Yeboah  10/19/2023    Electronically signed by Sánchez Yeboah OT at 10/19/23 1601       Sánchez Yeboah OT at 10/19/23 4767          Goal Outcome Evaluation:  Plan of Care Reviewed With: patient           Outcome Evaluation: Pamela Zavala is a 82 y.o. female who was found to have large left renal stone status post lithotripsy and stent placement here for planned staged procedure to clear remaining fragments. She has a PMH including CAD, hypertension, hyperlipidemia, hypothyroidism, and type 2 diabetes. According to pt's son, she has good days and bad days with her memory loss. Patient is from assisted living and states she normally does everything herself and the AL helps with medications. She does use a rollator. After procedure, pt admitted to PCU level however became unresponsive and rapid response was called. Per Dr. Vergara, pt appeared to be in septic shock and was transferred to ICU. This date, pt oriented x3, with moments of confusion noted. She required Min A to come to EOB sitting, then Max A for lower body dressing. She has CBI as well as mutiple IV lines running, requiring skilled hands of two therapists for safety this date. Min A required to come to standing, then Mod A with verbal cues to transfer from bed to chair. She impulsively reached for unstable items for support several times requiring cues to hold onto therapist. With multiple lines and tubes, unable to utilize a walker with the limited space, however would certainly benefit from using. OT will follow, recommending SNF prior to return to [facility].      Anticipated Discharge Disposition (OT): skilled nursing facility    Electronically signed by Sánchez Yeboah OT at 10/19/23 7586

## 2023-10-23 NOTE — PROGRESS NOTES
LOS: 5 days   Patient Care Team:  Marisol Rodgers APRN as PCP - General (Pulmonary Disease)    Subjective     Interval History: Able to walk with assistance around the nursing patient    Patient Complaints: Continues to feel better every day, fatigues easily    History taken from: patient    Review of Systems   Constitutional:  Positive for activity change and fatigue. Negative for appetite change, chills and fever.   HENT:  Negative for facial swelling.    Eyes:  Negative for visual disturbance.   Respiratory:  Negative for cough and shortness of breath.    Cardiovascular:  Negative for chest pain, palpitations and leg swelling.   Gastrointestinal:  Negative for abdominal pain, constipation, diarrhea, nausea and vomiting.   Genitourinary:  Negative for frequency.   Musculoskeletal:  Positive for gait problem. Negative for arthralgias.   Neurological:  Positive for weakness.   Psychiatric/Behavioral:  Negative for confusion.            Objective     Vital Signs  Temp:  [97.4 °F (36.3 °C)-98.6 °F (37 °C)] 98 °F (36.7 °C)  Heart Rate:  [64-96] 87  Resp:  [15-20] 18  BP: (105-156)/(40-89) 134/60    Physical Exam:     General Appearance:    Alert, cooperative, in no acute distress, sitting up in chair   Head:    Normocephalic, without obvious abnormality, atraumatic   Eyes:            Lids and lashes normal, conjunctivae and sclerae normal, no   icterus, no pallor, corneas clear, PERRLA   Ears:    Ears appear intact with no abnormalities noted   Throat:   No oral lesions, no thrush, oral mucosa moist   Neck:   No adenopathy, supple, trachea midline, no thyromegaly, no   carotid bruit, no JVD   Lungs:     Clear to auscultation,respirations regular, even and                  unlabored    Heart:    Regular rhythm and normal rate, normal S1 and S2, no            murmur, no gallop, no rub, no click   Chest Wall:    No abnormalities observed   Abdomen:     Normal bowel sounds, no masses, no organomegaly, soft         Non-tender non-distended, no guarding,   Extremities:   Moves all extremities well, no edema, no cyanosis, no             Redness   Pulses:   Pulses palpable and equal bilaterally   Skin:   No bleeding, bruising or rash   Lymph nodes:   No palpable adenopathy   Neurologic:   Cranial nerves 2 - 12 grossly intact, sensation intact, DTR       present and equal bilaterally        Results Review:    Lab Results (last 24 hours)       Procedure Component Value Units Date/Time    POC Glucose Once [[id number]]  (Abnormal) Collected: 10/23/23 1113    Specimen: Blood Updated: 10/23/23 1115     Glucose 197 mg/dL      Comment: Serial Number: [id number]:  [id number]       POC Glucose Once [[id number]]  (Abnormal) Collected: 10/23/23 0905    Specimen: Blood Updated: 10/23/23 0906     Glucose 251 mg/dL      Comment: Serial Number: [id number]:  [id number]       CBC & Differential [[id number]]  (Abnormal) Collected: 10/22/23 2353    Specimen: Blood Updated: 10/23/23 0107    Narrative:      The following orders were created for panel order CBC & Differential.  Procedure                               Abnormality         Status                     ---------                               -----------         ------                     CBC Auto Differential[[id number]]        Abnormal            Final result               Scan Slide[[id number]]                                       Final result                 Please view results for these tests on the individual orders.    CBC Auto Differential [[id number]]  (Abnormal) Collected: 10/22/23 2353    Specimen: Blood Updated: 10/23/23 0107     WBC 13.50 10*3/mm3      RBC 3.18 10*6/mm3      Hemoglobin 8.8 g/dL      Hematocrit 27.5 %      MCV 86.4 fL      MCH 27.6 pg      MCHC 31.9 g/dL      RDW 18.8 %      RDW-SD 56.0 fl      MPV 10.8 fL      Platelets 76 10*3/mm3     Narrative:      The previously reported component NRBC is no longer being reported. Previous result was 0.1 /100 WBC  (Reference Range: 0.0-0.2 /100 WBC) on 10/23/2023 at 0023 EDT.    Scan Slide [[id number]] Collected: 10/22/23 2353    Specimen: Blood Updated: 10/23/23 0107     Scan Slide --     Comment: See Manual Differential Results       Manual Differential [[id number]]  (Abnormal) Collected: 10/22/23 2353    Specimen: Blood Updated: 10/23/23 0107     Neutrophil % 60.0 %      Lymphocyte % 28.0 %      Monocyte % 3.0 %      Eosinophil % 3.0 %      Basophil % 1.0 %      Bands %  3.0 %      Metamyelocyte % 1.0 %      Atypical Lymphocyte % 1.0 %      Neutrophils Absolute 8.51 10*3/mm3      Lymphocytes Absolute 3.92 10*3/mm3      Monocytes Absolute 0.41 10*3/mm3      Eosinophils Absolute 0.41 10*3/mm3      Basophils Absolute 0.14 10*3/mm3      nRBC 1.0 /100 WBC      Anisocytosis Slight/1+     Dacrocytes Slight/1+     Microcytes Slight/1+     Poikilocytes Slight/1+     WBC Morphology Normal     Platelet Estimate Decreased    Phosphorus [[id number]]  (Normal) Collected: 10/22/23 2353    Specimen: Blood Updated: 10/23/23 0045     Phosphorus 3.0 mg/dL     Magnesium [[id number]]  (Normal) Collected: 10/22/23 2353    Specimen: Blood Updated: 10/23/23 0045     Magnesium 2.2 mg/dL     Comprehensive Metabolic Panel [[id number]]  (Abnormal) Collected: 10/22/23 2353    Specimen: Blood Updated: 10/23/23 0045     Glucose 264 mg/dL      BUN 16 mg/dL      Creatinine 0.77 mg/dL      Sodium 143 mmol/L      Potassium 4.1 mmol/L      Chloride 112 mmol/L      CO2 21.0 mmol/L      Calcium 6.8 mg/dL      Total Protein 5.1 g/dL      Albumin 2.9 g/dL      ALT (SGPT) 54 U/L      AST (SGOT) 40 U/L      Alkaline Phosphatase 400 U/L      Total Bilirubin 0.4 mg/dL      Globulin 2.2 gm/dL      A/G Ratio 1.3 g/dL      BUN/Creatinine Ratio 20.8     Anion Gap 10.0 mmol/L      eGFR 76.2 mL/min/1.73     Narrative:      GFR Normal >60  Chronic Kidney Disease <60  Kidney Failure <15    The GFR formula is only valid for adults with stable renal function between ages 18 and  70.    Blood Culture - Blood, Arm, Left [[id number]]  (Normal) Collected: 10/18/23 1901    Specimen: Blood from Arm, Left Updated: 10/22/23 1916     Blood Culture No growth at 4 days    Narrative:      Less than seven (7) mL's of blood was collected.  Insufficient quantity may yield false negative results.    Blood Culture - Blood, Hand, Left [[id number]]  (Normal) Collected: 10/18/23 1905    Specimen: Blood from Hand, Left Updated: 10/22/23 1916     Blood Culture No growth at 4 days    Narrative:      Less than seven (7) mL's of blood was collected.  Insufficient quantity may yield false negative results.    POC Glucose Once [[id number]]  (Abnormal) Collected: 10/22/23 1815    Specimen: Blood Updated: 10/22/23 1818     Glucose 174 mg/dL      Comment: Serial Number: [id number]:  [id number]                Imaging Results (Last 24 Hours)       ** No results found for the last 24 hours. **                 I reviewed the patient's new clinical results.    Medication Review:   Scheduled Meds:cefTRIAXone, 2,000 mg, Intravenous, Q24H  clopidogrel, 75 mg, Oral, Daily  folic acid, 1 mg, Oral, Daily  gabapentin, 300 mg, Oral, Nightly  insulin lispro, 2-7 Units, Subcutaneous, TID With Meals  lactulose, 10 g, Oral, BID  levothyroxine, 112 mcg, Oral, Daily  metoprolol tartrate, 25 mg, Oral, BID  midodrine, 5 mg, Oral, Q8H  multivitamin with minerals, 1 tablet, Oral, Daily  oxybutynin, 5 mg, Oral, BID  pantoprazole, 40 mg, Oral, BID  polyethylene glycol, 17 g, Oral, Daily  rosuvastatin, 5 mg, Oral, Daily  senna-docusate sodium, 2 tablet, Oral, BID  sodium chloride, 10 mL, Intravenous, Q12H  sodium chloride, 10 mL, Intravenous, Q12H  sodium chloride, 10 mL, Intravenous, Q12H  sodium chloride, 10 mL, Intravenous, Q12H  thiamine (B-1) IV, 200 mg, Intravenous, Q8H   Followed by  [START ON 10/24/2023] thiamine, 100 mg, Oral, Daily  traZODone, 50 mg, Oral, Nightly      Continuous Infusions:norepinephrine, 0.02-0.3  mcg/kg/min      PRN Meds:.  acetaminophen    acetaminophen    senna-docusate sodium **AND** polyethylene glycol **AND** bisacodyl **AND** bisacodyl    Calcium Replacement - Follow Nurse / BPA Driven Protocol    dextrose    dextrose    Diclofenac Sodium    glucagon (human recombinant)    hydrALAZINE    HYDROcodone-acetaminophen    Magnesium Standard Dose Replacement - Follow Nurse / BPA Driven Protocol    nitroglycerin    Phosphorus Replacement - Follow Nurse / BPA Driven Protocol    Potassium Replacement - Follow Nurse / BPA Driven Protocol    sodium chloride    sodium chloride    sodium chloride    sodium chloride    sodium chloride    traMADol     Assessment & Plan       Sepsis without acute organ dysfunction    Coronary arteriosclerosis    Type 2 diabetes mellitus    Diabetic neuropathy    Essential hypertension    Hyperlipidemia    Postoperative hypothyroidism    Ex-smoker    History of thyroid cancer    Kidney stones    UTI (urinary tract infection), bacterial    Sepsis  Hypotension - improving; decreasing midodirne  Hematuria - resolving  PAF - currently in sinus rhythm; restart anticoagulation soon; continue metoprolol  Anemia - hg is stable  Hypothyroidism - levothyroxine  HLD - statin    Continue antibiotics for sepsis.  Plan 10 day course - can change to cefdinir at discharge    Plan for disposition:Ready for discharge when rehab arrangement are made.  Will need less than 30 days of rehab prior to returning to [facility]    Sarah Beth Coronado MD  10/23/23  15:36 EDT

## 2023-10-23 NOTE — SIGNIFICANT NOTE
10/23/23 1350   Post Acute Pre-Cert Documentation   Request Submitted by Facility - Type: Post Acute   Post-Acute Authorization Type Submitted: SNF   Date Post Acute Pre-Cert Inititated per Facility 10/23/23   Verification from Payer Yes  ([employer] Auth ID: [id number])   Date Post Acute Pre-Cert Completed 10/23/23   Accepting Facility [facility] Discharge Date Requested 10/24/23   All Clinicals Submitted? Yes   Had Accepting Facility at Time of Submission Yes   Response Received from Insurance? Approval   Response Communicated to:    Authorization Number: Auto Approved, Plan auth ID pending.   Post Acute Pre-Cert Initiated Comment LSW submitted SNF precert for [facility] via [employer] portal. [employer] auth ID [id number]. Precert auto-approved, effective 10/24-10/26. ICU CM made aware via secure chat.     MANJULA Acosta, RICHARDW, CCM    Phone: [phone]  Cell: [phone]  Fax: [phone]  [email or web address]

## 2023-10-23 NOTE — THERAPY TREATMENT NOTE
"Subjective: Pt agreeable to therapeutic plan of care.  Pt reports she is ready to ambulate today.     Objective:     Bed mobility - Min-A  Transfers - Min-A, Assist x 2, and with rolling walker  Ambulation - 120 feet total, but pt took 2-3 standing rest breaks due to SOA with CGA and with rolling walker  *Gait belt applied and non-skid socks worn during treatment.       Vitals: Pt on 3L O2 NC.  Pt around 95% O2 sats.  Pt's BP also elevated end of session 164/79.     Pain: 0 VAS   Location: N/A  Intervention for pain: N/A    Education: Provided education on the importance of mobility in the acute care setting and Verbal/Tactile Cues    Assessment: Pamela Zavala presents with functional mobility impairments which indicate the need for skilled intervention. Tolerating session today without incident. Pt able to increase ambulation distance this date with 2-3 standing rest breaks due to SOA. Pt still slightly off balance and does require use of RW.  Endurance/activity tolerance as well as strength still low.  Pt still below baseline function and SNF recommended at d/c. Will continue to follow and progress as tolerated.     Plan/Recommendations:   Moderate Intensity Therapy recommended post-acute care. This is recommended as therapy feels the patient would require 3-4 days per week and wouldn't tolerate \"3 hour daily\" rehab intensity. SNF would be the preferred choice. If the patient does not agree to SNF, arrange HH or OP depending on home bound status. If patient is medically complex, consider LTACH.. Pt requires no DME at discharge.     Pt desires Skilled Rehab placement at discharge. Pt cooperative; agreeable to therapeutic recommendations and plan of care.         Basic Mobility 6-click:  Rollin = Total, A lot = 2, A little = 3; 4 = None  Supine>Sit:   1 = Total, A lot = 2, A little = 3; 4 = None   Sit>Stand with arms:  1 = Total, A lot = 2, A little = 3; 4 = None  Bed>Chair:   1 = Total, A lot = 2, A " little = 3; 4 = None  Ambulate in room:  1 = Total, A lot = 2, A little = 3; 4 = None  3-5 Steps with railin = Total, A lot = 2, A little = 3; 4 = None  Score: 17    Post-Tx Position: Up in Chair, Alarms activated, and Call light and personal items within reach  PPE: gloves

## 2023-10-23 NOTE — PLAN OF CARE
"Goal Outcome Evaluation:         Assessment: Pamela Zavala presents with functional mobility impairments which indicate the need for skilled intervention. Tolerating session today without incident. Pt able to increase ambulation distance this date with 2-3 standing rest breaks due to SOA. Pt still slightly off balance and does require use of RW.  Endurance/activity tolerance as well as strength still low.  Pt still below baseline function and SNF recommended at d/c. Will continue to follow and progress as tolerated.     Plan/Recommendations:   Moderate Intensity Therapy recommended post-acute care. This is recommended as therapy feels the patient would require 3-4 days per week and wouldn't tolerate \"3 hour daily\" rehab intensity. SNF would be the preferred choice. If the patient does not agree to SNF, arrange HH or OP depending on home bound status. If patient is medically complex, consider LTACH.. Pt requires no DME at discharge.     Pt desires Skilled Rehab placement at discharge. Pt cooperative; agreeable to therapeutic recommendations and plan of care.                "

## 2023-10-23 NOTE — PLAN OF CARE
"Assessment: Pamela Zavala presents with ADL impairments affecting function including balance, endurance / activity tolerance, and strength. Demonstrated functioning below baseline abilities indicate the need for continued skilled intervention while inpatient. Pt made excellent progress this date, ambulating in hallway for first time since admission. She does however exhibit low activity tolerance, needing to take multiple rest breaks. Min A required for toilet hygiene after completion this date as well. She remains inappropriate to dc to [facility] as she cannot care for herself. Tolerating session today without incident. Will continue to follow and progress as tolerated.      Plan/Recommendations:   Moderate Intensity Therapy recommended post-acute care. This is recommended as therapy feels the patient would require 3-4 days per week and wouldn't tolerate \"3 hour daily\" rehab intensity. SNF would be the preferred choice. If the patient does not agree to SNF, arrange HH or OP depending on home bound status. If patient is medically complex, consider LTACH.. Pt requires no DME at discharge.      Pt desires Skilled Rehab placement at discharge. Pt cooperative; agreeable to therapeutic recommendations and plan of care.   "

## 2023-10-23 NOTE — THERAPY TREATMENT NOTE
"Subjective: Pt agreeable to therapeutic plan of care.  Cognition: oriented to Person, Place, Time, and Situation    Objective:     Bed Mobility: Min-A   Functional Transfers: Min-A, Assist x 2, and with rolling walker     Balance: supported, static, dynamic, and standing CGA and with rolling walker  Functional Ambulation: CGA and with rolling walker    Toileting: Min-A  ADL Position: supported standing  ADL Comments: Pt performed candy-hygiene, provided with Min A for thoroughness upon completion.      Vitals: WNL on 3L of O2    Pain: 0 VAS  Location: NA  Interventions for pain: N/A  Education: Provided education on the importance of mobility in the acute care setting, Verbal/Tactile Cues, ADL training, and Transfer Training      Assessment: Pamela Zavala presents with ADL impairments affecting function including balance, endurance / activity tolerance, and strength. Demonstrated functioning below baseline abilities indicate the need for continued skilled intervention while inpatient. Pt made excellent progress this date, ambulating in hallway for first time since admission. She does however exhibit low activity tolerance, needing to take multiple rest breaks. Min A required for toilet hygiene after completion this date as well. She remains inappropriate to dc to long term as she cannot care for herself. Tolerating session today without incident. Will continue to follow and progress as tolerated.     Plan/Recommendations:   Moderate Intensity Therapy recommended post-acute care. This is recommended as therapy feels the patient would require 3-4 days per week and wouldn't tolerate \"3 hour daily\" rehab intensity. SNF would be the preferred choice. If the patient does not agree to SNF, arrange HH or OP depending on home bound status. If patient is medically complex, consider LTACH.. Pt requires no DME at discharge.     Pt desires Skilled Rehab placement at discharge. Pt cooperative; agreeable to therapeutic recommendations " and plan of care.     Modified [place]: N/A = No pre-op stroke/TIA    Post-Tx Position: Up in Chair, Alarms activated, and Call light and personal items within reach  PPE: gloves

## 2023-10-23 NOTE — PROGRESS NOTES
Referring Provider: Sarah Beth Coronado MD    Reason for follow-up: AFIB     Patient Care Team:  Marisol Rodgers APRN as PCP - General (Pulmonary Disease)      SUBJECTIVE    Feeling better today.  A little short of breath after ambulating with PT around the nursing station.  Denies any chest pain.  No palpitations or dizziness or lightheadedness     ROS  Review of all systems negative except as indicated.    Since I have last seen, the patient has been without any chest discomfort, shortness of breath, palpitations, dizziness or syncope.  Denies having any headache, abdominal pain, nausea, vomiting, diarrhea, constipation, loss of weight or loss of appetite.  Denies having any excessive bruising, hematuria or blood in the stool.  ROS      Personal History:    Past Medical History:   Diagnosis Date    Anemia     Arthritis     CAD (coronary artery disease)     Chronic back pain     Diabetic neuropathy     Essential hypertension     GERD (gastroesophageal reflux disease)     History of gastric ulcer     History of stomach ulcers     HLD (hyperlipidemia)     Hypocalcemia     Hypoparathyroidism     Hypothyroidism     Insomnia     Kidney stones     Osteoporosis     Recurrent sinusitis     Renal disease     Thyroid cancer     Type 2 diabetes mellitus        Past Surgical History:   Procedure Laterality Date    CARDIAC CATHETERIZATION N/A 05/03/2023    Procedure: Left Heart Cath and coronary angiogram;  Surgeon: John Baldwin MD;  Location: [facility] CATH INVASIVE LOCATION;  Service: Cardiovascular;  Laterality: N/A;    CORONARY STENT PLACEMENT      x 3    CYSTOSCOPY, URETEROSCOPY, RETROGRADE PYELOGRAM, STENT INSERTION Left 10/18/2023    Procedure: CYSTOSCOPY URETEROSCOPY RETROGRADE PYELOGRAM HOLMIUM LASER STENT INSERTION;  Surgeon: Juan Alaniz MD;  Location: [facility] MAIN OR;  Service: Urology;  Laterality: Left;    HYSTERECTOMY      LUNG LOBECTOMY      THYROIDECTOMY         Family History   Problem Relation Age of Onset     Diabetes Mother     Cancer Father     Heart disease Sister     Diabetes Sister     Heart disease Maternal Grandmother        Social History     Tobacco Use    Smoking status: Former     Packs/day: 2.00     Years: 30.00     Additional pack years: 0.00     Total pack years: 60.00     Types: Cigarettes     Quit date:      Years since quittin.     Passive exposure: Past    Smokeless tobacco: Never   Vaping Use    Vaping Use: Never used   Substance Use Topics    Alcohol use: Yes     Alcohol/week: 1.0 standard drink of alcohol     Types: 1 Glasses of wine per week    Drug use: Defer        Home meds:  Prior to Admission medications    Medication Sig Start Date End Date Taking? Authorizing Provider   Accu-Chek Softclix Lancets lancets 1 each by Other route 3 (Three) Times a Day Before Meals. Dx: E11.65. Use as instructed 23  Yes Johanny Nagel MD   atenolol (TENORMIN) 50 MG tablet Take 1 tablet by mouth Daily. Take dos   Yes Andrade La MD   clopidogrel (PLAVIX) 75 MG tablet Take 1 tablet by mouth Daily.   Yes Andrade La MD   fluticasone (FLONASE) 50 MCG/ACT nasal spray 1 spray into the nostril(s) as directed by provider 2 (Two) Times a Day.   Yes Andrade La MD   gabapentin (NEURONTIN) 300 MG capsule Take 1 capsule by mouth Every Night.   Yes Andrade La MD   glucose blood (Accu-Chek Guide) test strip 1 each by Other route 3 (Three) Times a Day Before Meals. Dx: E11.65. Use as instructed 23  Yes Johanny Nagel MD   insulin degludec (Tresiba FlexTouch) 100 UNIT/ML solution pen-injector injection Inject 22 Units under the skin into the appropriate area as directed every night at bedtime. 23  Yes Driss Dan MD   Insulin Lispro 100 UNIT/ML solution cartridge Inject  under the skin into the appropriate area as directed 3 (Three) Times a Day Before Meals. Per sliding scale   Yes Andrade La MD   Insulin Lispro, 1 Unit Dial, (HumaLOG KwikPen) 100 UNIT/ML  solution pen-injector Inject 7 Units under the skin into the appropriate area as directed 3 (Three) Times a Day. Inject 6 Units under the skin into the appropriate area as directed 3 (Three) Times a Day With Meals. 9/12/23  Yes Driss Dan MD   lactulose (CHRONULAC) 10 GM/15ML solution Take 15 mL by mouth Daily.   Yes Andrade La MD   levothyroxine (Synthroid) 112 MCG tablet Take 1 tablet by mouth Daily. 9/12/23 9/11/24 Yes Driss Dan MD   lisinopril (PRINIVIL,ZESTRIL) 5 MG tablet Take 1 tablet by mouth Daily. 5/18/23  Yes Nuria Mcgraw MD   metFORMIN (GLUCOPHAGE) 1000 MG tablet Take 1 tablet by mouth 2 (Two) Times a Day With Meals.   Yes Andrade La MD   oxybutynin (DITROPAN) 5 MG tablet Take 1 tablet by mouth 2 (Two) Times a Day.   Yes Andrade La MD   oxyCODONE-acetaminophen (PERCOCET) 5-325 MG per tablet Take 1 tablet by mouth Daily As Needed for Severe Pain. 10/18/23  Yes Juan Alaniz MD   pantoprazole (PROTONIX) 40 MG EC tablet Take 1 tablet by mouth 2 (Two) Times a Day. Take dos   Yes Andrade La MD   rosuvastatin (CRESTOR) 5 MG tablet Take 1 tablet by mouth Daily.   Yes Andrade La MD   traMADol (ULTRAM) 50 MG tablet Take 1 tablet by mouth 4 (Four) Times a Day As Needed for Moderate Pain.   Yes Andrade La MD   traZODone (DESYREL) 50 MG tablet Take 1 tablet by mouth Every Night.   Yes ProviderAndrade MD   cefdinir (OMNICEF) 300 MG capsule Take 1 capsule by mouth 2 (Two) Times a Day. 10/18/23   Juan Alaniz MD       Allergies:  Penicillins, Shellfish-derived products, Sulfamethizole, and Trimethoprim    Scheduled Meds:cefTRIAXone, 2,000 mg, Intravenous, Q24H  folic acid, 1 mg, Oral, Daily  gabapentin, 300 mg, Oral, Nightly  insulin lispro, 2-7 Units, Subcutaneous, TID With Meals  lactulose, 10 g, Oral, BID  levothyroxine, 112 mcg, Oral, Daily  metoprolol tartrate, 25 mg, Oral, BID  midodrine, 10 mg, Oral, Q8H  multivitamin with  "minerals, 1 tablet, Oral, Daily  oxybutynin, 5 mg, Oral, BID  pantoprazole, 40 mg, Oral, BID  polyethylene glycol, 17 g, Oral, Daily  rosuvastatin, 5 mg, Oral, Daily  senna-docusate sodium, 2 tablet, Oral, BID  sodium chloride, 10 mL, Intravenous, Q12H  sodium chloride, 10 mL, Intravenous, Q12H  sodium chloride, 10 mL, Intravenous, Q12H  sodium chloride, 10 mL, Intravenous, Q12H  thiamine (B-1) IV, 200 mg, Intravenous, Q8H   Followed by  [START ON 10/24/2023] thiamine, 100 mg, Oral, Daily  traZODone, 50 mg, Oral, Nightly      Continuous Infusions:norepinephrine, 0.02-0.3 mcg/kg/min      PRN Meds:.  acetaminophen    acetaminophen    senna-docusate sodium **AND** polyethylene glycol **AND** bisacodyl **AND** bisacodyl    Calcium Replacement - Follow Nurse / BPA Driven Protocol    dextrose    dextrose    Diclofenac Sodium    glucagon (human recombinant)    hydrALAZINE    HYDROcodone-acetaminophen    Magnesium Standard Dose Replacement - Follow Nurse / BPA Driven Protocol    nitroglycerin    Phosphorus Replacement - Follow Nurse / BPA Driven Protocol    Potassium Replacement - Follow Nurse / BPA Driven Protocol    sodium chloride    sodium chloride    sodium chloride    sodium chloride    sodium chloride    traMADol      OBJECTIVE    Vital Signs  Vitals:    10/23/23 0500 10/23/23 0600 10/23/23 0700 10/23/23 0800   BP: 120/59 119/59 134/66    BP Location: Left arm Left arm Left arm    Patient Position: Lying Lying Lying    Pulse: 68 73 73    Resp: 18 18 18    Temp:    97.8 °F (36.6 °C)   TempSrc:    Oral   SpO2: 90% 91% 95%    Weight:       Height:           Flowsheet Rows      Flowsheet Row First Filed Value   Admission Height 170.2 cm (67\") Documented at 10/09/2023 1548   Admission Weight 59 kg (130 lb) Documented at 10/09/2023 1548              Intake/Output Summary (Last 24 hours) at 10/23/2023 0938  Last data filed at 10/23/2023 0600  Gross per 24 hour   Intake 3342 ml   Output 0 ml   Net 3342 ml          Telemetry: " Normal sinus rhythm    Physical Exam:  The patient is alert, oriented and in no distress.  Frail  Vital signs as noted above.  Head and neck revealed no carotid bruits or jugular venous distention.  No thyromegaly or lymphadenopathy is present  Lungs clear.  No wheezing.  Breath sounds are normal bilaterally.  Heart normal first and second heart sounds.  No murmur. No precordial rub is present.  No gallop is present.  Abdomen soft and nontender.  No organomegaly is present.  Extremities with good peripheral pulses without any pedal edema.  Skin warm and dry.  Musculoskeletal system is grossly normal.  CNS grossly normal.       Results Review:  I have personally reviewed the results from the time of this admission to 10/23/2023 09:38 EDT and agree with these findings:  []  Laboratory  []  Microbiology  []  Radiology  []  EKG/Telemetry   []  Cardiology/Vascular   []  Pathology  []  Old records  []  Other:    Most notable findings include:    Lab Results (last 24 hours)       Procedure Component Value Units Date/Time    POC Glucose Once [[id number]]  (Abnormal) Collected: 10/23/23 0905    Specimen: Blood Updated: 10/23/23 0906     Glucose 251 mg/dL      Comment: Serial Number: [id number]:  [id number]       CBC & Differential [[id number]]  (Abnormal) Collected: 10/22/23 2353    Specimen: Blood Updated: 10/23/23 0107    Narrative:      The following orders were created for panel order CBC & Differential.  Procedure                               Abnormality         Status                     ---------                               -----------         ------                     CBC Auto Differential[[id number]]        Abnormal            Final result               Scan Slide[[id number]]                                       Final result                 Please view results for these tests on the individual orders.    CBC Auto Differential [[id number]]  (Abnormal) Collected: 10/22/23 2353    Specimen: Blood Updated:  10/23/23 0107     WBC 13.50 10*3/mm3      RBC 3.18 10*6/mm3      Hemoglobin 8.8 g/dL      Hematocrit 27.5 %      MCV 86.4 fL      MCH 27.6 pg      MCHC 31.9 g/dL      RDW 18.8 %      RDW-SD 56.0 fl      MPV 10.8 fL      Platelets 76 10*3/mm3     Narrative:      The previously reported component NRBC is no longer being reported. Previous result was 0.1 /100 WBC (Reference Range: 0.0-0.2 /100 WBC) on 10/23/2023 at 0023 EDT.    Scan Slide [[id number]] Collected: 10/22/23 2353    Specimen: Blood Updated: 10/23/23 0107     Scan Slide --     Comment: See Manual Differential Results       Manual Differential [[id number]]  (Abnormal) Collected: 10/22/23 2353    Specimen: Blood Updated: 10/23/23 0107     Neutrophil % 60.0 %      Lymphocyte % 28.0 %      Monocyte % 3.0 %      Eosinophil % 3.0 %      Basophil % 1.0 %      Bands %  3.0 %      Metamyelocyte % 1.0 %      Atypical Lymphocyte % 1.0 %      Neutrophils Absolute 8.51 10*3/mm3      Lymphocytes Absolute 3.92 10*3/mm3      Monocytes Absolute 0.41 10*3/mm3      Eosinophils Absolute 0.41 10*3/mm3      Basophils Absolute 0.14 10*3/mm3      nRBC 1.0 /100 WBC      Anisocytosis Slight/1+     Dacrocytes Slight/1+     Microcytes Slight/1+     Poikilocytes Slight/1+     WBC Morphology Normal     Platelet Estimate Decreased    Phosphorus [[id number]]  (Normal) Collected: 10/22/23 2353    Specimen: Blood Updated: 10/23/23 0045     Phosphorus 3.0 mg/dL     Magnesium [[id number]]  (Normal) Collected: 10/22/23 2353    Specimen: Blood Updated: 10/23/23 0045     Magnesium 2.2 mg/dL     Comprehensive Metabolic Panel [[id number]]  (Abnormal) Collected: 10/22/23 2353    Specimen: Blood Updated: 10/23/23 0045     Glucose 264 mg/dL      BUN 16 mg/dL      Creatinine 0.77 mg/dL      Sodium 143 mmol/L      Potassium 4.1 mmol/L      Chloride 112 mmol/L      CO2 21.0 mmol/L      Calcium 6.8 mg/dL      Total Protein 5.1 g/dL      Albumin 2.9 g/dL      ALT (SGPT) 54 U/L      AST (SGOT) 40 U/L       Alkaline Phosphatase 400 U/L      Total Bilirubin 0.4 mg/dL      Globulin 2.2 gm/dL      A/G Ratio 1.3 g/dL      BUN/Creatinine Ratio 20.8     Anion Gap 10.0 mmol/L      eGFR 76.2 mL/min/1.73     Narrative:      GFR Normal >60  Chronic Kidney Disease <60  Kidney Failure <15    The GFR formula is only valid for adults with stable renal function between ages 18 and 70.    Blood Culture - Blood, Arm, Left [[id number]]  (Normal) Collected: 10/18/23 1901    Specimen: Blood from Arm, Left Updated: 10/22/23 1916     Blood Culture No growth at 4 days    Narrative:      Less than seven (7) mL's of blood was collected.  Insufficient quantity may yield false negative results.    Blood Culture - Blood, Hand, Left [[id number]]  (Normal) Collected: 10/18/23 1905    Specimen: Blood from Hand, Left Updated: 10/22/23 1916     Blood Culture No growth at 4 days    Narrative:      Less than seven (7) mL's of blood was collected.  Insufficient quantity may yield false negative results.    POC Glucose Once [[id number]]  (Abnormal) Collected: 10/22/23 1815    Specimen: Blood Updated: 10/22/23 1818     Glucose 174 mg/dL      Comment: Serial Number: [id number]:  [id number]       Ferritin [[id number]]  (Abnormal) Collected: 10/22/23 0532    Specimen: Blood Updated: 10/22/23 1454     Ferritin 323.70 ng/mL     Narrative:      Results may be falsely decreased if patient taking Biotin.      POC Glucose Once [[id number]]  (Abnormal) Collected: 10/22/23 1111    Specimen: Blood Updated: 10/22/23 1113     Glucose 115 mg/dL      Comment: Serial Number: [id number]:  [id number]               Imaging Results (Last 24 Hours)       ** No results found for the last 24 hours. **            LAB RESULTS (LAST 7 DAYS)    CBC  Results from last 7 days   Lab Units 10/22/23  2353 10/22/23  0432 10/21/23  0537 10/20/23  0653 10/20/23  0027 10/19/23  1749 10/19/23  1135 10/19/23  0453 10/19/23  0109 10/18/23  2202 10/18/23  2156 10/18/23  1904   WBC 10*3/mm3  13.50* 15.60* 16.20* 24.90*  --   --   --  19.60*  --  8.10  --  6.30   RBC 10*6/mm3 3.18* 2.91* 3.06* 3.33*  --   --   --  3.34*  --  3.14*  --  3.48*   HEMOGLOBIN g/dL 8.8* 7.8* 8.2* 9.8* 10.6* 8.9* 7.0* 8.9*   < > 8.4*  --  9.5*   HEMOGLOBIN, POC   --   --   --   --   --   --   --   --   --   --    < >  --    HEMATOCRIT % 27.5* 24.6* 26.3* 29.6* 32.8* 28.6* 22.8* 28.9*   < > 26.7*  --  30.3*   HEMATOCRIT POC   --   --   --   --   --   --   --   --   --   --    < >  --    MCV fL 86.4 84.6 85.9 89.0  --   --   --  86.6  --  85.1  --  87.1   PLATELETS 10*3/mm3 76* 73* 85* 127*  --   --   --  207  --  172  --  251    < > = values in this interval not displayed.       BMP  Results from last 7 days   Lab Units 10/22/23  2353 10/22/23  0532 10/21/23  0537 10/21/23  0452 10/20/23  0603 10/19/23  0453 10/18/23  2202 10/18/23  2156 10/18/23  2051 10/18/23  1901   SODIUM mmol/L 143 147* 146*  --  148* 136  --   --  140 142   POTASSIUM mmol/L 4.1 2.8* 3.7  --  4.2 4.2 2.8*  --  3.1* 3.4*   CHLORIDE mmol/L 112* 114* 121*  --  122* 106  --   --  104 105   CO2 mmol/L 21.0* 22.0 17.0*  --  18.0* 20.0*  --   --  21.0* 21.0*   BUN mg/dL 16 17 25*  --  30* 19  --   --  18 17   CREATININE mg/dL 0.77 0.79 0.98  --  0.94 0.76  --  0.91 0.92 0.85   GLUCOSE mg/dL 264* 169* 207*  --  94 147*  --   --  103* 132*   MAGNESIUM mg/dL 2.2 1.3*  --  1.7  --   --  2.1  --   --  0.8*   PHOSPHORUS mg/dL 3.0 1.2*  --  2.5  --   --   --   --  2.6 2.9       CMP   Results from last 7 days   Lab Units 10/22/23  2353 10/22/23  0532 10/21/23  0537 10/20/23  0603 10/19/23  0937 10/19/23  0453 10/18/23  2202 10/18/23  2156 10/18/23  2051 10/18/23  1901   SODIUM mmol/L 143 147* 146* 148*  --  136  --   --  140 142   POTASSIUM mmol/L 4.1 2.8* 3.7 4.2  --  4.2 2.8*  --  3.1* 3.4*   CHLORIDE mmol/L 112* 114* 121* 122*  --  106  --   --  104 105   CO2 mmol/L 21.0* 22.0 17.0* 18.0*  --  20.0*  --   --  21.0* 21.0*   BUN mg/dL 16 17 25* 30*  --  19  --   --  18  17   CREATININE mg/dL 0.77 0.79 0.98 0.94  --  0.76  --  0.91 0.92 0.85   GLUCOSE mg/dL 264* 169* 207* 94  --  147*  --   --  103* 132*   ALBUMIN g/dL 2.9* 2.7* 3.2*  --   --  2.6*  --   --  2.9* 3.0*   BILIRUBIN mg/dL 0.4 0.9 0.9  --   --  0.4  --   --  0.9 0.7   ALK PHOS U/L 400* 264* 197*  --   --  88  --   --  72 74   AST (SGOT) U/L 40* 46* 93*  --   --  398*  --   --  195* 76*   ALT (SGPT) U/L 54* 59* 80*  --   --  149*  --   --  66* 29   AMYLASE U/L  --   --   --   --   --   --   --   --   --  38   AMMONIA umol/L  --   --   --   --  14  --   --   --   --   --        BNP        TROPONIN  Results from last 7 days   Lab Units 10/18/23  2051 10/18/23  1901   CK TOTAL U/L  --  96   HSTROP T ng/L 19* 17*       CoAg  Results from last 7 days   Lab Units 10/18/23  2202   INR  1.36*       Creatinine Clearance  Estimated Creatinine Clearance: 59.8 mL/min (by C-G formula based on SCr of 0.77 mg/dL).    ABG  Results from last 7 days   Lab Units 10/18/23  2156   PH, ARTERIAL pH units 7.327*   PCO2, ARTERIAL mm Hg 42.8   PO2 ART mm Hg 61.1*   O2 SATURATION ART % 89.1*   BASE EXCESS ART mmol/L -3.4*       Radiology  XR Chest 1 View    Result Date: 10/21/2023  Impression: Slight worsening airspace disease. Electronically Signed: Alan Arreaga MD  10/21/2023 11:40 AM CDT  Workstation ID: [id number]       EKG  I personally viewed and interpreted the patient's EKG/Telemetry data:  ECG 12 Lead Rhythm Change   Final Result   HEART RATE= 140  bpm   RR Interval= 419  ms   ME Interval=   ms   P Horizontal Axis=   deg   P Front Axis=   deg   QRSD Interval= 75  ms   QT Interval=   ms   QTcB= Invalid  ms   QRS Axis= 73  deg   T Wave Axis= -89  deg   - ABNORMAL ECG -   Atrial fibrillation with rapid V-rate   Low voltage, precordial leads   Borderline T abnormalities, diffuse leads   When compared with ECG of 18-Oct-2023 15:46:09,   Significant change in rhythm   Significant repolarization change   Significant axis, voltage or hypertrophy  change   Electronically Signed By: John Baldwin ([facility]) 20-Oct-2023 22:55:09   Date and Time of Study: 2023-10-20 05:19:28      ECG 12 Lead Rhythm Change   Final Result   HEART RATE= 151  bpm   RR Interval= 396  ms   CT Interval= 129  ms   P Horizontal Axis= -11  deg   P Front Axis= 61  deg   QRSD Interval= 84  ms   QT Interval= 312  ms   QTcB= 496  ms   QRS Axis= 45  deg   T Wave Axis= 247  deg   - ABNORMAL ECG -   Supraventricular tachycardia   Probable anteroseptal infarct, recent   When compared with ECG of 11-Oct-2023 13:54:31,   Significant change in rhythm: previously sinus   Electronically Signed By: Shirley Rosenthal ([facility]) 21-Oct-2023 17:04:20   Date and Time of Study: 2023-10-18 15:46:09      SCANNED - TELEMETRY     Final Result      SCANNED - TELEMETRY     Final Result      SCANNED - TELEMETRY     Final Result      SCANNED - TELEMETRY     Final Result      SCANNED - TELEMETRY     Final Result      ECG 12 Lead   Final Result   HEART RATE= 80  bpm   RR Interval= 752  ms   CT Interval= 168  ms   P Horizontal Axis= 6  deg   P Front Axis= 92  deg   QRSD Interval= 73  ms   QT Interval= 379  ms   QTcB= 437  ms   QRS Axis= 48  deg   T Wave Axis= 61  deg   - ABNORMAL ECG -   Sinus rhythm   Consider anteroseptal infarct   When compared with ECG of 08-Sep-2023 10:41:09,   No significant change   Electronically Signed By: Nuria Mcgraw ([facility]) 12-Oct-2023 15:16:23   Date and Time of Study: 2023-10-11 13:54:31            Echocardiogram:          Stress Test:  Results for orders placed during the hospital encounter of 05/02/23    Stress Test With Myocardial Perfusion One Day    Interpretation Summary    Left ventricular ejection fraction is hyperdynamic (Calculated EF > 70%).    Abnormal LV wall motion consistent with moderate hypokinesis of the inferior wall.    Myocardial perfusion imaging indicates a large-sized infarct located in the inferior wall with moderate candy-infarct ischemia.    Impressions are consistent with  a high risk study.    Findings consistent with an abnormal ECG stress test.         Cardiac Catheterization:  Results for orders placed during the hospital encounter of 05/02/23    Cardiac Catheterization/Vascular Study    Narrative  OPERATORS  John Baldwin M.D. (Attending Cardiologist)      PROCEDURES PERFORMED  Ultrasound guided Vascular access  Left Heart Catheterization  Coronary Angiogram [id number]  PCI with DIRK placement to RCA [id number]  IVUS of RCA [id number]  Moderate sedation 45mins    INDICATIONS FOR PROCEDURE  82 years old woman with multiple cardiovascular risk factors presented with chest pain/unstable angina.  She had a nuclear stress test which was abnormal in the inferior wall.  After discussing the risk and benefit of the procedure she was brought into the Cath Lab for cardiac catheterization.    PROCEDURE IN DETAIL  Informed consent was obtained from the patient after explaining the risks, benefits, and alternative options of the procedure. After obtaining informed consent, the patient was brought to the cath lab and was prepped in a sterile fashion. Lidocaine 2% was used for local anesthesia into the right femoral access site. The right femoral artery was accessed with a micropuncture needle via modified Seldinger technique under ultrasound guidance. A 6F was inserted successfully. Afterwards, 6F JR4 and JL4 diagnostic catheters were advanced over a wire into the ascending aorta and were used to engage the ostia of the left main and RCA respectively. JR4 used to cross the AV and obtain LV pressures and gradient across the AV measured via pullback technique. Images of the right and left coronary systems were obtained.    HEMODYNAMICS  LV: 151/2, 7 mmHg  AO: 150/64, 98 mmHg  No significant gradient across aortic valve during pullback of JR4 catheter.  LV gram was not performed due to echocardiogram being available.    FINDINGS    Coronary Angiogram    Right dominant circulation    Left main: Left main is a  large caliber vessel which gives rise to the Left Anterior Descending and the Left circumflex.  Left main is angiographically free from any significant disease.    Left Anterior Descending Artery: LAD is a medium caliber vessel which gives rise to several septal perforators and several diagonal branches.  LAD has diffuse luminal irregularities but no significant obstruction.    Left Circumflex: Patent stents in the mid left circumflex and first obtuse marginal branch.  Remaining left circumflex is angiographically free from any significant disease.    Right Coronary Artery: The RCA is a small caliber vessel gives rise to PDA and PLV.  Mid RCA has focal 80 to 85% stenosis with EDITH-3 flow.    Percutaneous coronary intervention  100 units/kg of heparin was administered and ACT of more than 250 was documented.  A 6 Azeri JR4 guide catheter with sideholes was used to engage the ostium of the RCA.  0.014 run-through wire was advanced past the lesion in the mid RCA into the distal RCA.  I then predilated the lesion with 2 x 12 mm mini trek balloon.  This was followed by placement of 2.25 x 33 mm Xience miryam point stent.  This was followed by advancement of [employer] intravascular ultrasound into the distal RCA and a slow manual pullback was performed.  IVUS confirmed distal RCA size of 2.5 mm and proximal RCA 3.5 mm.  Mid RCA was 3.2 x 2.5 mm in dimension.  I then postdilated the stent with a 3 x 15 mm noncompliant balloon at 14 shima.  Final angiography showed EDITH-3 flow and 0% stenosis in the RCA.    All the catheters were exchanged over a wire and subsequently removed. Angiogram of the femoral access site was obtained and did not show complications. The patient tolerated the procedure well without any complications. The pictures were reviewed at the end of the procedure. A 6 Azeri Angio-Seal closure device was applied to achieve hemostasis.    ESTIMATED BLOOD LOSS:  10 ml    COMPLICATIONS:  None    PROCEDURE  DATA:  Contrast Used: 50 mL  Sedation Time: 45 minutes    IMPRESSIONS  Patent stents in the left circumflex and obtuse marginal 1.  IVUS guided PCI of mid RCA with placement of drug-eluting stent.  Normal LVEDP    RECOMMENDATIONS  -Start dual antiplatelet therapy.  -High intensity statin, beta-blocker.  - Referral to cardiac rehab         Other:         ASSESSMENT & PLAN:    Principal Problem:    Sepsis without acute organ dysfunction  Active Problems:    Coronary arteriosclerosis    Type 2 diabetes mellitus    Diabetic neuropathy    Essential hypertension    Hyperlipidemia    Postoperative hypothyroidism    Ex-smoker    History of thyroid cancer    Kidney stones    UTI (urinary tract infection), bacterial    Sepsis        Sepsis  Shock state has improved   White count Continues to improve  Continue antibiotics  We will decrease midodrine to 5 mg 3 times daily today    Hematuria  Status post lithotripsy and stent placement on the left side.  Status post staged procedures on 10/18/2023 leading to renal hemorrhage  Postprocedure sepsis and anemia of blood loss requiring blood transfusion.  H&H stable now     Atrial fibrillation  [id number] score of 6  Back in sinus rhythm  Unable to anticoagulate at the moment due to recent bleeding requiring blood transfusion.  Started on metoprolol and rate controlled  Obtain clearance from urology for starting anticoagulation with heparin drip and eventually Eliquis.     CAD  Status post PCI of the RCA in May 2023  DAPT has been held due to renal hemorrhage  Resume Plavix today  We will stop aspirin given her need for anticoagulation for her A-fib     Hypertension  Holding antihypertensives due to hypotension  Continue midodrine     Hyperlipidemia  Continue Crestor  LDL is 55     Diabetes  Uncontrolled diabetes  A1c has improved from 11-7.6 now  On Sita Mcgraw MD  10/23/23  09:38 EDT

## 2023-10-24 PROBLEM — Z85.850 HISTORY OF THYROID CANCER: Chronic | Status: ACTIVE | Noted: 2023-08-11

## 2023-10-24 PROBLEM — I25.10 CORONARY ARTERIOSCLEROSIS: Chronic | Status: ACTIVE | Noted: 2023-05-03

## 2023-10-24 PROBLEM — E11.40 DIABETIC NEUROPATHY: Chronic | Status: ACTIVE | Noted: 2023-06-21

## 2023-10-24 PROBLEM — Z87.891 EX-SMOKER: Chronic | Status: ACTIVE | Noted: 2023-05-03

## 2023-10-24 PROBLEM — E89.0 POSTOPERATIVE HYPOTHYROIDISM: Chronic | Status: ACTIVE | Noted: 2023-06-21

## 2023-10-24 PROBLEM — E11.9 TYPE 2 DIABETES MELLITUS: Chronic | Status: ACTIVE | Noted: 2023-06-21

## 2023-10-24 PROBLEM — I10 ESSENTIAL HYPERTENSION: Chronic | Status: ACTIVE | Noted: 2023-06-21

## 2023-10-24 PROBLEM — E78.5 HYPERLIPIDEMIA: Chronic | Status: ACTIVE | Noted: 2023-06-21

## 2023-10-24 LAB
ALBUMIN SERPL-MCNC: 2.8 G/DL (ref 3.5–5.2)
ALBUMIN SERPL-MCNC: 2.9 G/DL (ref 3.5–5.2)
ALBUMIN/GLOB SERPL: 1.2 G/DL
ALBUMIN/GLOB SERPL: 1.3 G/DL
ALP SERPL-CCNC: 233 U/L (ref 39–117)
ALP SERPL-CCNC: 314 U/L (ref 39–117)
ALT SERPL W P-5'-P-CCNC: 31 U/L (ref 1–33)
ALT SERPL W P-5'-P-CCNC: 38 U/L (ref 1–33)
ANION GAP SERPL CALCULATED.3IONS-SCNC: 11 MMOL/L (ref 5–15)
ANION GAP SERPL CALCULATED.3IONS-SCNC: 9 MMOL/L (ref 5–15)
ANISOCYTOSIS BLD QL: ABNORMAL
ANISOCYTOSIS BLD QL: ABNORMAL
AST SERPL-CCNC: 19 U/L (ref 1–32)
AST SERPL-CCNC: 22 U/L (ref 1–32)
BASOPHILS # BLD MANUAL: 0.11 10*3/MM3 (ref 0–0.2)
BASOPHILS NFR BLD MANUAL: 1 % (ref 0–1.5)
BILIRUB SERPL-MCNC: 0.4 MG/DL (ref 0–1.2)
BILIRUB SERPL-MCNC: 0.5 MG/DL (ref 0–1.2)
BLASTS NFR BLD MANUAL: 2 % (ref 0–0)
BUN SERPL-MCNC: 10 MG/DL (ref 8–23)
BUN SERPL-MCNC: 8 MG/DL (ref 8–23)
BUN/CREAT SERPL: 12.3 (ref 7–25)
BUN/CREAT SERPL: 14.9 (ref 7–25)
CALCIUM SPEC-SCNC: 7.3 MG/DL (ref 8.6–10.5)
CALCIUM SPEC-SCNC: 7.3 MG/DL (ref 8.6–10.5)
CHLORIDE SERPL-SCNC: 108 MMOL/L (ref 98–107)
CHLORIDE SERPL-SCNC: 112 MMOL/L (ref 98–107)
CO2 SERPL-SCNC: 21 MMOL/L (ref 22–29)
CO2 SERPL-SCNC: 22 MMOL/L (ref 22–29)
CREAT SERPL-MCNC: 0.65 MG/DL (ref 0.57–1)
CREAT SERPL-MCNC: 0.67 MG/DL (ref 0.57–1)
DACRYOCYTES BLD QL SMEAR: ABNORMAL
DEPRECATED RDW RBC AUTO: 55.1 FL (ref 37–54)
DEPRECATED RDW RBC AUTO: 55.6 FL (ref 37–54)
EGFRCR SERPLBLD CKD-EPI 2021: 86.3 ML/MIN/1.73
EGFRCR SERPLBLD CKD-EPI 2021: 86.9 ML/MIN/1.73
EOSINOPHIL # BLD MANUAL: 0.11 10*3/MM3 (ref 0–0.4)
EOSINOPHIL NFR BLD MANUAL: 1 % (ref 0.3–6.2)
ERYTHROCYTE [DISTWIDTH] IN BLOOD BY AUTOMATED COUNT: 18.6 % (ref 12.3–15.4)
ERYTHROCYTE [DISTWIDTH] IN BLOOD BY AUTOMATED COUNT: 18.7 % (ref 12.3–15.4)
GLOBULIN UR ELPH-MCNC: 2.3 GM/DL
GLOBULIN UR ELPH-MCNC: 2.4 GM/DL
GLUCOSE BLDC GLUCOMTR-MCNC: 167 MG/DL (ref 70–105)
GLUCOSE BLDC GLUCOMTR-MCNC: 171 MG/DL (ref 70–105)
GLUCOSE BLDC GLUCOMTR-MCNC: 197 MG/DL (ref 70–105)
GLUCOSE BLDC GLUCOMTR-MCNC: 210 MG/DL (ref 70–105)
GLUCOSE SERPL-MCNC: 175 MG/DL (ref 65–99)
GLUCOSE SERPL-MCNC: 217 MG/DL (ref 65–99)
HCT VFR BLD AUTO: 28.6 % (ref 34–46.6)
HCT VFR BLD AUTO: 30.4 % (ref 34–46.6)
HGB BLD-MCNC: 9.3 G/DL (ref 12–15.9)
HGB BLD-MCNC: 9.7 G/DL (ref 12–15.9)
LARGE PLATELETS: ABNORMAL
LYMPHOCYTES # BLD MANUAL: 1.43 10*3/MM3 (ref 0.7–3.1)
LYMPHOCYTES # BLD MANUAL: 1.49 10*3/MM3 (ref 0.7–3.1)
LYMPHOCYTES NFR BLD MANUAL: 5 % (ref 5–12)
LYMPHOCYTES NFR BLD MANUAL: 9 % (ref 5–12)
MAGNESIUM SERPL-MCNC: 1.5 MG/DL (ref 1.6–2.4)
MCH RBC QN AUTO: 27.5 PG (ref 26.6–33)
MCH RBC QN AUTO: 28 PG (ref 26.6–33)
MCHC RBC AUTO-ENTMCNC: 32 G/DL (ref 31.5–35.7)
MCHC RBC AUTO-ENTMCNC: 32.5 G/DL (ref 31.5–35.7)
MCV RBC AUTO: 86 FL (ref 79–97)
MCV RBC AUTO: 86.1 FL (ref 79–97)
METAMYELOCYTES NFR BLD MANUAL: 10 % (ref 0–0)
METAMYELOCYTES NFR BLD MANUAL: 4 % (ref 0–0)
MICROCYTES BLD QL: ABNORMAL
MONOCYTES # BLD: 0.62 10*3/MM3 (ref 0.1–0.9)
MONOCYTES # BLD: 0.99 10*3/MM3 (ref 0.1–0.9)
NEUTROPHILS # BLD AUTO: 7.7 10*3/MM3 (ref 1.7–7)
NEUTROPHILS # BLD AUTO: 9.05 10*3/MM3 (ref 1.7–7)
NEUTROPHILS NFR BLD MANUAL: 60 % (ref 42.7–76)
NEUTROPHILS NFR BLD MANUAL: 63 % (ref 42.7–76)
NEUTS BAND NFR BLD MANUAL: 13 % (ref 0–5)
NEUTS BAND NFR BLD MANUAL: 7 % (ref 0–5)
NRBC SPEC MANUAL: 1 /100 WBC (ref 0–0.2)
PHOSPHATE SERPL-MCNC: 1.8 MG/DL (ref 2.5–4.5)
PHOSPHATE SERPL-MCNC: 2.1 MG/DL (ref 2.5–4.5)
PLATELET # BLD AUTO: 124 10*3/MM3 (ref 140–450)
PLATELET # BLD AUTO: 185 10*3/MM3 (ref 140–450)
PMV BLD AUTO: 10.2 FL (ref 6–12)
PMV BLD AUTO: 10.5 FL (ref 6–12)
POIKILOCYTOSIS BLD QL SMEAR: ABNORMAL
POLYCHROMASIA BLD QL SMEAR: ABNORMAL
POTASSIUM SERPL-SCNC: 3.6 MMOL/L (ref 3.5–5.2)
POTASSIUM SERPL-SCNC: 3.7 MMOL/L (ref 3.5–5.2)
PROT SERPL-MCNC: 5.2 G/DL (ref 6–8.5)
PROT SERPL-MCNC: 5.2 G/DL (ref 6–8.5)
RBC # BLD AUTO: 3.33 10*6/MM3 (ref 3.77–5.28)
RBC # BLD AUTO: 3.53 10*6/MM3 (ref 3.77–5.28)
SCAN SLIDE: NORMAL
SCAN SLIDE: NORMAL
SMALL PLATELETS BLD QL SMEAR: ABNORMAL
SMALL PLATELETS BLD QL SMEAR: ADEQUATE
SODIUM SERPL-SCNC: 140 MMOL/L (ref 136–145)
SODIUM SERPL-SCNC: 143 MMOL/L (ref 136–145)
VARIANT LYMPHS NFR BLD MANUAL: 13 % (ref 19.6–45.3)
VARIANT LYMPHS NFR BLD MANUAL: 5 % (ref 0–5)
VARIANT LYMPHS NFR BLD MANUAL: 7 % (ref 19.6–45.3)
WBC MORPH BLD: NORMAL
WBC MORPH BLD: NORMAL
WBC NRBC COR # BLD: 11 10*3/MM3 (ref 3.4–10.8)
WBC NRBC COR # BLD: 12.4 10*3/MM3 (ref 3.4–10.8)

## 2023-10-24 PROCEDURE — 97535 SELF CARE MNGMENT TRAINING: CPT

## 2023-10-24 PROCEDURE — 25010000002 HYDRALAZINE PER 20 MG: Performed by: NURSE PRACTITIONER

## 2023-10-24 PROCEDURE — 85025 COMPLETE CBC W/AUTO DIFF WBC: CPT | Performed by: NURSE PRACTITIONER

## 2023-10-24 PROCEDURE — 82948 REAGENT STRIP/BLOOD GLUCOSE: CPT

## 2023-10-24 PROCEDURE — 97110 THERAPEUTIC EXERCISES: CPT

## 2023-10-24 PROCEDURE — 85007 BL SMEAR W/DIFF WBC COUNT: CPT | Performed by: NURSE PRACTITIONER

## 2023-10-24 PROCEDURE — 25010000002 LORAZEPAM PER 2 MG

## 2023-10-24 PROCEDURE — 25010000002 THIAMINE HCL 200 MG/2ML SOLUTION: Performed by: NURSE PRACTITIONER

## 2023-10-24 PROCEDURE — 25010000002 CEFTRIAXONE PER 250 MG: Performed by: INTERNAL MEDICINE

## 2023-10-24 PROCEDURE — 25010000002 MAGNESIUM SULFATE 2 GM/50ML SOLUTION: Performed by: NURSE PRACTITIONER

## 2023-10-24 PROCEDURE — 97116 GAIT TRAINING THERAPY: CPT

## 2023-10-24 PROCEDURE — 83735 ASSAY OF MAGNESIUM: CPT | Performed by: NURSE PRACTITIONER

## 2023-10-24 PROCEDURE — 63710000001 INSULIN LISPRO (HUMAN) PER 5 UNITS: Performed by: INTERNAL MEDICINE

## 2023-10-24 PROCEDURE — 84100 ASSAY OF PHOSPHORUS: CPT | Performed by: NURSE PRACTITIONER

## 2023-10-24 PROCEDURE — 80053 COMPREHEN METABOLIC PANEL: CPT | Performed by: NURSE PRACTITIONER

## 2023-10-24 PROCEDURE — 99232 SBSQ HOSP IP/OBS MODERATE 35: CPT | Performed by: NURSE PRACTITIONER

## 2023-10-24 PROCEDURE — 97530 THERAPEUTIC ACTIVITIES: CPT

## 2023-10-24 RX ORDER — LORAZEPAM 2 MG/ML
0.5 INJECTION INTRAMUSCULAR EVERY 4 HOURS PRN
Status: DISCONTINUED | OUTPATIENT
Start: 2023-10-24 | End: 2023-10-25 | Stop reason: HOSPADM

## 2023-10-24 RX ORDER — LISINOPRIL 5 MG/1
5 TABLET ORAL
Status: DISCONTINUED | OUTPATIENT
Start: 2023-10-24 | End: 2023-10-25

## 2023-10-24 RX ORDER — MAGNESIUM SULFATE HEPTAHYDRATE 40 MG/ML
2 INJECTION, SOLUTION INTRAVENOUS
Status: COMPLETED | OUTPATIENT
Start: 2023-10-24 | End: 2023-10-24

## 2023-10-24 RX ORDER — FENTANYL/ROPIVACAINE/NS/PF 2-625MCG/1
15 PLASTIC BAG, INJECTION (ML) EPIDURAL ONCE
Qty: 100 ML | Refills: 0 | Status: COMPLETED | OUTPATIENT
Start: 2023-10-24 | End: 2023-10-24

## 2023-10-24 RX ADMIN — Medication 100 MG: at 14:59

## 2023-10-24 RX ADMIN — MAGNESIUM SULFATE HEPTAHYDRATE 2 G: 2 INJECTION, SOLUTION INTRAVENOUS at 10:57

## 2023-10-24 RX ADMIN — PANTOPRAZOLE SODIUM 40 MG: 40 TABLET, DELAYED RELEASE ORAL at 09:30

## 2023-10-24 RX ADMIN — INSULIN LISPRO 2 UNITS: 100 INJECTION, SOLUTION INTRAVENOUS; SUBCUTANEOUS at 09:32

## 2023-10-24 RX ADMIN — LEVOTHYROXINE SODIUM 112 MCG: 0.11 TABLET ORAL at 05:12

## 2023-10-24 RX ADMIN — LISINOPRIL 5 MG: 5 TABLET ORAL at 09:32

## 2023-10-24 RX ADMIN — METOPROLOL TARTRATE 25 MG: 25 TABLET, FILM COATED ORAL at 22:13

## 2023-10-24 RX ADMIN — Medication 10 ML: at 09:35

## 2023-10-24 RX ADMIN — OXYBUTYNIN CHLORIDE 5 MG: 5 TABLET ORAL at 22:13

## 2023-10-24 RX ADMIN — Medication 10 ML: at 22:16

## 2023-10-24 RX ADMIN — PANTOPRAZOLE SODIUM 40 MG: 40 TABLET, DELAYED RELEASE ORAL at 22:12

## 2023-10-24 RX ADMIN — APIXABAN 5 MG: 5 TABLET, FILM COATED ORAL at 09:30

## 2023-10-24 RX ADMIN — HYDROCODONE BITARTRATE AND ACETAMINOPHEN 1 TABLET: 5; 325 TABLET ORAL at 00:05

## 2023-10-24 RX ADMIN — GABAPENTIN 300 MG: 300 CAPSULE ORAL at 22:13

## 2023-10-24 RX ADMIN — INSULIN LISPRO 2 UNITS: 100 INJECTION, SOLUTION INTRAVENOUS; SUBCUTANEOUS at 12:27

## 2023-10-24 RX ADMIN — POTASSIUM PHOSPHATE, MONOBASIC AND POTASSIUM PHOSPHATE, DIBASIC 15 MMOL: 224; 236 INJECTION, SOLUTION, CONCENTRATE INTRAVENOUS at 09:33

## 2023-10-24 RX ADMIN — Medication 10 ML: at 22:15

## 2023-10-24 RX ADMIN — OXYBUTYNIN CHLORIDE 5 MG: 5 TABLET ORAL at 09:31

## 2023-10-24 RX ADMIN — HYDRALAZINE HYDROCHLORIDE 10 MG: 20 INJECTION INTRAMUSCULAR; INTRAVENOUS at 00:05

## 2023-10-24 RX ADMIN — LORAZEPAM 0.5 MG: 2 INJECTION INTRAMUSCULAR; INTRAVENOUS at 01:33

## 2023-10-24 RX ADMIN — LACTULOSE 10 G: 20 SOLUTION ORAL at 22:13

## 2023-10-24 RX ADMIN — METOPROLOL TARTRATE 25 MG: 25 TABLET, FILM COATED ORAL at 09:31

## 2023-10-24 RX ADMIN — APIXABAN 5 MG: 5 TABLET, FILM COATED ORAL at 22:13

## 2023-10-24 RX ADMIN — SENNOSIDES AND DOCUSATE SODIUM 2 TABLET: 50; 8.6 TABLET ORAL at 22:12

## 2023-10-24 RX ADMIN — MAGNESIUM SULFATE HEPTAHYDRATE 2 G: 2 INJECTION, SOLUTION INTRAVENOUS at 09:33

## 2023-10-24 RX ADMIN — THIAMINE HYDROCHLORIDE 200 MG: 100 INJECTION, SOLUTION INTRAMUSCULAR; INTRAVENOUS at 05:12

## 2023-10-24 RX ADMIN — FOLIC ACID 1 MG: 1 TABLET ORAL at 09:30

## 2023-10-24 RX ADMIN — MAGNESIUM SULFATE HEPTAHYDRATE 2 G: 2 INJECTION, SOLUTION INTRAVENOUS at 12:27

## 2023-10-24 RX ADMIN — Medication 10 ML: at 09:34

## 2023-10-24 RX ADMIN — TRAZODONE HYDROCHLORIDE 50 MG: 50 TABLET ORAL at 22:12

## 2023-10-24 RX ADMIN — CEFTRIAXONE 2000 MG: 2 INJECTION, POWDER, FOR SOLUTION INTRAMUSCULAR; INTRAVENOUS at 10:48

## 2023-10-24 RX ADMIN — CLOPIDOGREL BISULFATE 75 MG: 75 TABLET ORAL at 09:31

## 2023-10-24 RX ADMIN — SENNOSIDES AND DOCUSATE SODIUM 2 TABLET: 50; 8.6 TABLET ORAL at 09:32

## 2023-10-24 RX ADMIN — ROSUVASTATIN 5 MG: 10 TABLET, FILM COATED ORAL at 09:30

## 2023-10-24 RX ADMIN — MULTIPLE VITAMINS W/ MINERALS TAB 1 TABLET: TAB at 09:31

## 2023-10-24 NOTE — THERAPY TREATMENT NOTE
"Subjective: Pt agreeable to therapeutic plan of care. Pt expressed having a panic attack last night due to wanting to return to [facility] instead of going to SNF following hospital discharge.    Objective:     Bed mobility - CGA  Transfers - CGA, Min-A, and with rolling walker; mod A from commode due to being lower than bed  Ambulation - 130 + 10 feet CGA and with rolling walker one standing rest break during gait    Therapeutic Exercise - 10 Reps B LE AROM supported sitting / chair    Vitals: WNL    Pain: Pt expressed pain in hips but did not rate pain  Intervention for pain: Repositioned, Increased Activity, and Therapeutic Presence    Education: Provided education on the importance of mobility in the acute care setting, Verbal/Tactile Cues, Transfer Training, Gait Training, and Energy conservation strategies    Assessment: Pamela Zavala presents with functional mobility impairments which indicate the need for skilled intervention. Pt displayed ability to perform bed mobility with CGA and cuing. Pt able to CTS from bed with min-Cga with RW from Eob. Pt required extended time on commode with ability to perform pericare with SBA. Pt required mod A/RW to CTS from commode. Pt ambulated in hallway with RW/CGA on 1LO2. Pt displayed decreased safety awareness on L side during gait training. Tolerating session today without incident. Will continue to follow and progress as tolerated.     Plan/Recommendations:   Moderate Intensity Therapy recommended post-acute care. This is recommended as therapy feels the patient would require 3-4 days per week and wouldn't tolerate \"3 hour daily\" rehab intensity. SNF would be the preferred choice. If the patient does not agree to SNF, arrange HH or OP depending on home bound status. If patient is medically complex, consider LTACH.. Pt requires no DME at discharge.     Pt desires Home with Home Health at discharge. Pt cooperative; agreeable to therapeutic recommendations and plan " of care.         Basic Mobility 6-click:  Rollin = Total, A lot = 2, A little = 3; 4 = None  Supine>Sit:   1 = Total, A lot = 2, A little = 3; 4 = None   Sit>Stand with arms:  1 = Total, A lot = 2, A little = 3; 4 = None  Bed>Chair:   1 = Total, A lot = 2, A little = 3; 4 = None  Ambulate in room:  1 = Total, A lot = 2, A little = 3; 4 = None  3-5 Steps with railin = Total, A lot = 2, A little = 3; 4 = None  Score: 16    Modified Kyle: N/A = No pre-op stroke/TIA    Post-Tx Position: Up in Chair, Alarms activated, and Call light and personal items within reach  PPE: gloves and surgical mask

## 2023-10-24 NOTE — PROGRESS NOTES
[facility] DAILY PROGRESS NOTE    Patient Identification  Name: Pamela Zavala  Age: 82 y.o.  Sex: female  :  1940  MRN: [id number]    Date: 10/24/2023             Subjective:  Interval History: Recovering, urine clear    Objective:    Scheduled Meds:apixaban, 5 mg, Oral, Q12H  cefTRIAXone, 2,000 mg, Intravenous, Q24H  clopidogrel, 75 mg, Oral, Daily  folic acid, 1 mg, Oral, Daily  gabapentin, 300 mg, Oral, Nightly  insulin lispro, 2-7 Units, Subcutaneous, TID With Meals  lactulose, 10 g, Oral, BID  levothyroxine, 112 mcg, Oral, Daily  lisinopril, 5 mg, Oral, Q24H  metoprolol tartrate, 25 mg, Oral, BID  multivitamin with minerals, 1 tablet, Oral, Daily  oxybutynin, 5 mg, Oral, BID  pantoprazole, 40 mg, Oral, BID  polyethylene glycol, 17 g, Oral, Daily  rosuvastatin, 5 mg, Oral, Daily  senna-docusate sodium, 2 tablet, Oral, BID  sodium chloride, 10 mL, Intravenous, Q12H  sodium chloride, 10 mL, Intravenous, Q12H  sodium chloride, 10 mL, Intravenous, Q12H  sodium chloride, 10 mL, Intravenous, Q12H  thiamine, 100 mg, Oral, Daily  traZODone, 50 mg, Oral, Nightly      Continuous Infusions:     PRN Meds:  acetaminophen    acetaminophen    senna-docusate sodium **AND** polyethylene glycol **AND** bisacodyl **AND** bisacodyl    Calcium Replacement - Follow Nurse / BPA Driven Protocol    dextrose    dextrose    Diclofenac Sodium    glucagon (human recombinant)    hydrALAZINE    HYDROcodone-acetaminophen    LORazepam    Magnesium Standard Dose Replacement - Follow Nurse / BPA Driven Protocol    nitroglycerin    Phosphorus Replacement - Follow Nurse / BPA Driven Protocol    Potassium Replacement - Follow Nurse / BPA Driven Protocol    sodium chloride    sodium chloride    sodium chloride    sodium chloride    sodium chloride    traMADol    Vital signs in last 24 hours:  Temp:  [97.3 °F (36.3 °C)-98.6 °F (37 °C)] 98.2 °F (36.8 °C)  Heart Rate:  [] 100  Resp:  [13-22] 22  BP: (124-183)/(57-97)  "124/57    Intake/Output:    Intake/Output Summary (Last 24 hours) at 10/24/2023 1603  Last data filed at 10/24/2023 1200  Gross per 24 hour   Intake 1200 ml   Output 375 ml   Net 825 ml       Exam:  /57   Pulse 100   Temp 98.2 °F (36.8 °C) (Axillary)   Resp 22   Ht 165.1 cm (65\")   Wt 64.8 kg (142 lb 13.7 oz)   SpO2 93%   BMI 23.77 kg/m²     General Appearance:    Alert, cooperative, no distress, appears stated age               Abdomen:     Soft, ND   :    No suprapubic distention                Data Review:  All labs (24hrs):   Recent Results (from the past 24 hour(s))   POC Glucose Once    Collection Time: 10/23/23  5:28 PM    Specimen: Blood   Result Value Ref Range    Glucose 157 (H) 70 - 105 mg/dL   POC Glucose Once    Collection Time: 10/23/23  8:15 PM    Specimen: Blood   Result Value Ref Range    Glucose 122 (H) 70 - 105 mg/dL   Comprehensive Metabolic Panel    Collection Time: 10/24/23  5:11 AM    Specimen: Blood   Result Value Ref Range    Glucose 175 (H) 65 - 99 mg/dL    BUN 10 8 - 23 mg/dL    Creatinine 0.67 0.57 - 1.00 mg/dL    Sodium 143 136 - 145 mmol/L    Potassium 3.7 3.5 - 5.2 mmol/L    Chloride 112 (H) 98 - 107 mmol/L    CO2 22.0 22.0 - 29.0 mmol/L    Calcium 7.3 (L) 8.6 - 10.5 mg/dL    Total Protein 5.2 (L) 6.0 - 8.5 g/dL    Albumin 2.8 (L) 3.5 - 5.2 g/dL    ALT (SGPT) 38 (H) 1 - 33 U/L    AST (SGOT) 22 1 - 32 U/L    Alkaline Phosphatase 314 (H) 39 - 117 U/L    Total Bilirubin 0.4 0.0 - 1.2 mg/dL    Globulin 2.4 gm/dL    A/G Ratio 1.2 g/dL    BUN/Creatinine Ratio 14.9 7.0 - 25.0    Anion Gap 9.0 5.0 - 15.0 mmol/L    eGFR 86.3 >60.0 mL/min/1.73   Magnesium    Collection Time: 10/24/23  5:11 AM    Specimen: Blood   Result Value Ref Range    Magnesium 1.5 (L) 1.6 - 2.4 mg/dL   Phosphorus    Collection Time: 10/24/23  5:11 AM    Specimen: Blood   Result Value Ref Range    Phosphorus 1.8 (C) 2.5 - 4.5 mg/dL   CBC Auto Differential    Collection Time: 10/24/23  5:11 AM    Specimen: " Blood   Result Value Ref Range    WBC 11.00 (H) 3.40 - 10.80 10*3/mm3    RBC 3.33 (L) 3.77 - 5.28 10*6/mm3    Hemoglobin 9.3 (L) 12.0 - 15.9 g/dL    Hematocrit 28.6 (L) 34.0 - 46.6 %    MCV 86.0 79.0 - 97.0 fL    MCH 28.0 26.6 - 33.0 pg    MCHC 32.5 31.5 - 35.7 g/dL    RDW 18.7 (H) 12.3 - 15.4 %    RDW-SD 55.6 (H) 37.0 - 54.0 fl    MPV 10.5 6.0 - 12.0 fL    Platelets 124 (L) 140 - 450 10*3/mm3   Scan Slide    Collection Time: 10/24/23  5:11 AM    Specimen: Blood   Result Value Ref Range    Scan Slide     Manual Differential    Collection Time: 10/24/23  5:11 AM    Specimen: Blood   Result Value Ref Range    Neutrophil % 63.0 42.7 - 76.0 %    Lymphocyte % 13.0 (L) 19.6 - 45.3 %    Monocyte % 9.0 5.0 - 12.0 %    Eosinophil % 1.0 0.3 - 6.2 %    Basophil % 1.0 0.0 - 1.5 %    Bands %  7.0 (H) 0.0 - 5.0 %    Metamyelocyte % 4.0 (H) 0.0 - 0.0 %    Blasts % 2.0 (H) 0.0 - 0.0 %    Neutrophils Absolute 7.70 (H) 1.70 - 7.00 10*3/mm3    Lymphocytes Absolute 1.43 0.70 - 3.10 10*3/mm3    Monocytes Absolute 0.99 (H) 0.10 - 0.90 10*3/mm3    Eosinophils Absolute 0.11 0.00 - 0.40 10*3/mm3    Basophils Absolute 0.11 0.00 - 0.20 10*3/mm3    nRBC 1.0 (H) 0.0 - 0.2 /100 WBC    Anisocytosis Slight/1+ None Seen    Dacrocytes Slight/1+ None Seen    Poikilocytes Slight/1+ None Seen    Polychromasia Slight/1+ None Seen    WBC Morphology Normal Normal    Platelet Estimate Decreased Normal   POC Glucose Once    Collection Time: 10/24/23  7:23 AM    Specimen: Blood   Result Value Ref Range    Glucose 167 (H) 70 - 105 mg/dL   POC Glucose Once    Collection Time: 10/24/23 11:33 AM    Specimen: Blood   Result Value Ref Range    Glucose 171 (H) 70 - 105 mg/dL      Imaging Results (Last 24 Hours)       ** No results found for the last 24 hours. **             Assessment:    Sepsis without acute organ dysfunction    Coronary arteriosclerosis    Type 2 diabetes mellitus    Diabetic neuropathy    Essential hypertension    Hyperlipidemia    Postoperative  hypothyroidism    Ex-smoker    History of thyroid cancer    Kidney stones    UTI (urinary tract infection), bacterial      Sepsis after ureteroscopy    Plan:    Cultures neg  Plan stent removal prior to discharge likely Wed or Thurs      Juan Alaniz MD  [facility]  [facility], [address]  [place] [address]  Office: [phone]  Available via [employer]  10/24/23  16:03 EDT

## 2023-10-24 NOTE — CASE MANAGEMENT/SOCIAL WORK
Continued Stay Note  DERRICK Lawler     Patient Name: Pamela Zavala  MRN: [id number]  Today's Date: 10/24/2023    Admit Date: 10/18/2023    Plan: Accepted to [facility]. Precert approved (valid 10/24-10/26), PASRR approved. From [facility]  (current).   Discharge Plan       Row Name 10/24/23 1222       Plan    Plan Accepted to [facility]. Precert approved (valid 10/24-10/26), PASRR approved. From [facility] (current).    Patient/Family in Agreement with Plan yes    Plan Comments CM met with patient at bedside to discuss dc plan and IMM letter. CM also contacted patient’s son, Markus ([phone]) who is her Health Care Surrogate listed to leave  regarding dc plan and IMM letter. CM updated [facility] liaison Coral SORENSEN that anticipated d/c tomorrow, 10/25 and provided precert approval information/auth ID. Later received callback from patient's son, Markus to confirm dc plan and he was agreeable at this time. Requested w/c van transportation at d/c. DC Barriers: BP monitoring.             Expected Discharge Date and Time       Expected Discharge Date Expected Discharge Time    Oct 25, 2023       Karley Sinha RN     Office Phone: [phone]  Office Cell: [phone]

## 2023-10-24 NOTE — PLAN OF CARE
Goal Outcome Evaluation:      Patient awaiting completion of precert to return to [facility], episode of increased HR/RR/BP this shift and stated that she was having a panic attack, notified MD on call and obtained new order for Ativan, administered and effective, receiving abt without ASE noted, afebrile.

## 2023-10-24 NOTE — PROGRESS NOTES
Referring Provider: Sarah Beth Coronado MD    Reason for follow-up: a-fib     Patient Care Team:  Marisol Rodgers APRN as PCP - General (Pulmonary Disease)      SUBJECTIVE    The patient is asleep, sitting in a chair. Her son is at the bedside. He states she just finished working with physical therapy. He states she did well. He is hopeful that she will be discharged to a rehab facility soon. Her blood pressure is elevated at 155/86. She has not received any midodrine for over 24 hours. Her magnesium is low at 1.5, but is being replaced.        ROS  Review of Systems   Reason unable to perform ROS: asleep.         Personal History:    Past Medical History:   Diagnosis Date    Anemia     Arthritis     CAD (coronary artery disease)     Chronic back pain     Diabetic neuropathy     Essential hypertension     GERD (gastroesophageal reflux disease)     History of gastric ulcer     History of stomach ulcers     HLD (hyperlipidemia)     Hypocalcemia     Hypoparathyroidism     Hypothyroidism     Insomnia     Kidney stones     Osteoporosis     Recurrent sinusitis     Renal disease     Thyroid cancer     Type 2 diabetes mellitus        Past Surgical History:   Procedure Laterality Date    CARDIAC CATHETERIZATION N/A 05/03/2023    Procedure: Left Heart Cath and coronary angiogram;  Surgeon: John Baldwin MD;  Location: [facility] CATH INVASIVE LOCATION;  Service: Cardiovascular;  Laterality: N/A;    CORONARY STENT PLACEMENT      x 3    CYSTOSCOPY, URETEROSCOPY, RETROGRADE PYELOGRAM, STENT INSERTION Left 10/18/2023    Procedure: CYSTOSCOPY URETEROSCOPY RETROGRADE PYELOGRAM HOLMIUM LASER STENT INSERTION;  Surgeon: Juan Alaniz MD;  Location: [facility] OR;  Service: Urology;  Laterality: Left;    HYSTERECTOMY      LUNG LOBECTOMY      THYROIDECTOMY         Family History   Problem Relation Age of Onset    Diabetes Mother     Cancer Father     Heart disease Sister     Diabetes Sister     Heart disease Maternal Grandmother         Social History     Tobacco Use    Smoking status: Former     Packs/day: 2.00     Years: 30.00     Additional pack years: 0.00     Total pack years: 60.00     Types: Cigarettes     Quit date:      Years since quittin.8     Passive exposure: Past    Smokeless tobacco: Never   Vaping Use    Vaping Use: Never used   Substance Use Topics    Alcohol use: Yes     Alcohol/week: 1.0 standard drink of alcohol     Types: 1 Glasses of wine per week    Drug use: Defer        Home meds:  Prior to Admission medications    Medication Sig Start Date End Date Taking? Authorizing Provider   Accu-Chek Softclix Lancets lancets 1 each by Other route 3 (Three) Times a Day Before Meals. Dx: E11.65. Use as instructed 23  Yes Johanny Nagel MD   atenolol (TENORMIN) 50 MG tablet Take 1 tablet by mouth Daily. Take dos   Yes Andrade La MD   clopidogrel (PLAVIX) 75 MG tablet Take 1 tablet by mouth Daily.   Yes Andrade La MD   fluticasone (FLONASE) 50 MCG/ACT nasal spray 1 spray into the nostril(s) as directed by provider 2 (Two) Times a Day.   Yes Andrade La MD   gabapentin (NEURONTIN) 300 MG capsule Take 1 capsule by mouth Every Night.   Yes Andrade La MD   glucose blood (Accu-Chek Guide) test strip 1 each by Other route 3 (Three) Times a Day Before Meals. Dx: E11.65. Use as instructed 23  Yes Johanny Nagel MD   insulin degludec (Tresiba FlexTouch) 100 UNIT/ML solution pen-injector injection Inject 22 Units under the skin into the appropriate area as directed every night at bedtime. 23  Yes Driss Dan MD   Insulin Lispro 100 UNIT/ML solution cartridge Inject  under the skin into the appropriate area as directed 3 (Three) Times a Day Before Meals. Per sliding scale   Yes Andrade La MD   Insulin Lispro, 1 Unit Dial, (HumaLOG KwikPen) 100 UNIT/ML solution pen-injector Inject 7 Units under the skin into the appropriate area as directed 3 (Three) Times a Day.  Inject 6 Units under the skin into the appropriate area as directed 3 (Three) Times a Day With Meals. 9/12/23  Yes Driss Dan MD   lactulose (CHRONULAC) 10 GM/15ML solution Take 15 mL by mouth Daily.   Yes Andrade La MD   levothyroxine (Synthroid) 112 MCG tablet Take 1 tablet by mouth Daily. 9/12/23 9/11/24 Yes Driss Dan MD   lisinopril (PRINIVIL,ZESTRIL) 5 MG tablet Take 1 tablet by mouth Daily. 5/18/23  Yes Nuria Mcgraw MD   metFORMIN (GLUCOPHAGE) 1000 MG tablet Take 1 tablet by mouth 2 (Two) Times a Day With Meals.   Yes Andrade La MD   oxybutynin (DITROPAN) 5 MG tablet Take 1 tablet by mouth 2 (Two) Times a Day.   Yes Andrade La MD   oxyCODONE-acetaminophen (PERCOCET) 5-325 MG per tablet Take 1 tablet by mouth Daily As Needed for Severe Pain. 10/18/23  Yes Juan Alaniz MD   pantoprazole (PROTONIX) 40 MG EC tablet Take 1 tablet by mouth 2 (Two) Times a Day. Take dos   Yes Andrade La MD   rosuvastatin (CRESTOR) 5 MG tablet Take 1 tablet by mouth Daily.   Yes Andrade La MD   traMADol (ULTRAM) 50 MG tablet Take 1 tablet by mouth 4 (Four) Times a Day As Needed for Moderate Pain.   Yes ProviderAndrade MD   traZODone (DESYREL) 50 MG tablet Take 1 tablet by mouth Every Night.   Yes ProviderAndrade MD   cefdinir (OMNICEF) 300 MG capsule Take 1 capsule by mouth 2 (Two) Times a Day. 10/18/23   Juan Alaniz MD       Allergies:  Penicillins, Shellfish-derived products, Sulfamethizole, and Trimethoprim    Scheduled Meds:apixaban, 5 mg, Oral, Q12H  cefTRIAXone, 2,000 mg, Intravenous, Q24H  clopidogrel, 75 mg, Oral, Daily  folic acid, 1 mg, Oral, Daily  gabapentin, 300 mg, Oral, Nightly  insulin lispro, 2-7 Units, Subcutaneous, TID With Meals  lactulose, 10 g, Oral, BID  levothyroxine, 112 mcg, Oral, Daily  lisinopril, 5 mg, Oral, Q24H  magnesium sulfate, 2 g, Intravenous, Q2H  metoprolol tartrate, 25 mg, Oral, BID  multivitamin with minerals, 1  "tablet, Oral, Daily  oxybutynin, 5 mg, Oral, BID  pantoprazole, 40 mg, Oral, BID  polyethylene glycol, 17 g, Oral, Daily  potassium phosphate, 15 mmol, Intravenous, Once  rosuvastatin, 5 mg, Oral, Daily  senna-docusate sodium, 2 tablet, Oral, BID  sodium chloride, 10 mL, Intravenous, Q12H  sodium chloride, 10 mL, Intravenous, Q12H  sodium chloride, 10 mL, Intravenous, Q12H  sodium chloride, 10 mL, Intravenous, Q12H  thiamine, 100 mg, Oral, Daily  traZODone, 50 mg, Oral, Nightly      Continuous Infusions:   PRN Meds:.  acetaminophen    acetaminophen    senna-docusate sodium **AND** polyethylene glycol **AND** bisacodyl **AND** bisacodyl    Calcium Replacement - Follow Nurse / BPA Driven Protocol    dextrose    dextrose    Diclofenac Sodium    glucagon (human recombinant)    hydrALAZINE    HYDROcodone-acetaminophen    LORazepam    Magnesium Standard Dose Replacement - Follow Nurse / BPA Driven Protocol    nitroglycerin    Phosphorus Replacement - Follow Nurse / BPA Driven Protocol    Potassium Replacement - Follow Nurse / BPA Driven Protocol    sodium chloride    sodium chloride    sodium chloride    sodium chloride    sodium chloride    traMADol      OBJECTIVE    Vital Signs  Vitals:    10/24/23 0005 10/24/23 0142 10/24/23 0400 10/24/23 0541   BP: (!) 183/90 125/67 127/66 155/86   BP Location:  Right arm  Left arm   Patient Position:  Lying  Lying   Pulse: 116 94 87 93   Resp:  22  13   Temp:  98.2 °F (36.8 °C)  97.3 °F (36.3 °C)   TempSrc:  Oral  Oral   SpO2:  95% 94% 93%   Weight:    64.8 kg (142 lb 13.7 oz)   Height:           Flowsheet Rows      Flowsheet Row First Filed Value   Admission Height 170.2 cm (67\") Documented at 10/09/2023 1548   Admission Weight 59 kg (130 lb) Documented at 10/09/2023 1548              Intake/Output Summary (Last 24 hours) at 10/24/2023 0910  Last data filed at 10/24/2023 0541  Gross per 24 hour   Intake 680 ml   Output 375 ml   Net 305 ml          Telemetry:  sinus rhythm with PACs " and PVCs    Physical Exam:  The patient is asleep.  No acute distress.  Vital signs as noted above.  Head and neck revealed no carotid bruits or jugular venous distention.    Lungs clear.  No wheezing.  Breath sounds are normal bilaterally. On oxygen at 0.5 liters per nasal cannula.   Heart normal first and second heart sounds.  No precordial rub is present.  No gallop is present.  Abdomen soft and nontender.   Extremities with good peripheral pulses without any pedal edema.  Skin warm and dry.  Musculoskeletal system is grossly normal.  CNS grossly normal.       Results Review:  I have personally reviewed the results from the time of this admission to 10/24/2023 09:10 EDT and agree with these findings:  [x]  Laboratory  [x]  Microbiology  [x]  Radiology  [x]  EKG/Telemetry   [x]  Cardiology/Vascular   []  Pathology  [x]  Old records  []  Other:    Most notable findings include:    Lab Results (last 24 hours)       Procedure Component Value Units Date/Time    POC Glucose Once [[id number]]  (Abnormal) Collected: 10/24/23 0723    Specimen: Blood Updated: 10/24/23 0724     Glucose 167 mg/dL      Comment: Serial Number: [id number]:  [id number]       CBC & Differential [[id number]]  (Abnormal) Collected: 10/24/23 0511    Specimen: Blood Updated: 10/24/23 0647    Narrative:      The following orders were created for panel order CBC & Differential.  Procedure                               Abnormality         Status                     ---------                               -----------         ------                     CBC Auto Differential[[id number]]        Abnormal            Final result               Scan Slide[[id number]]                                       Final result                 Please view results for these tests on the individual orders.    CBC Auto Differential [[id number]]  (Abnormal) Collected: 10/24/23 0511    Specimen: Blood Updated: 10/24/23 0647     WBC 11.00 10*3/mm3      RBC 3.33 10*6/mm3       Hemoglobin 9.3 g/dL      Hematocrit 28.6 %      MCV 86.0 fL      MCH 28.0 pg      MCHC 32.5 g/dL      RDW 18.7 %      RDW-SD 55.6 fl      MPV 10.5 fL      Platelets 124 10*3/mm3     Narrative:      The previously reported component NRBC is no longer being reported. Previous result was 0.0 /100 WBC (Reference Range: 0.0-0.2 /100 WBC) on 10/24/2023 at 0528 EDT.    Scan Slide [[id number]] Collected: 10/24/23 0511    Specimen: Blood Updated: 10/24/23 0647     Scan Slide --     Comment: See Manual Differential Results       Manual Differential [[id number]]  (Abnormal) Collected: 10/24/23 0511    Specimen: Blood Updated: 10/24/23 0647     Neutrophil % 63.0 %      Lymphocyte % 13.0 %      Monocyte % 9.0 %      Eosinophil % 1.0 %      Basophil % 1.0 %      Bands %  7.0 %      Metamyelocyte % 4.0 %      Blasts % 2.0 %      Neutrophils Absolute 7.70 10*3/mm3      Lymphocytes Absolute 1.43 10*3/mm3      Monocytes Absolute 0.99 10*3/mm3      Eosinophils Absolute 0.11 10*3/mm3      Basophils Absolute 0.11 10*3/mm3      nRBC 1.0 /100 WBC      Anisocytosis Slight/1+     Dacrocytes Slight/1+     Poikilocytes Slight/1+     Polychromasia Slight/1+     WBC Morphology Normal     Platelet Estimate Decreased    Narrative:      Reviewed by Pathologist within the past 30 days on 102023 .      Magnesium [[id number]]  (Abnormal) Collected: 10/24/23 0511    Specimen: Blood Updated: 10/24/23 0556     Magnesium 1.5 mg/dL     Phosphorus [[id number]]  (Abnormal) Collected: 10/24/23 0511    Specimen: Blood Updated: 10/24/23 0553     Phosphorus 1.8 mg/dL     Comprehensive Metabolic Panel [[id number]]  (Abnormal) Collected: 10/24/23 0511    Specimen: Blood Updated: 10/24/23 0550     Glucose 175 mg/dL      BUN 10 mg/dL      Creatinine 0.67 mg/dL      Sodium 143 mmol/L      Potassium 3.7 mmol/L      Chloride 112 mmol/L      CO2 22.0 mmol/L      Calcium 7.3 mg/dL      Total Protein 5.2 g/dL      Albumin 2.8 g/dL      ALT (SGPT) 38 U/L      AST (SGOT) 22 U/L       Alkaline Phosphatase 314 U/L      Total Bilirubin 0.4 mg/dL      Globulin 2.4 gm/dL      A/G Ratio 1.2 g/dL      BUN/Creatinine Ratio 14.9     Anion Gap 9.0 mmol/L      eGFR 86.3 mL/min/1.73     Narrative:      GFR Normal >60  Chronic Kidney Disease <60  Kidney Failure <15    The GFR formula is only valid for adults with stable renal function between ages 18 and 70.    POC Glucose Once [[id number]]  (Abnormal) Collected: 10/23/23 2015    Specimen: Blood Updated: 10/23/23 2017     Glucose 122 mg/dL      Comment: Serial Number: [id number]:  [id number]       Blood Culture - Blood, Arm, Left [[id number]]  (Normal) Collected: 10/18/23 1901    Specimen: Blood from Arm, Left Updated: 10/23/23 1915     Blood Culture No growth at 5 days    Narrative:      Less than seven (7) mL's of blood was collected.  Insufficient quantity may yield false negative results.    Blood Culture - Blood, Hand, Left [[id number]]  (Normal) Collected: 10/18/23 1905    Specimen: Blood from Hand, Left Updated: 10/23/23 1915     Blood Culture No growth at 5 days    Narrative:      Less than seven (7) mL's of blood was collected.  Insufficient quantity may yield false negative results.    POC Glucose Once [[id number]]  (Abnormal) Collected: 10/23/23 1728    Specimen: Blood Updated: 10/23/23 1729     Glucose 157 mg/dL      Comment: Serial Number: [id number]:  [id number]       POC Glucose Once [[id number]]  (Abnormal) Collected: 10/23/23 1113    Specimen: Blood Updated: 10/23/23 1115     Glucose 197 mg/dL      Comment: Serial Number: [id number]:  [id number]               Imaging Results (Last 24 Hours)       ** No results found for the last 24 hours. **            LAB RESULTS (LAST 7 DAYS)    CBC  Results from last 7 days   Lab Units 10/24/23  0511 10/22/23  2353 10/22/23  0432 10/21/23  0537 10/20/23  0653 10/20/23  0027 10/19/23  1749 10/19/23  1135 10/19/23  0453 10/19/23  0109 10/18/23  2202   WBC 10*3/mm3 11.00* 13.50* 15.60*  16.20* 24.90*  --   --   --  19.60*  --  8.10   RBC 10*6/mm3 3.33* 3.18* 2.91* 3.06* 3.33*  --   --   --  3.34*  --  3.14*   HEMOGLOBIN g/dL 9.3* 8.8* 7.8* 8.2* 9.8* 10.6* 8.9*   < > 8.9*   < > 8.4*   HEMATOCRIT % 28.6* 27.5* 24.6* 26.3* 29.6* 32.8* 28.6*   < > 28.9*   < > 26.7*   MCV fL 86.0 86.4 84.6 85.9 89.0  --   --   --  86.6  --  85.1   PLATELETS 10*3/mm3 124* 76* 73* 85* 127*  --   --   --  207  --  172    < > = values in this interval not displayed.       BMP  Results from last 7 days   Lab Units 10/24/23  0511 10/22/23  2353 10/22/23  0532 10/21/23  0537 10/21/23  0452 10/20/23  0603 10/19/23  0453 10/18/23  2202 10/18/23  2156 10/18/23  2051 10/18/23  1901   SODIUM mmol/L 143 143 147* 146*  --  148* 136  --   --  140 142   POTASSIUM mmol/L 3.7 4.1 2.8* 3.7  --  4.2 4.2 2.8*  --  3.1* 3.4*   CHLORIDE mmol/L 112* 112* 114* 121*  --  122* 106  --   --  104 105   CO2 mmol/L 22.0 21.0* 22.0 17.0*  --  18.0* 20.0*  --   --  21.0* 21.0*   BUN mg/dL 10 16 17 25*  --  30* 19  --   --  18 17   CREATININE mg/dL 0.67 0.77 0.79 0.98  --  0.94 0.76  --  0.91 0.92 0.85   GLUCOSE mg/dL 175* 264* 169* 207*  --  94 147*  --   --  103* 132*   MAGNESIUM mg/dL 1.5* 2.2 1.3*  --  1.7  --   --  2.1  --   --  0.8*   PHOSPHORUS mg/dL 1.8* 3.0 1.2*  --  2.5  --   --   --   --  2.6 2.9       CMP   Results from last 7 days   Lab Units 10/24/23  0511 10/22/23  2353 10/22/23  0532 10/21/23  0537 10/20/23  0603 10/19/23  0937 10/19/23  0453 10/18/23  2202 10/18/23  2156 10/18/23  2051 10/18/23  1901   SODIUM mmol/L 143 143 147* 146* 148*  --  136  --   --  140 142   POTASSIUM mmol/L 3.7 4.1 2.8* 3.7 4.2  --  4.2 2.8*  --  3.1* 3.4*   CHLORIDE mmol/L 112* 112* 114* 121* 122*  --  106  --   --  104 105   CO2 mmol/L 22.0 21.0* 22.0 17.0* 18.0*  --  20.0*  --   --  21.0* 21.0*   BUN mg/dL 10 16 17 25* 30*  --  19  --   --  18 17   CREATININE mg/dL 0.67 0.77 0.79 0.98 0.94  --  0.76  --  0.91 0.92 0.85   GLUCOSE mg/dL 175* 264* 169* 207* 94   --  147*  --   --  103* 132*   ALBUMIN g/dL 2.8* 2.9* 2.7* 3.2*  --   --  2.6*  --   --  2.9* 3.0*   BILIRUBIN mg/dL 0.4 0.4 0.9 0.9  --   --  0.4  --   --  0.9 0.7   ALK PHOS U/L 314* 400* 264* 197*  --   --  88  --   --  72 74   AST (SGOT) U/L 22 40* 46* 93*  --   --  398*  --   --  195* 76*   ALT (SGPT) U/L 38* 54* 59* 80*  --   --  149*  --   --  66* 29   AMYLASE U/L  --   --   --   --   --   --   --   --   --   --  38   AMMONIA umol/L  --   --   --   --   --  14  --   --   --   --   --        BNP        TROPONIN  Results from last 7 days   Lab Units 10/18/23  2051 10/18/23  1901   CK TOTAL U/L  --  96   HSTROP T ng/L 19* 17*       CoAg  Results from last 7 days   Lab Units 10/18/23  2202   INR  1.36*       Creatinine Clearance  Estimated Creatinine Clearance: 63.9 mL/min (by C-G formula based on SCr of 0.67 mg/dL).    ABG  Results from last 7 days   Lab Units 10/18/23  2156   PH, ARTERIAL pH units 7.327*   PCO2, ARTERIAL mm Hg 42.8   PO2 ART mm Hg 61.1*   O2 SATURATION ART % 89.1*   BASE EXCESS ART mmol/L -3.4*       Radiology  No radiology results for the last day      EKG  I personally viewed and interpreted the patient's EKG/Telemetry data:  ECG 12 Lead Rhythm Change   Final Result   HEART RATE= 140  bpm   RR Interval= 419  ms   AK Interval=   ms   P Horizontal Axis=   deg   P Front Axis=   deg   QRSD Interval= 75  ms   QT Interval=   ms   QTcB= Invalid  ms   QRS Axis= 73  deg   T Wave Axis= -89  deg   - ABNORMAL ECG -   Atrial fibrillation with rapid V-rate   Low voltage, precordial leads   Borderline T abnormalities, diffuse leads   When compared with ECG of 18-Oct-2023 15:46:09,   Significant change in rhythm   Significant repolarization change   Significant axis, voltage or hypertrophy change   Electronically Signed By: John Baldwin ([facility]) 20-Oct-2023 22:55:09   Date and Time of Study: 2023-10-20 05:19:28      ECG 12 Lead Rhythm Change   Final Result   HEART RATE= 151  bpm   RR Interval= 396  ms   AK  Interval= 129  ms   P Horizontal Axis= -11  deg   P Front Axis= 61  deg   QRSD Interval= 84  ms   QT Interval= 312  ms   QTcB= 496  ms   QRS Axis= 45  deg   T Wave Axis= 247  deg   - ABNORMAL ECG -   Supraventricular tachycardia   Probable anteroseptal infarct, recent   When compared with ECG of 11-Oct-2023 13:54:31,   Significant change in rhythm: previously sinus   Electronically Signed By: Shirley Rosenthal ([facility]) 21-Oct-2023 17:04:20   Date and Time of Study: 2023-10-18 15:46:09      SCANNED - TELEMETRY     Final Result      SCANNED - TELEMETRY     Final Result      SCANNED - TELEMETRY     Final Result      SCANNED - TELEMETRY     Final Result      SCANNED - TELEMETRY     Final Result      SCANNED - TELEMETRY     Final Result      SCANNED - TELEMETRY     Final Result      SCANNED - TELEMETRY     Final Result      SCANNED - TELEMETRY     Final Result      ECG 12 Lead   Final Result   HEART RATE= 80  bpm   RR Interval= 752  ms   NJ Interval= 168  ms   P Horizontal Axis= 6  deg   P Front Axis= 92  deg   QRSD Interval= 73  ms   QT Interval= 379  ms   QTcB= 437  ms   QRS Axis= 48  deg   T Wave Axis= 61  deg   - ABNORMAL ECG -   Sinus rhythm   Consider anteroseptal infarct   When compared with ECG of 08-Sep-2023 10:41:09,   No significant change   Electronically Signed By: Nuria Mcgraw ([facility]) 12-Oct-2023 15:16:23   Date and Time of Study: 2023-10-11 13:54:31            Echocardiogram:          Stress Test:  Results for orders placed during the hospital encounter of 05/02/23    Stress Test With Myocardial Perfusion One Day    Interpretation Summary    Left ventricular ejection fraction is hyperdynamic (Calculated EF > 70%).    Abnormal LV wall motion consistent with moderate hypokinesis of the inferior wall.    Myocardial perfusion imaging indicates a large-sized infarct located in the inferior wall with moderate candy-infarct ischemia.    Impressions are consistent with a high risk study.    Findings consistent with an  abnormal ECG stress test.         Cardiac Catheterization:  Results for orders placed during the hospital encounter of 05/02/23    Cardiac Catheterization/Vascular Study    Narrative  OPERATORS  John Baldwin M.D. (Attending Cardiologist)      PROCEDURES PERFORMED  Ultrasound guided Vascular access  Left Heart Catheterization  Coronary Angiogram 94674  PCI with DIRK placement to RCA 18034  IVUS of RCA 11196  Moderate sedation 45mins    INDICATIONS FOR PROCEDURE  82 years old woman with multiple cardiovascular risk factors presented with chest pain/unstable angina.  She had a nuclear stress test which was abnormal in the inferior wall.  After discussing the risk and benefit of the procedure she was brought into the Cath Lab for cardiac catheterization.    PROCEDURE IN DETAIL  Informed consent was obtained from the patient after explaining the risks, benefits, and alternative options of the procedure. After obtaining informed consent, the patient was brought to the cath lab and was prepped in a sterile fashion. Lidocaine 2% was used for local anesthesia into the right femoral access site. The right femoral artery was accessed with a micropuncture needle via modified Seldinger technique under ultrasound guidance. A 6F was inserted successfully. Afterwards, 6F JR4 and JL4 diagnostic catheters were advanced over a wire into the ascending aorta and were used to engage the ostia of the left main and RCA respectively. JR4 used to cross the AV and obtain LV pressures and gradient across the AV measured via pullback technique. Images of the right and left coronary systems were obtained.    HEMODYNAMICS  LV: 151/2, 7 mmHg  AO: 150/64, 98 mmHg  No significant gradient across aortic valve during pullback of JR4 catheter.  LV gram was not performed due to echocardiogram being available.    FINDINGS    Coronary Angiogram    Right dominant circulation    Left main: Left main is a large caliber vessel which gives rise to the Left  Anterior Descending and the Left circumflex.  Left main is angiographically free from any significant disease.    Left Anterior Descending Artery: LAD is a medium caliber vessel which gives rise to several septal perforators and several diagonal branches.  LAD has diffuse luminal irregularities but no significant obstruction.    Left Circumflex: Patent stents in the mid left circumflex and first obtuse marginal branch.  Remaining left circumflex is angiographically free from any significant disease.    Right Coronary Artery: The RCA is a small caliber vessel gives rise to PDA and PLV.  Mid RCA has focal 80 to 85% stenosis with EDITH-3 flow.    Percutaneous coronary intervention  100 units/kg of heparin was administered and ACT of more than 250 was documented.  A 6 Singaporean JR4 guide catheter with sideholes was used to engage the ostium of the RCA.  0.014 run-through wire was advanced past the lesion in the mid RCA into the distal RCA.  I then predilated the lesion with 2 x 12 mm mini trek balloon.  This was followed by placement of 2.25 x 33 mm Xience miryam point stent.  This was followed by advancement of [employer] intravascular ultrasound into the distal RCA and a slow manual pullback was performed.  IVUS confirmed distal RCA size of 2.5 mm and proximal RCA 3.5 mm.  Mid RCA was 3.2 x 2.5 mm in dimension.  I then postdilated the stent with a 3 x 15 mm noncompliant balloon at 14 shima.  Final angiography showed EDITH-3 flow and 0% stenosis in the RCA.    All the catheters were exchanged over a wire and subsequently removed. Angiogram of the femoral access site was obtained and did not show complications. The patient tolerated the procedure well without any complications. The pictures were reviewed at the end of the procedure. A 6 Singaporean Angio-Seal closure device was applied to achieve hemostasis.    ESTIMATED BLOOD LOSS:  10 ml    COMPLICATIONS:  None    PROCEDURE DATA:  Contrast Used: 50 mL  Sedation Time: 45  minutes    IMPRESSIONS  Patent stents in the left circumflex and obtuse marginal 1.  IVUS guided PCI of mid RCA with placement of drug-eluting stent.  Normal LVEDP    RECOMMENDATIONS  -Start dual antiplatelet therapy.  -High intensity statin, beta-blocker.  - Referral to cardiac rehab         Other:         ASSESSMENT & PLAN:    Principal Problem:    Sepsis without acute organ dysfunction  Active Problems:    Coronary arteriosclerosis    Type 2 diabetes mellitus    Diabetic neuropathy    Essential hypertension    Hyperlipidemia    Postoperative hypothyroidism    Ex-smoker    History of thyroid cancer    Kidney stones    UTI (urinary tract infection), bacterial    Sepsis secondary to UTI  Shock state has resolved  Discontinue midodrine as patient has not received it for over 24 hours and her blood pressure is elevated   On ceftriaxone     Hematuria, nephrolithiasis   Status post lithotripsy and stent placement on the left side.  Status post staged procedures on 10/18/2023 leading to renal hemorrhage  Postprocedure sepsis and anemia of blood loss requiring blood transfusion.  H&H stable now     Atrial fibrillation  Currently in sinus rhythm   Continue metoprolol for rate control  Replace magnesium per protocol  [id number] score of 6  Will start Eliquis 5 mg oral twice daily (ok'd per urology)     CAD  Status post PCI of the RCA in May 2023  DAPT was held due to renal hemorrhage  Plavix was resumed on 10/23/23 and patient is tolerating.   We will stop aspirin given her need for anticoagulation for her A-fib     Primary Hypertension, chronic  Resume lisinopril as hypotension has resolved and patient is now hypertensive  Also on beta blocker  Monitor blood pressure     Hyperlipidemia  Continue Crestor  LDL is 55     Type 2 Diabetes mellitus complicated with diabetic neuropathy   Uncontrolled diabetes  A1c has improved from 11 to 7.6 now  On Lantus and SSI  On gabapentin     Hypothyroidism  Postoperative, history of  thyroid cancer  Continue levothyroxine       Electronically signed by CLAUDE Gonzales, 10/24/23, 1:49 PM EDT.

## 2023-10-24 NOTE — PROGRESS NOTES
LOS: 6 days   Patient Care Team:  Marisol Rodgers APRN as PCP - General (Pulmonary Disease)    Subjective     Patient is very tired after walking this am but denies any other complaints    Review of Systems   Constitutional:  Positive for activity change, appetite change and fatigue.   HENT: Negative.     Respiratory: Negative.     Gastrointestinal: Negative.    Genitourinary: Negative.    Musculoskeletal: Negative.    Neurological:  Positive for weakness.   Psychiatric/Behavioral: Negative.             Objective     Vital Signs  Temp:  [97.3 °F (36.3 °C)-98.6 °F (37 °C)] 97.3 °F (36.3 °C)  Heart Rate:  [] 100  Resp:  [13-22] 13  BP: (109-183)/(52-97) 152/74      Physical Exam  Vitals reviewed.   Constitutional:       Appearance: She is not ill-appearing.   HENT:      Head: Normocephalic and atraumatic.      Right Ear: External ear normal.      Left Ear: External ear normal.      Nose: Nose normal.      Mouth/Throat:      Mouth: Mucous membranes are moist.   Eyes:      General:         Right eye: No discharge.         Left eye: No discharge.   Cardiovascular:      Rate and Rhythm: Normal rate and regular rhythm.      Pulses: Normal pulses.      Heart sounds: Normal heart sounds.   Pulmonary:      Effort: Pulmonary effort is normal.      Breath sounds: Normal breath sounds.   Abdominal:      General: Bowel sounds are normal.      Palpations: Abdomen is soft.   Musculoskeletal:         General: Normal range of motion.      Cervical back: Normal range of motion.   Skin:     General: Skin is warm and dry.   Neurological:      Mental Status: She is alert and oriented to person, place, and time.   Psychiatric:         Behavior: Behavior normal.              Results Review:    Lab Results (last 24 hours)       Procedure Component Value Units Date/Time    POC Glucose Once [[id number]]  (Abnormal) Collected: 10/24/23 0723    Specimen: Blood Updated: 10/24/23 0724     Glucose 167 mg/dL      Comment: Serial  Number: [id number]:  [id number]       CBC & Differential [[id number]]  (Abnormal) Collected: 10/24/23 0511    Specimen: Blood Updated: 10/24/23 0647    Narrative:      The following orders were created for panel order CBC & Differential.  Procedure                               Abnormality         Status                     ---------                               -----------         ------                     CBC Auto Differential[[id number]]        Abnormal            Final result               Scan Slide[[id number]]                                       Final result                 Please view results for these tests on the individual orders.    CBC Auto Differential [[id number]]  (Abnormal) Collected: 10/24/23 0511    Specimen: Blood Updated: 10/24/23 0647     WBC 11.00 10*3/mm3      RBC 3.33 10*6/mm3      Hemoglobin 9.3 g/dL      Hematocrit 28.6 %      MCV 86.0 fL      MCH 28.0 pg      MCHC 32.5 g/dL      RDW 18.7 %      RDW-SD 55.6 fl      MPV 10.5 fL      Platelets 124 10*3/mm3     Narrative:      The previously reported component NRBC is no longer being reported. Previous result was 0.0 /100 WBC (Reference Range: 0.0-0.2 /100 WBC) on 10/24/2023 at 0528 EDT.    Scan Slide [[id number]] Collected: 10/24/23 0511    Specimen: Blood Updated: 10/24/23 0647     Scan Slide --     Comment: See Manual Differential Results       Manual Differential [[id number]]  (Abnormal) Collected: 10/24/23 0511    Specimen: Blood Updated: 10/24/23 0647     Neutrophil % 63.0 %      Lymphocyte % 13.0 %      Monocyte % 9.0 %      Eosinophil % 1.0 %      Basophil % 1.0 %      Bands %  7.0 %      Metamyelocyte % 4.0 %      Blasts % 2.0 %      Neutrophils Absolute 7.70 10*3/mm3      Lymphocytes Absolute 1.43 10*3/mm3      Monocytes Absolute 0.99 10*3/mm3      Eosinophils Absolute 0.11 10*3/mm3      Basophils Absolute 0.11 10*3/mm3      nRBC 1.0 /100 WBC      Anisocytosis Slight/1+     Dacrocytes Slight/1+     Poikilocytes Slight/1+      Polychromasia Slight/1+     WBC Morphology Normal     Platelet Estimate Decreased    Narrative:      Reviewed by Pathologist within the past 30 days on [id number] .      Magnesium [[id number]]  (Abnormal) Collected: 10/24/23 0511    Specimen: Blood Updated: 10/24/23 0556     Magnesium 1.5 mg/dL     Phosphorus [[id number]]  (Abnormal) Collected: 10/24/23 0511    Specimen: Blood Updated: 10/24/23 0553     Phosphorus 1.8 mg/dL     Comprehensive Metabolic Panel [[id number]]  (Abnormal) Collected: 10/24/23 0511    Specimen: Blood Updated: 10/24/23 0550     Glucose 175 mg/dL      BUN 10 mg/dL      Creatinine 0.67 mg/dL      Sodium 143 mmol/L      Potassium 3.7 mmol/L      Chloride 112 mmol/L      CO2 22.0 mmol/L      Calcium 7.3 mg/dL      Total Protein 5.2 g/dL      Albumin 2.8 g/dL      ALT (SGPT) 38 U/L      AST (SGOT) 22 U/L      Alkaline Phosphatase 314 U/L      Total Bilirubin 0.4 mg/dL      Globulin 2.4 gm/dL      A/G Ratio 1.2 g/dL      BUN/Creatinine Ratio 14.9     Anion Gap 9.0 mmol/L      eGFR 86.3 mL/min/1.73     Narrative:      GFR Normal >60  Chronic Kidney Disease <60  Kidney Failure <15    The GFR formula is only valid for adults with stable renal function between ages 18 and 70.    POC Glucose Once [[id number]]  (Abnormal) Collected: 10/23/23 2015    Specimen: Blood Updated: 10/23/23 2017     Glucose 122 mg/dL      Comment: Serial Number: [id number]:  [id number]       Blood Culture - Blood, Arm, Left [[id number]]  (Normal) Collected: 10/18/23 1901    Specimen: Blood from Arm, Left Updated: 10/23/23 1915     Blood Culture No growth at 5 days    Narrative:      Less than seven (7) mL's of blood was collected.  Insufficient quantity may yield false negative results.    Blood Culture - Blood, Hand, Left [[id number]]  (Normal) Collected: 10/18/23 1905    Specimen: Blood from Hand, Left Updated: 10/23/23 1915     Blood Culture No growth at 5 days    Narrative:      Less than seven (7) mL's of blood was collected.   Insufficient quantity may yield false negative results.    POC Glucose Once [[id number]]  (Abnormal) Collected: 10/23/23 1728    Specimen: Blood Updated: 10/23/23 1729     Glucose 157 mg/dL      Comment: Serial Number: [id number]:  [id number]       POC Glucose Once [[id number]]  (Abnormal) Collected: 10/23/23 1113    Specimen: Blood Updated: 10/23/23 1115     Glucose 197 mg/dL      Comment: Serial Number: [id number]:  [id number]                Imaging Results (Last 24 Hours)       ** No results found for the last 24 hours. **                 I reviewed the patient's new clinical results.    Medication Review:   Scheduled Meds:apixaban, 5 mg, Oral, Q12H  cefTRIAXone, 2,000 mg, Intravenous, Q24H  clopidogrel, 75 mg, Oral, Daily  folic acid, 1 mg, Oral, Daily  gabapentin, 300 mg, Oral, Nightly  insulin lispro, 2-7 Units, Subcutaneous, TID With Meals  lactulose, 10 g, Oral, BID  levothyroxine, 112 mcg, Oral, Daily  lisinopril, 5 mg, Oral, Q24H  magnesium sulfate, 2 g, Intravenous, Q2H  metoprolol tartrate, 25 mg, Oral, BID  multivitamin with minerals, 1 tablet, Oral, Daily  oxybutynin, 5 mg, Oral, BID  pantoprazole, 40 mg, Oral, BID  polyethylene glycol, 17 g, Oral, Daily  potassium phosphate, 15 mmol, Intravenous, Once  rosuvastatin, 5 mg, Oral, Daily  senna-docusate sodium, 2 tablet, Oral, BID  sodium chloride, 10 mL, Intravenous, Q12H  sodium chloride, 10 mL, Intravenous, Q12H  sodium chloride, 10 mL, Intravenous, Q12H  sodium chloride, 10 mL, Intravenous, Q12H  thiamine, 100 mg, Oral, Daily  traZODone, 50 mg, Oral, Nightly      Continuous Infusions:   PRN Meds:.  acetaminophen    acetaminophen    senna-docusate sodium **AND** polyethylene glycol **AND** bisacodyl **AND** bisacodyl    Calcium Replacement - Follow Nurse / BPA Driven Protocol    dextrose    dextrose    Diclofenac Sodium    glucagon (human recombinant)    hydrALAZINE    HYDROcodone-acetaminophen    LORazepam    Magnesium Standard Dose  Replacement - Follow Nurse / BPA Driven Protocol    nitroglycerin    Phosphorus Replacement - Follow Nurse / BPA Driven Protocol    Potassium Replacement - Follow Nurse / BPA Driven Protocol    sodium chloride    sodium chloride    sodium chloride    sodium chloride    sodium chloride    traMADol     Interval History:    Assessment & Plan     Sepsis-resolved  Continue antibiotics  Hypotension  Stop midodrine today monitor and may need bp meds restarted     Hematuria  Status post lithotripsy and stent placement on the left side.  Status post staged procedures on 10/18/2023 leading to renal hemorrhage  Postprocedure sepsis and anemia of blood loss requiring blood transfusion.  H&H stable now     Atrial fibrillation  [id number] score of 6  Back in sinus rhythm  Unable to anticoagulate at the moment due to recent bleeding requiring blood transfusion.  Started on metoprolol and rate controlled  Need clarification for eliquis     CAD  Status post PCI of the RCA in May 2023  DAPT has been held due to renal hemorrhage  Resume Plavix today  Stop aspirin given her need for anticoagulation f  Starting eliquis     Hypertension  Holding antihypertensives due to hypotension  Discontinue midodrine today and restart home med     Hyperlipidemia  Continue Crestor     Diabetes  Uncontrolled diabetes  A1c has improved from 11-7.6 now  On Lantus      Plan for disposition:[facility] hopefully tomorrow need to monitor bp after dc of midodrine and may have to restart bp meds; also start anticoagulation and monitor on room air    CLAUDE Bacon  10/24/23  10:32 EDT

## 2023-10-24 NOTE — PLAN OF CARE
Goal Outcome Evaluation:      Pamela Zavala presents with functional mobility impairments which indicate the need for skilled intervention. Pt displayed ability to perform bed mobility with CGA and cuing. Pt able to CTS from bed with min-Cga with RW from Eob. Pt required extended time on commode with ability to perform pericare with SBA. Pt required mod A/RW to CTS from commode. Pt ambulated in hallway with RW/CGA on 1LO2. Pt displayed decreased safety awareness on L side during gait training. Tolerating session today without incident. Will continue to follow and progress as tolerated.

## 2023-10-25 VITALS
HEART RATE: 104 BPM | BODY MASS INDEX: 23.18 KG/M2 | WEIGHT: 139.11 LBS | SYSTOLIC BLOOD PRESSURE: 159 MMHG | TEMPERATURE: 98.1 F | DIASTOLIC BLOOD PRESSURE: 96 MMHG | RESPIRATION RATE: 24 BRPM | HEIGHT: 65 IN | OXYGEN SATURATION: 96 %

## 2023-10-25 LAB
CALCIUM OXALATE DIHYDRATE MFR STONE IR: 10 %
COLOR STONE: NORMAL
COM MFR STONE: 50 %
COMPN STONE: NORMAL
GLUCOSE BLDC GLUCOMTR-MCNC: 175 MG/DL (ref 70–105)
GLUCOSE BLDC GLUCOMTR-MCNC: 190 MG/DL (ref 70–105)
GLUCOSE BLDC GLUCOMTR-MCNC: 190 MG/DL (ref 70–105)
GLUCOSE BLDC GLUCOMTR-MCNC: 199 MG/DL (ref 70–105)
HYDROXYAPATITE: 40 %
LABORATORY COMMENT REPORT: NORMAL
Lab: NORMAL
Lab: NORMAL
MAGNESIUM SERPL-MCNC: 2.2 MG/DL (ref 1.6–2.4)
PHOSPHATE SERPL-MCNC: 1.7 MG/DL (ref 2.5–4.5)
PHOSPHATE SERPL-MCNC: 2.1 MG/DL (ref 2.5–4.5)
PHOTO: NORMAL
POTASSIUM SERPL-SCNC: 4.4 MMOL/L (ref 3.5–5.2)
SIZE STONE: NORMAL MM
SPEC SOURCE SUBJ: NORMAL
STONE ANALYSIS-IMP: NORMAL
WT STONE: 69 MG

## 2023-10-25 PROCEDURE — 99232 SBSQ HOSP IP/OBS MODERATE 35: CPT | Performed by: INTERNAL MEDICINE

## 2023-10-25 PROCEDURE — 84132 ASSAY OF SERUM POTASSIUM: CPT | Performed by: INTERNAL MEDICINE

## 2023-10-25 PROCEDURE — 84100 ASSAY OF PHOSPHORUS: CPT | Performed by: INTERNAL MEDICINE

## 2023-10-25 PROCEDURE — 63710000001 INSULIN LISPRO (HUMAN) PER 5 UNITS: Performed by: INTERNAL MEDICINE

## 2023-10-25 PROCEDURE — 82948 REAGENT STRIP/BLOOD GLUCOSE: CPT

## 2023-10-25 PROCEDURE — 25010000002 LORAZEPAM PER 2 MG

## 2023-10-25 RX ORDER — MULTIPLE VITAMINS W/ MINERALS TAB 9MG-400MCG
1 TAB ORAL DAILY
Start: 2023-10-26

## 2023-10-25 RX ORDER — LACTULOSE 10 G/15ML
10 SOLUTION ORAL 2 TIMES DAILY
Start: 2023-10-25

## 2023-10-25 RX ORDER — INSULIN LISPRO 100 [IU]/ML
2-7 INJECTION, SOLUTION INTRAVENOUS; SUBCUTANEOUS
Start: 2023-10-25

## 2023-10-25 RX ORDER — TRAMADOL HYDROCHLORIDE 50 MG/1
50 TABLET ORAL 4 TIMES DAILY PRN
Qty: 20 TABLET | Refills: 0 | Status: SHIPPED | OUTPATIENT
Start: 2023-10-25

## 2023-10-25 RX ORDER — FOLIC ACID 1 MG/1
1 TABLET ORAL DAILY
Start: 2023-10-26

## 2023-10-25 RX ORDER — METOPROLOL SUCCINATE 25 MG/1
75 TABLET, EXTENDED RELEASE ORAL
Start: 2023-10-25

## 2023-10-25 RX ORDER — LANOLIN ALCOHOL/MO/W.PET/CERES
100 CREAM (GRAM) TOPICAL DAILY
Start: 2023-10-26

## 2023-10-25 RX ORDER — LISINOPRIL 5 MG/1
10 TABLET ORAL
Status: DISCONTINUED | OUTPATIENT
Start: 2023-10-26 | End: 2023-10-25 | Stop reason: HOSPADM

## 2023-10-25 RX ORDER — POLYETHYLENE GLYCOL 3350 17 G/17G
17 POWDER, FOR SOLUTION ORAL DAILY
Start: 2023-10-26

## 2023-10-25 RX ORDER — INSULIN DEGLUDEC INJECTION 100 U/ML
15 INJECTION, SOLUTION SUBCUTANEOUS
Start: 2023-10-25

## 2023-10-25 RX ORDER — POTASSIUM CHLORIDE 20 MEQ/1
40 TABLET, EXTENDED RELEASE ORAL EVERY 4 HOURS
Status: COMPLETED | OUTPATIENT
Start: 2023-10-25 | End: 2023-10-25

## 2023-10-25 RX ADMIN — SENNOSIDES AND DOCUSATE SODIUM 2 TABLET: 50; 8.6 TABLET ORAL at 08:36

## 2023-10-25 RX ADMIN — OXYBUTYNIN CHLORIDE 5 MG: 5 TABLET ORAL at 08:36

## 2023-10-25 RX ADMIN — PANTOPRAZOLE SODIUM 40 MG: 40 TABLET, DELAYED RELEASE ORAL at 08:36

## 2023-10-25 RX ADMIN — METOPROLOL TARTRATE 25 MG: 25 TABLET, FILM COATED ORAL at 05:43

## 2023-10-25 RX ADMIN — LEVOTHYROXINE SODIUM 112 MCG: 0.11 TABLET ORAL at 04:50

## 2023-10-25 RX ADMIN — ROSUVASTATIN 5 MG: 10 TABLET, FILM COATED ORAL at 08:37

## 2023-10-25 RX ADMIN — POTASSIUM CHLORIDE 40 MEQ: 1500 TABLET, EXTENDED RELEASE ORAL at 04:50

## 2023-10-25 RX ADMIN — Medication 100 MG: at 08:36

## 2023-10-25 RX ADMIN — FOLIC ACID 1 MG: 1 TABLET ORAL at 08:37

## 2023-10-25 RX ADMIN — MULTIPLE VITAMINS W/ MINERALS TAB 1 TABLET: TAB at 08:37

## 2023-10-25 RX ADMIN — Medication 2 PACKET: at 01:12

## 2023-10-25 RX ADMIN — LORAZEPAM 0.5 MG: 2 INJECTION INTRAMUSCULAR; INTRAVENOUS at 01:36

## 2023-10-25 RX ADMIN — CLOPIDOGREL BISULFATE 75 MG: 75 TABLET ORAL at 08:36

## 2023-10-25 RX ADMIN — LISINOPRIL 5 MG: 5 TABLET ORAL at 05:43

## 2023-10-25 RX ADMIN — APIXABAN 5 MG: 5 TABLET, FILM COATED ORAL at 08:37

## 2023-10-25 RX ADMIN — POTASSIUM CHLORIDE 40 MEQ: 1500 TABLET, EXTENDED RELEASE ORAL at 01:11

## 2023-10-25 RX ADMIN — INSULIN LISPRO 2 UNITS: 100 INJECTION, SOLUTION INTRAVENOUS; SUBCUTANEOUS at 08:37

## 2023-10-25 NOTE — PLAN OF CARE
Goal Outcome Evaluation:         No new concerns today. Dr. Alaniz at bedside for removal of ureteral stent. Patient tolerated procedure well. Plan for discharge to [facility] today.Paperwork ready, awaiting transport. Attempted to call [facility] multiple times to give report, but no answer.

## 2023-10-25 NOTE — PROGRESS NOTES
[facility] DAILY PROGRESS NOTE    Patient Identification  Name: Pamela Zavala  Age: 82 y.o.  Sex: female  :  1940  MRN: [id number]    Date: 10/25/2023             Subjective:  Interval History: Recovering    Objective:    Scheduled Meds:apixaban, 5 mg, Oral, Q12H  cefTRIAXone, 2,000 mg, Intravenous, Q24H  clopidogrel, 75 mg, Oral, Daily  folic acid, 1 mg, Oral, Daily  gabapentin, 300 mg, Oral, Nightly  insulin lispro, 2-7 Units, Subcutaneous, TID With Meals  lactulose, 10 g, Oral, BID  levothyroxine, 112 mcg, Oral, Daily  [START ON 10/26/2023] lisinopril, 10 mg, Oral, Q24H  metoprolol succinate XL, 75 mg, Oral, Q24H  multivitamin with minerals, 1 tablet, Oral, Daily  oxybutynin, 5 mg, Oral, BID  pantoprazole, 40 mg, Oral, BID  polyethylene glycol, 17 g, Oral, Daily  rosuvastatin, 5 mg, Oral, Daily  senna-docusate sodium, 2 tablet, Oral, BID  sodium chloride, 10 mL, Intravenous, Q12H  thiamine, 100 mg, Oral, Daily  traZODone, 50 mg, Oral, Nightly      Continuous Infusions:     PRN Meds:  acetaminophen    acetaminophen    senna-docusate sodium **AND** polyethylene glycol **AND** bisacodyl **AND** bisacodyl    Calcium Replacement - Follow Nurse / BPA Driven Protocol    dextrose    dextrose    Diclofenac Sodium    glucagon (human recombinant)    hydrALAZINE    HYDROcodone-acetaminophen    LORazepam    Magnesium Standard Dose Replacement - Follow Nurse / BPA Driven Protocol    nitroglycerin    Phosphorus Replacement - Follow Nurse / BPA Driven Protocol    Potassium Replacement - Follow Nurse / BPA Driven Protocol    sodium chloride    sodium chloride    sodium chloride    sodium chloride    traMADol    Vital signs in last 24 hours:  Temp:  [97.9 °F (36.6 °C)-98.1 °F (36.7 °C)] 98.1 °F (36.7 °C)  Heart Rate:  [] 104  Resp:  [15-24] 24  BP: (150-175)/(73-96) 159/96    Intake/Output:    Intake/Output Summary (Last 24 hours) at 10/25/2023 1740  Last data filed at 10/25/2023 1615  Gross per 24 hour  "  Intake 1200 ml   Output 1800 ml   Net -600 ml       Exam:  /96   Pulse 104   Temp 98.1 °F (36.7 °C) (Oral)   Resp 24   Ht 165.1 cm (65\")   Wt 63.1 kg (139 lb 1.8 oz)   SpO2 96%   BMI 23.15 kg/m²     General Appearance:    Alert, cooperative, no distress, appears stated age               Abdomen:     Soft, ND   :    No suprapubic distention            Bedside Cystoscopy with stent removal  The plan was discussed with the patient and/or family who agreed and consented to bedside cystoscopy as described.  Prior to initiating the procedure a timeout was performed patient was identified using 2 identifiers and procedure was described, all in the room were in agreement.  The patient was prepped and draped in the usual fashion.  Flexible cystourethroscopy was performed which revealed a patent urethra with no lesions.  Cystoscopy was performed which revealed the stent which was attempted be grasped but grasper was ineffective, multiple graspers were used and eventually stent was removed intact  Patient tolerated the procedure well with no complications      Data Review:  All labs (24hrs):   Recent Results (from the past 24 hour(s))   POC Glucose Once    Collection Time: 10/24/23  8:33 PM    Specimen: Blood   Result Value Ref Range    Glucose 210 (H) 70 - 105 mg/dL   Comprehensive Metabolic Panel    Collection Time: 10/24/23 11:21 PM    Specimen: Blood   Result Value Ref Range    Glucose 217 (H) 65 - 99 mg/dL    BUN 8 8 - 23 mg/dL    Creatinine 0.65 0.57 - 1.00 mg/dL    Sodium 140 136 - 145 mmol/L    Potassium 3.6 3.5 - 5.2 mmol/L    Chloride 108 (H) 98 - 107 mmol/L    CO2 21.0 (L) 22.0 - 29.0 mmol/L    Calcium 7.3 (L) 8.6 - 10.5 mg/dL    Total Protein 5.2 (L) 6.0 - 8.5 g/dL    Albumin 2.9 (L) 3.5 - 5.2 g/dL    ALT (SGPT) 31 1 - 33 U/L    AST (SGOT) 19 1 - 32 U/L    Alkaline Phosphatase 233 (H) 39 - 117 U/L    Total Bilirubin 0.5 0.0 - 1.2 mg/dL    Globulin 2.3 gm/dL    A/G Ratio 1.3 g/dL    BUN/Creatinine " Ratio 12.3 7.0 - 25.0    Anion Gap 11.0 5.0 - 15.0 mmol/L    eGFR 86.9 >60.0 mL/min/1.73   Magnesium    Collection Time: 10/24/23 11:21 PM    Specimen: Blood   Result Value Ref Range    Magnesium 2.2 1.6 - 2.4 mg/dL   Phosphorus    Collection Time: 10/24/23 11:21 PM    Specimen: Blood   Result Value Ref Range    Phosphorus 1.7 (C) 2.5 - 4.5 mg/dL   CBC Auto Differential    Collection Time: 10/24/23 11:21 PM    Specimen: Blood   Result Value Ref Range    WBC 12.40 (H) 3.40 - 10.80 10*3/mm3    RBC 3.53 (L) 3.77 - 5.28 10*6/mm3    Hemoglobin 9.7 (L) 12.0 - 15.9 g/dL    Hematocrit 30.4 (L) 34.0 - 46.6 %    MCV 86.1 79.0 - 97.0 fL    MCH 27.5 26.6 - 33.0 pg    MCHC 32.0 31.5 - 35.7 g/dL    RDW 18.6 (H) 12.3 - 15.4 %    RDW-SD 55.1 (H) 37.0 - 54.0 fl    MPV 10.2 6.0 - 12.0 fL    Platelets 185 140 - 450 10*3/mm3   Scan Slide    Collection Time: 10/24/23 11:21 PM    Specimen: Blood   Result Value Ref Range    Scan Slide     Manual Differential    Collection Time: 10/24/23 11:21 PM    Specimen: Blood   Result Value Ref Range    Neutrophil % 60.0 42.7 - 76.0 %    Lymphocyte % 7.0 (L) 19.6 - 45.3 %    Monocyte % 5.0 5.0 - 12.0 %    Bands %  13.0 (H) 0.0 - 5.0 %    Metamyelocyte % 10.0 (H) 0.0 - 0.0 %    Atypical Lymphocyte % 5.0 0.0 - 5.0 %    Neutrophils Absolute 9.05 (H) 1.70 - 7.00 10*3/mm3    Lymphocytes Absolute 1.49 0.70 - 3.10 10*3/mm3    Monocytes Absolute 0.62 0.10 - 0.90 10*3/mm3    Anisocytosis Slight/1+ None Seen    Microcytes Slight/1+ None Seen    WBC Morphology Normal Normal    Platelet Estimate Adequate Normal    Large Platelets Slight/1+ None Seen   POC Glucose Once    Collection Time: 10/25/23  7:04 AM    Specimen: Blood   Result Value Ref Range    Glucose 190 (H) 70 - 105 mg/dL   Phosphorus    Collection Time: 10/25/23 10:10 AM    Specimen: Blood   Result Value Ref Range    Phosphorus 2.1 (L) 2.5 - 4.5 mg/dL   Potassium    Collection Time: 10/25/23 10:10 AM    Specimen: Blood   Result Value Ref Range     Potassium 4.4 3.5 - 5.2 mmol/L   POC Glucose Once    Collection Time: 10/25/23 11:07 AM    Specimen: Blood   Result Value Ref Range    Glucose 190 (H) 70 - 105 mg/dL   POC Glucose Once    Collection Time: 10/25/23  4:14 PM    Specimen: Blood   Result Value Ref Range    Glucose 199 (H) 70 - 105 mg/dL      Imaging Results (Last 24 Hours)       ** No results found for the last 24 hours. **             Assessment:    Sepsis without acute organ dysfunction    Coronary arteriosclerosis    Type 2 diabetes mellitus    Diabetic neuropathy    Essential hypertension    Hyperlipidemia    Postoperative hypothyroidism    Ex-smoker    History of thyroid cancer    Kidney stones    UTI (urinary tract infection), bacterial      Sepsis after ureteroscopy    Plan:    Stent removed today, okay for discharge from urology standpoint  Office will call to arrange follow-up      Juan Alaniz MD  [facility]  [facility] [facility], [facility] [address]  [place] [address]  Office: [phone]  Available via [employer] SAMURAI  10/25/23  17:40 EDT

## 2023-10-25 NOTE — CASE MANAGEMENT/SOCIAL WORK
Continued Stay Note  DERRICK Lawler     Patient Name: Pamela Zavala  MRN: [id number]  Today's Date: 10/25/2023    Admit Date: 10/18/2023    Plan: Accepted to [facility]. Precert approved (valid 10/24-10/26), PASRR approved. From [facility] [address] [facility] (current).   Discharge Plan       Row Name 10/25/23 1622       Plan    Plan Comments CM contacted [facility] liaison Coral SORENSEN to notify her of anticipated d/c today and inquire if transportation was available. Liaison reported no transport available. RN updated who reported patient’s son requested w/c van transportation. DC orders noted. CM contacted [facility], [facility] [place] to schedule patient for w/c van transportation to [facility] today, 10//25.    Final Discharge Disposition Code 03 - skilled nursing facility (SNF)    Final Note [facility]             Expected Discharge Date and Time       Expected Discharge Date Expected Discharge Time    Oct 25, 2023         Case Management Discharge Note  Final Note: [facility]    Selected Continued Care - Admitted Since 10/18/2023       Destination Coordination complete.      Service Provider Selected Services Address Phone Fax Patient Preferred    [facility] [place] [facility] [address] [place] [address] [place] [facility] [phone] --           Transportation Services  W/C Van: [employer]  Final Discharge Disposition Code: 03 - skilled nursing facility (SNF)  Karley Sinha RN     Office Phone: [phone]  Office Cell: [phone]

## 2023-10-25 NOTE — PLAN OF CARE
Goal Outcome Evaluation:         Patient K and phos required replacement this shift, administered per protocol with recheck of levels entered for 0900/1000.

## 2023-10-25 NOTE — PROGRESS NOTES
Referring Provider: Sarah Beth Coronado MD    Reason for follow-up: Afib  Patient Care Team:  Marisol Rodgers APRN as PCP - General (Pulmonary Disease)      SUBJECTIVE    Seems more sleepy today. No complaints. Hoping to go to rehab today    ROS  Review of all systems negative except as indicated.    Since I have last seen, the patient has been without any chest discomfort, shortness of breath, palpitations, dizziness or syncope.  Denies having any headache, abdominal pain, nausea, vomiting, diarrhea, constipation, loss of weight or loss of appetite.  Denies having any excessive bruising, hematuria or blood in the stool.  ROS      Personal History:    Past Medical History:   Diagnosis Date    Anemia     Arthritis     CAD (coronary artery disease)     Chronic back pain     Diabetic neuropathy     Essential hypertension     GERD (gastroesophageal reflux disease)     History of gastric ulcer     History of stomach ulcers     HLD (hyperlipidemia)     Hypocalcemia     Hypoparathyroidism     Hypothyroidism     Insomnia     Kidney stones     Osteoporosis     Recurrent sinusitis     Renal disease     Thyroid cancer     Type 2 diabetes mellitus        Past Surgical History:   Procedure Laterality Date    CARDIAC CATHETERIZATION N/A 05/03/2023    Procedure: Left Heart Cath and coronary angiogram;  Surgeon: Jonh Baldwin MD;  Location: [facility] INVASIVE LOCATION;  Service: Cardiovascular;  Laterality: N/A;    CORONARY STENT PLACEMENT      x 3    CYSTOSCOPY, URETEROSCOPY, RETROGRADE PYELOGRAM, STENT INSERTION Left 10/18/2023    Procedure: CYSTOSCOPY URETEROSCOPY RETROGRADE PYELOGRAM HOLMIUM LASER STENT INSERTION;  Surgeon: Juan Alaniz MD;  Location: [facility] OR;  Service: Urology;  Laterality: Left;    HYSTERECTOMY      LUNG LOBECTOMY      THYROIDECTOMY         Family History   Problem Relation Age of Onset    Diabetes Mother     Cancer Father     Heart disease Sister     Diabetes Sister     Heart disease Maternal  Grandmother        Social History     Tobacco Use    Smoking status: Former     Packs/day: 2.00     Years: 30.00     Additional pack years: 0.00     Total pack years: 60.00     Types: Cigarettes     Quit date:      Years since quittin.     Passive exposure: Past    Smokeless tobacco: Never   Vaping Use    Vaping Use: Never used   Substance Use Topics    Alcohol use: Yes     Alcohol/week: 1.0 standard drink of alcohol     Types: 1 Glasses of wine per week    Drug use: Defer        Home meds:  Prior to Admission medications    Medication Sig Start Date End Date Taking? Authorizing Provider   Accu-Chek Softclix Lancets lancets 1 each by Other route 3 (Three) Times a Day Before Meals. Dx: E11.65. Use as instructed 23  Yes Johanny Nagel MD   apixaban (ELIQUIS) 5 MG tablet tablet Take 1 tablet by mouth Every 12 (Twelve) Hours. Indications: Atrial Fibrillation 10/25/23  Yes Sarah Beth Coronado MD   atenolol (TENORMIN) 50 MG tablet Take 1 tablet by mouth Daily. Take dos   Yes Andrade La MD   clopidogrel (PLAVIX) 75 MG tablet Take 1 tablet by mouth Daily.   Yes Andrade La MD   Diclofenac Sodium (VOLTAREN) 1 % gel gel Apply 2 g topically to the appropriate area as directed Every 6 (Six) Hours As Needed (Knee pain). 10/25/23  Yes Sarah Beth Coronado MD   fluticasone (FLONASE) 50 MCG/ACT nasal spray 1 spray into the nostril(s) as directed by provider 2 (Two) Times a Day.   Yes Andrade La MD   folic acid (FOLVITE) 1 MG tablet Take 1 tablet by mouth Daily. 10/26/23  Yes Sarah Beth Coronado MD   gabapentin (NEURONTIN) 300 MG capsule Take 1 capsule by mouth Every Night.   Yes Andrade La MD   glucose blood (Accu-Chek Guide) test strip 1 each by Other route 3 (Three) Times a Day Before Meals. Dx: E11.65. Use as instructed 23  Yes Johanny Nagel MD   insulin degludec (Tresiba FlexTouch) 100 UNIT/ML solution pen-injector injection Inject 15 Units under the skin into the appropriate area as  directed every night at bedtime. 10/25/23  Yes Sarah Beth Coronado MD   insulin lispro (HUMALOG/ADMELOG) 100 UNIT/ML injection Inject 2-7 Units under the skin into the appropriate area as directed 3 (Three) Times a Day With Meals. 10/25/23  Yes Sarah Beth Coronado MD   Insulin Lispro 100 UNIT/ML solution cartridge Inject  under the skin into the appropriate area as directed 3 (Three) Times a Day Before Meals. Per sliding scale   Yes Andrade La MD   Insulin Lispro, 1 Unit Dial, (HumaLOG KwikPen) 100 UNIT/ML solution pen-injector Inject 7 Units under the skin into the appropriate area as directed 3 (Three) Times a Day. Inject 6 Units under the skin into the appropriate area as directed 3 (Three) Times a Day With Meals. 9/12/23  Yes Driss Dan MD   lactulose (CHRONULAC) 10 GM/15ML solution Take 15 mL by mouth Daily.   Yes Andrade La MD   lactulose (CHRONULAC) 10 GM/15ML solution Take 15 mL by mouth 2 (Two) Times a Day. 10/25/23  Yes Sarah Beth Coronado MD   levothyroxine (Synthroid) 112 MCG tablet Take 1 tablet by mouth Daily. 9/12/23 9/11/24 Yes Driss Dan MD   lisinopril (PRINIVIL,ZESTRIL) 5 MG tablet Take 1 tablet by mouth Daily. 5/18/23  Yes Nuria Mcgraw MD   metFORMIN (GLUCOPHAGE) 1000 MG tablet Take 1 tablet by mouth 2 (Two) Times a Day With Meals.   Yes Andrade La MD   metoprolol succinate XL (TOPROL-XL) 25 MG 24 hr tablet Take 3 tablets by mouth Daily. 10/25/23  Yes Sarah Beth Coronado MD   multivitamin with minerals tablet tablet Take 1 tablet by mouth Daily. 10/26/23  Yes Sarah Beth Coronado MD   oxybutynin (DITROPAN) 5 MG tablet Take 1 tablet by mouth 2 (Two) Times a Day.   Yes Andrade La MD   pantoprazole (PROTONIX) 40 MG EC tablet Take 1 tablet by mouth 2 (Two) Times a Day. Take dos   Yes Andrade La MD   polyethylene glycol (MIRALAX) 17 g packet Take 17 g by mouth Daily. 10/26/23  Yes Sarah Beth Coronado MD   rosuvastatin (CRESTOR) 5 MG tablet Take 1 tablet by mouth Daily.   Yes  ProviderAndrade MD   thiamine (VITAMIN B1) 100 MG tablet Take 1 tablet by mouth Daily. 10/26/23  Yes Sarah Beth Coronado MD   traMADol (ULTRAM) 50 MG tablet Take 1 tablet by mouth 4 (Four) Times a Day As Needed for Moderate Pain. 10/25/23  Yes Sarah Beth Coronado MD   traZODone (DESYREL) 50 MG tablet Take 1 tablet by mouth Every Night.   Yes Andrade La MD   insulin degludec (Tresiba FlexTouch) 100 UNIT/ML solution pen-injector injection Inject 22 Units under the skin into the appropriate area as directed every night at bedtime. 9/12/23 10/25/23 Yes Driss Dan MD   oxyCODONE-acetaminophen (PERCOCET) 5-325 MG per tablet Take 1 tablet by mouth Daily As Needed for Severe Pain. 10/18/23 10/25/23 Yes Juan Alaniz MD   traMADol (ULTRAM) 50 MG tablet Take 1 tablet by mouth 4 (Four) Times a Day As Needed for Moderate Pain.  10/25/23 Yes Andrade La MD   cefdinir (OMNICEF) 300 MG capsule Take 1 capsule by mouth 2 (Two) Times a Day. 10/18/23   Juan Alaniz MD       Allergies:  Penicillins, Shellfish-derived products, Sulfamethizole, and Trimethoprim    Scheduled Meds:apixaban, 5 mg, Oral, Q12H  cefTRIAXone, 2,000 mg, Intravenous, Q24H  clopidogrel, 75 mg, Oral, Daily  folic acid, 1 mg, Oral, Daily  gabapentin, 300 mg, Oral, Nightly  insulin lispro, 2-7 Units, Subcutaneous, TID With Meals  lactulose, 10 g, Oral, BID  levothyroxine, 112 mcg, Oral, Daily  [START ON 10/26/2023] lisinopril, 10 mg, Oral, Q24H  metoprolol succinate XL, 75 mg, Oral, Q24H  multivitamin with minerals, 1 tablet, Oral, Daily  oxybutynin, 5 mg, Oral, BID  pantoprazole, 40 mg, Oral, BID  polyethylene glycol, 17 g, Oral, Daily  rosuvastatin, 5 mg, Oral, Daily  senna-docusate sodium, 2 tablet, Oral, BID  sodium chloride, 10 mL, Intravenous, Q12H  thiamine, 100 mg, Oral, Daily  traZODone, 50 mg, Oral, Nightly      Continuous Infusions:   PRN Meds:.  acetaminophen    acetaminophen    senna-docusate sodium **AND** polyethylene glycol **AND**  "bisacodyl **AND** bisacodyl    Calcium Replacement - Follow Nurse / BPA Driven Protocol    dextrose    dextrose    Diclofenac Sodium    glucagon (human recombinant)    hydrALAZINE    HYDROcodone-acetaminophen    LORazepam    Magnesium Standard Dose Replacement - Follow Nurse / BPA Driven Protocol    nitroglycerin    Phosphorus Replacement - Follow Nurse / BPA Driven Protocol    Potassium Replacement - Follow Nurse / BPA Driven Protocol    sodium chloride    sodium chloride    sodium chloride    sodium chloride    traMADol      OBJECTIVE    Vital Signs  Vitals:    10/25/23 0600 10/25/23 0612 10/25/23 0921 10/25/23 1321   BP: 170/74 166/81 159/96    BP Location:       Patient Position:       Pulse: 111 104     Resp:   17 24   Temp:   97.9 °F (36.6 °C) 97.9 °F (36.6 °C)   TempSrc:   Oral Oral   SpO2: 94% 96%     Weight:       Height:           Flowsheet Rows      Flowsheet Row First Filed Value   Admission Height 170.2 cm (67\") Documented at 10/09/2023 1548   Admission Weight 59 kg (130 lb) Documented at 10/09/2023 1548              Intake/Output Summary (Last 24 hours) at 10/25/2023 1452  Last data filed at 10/25/2023 0800  Gross per 24 hour   Intake 1320 ml   Output 1800 ml   Net -480 ml          Telemetry:  NSR    Physical Exam:  The patient is alert, oriented and in no distress.  Vital signs as noted above.  Head and neck revealed no carotid bruits or jugular venous distention.  No thyromegaly or lymphadenopathy is present  Lungs clear.  No wheezing.  Breath sounds are normal bilaterally.  Heart normal first and second heart sounds.  No murmur. No precordial rub is present.  No gallop is present.  Abdomen soft and nontender.  No organomegaly is present.  Extremities with good peripheral pulses without any pedal edema.  Skin warm and dry. Rash on right forearm  Musculoskeletal system is grossly normal.  CNS grossly normal.       Results Review:  I have personally reviewed the results from the time of this admission " to 10/25/2023 14:52 EDT and agree with these findings:  []  Laboratory  []  Microbiology  []  Radiology  []  EKG/Telemetry   []  Cardiology/Vascular   []  Pathology  []  Old records  []  Other:    Most notable findings include:    Lab Results (last 24 hours)       Procedure Component Value Units Date/Time    POC Glucose Once [[id number]]  (Abnormal) Collected: 10/25/23 1107    Specimen: Blood Updated: 10/25/23 1109     Glucose 190 mg/dL      Comment: Serial Number: [id number]:  [id number]       Phosphorus [[id number]]  (Abnormal) Collected: 10/25/23 1010    Specimen: Blood Updated: 10/25/23 1043     Phosphorus 2.1 mg/dL     Potassium [[id number]]  (Normal) Collected: 10/25/23 1010    Specimen: Blood Updated: 10/25/23 1043     Potassium 4.4 mmol/L      Comment: Slight hemolysis detected by analyzer. Results may be affected.       POC Glucose Once [[id number]]  (Abnormal) Collected: 10/25/23 0704    Specimen: Blood Updated: 10/25/23 0706     Glucose 190 mg/dL      Comment: Serial Number: [id number]:  [id number]       Magnesium [[id number]]  (Normal) Collected: 10/24/23 2321    Specimen: Blood Updated: 10/25/23 0000     Magnesium 2.2 mg/dL     Phosphorus [[id number]]  (Abnormal) Collected: 10/24/23 2321    Specimen: Blood Updated: 10/25/23 0000     Phosphorus 1.7 mg/dL     CBC & Differential [[id number]]  (Abnormal) Collected: 10/24/23 2321    Specimen: Blood Updated: 10/24/23 2359    Narrative:      The following orders were created for panel order CBC & Differential.  Procedure                               Abnormality         Status                     ---------                               -----------         ------                     CBC Auto Differential[[id number]]        Abnormal            Final result               Scan Slide[[id number]]                                       Final result                 Please view results for these tests on the individual orders.    CBC Auto Differential [[id number]]   (Abnormal) Collected: 10/24/23 2321    Specimen: Blood Updated: 10/24/23 2359     WBC 12.40 10*3/mm3      RBC 3.53 10*6/mm3      Hemoglobin 9.7 g/dL      Hematocrit 30.4 %      MCV 86.1 fL      MCH 27.5 pg      MCHC 32.0 g/dL      RDW 18.6 %      RDW-SD 55.1 fl      MPV 10.2 fL      Platelets 185 10*3/mm3     Narrative:      The previously reported component NRBC is no longer being reported. Previous result was 0.1 /100 WBC (Reference Range: 0.0-0.2 /100 WBC) on 10/24/2023 at 2341 EDT.    Scan Slide [[id number]] Collected: 10/24/23 2321    Specimen: Blood Updated: 10/24/23 2359     Scan Slide --     Comment: See Manual Differential Results       Manual Differential [[id number]]  (Abnormal) Collected: 10/24/23 2321    Specimen: Blood Updated: 10/24/23 2359     Neutrophil % 60.0 %      Lymphocyte % 7.0 %      Monocyte % 5.0 %      Bands %  13.0 %      Metamyelocyte % 10.0 %      Atypical Lymphocyte % 5.0 %      Neutrophils Absolute 9.05 10*3/mm3      Lymphocytes Absolute 1.49 10*3/mm3      Monocytes Absolute 0.62 10*3/mm3      Anisocytosis Slight/1+     Microcytes Slight/1+     WBC Morphology Normal     Platelet Estimate Adequate     Large Platelets Slight/1+    Comprehensive Metabolic Panel [[id number]]  (Abnormal) Collected: 10/24/23 2321    Specimen: Blood Updated: 10/24/23 2355     Glucose 217 mg/dL      BUN 8 mg/dL      Creatinine 0.65 mg/dL      Sodium 140 mmol/L      Potassium 3.6 mmol/L      Chloride 108 mmol/L      CO2 21.0 mmol/L      Calcium 7.3 mg/dL      Total Protein 5.2 g/dL      Albumin 2.9 g/dL      ALT (SGPT) 31 U/L      AST (SGOT) 19 U/L      Alkaline Phosphatase 233 U/L      Total Bilirubin 0.5 mg/dL      Globulin 2.3 gm/dL      A/G Ratio 1.3 g/dL      BUN/Creatinine Ratio 12.3     Anion Gap 11.0 mmol/L      eGFR 86.9 mL/min/1.73     Narrative:      GFR Normal >60  Chronic Kidney Disease <60  Kidney Failure <15    The GFR formula is only valid for adults with stable renal function between ages 18 and  70.    POC Glucose Once [[id number]]  (Abnormal) Collected: 10/24/23 2033    Specimen: Blood Updated: 10/24/23 2035     Glucose 210 mg/dL      Comment: Serial Number: [id number]:  [id number]       Phosphorus [[id number]]  (Abnormal) Collected: 10/24/23 1547    Specimen: Blood Updated: 10/24/23 1628     Phosphorus 2.1 mg/dL     POC Glucose Once [[id number]]  (Abnormal) Collected: 10/24/23 1625    Specimen: Blood Updated: 10/24/23 1627     Glucose 197 mg/dL      Comment: Serial Number: [id number]:  [id number]               Imaging Results (Last 24 Hours)       ** No results found for the last 24 hours. **            LAB RESULTS (LAST 7 DAYS)    CBC  Results from last 7 days   Lab Units 10/24/23  2321 10/24/23  0511 10/22/23  2353 10/22/23  0432 10/21/23  0537 10/20/23  0653 10/20/23  0027 10/19/23  1135 10/19/23  0453   WBC 10*3/mm3 12.40* 11.00* 13.50* 15.60* 16.20* 24.90*  --   --  19.60*   RBC 10*6/mm3 3.53* 3.33* 3.18* 2.91* 3.06* 3.33*  --   --  3.34*   HEMOGLOBIN g/dL 9.7* 9.3* 8.8* 7.8* 8.2* 9.8* 10.6*   < > 8.9*   HEMATOCRIT % 30.4* 28.6* 27.5* 24.6* 26.3* 29.6* 32.8*   < > 28.9*   MCV fL 86.1 86.0 86.4 84.6 85.9 89.0  --   --  86.6   PLATELETS 10*3/mm3 185 124* 76* 73* 85* 127*  --   --  207    < > = values in this interval not displayed.       BMP  Results from last 7 days   Lab Units 10/25/23  1010 10/24/23  2321 10/24/23  1547 10/24/23  0511 10/22/23  2353 10/22/23  0532 10/21/23  0537 10/21/23  0452 10/20/23  0603 10/19/23  0453 10/18/23  2202 10/18/23  2051 10/18/23  1901   SODIUM mmol/L  --  140  --  143 143 147* 146*  --  148* 136  --    < > 142   POTASSIUM mmol/L 4.4 3.6  --  3.7 4.1 2.8* 3.7  --  4.2 4.2 2.8*   < > 3.4*   CHLORIDE mmol/L  --  108*  --  112* 112* 114* 121*  --  122* 106  --    < > 105   CO2 mmol/L  --  21.0*  --  22.0 21.0* 22.0 17.0*  --  18.0* 20.0*  --    < > 21.0*   BUN mg/dL  --  8  --  10 16 17 25*  --  30* 19  --    < > 17   CREATININE mg/dL  --  0.65  --  0.67 0.77  0.79 0.98  --  0.94 0.76  --    < > 0.85   GLUCOSE mg/dL  --  217*  --  175* 264* 169* 207*  --  94 147*  --    < > 132*   MAGNESIUM mg/dL  --  2.2  --  1.5* 2.2 1.3*  --  1.7  --   --  2.1  --  0.8*   PHOSPHORUS mg/dL 2.1* 1.7* 2.1* 1.8* 3.0 1.2*  --  2.5  --   --   --    < > 2.9    < > = values in this interval not displayed.       CMP   Results from last 7 days   Lab Units 10/25/23  1010 10/24/23  2321 10/24/23  0511 10/22/23  2353 10/22/23  0532 10/21/23  0537 10/20/23  0603 10/19/23  0937 10/19/23  0453 10/18/23  2156 10/18/23  2051 10/18/23  1901   SODIUM mmol/L  --  140 143 143 147* 146* 148*  --  136  --  140 142   POTASSIUM mmol/L 4.4 3.6 3.7 4.1 2.8* 3.7 4.2  --  4.2   < > 3.1* 3.4*   CHLORIDE mmol/L  --  108* 112* 112* 114* 121* 122*  --  106  --  104 105   CO2 mmol/L  --  21.0* 22.0 21.0* 22.0 17.0* 18.0*  --  20.0*  --  21.0* 21.0*   BUN mg/dL  --  8 10 16 17 25* 30*  --  19  --  18 17   CREATININE mg/dL  --  0.65 0.67 0.77 0.79 0.98 0.94  --  0.76   < > 0.92 0.85   GLUCOSE mg/dL  --  217* 175* 264* 169* 207* 94  --  147*  --  103* 132*   ALBUMIN g/dL  --  2.9* 2.8* 2.9* 2.7* 3.2*  --   --  2.6*  --  2.9* 3.0*   BILIRUBIN mg/dL  --  0.5 0.4 0.4 0.9 0.9  --   --  0.4  --  0.9 0.7   ALK PHOS U/L  --  233* 314* 400* 264* 197*  --   --  88  --  72 74   AST (SGOT) U/L  --  19 22 40* 46* 93*  --   --  398*  --  195* 76*   ALT (SGPT) U/L  --  31 38* 54* 59* 80*  --   --  149*  --  66* 29   AMYLASE U/L  --   --   --   --   --   --   --   --   --   --   --  38   AMMONIA umol/L  --   --   --   --   --   --   --  14  --   --   --   --     < > = values in this interval not displayed.       BNP        TROPONIN  Results from last 7 days   Lab Units 10/18/23  2051 10/18/23  1901   CK TOTAL U/L  --  96   HSTROP T ng/L 19* 17*       CoAg  Results from last 7 days   Lab Units 10/18/23  2202   INR  1.36*       Creatinine Clearance  Estimated Creatinine Clearance: 64.2 mL/min (by C-G formula based on SCr of 0.65  mg/dL).    ABG  Results from last 7 days   Lab Units 10/18/23  2156   PH, ARTERIAL pH units 7.327*   PCO2, ARTERIAL mm Hg 42.8   PO2 ART mm Hg 61.1*   O2 SATURATION ART % 89.1*   BASE EXCESS ART mmol/L -3.4*       Radiology  No radiology results for the last day      EKG  I personally viewed and interpreted the patient's EKG/Telemetry data:  ECG 12 Lead Rhythm Change   Final Result   HEART RATE= 140  bpm   RR Interval= 419  ms   OR Interval=   ms   P Horizontal Axis=   deg   P Front Axis=   deg   QRSD Interval= 75  ms   QT Interval=   ms   QTcB= Invalid  ms   QRS Axis= 73  deg   T Wave Axis= -89  deg   - ABNORMAL ECG -   Atrial fibrillation with rapid V-rate   Low voltage, precordial leads   Borderline T abnormalities, diffuse leads   When compared with ECG of 18-Oct-2023 15:46:09,   Significant change in rhythm   Significant repolarization change   Significant axis, voltage or hypertrophy change   Electronically Signed By: John Baldwin ([facility]) 20-Oct-2023 22:55:09   Date and Time of Study: 2023-10-20 05:19:28      ECG 12 Lead Rhythm Change   Final Result   HEART RATE= 151  bpm   RR Interval= 396  ms   OR Interval= 129  ms   P Horizontal Axis= -11  deg   P Front Axis= 61  deg   QRSD Interval= 84  ms   QT Interval= 312  ms   QTcB= 496  ms   QRS Axis= 45  deg   T Wave Axis= 247  deg   - ABNORMAL ECG -   Supraventricular tachycardia   Probable anteroseptal infarct, recent   When compared with ECG of 11-Oct-2023 13:54:31,   Significant change in rhythm: previously sinus   Electronically Signed By: Shirley Rosenthal ([facility]) 21-Oct-2023 17:04:20   Date and Time of Study: 2023-10-18 15:46:09      SCANNED - TELEMETRY     Final Result      SCANNED - TELEMETRY     Final Result      SCANNED - TELEMETRY     Final Result      SCANNED - TELEMETRY     Final Result      SCANNED - TELEMETRY     Final Result      SCANNED - TELEMETRY     Final Result      SCANNED - TELEMETRY     Final Result      SCANNED - TELEMETRY     Final Result       SCANNED - TELEMETRY     Final Result      SCANNED - TELEMETRY     Final Result      SCANNED - TELEMETRY     Final Result      SCANNED - TELEMETRY     Final Result      SCANNED - TELEMETRY     Final Result      SCANNED - TELEMETRY     Final Result      SCANNED - TELEMETRY     Final Result      SCANNED - TELEMETRY     Final Result      SCANNED - TELEMETRY     Final Result      ECG 12 Lead   Final Result   HEART RATE= 80  bpm   RR Interval= 752  ms   WI Interval= 168  ms   P Horizontal Axis= 6  deg   P Front Axis= 92  deg   QRSD Interval= 73  ms   QT Interval= 379  ms   QTcB= 437  ms   QRS Axis= 48  deg   T Wave Axis= 61  deg   - ABNORMAL ECG -   Sinus rhythm   Consider anteroseptal infarct   When compared with ECG of 08-Sep-2023 10:41:09,   No significant change   Electronically Signed By: Nuria Mcgraw ([facility]) 12-Oct-2023 15:16:23   Date and Time of Study: 2023-10-11 13:54:31            Echocardiogram:          Stress Test:  Results for orders placed during the hospital encounter of 05/02/23    Stress Test With Myocardial Perfusion One Day    Interpretation Summary    Left ventricular ejection fraction is hyperdynamic (Calculated EF > 70%).    Abnormal LV wall motion consistent with moderate hypokinesis of the inferior wall.    Myocardial perfusion imaging indicates a large-sized infarct located in the inferior wall with moderate candy-infarct ischemia.    Impressions are consistent with a high risk study.    Findings consistent with an abnormal ECG stress test.         Cardiac Catheterization:  Results for orders placed during the hospital encounter of 05/02/23    Cardiac Catheterization/Vascular Study    Narrative  OPERATORS  John Baldwin M.D. (Attending Cardiologist)      PROCEDURES PERFORMED  Ultrasound guided Vascular access  Left Heart Catheterization  Coronary Angiogram [id number]  PCI with DIRK placement to RCA [id number]  IVUS of RCA [id number]  Moderate sedation 45mins    INDICATIONS FOR PROCEDURE  82 years old woman with  multiple cardiovascular risk factors presented with chest pain/unstable angina.  She had a nuclear stress test which was abnormal in the inferior wall.  After discussing the risk and benefit of the procedure she was brought into the Cath Lab for cardiac catheterization.    PROCEDURE IN DETAIL  Informed consent was obtained from the patient after explaining the risks, benefits, and alternative options of the procedure. After obtaining informed consent, the patient was brought to the cath lab and was prepped in a sterile fashion. Lidocaine 2% was used for local anesthesia into the right femoral access site. The right femoral artery was accessed with a micropuncture needle via modified Seldinger technique under ultrasound guidance. A 6F was inserted successfully. Afterwards, 6F JR4 and JL4 diagnostic catheters were advanced over a wire into the ascending aorta and were used to engage the ostia of the left main and RCA respectively. JR4 used to cross the AV and obtain LV pressures and gradient across the AV measured via pullback technique. Images of the right and left coronary systems were obtained.    HEMODYNAMICS  LV: 151/2, 7 mmHg  AO: 150/64, 98 mmHg  No significant gradient across aortic valve during pullback of JR4 catheter.  LV gram was not performed due to echocardiogram being available.    FINDINGS    Coronary Angiogram    Right dominant circulation    Left main: Left main is a large caliber vessel which gives rise to the Left Anterior Descending and the Left circumflex.  Left main is angiographically free from any significant disease.    Left Anterior Descending Artery: LAD is a medium caliber vessel which gives rise to several septal perforators and several diagonal branches.  LAD has diffuse luminal irregularities but no significant obstruction.    Left Circumflex: Patent stents in the mid left circumflex and first obtuse marginal branch.  Remaining left circumflex is angiographically free from any  significant disease.    Right Coronary Artery: The RCA is a small caliber vessel gives rise to PDA and PLV.  Mid RCA has focal 80 to 85% stenosis with EDITH-3 flow.    Percutaneous coronary intervention  100 units/kg of heparin was administered and ACT of more than 250 was documented.  A 6 Citizen of [place] JR4 guide catheter with sideholes was used to engage the ostium of the RCA.  0.014 run-through wire was advanced past the lesion in the mid RCA into the distal RCA.  I then predilated the lesion with 2 x 12 mm mini trek balloon.  This was followed by placement of 2.25 x 33 mm Xience miryam point stent.  This was followed by advancement of Plyfe intravascular ultrasound into the distal RCA and a slow manual pullback was performed.  IVUS confirmed distal RCA size of 2.5 mm and proximal RCA 3.5 mm.  Mid RCA was 3.2 x 2.5 mm in dimension.  I then postdilated the stent with a 3 x 15 mm noncompliant balloon at 14 shima.  Final angiography showed EDITH-3 flow and 0% stenosis in the RCA.    All the catheters were exchanged over a wire and subsequently removed. Angiogram of the femoral access site was obtained and did not show complications. The patient tolerated the procedure well without any complications. The pictures were reviewed at the end of the procedure. A 6 [place] Angio-Seal closure device was applied to achieve hemostasis.    ESTIMATED BLOOD LOSS:  10 ml    COMPLICATIONS:  None    PROCEDURE DATA:  Contrast Used: 50 mL  Sedation Time: 45 minutes    IMPRESSIONS  Patent stents in the left circumflex and obtuse marginal 1.  IVUS guided PCI of mid RCA with placement of drug-eluting stent.  Normal LVEDP    RECOMMENDATIONS  -Start dual antiplatelet therapy.  -High intensity statin, beta-blocker.  - Referral to cardiac rehab         Other:         ASSESSMENT & PLAN:    Principal Problem:    Sepsis without acute organ dysfunction  Active Problems:    Coronary arteriosclerosis    Type 2 diabetes mellitus    Diabetic  neuropathy    Essential hypertension    Hyperlipidemia    Postoperative hypothyroidism    Ex-smoker    History of thyroid cancer    Kidney stones    UTI (urinary tract infection), bacterial      Sepsis secondary to UTI  Shock state has resolved  Doff midodrine now  On ceftriaxone     Hematuria, nephrolithiasis   Status post lithotripsy and stent placement on the left side.  Status post staged procedures on 10/18/2023 leading to renal hemorrhage  Postprocedure sepsis and anemia of blood loss requiring blood transfusion.  H&H stable now  Plan for repeat cystoscopy today     Atrial fibrillation  Currently in sinus rhythm   Continue metoprolol for rate control  Replace magnesium per protocol  [id number] score of 6  Conitnue Eliquis 5 mg oral twice daily (ok'd per urology)     CAD  Status post PCI of the RCA in May 2023  DAPT was held due to renal hemorrhage  Plavix was resumed on 10/23/23 and patient is tolerating.   We will stop aspirin given her need for anticoagulation for her A-fib     Primary Hypertension, chronic  Resume lisinopril as hypotension has resolved and patient is now hypertensive  Also on beta blocker  Increase lisinopril today     Hyperlipidemia  Continue Crestor  LDL is 55     Type 2 Diabetes mellitus complicated with diabetic neuropathy   Uncontrolled diabetes  A1c has improved from 11 to 7.6 now  On Lantus and SSI  On gabapentin      Hypothyroidism  Postoperative, history of thyroid cancer  Continue levothyroxine        Nuria Mcgraw MD  10/25/23  14:52 EDT

## 2023-10-25 NOTE — DISCHARGE SUMMARY
Date of Discharge:  10/25/2023    Discharge Diagnosis:   **Sepsis without acute organ dysfunction [A41.9]   Kidney stones [N20.0]   UTI (urinary tract infection), bacterial [N39.0, A49.9]   History of thyroid cancer [Z85.850]   Type 2 diabetes mellitus [E11.9]   Diabetic neuropathy [E11.40]   Essential hypertension [I10]   Hyperlipidemia [E78.5]   Postoperative hypothyroidism [E89.0]   Coronary arteriosclerosis [I25.10]   Ex-smoker [Z87.891]   Alcohol use  Supraventricular tachycardia  Anemia due to urinary blood loss  Paroxysmal atrial fib  Secondary hypercoagulable state due to atrial fibs    Presenting Problem/History of Present Illness  Active Hospital Problems    Diagnosis  POA    **Sepsis without acute organ dysfunction [A41.9]  Yes    Kidney stones [N20.0]  Yes    UTI (urinary tract infection), bacterial [N39.0, A49.9]  Yes    History of thyroid cancer [Z85.850]  Not Applicable    Type 2 diabetes mellitus [E11.9]  Yes    Diabetic neuropathy [E11.40]  Yes    Essential hypertension [I10]  Yes    Hyperlipidemia [E78.5]  Yes    Postoperative hypothyroidism [E89.0]  Yes    Coronary arteriosclerosis [I25.10]  Yes    Ex-smoker [Z87.891]  Not Applicable      Resolved Hospital Problems   No resolved problems to display.          Hospital Course  Patient is a 82 y.o. female with multiple medical problems including recurrent kidney stones and UTIs presented to the hospital for outpatient lithotripsy.  She was found to have a large left renal stone that required a staged procedure along with stent placement.  She was admitted to the hospital.  Postoperatively she had a run of supraventricular tachycardia which was treated with adenosine and then Cardizem.  Shortly thereafter she developed fever and hypotension.  She was septic and required Levophed, fluid resuscitation and broad-spectrum antibiotics.  She was noted to have hemorrhage in her kidney.  Hemoglobin dropped from 11-8.4.  She had multiple electrolyte  abnormalities including hypokalemia hypocalcemia and hypomagnesemia.  She improved with treatment of sepsis and was able to be moved out of the ICU.  She gradually became stronger.  Hemoglobin stabilized.  Ureteral stent was removed prior to discharge.  Blood cultures remain negative.  Urine culture did not grow any organisms but she clearly had evidence of urinary tract infection/pyelonephritis.    At the time of discharge she is feeling well.  She is hemodynamically stable and in sinus rhythm.  She is tolerating a regular diet.  She is deconditioned and in need of skilled rehab.  She is being transferred to [facility].  Antihypertensive medications have been restarted.  Plavix (previous coronary artery PCI in May 2023) and Eliquis (atrial fibs) have been restarted.  She will need weekly monitoring of hemoglobin.  She will complete a course of oral cefdinir to complete treatment of urosepsis.  There is concern for alcohol abuse and so she has been started on thiamine and folic acid.  She will have follow-up with her PCP after released from rehab.  She has scheduled follow-up with cardiology next month.  She will need an appointment scheduled with urologist.    Procedures Performed    Procedure(s):  CYSTOSCOPY URETEROSCOPY RETROGRADE PYELOGRAM HOLMIUM LASER STENT INSERTION  -------------------       Consults:   Consults       Date and Time Order Name Status Description    10/22/2023  1:15 AM Inpatient Cardiology Consult Completed     10/18/2023  5:14 PM Inpatient Hospitalist Consult              Pertinent Test Results:    Lab Results (most recent)       Procedure Component Value Units Date/Time    POC Glucose Once [[id number]]  (Abnormal) Collected: 10/25/23 1107    Specimen: Blood Updated: 10/25/23 1109     Glucose 190 mg/dL      Comment: Serial Number: [id number]:  [id number]       Phosphorus [[id number]]  (Abnormal) Collected: 10/25/23 1010    Specimen: Blood Updated: 10/25/23 1043     Phosphorus 2.1 mg/dL      Potassium [[id number]]  (Normal) Collected: 10/25/23 1010    Specimen: Blood Updated: 10/25/23 1043     Potassium 4.4 mmol/L      Comment: Slight hemolysis detected by analyzer. Results may be affected.       POC Glucose Once [[id number]]  (Abnormal) Collected: 10/25/23 0704    Specimen: Blood Updated: 10/25/23 0706     Glucose 190 mg/dL      Comment: Serial Number: [id number]:  [id number]       Magnesium [[id number]]  (Normal) Collected: 10/24/23 2321    Specimen: Blood Updated: 10/25/23 0000     Magnesium 2.2 mg/dL     Phosphorus [[id number]]  (Abnormal) Collected: 10/24/23 2321    Specimen: Blood Updated: 10/25/23 0000     Phosphorus 1.7 mg/dL     CBC & Differential [[id number]]  (Abnormal) Collected: 10/24/23 2321    Specimen: Blood Updated: 10/24/23 2359    Narrative:      The following orders were created for panel order CBC & Differential.  Procedure                               Abnormality         Status                     ---------                               -----------         ------                     CBC Auto Differential[[id number]]        Abnormal            Final result               Scan Slide[[id number]]                                       Final result                 Please view results for these tests on the individual orders.    CBC Auto Differential [[id number]]  (Abnormal) Collected: 10/24/23 2321    Specimen: Blood Updated: 10/24/23 2359     WBC 12.40 10*3/mm3      RBC 3.53 10*6/mm3      Hemoglobin 9.7 g/dL      Hematocrit 30.4 %      MCV 86.1 fL      MCH 27.5 pg      MCHC 32.0 g/dL      RDW 18.6 %      RDW-SD 55.1 fl      MPV 10.2 fL      Platelets 185 10*3/mm3     Narrative:      The previously reported component NRBC is no longer being reported. Previous result was 0.1 /100 WBC (Reference Range: 0.0-0.2 /100 WBC) on 10/24/2023 at 2341 EDT.    Scan Slide [[id number]] Collected: 10/24/23 2321    Specimen: Blood Updated: 10/24/23 2359     Scan Slide --     Comment: See Manual  Differential Results       Manual Differential [[id number]]  (Abnormal) Collected: 10/24/23 2321    Specimen: Blood Updated: 10/24/23 2359     Neutrophil % 60.0 %      Lymphocyte % 7.0 %      Monocyte % 5.0 %      Bands %  13.0 %      Metamyelocyte % 10.0 %      Atypical Lymphocyte % 5.0 %      Neutrophils Absolute 9.05 10*3/mm3      Lymphocytes Absolute 1.49 10*3/mm3      Monocytes Absolute 0.62 10*3/mm3      Anisocytosis Slight/1+     Microcytes Slight/1+     WBC Morphology Normal     Platelet Estimate Adequate     Large Platelets Slight/1+    Comprehensive Metabolic Panel [[id number]]  (Abnormal) Collected: 10/24/23 2321    Specimen: Blood Updated: 10/24/23 2355     Glucose 217 mg/dL      BUN 8 mg/dL      Creatinine 0.65 mg/dL      Sodium 140 mmol/L      Potassium 3.6 mmol/L      Chloride 108 mmol/L      CO2 21.0 mmol/L      Calcium 7.3 mg/dL      Total Protein 5.2 g/dL      Albumin 2.9 g/dL      ALT (SGPT) 31 U/L      AST (SGOT) 19 U/L      Alkaline Phosphatase 233 U/L      Total Bilirubin 0.5 mg/dL      Globulin 2.3 gm/dL      A/G Ratio 1.3 g/dL      BUN/Creatinine Ratio 12.3     Anion Gap 11.0 mmol/L      eGFR 86.9 mL/min/1.73     Narrative:      GFR Normal >60  Chronic Kidney Disease <60  Kidney Failure <15    The GFR formula is only valid for adults with stable renal function between ages 18 and 70.    CBC & Differential [[id number]]  (Abnormal) Collected: 10/24/23 0511    Specimen: Blood Updated: 10/24/23 0647    Narrative:      The following orders were created for panel order CBC & Differential.  Procedure                               Abnormality         Status                     ---------                               -----------         ------                     CBC Auto Differential[[id number]]        Abnormal            Final result               Scan Slide[[id number]]                                       Final result                 Please view results for these tests on the individual orders.    CBC  Auto Differential [[id number]]  (Abnormal) Collected: 10/24/23 0511    Specimen: Blood Updated: 10/24/23 0647     WBC 11.00 10*3/mm3      RBC 3.33 10*6/mm3      Hemoglobin 9.3 g/dL      Hematocrit 28.6 %      MCV 86.0 fL      MCH 28.0 pg      MCHC 32.5 g/dL      RDW 18.7 %      RDW-SD 55.6 fl      MPV 10.5 fL      Platelets 124 10*3/mm3     Narrative:      The previously reported component NRBC is no longer being reported. Previous result was 0.0 /100 WBC (Reference Range: 0.0-0.2 /100 WBC) on 10/24/2023 at 0528 EDT.    Scan Slide [[id number]] Collected: 10/24/23 0511    Specimen: Blood Updated: 10/24/23 0647     Scan Slide --     Comment: See Manual Differential Results       Manual Differential [[id number]]  (Abnormal) Collected: 10/24/23 0511    Specimen: Blood Updated: 10/24/23 0647     Neutrophil % 63.0 %      Lymphocyte % 13.0 %      Monocyte % 9.0 %      Eosinophil % 1.0 %      Basophil % 1.0 %      Bands %  7.0 %      Metamyelocyte % 4.0 %      Blasts % 2.0 %      Neutrophils Absolute 7.70 10*3/mm3      Lymphocytes Absolute 1.43 10*3/mm3      Monocytes Absolute 0.99 10*3/mm3      Eosinophils Absolute 0.11 10*3/mm3      Basophils Absolute 0.11 10*3/mm3      nRBC 1.0 /100 WBC      Anisocytosis Slight/1+     Dacrocytes Slight/1+     Poikilocytes Slight/1+     Polychromasia Slight/1+     WBC Morphology Normal     Platelet Estimate Decreased    Narrative:      Reviewed by Pathologist within the past 30 days on 102023 .      Magnesium [[id number]]  (Abnormal) Collected: 10/24/23 0511    Specimen: Blood Updated: 10/24/23 0556     Magnesium 1.5 mg/dL     Comprehensive Metabolic Panel [[id number]]  (Abnormal) Collected: 10/24/23 0511    Specimen: Blood Updated: 10/24/23 0550     Glucose 175 mg/dL      BUN 10 mg/dL      Creatinine 0.67 mg/dL      Sodium 143 mmol/L      Potassium 3.7 mmol/L      Chloride 112 mmol/L      CO2 22.0 mmol/L      Calcium 7.3 mg/dL      Total Protein 5.2 g/dL      Albumin 2.8 g/dL      ALT (SGPT)  38 U/L      AST (SGOT) 22 U/L      Alkaline Phosphatase 314 U/L      Total Bilirubin 0.4 mg/dL      Globulin 2.4 gm/dL      A/G Ratio 1.2 g/dL      BUN/Creatinine Ratio 14.9     Anion Gap 9.0 mmol/L      eGFR 86.3 mL/min/1.73     Narrative:      GFR Normal >60  Chronic Kidney Disease <60  Kidney Failure <15    The GFR formula is only valid for adults with stable renal function between ages 18 and 70.    Blood Culture - Blood, Arm, Left [[id number]]  (Normal) Collected: 10/18/23 1901    Specimen: Blood from Arm, Left Updated: 10/23/23 1915     Blood Culture No growth at 5 days    Narrative:      Less than seven (7) mL's of blood was collected.  Insufficient quantity may yield false negative results.    Blood Culture - Blood, Hand, Left [[id number]]  (Normal) Collected: 10/18/23 1905    Specimen: Blood from Hand, Left Updated: 10/23/23 1915     Blood Culture No growth at 5 days    Narrative:      Less than seven (7) mL's of blood was collected.  Insufficient quantity may yield false negative results.    Ferritin [[id number]]  (Abnormal) Collected: 10/22/23 0532    Specimen: Blood Updated: 10/22/23 1454     Ferritin 323.70 ng/mL     Narrative:      Results may be falsely decreased if patient taking Biotin.      Reticulocytes [[id number]]  (Normal) Collected: 10/21/23 0537    Specimen: Blood Updated: 10/21/23 1440     Reticulocyte % 0.71 %      Reticulocyte Absolute 0.0215 10*6/mm3     Iron Profile [[id number]]  (Abnormal) Collected: 10/21/23 0537    Specimen: Blood Updated: 10/21/23 1238     Iron 6 mcg/dL      Iron Saturation (TSAT) 3 %      Transferrin 134 mg/dL      TIBC 200 mcg/dL     Blood Gas, Venous - [[id number]]  (Abnormal) Collected: 10/21/23 1113    Specimen: Venous Blood Updated: 10/21/23 1118     Site Left Brachial     pH, Venous 7.240 pH Units      pCO2, Venous 38.3 mm Hg      pO2, Venous 52.6 mm Hg      HCO3, Venous 16.4 mmol/L      Base Excess, Venous -10.2 mmol/L      Comment: Serial Number: [id number]:   [id number]        O2 Saturation, Venous 80.9 %      CO2 Content 17.6 mmol/L      Barometric Pressure for Blood Gas --     Comment: N/A        Modality Cannula    Pathology Consultation [[id number]] Collected: 10/20/23 0653    Specimen: Blood, Venous Line Updated: 10/20/23 1215     Final Diagnosis --     Leukocytosis with left shift  Normocytic anemia  Mild thrombocytopenia  No blasts identified       Case Report --     Surgical Pathology Report                         Case: [id number]                                  Authorizing Provider:  Surjit Morris MD    Collected:           10/20/2023 06:53 AM          Ordering Location:     [facility]       Received:            10/20/2023 07:37 AM                                 [facility]                                                          Pathologist:           Nestor Tuttle MD                                                             Specimen:    Blood, Venous Line                                                                         Urine Culture - Urine, Urine, Clean Catch [[id number]]  (Normal) Collected: 10/18/23 2223    Specimen: Urine, Clean Catch Updated: 10/20/23 1041     Urine Culture No growth    Path Consult Reflex [[id number]] Collected: 10/20/23 0653    Specimen: Blood Updated: 10/20/23 0736     Pathology Review Yes    Basic Metabolic Panel [[id number]]  (Abnormal) Collected: 10/20/23 0603    Specimen: Blood Updated: 10/20/23 0649     Glucose 94 mg/dL      BUN 30 mg/dL      Creatinine 0.94 mg/dL      Sodium 148 mmol/L      Potassium 4.2 mmol/L      Chloride 122 mmol/L      Comment: Result checked          CO2 18.0 mmol/L      Calcium 7.1 mg/dL      BUN/Creatinine Ratio 31.9     Anion Gap 8.0 mmol/L      eGFR 60.0 mL/min/1.73     Narrative:      GFR Normal >60  Chronic Kidney Disease <60  Kidney Failure <15    The GFR formula is only valid for adults with stable renal function between ages 18 and 70.    Hemoglobin & Hematocrit, Blood  [[id number]]  (Abnormal) Collected: 10/20/23 0027    Specimen: Blood Updated: 10/20/23 0035     Hemoglobin 10.6 g/dL      Hematocrit 32.8 %     Hemoglobin & Hematocrit, Blood [[id number]]  (Abnormal) Collected: 10/19/23 1749    Specimen: Blood Updated: 10/19/23 1801     Hemoglobin 8.9 g/dL      Hematocrit 28.6 %      Comment: Result checked         Ammonia [[id number]]  (Normal) Collected: 10/19/23 0937    Specimen: Blood Updated: 10/19/23 1005     Ammonia 14 umol/L     STAT Lactic Acid, Reflex [[id number]]  (Normal) Collected: 10/19/23 0109    Specimen: Blood Updated: 10/19/23 0149     Lactate 1.4 mmol/L     MRSA Screen, PCR (Inpatient) - Swab, Nares [[id number]]  (Normal) Collected: 10/18/23 2223    Specimen: Swab from Nares Updated: 10/19/23 0105     MRSA PCR No MRSA Detected    Narrative:      The negative predictive value of this diagnostic test is high and should only be used to consider de-escalating anti-MRSA therapy. A positive result may indicate colonization with MRSA and must be correlated clinically.    Urinalysis With Microscopic If Indicated (No Culture) - Urine, Clean Catch [[id number]]  (Abnormal) Collected: 10/18/23 2223    Specimen: Urine, Clean Catch Updated: 10/18/23 2339     Color, UA Red     Comment: Any Substance that causes an abnormal urine color can alter the accuracy of the chemical reactions.        Appearance, UA Turbid     pH, UA 7.5     Specific Gravity, UA 1.025     Glucose, UA Negative     Ketones, UA Negative     Bilirubin, UA Negative     Blood, UA Large (3+)     Protein, UA >=300 mg/dL (3+)     Leuk Esterase, UA --     Comment: The Leukoesterase test cannot be performed due to the centrifugation of the specimen.           Nitrite, UA Negative     Urobilinogen, UA 0.2 E.U./dL    Urinalysis, Microscopic Only - Urine, Clean Catch [[id number]]  (Abnormal) Collected: 10/18/23 2223    Specimen: Urine, Clean Catch Updated: 10/18/23 2339     RBC, UA Too Numerous to Count /HPF      WBC, UA 21-50  /HPF      Bacteria, UA Trace /HPF      Squamous Epithelial Cells, UA None Seen /HPF      Hyaline Casts, UA 3-6 /LPF      Amorphous Crystals, UA Large/3+ /HPF      Methodology Automated Microscopy    Extra Tubes [[id number]] Collected: 10/18/23 2202    Specimen: Blood Updated: 10/18/23 2315    Narrative:      The following orders were created for panel order Extra Tubes.  Procedure                               Abnormality         Status                     ---------                               -----------         ------                     Gold Top - SST[[id number]]                                   Final result               Light Blue Top[[id number]]                                   Final result                 Please view results for these tests on the individual orders.    Gold Top - SST [[id number]] Collected: 10/18/23 2202    Specimen: Blood Updated: 10/18/23 2315     Extra Tube Hold for add-ons.     Comment: Auto resulted.       Light Blue Top [[id number]] Collected: 10/18/23 2202    Specimen: Blood Updated: 10/18/23 2315     Extra Tube Hold for add-ons.     Comment: Auto resulted       Protime-INR [[id number]]  (Abnormal) Collected: 10/18/23 2202    Specimen: Blood Updated: 10/18/23 2240     Protime 14.5 Seconds      INR 1.36    T4, Free [[id number]]  (Abnormal) Collected: 10/18/23 2051    Specimen: Blood Updated: 10/18/23 2238     Free T4 1.72 ng/dL     Narrative:      Results may be falsely increased if patient taking Biotin.      STAT Lactic Acid, Reflex [[id number]]  (Abnormal) Collected: 10/18/23 2202    Specimen: Blood Updated: 10/18/23 2238     Lactate 2.9 mmol/L     Potassium [[id number]]  (Abnormal) Collected: 10/18/23 2202    Specimen: Blood Updated: 10/18/23 2232     Potassium 2.8 mmol/L     POCT Electrolytes +HGB +HCT [[id number]]  (Abnormal) Collected: 10/18/23 2156    Specimen: Arterial Blood Updated: 10/18/23 2225     Sodium 139 mmol/L      POC Potassium 2.6 mmol/L      Ionized Calcium 1.02 mmol/L       Comment: Serial Number: [id number]:  [id number]        Glucose 96 mg/dL      Hematocrit 24 %      Hemoglobin 8.3 g/dL     POC Creatinine [[id number]] Collected: 10/18/23 2156    Specimen: Arterial Blood Updated: 10/18/23 2201     Creatinine 0.91 mg/dL      Comment: Serial Number: [id number]  [id number]        eGFR 62.3 mL/min/1.73     Blood Gas, Arterial - [[id number]]  (Abnormal) Collected: 10/18/23 2156    Specimen: Arterial Blood Updated: 10/18/23 2201     Site Right Radial     Genrty's Test Positive     pH, Arterial 7.327 pH units      pCO2, Arterial 42.8 mm Hg      pO2, Arterial 61.1 mm Hg      HCO3, Arterial 22.4 mmol/L      Base Excess, Arterial -3.4 mmol/L      Comment: Serial Number: [id number]:  [id number]        O2 Saturation, Arterial 89.1 %      Barometric Pressure for Blood Gas --     Comment: N/A        Modality Cannula     FIO2 32 %      Hemodilution No    POC Lactate [[id number]]  (Normal) Collected: 10/18/23 2156    Specimen: Arterial Blood Updated: 10/18/23 2201     Lactate 1.8 mmol/L      Comment: Serial Number: [id number]:  [id number]       High Sensitivity Troponin T 2Hr [[id number]]  (Abnormal) Collected: 10/18/23 2051    Specimen: Blood Updated: 10/18/23 2135     HS Troponin T 19 ng/L      Troponin T Delta 2 ng/L     Narrative:      High Sensitive Troponin T Reference Range:  <10.0 ng/L- Negative Female for AMI  <15.0 ng/L- Negative Male for AMI  >=10 - Abnormal Female indicating possible myocardial injury.  >=15 - Abnormal Male indicating possible myocardial injury.   Clinicians would have to utilize clinical acumen, EKG, Troponin, and serial changes to determine if it is an Acute Myocardial Infarction or myocardial injury due to an underlying chronic condition.         BNP [[id number]]  (Normal) Collected: 10/18/23 1901    Specimen: Blood Updated: 10/18/23 1951     proBNP 1,290.0 pg/mL     Narrative:      This assay is used as an aid in the diagnosis of individuals suspected of having heart  "failure. It can be used as an aid in the diagnosis of acute decompensated heart failure (ADHF) in patients presenting with signs and symptoms of ADHF to the emergency department (ED). In addition, NT-proBNP of <300 pg/mL indicates ADHF is not likely.    Age Range Result Interpretation  NT-proBNP Concentration (pg/mL:      <50             Positive            >450                   Gray                 300-450                    Negative             <300    50-75           Positive            >900                  Gray                300-900                  Negative            <300      >75             Positive            >1800                  Gray                300-1800                  Negative            <300    Procalcitonin [[id number]]  (Abnormal) Collected: 10/18/23 1901    Specimen: Blood Updated: 10/18/23 1951     Procalcitonin 93.69 ng/mL     Narrative:      As a Marker for Sepsis (Non-Neonates):    1. <0.5 ng/mL represents a low risk of severe sepsis and/or septic shock.  2. >2 ng/mL represents a high risk of severe sepsis and/or septic shock.    As a Marker for Lower Respiratory Tract Infections that require antibiotic therapy:    PCT on Admission    Antibiotic Therapy       6-12 Hrs later    >0.5                Strongly Recommended  >0.25 - <0.5        Recommended   0.1 - 0.25          Discouraged              Remeasure/reassess PCT  <0.1                Strongly Discouraged     Remeasure/reassess PCT    As 28 day mortality risk marker: \"Change in Procalcitonin Result\" (>80% or <=80%) if Day 0 (or Day 1) and Day 4 values are available. Refer to http://www.[employer]-pct-calculator.com    Change in PCT <=80%  A decrease of PCT levels below or equal to 80% defines a positive change in PCT test result representing a higher risk for 28-day all-cause mortality of patients diagnosed with severe sepsis for septic shock.    Change in PCT >80%  A decrease of PCT levels of more than 80% defines a negative change in " PCT result representing a lower risk for 28-day all-cause mortality of patients diagnosed with severe sepsis or septic shock.       High Sensitivity Troponin T [[id number]]  (Abnormal) Collected: 10/18/23 1901    Specimen: Blood Updated: 10/18/23 1951     HS Troponin T 17 ng/L     Narrative:      High Sensitive Troponin T Reference Range:  <10.0 ng/L- Negative Female for AMI  <15.0 ng/L- Negative Male for AMI  >=10 - Abnormal Female indicating possible myocardial injury.  >=15 - Abnormal Male indicating possible myocardial injury.   Clinicians would have to utilize clinical acumen, EKG, Troponin, and serial changes to determine if it is an Acute Myocardial Infarction or myocardial injury due to an underlying chronic condition.         TSH [[id number]]  (Abnormal) Collected: 10/18/23 1901    Specimen: Blood Updated: 10/18/23 1951     TSH 4.240 uIU/mL     Lipid Panel [[id number]] Collected: 10/18/23 1901    Specimen: Blood Updated: 10/18/23 1947     Total Cholesterol 98 mg/dL      Triglycerides 81 mg/dL      HDL Cholesterol 54 mg/dL      LDL Cholesterol  28 mg/dL      VLDL Cholesterol 16 mg/dL      LDL/HDL Ratio 0.51    Narrative:      Cholesterol Reference Ranges  (U.S. Department of Health and Human Services ATP III Classifications)    Desirable          <200 mg/dL  Borderline High    200-239 mg/dL  High Risk          >240 mg/dL      Triglyceride Reference Ranges  (U.S. Department of Health and Human Services ATP III Classifications)    Normal           <150 mg/dL  Borderline High  150-199 mg/dL  High             200-499 mg/dL  Very High        >500 mg/dL    HDL Reference Ranges  (U.S. Department of Health and Human Services ATP III Classifications)    Low     <40 mg/dl (major risk factor for CHD)  High    >60 mg/dl ('negative' risk factor for CHD)        LDL Reference Ranges  (U.S. Department of Health and Human Services ATP III Classifications)    Optimal          <100 mg/dL  Near Optimal     100-129  mg/dL  Borderline High  130-159 mg/dL  High             160-189 mg/dL  Very High        >189 mg/dL    CK [[id number]]  (Normal) Collected: 10/18/23 1901    Specimen: Blood Updated: 10/18/23 1947     Creatine Kinase 96 U/L     Amylase [[id number]]  (Normal) Collected: 10/18/23 1901    Specimen: Blood Updated: 10/18/23 1947     Amylase 38 U/L     Hemoglobin A1c [[id number]]  (Abnormal) Collected: 10/18/23 1904    Specimen: Blood Updated: 10/18/23 1946     Hemoglobin A1C 7.60 %     Lactic Acid, Plasma [[id number]]  (Abnormal) Collected: 10/18/23 1901    Specimen: Blood Updated: 10/18/23 1944     Lactate 4.7 mmol/L     Calcium, Ionized [[id number]]  (Abnormal) Collected: 10/18/23 1904    Specimen: Blood Updated: 10/18/23 1933     Ionized Calcium 1.00 mmol/L     [facility] ANALYSIS - Calculus, Ureter, Left [[id number]] Collected: 10/18/23 1304    Specimen: Calculus from Ureter, Left Updated: 10/18/23 1829    Basic Metabolic Panel [[id number]]  (Abnormal) Collected: 10/11/23 1343    Specimen: Blood Updated: 10/11/23 2025     Glucose 94 mg/dL      BUN 19 mg/dL      Creatinine 1.07 mg/dL      Sodium 142 mmol/L      Potassium 4.5 mmol/L      Chloride 105 mmol/L      CO2 25.8 mmol/L      Calcium 8.2 mg/dL      BUN/Creatinine Ratio 17.8     Anion Gap 11.2 mmol/L      eGFR 51.3 mL/min/1.73     Narrative:      GFR Normal >60  Chronic Kidney Disease <60  Kidney Failure <15    The GFR formula is only valid for adults with stable renal function between ages 18 and 70.    CBC (No Diff) [[id number]]  (Abnormal) Collected: 10/11/23 1343    Specimen: Blood Updated: 10/11/23 1947     WBC 10.96 10*3/mm3      RBC 3.75 10*6/mm3      Hemoglobin 10.3 g/dL      Hematocrit 31.7 %      MCV 84.5 fL      MCH 27.5 pg      MCHC 32.5 g/dL      RDW 15.7 %      RDW-SD 48.1 fl      MPV 11.3 fL      Platelets 464 10*3/mm3                          Condition on Discharge:  Improved, stable    Vital Signs  Temp:  [97.9 °F (36.6 °C)-98.1 °F (36.7 °C)] 97.9 °F (36.6  °C)  Heart Rate:  [] 104  Resp:  [15-24] 24  BP: (150-175)/(73-96) 159/96    Physical Exam:     General Appearance:    Alert, cooperative, in no acute distress   Head:    Normocephalic, without obvious abnormality, atraumatic   Eyes:            Lids and lashes normal, conjunctivae and sclerae normal, no   icterus, no pallor, corneas clear, PERRLA   Ears:    Ears appear intact with no abnormalities noted   Throat:   No oral lesions, no thrush, oral mucosa moist   Neck:   No adenopathy, supple, trachea midline, no thyromegaly, no   carotid bruit, no JVD   Lungs:     Clear to auscultation,respirations regular, even and                  unlabored    Heart:    Regular rhythm and normal rate, normal S1 and S2, no            murmur, no gallop, no rub, no click   Chest Wall:    No abnormalities observed   Abdomen:     Normal bowel sounds, no masses, no organomegaly, soft        non-tender, non-distended, no guarding, no rebound                tenderness   Extremities:   Moves all extremities well, no edema, no cyanosis, no             redness   Pulses:   Pulses palpable and equal bilaterally   Skin:   No bleeding, bruising or rash   Lymph nodes:   No palpable adenopathy   Neurologic:   Cranial nerves 2 - 12 grossly intact, sensation intact, DTR       present and equal bilaterally       Discharge Disposition  Skilled Nursing Facility (DC - External)    Discharge Medications     Discharge Medications        New Medications        Instructions Start Date   apixaban 5 MG tablet tablet  Commonly known as: ELIQUIS   5 mg, Oral, Every 12 Hours Scheduled      cefdinir 300 MG capsule  Commonly known as: OMNICEF   300 mg, Oral, 2 Times Daily      Diclofenac Sodium 1 % gel gel  Commonly known as: VOLTAREN   2 g, Topical, Every 6 Hours PRN      folic acid 1 MG tablet  Commonly known as: FOLVITE   1 mg, Oral, Daily   Start Date: October 26, 2023     insulin lispro 100 UNIT/ML injection  Commonly known as:  HUMALOG/ADMELOG  Replaces: Insulin Lispro (1 Unit Dial) 100 UNIT/ML solution pen-injector   2-7 Units, Subcutaneous, 3 Times Daily With Meals      metoprolol succinate XL 25 MG 24 hr tablet  Commonly known as: TOPROL-XL   75 mg, Oral, Every 24 Hours Scheduled      multivitamin with minerals tablet tablet   1 tablet, Oral, Daily   Start Date: October 26, 2023     polyethylene glycol 17 g packet  Commonly known as: MIRALAX   17 g, Oral, Daily   Start Date: October 26, 2023     thiamine 100 MG tablet  Commonly known as: VITAMIN B1   100 mg, Oral, Daily   Start Date: October 26, 2023            Changes to Medications        Instructions Start Date   lactulose 10 GM/15ML solution  Commonly known as: CHRONULAC  What changed: when to take this   10 g, Oral, 2 Times Daily      Tresiba FlexTouch 100 UNIT/ML solution pen-injector injection  Generic drug: insulin degludec  What changed: how much to take   15 Units, Subcutaneous, Every Night at Bedtime             Continue These Medications        Instructions Start Date   Accu-Chek Softclix Lancets lancets   1 each, Other, 3 Times Daily Before Meals, Dx: E11.65. Use as instructed      clopidogrel 75 MG tablet  Commonly known as: PLAVIX   75 mg, Oral, Daily      fluticasone 50 MCG/ACT nasal spray  Commonly known as: FLONASE   1 spray, Nasal, 2 Times Daily      gabapentin 300 MG capsule  Commonly known as: NEURONTIN   300 mg, Oral, Nightly      levothyroxine 112 MCG tablet  Commonly known as: Synthroid   112 mcg, Oral, Daily      lisinopril 5 MG tablet  Commonly known as: PRINIVIL,ZESTRIL   5 mg, Oral, Daily      metFORMIN 1000 MG tablet  Commonly known as: GLUCOPHAGE   1,000 mg, Oral, 2 Times Daily With Meals      oxybutynin 5 MG tablet  Commonly known as: DITROPAN   5 mg, Oral, 2 Times Daily      pantoprazole 40 MG EC tablet  Commonly known as: PROTONIX   40 mg, Oral, 2 Times Daily, Take dos      rosuvastatin 5 MG tablet  Commonly known as: CRESTOR   5 mg, Oral, Daily       traMADol 50 MG tablet  Commonly known as: ULTRAM   50 mg, Oral, 4 Times Daily PRN      traZODone 50 MG tablet  Commonly known as: DESYREL   50 mg, Oral, Nightly             Stop These Medications      atenolol 50 MG tablet  Commonly known as: TENORMIN     glucose blood test strip  Commonly known as: Accu-Chek Guide     Insulin Lispro (1 Unit Dial) 100 UNIT/ML solution pen-injector  Commonly known as: HumaLOG KwikPen  Replaced by: insulin lispro 100 UNIT/ML injection     Insulin Lispro 100 UNIT/ML solution cartridge     oxyCODONE-acetaminophen 5-325 MG per tablet  Commonly known as: PERCOCET              Discharge Diet:   Diet Instructions       Diet: Diabetic Diets; Consistent Carbohydrate; Regular Texture (IDDSI 7); Thin (IDDSI 0)      Discharge Diet: Diabetic Diets    Diabetic Diet: Consistent Carbohydrate    Texture: Regular Texture (IDDSI 7)    Fluid Consistency: Thin (IDDSI 0)            Activity at Discharge:   Activity Instructions       Up WIth Assist              Follow-up Appointments  Future Appointments   Date Time Provider Department Center   11/20/2023 10:20 AM LAB  [facility] None   11/27/2023 11:00 AM Driss Dan MD [facility]   11/30/2023  3:15 PM Nuria Mcgraw MD [facility]     Additional Instructions for the Follow-ups that You Need to Schedule       Discharge Follow-up with PCP   As directed       Currently Documented PCP:    Marisol Rodgers APRN    PCP Phone Number:    [phone]     Follow Up Details: After release from rehab        Discharge Follow-up with Specified Provider: [facility]; 2 Weeks   As directed      To: [facility]   Follow Up: 2 Weeks        Discharge Follow-up with Specified Provider: Keep appointment with cardiologist, Dr. Mcgraw, in November   As directed      To: Keep appointment with cardiologist, Dr. Mcrgaw, in November                Test Results Pending at Discharge  Pending Labs       Order Current Status    [facility]  ANALYSIS - Calculus, Ureter, Left In process             Sarah Beth Coronado MD  10/25/23  14:35 EDT    Time: Discharge 35 min

## 2023-11-02 NOTE — TELEPHONE ENCOUNTER
Spoke with the nurse, Tj Taylor on [address] and they don't need order faxed over to hold metformin for 7 days.

## 2023-11-17 ENCOUNTER — TRANSCRIBE ORDERS (OUTPATIENT)
Dept: ADMINISTRATIVE | Facility: HOSPITAL | Age: 83
End: 2023-11-17
Payer: MEDICARE

## 2023-11-17 DIAGNOSIS — N20.0 CALCULUS, KIDNEY: Primary | ICD-10-CM

## 2023-11-17 DIAGNOSIS — N39.0 URINARY TRACT INFECTION WITHOUT HEMATURIA, SITE UNSPECIFIED: ICD-10-CM

## 2023-11-20 ENCOUNTER — LAB (OUTPATIENT)
Dept: LAB | Facility: HOSPITAL | Age: 83
End: 2023-11-20
Payer: MEDICARE

## 2023-11-20 ENCOUNTER — TELEPHONE (OUTPATIENT)
Dept: LAB | Facility: HOSPITAL | Age: 83
End: 2023-11-20
Payer: MEDICARE

## 2023-11-20 DIAGNOSIS — Z79.4 TYPE 2 DIABETES MELLITUS WITH HYPERGLYCEMIA, WITH LONG-TERM CURRENT USE OF INSULIN: ICD-10-CM

## 2023-11-20 DIAGNOSIS — E89.0 POSTSURGICAL HYPOTHYROIDISM: ICD-10-CM

## 2023-11-20 DIAGNOSIS — E11.65 TYPE 2 DIABETES MELLITUS WITH HYPERGLYCEMIA, WITH LONG-TERM CURRENT USE OF INSULIN: ICD-10-CM

## 2023-11-20 LAB
ALBUMIN SERPL-MCNC: 4.7 G/DL (ref 3.5–5.2)
ALBUMIN/GLOB SERPL: 2.2 G/DL
ALP SERPL-CCNC: 68 U/L (ref 39–117)
ALT SERPL W P-5'-P-CCNC: 12 U/L (ref 1–33)
ANION GAP SERPL CALCULATED.3IONS-SCNC: 11 MMOL/L (ref 5–15)
AST SERPL-CCNC: 17 U/L (ref 1–32)
BILIRUB SERPL-MCNC: 0.5 MG/DL (ref 0–1.2)
BUN SERPL-MCNC: 13 MG/DL (ref 8–23)
BUN/CREAT SERPL: 15.1 (ref 7–25)
CALCIUM SPEC-SCNC: 8.5 MG/DL (ref 8.6–10.5)
CHLORIDE SERPL-SCNC: 103 MMOL/L (ref 98–107)
CO2 SERPL-SCNC: 28 MMOL/L (ref 22–29)
CREAT SERPL-MCNC: 0.86 MG/DL (ref 0.57–1)
EGFRCR SERPLBLD CKD-EPI 2021: 66.7 ML/MIN/1.73
GLOBULIN UR ELPH-MCNC: 2.1 GM/DL
GLUCOSE SERPL-MCNC: 152 MG/DL (ref 65–99)
POTASSIUM SERPL-SCNC: 4.3 MMOL/L (ref 3.5–5.2)
PROT SERPL-MCNC: 6.8 G/DL (ref 6–8.5)
SODIUM SERPL-SCNC: 142 MMOL/L (ref 136–145)
T4 FREE SERPL-MCNC: 2.06 NG/DL (ref 0.93–1.7)
TSH SERPL DL<=0.05 MIU/L-ACNC: 2.86 UIU/ML (ref 0.27–4.2)

## 2023-11-20 PROCEDURE — 80053 COMPREHEN METABOLIC PANEL: CPT

## 2023-11-20 PROCEDURE — 84443 ASSAY THYROID STIM HORMONE: CPT

## 2023-11-20 PROCEDURE — 84439 ASSAY OF FREE THYROXINE: CPT

## 2023-11-20 PROCEDURE — 36415 COLL VENOUS BLD VENIPUNCTURE: CPT

## 2023-11-27 ENCOUNTER — OFFICE VISIT (OUTPATIENT)
Dept: ENDOCRINOLOGY | Facility: CLINIC | Age: 83
End: 2023-11-27
Payer: MEDICARE

## 2023-11-27 VITALS
BODY MASS INDEX: 21.16 KG/M2 | HEART RATE: 82 BPM | OXYGEN SATURATION: 98 % | WEIGHT: 127 LBS | HEIGHT: 65 IN | SYSTOLIC BLOOD PRESSURE: 168 MMHG | DIASTOLIC BLOOD PRESSURE: 90 MMHG

## 2023-11-27 DIAGNOSIS — Z79.4 TYPE 2 DIABETES MELLITUS WITH HYPERGLYCEMIA, WITH LONG-TERM CURRENT USE OF INSULIN: Primary | ICD-10-CM

## 2023-11-27 DIAGNOSIS — E11.65 TYPE 2 DIABETES MELLITUS WITH HYPERGLYCEMIA, WITH LONG-TERM CURRENT USE OF INSULIN: Primary | ICD-10-CM

## 2023-11-27 DIAGNOSIS — I10 PRIMARY HYPERTENSION: ICD-10-CM

## 2023-11-27 DIAGNOSIS — I25.10 CORONARY ARTERY DISEASE INVOLVING NATIVE CORONARY ARTERY OF NATIVE HEART WITHOUT ANGINA PECTORIS: ICD-10-CM

## 2023-11-27 DIAGNOSIS — E89.0 POSTSURGICAL HYPOTHYROIDISM: ICD-10-CM

## 2023-11-27 DIAGNOSIS — E78.5 HYPERLIPIDEMIA, UNSPECIFIED HYPERLIPIDEMIA TYPE: ICD-10-CM

## 2023-11-27 PROCEDURE — 3080F DIAST BP >= 90 MM HG: CPT | Performed by: INTERNAL MEDICINE

## 2023-11-27 PROCEDURE — 99214 OFFICE O/P EST MOD 30 MIN: CPT | Performed by: INTERNAL MEDICINE

## 2023-11-27 PROCEDURE — 1159F MED LIST DOCD IN RCRD: CPT | Performed by: INTERNAL MEDICINE

## 2023-11-27 PROCEDURE — 1160F RVW MEDS BY RX/DR IN RCRD: CPT | Performed by: INTERNAL MEDICINE

## 2023-11-27 PROCEDURE — 3077F SYST BP >= 140 MM HG: CPT | Performed by: INTERNAL MEDICINE

## 2023-11-27 RX ORDER — INSULIN LISPRO 100 [IU]/ML
8 INJECTION, SOLUTION INTRAVENOUS; SUBCUTANEOUS
Qty: 15 ML | Refills: 3 | Status: SHIPPED | OUTPATIENT
Start: 2023-11-27

## 2023-11-27 RX ORDER — DOCUSATE SODIUM 100 MG/1
CAPSULE, LIQUID FILLED ORAL
COMMUNITY
Start: 2023-11-13

## 2023-11-27 RX ORDER — ACYCLOVIR 400 MG/1
1 TABLET ORAL
Qty: 1 EACH | Refills: 0 | Status: SHIPPED | OUTPATIENT
Start: 2023-11-27

## 2023-11-27 RX ORDER — INSULIN GLARGINE 100 [IU]/ML
15 INJECTION, SOLUTION SUBCUTANEOUS NIGHTLY
Qty: 15 ML | Refills: 5 | Status: SHIPPED | OUTPATIENT
Start: 2023-11-27

## 2023-11-27 RX ORDER — ACYCLOVIR 400 MG/1
1 TABLET ORAL
Qty: 3 EACH | Refills: 5 | Status: SHIPPED | OUTPATIENT
Start: 2023-11-27

## 2023-11-27 RX ORDER — LEVOTHYROXINE SODIUM 0.1 MG/1
100 TABLET ORAL DAILY
Qty: 30 TABLET | Refills: 11 | Status: SHIPPED | OUTPATIENT
Start: 2023-11-27 | End: 2024-11-26

## 2023-11-27 RX ORDER — INSULIN GLARGINE 100 [IU]/ML
INJECTION, SOLUTION SUBCUTANEOUS
COMMUNITY
Start: 2023-11-02 | End: 2023-11-27 | Stop reason: SDUPTHER

## 2023-11-27 RX ORDER — ATORVASTATIN CALCIUM 10 MG/1
TABLET, FILM COATED ORAL
COMMUNITY
Start: 2023-11-06

## 2023-11-27 RX ORDER — ONDANSETRON 4 MG/1
4 TABLET, FILM COATED ORAL EVERY 8 HOURS PRN
COMMUNITY

## 2023-11-27 NOTE — PATIENT INSTRUCTIONS
Please,     # Diabetes Management:     Please adjust insulin therapy as:     - Insulin Lantus (Slow acting insulin) 15 Units in the evening before bedtime.    - Insulin lispro / aspart (Fast acting / meal time insulin): as per correction scale     Meal time insulin need to be taken 15 mins before meal. You can bring your insulin with you if you are planning to eat out.     If you plan to miss the meal please miss the meal time insulin as well.     Also take following non-insulin therapies:  - Metformin 1000 mg twice a day     Check your blood glucose four times a day: before breakfast, before lunch, before dinner and before bedtime. Maintain blood glucose logs.     Use correction insulin if your blood is high along with your meal time insulin. Use this correction insulin before meal only, not to be taken without meal.     Corrections for High Blood Sugar  Use the following guide to “correct” for pre-meal high blood sugar.  This insulin will help “correct” the high reading.  You will still need to take as per scheduled meal insulin.     If your   Blood Sugar Reading is…. Number of additional   units of Insulin Lispro to Take…  (Correction)   100-150 Take 2 additional unit   151-200 Take 3 additional unit   201-250 Take 4 additional unit   251-300 Take 5 additional unit   301-350 Take 6 additional unit   351-400 Take 7 additional unit   More than 400 Take 8 additional unit         Low Blood Glucose:    - If you feel sweaty, anxious, shakiness, feel weak, trouble walking, feels like passing out or trouble seeing thing, please check your blood glucose and if its less than 70 or if your feel symptoms of low blood glucose then take one of the following:     *3 or 4 glucose tablets  *½ cup of juice or regular soda (not sugar-free)  *2 tablespoons of raisins  *4 or 5 saltine crackers  *1 tablespoon of sugar  *1 tablespoon of honey or corn syrup  *6 to 8 hard candies     #Levothyroxine Dosing:     - Change levothyroxine  therapy to 100 mcg one pill by mouth daily at 6:00 AM  - Take it every day, on an empty stomach, 30 minutes before eating  - Avoid taking it with iron, calcium, other medications (blocks absorption), wait at least 4 hrs after taking levothyroxine to take these medications  - If you miss a pill, you can take 2 the next day and return to schedule from next day     Repeat lab work fasting before next visit.    Follow-up in my clinic in 3 months.    Lab work before next visit.     Thank you for your visit today.     If you have any questions or concerns please feel free to reach out of the office.

## 2023-11-27 NOTE — PROGRESS NOTES
-----------------------------------------------------------------  ENDOCRINE CLINIC NOTE  -----------------------------------------------------------------        PATIENT NAME: Pamela Zavala  PATIENT : 1940 AGE: 82 y.o.  MRN NUMBER: [id number]  PRIMARY CARE: Marisol Rodgers APRN    ==========================================================================    CHIEF COMPLAINT: Type 2 Diabetes Management  DATE OF SERVICE: 23    ==========================================================================    HPI / SUBJECTIVE    82 y.o. female is seen in the clinic today for follow up for T2DM.  Diagnosed with type 2 diabetes around age 58.  Last visit on 2023.  Since last visit patient had episode of complicated UTI secondary to nephrolithiasis and currently following urology.  Was admitted to the hospital and since then patient is on adjusted insulin therapy.  Last known A1c 7.6% - [id number].  Patient current therapy includes:  Insulin Lantus 15 units nightly  Insulin NovoLog 2-7 units with sliding scale only  Metformin 1000 mg twice a day  Living in assisted living facility.  Consuming 3 meals a day along with bedtime snack.  Making blood sugar checks through fingersticks.  Patient is interested in CGM.  Last eye visit was around 5 years ago.  Underlying history of neuropathy in both feet and hands currently on gabapentin therapy.  Also maintained on rosuvastatin therapy.  Underlying history of coronary artery disease s/p PCI.  History of thyroid cancer at age 33 currently on levothyroxine replacement therapy.    ==========================================================================                                                PAST MEDICAL HISTORY    Past Medical History:   Diagnosis Date    Anemia     Arthritis     CAD (coronary artery disease)     Chronic back pain     Diabetic neuropathy     Essential hypertension     GERD (gastroesophageal reflux disease)     History of gastric  ulcer     History of stomach ulcers     HLD (hyperlipidemia)     Hypocalcemia     Hypoparathyroidism     Hypothyroidism     Insomnia     Kidney stones     Osteoporosis     Recurrent sinusitis     Renal disease     Thyroid cancer     Type 2 diabetes mellitus        ==========================================================================    PAST SURGICAL HISTORY    Past Surgical History:   Procedure Laterality Date    CARDIAC CATHETERIZATION N/A 2023    Procedure: Left Heart Cath and coronary angiogram;  Surgeon: John Baldwin MD;  Location: [facility] CATH INVASIVE LOCATION;  Service: Cardiovascular;  Laterality: N/A;    CORONARY STENT PLACEMENT      x 3    CYSTOSCOPY, URETEROSCOPY, RETROGRADE PYELOGRAM, STENT INSERTION Left 10/18/2023    Procedure: CYSTOSCOPY URETEROSCOPY RETROGRADE PYELOGRAM HOLMIUM LASER STENT INSERTION;  Surgeon: Juan Alaniz MD;  Location: [facility] MAIN OR;  Service: Urology;  Laterality: Left;    HYSTERECTOMY      LUNG LOBECTOMY      THYROIDECTOMY         ==========================================================================    FAMILY HISTORY    Family History   Problem Relation Age of Onset    Diabetes Mother     Cancer Father     Heart disease Sister     Diabetes Sister     Heart disease Maternal Grandmother        ==========================================================================    SOCIAL HISTORY    Social History     Socioeconomic History    Marital status:     Number of children: 2    Highest education level: 11th grade   Tobacco Use    Smoking status: Former     Packs/day: 2.00     Years: 30.00     Additional pack years: 0.00     Total pack years: 60.00     Types: Cigarettes     Quit date: 2000     Years since quittin.9     Passive exposure: Past    Smokeless tobacco: Never   Vaping Use    Vaping Use: Never used   Substance and Sexual Activity    Alcohol use: Yes     Alcohol/week: 1.0 standard drink of alcohol     Types: 1 Glasses of wine per week    Drug use:  Defer    Sexual activity: Defer       ==========================================================================    MEDICATIONS      Current Outpatient Medications:     Accu-Chek Softclix Lancets lancets, 1 each by Other route 3 (Three) Times a Day Before Meals. Dx: E11.65. Use as instructed, Disp: 100 each, Rfl: 2    apixaban (ELIQUIS) 5 MG tablet tablet, Take 1 tablet by mouth Every 12 (Twelve) Hours. Indications: Atrial Fibrillation, Disp: , Rfl:     atorvastatin (LIPITOR) 10 MG tablet, , Disp: , Rfl:     clopidogrel (PLAVIX) 75 MG tablet, Take 1 tablet by mouth Daily., Disp: , Rfl:     Diclofenac Sodium (VOLTAREN) 1 % gel gel, Apply 2 g topically to the appropriate area as directed Every 6 (Six) Hours As Needed (Knee pain)., Disp: , Rfl:     docusate sodium (COLACE) 100 MG capsule, , Disp: , Rfl:     fluticasone (FLONASE) 50 MCG/ACT nasal spray, 1 spray into the nostril(s) as directed by provider 2 (Two) Times a Day., Disp: , Rfl:     folic acid (FOLVITE) 1 MG tablet, Take 1 tablet by mouth Daily., Disp: , Rfl:     gabapentin (NEURONTIN) 300 MG capsule, Take 1 capsule by mouth Every Night., Disp: , Rfl:     insulin lispro (HUMALOG/ADMELOG) 100 UNIT/ML injection, Inject 2-7 Units under the skin into the appropriate area as directed 3 (Three) Times a Day With Meals., Disp: , Rfl:     Lantus SoloStar 100 UNIT/ML injection pen, , Disp: , Rfl:     lisinopril (PRINIVIL,ZESTRIL) 5 MG tablet, Take 1 tablet by mouth Daily., Disp: 90 tablet, Rfl: 3    metFORMIN (GLUCOPHAGE) 1000 MG tablet, Take 1 tablet by mouth 2 (Two) Times a Day With Meals., Disp: , Rfl:     metoprolol succinate XL (TOPROL-XL) 25 MG 24 hr tablet, Take 3 tablets by mouth Daily., Disp: , Rfl:     multivitamin with minerals tablet tablet, Take 1 tablet by mouth Daily., Disp: , Rfl:     ondansetron (ZOFRAN) 4 MG tablet, Take 1 tablet by mouth Every 8 (Eight) Hours As Needed for Nausea or Vomiting., Disp: , Rfl:     oxybutynin (DITROPAN) 5 MG tablet, Take 1  tablet by mouth 2 (Two) Times a Day., Disp: , Rfl:     pantoprazole (PROTONIX) 40 MG EC tablet, Take 1 tablet by mouth 2 (Two) Times a Day. Take dos, Disp: , Rfl:     polyethylene glycol (MIRALAX) 17 g packet, Take 17 g by mouth Daily., Disp: , Rfl:     thiamine (VITAMIN B1) 100 MG tablet, Take 1 tablet by mouth Daily., Disp: , Rfl:     traMADol (ULTRAM) 50 MG tablet, Take 1 tablet by mouth 4 (Four) Times a Day As Needed for Moderate Pain., Disp: 20 tablet, Rfl: 0    traZODone (DESYREL) 50 MG tablet, Take 1 tablet by mouth Every Night., Disp: , Rfl:     ==========================================================================    ALLERGIES    Allergies   Allergen Reactions    Penicillins Unknown - High Severity     Lips swell and hives    Shellfish-Derived Products Unknown - Low Severity    Sulfamethizole Unknown - Low Severity    Trimethoprim Unknown - Low Severity       ==========================================================================    OBJECTIVE    Vitals:    11/27/23 1103   BP: 168/90   Pulse: 82   SpO2: 98%       Body mass index is 21.13 kg/m².     General: Alert, cooperative, no acute distress  Thyroid:  no enlargement/tenderness/palpable nodules  Lungs: Clear to auscultation bilaterally, respirations unlabored  Heart: Regular rate and rhythm, S1 and S2 normal, no murmur, rub or gallop  Abdomen: Soft, NT, ND and Bowel sounds Positive  Extremities:  Extremities normal, atraumatic, no cyanosis or edema    ==========================================================================    LAB EVALUATION    Lab Results   Component Value Date    GLUCOSE 152 (H) 11/20/2023    BUN 13 11/20/2023    CREATININE 0.86 11/20/2023    BCR 15.1 11/20/2023    K 4.3 11/20/2023    CO2 28.0 11/20/2023    CALCIUM 8.5 (L) 11/20/2023    ALBUMIN 4.7 11/20/2023    AST 17 11/20/2023    ALT 12 11/20/2023    CHOL 98 10/18/2023    TRIG 81 10/18/2023    HDL 54 10/18/2023    LDL 28 10/18/2023     Lab Results   Component Value Date     HGBA1C 7.60 (H) 10/18/2023    HGBA1C 7.90 (H) 09/05/2023    HGBA1C 11.00 (H) 05/02/2023     Lab Results   Component Value Date    MICROALBUR 1.5 09/05/2023    CREATININE 0.86 11/20/2023     Lab Results   Component Value Date    TSH 2.860 11/20/2023    FREET4 2.06 (H) 11/20/2023       ==========================================================================    ASSESSMENT AND PLAN    # Type 2 diabetes with hyperglycemia but also concerns for hypoglycemia  # Hypertension  # Hyperlipidemia  # Coronary artery disease s/p PCI  - Reviewed blood glucose readings there is evidence of mild hyperglycemia postprandial  - Patient currently is within target limits for A1c which is to be maintained less than 8%  - There is no evidence of hypoglycemia  - Patient have a history significant for chronic constipation that is why she is not a good candidate for GLP-1 receptor agonist therapy and also have a history of recurrent UTIs now also have history of nephrolithiasis therefore not a good candidate for SGLT2 inhibitor therapy as well  - Patient is currently maintained on basal insulin therapy with metformin  - Currently is using sliding scale coverage starting at blood glucose 150  - We will adjust insulin lispro therapy and patient to follow-up in my clinic back again in 3 months time  - Dexcom ordered for CGM, sugar check ACHS  - New adjusted therapy:  Insulin Lantus 15 units at night  Insulin NovoLog   No insulin NovoLog or Humalog if blood sugar is less than 100 before meal  Otherwise we will start with 2 units of blood blood sugars between 100 and 150 and then 1 additional unit for every 50 above 150  Continue metformin therapy for now  - Ophthalmology referral provided    # History of thyroid cancer s/p surgery  # Postsurgical hypothyroidism  - Lab work reviewed and patient is currently maintained on 112 mcg, still have high free t4  - Thyroglobulin levels otherwise negative  - Adjusted therapy to 100 mcg daily  - Repeat  "thyroid work-up before next visit     Thank you for courtesy of consultation.    Return to clinic: 3 months    Entire assessment and plan was discussed and counseled the patient in detail to which patient verbalized understanding and agreed with care.  Answered all queries and concerns.    This note was created using voice recognition software and is inherently subject to errors including those of syntax and \"sound-alike\" substitutions which may escape proofreading.  In such instances, original meaning may be extrapolated by contextual derivation.    Note: Portions of this note may have been copied from previous notes but documentation have been reviewed and edited as necessary to support clinical decision making for today's visit.    ==========================================================================    INFORMATION PROVIDED TO PATIENT    Patient Instructions   Please,     # Diabetes Management:     Please adjust insulin therapy as:     - Insulin Lantus (Slow acting insulin) 15 Units in the evening before bedtime.    - Insulin lispro / aspart (Fast acting / meal time insulin): as per correction scale     Meal time insulin need to be taken 15 mins before meal. You can bring your insulin with you if you are planning to eat out.     If you plan to miss the meal please miss the meal time insulin as well.     Also take following non-insulin therapies:  - Metformin 1000 mg twice a day     Check your blood glucose four times a day: before breakfast, before lunch, before dinner and before bedtime. Maintain blood glucose logs.     Use correction insulin if your blood is high along with your meal time insulin. Use this correction insulin before meal only, not to be taken without meal.     Corrections for High Blood Sugar  Use the following guide to “correct” for pre-meal high blood sugar.  This insulin will help “correct” the high reading.  You will still need to take as per scheduled meal insulin.     If your   Blood " Sugar Reading is…. Number of additional   units of Insulin Lispro to Take…  (Correction)   100-150 Take 2 additional unit   151-200 Take 3 additional unit   201-250 Take 4 additional unit   251-300 Take 5 additional unit   301-350 Take 6 additional unit   351-400 Take 7 additional unit   More than 400 Take 8 additional unit         Low Blood Glucose:    - If you feel sweaty, anxious, shakiness, feel weak, trouble walking, feels like passing out or trouble seeing thing, please check your blood glucose and if its less than 70 or if your feel symptoms of low blood glucose then take one of the following:     *3 or 4 glucose tablets  *½ cup of juice or regular soda (not sugar-free)  *2 tablespoons of raisins  *4 or 5 saltine crackers  *1 tablespoon of sugar  *1 tablespoon of honey or corn syrup  *6 to 8 hard candies     #Levothyroxine Dosing:     - Change levothyroxine therapy to 100 mcg one pill by mouth daily at 6:00 AM  - Take it every day, on an empty stomach, 30 minutes before eating  - Avoid taking it with iron, calcium, other medications (blocks absorption), wait at least 4 hrs after taking levothyroxine to take these medications  - If you miss a pill, you can take 2 the next day and return to schedule from next day     Repeat lab work fasting before next visit.    Follow-up in my clinic in 3 months.    Lab work before next visit.     Thank you for your visit today.     If you have any questions or concerns please feel free to reach out of the office.          ==========================================================================  Driss Dan MD  Department of Endocrine, Diabetes and Metabolism  [facility] [place]  ==========================================================================

## 2023-11-29 NOTE — PROGRESS NOTES
Encounter Date:05/18/2023      Patient ID: Pamela Zavala is a 82 y.o. female.    Chief Complaint   Patient presents with    Follow-up     6 month F/U          History of Present Illness  Pamela Zavala is a 82 y.o. female with a history of coronary artery disease status post PCI to OM1 and OM 2 along with recent PCI this year to the RCA, hypertension, hyperlipidemia, Type 2 diabetes mellitus, former tobacco abuse, thyroid cancer status post thyroidectomy, and postoperative hypothyroidism, and recently diagnosed atrial fibrillation who presents for follow-up.  She recently had a cystoscopy and postop went into A-fib with RVR.  This was also complicated by sepsis.  She was started on AV chin blocking agents and Eliquis and eventually did convert back to sinus rhythm.  She was discharged on Eliquis.  She was initially sent to rehab and now she is back at her assisted living facility at the [facility].  She has been doing really well and feels like she is about 60% back to her baseline.  Denies any chest pain or shortness of breath.  Does feel fatigued sometimes.  Continues to work with physical therapy.       Previous note:   she had a positive stress test for which she underwent cardiac catheterization and PCI of the RCA with IVUS guidance with a Xience 2.25 x 33 mm stent postdilated with a 3.0 NC balloon.   The following portions of the patient's history were reviewed and updated as appropriate: allergies, current medications, past family history, past medical history, past social history, past surgical history, and problem list.    Review of Systems   Constitutional: Negative for malaise/fatigue.   Cardiovascular:  Positive for chest pain and leg swelling. Negative for dyspnea on exertion and palpitations.   Respiratory:  Negative for cough and shortness of breath.    Gastrointestinal:  Positive for nausea. Negative for abdominal pain and vomiting.   Neurological:  Positive for numbness. Negative for dizziness, focal  weakness, headaches and light-headedness.   All other systems reviewed and are negative.        Current Outpatient Medications:     Accu-Chek Softclix Lancets lancets, 1 each by Other route 3 (Three) Times a Day Before Meals. Dx: E11.65. Use as instructed, Disp: 100 each, Rfl: 2    apixaban (ELIQUIS) 5 MG tablet tablet, Take 1 tablet by mouth Every 12 (Twelve) Hours. Indications: Atrial Fibrillation (Patient taking differently: Take 1 tablet by mouth Daily. Indications: Atrial Fibrillation), Disp: , Rfl:     atorvastatin (LIPITOR) 10 MG tablet, , Disp: , Rfl:     clopidogrel (PLAVIX) 75 MG tablet, Take 1 tablet by mouth Daily., Disp: , Rfl:     Continuous Blood Gluc  (Dexcom G7 ) device, Use 1 each 4 (Four) Times a Day Before Meals & at Bedtime., Disp: 1 each, Rfl: 0    Continuous Blood Gluc Sensor (Dexcom G7 Sensor) misc, Use 1 each 4 (Four) Times a Day Before Meals & at Bedtime. Change every 10 days, Disp: 3 each, Rfl: 5    Diclofenac Sodium (VOLTAREN) 1 % gel gel, Apply 2 g topically to the appropriate area as directed Every 6 (Six) Hours As Needed (Knee pain)., Disp: , Rfl:     docusate sodium (COLACE) 100 MG capsule, , Disp: , Rfl:     fluticasone (FLONASE) 50 MCG/ACT nasal spray, 1 spray into the nostril(s) as directed by provider 2 (Two) Times a Day., Disp: , Rfl:     folic acid (FOLVITE) 1 MG tablet, Take 1 tablet by mouth Daily., Disp: , Rfl:     gabapentin (NEURONTIN) 300 MG capsule, Take 1 capsule by mouth Every Night., Disp: , Rfl:     GNP ClearLax 17 GM/SCOOP powder, , Disp: , Rfl:     insulin lispro (HUMALOG/ADMELOG) 100 UNIT/ML injection, Inject 8 Units under the skin into the appropriate area as directed 3 (Three) Times a Day With Meals. (Patient taking differently: Inject  under the skin into the appropriate area as directed 3 (Three) Times a Day With Meals.  Based on SSI), Disp: 15 mL, Rfl: 3    Lantus SoloStar 100 UNIT/ML injection pen, Inject 15 Units under the skin into the  appropriate area as directed Every Night., Disp: 15 mL, Rfl: 5    levothyroxine (Synthroid) 100 MCG tablet, Take 1 tablet by mouth Daily., Disp: 30 tablet, Rfl: 11    lisinopril (PRINIVIL,ZESTRIL) 5 MG tablet, Take 1 tablet by mouth Daily., Disp: 90 tablet, Rfl: 3    metFORMIN (GLUCOPHAGE) 1000 MG tablet, Take 1 tablet by mouth 2 (Two) Times a Day With Meals., Disp: , Rfl:     metoprolol succinate XL (TOPROL-XL) 25 MG 24 hr tablet, Take 3 tablets by mouth Daily., Disp: , Rfl:     multivitamin with minerals tablet tablet, Take 1 tablet by mouth Daily., Disp: , Rfl:     ondansetron (ZOFRAN) 4 MG tablet, Take 1 tablet by mouth Every 8 (Eight) Hours As Needed for Nausea or Vomiting., Disp: , Rfl:     oxybutynin (DITROPAN) 5 MG tablet, Take 1 tablet by mouth 2 (Two) Times a Day., Disp: , Rfl:     pantoprazole (PROTONIX) 40 MG EC tablet, Take 1 tablet by mouth 2 (Two) Times a Day. Take dos, Disp: , Rfl:     polyethylene glycol (MIRALAX) 17 g packet, Take 17 g by mouth Daily., Disp: , Rfl:     sucralfate (CARAFATE) 1 GM/10ML suspension, Take 10 mL by mouth., Disp: , Rfl:     thiamine (VITAMIN B1) 100 MG tablet, Take 1 tablet by mouth Daily., Disp: , Rfl:     traMADol (ULTRAM) 50 MG tablet, Take 1 tablet by mouth 4 (Four) Times a Day As Needed for Moderate Pain., Disp: 20 tablet, Rfl: 0    traZODone (DESYREL) 50 MG tablet, Take 1 tablet by mouth Every Night., Disp: , Rfl:     Allergies   Allergen Reactions    Penicillins Unknown - High Severity     Lips swell and hives    Shellfish-Derived Products Unknown - Low Severity    Sulfamethizole Unknown - Low Severity    Trimethoprim Unknown - Low Severity       Family History   Problem Relation Age of Onset    Diabetes Mother     Cancer Father     Heart disease Sister     Diabetes Sister     Heart disease Maternal Grandmother        Past Surgical History:   Procedure Laterality Date    CARDIAC CATHETERIZATION N/A 05/03/2023    Procedure: Left Heart Cath and coronary angiogram;   Surgeon: John Baldwin MD;  Location: [facility] CATH INVASIVE LOCATION;  Service: Cardiovascular;  Laterality: N/A;    CORONARY STENT PLACEMENT      x 3    CYSTOSCOPY, URETEROSCOPY, RETROGRADE PYELOGRAM, STENT INSERTION Left 10/18/2023    Procedure: CYSTOSCOPY URETEROSCOPY RETROGRADE PYELOGRAM HOLMIUM LASER STENT INSERTION;  Surgeon: Juan Alaniz MD;  Location: [facility] MAIN OR;  Service: Urology;  Laterality: Left;    HYSTERECTOMY      LUNG LOBECTOMY      THYROIDECTOMY         Past Medical History:   Diagnosis Date    Anemia     Arthritis     CAD (coronary artery disease)     Chronic back pain     Diabetic neuropathy     Essential hypertension     GERD (gastroesophageal reflux disease)     History of gastric ulcer     History of stomach ulcers     HLD (hyperlipidemia)     Hypocalcemia     Hypoparathyroidism     Hypothyroidism     Insomnia     Kidney stones     Osteoporosis     Recurrent sinusitis     Renal disease     Thyroid cancer     Type 2 diabetes mellitus        Social History     Socioeconomic History    Marital status:     Number of children: 2    Highest education level: 11th grade   Tobacco Use    Smoking status: Former     Packs/day: 2.00     Years: 30.00     Additional pack years: 0.00     Total pack years: 60.00     Types: Cigarettes     Quit date:      Years since quittin.9     Passive exposure: Past    Smokeless tobacco: Never   Vaping Use    Vaping Use: Never used   Substance and Sexual Activity    Alcohol use: Yes     Alcohol/week: 1.0 standard drink of alcohol     Types: 1 Glasses of wine per week    Drug use: Defer    Sexual activity: Defer           ECG 12 Lead    Date/Time: 2023 3:48 PM  Performed by: Nuria Mcgraw MD    Authorized by: Nuria Mcgraw MD  Comparison: compared with previous ECG   Comparison to previous ECG: Now in sinus rhythm  Rhythm: sinus rhythm  Rate: normal  BPM: 86  Conduction: conduction normal  Q waves: V1 and V2    QRS axis: normal  Comments:  "Sinus rhythm with old anteroseptal infarct            Objective:       Physical Exam    /96 (BP Location: Left arm, Patient Position: Sitting)   Pulse 86   Ht 165.1 cm (65\")   Wt 57.2 kg (126 lb)   SpO2 97%   BMI 20.97 kg/m²   The patient is alert, oriented and in no distress.    Vital signs as noted above.    Head and neck revealed no carotid bruits or jugular venous distension.  No thyromegaly or lymphadenopathy is present.    Lungs clear.  No wheezing.  Breath sounds are normal bilaterally.    Heart normal first and second heart sounds.  No murmur..  No pericardial rub is present.  No gallop is present.    Abdomen soft and nontender.  No organomegaly is present.    Extremities revealed good peripheral pulses without any pedal edema.    Skin warm and dry.    Musculoskeletal system is grossly normal.    CNS grossly normal.           Diagnosis Plan   1. Coronary artery disease involving native coronary artery of native heart without angina pectoris  ECG 12 Lead      2. Essential hypertension        3. Mixed hyperlipidemia        4. Type 2 diabetes mellitus with diabetic neuropathy, with long-term current use of insulin        5. Hematuria, unspecified type        6. Paroxysmal atrial fibrillation          LAB RESULTS (LAST 7 DAYS)    CBC        BMP        CMP         BNP        TROPONIN        CoAg        Creatinine Clearance  Estimated Creatinine Clearance: 44 mL/min (by C-G formula based on SCr of 0.86 mg/dL).    ABG        Radiology  No radiology results for the last day         Assessment and Plan       Diagnoses and all orders for this visit:    1. Coronary artery disease involving native coronary artery of native heart without angina pectoris (Primary)  -     ECG 12 Lead    2. Essential hypertension    3. Mixed hyperlipidemia    4. Type 2 diabetes mellitus with diabetic neuropathy, with long-term current use of insulin    5. Hematuria, unspecified type    6. Paroxysmal atrial fibrillation       "   CAD  Status post PCI of the RCA with IVUS guidance.  Xience 2.25 x 33 mm stent postdilated with a 3.0 NC balloon  Continue with Plavix  Continue statin  Not on aspirin to avoid triple therapy    Paroxysmal A-fib  In the setting of postop status with sepsis  Continue Eliquis  Currently in sinus rhythm  Continue with metoprolol    Hypertension  Currently on metoprolol and lisinopril  I have asked her to keep an eye on her blood pressure at her facility and if her blood pressure remains uncontrolled we will start lisinopril 20 mg     Hyperlipidemia  Currently on rosuvastatin  LDL is 55     Diabetes  Uncontrolled diabetes with an HbA1c of 11  On Lantus     History of hematuria  Status post cystoscopy       Nuria Mcgraw MD

## 2023-11-30 ENCOUNTER — OFFICE VISIT (OUTPATIENT)
Dept: CARDIOLOGY | Facility: CLINIC | Age: 83
End: 2023-11-30
Payer: MEDICARE

## 2023-11-30 VITALS
DIASTOLIC BLOOD PRESSURE: 96 MMHG | OXYGEN SATURATION: 97 % | SYSTOLIC BLOOD PRESSURE: 136 MMHG | HEIGHT: 65 IN | HEART RATE: 86 BPM | WEIGHT: 126 LBS | BODY MASS INDEX: 20.99 KG/M2

## 2023-11-30 DIAGNOSIS — I10 ESSENTIAL HYPERTENSION: ICD-10-CM

## 2023-11-30 DIAGNOSIS — E78.2 MIXED HYPERLIPIDEMIA: ICD-10-CM

## 2023-11-30 DIAGNOSIS — I25.10 CORONARY ARTERY DISEASE INVOLVING NATIVE CORONARY ARTERY OF NATIVE HEART WITHOUT ANGINA PECTORIS: Primary | ICD-10-CM

## 2023-11-30 DIAGNOSIS — E11.40 TYPE 2 DIABETES MELLITUS WITH DIABETIC NEUROPATHY, WITH LONG-TERM CURRENT USE OF INSULIN: ICD-10-CM

## 2023-11-30 DIAGNOSIS — R31.9 HEMATURIA, UNSPECIFIED TYPE: ICD-10-CM

## 2023-11-30 DIAGNOSIS — I48.0 PAROXYSMAL ATRIAL FIBRILLATION: ICD-10-CM

## 2023-11-30 DIAGNOSIS — Z79.4 TYPE 2 DIABETES MELLITUS WITH DIABETIC NEUROPATHY, WITH LONG-TERM CURRENT USE OF INSULIN: ICD-10-CM

## 2023-11-30 RX ORDER — SUCRALFATE ORAL 1 G/10ML
1 SUSPENSION ORAL
COMMUNITY
Start: 2023-11-29

## 2023-11-30 RX ORDER — POLYETHYLENE GLYCOL 3350 17 G/17G
POWDER, FOR SOLUTION ORAL
COMMUNITY
Start: 2023-11-29

## 2023-11-30 NOTE — PATIENT INSTRUCTIONS
Please check Blood pressure in the morning 2 times a week and let me know if is systolic Blood pressure is consistently more than 130mmHg

## 2023-12-13 ENCOUNTER — TELEPHONE (OUTPATIENT)
Dept: ENDOCRINOLOGY | Facility: CLINIC | Age: 83
End: 2023-12-13

## 2023-12-13 NOTE — TELEPHONE ENCOUNTER
Spoke with patient, she is at [facility] and nurse was unable to give insulin in evening due to instructions, needs to state PRN. Patients BS were 279 and increased to 300 in the middle of the night and she had received both humalog and lantus for the day. Claudia [facility] is needing new rx stating PRN (per Josette) sent to [facility] for Humalog. Please advise.

## 2023-12-15 ENCOUNTER — TRANSCRIBE ORDERS (OUTPATIENT)
Dept: ADMINISTRATIVE | Facility: HOSPITAL | Age: 83
End: 2023-12-15
Payer: MEDICARE

## 2023-12-15 DIAGNOSIS — N85.8 CYST, UTERUS: Primary | ICD-10-CM

## 2023-12-21 NOTE — TELEPHONE ENCOUNTER
Called the assisted living to help with insulin orders. Connected to Nursing station and no answer, VM left to call back.

## 2023-12-21 NOTE — TELEPHONE ENCOUNTER
Caller: KIMBERLEY- NURSE    Relationship to patient: Other    Best call back number: [phone] RXT [phone]    Patient is needing: WAS RETURNING DR LOZANO CALL

## 2023-12-22 NOTE — TELEPHONE ENCOUNTER
Called back again and spoke with reception who connected me with nursing station on EXT [phone] but no response and VM left.    Driss Dan MD  10:42 EST  12/22/23

## 2024-01-04 ENCOUNTER — HOSPITAL ENCOUNTER (OUTPATIENT)
Dept: ULTRASOUND IMAGING | Facility: HOSPITAL | Age: 84
Discharge: HOME | End: 2024-01-04
Admitting: OBSTETRICS & GYNECOLOGY
Payer: MEDICARE

## 2024-01-04 DIAGNOSIS — N85.8 CYST, UTERUS: ICD-10-CM

## 2024-01-04 PROCEDURE — 93976 VASCULAR STUDY: CPT

## 2024-01-04 PROCEDURE — 76830 TRANSVAGINAL US NON-OB: CPT

## 2024-01-08 ENCOUNTER — TELEPHONE (OUTPATIENT)
Dept: ENDOCRINOLOGY | Facility: CLINIC | Age: 84
End: 2024-01-08
Payer: MEDICARE

## 2024-01-08 DIAGNOSIS — E11.65 TYPE 2 DIABETES MELLITUS WITH HYPERGLYCEMIA, WITH LONG-TERM CURRENT USE OF INSULIN: Primary | ICD-10-CM

## 2024-01-08 DIAGNOSIS — I10 PRIMARY HYPERTENSION: ICD-10-CM

## 2024-01-08 DIAGNOSIS — Z79.4 TYPE 2 DIABETES MELLITUS WITH HYPERGLYCEMIA, WITH LONG-TERM CURRENT USE OF INSULIN: Primary | ICD-10-CM

## 2024-01-08 NOTE — TELEPHONE ENCOUNTER
Patient's insurance needing PA for (Key: [id number]) Dexcom G7, was denied due to the requested medication is a benefit exclusion and is not covered by the plan. Spoke with pharmacist @ [facility] and she said pt insurance will accept freestyle nida 2 sensor and reader. New rx sent to provider for review and signature.

## 2024-01-15 ENCOUNTER — TRANSCRIBE ORDERS (OUTPATIENT)
Dept: ADMINISTRATIVE | Facility: HOSPITAL | Age: 84
End: 2024-01-15
Payer: MEDICARE

## 2024-01-15 DIAGNOSIS — N83.291 OTHER OVARIAN CYST, RIGHT SIDE: Primary | ICD-10-CM

## 2024-01-19 ENCOUNTER — APPOINTMENT (OUTPATIENT)
Dept: CT IMAGING | Facility: HOSPITAL | Age: 84
End: 2024-01-19
Payer: MEDICARE

## 2024-01-19 ENCOUNTER — HOSPITAL ENCOUNTER (EMERGENCY)
Facility: HOSPITAL | Age: 84
Discharge: HOME | End: 2024-01-19
Attending: EMERGENCY MEDICINE
Payer: MEDICARE

## 2024-01-19 VITALS
HEIGHT: 65 IN | HEART RATE: 80 BPM | TEMPERATURE: 98.4 F | RESPIRATION RATE: 18 BRPM | OXYGEN SATURATION: 98 % | SYSTOLIC BLOOD PRESSURE: 130 MMHG | BODY MASS INDEX: 20.83 KG/M2 | WEIGHT: 125 LBS | DIASTOLIC BLOOD PRESSURE: 80 MMHG

## 2024-01-19 DIAGNOSIS — R31.0 GROSS HEMATURIA: Primary | ICD-10-CM

## 2024-01-19 LAB
ALBUMIN SERPL-MCNC: 4.2 G/DL (ref 3.5–5.2)
ALBUMIN/GLOB SERPL: 1.6 G/DL
ALP SERPL-CCNC: 63 U/L (ref 39–117)
ALT SERPL W P-5'-P-CCNC: 10 U/L (ref 1–33)
ANION GAP SERPL CALCULATED.3IONS-SCNC: 9 MMOL/L (ref 5–15)
APTT PPP: 28.2 SECONDS (ref 61–76.5)
AST SERPL-CCNC: 16 U/L (ref 1–32)
BACTERIA UR QL AUTO: ABNORMAL /HPF
BASOPHILS # BLD AUTO: 0.1 10*3/MM3 (ref 0–0.2)
BASOPHILS NFR BLD AUTO: 1.3 % (ref 0–1.5)
BILIRUB SERPL-MCNC: 0.3 MG/DL (ref 0–1.2)
BILIRUB UR QL STRIP: NEGATIVE
BUN SERPL-MCNC: 21 MG/DL (ref 8–23)
BUN/CREAT SERPL: 18.9 (ref 7–25)
CALCIUM SPEC-SCNC: 8.2 MG/DL (ref 8.6–10.5)
CHLORIDE SERPL-SCNC: 101 MMOL/L (ref 98–107)
CLARITY UR: ABNORMAL
CO2 SERPL-SCNC: 27 MMOL/L (ref 22–29)
COLOR UR: ABNORMAL
CREAT SERPL-MCNC: 1.11 MG/DL (ref 0.57–1)
DEPRECATED RDW RBC AUTO: 48.6 FL (ref 37–54)
EGFRCR SERPLBLD CKD-EPI 2021: 48.8 ML/MIN/1.73
EOSINOPHIL # BLD AUTO: 0.2 10*3/MM3 (ref 0–0.4)
EOSINOPHIL NFR BLD AUTO: 2.2 % (ref 0.3–6.2)
ERYTHROCYTE [DISTWIDTH] IN BLOOD BY AUTOMATED COUNT: 14.8 % (ref 12.3–15.4)
GLOBULIN UR ELPH-MCNC: 2.6 GM/DL
GLUCOSE SERPL-MCNC: 243 MG/DL (ref 65–99)
GLUCOSE UR STRIP-MCNC: NEGATIVE MG/DL
HCT VFR BLD AUTO: 35.3 % (ref 34–46.6)
HGB BLD-MCNC: 11.6 G/DL (ref 12–15.9)
HGB UR QL STRIP.AUTO: ABNORMAL
HYALINE CASTS UR QL AUTO: ABNORMAL /LPF
INR PPP: 1.14 (ref 0.93–1.1)
KETONES UR QL STRIP: NEGATIVE
LEUKOCYTE ESTERASE UR QL STRIP.AUTO: ABNORMAL
LIPASE SERPL-CCNC: 13 U/L (ref 13–60)
LYMPHOCYTES # BLD AUTO: 1.6 10*3/MM3 (ref 0.7–3.1)
LYMPHOCYTES NFR BLD AUTO: 21.7 % (ref 19.6–45.3)
MCH RBC QN AUTO: 30.8 PG (ref 26.6–33)
MCHC RBC AUTO-ENTMCNC: 32.8 G/DL (ref 31.5–35.7)
MCV RBC AUTO: 93.9 FL (ref 79–97)
MONOCYTES # BLD AUTO: 0.5 10*3/MM3 (ref 0.1–0.9)
MONOCYTES NFR BLD AUTO: 6.8 % (ref 5–12)
NEUTROPHILS NFR BLD AUTO: 5.1 10*3/MM3 (ref 1.7–7)
NEUTROPHILS NFR BLD AUTO: 68 % (ref 42.7–76)
NITRITE UR QL STRIP: NEGATIVE
NRBC BLD AUTO-RTO: 0 /100 WBC (ref 0–0.2)
PH UR STRIP.AUTO: 6 [PH] (ref 5–8)
PLATELET # BLD AUTO: 274 10*3/MM3 (ref 140–450)
PMV BLD AUTO: 9.9 FL (ref 6–12)
POTASSIUM SERPL-SCNC: 4.6 MMOL/L (ref 3.5–5.2)
PROT SERPL-MCNC: 6.8 G/DL (ref 6–8.5)
PROT UR QL STRIP: ABNORMAL
PROTHROMBIN TIME: 12.3 SECONDS (ref 9.6–11.7)
RBC # BLD AUTO: 3.76 10*6/MM3 (ref 3.77–5.28)
RBC # UR STRIP: ABNORMAL /HPF
REF LAB TEST METHOD: ABNORMAL
SODIUM SERPL-SCNC: 137 MMOL/L (ref 136–145)
SP GR UR STRIP: 1.01 (ref 1–1.03)
SQUAMOUS #/AREA URNS HPF: ABNORMAL /HPF
UROBILINOGEN UR QL STRIP: ABNORMAL
WBC # UR STRIP: ABNORMAL /HPF
WBC NRBC COR # BLD AUTO: 7.5 10*3/MM3 (ref 3.4–10.8)

## 2024-01-19 PROCEDURE — 85025 COMPLETE CBC W/AUTO DIFF WBC: CPT | Performed by: EMERGENCY MEDICINE

## 2024-01-19 PROCEDURE — 80053 COMPREHEN METABOLIC PANEL: CPT | Performed by: EMERGENCY MEDICINE

## 2024-01-19 PROCEDURE — 85730 THROMBOPLASTIN TIME PARTIAL: CPT | Performed by: EMERGENCY MEDICINE

## 2024-01-19 PROCEDURE — 81001 URINALYSIS AUTO W/SCOPE: CPT | Performed by: EMERGENCY MEDICINE

## 2024-01-19 PROCEDURE — 83690 ASSAY OF LIPASE: CPT | Performed by: EMERGENCY MEDICINE

## 2024-01-19 PROCEDURE — 85610 PROTHROMBIN TIME: CPT | Performed by: EMERGENCY MEDICINE

## 2024-01-19 PROCEDURE — 99284 EMERGENCY DEPT VISIT MOD MDM: CPT

## 2024-01-19 PROCEDURE — 74176 CT ABD & PELVIS W/O CONTRAST: CPT

## 2024-01-19 RX ORDER — SODIUM CHLORIDE 0.9 % (FLUSH) 0.9 %
10 SYRINGE (ML) INJECTION AS NEEDED
Status: DISCONTINUED | OUTPATIENT
Start: 2024-01-19 | End: 2024-01-19 | Stop reason: HOSPADM

## 2024-01-19 NOTE — ED NOTES
Rn assisted patient to the restroom. Patient attempted to urinate. Patient was unable to void at this time. Patient stated that she voided earlier this morning but has not had the feeling to go since then. RN assisted patient back to the stretcher and hooked patient up to the monitor.

## 2024-01-19 NOTE — ED NOTES
This nurse called reports to ARCADIO Freeman at [facility]. This nurse went over discharge paperwork with pt and pt called son to come pick her up. Pt and son verbalized understanding.

## 2024-01-19 NOTE — ED PROVIDER NOTES
Subjective   History of Present Illness  Chief complaint: Blood in urine    83-year-old female presents with blood in her urine.  Patient states symptoms started this morning.  She states she has had kidney stones in the past.  She is on Plavix and Eliquis and states she has noticed a lot of easy bruising over the past couple days.  Today she started having blood in her urine.  She has also had some left flank pain that radiates into the left side of the abdomen.  She denies any vomiting or diarrhea.  She denies any injuries or falls.    History provided by:  Patient      Review of Systems   Constitutional:  Negative for fever.   HENT:  Negative for congestion.    Respiratory:  Negative for cough and shortness of breath.    Cardiovascular:  Negative for chest pain.   Gastrointestinal:  Positive for abdominal pain. Negative for diarrhea and vomiting.   Genitourinary:  Positive for flank pain and hematuria. Negative for dysuria.   Neurological:  Negative for headaches.   Psychiatric/Behavioral:  Negative for confusion.        Past Medical History:   Diagnosis Date    Anemia     Arthritis     CAD (coronary artery disease)     Chronic back pain     Diabetic neuropathy     Essential hypertension     GERD (gastroesophageal reflux disease)     History of gastric ulcer     History of stomach ulcers     HLD (hyperlipidemia)     Hypocalcemia     Hypoparathyroidism     Hypothyroidism     Insomnia     Kidney stones     Osteoporosis     Recurrent sinusitis     Renal disease     Thyroid cancer     Type 2 diabetes mellitus        Allergies   Allergen Reactions    Penicillins Unknown - High Severity     Lips swell and hives    Shellfish-Derived Products Unknown - Low Severity    Sulfamethizole Unknown - Low Severity    Trimethoprim Unknown - Low Severity       Past Surgical History:   Procedure Laterality Date    CARDIAC CATHETERIZATION N/A 05/03/2023    Procedure: Left Heart Cath and coronary angiogram;  Surgeon: John Baldwin MD;  " Location: [facility] CATH INVASIVE LOCATION;  Service: Cardiovascular;  Laterality: N/A;    CORONARY STENT PLACEMENT      x 3    CYSTOSCOPY, URETEROSCOPY, RETROGRADE PYELOGRAM, STENT INSERTION Left 10/18/2023    Procedure: CYSTOSCOPY URETEROSCOPY RETROGRADE PYELOGRAM HOLMIUM LASER STENT INSERTION;  Surgeon: Juan Alaniz MD;  Location: [facility] MAIN OR;  Service: Urology;  Laterality: Left;    HYSTERECTOMY      LUNG LOBECTOMY      THYROIDECTOMY         Family History   Problem Relation Age of Onset    Diabetes Mother     Cancer Father     Heart disease Sister     Diabetes Sister     Heart disease Maternal Grandmother        Social History     Socioeconomic History    Marital status:     Number of children: 2    Highest education level: 11th grade   Tobacco Use    Smoking status: Former     Packs/day: 2.00     Years: 30.00     Additional pack years: 0.00     Total pack years: 60.00     Types: Cigarettes     Quit date:      Years since quittin.0     Passive exposure: Past    Smokeless tobacco: Never   Vaping Use    Vaping Use: Never used   Substance and Sexual Activity    Alcohol use: Yes     Alcohol/week: 1.0 standard drink of alcohol     Types: 1 Glasses of wine per week    Drug use: Defer    Sexual activity: Defer       /80   Pulse 80   Temp 98.4 °F (36.9 °C) (Oral)   Resp 18   Ht 165.1 cm (65\")   Wt 56.7 kg (125 lb)   SpO2 98%   BMI 20.80 kg/m²       Objective   Physical Exam  Vitals and nursing note reviewed.   Constitutional:       Appearance: Normal appearance.   HENT:      Head: Normocephalic and atraumatic.      Mouth/Throat:      Mouth: Mucous membranes are moist.   Cardiovascular:      Rate and Rhythm: Normal rate and regular rhythm.      Heart sounds: Normal heart sounds.   Pulmonary:      Effort: Pulmonary effort is normal. No respiratory distress.      Breath sounds: Normal breath sounds.   Abdominal:      Palpations: Abdomen is soft.      Tenderness: There is no abdominal " tenderness. There is no right CVA tenderness or left CVA tenderness.   Skin:     General: Skin is warm and dry.   Neurological:      Mental Status: She is alert and oriented to person, place, and time.         Procedures           ED Course      Results for orders placed or performed during the hospital encounter of 01/19/24   Comprehensive Metabolic Panel    Specimen: Blood   Result Value Ref Range    Glucose 243 (H) 65 - 99 mg/dL    BUN 21 8 - 23 mg/dL    Creatinine 1.11 (H) 0.57 - 1.00 mg/dL    Sodium 137 136 - 145 mmol/L    Potassium 4.6 3.5 - 5.2 mmol/L    Chloride 101 98 - 107 mmol/L    CO2 27.0 22.0 - 29.0 mmol/L    Calcium 8.2 (L) 8.6 - 10.5 mg/dL    Total Protein 6.8 6.0 - 8.5 g/dL    Albumin 4.2 3.5 - 5.2 g/dL    ALT (SGPT) 10 1 - 33 U/L    AST (SGOT) 16 1 - 32 U/L    Alkaline Phosphatase 63 39 - 117 U/L    Total Bilirubin 0.3 0.0 - 1.2 mg/dL    Globulin 2.6 gm/dL    A/G Ratio 1.6 g/dL    BUN/Creatinine Ratio 18.9 7.0 - 25.0    Anion Gap 9.0 5.0 - 15.0 mmol/L    eGFR 48.8 (L) >60.0 mL/min/1.73   Protime-INR    Specimen: Blood   Result Value Ref Range    Protime 12.3 (H) 9.6 - 11.7 Seconds    INR 1.14 (H) 0.93 - 1.10   aPTT    Specimen: Blood   Result Value Ref Range    PTT 28.2 (L) 61.0 - 76.5 seconds   Urinalysis With Culture If Indicated - Urine, Clean Catch    Specimen: Urine, Clean Catch   Result Value Ref Range    Color, UA Orange (A) Yellow, Straw    Appearance, UA Cloudy (A) Clear    pH, UA 6.0 5.0 - 8.0    Specific Gravity, UA 1.007 1.005 - 1.030    Glucose, UA Negative Negative    Ketones, UA Negative Negative    Bilirubin, UA Negative Negative    Blood, UA Large (3+) (A) Negative    Protein,  mg/dL (2+) (A) Negative    Leuk Esterase, UA Trace (A) Negative    Nitrite, UA Negative Negative    Urobilinogen, UA 0.2 E.U./dL 0.2 - 1.0 E.U./dL   Lipase    Specimen: Blood   Result Value Ref Range    Lipase 13 13 - 60 U/L   CBC Auto Differential    Specimen: Blood   Result Value Ref Range    WBC  7.50 3.40 - 10.80 10*3/mm3    RBC 3.76 (L) 3.77 - 5.28 10*6/mm3    Hemoglobin 11.6 (L) 12.0 - 15.9 g/dL    Hematocrit 35.3 34.0 - 46.6 %    MCV 93.9 79.0 - 97.0 fL    MCH 30.8 26.6 - 33.0 pg    MCHC 32.8 31.5 - 35.7 g/dL    RDW 14.8 12.3 - 15.4 %    RDW-SD 48.6 37.0 - 54.0 fl    MPV 9.9 6.0 - 12.0 fL    Platelets 274 140 - 450 10*3/mm3    Neutrophil % 68.0 42.7 - 76.0 %    Lymphocyte % 21.7 19.6 - 45.3 %    Monocyte % 6.8 5.0 - 12.0 %    Eosinophil % 2.2 0.3 - 6.2 %    Basophil % 1.3 0.0 - 1.5 %    Neutrophils, Absolute 5.10 1.70 - 7.00 10*3/mm3    Lymphocytes, Absolute 1.60 0.70 - 3.10 10*3/mm3    Monocytes, Absolute 0.50 0.10 - 0.90 10*3/mm3    Eosinophils, Absolute 0.20 0.00 - 0.40 10*3/mm3    Basophils, Absolute 0.10 0.00 - 0.20 10*3/mm3    nRBC 0.0 0.0 - 0.2 /100 WBC   Urinalysis, Microscopic Only - Urine, Clean Catch    Specimen: Urine, Clean Catch   Result Value Ref Range    RBC, UA Too Numerous to Count (A) None Seen, 0-2 /HPF    WBC, UA 3-5 (A) None Seen, 0-2 /HPF    Bacteria, UA None Seen None Seen /HPF    Squamous Epithelial Cells, UA 0-2 None Seen, 0-2 /HPF    Hyaline Casts, UA None Seen None Seen /LPF    Methodology Automated Microscopy      CT Abdomen Pelvis Without Contrast    Result Date: 1/19/2024  Impression: 1.No acute abnormality identified within the abdomen or pelvis. 2.Bilateral renal cysts. 3.Colonic diverticulosis. 4.Additional findings as detailed above. Electronically Signed: Caio Mota MD  1/19/2024 2:40 PM EST  Workstation ID: [id number]                                          Medical Decision Making  Amount and/or Complexity of Data Reviewed  Labs: ordered.  Radiology: ordered.    Risk  Prescription drug management.      Patient had the above evaluation.  Results were discussed with the patient.  White blood cell count is normal.  Platelets are normal.  CMP is unremarkable.  Urinalysis shows large blood and too numerous to count red blood cells but no evidence of infection.  CT of  the abdomen and pelvis shows no acute abnormality.  Patient is on Eliquis for history of A-fib as well as Plavix for history of coronary artery disease.  I discussed with Dr. Mcgraw with cardiology who recommends stopping the Eliquis and she will follow-up with the patient for ongoing management.  She states patient is okay to go home.  Patient is agreeable with this plan.      Final diagnoses:   Gross hematuria       ED Disposition  ED Disposition       ED Disposition   Discharge    Condition   Stable    Comment   --               Marisol Rodgers, APRN  [address] [facility] [place] [address] [phone]    Call in 2 days           Medication List        Changed      insulin lispro 100 UNIT/ML injection  Commonly known as: HUMALOG/ADMELOG  Inject 8 Units under the skin into the appropriate area as directed 3 (Three) Times a Day With Meals.  What changed:   how much to take  additional instructions            Stop      apixaban 5 MG tablet tablet  Commonly known as: Freddy Christie MD  01/19/24 2892

## 2024-01-22 ENCOUNTER — HOSPITAL ENCOUNTER (OUTPATIENT)
Facility: HOSPITAL | Age: 84
Setting detail: OBSERVATION
Discharge: HOME | End: 2024-01-23
Attending: EMERGENCY MEDICINE | Admitting: HOSPITALIST
Payer: MEDICARE

## 2024-01-22 ENCOUNTER — APPOINTMENT (OUTPATIENT)
Dept: GENERAL RADIOLOGY | Facility: HOSPITAL | Age: 84
End: 2024-01-22
Payer: MEDICARE

## 2024-01-22 ENCOUNTER — APPOINTMENT (OUTPATIENT)
Dept: CT IMAGING | Facility: HOSPITAL | Age: 84
End: 2024-01-22
Payer: MEDICARE

## 2024-01-22 ENCOUNTER — APPOINTMENT (OUTPATIENT)
Dept: MRI IMAGING | Facility: HOSPITAL | Age: 84
End: 2024-01-22
Payer: MEDICARE

## 2024-01-22 DIAGNOSIS — H53.40 VISUAL FIELD DEFECT: ICD-10-CM

## 2024-01-22 DIAGNOSIS — E83.42 HYPOMAGNESEMIA: ICD-10-CM

## 2024-01-22 DIAGNOSIS — R51.9 INTRACTABLE HEADACHE, UNSPECIFIED CHRONICITY PATTERN, UNSPECIFIED HEADACHE TYPE: Primary | ICD-10-CM

## 2024-01-22 DIAGNOSIS — R20.0 FACIAL NUMBNESS: ICD-10-CM

## 2024-01-22 PROBLEM — G45.9 TIA (TRANSIENT ISCHEMIC ATTACK): Status: ACTIVE | Noted: 2024-01-22

## 2024-01-22 LAB
ALBUMIN SERPL-MCNC: 4.4 G/DL (ref 3.5–5.2)
ALBUMIN/GLOB SERPL: 1.6 G/DL
ALP SERPL-CCNC: 57 U/L (ref 39–117)
ALT SERPL W P-5'-P-CCNC: 11 U/L (ref 1–33)
ANION GAP SERPL CALCULATED.3IONS-SCNC: 11 MMOL/L (ref 5–15)
APTT PPP: 26.8 SECONDS (ref 61–76.5)
AST SERPL-CCNC: 16 U/L (ref 1–32)
BACTERIA UR QL AUTO: NORMAL /HPF
BASOPHILS # BLD AUTO: 0.1 10*3/MM3 (ref 0–0.2)
BASOPHILS NFR BLD AUTO: 1.3 % (ref 0–1.5)
BILIRUB SERPL-MCNC: 0.4 MG/DL (ref 0–1.2)
BILIRUB UR QL STRIP: NEGATIVE
BUN SERPL-MCNC: 10 MG/DL (ref 8–23)
BUN/CREAT SERPL: 13 (ref 7–25)
CALCIUM SPEC-SCNC: 8.6 MG/DL (ref 8.6–10.5)
CHLORIDE SERPL-SCNC: 105 MMOL/L (ref 98–107)
CLARITY UR: CLEAR
CO2 SERPL-SCNC: 25 MMOL/L (ref 22–29)
COLOR UR: YELLOW
CREAT SERPL-MCNC: 0.77 MG/DL (ref 0.57–1)
DEPRECATED RDW RBC AUTO: 47.7 FL (ref 37–54)
EGFRCR SERPLBLD CKD-EPI 2021: 75.7 ML/MIN/1.73
EOSINOPHIL # BLD AUTO: 0.1 10*3/MM3 (ref 0–0.4)
EOSINOPHIL NFR BLD AUTO: 1.8 % (ref 0.3–6.2)
ERYTHROCYTE [DISTWIDTH] IN BLOOD BY AUTOMATED COUNT: 14.5 % (ref 12.3–15.4)
ERYTHROCYTE [SEDIMENTATION RATE] IN BLOOD: 13 MM/HR (ref 0–30)
GLOBULIN UR ELPH-MCNC: 2.7 GM/DL
GLUCOSE BLDC GLUCOMTR-MCNC: 167 MG/DL (ref 70–105)
GLUCOSE BLDC GLUCOMTR-MCNC: 189 MG/DL (ref 70–105)
GLUCOSE SERPL-MCNC: 171 MG/DL (ref 65–99)
GLUCOSE UR STRIP-MCNC: NEGATIVE MG/DL
HCT VFR BLD AUTO: 39.8 % (ref 34–46.6)
HGB BLD-MCNC: 12.8 G/DL (ref 12–15.9)
HGB UR QL STRIP.AUTO: NEGATIVE
HYALINE CASTS UR QL AUTO: NORMAL /LPF
INR PPP: 1.03 (ref 0.93–1.1)
KETONES UR QL STRIP: NEGATIVE
LEUKOCYTE ESTERASE UR QL STRIP.AUTO: ABNORMAL
LIPASE SERPL-CCNC: 12 U/L (ref 13–60)
LYMPHOCYTES # BLD AUTO: 1.9 10*3/MM3 (ref 0.7–3.1)
LYMPHOCYTES NFR BLD AUTO: 27.2 % (ref 19.6–45.3)
MAGNESIUM SERPL-MCNC: 1.5 MG/DL (ref 1.6–2.4)
MCH RBC QN AUTO: 30.3 PG (ref 26.6–33)
MCHC RBC AUTO-ENTMCNC: 32.2 G/DL (ref 31.5–35.7)
MCV RBC AUTO: 94 FL (ref 79–97)
MONOCYTES # BLD AUTO: 0.4 10*3/MM3 (ref 0.1–0.9)
MONOCYTES NFR BLD AUTO: 6 % (ref 5–12)
NEUTROPHILS NFR BLD AUTO: 4.5 10*3/MM3 (ref 1.7–7)
NEUTROPHILS NFR BLD AUTO: 63.7 % (ref 42.7–76)
NITRITE UR QL STRIP: NEGATIVE
NRBC BLD AUTO-RTO: 0 /100 WBC (ref 0–0.2)
PH UR STRIP.AUTO: 8 [PH] (ref 5–8)
PLATELET # BLD AUTO: 273 10*3/MM3 (ref 140–450)
PMV BLD AUTO: 9.5 FL (ref 6–12)
POTASSIUM SERPL-SCNC: 4.1 MMOL/L (ref 3.5–5.2)
PROT SERPL-MCNC: 7.1 G/DL (ref 6–8.5)
PROT UR QL STRIP: NEGATIVE
PROTHROMBIN TIME: 11.2 SECONDS (ref 9.6–11.7)
RBC # BLD AUTO: 4.23 10*6/MM3 (ref 3.77–5.28)
RBC # UR STRIP: NORMAL /HPF
REF LAB TEST METHOD: NORMAL
SODIUM SERPL-SCNC: 141 MMOL/L (ref 136–145)
SP GR UR STRIP: 1.02 (ref 1–1.03)
SQUAMOUS #/AREA URNS HPF: NORMAL /HPF
UROBILINOGEN UR QL STRIP: ABNORMAL
WBC # UR STRIP: NORMAL /HPF
WBC NRBC COR # BLD AUTO: 7.1 10*3/MM3 (ref 3.4–10.8)

## 2024-01-22 PROCEDURE — G0378 HOSPITAL OBSERVATION PER HR: HCPCS

## 2024-01-22 PROCEDURE — 80053 COMPREHEN METABOLIC PANEL: CPT | Performed by: EMERGENCY MEDICINE

## 2024-01-22 PROCEDURE — 85652 RBC SED RATE AUTOMATED: CPT | Performed by: EMERGENCY MEDICINE

## 2024-01-22 PROCEDURE — 63710000001 INSULIN LISPRO (HUMAN) PER 5 UNITS: Performed by: HOSPITALIST

## 2024-01-22 PROCEDURE — 85025 COMPLETE CBC W/AUTO DIFF WBC: CPT | Performed by: EMERGENCY MEDICINE

## 2024-01-22 PROCEDURE — 93005 ELECTROCARDIOGRAM TRACING: CPT | Performed by: EMERGENCY MEDICINE

## 2024-01-22 PROCEDURE — 82948 REAGENT STRIP/BLOOD GLUCOSE: CPT | Performed by: HOSPITALIST

## 2024-01-22 PROCEDURE — 99285 EMERGENCY DEPT VISIT HI MDM: CPT

## 2024-01-22 PROCEDURE — 83735 ASSAY OF MAGNESIUM: CPT | Performed by: EMERGENCY MEDICINE

## 2024-01-22 PROCEDURE — 81001 URINALYSIS AUTO W/SCOPE: CPT | Performed by: HOSPITALIST

## 2024-01-22 PROCEDURE — 70551 MRI BRAIN STEM W/O DYE: CPT

## 2024-01-22 PROCEDURE — 74018 RADEX ABDOMEN 1 VIEW: CPT

## 2024-01-22 PROCEDURE — 92610 EVALUATE SWALLOWING FUNCTION: CPT

## 2024-01-22 PROCEDURE — 70498 CT ANGIOGRAPHY NECK: CPT

## 2024-01-22 PROCEDURE — 96366 THER/PROPH/DIAG IV INF ADDON: CPT

## 2024-01-22 PROCEDURE — 83690 ASSAY OF LIPASE: CPT | Performed by: EMERGENCY MEDICINE

## 2024-01-22 PROCEDURE — 85730 THROMBOPLASTIN TIME PARTIAL: CPT | Performed by: EMERGENCY MEDICINE

## 2024-01-22 PROCEDURE — 70450 CT HEAD/BRAIN W/O DYE: CPT

## 2024-01-22 PROCEDURE — 25510000001 IOPAMIDOL PER 1 ML: Performed by: HOSPITALIST

## 2024-01-22 PROCEDURE — 70496 CT ANGIOGRAPHY HEAD: CPT

## 2024-01-22 PROCEDURE — 96365 THER/PROPH/DIAG IV INF INIT: CPT

## 2024-01-22 PROCEDURE — 71045 X-RAY EXAM CHEST 1 VIEW: CPT

## 2024-01-22 PROCEDURE — 25010000002 MAGNESIUM SULFATE IN D5W 1G/100ML (PREMIX) 1-5 GM/100ML-% SOLUTION: Performed by: HOSPITALIST

## 2024-01-22 PROCEDURE — 85610 PROTHROMBIN TIME: CPT | Performed by: EMERGENCY MEDICINE

## 2024-01-22 RX ORDER — FAMOTIDINE 40 MG/1
40 TABLET, FILM COATED ORAL DAILY
COMMUNITY

## 2024-01-22 RX ORDER — POLYETHYLENE GLYCOL 3350 17 G/17G
17 POWDER, FOR SOLUTION ORAL DAILY
COMMUNITY
End: 2024-01-22

## 2024-01-22 RX ORDER — LEVOTHYROXINE SODIUM 0.1 MG/1
100 TABLET ORAL
Status: DISCONTINUED | OUTPATIENT
Start: 2024-01-23 | End: 2024-01-23 | Stop reason: HOSPADM

## 2024-01-22 RX ORDER — ATORVASTATIN CALCIUM 10 MG/1
10 TABLET, FILM COATED ORAL DAILY
COMMUNITY

## 2024-01-22 RX ORDER — SODIUM CHLORIDE 0.9 % (FLUSH) 0.9 %
10 SYRINGE (ML) INJECTION AS NEEDED
Status: DISCONTINUED | OUTPATIENT
Start: 2024-01-22 | End: 2024-01-23 | Stop reason: HOSPADM

## 2024-01-22 RX ORDER — METOPROLOL SUCCINATE 25 MG/1
25 TABLET, EXTENDED RELEASE ORAL DAILY
COMMUNITY

## 2024-01-22 RX ORDER — SODIUM CHLORIDE 0.9 % (FLUSH) 0.9 %
10 SYRINGE (ML) INJECTION EVERY 12 HOURS SCHEDULED
Status: DISCONTINUED | OUTPATIENT
Start: 2024-01-22 | End: 2024-01-23 | Stop reason: HOSPADM

## 2024-01-22 RX ORDER — CLOPIDOGREL BISULFATE 75 MG/1
75 TABLET ORAL DAILY
Status: DISCONTINUED | OUTPATIENT
Start: 2024-01-22 | End: 2024-01-23 | Stop reason: HOSPADM

## 2024-01-22 RX ORDER — NICOTINE POLACRILEX 4 MG
15 LOZENGE BUCCAL
Status: DISCONTINUED | OUTPATIENT
Start: 2024-01-22 | End: 2024-01-23 | Stop reason: HOSPADM

## 2024-01-22 RX ORDER — INSULIN LISPRO 100 [IU]/ML
2-7 INJECTION, SOLUTION INTRAVENOUS; SUBCUTANEOUS
Status: DISCONTINUED | OUTPATIENT
Start: 2024-01-22 | End: 2024-01-23 | Stop reason: HOSPADM

## 2024-01-22 RX ORDER — SODIUM CHLORIDE 9 MG/ML
40 INJECTION, SOLUTION INTRAVENOUS AS NEEDED
Status: DISCONTINUED | OUTPATIENT
Start: 2024-01-22 | End: 2024-01-23 | Stop reason: HOSPADM

## 2024-01-22 RX ORDER — INSULIN LISPRO 100 [IU]/ML
INJECTION, SOLUTION INTRAVENOUS; SUBCUTANEOUS SEE ADMIN INSTRUCTIONS
COMMUNITY

## 2024-01-22 RX ORDER — IBUPROFEN 600 MG/1
1 TABLET ORAL
Status: DISCONTINUED | OUTPATIENT
Start: 2024-01-22 | End: 2024-01-23 | Stop reason: HOSPADM

## 2024-01-22 RX ORDER — MAGNESIUM SULFATE 1 G/100ML
1 INJECTION INTRAVENOUS ONCE
Status: COMPLETED | OUTPATIENT
Start: 2024-01-22 | End: 2024-01-22

## 2024-01-22 RX ORDER — ATORVASTATIN CALCIUM 10 MG/1
10 TABLET, FILM COATED ORAL NIGHTLY
Status: DISCONTINUED | OUTPATIENT
Start: 2024-01-22 | End: 2024-01-23 | Stop reason: HOSPADM

## 2024-01-22 RX ORDER — METFORMIN HYDROCHLORIDE 1000 MG/1
1000 TABLET, FILM COATED, EXTENDED RELEASE ORAL 2 TIMES DAILY
COMMUNITY

## 2024-01-22 RX ORDER — DEXTROSE MONOHYDRATE 25 G/50ML
25 INJECTION, SOLUTION INTRAVENOUS
Status: DISCONTINUED | OUTPATIENT
Start: 2024-01-22 | End: 2024-01-23 | Stop reason: HOSPADM

## 2024-01-22 RX ADMIN — ATORVASTATIN CALCIUM 10 MG: 10 TABLET, FILM COATED ORAL at 20:08

## 2024-01-22 RX ADMIN — INSULIN LISPRO 2 UNITS: 100 INJECTION, SOLUTION INTRAVENOUS; SUBCUTANEOUS at 20:09

## 2024-01-22 RX ADMIN — MAGNESIUM SULFATE IN DEXTROSE 1 G: 10 INJECTION, SOLUTION INTRAVENOUS at 15:04

## 2024-01-22 RX ADMIN — IOPAMIDOL 100 ML: 755 INJECTION, SOLUTION INTRAVENOUS at 15:05

## 2024-01-22 RX ADMIN — Medication 10 ML: at 20:09

## 2024-01-22 NOTE — SIGNIFICANT NOTE
01/22/24 1555   OTHER   Discipline physical therapist   Rehab Time/Intention   Session Not Performed patient unavailable for evaluation;other (see comments)  (working w/ SLP (just started); will check back as able)   Recommendation   PT - Next Appointment 01/23/24

## 2024-01-22 NOTE — ED NOTES
Nursing report ED to floor  Pamela Zavala  83 y.o.  female    HPI:   Chief Complaint   Patient presents with    Head Injury       Admitting doctor:   Amando Goldman MD    Admitting diagnosis:   The primary encounter diagnosis was Intractable headache, unspecified chronicity pattern, unspecified headache type. Diagnoses of Visual field defect, Facial numbness, and Hypomagnesemia were also pertinent to this visit.    Code status:   Current Code Status       Date Active Code Status Order ID Comments User Context       1/22/2024 1500 CPR (Attempt to Resuscitate) [id number]  Amando Goldman MD ED        Question Answer    Code Status (Patient has no pulse and is not breathing) CPR (Attempt to Resuscitate)    Medical Interventions (Patient has pulse or is breathing) Full Support                    Allergies:   Penicillins, Shellfish-derived products, Sulfamethizole, and Trimethoprim    Isolation:  No active isolations     Fall Risk:  Fall Risk Assessment was completed, and patient is at high risk for falls.   Predictive Model Details         14 (Low) Factor Value    Calculated 1/22/2024 16:10 Age 83    Risk of Fall Model Musculoskeletal Assessment WDL     Active Peripheral IV Present     Imaging order in this encounter Present     Drug Use Not Asked     Respiratory Rate 18     Financial Class Other     Magnesium 1.5 mg/dL     Calcium 8.6 mg/dL     Tobacco Use Quit     Austin Scale not on file     Number of Distinct Medication Classes administered 2     Peripheral Vascular Assessment WDL     Gastrointestinal Assessment WDL     Diastolic BP 87     Cardiac Assessment WDL     Skin Assessment X     Days after Admission 0.176     Total Bilirubin 0.4 mg/dL     Albumin 4.4 g/dL     Potassium 4.1 mmol/L     ALT 11 U/L     Chloride 105 mmol/L     Creatinine 0.77 mg/dL         Weight:       01/22/24  1155   Weight: 63.5 kg (140 lb)       Intake and Output  No intake or output data in the 24 hours ending 01/22/24 1610    Diet:  "  Dietary Orders (From admission, onward)       Start     Ordered    01/22/24 1501  NPO Diet NPO Type: Strict NPO  Diet Effective Now        Comments: Strict NPO Until Nursing Dysphagia Screen Passed   Question:  NPO Type  Answer:  Strict NPO    01/22/24 1500                     Most recent vitals:   Vitals:    01/22/24 1155 01/22/24 1304   BP: 164/90 (!) 181/87   BP Location:  Left arm   Patient Position:  Sitting   Pulse: 92 74   Resp: 18    Temp: 98 °F (36.7 °C)    TempSrc: Oral    SpO2: 98% 100%   Weight: 63.5 kg (140 lb)    Height: 165.1 cm (65\")        Active LDAs/IV Access:   Lines, Drains & Airways       Active LDAs       Name Placement date Placement time Site Days    Peripheral IV 01/22/24 1255 Anterior;Proximal;Right Forearm 01/22/24  1255  Forearm  less than 1                    Skin Condition:   Skin Assessments (last day)       Date/Time Skin WDL Skin Color/Characteristics Skin Temperature Skin Moisture Skin Elasticity Skin Integrity Interval    01/22/24 1315 -- -- -- -- -- -- baseline    01/22/24 13:18:18 all;X without discoloration warm moist slow return to original state bruised (ecchymotic);intact --             Labs (abnormal labs have a star):   Labs Reviewed   COMPREHENSIVE METABOLIC PANEL - Abnormal; Notable for the following components:       Result Value    Glucose 171 (*)     All other components within normal limits    Narrative:     GFR Normal >60  Chronic Kidney Disease <60  Kidney Failure <15    The GFR formula is only valid for adults with stable renal function between ages 18 and 70.   APTT - Abnormal; Notable for the following components:    PTT 26.8 (*)     All other components within normal limits   MAGNESIUM - Abnormal; Notable for the following components:    Magnesium 1.5 (*)     All other components within normal limits   LIPASE - Abnormal; Notable for the following components:    Lipase 12 (*)     All other components within normal limits   PROTIME-INR - Normal   CBC WITH AUTO " DIFFERENTIAL - Normal   SEDIMENTATION RATE - Normal   URINALYSIS W/ CULTURE IF INDICATED   POCT GLUCOSE FINGERSTICK   POCT GLUCOSE FINGERSTICK   POCT GLUCOSE FINGERSTICK   POCT GLUCOSE FINGERSTICK   POCT GLUCOSE FINGERSTICK   POCT GLUCOSE FINGERSTICK   POCT GLUCOSE FINGERSTICK   POCT GLUCOSE FINGERSTICK   CBC AND DIFFERENTIAL    Narrative:     The following orders were created for panel order CBC & Differential.  Procedure                               Abnormality         Status                     ---------                               -----------         ------                     CBC Auto Differential[[id number]]        Normal              Final result                 Please view results for these tests on the individual orders.       LOC: Person, Place, and Time    Telemetry:  Med/Surg    Cardiac Monitoring Ordered: yes    EKG:   ECG 12 Lead Other; dizziness   Preliminary Result   HEART RATE= 74  bpm   RR Interval= 812  ms   MS Interval= 161  ms   P Horizontal Axis= -35  deg   P Front Axis= 56  deg   QRSD Interval= 94  ms   QT Interval= 403  ms   QTcB= 447  ms   QRS Axis= 55  deg   T Wave Axis= 63  deg   - ABNORMAL ECG -   Sinus rhythm   Anteroseptal infarct, age indeterminate   Electronically Signed By:    Date and Time of Study: 2024-01-22 12:54:28      SCANNED - TELEMETRY     Final Result          Medications Given in the ED:   Medications   sodium chloride 0.9 % flush 10 mL (has no administration in time range)   atorvastatin (LIPITOR) tablet 10 mg (has no administration in time range)   clopidogrel (PLAVIX) tablet 75 mg (has no administration in time range)   levothyroxine (SYNTHROID, LEVOTHROID) tablet 100 mcg (has no administration in time range)   sodium chloride 0.9 % flush 10 mL (has no administration in time range)   sodium chloride 0.9 % flush 10 mL (has no administration in time range)   sodium chloride 0.9 % infusion 40 mL (has no administration in time range)   dextrose (GLUTOSE) oral gel 15 g  (has no administration in time range)   dextrose (D50W) (25 g/50 mL) IV injection 25 g (has no administration in time range)   glucagon (GLUCAGEN) injection 1 mg (has no administration in time range)   insulin lispro (HUMALOG/ADMELOG) injection 2-7 Units (has no administration in time range)   Potassium Replacement - Follow Nurse / BPA Driven Protocol (has no administration in time range)   Magnesium Standard Dose Replacement - Follow Nurse / BPA Driven Protocol (has no administration in time range)   Phosphorus Replacement - Follow Nurse / BPA Driven Protocol (has no administration in time range)   Calcium Replacement - Follow Nurse / BPA Driven Protocol (has no administration in time range)   magnesium sulfate in D5W 1g/100mL (PREMIX) (1 g Intravenous New Bag 1/22/24 1504)   iopamidol (ISOVUE-370) 76 % injection 100 mL (100 mL Intravenous Given 1/22/24 1505)       Imaging results:  CT Angiogram Head    Result Date: 1/22/2024  Impression: No acute vascular abnormality of the head and neck. Mild stenosis within the proximal right cervical internal carotid artery with less than 50% stenosis. Additional focal areas of mild to moderate stenoses including the right vertebral artery and left subclavian artery as characterized above. Electronically Signed: Jaycob Black,   1/22/2024 3:35 PM EST  Workstation ID: [id number]    CT Angiogram Neck    Result Date: 1/22/2024  Impression: No acute vascular abnormality of the head and neck. Mild stenosis within the proximal right cervical internal carotid artery with less than 50% stenosis. Additional focal areas of mild to moderate stenoses including the right vertebral artery and left subclavian artery as characterized above. Electronically Signed: Jaycob Black,   1/22/2024 3:35 PM EST  Workstation ID: [id number]    CT Head Without Contrast    Result Date: 1/22/2024  Impression: 1. No acute intracranial finding or significant change compared to 10/18/2023. 2. Moderately  advanced chronic microvascular disease and moderate generalized atrophy. Electronically Signed: Virginia Acevedo MD  2024 1:30 PM EST  Workstation ID: [id number]     Social issues:   Social History     Socioeconomic History    Marital status:     Number of children: 2    Highest education level: 11th grade   Tobacco Use    Smoking status: Former     Packs/day: 2.00     Years: 30.00     Additional pack years: 0.00     Total pack years: 60.00     Types: Cigarettes     Quit date:      Years since quittin.0     Passive exposure: Past    Smokeless tobacco: Never   Vaping Use    Vaping Use: Never used   Substance and Sexual Activity    Alcohol use: Yes     Alcohol/week: 1.0 standard drink of alcohol     Types: 1 Glasses of wine per week    Drug use: Defer    Sexual activity: Defer       NIH Stroke Scale:  Interval: baseline (24)  1a. Level of Consciousness: 0-->Alert, keenly responsive (24)  1b. LOC Questions: 0-->Answers both questions correctly (24)  1c. LOC Commands: 0-->Performs both tasks correctly (24)  2. Best Gaze: 0-->Normal (24)  3. Visual: 0-->No visual loss (24)  4. Facial Palsy: 0-->Normal symmetrical movements (24)  5a. Motor Arm, Left: 0-->No drift, limb holds 90 (or 45) degrees for full 10 secs (24)  5b. Motor Arm, Right: 0-->No drift, limb holds 90 (or 45) degrees for full 10 secs (24)  6a. Motor Leg, Left: 0-->No drift, leg holds 30 degree position for full 5 secs (24)  6b. Motor Leg, Right: 0-->No drift, leg holds 30 degree position for full 5 secs (24)  7. Limb Ataxia: 0-->Absent (24)  8. Sensory: 0-->Normal, no sensory loss (24)  9. Best Language: 1-->Mild-to-moderate aphasia, some obvious loss of fluency or facility of comprehension, without significant limitation on ideas expressed or form of expression. Reduction of speech and/or  comprehension, however, makes conversation. . . (see row details) (01/22/24 1315)  10. Dysarthria: 0-->Normal (01/22/24 1315)  11. Extinction and Inattention (formerly Neglect): 0-->No abnormality (01/22/24 1315)    Total (NIH Stroke Scale): 1 (01/22/24 1315)     Additional notable assessment information:     Nursing report ED to floor:  EDH 4    Zahraa Moore RN   01/22/24 16:10 EST

## 2024-01-22 NOTE — PLAN OF CARE
Goal Outcome Evaluation:               Pamela Zavala is a 83 y.o. female presents with blurry vision and left facial numbness that started last night. She hit her head about 10 days ago getting into a van and has bruising and pain to top of head to forehead. She is c/o headache that moves around. She states her vision and numbness has resolved now. She used to be on eliquis and plavix but stopped taking eliquis a couple of days ago 2/2 hematuria, she has been off 1/19. Orders for swallow evaluation received via stroke protocol. Pt failed water portion of screen.    Bedside swallow evaluation completed this date. Pt awake, alert and amenable to swallow evaluation. Pt consumes trials of ice chips, thin water via straw, puree applesauce, mechanical soft mixed consistency peaches and regular solid peanut butter crackers. Oral transit mildly prolonged w/solids d/t loose fitting dentures. Pt adequately clears oral cavity between bites. No overt clinical s/s of aspiration are demonstrated at anytime.     Recommend a regular consistency diet w/thin liquids. ST to follow for diet tolerance w/further recommendations as indicated.                  SLP Swallowing Diagnosis: functional oral phase, functional pharyngeal phase, R/O pharyngeal dysphagia (01/22/24 1600)

## 2024-01-22 NOTE — Clinical Note
Level of Care: Med/Surg [1]   Admitting Physician: LAITH BRONSON [[id number]]   Attending Physician: LAITH BRONSON [[id number]]   Bed Request Comments: neuro

## 2024-01-22 NOTE — CONSULTS
Diabetes Education    Patient Name:  Pamela Zavala  YOB: 1940  MRN: [id number]  Admit Date:  1/22/2024    Consult received due to stroke protocol being ordered. Pt with hx of diabetes. Last A1c in [facility] was from 10/18/2023 and result was 7.6%. Adm bs 171. Per home med list, pt taking lantus 15 units hs, Lispro 2 units if bs 100-150 plus 1 unit for every 50 >150, and Metformin 1000 mg bid. Pt follows with endocrinologist, Dr. Driss Dan. Pt saw MD last on 11/27/2023. Pt lives at [facility]. Educator met with pt at bedside. Pt states nursing staff take care of preparing/administering insulin injections. Pt states she wears the [employer] [place] continuous glucose monitoring system and staff reviews sensor sugars. Pt usually eating 3 meals/day. Pt states not needing education at this time since staff is overseeing all meds and sugar monitoring.       Electronically signed by:  Lisa Cadena RN  01/22/24 18:51 EST

## 2024-01-22 NOTE — THERAPY EVALUATION
Acute Care - Speech Language Pathology   Swallow Initial Evaluation [facility]     Patient Name: Pamela Zavala  : 1940  MRN: [id number]  Today's Date: 2024               Admit Date: 2024    Visit Dx:     ICD-10-CM ICD-9-CM   1. Intractable headache, unspecified chronicity pattern, unspecified headache type  R51.9 784.0   2. Visual field defect  H53.40 368.40   3. Facial numbness  R20.0 782.0   4. Hypomagnesemia  E83.42 275.2     Patient Active Problem List   Diagnosis    Chest pain    Coronary arteriosclerosis    Type 2 diabetes mellitus    Diabetic neuropathy    Essential hypertension    Hyperlipidemia    Postoperative hypothyroidism    Thallium stress test abnormal    Ex-smoker    Chest pain, unspecified type    History of thyroid cancer    Sepsis without acute organ dysfunction    Kidney stones    UTI (urinary tract infection), bacterial    TIA (transient ischemic attack)     Past Medical History:   Diagnosis Date    Anemia     Arthritis     CAD (coronary artery disease)     Chronic back pain     Diabetic neuropathy     Essential hypertension     GERD (gastroesophageal reflux disease)     History of gastric ulcer     History of stomach ulcers     HLD (hyperlipidemia)     Hypocalcemia     Hypoparathyroidism     Hypothyroidism     Insomnia     Kidney stones     Osteoporosis     Recurrent sinusitis     Renal disease     Thyroid cancer     Type 2 diabetes mellitus      Past Surgical History:   Procedure Laterality Date    CARDIAC CATHETERIZATION N/A 2023    Procedure: Left Heart Cath and coronary angiogram;  Surgeon: John Baldwin MD;  Location: [facility] CATH INVASIVE LOCATION;  Service: Cardiovascular;  Laterality: N/A;    CORONARY STENT PLACEMENT      x 3    CYSTOSCOPY, URETEROSCOPY, RETROGRADE PYELOGRAM, STENT INSERTION Left 10/18/2023    Procedure: CYSTOSCOPY URETEROSCOPY RETROGRADE PYELOGRAM HOLMIUM LASER STENT INSERTION;  Surgeon: Juan Alaniz MD;  Location: [facility] MAIN OR;  Service:  Urology;  Laterality: Left;    HYSTERECTOMY      LUNG LOBECTOMY      THYROIDECTOMY         SLP Recommendation and Plan  SLP Swallowing Diagnosis: functional oral phase, functional pharyngeal phase, R/O pharyngeal dysphagia (01/22/24 1600)  SLP Diet Recommendation: regular textures, thin liquids (01/22/24 1600)  Recommended Precautions and Strategies: upright posture during/after eating, general aspiration precautions (01/22/24 1600)  SLP Rec. for Method of Medication Administration: as tolerated (01/22/24 1600)     Monitor for Signs of Aspiration: yes, notify SLP if any concerns (01/22/24 1600)  Recommended Diagnostics: reassess via clinical swallow evaluation (01/22/24 1600)  Swallow Criteria for Skilled Therapeutic Interventions Met: demonstrates skilled criteria (01/22/24 1600)     Rehab Potential/Prognosis, Swallowing: good, to achieve stated therapy goals (01/22/24 1600)  Therapy Frequency (Swallow): PRN (01/22/24 1600)  Predicted Duration Therapy Intervention (Days): until discharge (01/22/24 1600)  Oral Care Recommendations: Oral Care BID/PRN (01/22/24 1600)                                      Oral Care Recommendations: Oral Care BID/PRN (01/22/24 1600)           SWALLOW EVALUATION (last 72 hours)       SLP Adult Swallow Evaluation       Row Name 01/22/24 1600       Rehab Evaluation    Document Type evaluation  -EC    Subjective Information no complaints  -EC    Patient Observations alert;cooperative;agree to therapy  -EC    Patient Effort good  -EC    Symptoms Noted During/After Treatment none  -EC       General Information    Patient Profile Reviewed yes  -EC    Pertinent History Of Current Problem Pamela Zavala is a 83 y.o. female presents with blurry vision and left facial numbness that started last night. She hit her head about 10 days ago getting into a van and has bruising and pain to top of head to forehead. She is c/o headache that moves around. She states her vision and numbness has resolved now.  She used to be on eliquis and plavix but stopped taking eliquis a couple of days ago 2/2 hematuria, she has been off 1/19. Orders for swallow evaluation received via stroke protocol. Pt failed water portion of screen  -EC    Current Method of Nutrition NPO  -EC    Precautions/Limitations, Vision WFL with corrective lenses;for purposes of eval  -EC    Precautions/Limitations, Hearing WFL;for purposes of eval  -EC    Prior Level of Function-Communication WFL  -EC    Prior Level of Function-Swallowing regular textures;thin liquids  -EC    Plans/Goals Discussed with patient  -EC       Oral Motor Structure and Function    Dentition Assessment upper dentures/partial in place;lower dentures/partial in place;dentures are ill fitting  dentures are loose  -EC    Secretion Management WNL/WFL  -EC    Mucosal Quality moist, healthy  -EC       Oral Musculature and Cranial Nerve Assessment    Oral Motor General Assessment WFL  -EC       General Eating/Swallowing Observations    Respiratory Support Currently in Use room air  -EC    Eating/Swallowing Skills self-fed;appropriate self-feeding skills observed  -EC    Positioning During Eating upright 90 degree;upright in bed  -EC    Utensils Used spoon;straw  -EC    Consistencies Trialed regular textures;chopped;mixed consistency;pureed;ice chips;thin liquids  -EC       Respiratory    Respiratory Status WFL;during swallowing/eating  -EC       Clinical Swallow Eval    Clinical Swallow Evaluation Summary Bedside swallow evaluation completed this date. Pt awake, alert and amenable to swallow evaluation. Pt consumes trials of ice chips, thin water via straw, puree applesauce, mechanical soft mixed consistency peaches and regular solid peanut butter crackers. Oral transit mildly prolonged w/solids d/t loose fitting dentures. Pt adequately clears oral cavity between bites. No overt clinical s/s of aspiration are demonstrated at anytime. Recommend a regular consistency diet w/thin liquids. ST  to follow for diet tolerance w/further recommendations as indicated.  -EC       SLP Evaluation Clinical Impression    SLP Swallowing Diagnosis functional oral phase;functional pharyngeal phase;R/O pharyngeal dysphagia  -EC    Rehab Potential/Prognosis, Swallowing good, to achieve stated therapy goals  -EC    Swallow Criteria for Skilled Therapeutic Interventions Met demonstrates skilled criteria  -EC       SLP Treatment Clinical Impressions    Care Plan Review evaluation/treatment results reviewed  -EC       Recommendations    Therapy Frequency (Swallow) PRN  -EC    Predicted Duration Therapy Intervention (Days) until discharge  -EC    SLP Diet Recommendation regular textures;thin liquids  -EC    Recommended Diagnostics reassess via clinical swallow evaluation  -EC    Recommended Precautions and Strategies upright posture during/after eating;general aspiration precautions  -EC    Oral Care Recommendations Oral Care BID/PRN  -EC    SLP Rec. for Method of Medication Administration as tolerated  -EC    Monitor for Signs of Aspiration yes;notify SLP if any concerns  -EC       Swallow Goals (SLP)    Swallow LTGs Swallow Long Term Goal (free text)  -EC    Swallow STGs diet tolerance goal selection (SLP)  -EC    Diet Tolerance Goal Selection (SLP) Swallow Short Term Goal 1  -EC       (LTG) Swallow    (LTG) Swallow The patient will maximize swallow function for least restrictive po diet, exhibiting no complications associated with dysphagia, adequate po intake, and demonstrating independent use of safe swallow compensations.  -EC    [place] (Swallow Long Term Goal) with minimal cues (75-90% accuracy)  -EC    Time Frame (Swallow Long Term Goal) by discharge  -EC    Progress/Outcomes (Swallow Long Term Goal) new goal  -EC       (STG) Swallow 1    (STG) Swallow 1 The patient will participate in a follow up assessment to determine safety and adequacy of recommended diet, independent use of safe swallow compensations, and  additional goals/recommendations to follow  -EC    [place] (Swallow Short Term Goal 1) with minimal cues (75-90% accuracy)  -EC    Time Frame (Swallow Short Term Goal 1) by discharge  -EC    Progress/Outcomes (Swallow Short Term Goal 1) new goal  -EC              User Key  (r) = Recorded By, (t) = Taken By, (c) = Cosigned By      Initials Name Effective Dates    EC Amy Mcfadden 06/16/21 -                     EDUCATION  The patient has been educated in the following areas:   Dysphagia (Swallowing Impairment).        SLP GOALS       Row Name 01/22/24 1600             (LTG) Swallow    (LTG) Swallow The patient will maximize swallow function for least restrictive po diet, exhibiting no complications associated with dysphagia, adequate po intake, and demonstrating independent use of safe swallow compensations.  -EC      [place] (Swallow Long Term Goal) with minimal cues (75-90% accuracy)  -EC      Time Frame (Swallow Long Term Goal) by discharge  -EC      Progress/Outcomes (Swallow Long Term Goal) new goal  -EC         (STG) Swallow 1    (STG) Swallow 1 The patient will participate in a follow up assessment to determine safety and adequacy of recommended diet, independent use of safe swallow compensations, and additional goals/recommendations to follow  -EC      [place] (Swallow Short Term Goal 1) with minimal cues (75-90% accuracy)  -EC      Time Frame (Swallow Short Term Goal 1) by discharge  -EC      Progress/Outcomes (Swallow Short Term Goal 1) new goal  -EC                User Key  (r) = Recorded By, (t) = Taken By, (c) = Cosigned By      Initials Name Provider Type    EC Amy Mcfadden Speech and Language Pathologist                       Time Calculation:                Amy Mcfadden  1/22/2024

## 2024-01-22 NOTE — H&P
[facility]   HISTORY AND PHYSICAL    Patient Name: Pamela Zavala  : 1940  MRN: [id number]  Primary Care Physician:  Marisol Rodgers APRN  Date of admission: 2024  Service Date and time: 24 15:19 EST  Subjective   Subjective     Chief Complaint: blurry vision and facial numbness    HPI:    Pamela Zavala is a 83 y.o. female presents with blurry vision and left facial numbness that started last night. She hit her head about 10 days ago getting into a van and has bruising and pain to top of head to forehead. She is c/o headache that moves around. She states her vision and numbness has resolved now. She used to be on eliquis and plavix but stopped taking eliquis a couple of days ago 2/2 hematuria, she has been off .   She denies chest pain, SOB, abdominal pain, N/V, syncope, chills/fever, dysuria, melena, hematemesis or flank pain.     Review of Systems   As per HPI    Personal History     Past Medical History:   Diagnosis Date    Anemia     Arthritis     CAD (coronary artery disease)     Chronic back pain     Diabetic neuropathy     Essential hypertension     GERD (gastroesophageal reflux disease)     History of gastric ulcer     History of stomach ulcers     HLD (hyperlipidemia)     Hypocalcemia     Hypoparathyroidism     Hypothyroidism     Insomnia     Kidney stones     Osteoporosis     Recurrent sinusitis     Renal disease     Thyroid cancer     Type 2 diabetes mellitus        Past Surgical History:   Procedure Laterality Date    CARDIAC CATHETERIZATION N/A 2023    Procedure: Left Heart Cath and coronary angiogram;  Surgeon: John Baldwin MD;  Location: [facility] CATH INVASIVE LOCATION;  Service: Cardiovascular;  Laterality: N/A;    CORONARY STENT PLACEMENT      x 3    CYSTOSCOPY, URETEROSCOPY, RETROGRADE PYELOGRAM, STENT INSERTION Left 10/18/2023    Procedure: CYSTOSCOPY URETEROSCOPY RETROGRADE PYELOGRAM HOLMIUM LASER STENT INSERTION;  Surgeon: Juan Alaniz MD;  Location:   [facility];  Service: Urology;  Laterality: Left;    HYSTERECTOMY      LUNG LOBECTOMY      THYROIDECTOMY         Family History: family history includes Cancer in her father; Diabetes in her mother and sister; Heart disease in her maternal grandmother and sister. Otherwise pertinent FHx was reviewed and not pertinent to current issue.    Social History:  reports that she quit smoking about 24 years ago. Her smoking use included cigarettes. She has a 60.00 pack-year smoking history. She has been exposed to tobacco smoke. She has never used smokeless tobacco. She reports current alcohol use of about 1.0 standard drink of alcohol per week. Drug use questions deferred to the physician.    Home Medications:  Accu-Chek Softclix Lancets, Dexcom G7 , Dexcom G7 Sensor, Diclofenac Sodium, FreeStyle [place] 2 [place], FreeStyle [place] 2 Sensor, Insulin Glargine, atorvastatin, clopidogrel, docusate sodium, fluticasone, folic acid, gabapentin, insulin lispro, levothyroxine, lisinopril, metFORMIN, metoprolol succinate XL, multivitamin with minerals, ondansetron, oxybutynin, pantoprazole, polyethylene glycol, sucralfate, thiamine, traMADol, and traZODone      Allergies:  Allergies   Allergen Reactions    Penicillins Unknown - High Severity     Lips swell and hives    Shellfish-Derived Products Unknown - Low Severity    Sulfamethizole Unknown - Low Severity    Trimethoprim Unknown - Low Severity       Objective   Objective     Vitals:   Temp:  [98 °F (36.7 °C)] 98 °F (36.7 °C)  Heart Rate:  [74-92] 74  Resp:  [18] 18  BP: (164-181)/(87-90) 181/87  Physical Exam    Constitutional: Awake, alert, elderly   Eyes: PERRLA, sclerae anicteric, no conjunctival injection   HENT: bruising to top of head, mucous membranes moist   Neck: Supple, no thyromegaly, no lymphadenopathy, trachea midline   Respiratory: Clear to auscultation bilaterally, nonlabored respirations    Cardiovascular: RRR, no murmurs, rubs, or gallops, palpable pedal  pulses bilaterally   Gastrointestinal: Positive bowel sounds, soft, nontender, nondistended   Musculoskeletal: No bilateral ankle edema, no clubbing or cyanosis to extremities   Psychiatric: Appropriate affect, cooperative   Neurologic: Oriented x 3, strength symmetric in all extremities, Cranial Nerves grossly intact to confrontation, speech clear   Skin: No rashes     Result Review    Result Review:  I have personally reviewed the results from the time of this admission to 1/22/2024 15:19 EST and agree with these findings:  [x]  Laboratory list / accordion  [x]  Microbiology  [x]  Radiology  [x]  EKG/Telemetry   [x]  Cardiology/Vascular   []  Pathology  []  Old records  []  Other:      Assessment & Plan   Assessment / Plan       Active Hospital Problems:  Active Hospital Problems    Diagnosis     **TIA (transient ischemic attack)     Type 2 diabetes mellitus     Essential hypertension     Hyperlipidemia     Postoperative hypothyroidism     Coronary arteriosclerosis      Plan:   - neuro consult, neuro checks, cont plavix and statin  - obtain MRI brain, echo  - SSI, check A1c and FLP  - supportive care  - cont home meds as able  - trend labs, replace electrolytes    DVT prophylaxis:  Mechanical DVT prophylaxis orders are present.    CODE STATUS:    Code Status (Patient has no pulse and is not breathing): CPR (Attempt to Resuscitate)  Medical Interventions (Patient has pulse or is breathing): Full Support    Admission Status:  I believe this patient meets observation status.    Amando Goldman MD

## 2024-01-23 ENCOUNTER — READMISSION MANAGEMENT (OUTPATIENT)
Dept: CALL CENTER | Facility: HOSPITAL | Age: 84
End: 2024-01-23
Payer: MEDICARE

## 2024-01-23 ENCOUNTER — APPOINTMENT (OUTPATIENT)
Dept: CARDIOLOGY | Facility: HOSPITAL | Age: 84
End: 2024-01-23
Payer: MEDICARE

## 2024-01-23 VITALS
OXYGEN SATURATION: 98 % | WEIGHT: 119 LBS | DIASTOLIC BLOOD PRESSURE: 91 MMHG | BODY MASS INDEX: 19.83 KG/M2 | TEMPERATURE: 99 F | SYSTOLIC BLOOD PRESSURE: 145 MMHG | RESPIRATION RATE: 18 BRPM | HEIGHT: 65 IN | HEART RATE: 97 BPM

## 2024-01-23 PROBLEM — S09.90XA HEAD INJURY, CLOSED, WITHOUT LOC, INITIAL ENCOUNTER: Status: ACTIVE | Noted: 2024-01-23

## 2024-01-23 PROBLEM — G45.9 TIA (TRANSIENT ISCHEMIC ATTACK): Status: RESOLVED | Noted: 2024-01-22 | Resolved: 2024-01-23

## 2024-01-23 LAB
BH CV ECHO LEFT VENTRICLE GLOBAL LONGITUDINAL STRAIN: -10.4 %
BH CV ECHO MEAS - ACS: 1.6 CM
BH CV ECHO MEAS - AO MAX PG: 5.3 MMHG
BH CV ECHO MEAS - AO MEAN PG: 3 MMHG
BH CV ECHO MEAS - AO V2 MAX: 115 CM/SEC
BH CV ECHO MEAS - AO V2 VTI: 24.3 CM
BH CV ECHO MEAS - AVA(I,D): 1.87 CM2
BH CV ECHO MEAS - EDV(CUBED): 32.8 ML
BH CV ECHO MEAS - EDV(MOD-SP4): 50.3 ML
BH CV ECHO MEAS - EF(MOD-SP4): 54.1 %
BH CV ECHO MEAS - ESV(CUBED): 15.6 ML
BH CV ECHO MEAS - ESV(MOD-SP4): 23.1 ML
BH CV ECHO MEAS - FS: 21.9 %
BH CV ECHO MEAS - IVS/LVPW: 1.25 CM
BH CV ECHO MEAS - IVSD: 1 CM
BH CV ECHO MEAS - LA DIMENSION: 3.4 CM
BH CV ECHO MEAS - LAT PEAK E' VEL: 5.7 CM/SEC
BH CV ECHO MEAS - LV MASS(C)D: 77.3 GRAMS
BH CV ECHO MEAS - LV MAX PG: 2.07 MMHG
BH CV ECHO MEAS - LV MEAN PG: 1 MMHG
BH CV ECHO MEAS - LV V1 MAX: 71.9 CM/SEC
BH CV ECHO MEAS - LV V1 VTI: 16 CM
BH CV ECHO MEAS - LVIDD: 3.2 CM
BH CV ECHO MEAS - LVIDS: 2.5 CM
BH CV ECHO MEAS - LVOT AREA: 2.8 CM2
BH CV ECHO MEAS - LVOT DIAM: 1.9 CM
BH CV ECHO MEAS - LVPWD: 0.8 CM
BH CV ECHO MEAS - MED PEAK E' VEL: 8.5 CM/SEC
BH CV ECHO MEAS - MR MAX PG: 26.2 MMHG
BH CV ECHO MEAS - MR MAX VEL: 256 CM/SEC
BH CV ECHO MEAS - MV A MAX VEL: 112 CM/SEC
BH CV ECHO MEAS - MV DEC SLOPE: 216 CM/SEC2
BH CV ECHO MEAS - MV DEC TIME: 0.23 SEC
BH CV ECHO MEAS - MV E MAX VEL: 59.2 CM/SEC
BH CV ECHO MEAS - MV E/A: 0.53
BH CV ECHO MEAS - MV MAX PG: 4.1 MMHG
BH CV ECHO MEAS - MV MEAN PG: 2 MMHG
BH CV ECHO MEAS - MV P1/2T: 84.9 MSEC
BH CV ECHO MEAS - MV V2 VTI: 18 CM
BH CV ECHO MEAS - MVA(P1/2T): 2.6 CM2
BH CV ECHO MEAS - MVA(VTI): 2.5 CM2
BH CV ECHO MEAS - PA V2 MAX: 102.5 CM/SEC
BH CV ECHO MEAS - PULM A REVS DUR: 0.11 SEC
BH CV ECHO MEAS - PULM A REVS VEL: 107 CM/SEC
BH CV ECHO MEAS - PULM DIAS VEL: 37.2 CM/SEC
BH CV ECHO MEAS - PULM S/D: 1.47
BH CV ECHO MEAS - PULM SYS VEL: 54.5 CM/SEC
BH CV ECHO MEAS - QP/QS: 0.38
BH CV ECHO MEAS - RAP SYSTOLE: 3 MMHG
BH CV ECHO MEAS - RV MAX PG: 1.63 MMHG
BH CV ECHO MEAS - RV V1 MAX: 63.9 CM/SEC
BH CV ECHO MEAS - RV V1 VTI: 13.1 CM
BH CV ECHO MEAS - RVDD: 1.9 CM
BH CV ECHO MEAS - RVOT DIAM: 1.3 CM
BH CV ECHO MEAS - RVSP: 25.7 MMHG
BH CV ECHO MEAS - SV(LVOT): 45.4 ML
BH CV ECHO MEAS - SV(MOD-SP4): 27.2 ML
BH CV ECHO MEAS - SV(RVOT): 17.4 ML
BH CV ECHO MEAS - TAPSE (>1.6): 3.1 CM
BH CV ECHO MEAS - TR MAX PG: 22.7 MMHG
BH CV ECHO MEAS - TR MAX VEL: 238 CM/SEC
BH CV ECHO MEASUREMENTS AVERAGE E/E' RATIO: 8.34
BH CV XLRA - TDI S': 20.1 CM/SEC
CHOLEST SERPL-MCNC: 142 MG/DL (ref 0–200)
GLUCOSE BLDC GLUCOMTR-MCNC: 164 MG/DL (ref 70–105)
GLUCOSE BLDC GLUCOMTR-MCNC: 173 MG/DL (ref 70–105)
GLUCOSE BLDC GLUCOMTR-MCNC: 174 MG/DL (ref 70–105)
GLUCOSE BLDC GLUCOMTR-MCNC: 229 MG/DL (ref 70–105)
HBA1C MFR BLD: 7.3 % (ref 4.8–5.6)
HDLC SERPL-MCNC: 67 MG/DL (ref 40–60)
LDLC SERPL CALC-MCNC: 57 MG/DL (ref 0–100)
LDLC/HDLC SERPL: 0.82 {RATIO}
QT INTERVAL: 403 MS
QTC INTERVAL: 447 MS
SINUS: 3 CM
TRIGL SERPL-MCNC: 101 MG/DL (ref 0–150)
VLDLC SERPL-MCNC: 18 MG/DL (ref 5–40)

## 2024-01-23 PROCEDURE — 36415 COLL VENOUS BLD VENIPUNCTURE: CPT | Performed by: HOSPITALIST

## 2024-01-23 PROCEDURE — 83036 HEMOGLOBIN GLYCOSYLATED A1C: CPT | Performed by: HOSPITALIST

## 2024-01-23 PROCEDURE — 97162 PT EVAL MOD COMPLEX 30 MIN: CPT

## 2024-01-23 PROCEDURE — 63710000001 INSULIN LISPRO (HUMAN) PER 5 UNITS: Performed by: HOSPITALIST

## 2024-01-23 PROCEDURE — G0378 HOSPITAL OBSERVATION PER HR: HCPCS

## 2024-01-23 PROCEDURE — 93306 TTE W/DOPPLER COMPLETE: CPT | Performed by: INTERNAL MEDICINE

## 2024-01-23 PROCEDURE — 82948 REAGENT STRIP/BLOOD GLUCOSE: CPT | Performed by: HOSPITALIST

## 2024-01-23 PROCEDURE — 93306 TTE W/DOPPLER COMPLETE: CPT

## 2024-01-23 PROCEDURE — 97166 OT EVAL MOD COMPLEX 45 MIN: CPT

## 2024-01-23 PROCEDURE — 82948 REAGENT STRIP/BLOOD GLUCOSE: CPT

## 2024-01-23 PROCEDURE — 80061 LIPID PANEL: CPT | Performed by: HOSPITALIST

## 2024-01-23 PROCEDURE — 99214 OFFICE O/P EST MOD 30 MIN: CPT | Performed by: NURSE PRACTITIONER

## 2024-01-23 RX ORDER — AMLODIPINE BESYLATE 5 MG/1
5 TABLET ORAL
Status: DISCONTINUED | OUTPATIENT
Start: 2024-01-23 | End: 2024-01-23 | Stop reason: HOSPADM

## 2024-01-23 RX ORDER — HYDRALAZINE HYDROCHLORIDE 20 MG/ML
10 INJECTION INTRAMUSCULAR; INTRAVENOUS EVERY 4 HOURS PRN
Status: DISCONTINUED | OUTPATIENT
Start: 2024-01-23 | End: 2024-01-23 | Stop reason: HOSPADM

## 2024-01-23 RX ORDER — AMLODIPINE BESYLATE 5 MG/1
5 TABLET ORAL
Qty: 30 TABLET | Refills: 0 | Status: SHIPPED | OUTPATIENT
Start: 2024-01-24

## 2024-01-23 RX ADMIN — LEVOTHYROXINE SODIUM 100 MCG: 0.1 TABLET ORAL at 06:18

## 2024-01-23 RX ADMIN — INSULIN LISPRO 3 UNITS: 100 INJECTION, SOLUTION INTRAVENOUS; SUBCUTANEOUS at 08:40

## 2024-01-23 RX ADMIN — AMLODIPINE BESYLATE 5 MG: 5 TABLET ORAL at 12:18

## 2024-01-23 RX ADMIN — Medication 10 ML: at 08:41

## 2024-01-23 RX ADMIN — CLOPIDOGREL BISULFATE 75 MG: 75 TABLET ORAL at 08:40

## 2024-01-23 RX ADMIN — INSULIN LISPRO 2 UNITS: 100 INJECTION, SOLUTION INTRAVENOUS; SUBCUTANEOUS at 12:17

## 2024-01-23 NOTE — PLAN OF CARE
Problem: Adult Inpatient Plan of Care  Goal: Plan of Care Review  Outcome: Met  Goal: Patient-Specific Goal (Individualized)  Outcome: Met  Goal: Absence of Hospital-Acquired Illness or Injury  Outcome: Met  Intervention: Identify and Manage Fall Risk  Recent Flowsheet Documentation  Taken 1/23/2024 1400 by Elba Avalos RN  Safety Promotion/Fall Prevention:   activity supervised   assistive device/personal items within reach   clutter free environment maintained   fall prevention program maintained   nonskid shoes/slippers when out of bed   room organization consistent   safety round/check completed  Taken 1/23/2024 1000 by Elba Avalos RN  Safety Promotion/Fall Prevention:   assistive device/personal items within reach   activity supervised   clutter free environment maintained   fall prevention program maintained   nonskid shoes/slippers when out of bed   room organization consistent   safety round/check completed  Taken 1/23/2024 0800 by Elba Avalos RN  Safety Promotion/Fall Prevention:   activity supervised   assistive device/personal items within reach   clutter free environment maintained   fall prevention program maintained   nonskid shoes/slippers when out of bed   room organization consistent   safety round/check completed  Intervention: Prevent Skin Injury  Recent Flowsheet Documentation  Taken 1/23/2024 1400 by Elba Avalos RN  Body Position: position changed independently  Taken 1/23/2024 1200 by Elba Avalos RN  Skin Protection: adhesive use limited  Taken 1/23/2024 1000 by Elba Avalos RN  Body Position: position changed independently  Taken 1/23/2024 0800 by Elba Avalos RN  Body Position: position changed independently  Skin Protection:   adhesive use limited   incontinence pads utilized   tubing/devices free from skin contact  Intervention: Prevent and Manage VTE (Venous Thromboembolism) Risk  Recent Flowsheet Documentation  Taken 1/23/2024 1400 by Elba Avalos  RN  Activity Management: activity encouraged  Taken 1/23/2024 1200 by Elba Avalos RN  VTE Prevention/Management:   bilateral   sequential compression devices off   patient refused intervention  Range of Motion: active ROM (range of motion) encouraged  Taken 1/23/2024 1000 by Elba Avalos RN  Activity Management: activity encouraged  Taken 1/23/2024 0800 by Elba Avalos RN  Activity Management: activity encouraged  VTE Prevention/Management:   bilateral   sequential compression devices off   patient refused intervention  Range of Motion: active ROM (range of motion) encouraged  Intervention: Prevent Infection  Recent Flowsheet Documentation  Taken 1/23/2024 1400 by Elba Avalos RN  Infection Prevention:   environmental surveillance performed   equipment surfaces disinfected   hand hygiene promoted   personal protective equipment utilized   single patient room provided  Taken 1/23/2024 1000 by Elba Avalos RN  Infection Prevention:   environmental surveillance performed   equipment surfaces disinfected   hand hygiene promoted   personal protective equipment utilized   single patient room provided  Taken 1/23/2024 0800 by Elba Avalos RN  Infection Prevention:   environmental surveillance performed   equipment surfaces disinfected   hand hygiene promoted   personal protective equipment utilized   single patient room provided  Goal: Optimal Comfort and Wellbeing  Outcome: Met  Intervention: Monitor Pain and Promote Comfort  Recent Flowsheet Documentation  Taken 1/23/2024 0800 by Elba Avalos RN  Pain Management Interventions:   care clustered   no interventions per patient request   pillow support provided   position adjusted  Intervention: Provide Person-Centered Care  Recent Flowsheet Documentation  Taken 1/23/2024 1200 by Elba Avalos RN  Trust Relationship/Rapport:   care explained   choices provided   emotional support provided   empathic listening provided   questions answered    thoughts/feelings acknowledged  Taken 1/23/2024 0800 by Elba Avalos RN  Trust Relationship/Rapport:   care explained   choices provided   emotional support provided   empathic listening provided   questions answered   thoughts/feelings acknowledged  Goal: Readiness for Transition of Care  Outcome: Met     Problem: Behavioral Health Comorbidity  Goal: Maintenance of Behavioral Health Symptom Control  Outcome: Met  Intervention: Maintain Behavioral Health Symptom Control  Recent Flowsheet Documentation  Taken 1/23/2024 1400 by Elba Avalos RN  Medication Review/Management: medications reviewed  Taken 1/23/2024 1000 by Elba Avalos RN  Medication Review/Management: medications reviewed  Taken 1/23/2024 0800 by Elba Avalos RN  Medication Review/Management: medications reviewed     Problem: Diabetes Comorbidity  Goal: Blood Glucose Level Within Targeted Range  Outcome: Met  Intervention: Monitor and Manage Glycemia  Recent Flowsheet Documentation  Taken 1/23/2024 1200 by Elba Avalos RN  Glycemic Management: blood glucose monitored  Taken 1/23/2024 0800 by Elba Avalos RN  Glycemic Management: blood glucose monitored     Problem: Hypertension Comorbidity  Goal: Blood Pressure in Desired Range  Outcome: Met  Intervention: Maintain Blood Pressure Management  Recent Flowsheet Documentation  Taken 1/23/2024 1400 by Elba Avalos RN  Medication Review/Management: medications reviewed  Taken 1/23/2024 1000 by Elba Avalos RN  Medication Review/Management: medications reviewed  Taken 1/23/2024 0800 by Elba Avalos RN  Medication Review/Management: medications reviewed     Problem: Pain Chronic (Persistent) (Comorbidity Management)  Goal: Acceptable Pain Control and Functional Ability  Outcome: Met  Intervention: Manage Persistent Pain  Recent Flowsheet Documentation  Taken 1/23/2024 1400 by Elba Avalos RN  Medication Review/Management: medications reviewed  Taken 1/23/2024 1200 by  Elba Avalos RN  Bowel Elimination Promotion: adequate fluid intake promoted  Taken 1/23/2024 1000 by Elba Avalos RN  Medication Review/Management: medications reviewed  Taken 1/23/2024 0800 by Elba Avalos RN  Bowel Elimination Promotion: adequate fluid intake promoted  Sleep/Rest Enhancement:   awakenings minimized   noise level reduced  Medication Review/Management: medications reviewed  Intervention: Develop Pain Management Plan  Recent Flowsheet Documentation  Taken 1/23/2024 0800 by Elba Avalos RN  Pain Management Interventions:   care clustered   no interventions per patient request   pillow support provided   position adjusted  Intervention: Optimize Psychosocial Wellbeing  Recent Flowsheet Documentation  Taken 1/23/2024 1200 by Elba Avalos RN  Supportive Measures: active listening utilized  Diversional Activities: television  Family/Support System Care:   support provided   self-care encouraged  Taken 1/23/2024 0800 by Elba Avalos RN  Supportive Measures:   active listening utilized   verbalization of feelings encouraged  Diversional Activities: television  Family/Support System Care:   support provided   self-care encouraged     Problem: Fall Injury Risk  Goal: Absence of Fall and Fall-Related Injury  Outcome: Met  Intervention: Identify and Manage Contributors  Recent Flowsheet Documentation  Taken 1/23/2024 1400 by Elba Avalos RN  Medication Review/Management: medications reviewed  Taken 1/23/2024 1000 by Elba Avalos RN  Medication Review/Management: medications reviewed  Taken 1/23/2024 0800 by Elba Avalos RN  Medication Review/Management: medications reviewed  Intervention: Promote Injury-Free Environment  Recent Flowsheet Documentation  Taken 1/23/2024 1400 by Elba Avalos RN  Safety Promotion/Fall Prevention:   activity supervised   assistive device/personal items within reach   clutter free environment maintained   fall prevention program maintained    nonskid shoes/slippers when out of bed   room organization consistent   safety round/check completed  Taken 1/23/2024 1000 by Elba Avalos, RN  Safety Promotion/Fall Prevention:   assistive device/personal items within reach   activity supervised   clutter free environment maintained   fall prevention program maintained   nonskid shoes/slippers when out of bed   room organization consistent   safety round/check completed  Taken 1/23/2024 0800 by Elba Avalos, RN  Safety Promotion/Fall Prevention:   activity supervised   assistive device/personal items within reach   clutter free environment maintained   fall prevention program maintained   nonskid shoes/slippers when out of bed   room organization consistent   safety round/check completed   Goal Outcome Evaluation:

## 2024-01-23 NOTE — THERAPY EVALUATION
Patient Name: Pamela Zavala  : 1940    MRN: [id number]                              Today's Date: 2024       Admit Date: 2024    Visit Dx:     ICD-10-CM ICD-9-CM   1. Intractable headache, unspecified chronicity pattern, unspecified headache type  R51.9 784.0   2. Visual field defect  H53.40 368.40   3. Facial numbness  R20.0 782.0   4. Hypomagnesemia  E83.42 275.2     Patient Active Problem List   Diagnosis    Chest pain    Coronary arteriosclerosis    Type 2 diabetes mellitus    Diabetic neuropathy    Essential hypertension    Hyperlipidemia    Postoperative hypothyroidism    Thallium stress test abnormal    Ex-smoker    Chest pain, unspecified type    History of thyroid cancer    Sepsis without acute organ dysfunction    Kidney stones    UTI (urinary tract infection), bacterial    Head injury, closed, without LOC, initial encounter     Past Medical History:   Diagnosis Date    Anemia     Arthritis     CAD (coronary artery disease)     Chronic back pain     Diabetic neuropathy     Essential hypertension     GERD (gastroesophageal reflux disease)     History of gastric ulcer     History of stomach ulcers     HLD (hyperlipidemia)     Hypocalcemia     Hypoparathyroidism     Hypothyroidism     Insomnia     Kidney stones     Osteoporosis     Recurrent sinusitis     Renal disease     Thyroid cancer     Type 2 diabetes mellitus      Past Surgical History:   Procedure Laterality Date    CARDIAC CATHETERIZATION N/A 2023    Procedure: Left Heart Cath and coronary angiogram;  Surgeon: John Baldwin MD;  Location: [facility] CATH INVASIVE LOCATION;  Service: Cardiovascular;  Laterality: N/A;    CORONARY STENT PLACEMENT      x 3    CYSTOSCOPY, URETEROSCOPY, RETROGRADE PYELOGRAM, STENT INSERTION Left 10/18/2023    Procedure: CYSTOSCOPY URETEROSCOPY RETROGRADE PYELOGRAM HOLMIUM LASER STENT INSERTION;  Surgeon: Juan Alaniz MD;  Location: [facility] MAIN OR;  Service: Urology;  Laterality: Left;    HYSTERECTOMY       LUNG LOBECTOMY      THYROIDECTOMY        General Information       Row Name 01/23/24 1449          Physical Therapy Time and Intention    Document Type evaluation  -     Mode of Treatment physical therapy  -       Row Name 01/23/24 1449          General Information    Patient Profile Reviewed yes  -SS     Prior Level of Function independent:;ADL's  -       Row Name 01/23/24 1449          Living Environment    People in Home facility resident  [facility] [address] [facility]  -SS       Row Name 01/23/24 1449          Home Main Entrance    Number of Stairs, Main Entrance none  -SS       Row Name 01/23/24 1449          Stairs Within Home, Primary    Number of Stairs, Within Home, Primary none  -SS       Row Name 01/23/24 1449          Cognition    Orientation Status (Cognition) oriented x 4  -               User Key  (r) = Recorded By, (t) = Taken By, (c) = Cosigned By      Initials Name Provider Type     Alanna Stewart PT Physical Therapist                   Mobility       Row Name 01/23/24 1454          Bed Mobility    All Activities, [place] (Bed Mobility) modified independence  -       Row Name 01/23/24 1454          Sit-Stand Transfer    Sit-Stand [place] (Transfers) supervision  -       Row Name 01/23/24 1454          Gait/Stairs (Locomotion)    [place] Level (Gait) supervision  -     Assistive Device (Gait) walker, front-wheeled  -     Distance in Feet (Gait) 200'  -     Comment, (Gait/Stairs) No loss of balance. Able to follow instructions for pathifinding with mod cues to find room but able to identify mistake and rectify  -               User Key  (r) = Recorded By, (t) = Taken By, (c) = Cosigned By      Initials Name Provider Type     Alanna Stewart PT Physical Therapist                   Obj/Interventions       Row Name 01/23/24 1458          Range of Motion Comprehensive    General Range of Motion no range of motion deficits identified  -       Row Name  01/23/24 1458          Strength Comprehensive (MMT)    General Manual Muscle Testing (MMT) Assessment no strength deficits identified  -SS       Row Name 01/23/24 1458          Sensory Assessment (Somatosensory)    Sensory Assessment (Somatosensory) sensation intact  reports history of peripheral neuroapthy  -SS               User Key  (r) = Recorded By, (t) = Taken By, (c) = Cosigned By      Initials Name Provider Type     Alanna Stewart, PT Physical Therapist                   Goals/Plan    No documentation.                  Clinical Impression       Row Name 01/23/24 1459          Pain    Pretreatment Pain Rating 0/10 - no pain  -SS     Posttreatment Pain Rating 0/10 - no pain  -SS       Row Name 01/23/24 1505 01/23/24 1459       Plan of Care Review    Plan of Care Reviewed With -- patient  -SS    Outcome Evaluation 84 yo female with c/o blurry vision and L facial numbness. Reportedly hit head 10 days ago getting into cab, with resulting bruising on scalp and forehead. She stopped taking Eliquis due to this bruising event. MRI Head (-). Dx TIA. PMHx including CAD, HTN, hyperlipidemia, hypothyroidism, thyroid CA x2, and T2DM. Recent admission approx 3 months ago with L renal stone with complicated recovery. At baseline, pt resides in [facility] on [address] [facility] where she is Mod I with ADLs, and has strong social support of son for IADLs. Facility manages medication. Son reports patient's memory / cognitive status varies greatly day-to-day. Patient ambulates with RW with CGA - Supervision for balance. She is easily distractible, but is able to complete pathfinding task with mod cues. HTN throughout session, with NSG aware. She appears near baseline, and anticipate she is safe to return to [facility] with continuance of HHPT.  -SS --      Row Name 01/23/24 1459          Therapy Assessment/Plan (PT)    Criteria for Skilled Interventions Met (PT) no  -SS     Therapy Frequency (PT) evaluation only  -SS       Row Name 01/23/24  1459          Vital Signs    Intra Systolic BP Rehab 171  -SS     Intra Treatment Diastolic   -SS     Post Systolic BP Rehab 171  -SS     Post Treatment Diastolic   -SS       Row Name 01/23/24 1459          Positioning and Restraints    Pre-Treatment Position in bed  -SS     Post Treatment Position bed  -SS               User Key  (r) = Recorded By, (t) = Taken By, (c) = Cosigned By      Initials Name Provider Type    Alanna Norman, PT Physical Therapist                   Outcome Measures       Row Name 01/23/24 0800          How much help from another person do you currently need...    Turning from your back to your side while in flat bed without using bedrails? 4  -BL     Moving from lying on back to sitting on the side of a flat bed without bedrails? 3  -BL     Moving to and from a bed to a chair (including a wheelchair)? 3  -BL     Standing up from a chair using your arms (e.g., wheelchair, bedside chair)? 3  -BL     Climbing 3-5 steps with a railing? 3  -BL     To walk in hospital room? 3  -BL     AM-PAC 6 Clicks Score (PT) 19  -BL     Highest Level of Mobility Goal 6 --> Walk 10 steps or more  -       Row Name 01/23/24 1501          Modified [place] Scale    Pre-Stroke Modified Kyle Scale 0 - No Symptoms at all.  -SS     Modified Kyle Scale 0 - No Symptoms at all.  -       Row Name 01/23/24 1501 01/23/24 1433       Functional Assessment    Outcome Measure Options Modified [place]  - AM-PAC 6 Clicks Daily Activity (OT)  -ES              User Key  (r) = Recorded By, (t) = Taken By, (c) = Cosigned By      Initials Name Provider Type    Alanna Norman, PT Physical Therapist    ES Amy Streeter, OT Occupational Therapist    Elba Taylor, RN Registered Nurse                                 Physical Therapy Education       Title: PT OT SLP Therapies (Done)       Topic: Physical Therapy (Done)       Point: Mobility training (Done)       Learning Progress Summary              Patient Acceptance, E, VU by  at 1/23/2024 1502    Acceptance, E, VU by  at 1/23/2024 1436                         Point: Home exercise program (Done)       Learning Progress Summary             Patient Acceptance, E, VU by BL at 1/23/2024 1436                         Point: Body mechanics (Done)       Learning Progress Summary             Patient Acceptance, E, VU by BL at 1/23/2024 1436                         Point: Precautions (Done)       Learning Progress Summary             Patient Acceptance, E, VU by BL at 1/23/2024 1436                                         User Key       Initials Effective Dates Name Provider Type Discipline     06/16/21 -  Alanna Stewart, PT Physical Therapist PT     06/20/23 -  Elba Avalos, RN Registered Nurse Nurse                  PT Recommendation and Plan     Plan of Care Reviewed With: patient  Outcome Evaluation: 84 yo female with c/o blurry vision and L facial numbness. Reportedly hit head 10 days ago getting into cab, with resulting bruising on scalp and forehead. She stopped taking Eliquis due to this bruising event. MRI Head (-). Dx TIA. PMHx including CAD, HTN, hyperlipidemia, hypothyroidism, thyroid CA x2, and T2DM. Recent admission approx 3 months ago with L renal stone with complicated recovery. At baseline, pt resides in [facility] [address] [place] where she is Mod I with ADLs, and has strong social support of son for IADLs. Facility manages medication. Son reports patient's memory / cognitive status varies greatly day-to-day. Patient ambulates with RW with CGA - Supervision for balance. She is easily distractible, but is able to complete pathfinding task with mod cues. HTN throughout session, with NSG aware. She appears near baseline, and anticipate she is safe to return to [facility] with continuance of HHPT.     Time Calculation:   PT Evaluation Complexity  History, PT Evaluation Complexity: 3 or more personal factors and/or comorbidities  Examination of Body  Systems (PT Eval Complexity): total of 3 or more elements  Clinical Presentation (PT Evaluation Complexity): evolving  Clinical Decision Making (PT Evaluation Complexity): moderate complexity  Overall Complexity (PT Evaluation Complexity): moderate complexity     PT Charges       Row Name 01/23/24 1502             Time Calculation    Start Time 0930  -      Stop Time 1007  -SS      Time Calculation (min) 37 min  -SS      PT Received On 01/23/24  -         Time Calculation- PT    Total Timed Code Minutes- PT 0 minute(s)  -                User Key  (r) = Recorded By, (t) = Taken By, (c) = Cosigned By      Initials Name Provider Type    SS Alanna Stewart, PT Physical Therapist                  Therapy Charges for Today       Code Description Service Date Service Provider Modifiers Qty    99839878182 HC PT EVAL MOD COMPLEXITY 4 1/23/2024 Alanna Stewart, PT GP 1            PT G-Codes  Outcome Measure Options: Modified Kyle  AM-PAC 6 Clicks Score (PT): 19  AM-PAC 6 Clicks Score (OT): 23  Modified Kyle Scale: 0 - No Symptoms at all.  PT Discharge Summary  Anticipated Discharge Disposition (PT): home with home health    Alanna Stewart, PT  1/23/2024

## 2024-01-23 NOTE — PLAN OF CARE
Goal Outcome Evaluation:  Plan of Care Reviewed With: patient         84 yo female with c/o blurry vision and L facial numbness. Reportedly hit head 10 days ago getting into cab, with resulting bruising on scalp and forehead. She stopped taking Eliquis due to this bruising event. MRI Head (-). Dx TIA. PMHx including CAD, HTN, hyperlipidemia, hypothyroidism, thyroid CA x2, and T2DM. Recent admission approx 3 months ago with L renal stone with complicated recovery. At baseline, pt resides in [facility] [address] [facility] where she is Mod I with ADLs, and has strong social support of son for IADLs. Facility manages medication. Son reports patient's memory / cognitive status varies greatly day-to-day. Patient ambulates with RW with CGA - Supervision for balance. She is easily distractible, but is able to complete pathfinding task with mod cues. HTN throughout session, with NSG aware. She appears near baseline, and anticipate she is safe to return to residential with continuance of HHPT.          Anticipated Discharge Disposition (PT): home with home health

## 2024-01-23 NOTE — THERAPY EVALUATION
Patient Name: Pamela Zavala  : 1940    MRN: [id number]                              Today's Date: 2024       Admit Date: 2024    Visit Dx:     ICD-10-CM ICD-9-CM   1. Intractable headache, unspecified chronicity pattern, unspecified headache type  R51.9 784.0   2. Visual field defect  H53.40 368.40   3. Facial numbness  R20.0 782.0   4. Hypomagnesemia  E83.42 275.2     Patient Active Problem List   Diagnosis    Chest pain    Coronary arteriosclerosis    Type 2 diabetes mellitus    Diabetic neuropathy    Essential hypertension    Hyperlipidemia    Postoperative hypothyroidism    Thallium stress test abnormal    Ex-smoker    Chest pain, unspecified type    History of thyroid cancer    Sepsis without acute organ dysfunction    Kidney stones    UTI (urinary tract infection), bacterial    Head injury, closed, without LOC, initial encounter     Past Medical History:   Diagnosis Date    Anemia     Arthritis     CAD (coronary artery disease)     Chronic back pain     Diabetic neuropathy     Essential hypertension     GERD (gastroesophageal reflux disease)     History of gastric ulcer     History of stomach ulcers     HLD (hyperlipidemia)     Hypocalcemia     Hypoparathyroidism     Hypothyroidism     Insomnia     Kidney stones     Osteoporosis     Recurrent sinusitis     Renal disease     Thyroid cancer     Type 2 diabetes mellitus      Past Surgical History:   Procedure Laterality Date    CARDIAC CATHETERIZATION N/A 2023    Procedure: Left Heart Cath and coronary angiogram;  Surgeon: John Baldwin MD;  Location: [facility] CATH INVASIVE LOCATION;  Service: Cardiovascular;  Laterality: N/A;    CORONARY STENT PLACEMENT      x 3    CYSTOSCOPY, URETEROSCOPY, RETROGRADE PYELOGRAM, STENT INSERTION Left 10/18/2023    Procedure: CYSTOSCOPY URETEROSCOPY RETROGRADE PYELOGRAM HOLMIUM LASER STENT INSERTION;  Surgeon: Juan Alaniz MD;  Location: [facility] MAIN OR;  Service: Urology;  Laterality: Left;    HYSTERECTOMY       LUNG LOBECTOMY      THYROIDECTOMY        General Information       Row Name 01/23/24 1328          OT Time and Intention    Document Type evaluation  -ES     Mode of Treatment occupational therapy  -ES       Row Name 01/23/24 1328          General Information    Patient Profile Reviewed yes  -ES     Prior Level of Function independent:;ADL's  -ES       Row Name 01/23/24 1332          Occupational Profile    Reason for Services/Referral (Occupational Profile) Pt is an 84 yo female brought to ED 1/22/24 with c/o blurry vision and L facial numbness. Reportedly hit head 10 days ago, with resulting bruising. She stopped taking Eliquis due to this bruising event. MRI Head (-). Dx TIA.  PMHx including CAD, HTN, hyperlipidemia, hypothyroidism, thyriod CA x2, and T2DM. Recent admission approx 3 months ago with L renal stone with complicated recovery.   At baseline, pt resides in [facility] where she is Mod I with ADLs, and has strong social support of son for IADLs. Facility manages medication Son reports patient's memory / cognitive status varies greatly day-to-day.  -ES       Row Name 01/23/24 1332          Living Environment    People in Home facility resident  [facility] resident  -ES       Row Name 01/23/24 1332          Home Main Entrance    Number of Stairs, Main Entrance none  -ES       Row Name 01/23/24 1332          Stairs Within Home, Primary    Number of Stairs, Within Home, Primary none  -ES       Row Name 01/23/24 1332          Cognition    Orientation Status (Cognition) oriented x 4  Short Blessed Test: 8/28, indicating questionable cognitive impairment.  -ES       Row Name 01/23/24 1332          Safety Issues, Functional Mobility    Safety Issues Affecting Function (Mobility) impulsivity  -ES     Impairments Affecting Function (Mobility) balance;cognition  -ES     Cognitive Impairments, Mobility Safety/Performance awareness, need for assistance;impulsivity;attention  -ES               User Key  (r) = Recorded By, (t)  = Taken By, (c) = Cosigned By      Initials Name Provider Type    Amy Gordon OT Occupational Therapist                     Mobility/ADL's       Row Name 01/23/24 1336          Bed Mobility    Bed Mobility bed mobility (all) activities  -ES     All Activities, [place] (Bed Mobility) modified independence  -ES       Row Name 01/23/24 1336          Transfers    Transfers bed-chair transfer;toilet transfer;stand-sit transfer  -ES       Row Name 01/23/24 1336          Bed-Chair Transfer    Bed-Chair [place] (Transfers) supervision  -ES       Row Name 01/23/24 1336          Stand-Sit Transfer    Stand-Sit [place] (Transfers) supervision  -ES       Row Name 01/23/24 1336          Toilet Transfer    Type (Toilet Transfer) sit-stand  -ES     [place] Level (Toilet Transfer) supervision  -ES       Row Name 01/23/24 1336          Functional Mobility    Functional Mobility- Ind. Level supervision required;contact guard assist  -ES     Functional Mobility- Device walker, front-wheeled  -ES       Row Name 01/23/24 1336          Activities of Daily Living    BADL Assessment/Intervention upper body dressing;lower body dressing;toileting  -ES       Row Name 01/23/24 1336          Upper Body Dressing Assessment/Training    [place] Level (Upper Body Dressing) set up  -ES       Row Name 01/23/24 1336          Lower Body Dressing Assessment/Training    [place] Level (Lower Body Dressing) contact guard assist  -ES       Row Name 01/23/24 1336          Toileting Assessment/Training    [place] Level (Toileting) supervision  -ES               User Key  (r) = Recorded By, (t) = Taken By, (c) = Cosigned By      Initials Name Provider Type    Amy Gordon OT Occupational Therapist                   Obj/Interventions       Row Name 01/23/24 1337          Sensory Assessment (Somatosensory)    Sensory Assessment peripheral neuropathy  -ES       Row Name 01/23/24 1337          Vision  Assessment/Intervention    Visual Impairment/Limitations corrective lenses full-time  -ES     Vision Assessment Comment No visual deficits at this time.  -ES       Row Name 01/23/24 1337          Range of Motion Comprehensive    General Range of Motion no range of motion deficits identified  -ES       Row Name 01/23/24 1337          Strength Comprehensive (MMT)    General Manual Muscle Testing (MMT) Assessment no strength deficits identified  -ES       Row Name 01/23/24 1337          Balance    Balance Assessment sitting static balance;sitting dynamic balance;standing static balance;standing dynamic balance  -ES     Static Sitting Balance independent  -ES     Dynamic Sitting Balance modified independence  -ES     Static Standing Balance supervision  -ES     Dynamic Standing Balance contact guard  -ES     Position/Device Used, Standing Balance walker, front-wheeled  -ES     Balance Interventions standing;sitting;static;dynamic  -ES               User Key  (r) = Recorded By, (t) = Taken By, (c) = Cosigned By      Initials Name Provider Type    Amy Gordon OT Occupational Therapist                   Goals/Plan    No documentation.                  Clinical Impression       Row Name 01/23/24 0951          Pain Assessment    Pretreatment Pain Rating 0/10 - no pain  -ES     Posttreatment Pain Rating 0/10 - no pain  -ES       Row Name 01/23/24 0951          Plan of Care Review    Plan of Care Reviewed With patient  -ES     Outcome Evaluation Pt is an 82 yo female brought to ED 1/22/24 with c/o blurry vision and L facial numbness. Reportedly hit head 10 days ago, with resulting bruising. She stopped taking Eliquis due to this bruising event. MRI Head (-). Dx TIA.  PMHx including CAD, HTN, hyperlipidemia, hypothyroidism, thyriod CA x2, and T2DM. Recent admission approx 3 months ago with L renal stone with complicated recovery.   At baseline, pt resides in [facility] where she is Mod I with ADLs, and has strong social  support of son for IADLs. Facility manages medication Son reports patient's memory / cognitive status varies greatly day-to-day. This date, patient oriented x4 but scores 8/28 on Short Blessed Test, indicating questionable cognition.Patient ambulates with RW with CGA - Supervision for balance. She is easily distractible, but is able to complete topigraphical orientation. HTN throughout session, with NSG aware. SHe appears near baseline, and anticipate she is safe to return to [facility].  -ES       Row Name 01/23/24 0951          Therapy Assessment/Plan (OT)    Therapy Frequency (OT) evaluation only  -ES       Row Name 01/23/24 0951          Therapy Plan Review/Discharge Plan (OT)    Anticipated Discharge Disposition (OT) home with assist  -ES       Row Name 01/23/24 0951          Vital Signs    Pre Systolic BP Rehab 197  -ES     Pre Treatment Diastolic   -ES     Intra Systolic BP Rehab 191  -ES     Intra Treatment Diastolic   -ES     Post Systolic BP Rehab 171  -ES     Post Treatment Diastolic   -ES     O2 Delivery Pre Treatment room air  -ES     O2 Delivery Intra Treatment room air  -ES     O2 Delivery Post Treatment room air  -ES     Pre Patient Position Supine  -ES     Intra Patient Position Sitting  -ES     Post Patient Position Standing  -ES               User Key  (r) = Recorded By, (t) = Taken By, (c) = Cosigned By      Initials Name Provider Type    Amy Gordon, OT Occupational Therapist                   Outcome Measures       Row Name 01/23/24 7266          How much help from another is currently needed...    Putting on and taking off regular lower body clothing? 4  -ES     Bathing (including washing, rinsing, and drying) 3  -ES     Toileting (which includes using toilet bed pan or urinal) 4  -ES     Putting on and taking off regular upper body clothing 4  -ES     Taking care of personal grooming (such as brushing teeth) 4  -ES     Eating meals 4  -ES     AM-PAC 6 Clicks Score (OT) 23   -ES       Row Name 01/23/24 0800          How much help from another person do you currently need...    Turning from your back to your side while in flat bed without using bedrails? 4  -BL     Moving from lying on back to sitting on the side of a flat bed without bedrails? 3  -BL     Moving to and from a bed to a chair (including a wheelchair)? 3  -BL     Standing up from a chair using your arms (e.g., wheelchair, bedside chair)? 3  -BL     Climbing 3-5 steps with a railing? 3  -BL     To walk in hospital room? 3  -BL     AM-PAC 6 Clicks Score (PT) 19  -BL     Highest Level of Mobility Goal 6 --> Walk 10 steps or more  -BL       Row Name 01/23/24 1433          Functional Assessment    Outcome Measure Options AM-PAC 6 Clicks Daily Activity (OT)  -ES               User Key  (r) = Recorded By, (t) = Taken By, (c) = Cosigned By      Initials Name Provider Type    Amy Gordon OT Occupational Therapist    Elba Taylor, [place] Registered Nurse                    Occupational Therapy Education       Title: PT OT SLP Therapies (Not Started)       Topic: Occupational Therapy (Not Started)       Point: ADL training (Not Started)       Description:   Instruct learner(s) on proper safety adaptation and remediation techniques during self care or transfers.   Instruct in proper use of assistive devices.                  Learner Progress:  Not documented in this visit.                                  OT Recommendation and Plan  Therapy Frequency (OT): evaluation only  Plan of Care Review  Plan of Care Reviewed With: patient  Outcome Evaluation: Pt is an 82 yo female brought to ED 1/22/24 with c/o blurry vision and L facial numbness. Reportedly hit head 10 days ago, with resulting bruising. She stopped taking Eliquis due to this bruising event. MRI Head (-). Dx TIA.  PMHx including CAD, HTN, hyperlipidemia, hypothyroidism, thyriod CA x2, and T2DM. Recent admission approx 3 months ago with L renal stone with  complicated recovery.   At baseline, pt resides in [place] where she is Mod I with ADLs, and has strong social support of son for IADLs. Facility manages medication Son reports patient's memory / cognitive status varies greatly day-to-day. This date, patient oriented x4 but scores 8/28 on Short Blessed Test, indicating questionable cognition.Patient ambulates with RW with CGA - Supervision for balance. She is easily distractible, but is able to complete topigraphical orientation. HTN throughout session, with NSG aware. SHe appears near baseline, and anticipate [facility].     Time Calculation:         Time Calculation- OT       Row Name 01/23/24 1433             Time Calculation- OT    OT Start Time 0945  -ES      OT Stop Time 1007  -ES      OT Time Calculation (min) 22 min  -ES      Total Timed Code Minutes- OT 22 minute(s)  -ES      OT Received On 01/23/24  -ES                User Key  (r) = Recorded By, (t) = Taken By, (c) = Cosigned By      Initials Name Provider Type    Amy Gordon OT Occupational Therapist                           Amy Streeter OT  1/23/2024

## 2024-01-23 NOTE — CONSULTS
Primary Care Provider: Provider, No Known     Consult requested by:  Dr. Goldman     Reason for Consultation: Neurological evaluation      History taken from: patient chart RN    Chief complaint: headache left blurry vision and left facial numbness        SUBJECTIVE:    History of present illness: Background per H&P: Pamela Zavala is a 83 y.o. year old female who presented to the ER on 1/22/2024 with complaints of blurry vision and left facial numbness that started the night before.  She reports hitting her head about 10 days ago while getting into her van and she had bruising and pain to the top of her forehead.  She has had a headache that moves around.  The vision changes and numbness have now resolved.    She had been taking Eliquis and Plavix, but stopped taking Eliquis on 1/19 second to hematuria.    She denies chest pain, SOB, abdominal pain, N/V, syncope, chills/fever, dysuria, melena, hematemesis or flank pain.     BP was elevated in the ED at 181/87, currently improved to 137/82. Afebrile.   - Portions of the above HPI were copied from previous encounters and edited as appropriate. PMH as detailed below.     Pt adamantly denies every having a headache or facial numbness. She states her nurse was concerned because she still had a bump on her head after 10 days and suggested she be evaluated. Pt states other people were answering questions for her on admission and the answers were not accurate. She does recall having slightly blurred vision, but she does not feel it was out of the norm and she denies any vision issues now. Pt is requesting to go home.    Review of Systems   Constitutional:  Negative for chills, diaphoresis and fatigue.   Eyes:  Negative for photophobia and visual disturbance.   Neurological:  Negative for dizziness, tremors, seizures, syncope, facial asymmetry, speech difficulty, weakness, light-headedness, numbness and headaches.          PATIENT HISTORY:  Past Medical History:   Diagnosis  Date    Anemia     Arthritis     CAD (coronary artery disease)     Chronic back pain     Diabetic neuropathy     Essential hypertension     GERD (gastroesophageal reflux disease)     History of gastric ulcer     History of stomach ulcers     HLD (hyperlipidemia)     Hypocalcemia     Hypoparathyroidism     Hypothyroidism     Insomnia     Kidney stones     Osteoporosis     Recurrent sinusitis     Renal disease     Thyroid cancer     Type 2 diabetes mellitus    ,   Past Surgical History:   Procedure Laterality Date    CARDIAC CATHETERIZATION N/A 2023    Procedure: Left Heart Cath and coronary angiogram;  Surgeon: John Baldwin MD;  Location: [facility] CATH INVASIVE LOCATION;  Service: Cardiovascular;  Laterality: N/A;    CORONARY STENT PLACEMENT      x 3    CYSTOSCOPY, URETEROSCOPY, RETROGRADE PYELOGRAM, STENT INSERTION Left 10/18/2023    Procedure: CYSTOSCOPY URETEROSCOPY RETROGRADE PYELOGRAM HOLMIUM LASER STENT INSERTION;  Surgeon: Juan Alaniz MD;  Location: [facility] MAIN OR;  Service: Urology;  Laterality: Left;    HYSTERECTOMY      LUNG LOBECTOMY      THYROIDECTOMY     ,   Family History   Problem Relation Age of Onset    Diabetes Mother     Cancer Father     Heart disease Sister     Diabetes Sister     Heart disease Maternal Grandmother    ,   Social History     Tobacco Use    Smoking status: Former     Packs/day: 2.00     Years: 30.00     Additional pack years: 0.00     Total pack years: 60.00     Types: Cigarettes     Quit date:      Years since quittin.0     Passive exposure: Past    Smokeless tobacco: Never   Vaping Use    Vaping Use: Never used   Substance Use Topics    Alcohol use: Yes     Alcohol/week: 1.0 standard drink of alcohol     Types: 1 Glasses of wine per week    Drug use: Defer   ,   Prior to Admission medications    Medication Sig Start Date End Date Taking? Authorizing Provider   atorvastatin (LIPITOR) 10 MG tablet Take 1 tablet by mouth Daily.   Yes Provider, MD Andrade    clopidogrel (PLAVIX) 75 MG tablet Take 1 tablet by mouth Daily.   Yes Andrade La MD   Diclofenac Sodium (VOLTAREN) 1 % gel gel Apply 2 g topically to the appropriate area as directed Every 6 (Six) Hours As Needed (Knee pain). 10/25/23  Yes Sarah Beth Coronado MD   famotidine (PEPCID) 40 MG tablet Take 1 tablet by mouth Daily.   Yes Andrade La MD   fluticasone (FLONASE) 50 MCG/ACT nasal spray 1 spray into the nostril(s) as directed by provider 2 (Two) Times a Day.   Yes Andrade La MD   folic acid (FOLVITE) 1 MG tablet Take 1 tablet by mouth Daily. 10/26/23  Yes Sarah Beth Coronado MD   gabapentin (NEURONTIN) 300 MG capsule Take 1 capsule by mouth Every Night.   Yes Andrade La MD   Insulin Lispro (humaLOG) 100 UNIT/ML injection See Admin Instructions. Inject subcutaneously three times daily before meals via sliding scale    100-150 = 2 units  151-200 = 3 units  201-250 = 4 units  251-300 = 5 units  301-350 = 6 units  351-400 = 7 units  401 = 8 units   Yes Andrade La MD   Lantus SoloStar 100 UNIT/ML injection pen Inject 15 Units under the skin into the appropriate area as directed Every Night. 11/27/23  Yes Driss Dan MD   levothyroxine (Synthroid) 100 MCG tablet Take 1 tablet by mouth Daily. 11/27/23 11/26/24 Yes Driss Dan MD   lisinopril (PRINIVIL,ZESTRIL) 5 MG tablet Take 1 tablet by mouth Daily. 5/18/23  Yes Nuria Mcgraw MD   metFORMIN (GLUMETZA) 1000 MG (MOD) 24 hr tablet Take 1 tablet by mouth 2 (Two) Times a Day.   Yes Andrade La MD   metoprolol succinate XL (TOPROL-XL) 25 MG 24 hr tablet Take 1 tablet by mouth Daily.   Yes Andrade La MD   multivitamin with minerals tablet tablet Take 1 tablet by mouth Daily. 10/26/23  Yes Sarah Beth Coronado MD   ondansetron (ZOFRAN) 4 MG tablet Take 1 tablet by mouth Every 8 (Eight) Hours As Needed for Nausea or Vomiting.   Yes Andrade La MD   oxybutynin (DITROPAN) 5 MG tablet Take 1 tablet by mouth  2 (Two) Times a Day.   Yes Andrade La MD   pantoprazole (PROTONIX) 40 MG EC tablet Take 1 tablet by mouth 2 (Two) Times a Day. Take dos   Yes Andrade La MD   polyethylene glycol (MIRALAX) 17 g packet Take 17 g by mouth Daily. 10/26/23  Yes Sarah Beth Coronado MD   thiamine (VITAMIN B1) 100 MG tablet Take 1 tablet by mouth Daily. 10/26/23  Yes Sarah Beth Coronado MD   traMADol (ULTRAM) 50 MG tablet Take 1 tablet by mouth 4 (Four) Times a Day As Needed for Moderate Pain. 10/25/23  Yes Sarah Beth Coronado MD   traZODone (DESYREL) 50 MG tablet Take 1 tablet by mouth Every Night.   Yes ProviderAndrade MD    Allergies:  Penicillins, Shellfish-derived products, Sulfamethizole, and Trimethoprim    Current Facility-Administered Medications   Medication Dose Route Frequency Provider Last Rate Last Admin    amLODIPine (NORVASC) tablet 5 mg  5 mg Oral Q24H Rashi Rowland MD        atorvastatin (LIPITOR) tablet 10 mg  10 mg Oral Nightly Amando Goldman MD   10 mg at 01/22/24 2008    Calcium Replacement - Follow Nurse / BPA Driven Protocol   Does not apply PRN Amando Goldman MD        clopidogrel (PLAVIX) tablet 75 mg  75 mg Oral Daily Amando Goldman MD   75 mg at 01/23/24 0840    dextrose (D50W) (25 g/50 mL) IV injection 25 g  25 g Intravenous Q15 Min PRN Amando Goldman MD        dextrose (GLUTOSE) oral gel 15 g  15 g Oral Q15 Min PRN Amando Goldman MD        glucagon (GLUCAGEN) injection 1 mg  1 mg Intramuscular Q15 Min PRN Amando Goldman MD        hydrALAZINE (APRESOLINE) injection 10 mg  10 mg Intravenous Q4H PRN Rashi Rowland MD        insulin lispro (HUMALOG/ADMELOG) injection 2-7 Units  2-7 Units Subcutaneous 4x Daily AC & at Bedtime Amando Goldman MD   3 Units at 01/23/24 0840    levothyroxine (SYNTHROID, LEVOTHROID) tablet 100 mcg  100 mcg Oral Q AM Amando Goldman MD   100 mcg at 01/23/24 0618    Magnesium Standard Dose Replacement - Follow Nurse / BPA Driven Protocol    Does not apply Amando Valdovinos MD        Phosphorus Replacement - Follow Nurse / BPA Driven Protocol   Does not apply Amando Valdovinos MD        Potassium Replacement - Follow Nurse / BPA Driven Protocol   Does not apply Amando Valdovinos MD        sodium chloride 0.9 % flush 10 mL  10 mL Intravenous Amando Valdovinos MD        sodium chloride 0.9 % flush 10 mL  10 mL Intravenous Q12H Amando Goldman MD   10 mL at 01/23/24 0841    sodium chloride 0.9 % flush 10 mL  10 mL Intravenous Amando Valdovinos MD        sodium chloride 0.9 % infusion 40 mL  40 mL Intravenous Amando Valdovinos MD            ________________________________________________________        OBJECTIVE:  Upon today's exam, pt is awake and alert. She is sitting at BS, finishing her lunch independently. Very pleasant.      Neurologic Exam  PHYSICAL EXAM:    CONSTITUTIONAL: The patient is in no apparent distress, bright awake and alert.      HEENT: There is no tenderness over the temporal arteries bilaterally. Normocephalic, atraumatic except slightly raised scalp contusion at the crown and bruising in the forehead (old).  TMJ open symmetrically without tenderness.    NECK: ROM normal, supple    RESPIRATORY: Breathing unlabored, Breath sounds are normal    CARDIAC: Regular rate and rhythm.     EXTREMITIES:  No clubbing, cyanosis or edema.    PSYCHIATRIC: Mood/affect normal, judgement normal, appropriate    NEUROLOGICAL:    Cognition:   Fully oriented.  Fund of knowledge excellent.  Concentration and attention normal.   Language normal with normal comprehension, fluent speech, intact repetition and naming.   Short and long term memory appears intact    Cranial nerves;    II - pupils bilaterally equal reacting to light,  No new Visual field deficits;  Fundoscopic exam- Not able to be done, non-dilated exam  III,IV,VI: EOMI with no diplopia  V: Normal facial sensations  VII: No facial asymmetry,  VIII: No New  hearing abnormality  IX, X, XI: normal gag and shoulder shrug;  XII: tongue is in the midline.    Sensory:  Intact to light touch in all extremities.     Motor: Strength 5/5 bilaterally upper and lower extremities. No involuntary movements present. Normal tone and bulk.  Deep tendon reflexes: 2/4 and symmetrical in biceps, brachioradialis, triceps, bilateral 2/4 knees and ankles. Both plantars are flexor.    Cerebellar: Finger to nose and mirror movements normal bilaterally.    Gait and balance: deferred    ________________________________________________________   RESULTS REVIEW:    VITAL SIGNS:   Temp:  [97.7 °F (36.5 °C)-98.5 °F (36.9 °C)] 97.7 °F (36.5 °C)  Heart Rate:  [74-92] 87  Resp:  [12-22] 18  BP: (137-181)/(70-96) 137/82     LABS:      Lab 01/22/24  1256 01/19/24  1334   WBC 7.10 7.50   HEMOGLOBIN 12.8 11.6*   HEMATOCRIT 39.8 35.3   PLATELETS 273 274   NEUTROS ABS 4.50 5.10   LYMPHS ABS 1.90 1.60   MONOS ABS 0.40 0.50   EOS ABS 0.10 0.20   MCV 94.0 93.9   SED RATE 13  --    PROTIME 11.2 12.3*   APTT 26.8* 28.2*         Lab 01/23/24  0157 01/22/24  1256 01/19/24  1334   SODIUM  --  141 137   POTASSIUM  --  4.1 4.6   CHLORIDE  --  105 101   CO2  --  25.0 27.0   ANION GAP  --  11.0 9.0   BUN  --  10 21   CREATININE  --  0.77 1.11*   EGFR  --  75.7 48.8*   GLUCOSE  --  171* 243*   CALCIUM  --  8.6 8.2*   MAGNESIUM  --  1.5*  --    HEMOGLOBIN A1C 7.30*  --   --          Lab 01/22/24  1256 01/19/24  1334   TOTAL PROTEIN 7.1 6.8   ALBUMIN 4.4 4.2   GLOBULIN 2.7 2.6   ALT (SGPT) 11 10   AST (SGOT) 16 16   BILIRUBIN 0.4 0.3   ALK PHOS 57 63   LIPASE 12* 13         Lab 01/22/24  1256 01/19/24  1334   PROTIME 11.2 12.3*   INR 1.03 1.14*         Lab 01/23/24  0157   CHOLESTEROL 142   LDL CHOL 57   HDL CHOL 67*   TRIGLYCERIDES 101             UA          10/18/2023    22:23 1/19/2024    14:26 1/22/2024    16:57   Urinalysis   Squamous Epithelial Cells, UA None Seen  0-2  0-2    Specific Gravity, UA 1.025  1.007   1.021    Ketones, UA Negative  Negative  Negative    Blood, UA Large (3+)  Large (3+)  Negative    Leukocytes, UA  Trace  Small (1+)    Nitrite, UA Negative  Negative  Negative    RBC, UA Too Numerous to Count  Too Numerous to Count  0-2    WBC, UA 21-50  3-5  0-2    Bacteria, UA Trace  None Seen  None Seen        Lab Results   Component Value Date    TSH 2.860 11/20/2023    LDL 57 01/23/2024    HGBA1C 7.30 (H) 01/23/2024       IMAGING STUDIES:  MRI Brain Without Contrast    Result Date: 1/22/2024  Impression: No acute intracranial pathology. Chronic small vessel/microangiopathic ischemic changes. Electronically Signed: Nestor Person MD  1/22/2024 6:08 PM EST  Workstation ID: [id number]    XR Abdomen KUB    Result Date: 1/22/2024  No bowel obstruction is appreciated. No metallic density is present. Patient okay to MRI. Electronically Signed: Nestor Person MD  1/22/2024 4:41 PM EST  Workstation ID: [id number]    XR Chest 1 View    Result Date: 1/22/2024  Impression: No acute cardiopulmonary disease. Minimal residual linear atelectatic changes in the right midlung zone. No metallic density. Okay to MRI. Electronically Signed: Nestor Person MD  1/22/2024 4:37 PM EST  Workstation ID: [id number]    CT Angiogram Head    Result Date: 1/22/2024  Impression: No acute vascular abnormality of the head and neck. Mild stenosis within the proximal right cervical internal carotid artery with less than 50% stenosis. Additional focal areas of mild to moderate stenoses including the right vertebral artery and left subclavian artery as characterized above. Electronically Signed: Jaycob Black DO  1/22/2024 3:35 PM EST  Workstation ID: [id number]    CT Angiogram Neck    Result Date: 1/22/2024  Impression: No acute vascular abnormality of the head and neck. Mild stenosis within the proximal right cervical internal carotid artery with less than 50% stenosis. Additional focal areas of mild to moderate stenoses including the right vertebral  artery and left subclavian artery as characterized above. Electronically Signed: Jaycob Black DO  1/22/2024 3:35 PM EST  Workstation ID: [id number]    CT Head Without Contrast    Result Date: 1/22/2024  Impression: 1. No acute intracranial finding or significant change compared to 10/18/2023. 2. Moderately advanced chronic microvascular disease and moderate generalized atrophy. Electronically Signed: Virginia Acevedo MD  1/22/2024 1:30 PM EST  Workstation ID: [id number]     I reviewed the patient's new clinical results.    ________________________________________________________     PROBLEM LIST:    TIA (transient ischemic attack)    Coronary arteriosclerosis    Type 2 diabetes mellitus    Essential hypertension    Hyperlipidemia    Postoperative hypothyroidism            ASSESSMENT/PLAN:  1.  Headache, left facial numbness, blurry vision---Pt denies ever having these symptoms and states her nurse from the facility incorrectly explained her symptoms. She only wanted to be evaluated because the bump from hitting her head had not resolved. Patient hit head on a van roof about 10 days ago and had some bruising and a large bump.  Of note, patient has been off of Eliquis since 1/19 due to hematuria, but has been taking Plavix. Pt is at baseline now with no deficits.   - CT head: No acute intracranial finding or significant change compared to 10/18/2023. Moderately advanced chronic microvascular disease and moderate generalized atrophy  - CTA head and neck: No acute vascular abnormality of the head and neck. Mild stenosis within the proximal right cervical internal carotid artery with less than 50% stenosis. Additional focal areas of mild to moderate stenoses including the right vertebral artery and left subclavian artery as characterized above.  - MRI brain: No acute intracranial pathology. Chronic small vessel/microangiopathic ischemic changes.   - Echo: Pending  - EKG: Sinus rhythm. Anteroseptal infarct, age  indeterminate  - Labs: A1C: 7.3, LDL: 57, ESR: 13, UA negative  - Antithrombotics: Continue Plavix.  Consider checking P2Y12 response   - Statin: Lipitor 80 mg qhs  - PT/OT/ST as appropriate, fall precautions as appropriate, Neuro checks per protocol, DVT prophylaxis, Stroke education  - Pt would like to go home and does not want any further evaluation.     2. Atrial fibrillation, CAD  - Had been on Eliquis until it was stopped on 1/19 due to hematuria   - On Plavix      3. Hypertension  - Strict BP control, monitor per protocol    4. Type 2 diabetes mellitus  - Strict glycemic control, SSI per primary    5. HLD  - Statin, recommend diet and lifestyle modifications    Modification of stroke risk factors:   - Blood pressure should be less than 130/80 outpatient, HbA1c less than 6.5, LDL less than 70; b12>500 and smoking cessation if applicable. We would be grateful if the primary team / primary care physician would keep a close watch on the above targets.  - Stroke education  - Follow up with neurologist of choice      I discussed the patient's findings and my recommendations with patient, nursing staff, and consulting provider    CLAUDE rGeen  01/23/24  11:44 EST

## 2024-01-23 NOTE — PLAN OF CARE
Goal Outcome Evaluation:  Plan of Care Reviewed With: patient           Outcome Evaluation: Pt is an 84 yo female brought to ED 1/22/24 with c/o blurry vision and L facial numbness. Reportedly hit head 10 days ago, with resulting bruising. She stopped taking Eliquis due to this bruising event. MRI Head (-). Dx TIA.  PMHx including CAD, HTN, hyperlipidemia, hypothyroidism, thyriod CA x2, and T2DM. Recent admission approx 3 months ago with L renal stone with complicated recovery.   At baseline, pt resides in [place] where she is Mod I with ADLs, and has strong social support of son for IADLs. Facility manages medication Son reports patient's memory / cognitive status varies greatly day-to-day. This date, patient oriented x4 but scores 8/28 on Short Blessed Test, indicating questionable cognition.Patient ambulates with RW with CGA - Supervision for balance. She is easily distractible, but is able to complete topigraphical orientation. HTN throughout session, with NSG aware. SHe appears near baseline, and anticipate she is safe to return to [facility].      Anticipated Discharge Disposition (OT): home with assist

## 2024-01-23 NOTE — DISCHARGE SUMMARY
[facility] Medicine Services  Discharge Summary    Date of Service: 24  Patient Name: Pamela Zavala  : 1940  MRN: [id number]    Date of Admission: 2024  Discharge Diagnosis:    Diagnosis Plan   1. Intractable headache, unspecified chronicity pattern, unspecified headache type        2. Visual field defect        3. Facial numbness        4. Hypomagnesemia           Date of Discharge:  24  Primary Care Physician: Marisol Rodgers APRN      Presenting Problem:   TIA (transient ischemic attack) [G45.9]  Hypomagnesemia [E83.42]  Visual field defect [H53.40]  Facial numbness [R20.0]  Intractable headache, unspecified chronicity pattern, unspecified headache type [R51.9]    Active and Resolved Hospital Problems:  Active Hospital Problems    Diagnosis POA    **TIA (transient ischemic attack) [G45.9] Yes    Type 2 diabetes mellitus [E11.9] Yes    Essential hypertension [I10] Yes    Hyperlipidemia [E78.5] Yes    Postoperative hypothyroidism [E89.0] Yes    Coronary arteriosclerosis [I25.10] Yes      Resolved Hospital Problems   No resolved problems to display.         Hospital Course       Hospital Course:    **TIA (transient ischemic attack)       Type 2 diabetes mellitus      Essential hypertension      Hyperlipidemia      Postoperative hypothyroidism      Coronary arteriosclerosis        Plan:     83 y.o. year old female who presented to the ER on 2024 with complaints of blurry vision and left facial numbness that started the night before.  She reports hitting her head about 10 days ago while getting into her van and she had bruising and pain to the top of her forehead.  She has had a headache that moves around.  The vision changes and numbness have now resolved.     She had been taking Eliquis and Plavix, but stopped taking Eliquis on  second to hematuria.     She denies chest pain, SOB, abdominal pain, N/V, syncope, chills/fever, dysuria, melena, hematemesis or flank  pain.      BP was elevated in the ED at 181/87, currently improved to 137/82. Afebrile.   - Portions of the above HPI were copied from previous encounters and edited as appropriate. PMH as detailed below.      1.  Headache, left facial numbness, blurry vision.  Patient fell and hit her head about 10 days ago and had some bruising and headache.  The night before arrival, she developed left facial numbness and blurry vision.  Of note, patient has been off of Eliquis since 1/19 due to hematuria, but has been taking Plavix  - CT head: No acute intracranial finding or significant change compared to 10/18/2023. Moderately advanced chronic microvascular disease and moderate generalized atrophy  - CTA head and neck: No acute vascular abnormality of the head and neck. Mild stenosis within the proximal right cervical internal carotid artery with less than 50% stenosis. Additional focal areas of mild to moderate stenoses including the right vertebral artery and left subclavian artery as characterized above.  - MRI brain: No acute intracranial pathology. Chronic small vessel/microangiopathic ischemic changes.   - Echo: Pending  - EKG: Sinus rhythm. Anteroseptal infarct, age indeterminate  - Labs: A1C: 7.3, B12:   - Antithrombotics: Continue Plavix.  Will check P2Y12 response   - Statin: Lipitor 80 mg qhs  - PT/OT/ST as appropriate, fall precautions as appropriate, Neuro checks per protocol, DVT prophylaxis, Stroke education     2. Atrial fibrillation, CAD  - Had been on Eliquis until it was stopped on 1/19 due to hematuria   - On Plavix       3. Hypertension  - Strict BP control, monitor per protocol     4. Type 2 diabetes mellitus  - Strict glycemic control, SSI per primary     5. HLD  - Statin, recommend diet and lifestyle modifications     Modification of stroke risk factors:   - Blood pressure should be less than 130/80 outpatient, HbA1c less than 6.5, LDL less than 70; b12>500 and smoking cessation if applicable. We would  be grateful if the primary team / primary care physician would keep a close watch on the above t      DISCHARGE Follow Up Recommendations for labs and diagnostics:       Reasons For Change In Medications and Indications for New Medications:      Day of Discharge     Vital Signs:  Temp:  [97.7 °F (36.5 °C)-99 °F (37.2 °C)] 99 °F (37.2 °C)  Heart Rate:  [78-97] 97  Resp:  [12-22] 18  BP: (137-173)/(70-96) 145/91    Physical Exam:  Physical Exam   GENERAL: The patient is well developed and nontoxic.  HEENT: Nonicteric sclerae, PERRLA, EOMI. Oropharynx clear. Moist mucous membranes. Conjunctivae appear well perfused.  CHEST: Chest wall is nontender.  HEART: Regular rate and rhythm without murmurs. No MRG  LUNGS: Clear to auscultation bilaterally. No WRR  ABDOMEN: Soft, positive bowel sounds, nontender, no organomegaly.  RECTAL: Deferred.  SKIN: No rash, no excessive bruising, petechiae, or purpura.  NEUROLOGIC: Cranial nerves II-XII intact without motor/sensory deficit       Pertinent  and/or Most Recent Results     LAB RESULTS:      Lab 01/22/24  1256 01/19/24  1334   WBC 7.10 7.50   HEMOGLOBIN 12.8 11.6*   HEMATOCRIT 39.8 35.3   PLATELETS 273 274   NEUTROS ABS 4.50 5.10   LYMPHS ABS 1.90 1.60   MONOS ABS 0.40 0.50   EOS ABS 0.10 0.20   MCV 94.0 93.9   SED RATE 13  --    PROTIME 11.2 12.3*   APTT 26.8* 28.2*         Lab 01/23/24  0157 01/22/24  1256 01/19/24  1334   SODIUM  --  141 137   POTASSIUM  --  4.1 4.6   CHLORIDE  --  105 101   CO2  --  25.0 27.0   ANION GAP  --  11.0 9.0   BUN  --  10 21   CREATININE  --  0.77 1.11*   EGFR  --  75.7 48.8*   GLUCOSE  --  171* 243*   CALCIUM  --  8.6 8.2*   MAGNESIUM  --  1.5*  --    HEMOGLOBIN A1C 7.30*  --   --          Lab 01/22/24  1256 01/19/24  1334   TOTAL PROTEIN 7.1 6.8   ALBUMIN 4.4 4.2   GLOBULIN 2.7 2.6   ALT (SGPT) 11 10   AST (SGOT) 16 16   BILIRUBIN 0.4 0.3   ALK PHOS 57 63   LIPASE 12* 13         Lab 01/22/24  1256 01/19/24  1334   PROTIME 11.2 12.3*   INR 1.03  1.14*         Lab 01/23/24  0157   CHOLESTEROL 142   LDL CHOL 57   HDL CHOL 67*   TRIGLYCERIDES 101             Brief Urine Lab Results  (Last result in the past 365 days)        Color   Clarity   Blood   Leuk Est   Nitrite   Protein   CREAT   Urine HCG        01/22/24 1657 Yellow   Clear   Negative   Small (1+)   Negative   Negative                 Microbiology Results (last 10 days)       ** No results found for the last 240 hours. **            MRI Brain Without Contrast    Result Date: 1/22/2024  Impression: Impression: No acute intracranial pathology. Chronic small vessel/microangiopathic ischemic changes. Electronically Signed: Nestor Person MD  1/22/2024 6:08 PM EST  Workstation ID: [id number]    XR Abdomen KUB    Result Date: 1/22/2024  Impression: No bowel obstruction is appreciated. No metallic density is present. Patient okay to MRI. Electronically Signed: Nestor Person MD  1/22/2024 4:41 PM EST  Workstation ID: [id number]    XR Chest 1 View    Result Date: 1/22/2024  Impression: Impression: No acute cardiopulmonary disease. Minimal residual linear atelectatic changes in the right midlung zone. No metallic density. Okay to MRI. Electronically Signed: Nestor Person MD  1/22/2024 4:37 PM EST  Workstation ID: [id number]    CT Angiogram Head    Result Date: 1/22/2024  Impression: Impression: No acute vascular abnormality of the head and neck. Mild stenosis within the proximal right cervical internal carotid artery with less than 50% stenosis. Additional focal areas of mild to moderate stenoses including the right vertebral artery and left subclavian artery as characterized above. Electronically Signed: Jaycob Black DO  1/22/2024 3:35 PM EST  Workstation ID: [id number]    CT Angiogram Neck    Result Date: 1/22/2024  Impression: Impression: No acute vascular abnormality of the head and neck. Mild stenosis within the proximal right cervical internal carotid artery with less than 50% stenosis. Additional focal areas  of mild to moderate stenoses including the right vertebral artery and left subclavian artery as characterized above. Electronically Signed: Jaycob Black DO  1/22/2024 3:35 PM EST  Workstation ID: [id number]    CT Head Without Contrast    Result Date: 1/22/2024  Impression: Impression: 1. No acute intracranial finding or significant change compared to 10/18/2023. 2. Moderately advanced chronic microvascular disease and moderate generalized atrophy. Electronically Signed: Virginia Acevedo MD  1/22/2024 1:30 PM EST  Workstation ID: [id number]    CT Abdomen Pelvis Without Contrast    Result Date: 1/19/2024  Impression: Impression: 1.No acute abnormality identified within the abdomen or pelvis. 2.Bilateral renal cysts. 3.Colonic diverticulosis. 4.Additional findings as detailed above. Electronically Signed: Caio Mota MD  1/19/2024 2:40 PM EST  Workstation ID: [id number]    US Non-ob Transvaginal    Result Date: 1/5/2024  Impression: Impression: 1. Allowing for technical differences grossly stable multilocular right ovarian lesion since 6/9/2023 pelvic MRI. 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 2. Hysterectomy. Nonvisualized left ovary due to overlying bowel gas. Electronically Signed: Flip Acosta MD  1/5/2024 9:08 AM EST  Workstation ID: [id number]             Results for orders placed during the hospital encounter of 01/22/24    Adult Transthoracic Echo Complete W/ Cont if Necessary Per Protocol (With Agitated Saline)    Interpretation Summary    Left ventricular ejection fraction appears to be 51 - 55%.    Left ventricular diastolic function is consistent with (grade I) impaired relaxation.    There are myxomatous changes of the mitral valve apparatus present.    Estimated right ventricular systolic pressure from tricuspid regurgitation is normal (<35 mmHg).    Reduced GLS of -10.9%    Labs Pending at Discharge:    Procedures Performed    Consults:   Consults       Date and Time Order Name Status Description     1/22/2024  3:00 PM Inpatient Neurology Consult Stroke      1/22/2024  1:49 PM Inpatient Neurology Consult Stroke      1/22/2024  1:49 PM Hospitalist (on-call MD unless specified)            Discharge Details        Discharge Medications        New Medications        Instructions Start Date   amLODIPine 5 MG tablet  Commonly known as: NORVASC   5 mg, Oral, Every 24 Hours Scheduled   Start Date: January 24, 2024            Continue These Medications        Instructions Start Date   atorvastatin 10 MG tablet  Commonly known as: LIPITOR   10 mg, Oral, Daily      clopidogrel 75 MG tablet  Commonly known as: PLAVIX   75 mg, Oral, Daily      Diclofenac Sodium 1 % gel gel  Commonly known as: VOLTAREN   2 g, Topical, Every 6 Hours PRN      famotidine 40 MG tablet  Commonly known as: PEPCID   40 mg, Oral, Daily      fluticasone 50 MCG/ACT nasal spray  Commonly known as: FLONASE   1 spray, Nasal, 2 Times Daily      folic acid 1 MG tablet  Commonly known as: FOLVITE   1 mg, Oral, Daily      gabapentin 300 MG capsule  Commonly known as: NEURONTIN   300 mg, Oral, Nightly      Insulin Lispro 100 UNIT/ML injection  Commonly known as: humaLOG   See Admin Instructions, Inject subcutaneously three times daily before meals via sliding scale  100-150 = 2 units 151-200 = 3 units 201-250 = 4 units 251-300 = 5 units 301-350 = 6 units 351-400 = 7 units 401 = 8 units      Lantus SoloStar 100 UNIT/ML injection pen  Generic drug: Insulin Glargine   15 Units, Subcutaneous, Nightly      levothyroxine 100 MCG tablet  Commonly known as: Synthroid   100 mcg, Oral, Daily      lisinopril 5 MG tablet  Commonly known as: PRINIVIL,ZESTRIL   5 mg, Oral, Daily      metFORMIN 1000 MG (MOD) 24 hr tablet  Commonly known as: GLUMETZA   1,000 mg, Oral, 2 Times Daily      metoprolol succinate XL 25 MG 24 hr tablet  Commonly known as: TOPROL-XL   25 mg, Oral, Daily      multivitamin with minerals tablet tablet   1 tablet, Oral, Daily      ondansetron 4 MG  tablet  Commonly known as: ZOFRAN   4 mg, Oral, Every 8 Hours PRN      oxybutynin 5 MG tablet  Commonly known as: DITROPAN   5 mg, Oral, 2 Times Daily      pantoprazole 40 MG EC tablet  Commonly known as: PROTONIX   40 mg, Oral, 2 Times Daily, Take dos      polyethylene glycol 17 g packet  Commonly known as: MIRALAX   17 g, Oral, Daily      thiamine 100 MG tablet  Commonly known as: VITAMIN B1   100 mg, Oral, Daily      traMADol 50 MG tablet  Commonly known as: ULTRAM   50 mg, Oral, 4 Times Daily PRN      traZODone 50 MG tablet  Commonly known as: DESYREL   50 mg, Oral, Nightly               Allergies   Allergen Reactions    Penicillins Unknown - High Severity     Lips swell and hives    Shellfish-Derived Products Unknown - Low Severity    Sulfamethizole Unknown - Low Severity    Trimethoprim Unknown - Low Severity     Discharge Disposition:   Home or Self Care    Diet:  Hospital:  Diet Order   Procedures    Diet: Regular/House Diet, Diabetic Diets; Consistent Carbohydrate; Texture: Regular Texture (IDDSI 7); Fluid Consistency: Thin (IDDSI 0)     Discharge Activity:     CODE STATUS:  Code Status and Medical Interventions:   Ordered at: 01/22/24 1500     Code Status (Patient has no pulse and is not breathing):    CPR (Attempt to Resuscitate)     Medical Interventions (Patient has pulse or is breathing):    Full Support     Future Appointments   Date Time Provider Department Center   2/13/2024 10:00 AM LAB [facility] LAB DS [facility] None   2/20/2024 10:20 AM Driss Dan MD [facility] END NA [facility]   5/13/2024 11:30 AM [facility] US 2 [facility]   5/16/2024  2:00 PM Nuria Mcgraw MD [facility] NA   7/8/2024 11:30 AM [facility] US 2 [facility] US [facility]     Time spent on Discharge including face to face service:  >30 minutes    Signature: Electronically signed by Rashi Rowland MD, 01/23/24, 13:48 ESTElaine  [facility] Hospitalist Team

## 2024-01-23 NOTE — CASE MANAGEMENT/SOCIAL WORK
Case Management Discharge Note      Final Note: Return to [facility] on [address]               Transportation Services  Private: Car    Final Discharge Disposition Code: 01 - home or self-care

## 2024-01-23 NOTE — PLAN OF CARE
Goal Outcome Evaluation:   Orders generated as per stroke workup. The patient underwent a clinical swallow evaluation and is currently receiving a regular consistency/thin liquid diet. Tolerating well per chart report. CT of the head:    1. No acute intracranial finding or significant change compared to 10/18/2023.  2. Moderately advanced chronic microvascular disease and moderate generalized atrophy.    MRI revealed:    Impression: No acute intracranial pathology. Chronic small vessel/microangiopathic ischemic changes.    Per chart/nurse report, all deficits have resolved. Patient has an NIH of 0.  ST will complete communication order and sign off at this time as per protocol, as no further skilled speech/language or swallow intervention indicated at this time. Noted patient has orders for discharge.     Please re-consult if our services are warranted in the future. Thank you.

## 2024-01-23 NOTE — PLAN OF CARE
Fair shift spent, medicated as ordered, left stable, ambulated to bathroom with stand by assist, care and observation continues    Goal Outcome Evaluation:    Problem: Adult Inpatient Plan of Care  Goal: Absence of Hospital-Acquired Illness or Injury  Outcome: Ongoing, Progressing  Intervention: Identify and Manage Fall Risk  Recent Flowsheet Documentation  Taken 1/23/2024 0000 by Fabrizio Abebe RN  Safety Promotion/Fall Prevention:   assistive device/personal items within reach   clutter free environment maintained   fall prevention program maintained   nonskid shoes/slippers when out of bed   room organization consistent   safety round/check completed  Taken 1/22/2024 2200 by Fabrizio Abebe RN  Safety Promotion/Fall Prevention:   assistive device/personal items within reach   clutter free environment maintained   fall prevention program maintained   nonskid shoes/slippers when out of bed   room organization consistent   safety round/check completed  Taken 1/22/2024 1941 by Fabrizio Abebe RN  Safety Promotion/Fall Prevention:   assistive device/personal items within reach   clutter free environment maintained   fall prevention program maintained   nonskid shoes/slippers when out of bed   room organization consistent   safety round/check completed  Intervention: Prevent Skin Injury  Recent Flowsheet Documentation  Taken 1/23/2024 0000 by Fabrizio Abebe RN  Body Position:   side-lying   position changed independently   left  Taken 1/22/2024 1941 by Fabrizio Abebe RN  Body Position:   supine   position changed independently  Skin Protection: adhesive use limited  Intervention: Prevent and Manage VTE (Venous Thromboembolism) Risk  Recent Flowsheet Documentation  Taken 1/22/2024 1941 by Fabrizio Abebe RN  Activity Management: ambulated to bathroom  Range of Motion: active ROM (range of motion) encouraged  Intervention: Prevent Infection  Recent Flowsheet Documentation  Taken 1/23/2024 0000 by Andrae  Fabrizio, RN  Infection Prevention:   hand hygiene promoted   rest/sleep promoted   single patient room provided  Taken 1/22/2024 2200 by Fabrizio Abebe RN  Infection Prevention:   hand hygiene promoted   rest/sleep promoted   single patient room provided  Taken 1/22/2024 1941 by Fabrizio Abebe, RN  Infection Prevention:   hand hygiene promoted   rest/sleep promoted   single patient room provided  Goal: Optimal Comfort and Wellbeing  Outcome: Ongoing, Progressing  Intervention: Monitor Pain and Promote Comfort  Recent Flowsheet Documentation  Taken 1/22/2024 1941 by Fabrizio Abebe, RN  Pain Management Interventions:   care clustered   position adjusted   relaxation techniques promoted  Intervention: Provide Person-Centered Care  Recent Flowsheet Documentation  Taken 1/22/2024 1941 by Fabrizio Abebe, RN  Trust Relationship/Rapport:   care explained   choices provided   thoughts/feelings acknowledged  Goal: Readiness for Transition of Care  Outcome: Ongoing, Progressing  Intervention: Mutually Develop Transition Plan  Recent Flowsheet Documentation  Taken 1/22/2024 1943 by Fabrizio Abebe, RN  Equipment Currently Used at Home:   cane, straight   walker, rolling  Taken 1/22/2024 1941 by Fabrizio Abebe, RN  Transportation Anticipated: health plan transportation  Patient/Family Anticipated Services at Transition: none  Patient/Family Anticipates Transition to: long-term care facility

## 2024-01-23 NOTE — PROGRESS NOTES
[facility] MEDICINE SERVICE  DAILY PROGRESS NOTE    NAME: Pamela Zavala  : 1940  MRN: [id number]      LOS: 0 days     PROVIDER OF SERVICE: Rashi Rowland MD    Chief Complaint: TIA (transient ischemic attack)    Subjective:   Pt seen and examined    Review of Systems:   Review of Systems    Objective:     Vital Signs  Temp:  [97.7 °F (36.5 °C)-98.5 °F (36.9 °C)] 97.7 °F (36.5 °C)  Heart Rate:  [74-92] 87  Resp:  [12-22] 18  BP: (137-181)/(70-96) 137/82   Body mass index is 19.8 kg/m².    Physical Exam  Physical Exam  GENERAL: The patient is well developed and nontoxic.  HEENT: Nonicteric sclerae, PERRLA, EOMI. Oropharynx clear. Moist mucous membranes. Conjunctivae appear well perfused.  CHEST: Chest wall is nontender.  HEART: Regular rate and rhythm without murmurs. No MRG  LUNGS: Clear to auscultation bilaterally. No WRR  ABDOMEN: Soft, positive bowel sounds, nontender, no organomegaly.  RECTAL: Deferred.  SKIN: No rash, no excessive bruising, petechiae, or purpura.  NEUROLOGIC: Cranial nerves II-XII intact without motor/sensory deficit     Scheduled Meds   atorvastatin, 10 mg, Oral, Nightly  clopidogrel, 75 mg, Oral, Daily  insulin lispro, 2-7 Units, Subcutaneous, 4x Daily AC & at Bedtime  levothyroxine, 100 mcg, Oral, Q AM  sodium chloride, 10 mL, Intravenous, Q12H       PRN Meds     Calcium Replacement - Follow Nurse / BPA Driven Protocol    dextrose    dextrose    glucagon (human recombinant)    hydrALAZINE    Magnesium Standard Dose Replacement - Follow Nurse / BPA Driven Protocol    Phosphorus Replacement - Follow Nurse / BPA Driven Protocol    Potassium Replacement - Follow Nurse / BPA Driven Protocol    [COMPLETED] Insert Peripheral IV **AND** sodium chloride    sodium chloride    sodium chloride   Infusions         Diagnostic Data    Results from last 7 days   Lab Units 24  1256   WBC 10*3/mm3 7.10   HEMOGLOBIN g/dL 12.8   HEMATOCRIT % 39.8   PLATELETS 10*3/mm3 273   GLUCOSE mg/dL  171*   CREATININE mg/dL 0.77   BUN mg/dL 10   SODIUM mmol/L 141   POTASSIUM mmol/L 4.1   AST (SGOT) U/L 16   ALT (SGPT) U/L 11   ALK PHOS U/L 57   BILIRUBIN mg/dL 0.4   ANION GAP mmol/L 11.0       MRI Brain Without Contrast    Result Date: 1/22/2024  Impression: No acute intracranial pathology. Chronic small vessel/microangiopathic ischemic changes. Electronically Signed: Nestor Person MD  1/22/2024 6:08 PM EST  Workstation ID: [id number]    XR Abdomen KUB    Result Date: 1/22/2024  No bowel obstruction is appreciated. No metallic density is present. Patient okay to MRI. Electronically Signed: Nestor Person MD  1/22/2024 4:41 PM EST  Workstation ID: [id number]    XR Chest 1 View    Result Date: 1/22/2024  Impression: No acute cardiopulmonary disease. Minimal residual linear atelectatic changes in the right midlung zone. No metallic density. Okay to MRI. Electronically Signed: Nestor Person MD  1/22/2024 4:37 PM EST  Workstation ID: [id number]    CT Angiogram Head    Result Date: 1/22/2024  Impression: No acute vascular abnormality of the head and neck. Mild stenosis within the proximal right cervical internal carotid artery with less than 50% stenosis. Additional focal areas of mild to moderate stenoses including the right vertebral artery and left subclavian artery as characterized above. Electronically Signed: Jaycob Black DO  1/22/2024 3:35 PM EST  Workstation ID: [id number]    CT Angiogram Neck    Result Date: 1/22/2024  Impression: No acute vascular abnormality of the head and neck. Mild stenosis within the proximal right cervical internal carotid artery with less than 50% stenosis. Additional focal areas of mild to moderate stenoses including the right vertebral artery and left subclavian artery as characterized above. Electronically Signed: Jaycob Black DO  1/22/2024 3:35 PM EST  Workstation ID: [id number]    CT Head Without Contrast    Result Date: 1/22/2024  Impression: 1. No acute intracranial finding or  significant change compared to 10/18/2023. 2. Moderately advanced chronic microvascular disease and moderate generalized atrophy. Electronically Signed: Virginia Acevedo MD  1/22/2024 1:30 PM EST  Workstation ID: [id number]       I reviewed the patient's new clinical results.  I reviewed the patient's new imaging results and agree with the interpretation.    Assessment/Plan:     Active and Resolved Problems  Active Hospital Problems    Diagnosis  POA    **TIA (transient ischemic attack) [G45.9]  Yes    Type 2 diabetes mellitus [E11.9]  Yes    Essential hypertension [I10]  Yes    Hyperlipidemia [E78.5]  Yes    Postoperative hypothyroidism [E89.0]  Yes    Coronary arteriosclerosis [I25.10]  Yes      Resolved Hospital Problems   No resolved problems to display.       **TIA (transient ischemic attack)       Type 2 diabetes mellitus      Essential hypertension      Hyperlipidemia      Postoperative hypothyroidism      Coronary arteriosclerosis        Plan:   - neuro consult, neuro checks, cont plavix and statin  - MRI reviewed, echo pending  -Htn bp control; hydrlazine prn, will add po amlodipine  - SSI, check A1c and FLP  - supportive care  - cont home meds as able  - trend labs, replace electrolytes       DVT prophylaxis:  Mechanical DVT prophylaxis orders are present.         Code status is   Code Status and Medical Interventions:   Ordered at: 01/22/24 1500     Code Status (Patient has no pulse and is not breathing):    CPR (Attempt to Resuscitate)     Medical Interventions (Patient has pulse or is breathing):    Full Support       Plan for disposition:1-2 days    Time: 30 minutes    Signature: Electronically signed by Rashi Rowland MD, 01/23/24, 10:29 EST.  [facility] Hospitalist Team

## 2024-01-24 NOTE — OUTREACH NOTE
Prep Survey      Flowsheet Row Responses   [facility] patient discharged from? [facility]   Is LACE score < 7 ? No   Eligibility Readm Mgmt   Discharge diagnosis TIA   Does the patient have one of the following disease processes/diagnoses(primary or secondary)? Stroke   Does the patient have Home health ordered? No   Is there a DME ordered? No   Prep survey completed? Yes            ILANA BROUSSARD - Registered Nurse

## 2024-01-25 ENCOUNTER — READMISSION MANAGEMENT (OUTPATIENT)
Dept: CALL CENTER | Facility: HOSPITAL | Age: 84
End: 2024-01-25
Payer: MEDICARE

## 2024-01-25 NOTE — OUTREACH NOTE
Stroke Week 1 Survey      Flowsheet Row Responses   [facility] patient discharged from? [facility]   Does the patient have one of the following disease processes/diagnoses(primary or secondary)? Stroke   Week 1 attempt successful? Yes   Call start time 1622   Call end time 1627   Discharge diagnosis TIA   Person spoke with today (if not patient) and relationship pt   Meds reviewed with patient/caregiver? Yes   Is the patient having any side effects they believe may be caused by any medication additions or changes? No   Does the patient have all medications ordered at discharge? Yes   Prescription comments PCP stopped the amlodipine   Is the patient taking all medications as directed (includes completed medication regime)? Yes   Does the patient have a primary care provider?  Yes   Does the patient have an appointment with their PCP within 7 days of discharge? Yes   Has the patient kept scheduled appointments due by today? Yes   Psychosocial issues? No   Does the patient require any assistance with activities of daily living such as eating, bathing, dressing, walking, etc.? No   Does the patient have any residual symptoms from stroke/TIA? No   Does the patient understand the diet ordered at discharge? Yes   Did the patient receive a copy of their discharge instructions? Yes   Nursing interventions Reviewed instructions with patient   What is the patient's perception of their health status since discharge? Worsening   Nursing interventions Nurse provided patient education   Is the patient/caregiver able to teach back the risk factors for a stroke? History of TIAs   Is the patient/caregiver able to teach back signs and symptoms related to disease process for when to call PCP? Yes   Is the patient/caregiver able to teach back signs and symptoms related to disease process for when to call 911? Yes   If the patient is a current smoker, are they able to teach back resources for cessation? Not a smoker   Is the  patient/caregiver able to teach back the hierarchy of who to call/visit for symptoms/problems? PCP, Specialist, Home health nurse, Urgent Care, ED, 911 Yes   Is the patient able to teach back FAST for Stroke? F ace: Look for an uneven smile, T marlen: Call 9-1-1 right away, B alance: Watch for sudden loss of balance, A rm: Check if one arm is weak   Week 1 call completed? Yes   Is the patient interested in additional calls from an ambulatory ? No   Would this patient benefit from a Referral to [facility] Social Work? No   Wrap up additional comments Pt states she is doing better, and having some left side abdominal pain. No residual from TIA. Pt had PCP fu appt. No questions/concerns.   Call end time 1627            Dina IGNACIO - Registered Nurse

## 2024-01-26 ENCOUNTER — LAB (OUTPATIENT)
Dept: LAB | Facility: HOSPITAL | Age: 84
End: 2024-01-26
Payer: MEDICARE

## 2024-01-26 ENCOUNTER — TRANSCRIBE ORDERS (OUTPATIENT)
Dept: ADMINISTRATIVE | Facility: HOSPITAL | Age: 84
End: 2024-01-26
Payer: MEDICARE

## 2024-01-26 DIAGNOSIS — E87.5 HYPERKALEMIA: ICD-10-CM

## 2024-01-26 DIAGNOSIS — E87.5 HYPERKALEMIA: Primary | ICD-10-CM

## 2024-01-26 LAB — POTASSIUM SERPL-SCNC: 4.8 MMOL/L (ref 3.5–5.2)

## 2024-01-26 PROCEDURE — 36415 COLL VENOUS BLD VENIPUNCTURE: CPT

## 2024-01-26 PROCEDURE — 84132 ASSAY OF SERUM POTASSIUM: CPT

## 2024-01-28 DIAGNOSIS — Z79.4 TYPE 2 DIABETES MELLITUS WITH HYPERGLYCEMIA, WITH LONG-TERM CURRENT USE OF INSULIN: ICD-10-CM

## 2024-01-28 DIAGNOSIS — E11.65 TYPE 2 DIABETES MELLITUS WITH HYPERGLYCEMIA, WITH LONG-TERM CURRENT USE OF INSULIN: ICD-10-CM

## 2024-01-30 ENCOUNTER — HOSPITAL ENCOUNTER (EMERGENCY)
Facility: HOSPITAL | Age: 84
Discharge: HOME | End: 2024-01-30
Attending: EMERGENCY MEDICINE | Admitting: EMERGENCY MEDICINE
Payer: MEDICARE

## 2024-01-30 ENCOUNTER — APPOINTMENT (OUTPATIENT)
Dept: CT IMAGING | Facility: HOSPITAL | Age: 84
End: 2024-01-30
Payer: MEDICARE

## 2024-01-30 VITALS
DIASTOLIC BLOOD PRESSURE: 63 MMHG | HEIGHT: 65 IN | OXYGEN SATURATION: 98 % | TEMPERATURE: 98.2 F | HEART RATE: 84 BPM | WEIGHT: 119 LBS | SYSTOLIC BLOOD PRESSURE: 144 MMHG | BODY MASS INDEX: 19.83 KG/M2 | RESPIRATION RATE: 17 BRPM

## 2024-01-30 DIAGNOSIS — R10.32 LLQ PAIN: Primary | ICD-10-CM

## 2024-01-30 LAB
ALBUMIN SERPL-MCNC: 4.5 G/DL (ref 3.5–5.2)
ALBUMIN/GLOB SERPL: 1.6 G/DL
ALP SERPL-CCNC: 51 U/L (ref 39–117)
ALT SERPL W P-5'-P-CCNC: 9 U/L (ref 1–33)
ANION GAP SERPL CALCULATED.3IONS-SCNC: 11 MMOL/L (ref 5–15)
AST SERPL-CCNC: 14 U/L (ref 1–32)
BACTERIA UR QL AUTO: ABNORMAL /HPF
BASOPHILS # BLD AUTO: 0.1 10*3/MM3 (ref 0–0.2)
BASOPHILS NFR BLD AUTO: 1.4 % (ref 0–1.5)
BILIRUB SERPL-MCNC: 0.6 MG/DL (ref 0–1.2)
BILIRUB UR QL STRIP: ABNORMAL
BUN SERPL-MCNC: 21 MG/DL (ref 8–23)
BUN/CREAT SERPL: 22.1 (ref 7–25)
CALCIUM SPEC-SCNC: 9.1 MG/DL (ref 8.6–10.5)
CHLORIDE SERPL-SCNC: 103 MMOL/L (ref 98–107)
CLARITY UR: CLEAR
CO2 SERPL-SCNC: 21 MMOL/L (ref 22–29)
COLOR UR: ABNORMAL
CREAT SERPL-MCNC: 0.95 MG/DL (ref 0.57–1)
DEPRECATED RDW RBC AUTO: 45.1 FL (ref 37–54)
EGFRCR SERPLBLD CKD-EPI 2021: 58.8 ML/MIN/1.73
EOSINOPHIL # BLD AUTO: 0.2 10*3/MM3 (ref 0–0.4)
EOSINOPHIL NFR BLD AUTO: 2 % (ref 0.3–6.2)
ERYTHROCYTE [DISTWIDTH] IN BLOOD BY AUTOMATED COUNT: 13.8 % (ref 12.3–15.4)
GLOBULIN UR ELPH-MCNC: 2.8 GM/DL
GLUCOSE SERPL-MCNC: 119 MG/DL (ref 65–99)
GLUCOSE UR STRIP-MCNC: NEGATIVE MG/DL
HCT VFR BLD AUTO: 43.2 % (ref 34–46.6)
HGB BLD-MCNC: 14.1 G/DL (ref 12–15.9)
HGB UR QL STRIP.AUTO: NEGATIVE
HOLD SPECIMEN: NORMAL
HYALINE CASTS UR QL AUTO: ABNORMAL /LPF
KETONES UR QL STRIP: ABNORMAL
LEUKOCYTE ESTERASE UR QL STRIP.AUTO: ABNORMAL
LIPASE SERPL-CCNC: 12 U/L (ref 13–60)
LYMPHOCYTES # BLD AUTO: 1.9 10*3/MM3 (ref 0.7–3.1)
LYMPHOCYTES NFR BLD AUTO: 21.6 % (ref 19.6–45.3)
MCH RBC QN AUTO: 30.6 PG (ref 26.6–33)
MCHC RBC AUTO-ENTMCNC: 32.7 G/DL (ref 31.5–35.7)
MCV RBC AUTO: 93.5 FL (ref 79–97)
MONOCYTES # BLD AUTO: 0.5 10*3/MM3 (ref 0.1–0.9)
MONOCYTES NFR BLD AUTO: 5.9 % (ref 5–12)
NEUTROPHILS NFR BLD AUTO: 6 10*3/MM3 (ref 1.7–7)
NEUTROPHILS NFR BLD AUTO: 69.1 % (ref 42.7–76)
NITRITE UR QL STRIP: NEGATIVE
NRBC BLD AUTO-RTO: 0.1 /100 WBC (ref 0–0.2)
PH UR STRIP.AUTO: 5.5 [PH] (ref 5–8)
PLATELET # BLD AUTO: 303 10*3/MM3 (ref 140–450)
PMV BLD AUTO: 9.7 FL (ref 6–12)
POTASSIUM SERPL-SCNC: 4.3 MMOL/L (ref 3.5–5.2)
PROT SERPL-MCNC: 7.3 G/DL (ref 6–8.5)
PROT UR QL STRIP: ABNORMAL
RBC # BLD AUTO: 4.62 10*6/MM3 (ref 3.77–5.28)
RBC # UR STRIP: ABNORMAL /HPF
REF LAB TEST METHOD: ABNORMAL
SODIUM SERPL-SCNC: 135 MMOL/L (ref 136–145)
SP GR UR STRIP: 1.02 (ref 1–1.03)
SQUAMOUS #/AREA URNS HPF: ABNORMAL /HPF
UROBILINOGEN UR QL STRIP: ABNORMAL
WBC # UR STRIP: ABNORMAL /HPF
WBC NRBC COR # BLD AUTO: 8.7 10*3/MM3 (ref 3.4–10.8)
WHOLE BLOOD HOLD COAG: NORMAL

## 2024-01-30 PROCEDURE — 25510000001 IOPAMIDOL PER 1 ML: Performed by: EMERGENCY MEDICINE

## 2024-01-30 PROCEDURE — 25810000003 SODIUM CHLORIDE 0.9 % SOLUTION: Performed by: PHYSICIAN ASSISTANT

## 2024-01-30 PROCEDURE — 74177 CT ABD & PELVIS W/CONTRAST: CPT

## 2024-01-30 PROCEDURE — 87086 URINE CULTURE/COLONY COUNT: CPT | Performed by: PHYSICIAN ASSISTANT

## 2024-01-30 PROCEDURE — 80053 COMPREHEN METABOLIC PANEL: CPT | Performed by: PHYSICIAN ASSISTANT

## 2024-01-30 PROCEDURE — 81001 URINALYSIS AUTO W/SCOPE: CPT | Performed by: PHYSICIAN ASSISTANT

## 2024-01-30 PROCEDURE — 99285 EMERGENCY DEPT VISIT HI MDM: CPT

## 2024-01-30 PROCEDURE — 83690 ASSAY OF LIPASE: CPT | Performed by: PHYSICIAN ASSISTANT

## 2024-01-30 PROCEDURE — 85025 COMPLETE CBC W/AUTO DIFF WBC: CPT | Performed by: PHYSICIAN ASSISTANT

## 2024-01-30 RX ORDER — SODIUM CHLORIDE 0.9 % (FLUSH) 0.9 %
10 SYRINGE (ML) INJECTION AS NEEDED
Status: DISCONTINUED | OUTPATIENT
Start: 2024-01-30 | End: 2024-01-30 | Stop reason: HOSPADM

## 2024-01-30 RX ADMIN — SODIUM CHLORIDE 1000 ML: 9 INJECTION, SOLUTION INTRAVENOUS at 15:58

## 2024-01-30 RX ADMIN — IOPAMIDOL 100 ML: 755 INJECTION, SOLUTION INTRAVENOUS at 15:15

## 2024-01-30 NOTE — ED PROVIDER NOTES
Subjective   History of Present Illness  Chief Complaint: Abdominal pain    Patient is a 83-year-old  female with history of CAD hypertension GERD presents to the ER with complaints of left lower quadrant pain for few days.  Patient states the pain became worse today.  She states that she has had it for quite some time and has been seen for previously but reports no diagnosis.  She reports nausea vomiting and diarrhea this morning.  No hematemesis hematochezia or melena.  No dysuria or hematuria.  Denies chest pain shortness of breath headache or fever or chills.  Abdominal surgical history significant for hysterectomy.    PCP: Marisol Rodgers    History provided by:  Patient      Review of Systems   Constitutional:  Negative for fever.   HENT:  Negative for sore throat and trouble swallowing.    Eyes: Negative.    Respiratory:  Negative for shortness of breath and wheezing.    Cardiovascular:  Negative for chest pain.   Gastrointestinal:  Positive for abdominal pain. Negative for diarrhea, nausea and vomiting.   Endocrine: Negative.    Genitourinary:  Negative for dysuria.   Musculoskeletal:  Negative for myalgias.   Skin:  Negative for rash.   Allergic/Immunologic: Negative.    Neurological:  Negative for light-headedness and headaches.   Psychiatric/Behavioral:  Negative for behavioral problems.    All other systems reviewed and are negative.      Past Medical History:   Diagnosis Date    Anemia     Arthritis     CAD (coronary artery disease)     Chronic back pain     Diabetic neuropathy     Essential hypertension     GERD (gastroesophageal reflux disease)     History of gastric ulcer     History of stomach ulcers     HLD (hyperlipidemia)     Hypocalcemia     Hypoparathyroidism     Hypothyroidism     Insomnia     Kidney stones     Osteoporosis     Recurrent sinusitis     Renal disease     Thyroid cancer     Type 2 diabetes mellitus        Allergies   Allergen Reactions    Penicillins Unknown - High Severity      Lips swell and hives    Shellfish-Derived Products Unknown - Low Severity    Sulfamethizole Unknown - Low Severity    Trimethoprim Unknown - Low Severity       Past Surgical History:   Procedure Laterality Date    CARDIAC CATHETERIZATION N/A 2023    Procedure: Left Heart Cath and coronary angiogram;  Surgeon: John Baldwin MD;  Location: [facility] CATH INVASIVE LOCATION;  Service: Cardiovascular;  Laterality: N/A;    CORONARY STENT PLACEMENT      x 3    CYSTOSCOPY, URETEROSCOPY, RETROGRADE PYELOGRAM, STENT INSERTION Left 10/18/2023    Procedure: CYSTOSCOPY URETEROSCOPY RETROGRADE PYELOGRAM HOLMIUM LASER STENT INSERTION;  Surgeon: Juan Alaniz MD;  Location: [facility] MAIN OR;  Service: Urology;  Laterality: Left;    HYSTERECTOMY      LUNG LOBECTOMY      THYROIDECTOMY         Family History   Problem Relation Age of Onset    Diabetes Mother     Cancer Father     Heart disease Sister     Diabetes Sister     Heart disease Maternal Grandmother        Social History     Socioeconomic History    Marital status:     Number of children: 2    Highest education level: 11th grade   Tobacco Use    Smoking status: Former     Packs/day: 2.00     Years: 30.00     Additional pack years: 0.00     Total pack years: 60.00     Types: Cigarettes     Quit date:      Years since quittin.0     Passive exposure: Past    Smokeless tobacco: Never   Vaping Use    Vaping Use: Never used   Substance and Sexual Activity    Alcohol use: Yes     Alcohol/week: 1.0 standard drink of alcohol     Types: 1 Glasses of wine per week    Drug use: Defer    Sexual activity: Defer           Objective   Physical Exam  Vitals and nursing note reviewed.   Constitutional:       Appearance: Normal appearance. She is well-developed and normal weight. She is not ill-appearing or toxic-appearing.   HENT:      Head: Normocephalic and atraumatic.   Eyes:      Pupils: Pupils are equal, round, and reactive to light.   Cardiovascular:      Rate and  "Rhythm: Normal rate and regular rhythm.      Heart sounds: Normal heart sounds. No murmur heard.  Pulmonary:      Effort: Pulmonary effort is normal. No respiratory distress.      Breath sounds: Normal breath sounds. No wheezing.   Abdominal:      General: Abdomen is flat. Bowel sounds are normal. There is no distension.      Palpations: Abdomen is soft.      Tenderness: There is no abdominal tenderness in the left lower quadrant. There is no guarding.   Skin:     General: Skin is warm and dry.      Capillary Refill: Capillary refill takes less than 2 seconds.      Findings: No rash.   Neurological:      General: No focal deficit present.      Mental Status: She is alert and oriented to person, place, and time.   Psychiatric:         Mood and Affect: Mood normal.         Behavior: Behavior normal.         Procedures           ED Course    /63   Pulse 84   Temp 98.2 °F (36.8 °C) (Oral)   Resp 17   Ht 165.1 cm (65\")   Wt 54 kg (119 lb)   SpO2 98%   BMI 19.80 kg/m²   Labs Reviewed   COMPREHENSIVE METABOLIC PANEL - Abnormal; Notable for the following components:       Result Value    Glucose 119 (*)     Sodium 135 (*)     CO2 21.0 (*)     eGFR 58.8 (*)     All other components within normal limits    Narrative:     GFR Normal >60  Chronic Kidney Disease <60  Kidney Failure <15    The GFR formula is only valid for adults with stable renal function between ages 18 and 70.   LIPASE - Abnormal; Notable for the following components:    Lipase 12 (*)     All other components within normal limits   URINALYSIS W/ CULTURE IF INDICATED - Abnormal; Notable for the following components:    Color, UA Dark Yellow (*)     Ketones, UA 80 mg/dL (3+) (*)     Bilirubin, UA Small (1+) (*)     Protein, UA 30 mg/dL (1+) (*)     Leuk Esterase, UA Moderate (2+) (*)     All other components within normal limits    Narrative:     In absence of clinical symptoms, the presence of pyuria, bacteria, and/or nitrites on the urinalysis " result does not correlate with infection.   URINALYSIS, MICROSCOPIC ONLY - Abnormal; Notable for the following components:    RBC, UA 3-5 (*)     WBC, UA 21-50 (*)     Squamous Epithelial Cells, UA 7-12 (*)     All other components within normal limits   CBC WITH AUTO DIFFERENTIAL - Normal   URINE CULTURE   CBC AND DIFFERENTIAL    Narrative:     The following orders were created for panel order CBC & Differential.  Procedure                               Abnormality         Status                     ---------                               -----------         ------                     CBC Auto Differential[[id number]]        Normal              Final result                 Please view results for these tests on the individual orders.   EXTRA TUBES    Narrative:     The following orders were created for panel order Extra Tubes.  Procedure                               Abnormality         Status                     ---------                               -----------         ------                     Gold Top - SST[[id number]]                                   Final result               Light Blue Top[[id number]]                                   Final result                 Please view results for these tests on the individual orders.   GOLD TOP - SST   LIGHT BLUE TOP     Medications   iopamidol (ISOVUE-370) 76 % injection 100 mL (100 mL Intravenous Given 1/30/24 1515)   sodium chloride 0.9 % bolus 1,000 mL (0 mL Intravenous Stopped 1/30/24 1718)     CT Abdomen Pelvis With Contrast    Result Date: 1/30/2024  Impression: 1. No acute findings in the abdomen or pelvis. 2. Chronic findings as described above, including: Bilateral renal cysts, uncomplicated colonic diverticulosis, moderately advanced aortic and iliac artery calcific atherosclerosis, small right ovarian cyst, hysterectomy, degenerative changes of the lumbar spine with lumbar levoscoliosis, small esophageal hiatal hernia, coronary artery calcifications.  Electronically Signed: Virginia Acevedo MD  1/30/2024 3:22 PM EST  Workstation ID: [id number]                                            Medical Decision Making  Differential Dx (Includes but not limited to): UTI pyelonephritis diverticulitis fistula ovarian cyst  Medical Records Reviewed: No pertinent records to review  Labs: CBC no leukocytosis.  CMP glucose 119, CO2 21.0.  Lipase 12.  Urinalysis 3+ ketones, small leukocytes 21-50 WBCs with no bacteria.  Culture pending.  Imaging:, Interpretation, CT abdomen pelvis shows no acute infectious process or obstruction.  Telemetry: N/A  Testing considered but not ordered: Chest x-ray patient denies chest pain or shortness of breath  Nature of Complaint: Acute  Admission vs Discharge: Discharge  Discussion: While in the ED IV was placed and labs were obtained appropriate PPE was worn during exam and throughout all encounters with the patient.  Patient had the above evaluation.  She is afebrile, nontoxic appearance in no acute distress.  Lab work reassuring.  Urinalysis shows 3+ ketones she was given IV fluids.  Small leukocytes and WBCs but no bacteria.  She has no urinary symptoms.  Will send for culture, will hold off on treatment as patient is not symptomatic and patient reports her pain is not new compared to her previous abdominal pain.  CT scan shows no acute infectious process.  I see no indication for admission at this time.    Discharge plan and instructions were discussed with the patient who verbalized understanding and is in agreement with the plan, all questions were answered at this time.  Patient is aware of signs symptoms that would require immediate return to the emergency room.  Patient understands importance of following up with primary care provider for further evaluation and worsening concerns as well as blood pressure recheck in the next 4 weeks.    Patient was discharged in improved stable condition with an upright steady gait.    Patient is aware that  discharge does not mean that nothing is wrong but indicates no emergencies present and they must continue care with follow-up as given below or physician of their choice.    Problems Addressed:  LLQ pain: acute illness or injury    Amount and/or Complexity of Data Reviewed  Labs: ordered. Decision-making details documented in ED Course.  Radiology: ordered. Decision-making details documented in ED Course.    Risk  Prescription drug management.        Final diagnoses:   LLQ pain       ED Disposition  ED Disposition       ED Disposition   Discharge    Condition   Stable    Comment   --               Marisol Rodgers, APRN  [address] [facility] [address] [phone]    Schedule an appointment as soon as possible for a visit in 2 days  As needed, If symptoms worsen         Medication List      No changes were made to your prescriptions during this visit.            Pamela Diaz PA  01/30/24 [id number]

## 2024-01-30 NOTE — DISCHARGE INSTRUCTIONS
Take Tylenol as needed for pain    May take zofran as needed for nausea or vomiting    Follow-up with primary care for recheck    Return to the ER for new worsening symptoms

## 2024-01-30 NOTE — ED NOTES
Pt to ED [facility] via EMS. Pt reports left lower quadrant pain, vomiting and diarrhea x 4-5 days. Pt describes her pain as intermittent and sharp, rates her pain at a 4/10 and has not taken anything for pain. She reports that the pain occasionally goes down into her left leg. Pt denies any urinary symptoms, chest pain and soa. No other complaints at this time.

## 2024-01-31 LAB — BACTERIA SPEC AEROBE CULT: NORMAL

## 2024-02-02 ENCOUNTER — READMISSION MANAGEMENT (OUTPATIENT)
Dept: CALL CENTER | Facility: HOSPITAL | Age: 84
End: 2024-02-02
Payer: MEDICARE

## 2024-02-02 ENCOUNTER — APPOINTMENT (OUTPATIENT)
Dept: GENERAL RADIOLOGY | Facility: HOSPITAL | Age: 84
End: 2024-02-02
Payer: MEDICARE

## 2024-02-02 ENCOUNTER — HOSPITAL ENCOUNTER (OUTPATIENT)
Facility: HOSPITAL | Age: 84
Setting detail: OBSERVATION
LOS: 1 days | Discharge: HOME | End: 2024-02-04
Attending: FAMILY MEDICINE | Admitting: FAMILY MEDICINE
Payer: MEDICARE

## 2024-02-02 PROBLEM — R10.9 ABDOMINAL PAIN: Status: ACTIVE | Noted: 2024-02-02

## 2024-02-02 LAB
ALBUMIN SERPL-MCNC: 5 G/DL (ref 3.5–5.2)
ALBUMIN/GLOB SERPL: 1.4 G/DL
ALP SERPL-CCNC: 59 U/L (ref 39–117)
ALT SERPL W P-5'-P-CCNC: 11 U/L (ref 1–33)
AMYLASE SERPL-CCNC: 35 U/L (ref 28–100)
ANION GAP SERPL CALCULATED.3IONS-SCNC: 19 MMOL/L (ref 5–15)
AST SERPL-CCNC: 19 U/L (ref 1–32)
BACTERIA UR QL AUTO: ABNORMAL /HPF
BILIRUB SERPL-MCNC: 0.7 MG/DL (ref 0–1.2)
BILIRUB UR QL STRIP: NEGATIVE
BUN SERPL-MCNC: 26 MG/DL (ref 8–23)
BUN/CREAT SERPL: 21 (ref 7–25)
CALCIUM SPEC-SCNC: 10 MG/DL (ref 8.6–10.5)
CANCER AG125 SERPL QL: 12.1 U/ML (ref 0–38.1)
CEA SERPL-MCNC: 1.49 NG/ML
CHLORIDE SERPL-SCNC: 100 MMOL/L (ref 98–107)
CLARITY UR: CLEAR
CO2 SERPL-SCNC: 16 MMOL/L (ref 22–29)
COLOR UR: YELLOW
CREAT SERPL-MCNC: 1.24 MG/DL (ref 0.57–1)
EGFRCR SERPLBLD CKD-EPI 2021: 42.7 ML/MIN/1.73
GLOBULIN UR ELPH-MCNC: 3.6 GM/DL
GLUCOSE BLDC GLUCOMTR-MCNC: 152 MG/DL (ref 70–105)
GLUCOSE BLDC GLUCOMTR-MCNC: 177 MG/DL (ref 70–105)
GLUCOSE SERPL-MCNC: 120 MG/DL (ref 65–99)
GLUCOSE UR STRIP-MCNC: NEGATIVE MG/DL
HGB UR QL STRIP.AUTO: NEGATIVE
HYALINE CASTS UR QL AUTO: ABNORMAL /LPF
KETONES UR QL STRIP: ABNORMAL
LEUKOCYTE ESTERASE UR QL STRIP.AUTO: ABNORMAL
NITRITE UR QL STRIP: NEGATIVE
PH UR STRIP.AUTO: <=5 [PH] (ref 5–8)
POTASSIUM SERPL-SCNC: 4.6 MMOL/L (ref 3.5–5.2)
PROT SERPL-MCNC: 8.6 G/DL (ref 6–8.5)
PROT UR QL STRIP: ABNORMAL
RBC # UR STRIP: ABNORMAL /HPF
REF LAB TEST METHOD: ABNORMAL
SODIUM SERPL-SCNC: 135 MMOL/L (ref 136–145)
SP GR UR STRIP: 1.01 (ref 1–1.03)
SQUAMOUS #/AREA URNS HPF: ABNORMAL /HPF
T4 FREE SERPL-MCNC: 1.88 NG/DL (ref 0.93–1.7)
TSH SERPL DL<=0.05 MIU/L-ACNC: 8.58 UIU/ML (ref 0.27–4.2)
UROBILINOGEN UR QL STRIP: ABNORMAL
WBC # UR STRIP: ABNORMAL /HPF
YEAST URNS QL MICRO: ABNORMAL /HPF

## 2024-02-02 PROCEDURE — 71045 X-RAY EXAM CHEST 1 VIEW: CPT

## 2024-02-02 PROCEDURE — 93005 ELECTROCARDIOGRAM TRACING: CPT | Performed by: INTERNAL MEDICINE

## 2024-02-02 PROCEDURE — 80053 COMPREHEN METABOLIC PANEL: CPT | Performed by: INTERNAL MEDICINE

## 2024-02-02 PROCEDURE — 82378 CARCINOEMBRYONIC ANTIGEN: CPT | Performed by: INTERNAL MEDICINE

## 2024-02-02 PROCEDURE — 84439 ASSAY OF FREE THYROXINE: CPT | Performed by: INTERNAL MEDICINE

## 2024-02-02 PROCEDURE — 82150 ASSAY OF AMYLASE: CPT | Performed by: INTERNAL MEDICINE

## 2024-02-02 PROCEDURE — 86304 IMMUNOASSAY TUMOR CA 125: CPT | Performed by: INTERNAL MEDICINE

## 2024-02-02 PROCEDURE — 63710000001 INSULIN LISPRO (HUMAN) PER 5 UNITS

## 2024-02-02 PROCEDURE — 84443 ASSAY THYROID STIM HORMONE: CPT | Performed by: INTERNAL MEDICINE

## 2024-02-02 PROCEDURE — 81001 URINALYSIS AUTO W/SCOPE: CPT | Performed by: INTERNAL MEDICINE

## 2024-02-02 PROCEDURE — G0378 HOSPITAL OBSERVATION PER HR: HCPCS

## 2024-02-02 PROCEDURE — 25810000003 SODIUM CHLORIDE 0.9 % SOLUTION: Performed by: INTERNAL MEDICINE

## 2024-02-02 PROCEDURE — 93010 ELECTROCARDIOGRAM REPORT: CPT | Performed by: INTERNAL MEDICINE

## 2024-02-02 PROCEDURE — 25010000002 METRONIDAZOLE 500 MG/100ML SOLUTION

## 2024-02-02 PROCEDURE — 87086 URINE CULTURE/COLONY COUNT: CPT | Performed by: INTERNAL MEDICINE

## 2024-02-02 PROCEDURE — 82948 REAGENT STRIP/BLOOD GLUCOSE: CPT

## 2024-02-02 RX ORDER — METRONIDAZOLE 500 MG/100ML
500 INJECTION, SOLUTION INTRAVENOUS EVERY 8 HOURS
Qty: 1500 ML | Refills: 0 | Status: DISCONTINUED | OUTPATIENT
Start: 2024-02-02 | End: 2024-02-04 | Stop reason: HOSPADM

## 2024-02-02 RX ORDER — SODIUM CHLORIDE 9 MG/ML
40 INJECTION, SOLUTION INTRAVENOUS AS NEEDED
Status: DISCONTINUED | OUTPATIENT
Start: 2024-02-02 | End: 2024-02-04 | Stop reason: HOSPADM

## 2024-02-02 RX ORDER — PANTOPRAZOLE SODIUM 40 MG/10ML
40 INJECTION, POWDER, LYOPHILIZED, FOR SOLUTION INTRAVENOUS
Status: DISCONTINUED | OUTPATIENT
Start: 2024-02-03 | End: 2024-02-04 | Stop reason: HOSPADM

## 2024-02-02 RX ORDER — HYDRALAZINE HYDROCHLORIDE 20 MG/ML
10 INJECTION INTRAMUSCULAR; INTRAVENOUS EVERY 6 HOURS PRN
Status: DISCONTINUED | OUTPATIENT
Start: 2024-02-02 | End: 2024-02-04 | Stop reason: HOSPADM

## 2024-02-02 RX ORDER — NICOTINE POLACRILEX 4 MG
15 LOZENGE BUCCAL
Status: DISCONTINUED | OUTPATIENT
Start: 2024-02-02 | End: 2024-02-04 | Stop reason: HOSPADM

## 2024-02-02 RX ORDER — INSULIN GLARGINE 100 [IU]/ML
INJECTION, SOLUTION SUBCUTANEOUS
Qty: 15 ML | Refills: 4 | Status: SHIPPED | OUTPATIENT
Start: 2024-02-02

## 2024-02-02 RX ORDER — TRAZODONE HYDROCHLORIDE 50 MG/1
50 TABLET ORAL NIGHTLY
Status: DISCONTINUED | OUTPATIENT
Start: 2024-02-02 | End: 2024-02-04 | Stop reason: HOSPADM

## 2024-02-02 RX ORDER — LEVOTHYROXINE SODIUM 0.1 MG/1
100 TABLET ORAL
Status: DISCONTINUED | OUTPATIENT
Start: 2024-02-03 | End: 2024-02-04 | Stop reason: HOSPADM

## 2024-02-02 RX ORDER — ATORVASTATIN CALCIUM 10 MG/1
10 TABLET, FILM COATED ORAL DAILY
Status: DISCONTINUED | OUTPATIENT
Start: 2024-02-03 | End: 2024-02-04 | Stop reason: HOSPADM

## 2024-02-02 RX ORDER — METOPROLOL SUCCINATE 25 MG/1
25 TABLET, EXTENDED RELEASE ORAL DAILY
Status: DISCONTINUED | OUTPATIENT
Start: 2024-02-03 | End: 2024-02-04 | Stop reason: HOSPADM

## 2024-02-02 RX ORDER — AMOXICILLIN 250 MG
2 CAPSULE ORAL 2 TIMES DAILY
Status: DISCONTINUED | OUTPATIENT
Start: 2024-02-02 | End: 2024-02-04 | Stop reason: HOSPADM

## 2024-02-02 RX ORDER — CLOPIDOGREL BISULFATE 75 MG/1
75 TABLET ORAL DAILY
Status: DISCONTINUED | OUTPATIENT
Start: 2024-02-03 | End: 2024-02-04 | Stop reason: HOSPADM

## 2024-02-02 RX ORDER — ONDANSETRON 2 MG/ML
4 INJECTION INTRAMUSCULAR; INTRAVENOUS EVERY 6 HOURS PRN
Status: DISCONTINUED | OUTPATIENT
Start: 2024-02-02 | End: 2024-02-04 | Stop reason: HOSPADM

## 2024-02-02 RX ORDER — INSULIN LISPRO 100 [IU]/ML
2-7 INJECTION, SOLUTION INTRAVENOUS; SUBCUTANEOUS
Status: DISCONTINUED | OUTPATIENT
Start: 2024-02-02 | End: 2024-02-04 | Stop reason: HOSPADM

## 2024-02-02 RX ORDER — MORPHINE SULFATE 2 MG/ML
1 INJECTION, SOLUTION INTRAMUSCULAR; INTRAVENOUS EVERY 4 HOURS PRN
Status: DISCONTINUED | OUTPATIENT
Start: 2024-02-02 | End: 2024-02-04 | Stop reason: HOSPADM

## 2024-02-02 RX ORDER — SODIUM CHLORIDE 0.9 % (FLUSH) 0.9 %
10 SYRINGE (ML) INJECTION EVERY 12 HOURS SCHEDULED
Status: DISCONTINUED | OUTPATIENT
Start: 2024-02-02 | End: 2024-02-04 | Stop reason: HOSPADM

## 2024-02-02 RX ORDER — POLYETHYLENE GLYCOL 3350 17 G/17G
17 POWDER, FOR SOLUTION ORAL DAILY PRN
Status: DISCONTINUED | OUTPATIENT
Start: 2024-02-02 | End: 2024-02-04 | Stop reason: HOSPADM

## 2024-02-02 RX ORDER — HYDROCODONE BITARTRATE AND ACETAMINOPHEN 5; 325 MG/1; MG/1
1 TABLET ORAL EVERY 6 HOURS PRN
Status: DISCONTINUED | OUTPATIENT
Start: 2024-02-02 | End: 2024-02-04 | Stop reason: HOSPADM

## 2024-02-02 RX ORDER — CHOLECALCIFEROL (VITAMIN D3) 125 MCG
5 CAPSULE ORAL NIGHTLY PRN
Status: DISCONTINUED | OUTPATIENT
Start: 2024-02-02 | End: 2024-02-04 | Stop reason: HOSPADM

## 2024-02-02 RX ORDER — SODIUM CHLORIDE 0.9 % (FLUSH) 0.9 %
10 SYRINGE (ML) INJECTION AS NEEDED
Status: DISCONTINUED | OUTPATIENT
Start: 2024-02-02 | End: 2024-02-04 | Stop reason: HOSPADM

## 2024-02-02 RX ORDER — SODIUM CHLORIDE 9 MG/ML
100 INJECTION, SOLUTION INTRAVENOUS CONTINUOUS
Status: DISCONTINUED | OUTPATIENT
Start: 2024-02-02 | End: 2024-02-04 | Stop reason: HOSPADM

## 2024-02-02 RX ORDER — BISACODYL 10 MG
10 SUPPOSITORY, RECTAL RECTAL DAILY PRN
Status: DISCONTINUED | OUTPATIENT
Start: 2024-02-02 | End: 2024-02-04 | Stop reason: HOSPADM

## 2024-02-02 RX ORDER — DEXTROSE MONOHYDRATE 25 G/50ML
25 INJECTION, SOLUTION INTRAVENOUS
Status: DISCONTINUED | OUTPATIENT
Start: 2024-02-02 | End: 2024-02-04 | Stop reason: HOSPADM

## 2024-02-02 RX ORDER — IBUPROFEN 600 MG/1
1 TABLET ORAL
Status: DISCONTINUED | OUTPATIENT
Start: 2024-02-02 | End: 2024-02-04 | Stop reason: HOSPADM

## 2024-02-02 RX ORDER — ACETAMINOPHEN 325 MG/1
650 TABLET ORAL EVERY 4 HOURS PRN
Status: DISCONTINUED | OUTPATIENT
Start: 2024-02-02 | End: 2024-02-04 | Stop reason: HOSPADM

## 2024-02-02 RX ORDER — AMLODIPINE BESYLATE 5 MG/1
5 TABLET ORAL
Status: DISCONTINUED | OUTPATIENT
Start: 2024-02-03 | End: 2024-02-04 | Stop reason: HOSPADM

## 2024-02-02 RX ORDER — BISACODYL 5 MG/1
5 TABLET, DELAYED RELEASE ORAL DAILY PRN
Status: DISCONTINUED | OUTPATIENT
Start: 2024-02-02 | End: 2024-02-04 | Stop reason: HOSPADM

## 2024-02-02 RX ADMIN — METRONIDAZOLE 500 MG: 500 INJECTION, SOLUTION INTRAVENOUS at 22:30

## 2024-02-02 RX ADMIN — SODIUM CHLORIDE 100 ML/HR: 9 INJECTION, SOLUTION INTRAVENOUS at 16:00

## 2024-02-02 RX ADMIN — INSULIN LISPRO 2 UNITS: 100 INJECTION, SOLUTION INTRAVENOUS; SUBCUTANEOUS at 21:00

## 2024-02-02 RX ADMIN — HYDROCODONE BITARTRATE AND ACETAMINOPHEN 1 TABLET: 5; 325 TABLET ORAL at 20:59

## 2024-02-02 RX ADMIN — Medication 10 ML: at 21:00

## 2024-02-02 RX ADMIN — DOCUSATE SODIUM 50MG AND SENNOSIDES 8.6MG 2 TABLET: 8.6; 5 TABLET, FILM COATED ORAL at 20:59

## 2024-02-02 RX ADMIN — TRAZODONE HYDROCHLORIDE 50 MG: 50 TABLET ORAL at 22:30

## 2024-02-02 RX ADMIN — DOXYCYCLINE 100 MG: 100 INJECTION, POWDER, LYOPHILIZED, FOR SOLUTION INTRAVENOUS at 20:59

## 2024-02-02 NOTE — CONSULTS
Urology Consult Note    Patient:Pamela Zavala :1940  Room:[facility]/1  Admit Date2024  Age:83 y.o.     SEX:female     DOS:2024     MR:[id number]     Visit:[id number]       Attending: Nneka Elkins MD  Referring Provider: Dr. Elkins  Reason for Consultation: Left flank pain    Patient Care Team:  Marisol Rodgers APRN as PCP - General (Pulmonary Disease)    Chief complaint left flank pain    Subjective .     History of present illness: 83-year-old woman who normally sees Dr. Alaniz.  Patient has had quite a few urinary calculi in the past that required extensive treatment with ESWL and ureteroscopy but these have cleared.  Patient is complaining of severe left flank pain that she felt was due to her kidneys.  She was in the emergency room 2 to 3 days ago.  A CT scan at that time reveals a large left renal cyst and a moderate size right renal cyst but there is no obstruction and there are no stones seen.  The cysts are chronic and there is no evidence for infection.  Urinalysis did show quite a few white cells as well as squamous cells.  Urine culture showed 25,000 normal  tip.  Apparently she was seen in her primary care office today and her urine appeared infected and she was admitted to the hospital.  Patient has no fevers.  She denies any nausea or vomiting.    Review of Systems  10 point review of systems were reviewed and are negative except for:  Constitution:  positive for See HPI    History  Past Medical History:   Diagnosis Date    Anemia     Arthritis     CAD (coronary artery disease)     Chronic back pain     Diabetic neuropathy     Essential hypertension     GERD (gastroesophageal reflux disease)     History of gastric ulcer     History of stomach ulcers     HLD (hyperlipidemia)     Hypocalcemia     Hypoparathyroidism     Hypothyroidism     Insomnia     Kidney stones     Osteoporosis     Recurrent sinusitis     Renal disease     Thyroid cancer     Type 2 diabetes mellitus      Past  Surgical History:   Procedure Laterality Date    CARDIAC CATHETERIZATION N/A 2023    Procedure: Left Heart Cath and coronary angiogram;  Surgeon: John Baldwin MD;  Location: [facility] CATH INVASIVE LOCATION;  Service: Cardiovascular;  Laterality: N/A;    CORONARY STENT PLACEMENT      x 3    CYSTOSCOPY, URETEROSCOPY, RETROGRADE PYELOGRAM, STENT INSERTION Left 10/18/2023    Procedure: CYSTOSCOPY URETEROSCOPY RETROGRADE PYELOGRAM HOLMIUM LASER STENT INSERTION;  Surgeon: Juan Alaniz MD;  Location: [facility] MAIN OR;  Service: Urology;  Laterality: Left;    HYSTERECTOMY      LUNG LOBECTOMY      THYROIDECTOMY       Social History     Socioeconomic History    Marital status:     Number of children: 2    Highest education level: 11th grade   Tobacco Use    Smoking status: Former     Packs/day: 2.00     Years: 30.00     Additional pack years: 0.00     Total pack years: 60.00     Types: Cigarettes     Quit date:      Years since quittin.     Passive exposure: Past    Smokeless tobacco: Never   Vaping Use    Vaping Use: Never used   Substance and Sexual Activity    Alcohol use: Yes     Alcohol/week: 1.0 standard drink of alcohol     Types: 1 Glasses of wine per week    Drug use: Defer    Sexual activity: Defer     Family History   Problem Relation Age of Onset    Diabetes Mother     Cancer Father     Heart disease Sister     Diabetes Sister     Heart disease Maternal Grandmother      Allergy  Allergies   Allergen Reactions    Penicillins Unknown - High Severity     Lips swell and hives    Shellfish-Derived Products Unknown - Low Severity    Sulfamethizole Unknown - Low Severity    Trimethoprim Unknown - Low Severity     Prior to Admission medications    Medication Sig Start Date End Date Taking? Authorizing Provider   amLODIPine (NORVASC) 5 MG tablet Take 1 tablet by mouth Daily. 24   Rashi Rowland MD   atorvastatin (LIPITOR) 10 MG tablet Take 1 tablet by mouth Daily.    Provider, MD Andrade    clopidogrel (PLAVIX) 75 MG tablet Take 1 tablet by mouth Daily.    Andrade La MD   Continuous Blood Gluc  (FreeStyle [place] 2 [place]) device USE 1 EACH SEE ADMIN INSTRUCTIONS. USE TO CHECK BLOOD SUGARS 4 TIMES DAILY. DX: E11.65 1/31/24   Driss Dan MD   Diclofenac Sodium (VOLTAREN) 1 % gel gel Apply 2 g topically to the appropriate area as directed Every 6 (Six) Hours As Needed (Knee pain). 10/25/23   Sarah Beth Coronado MD   famotidine (PEPCID) 40 MG tablet Take 1 tablet by mouth Daily.    Andrade La MD   fluticasone (FLONASE) 50 MCG/ACT nasal spray 1 spray into the nostril(s) as directed by provider 2 (Two) Times a Day.    Andrade La MD   folic acid (FOLVITE) 1 MG tablet Take 1 tablet by mouth Daily. 10/26/23   Sarah Beth Coronado MD   gabapentin (NEURONTIN) 300 MG capsule Take 1 capsule by mouth Every Night.    Andrade La MD   Insulin Lispro (humaLOG) 100 UNIT/ML injection See Admin Instructions. Inject subcutaneously three times daily before meals via sliding scale    100-150 = 2 units  151-200 = 3 units  201-250 = 4 units  251-300 = 5 units  301-350 = 6 units  351-400 = 7 units  401 = 8 units    Andrade La MD   Lantus SoloStar 100 UNIT/ML injection pen INJECT 15 UNITS SUBCUTANEOUSLY AS DIRECTED EVERY NIGHT 2/2/24   Driss Dan MD   levothyroxine (Synthroid) 100 MCG tablet Take 1 tablet by mouth Daily. 11/27/23 11/26/24  Driss Dan MD   lisinopril (PRINIVIL,ZESTRIL) 5 MG tablet Take 1 tablet by mouth Daily. 5/18/23   Nuria Mcgraw MD   metFORMIN (GLUMETZA) 1000 MG (MOD) 24 hr tablet Take 1 tablet by mouth 2 (Two) Times a Day.    Andrade La MD   metoprolol succinate XL (TOPROL-XL) 25 MG 24 hr tablet Take 1 tablet by mouth Daily.    Andrade La MD   multivitamin with minerals tablet tablet Take 1 tablet by mouth Daily. 10/26/23   Sarah Beth Coronado MD   ondansetron (ZOFRAN) 4 MG tablet Take 1 tablet by mouth Every 8 (Eight) Hours As Needed  for Nausea or Vomiting.    Andrade La MD   oxybutynin (DITROPAN) 5 MG tablet Take 1 tablet by mouth 2 (Two) Times a Day.    Andrade La MD   pantoprazole (PROTONIX) 40 MG EC tablet Take 1 tablet by mouth 2 (Two) Times a Day. Take dos    Andrade La MD   polyethylene glycol (MIRALAX) 17 g packet Take 17 g by mouth Daily. 10/26/23   Sarah Beth Coronado MD   thiamine (VITAMIN B1) 100 MG tablet Take 1 tablet by mouth Daily. 10/26/23   Sarah Beth Coronado MD   traMADol (ULTRAM) 50 MG tablet Take 1 tablet by mouth 4 (Four) Times a Day As Needed for Moderate Pain. 10/25/23   Sarah Beth Coronado MD   traZODone (DESYREL) 50 MG tablet Take 1 tablet by mouth Every Night.    Andrade La MD   Lantus SoloStar 100 UNIT/ML injection pen Inject 15 Units under the skin into the appropriate area as directed Every Night. 23  Driss Dan MD         Objective     tMax 24 hours:  Temp (24hrs), Av.7 °F (36.5 °C), Min:97.7 °F (36.5 °C), Max:97.7 °F (36.5 °C)    Vital Sign Ranges:  Temp:  [97.7 °F (36.5 °C)] 97.7 °F (36.5 °C)  Heart Rate:  [85-88] 88  Resp:  [18] 18  BP: (120-122)/(59-62) 122/62  Intake and Output Last 3 Shifts:  No intake/output data recorded.      Physical Exam:   General Appearance: alert, appears stated age, and cooperative  Head: normocephalic, without obvious abnormality and atraumatic  Abdomen: no guarding and no rebound tenderness  Skin: no bleeding, bruising or rash  Neurologic: Mental Status orientated to person, place, time and situation    Results Review:     Lab Results (last 24 hours)       Procedure Component Value Units Date/Time    POC Glucose Once [[id number]]  (Abnormal) Collected: 24 1636    Specimen: Blood Updated: 24 1637     Glucose 177 mg/dL      Comment: Serial Number: [id number]:  [id number]       CEA [[id number]] Collected: 24 1522    Specimen: Blood Updated: 24 1550     [[id number]] Collected: 24 1522    Specimen: Blood  "Updated: 02/02/24 1550    Comprehensive Metabolic Panel [[id number]] Collected: 02/02/24 1522    Specimen: Blood Updated: 02/02/24 1549    Amylase [[id number]] Collected: 02/02/24 1522    Specimen: Blood Updated: 02/02/24 1549    TSH [[id number]] Collected: 02/02/24 1522    Specimen: Blood Updated: 02/02/24 1549    T4, Free [[id number]] Collected: 02/02/24 1522    Specimen: Blood Updated: 02/02/24 1549           No results found for: \"URINECX\"     Imaging Results (Last 7 Days)       Procedure Component Value Units Date/Time    XR Chest 1 View [[id number]] Collected: 02/02/24 1503     Updated: 02/02/24 1506    Narrative:      XR CHEST 1 VW    Date of Exam: 2/2/2024 2:30 PM EST    Indication: dyspnea, unintentional weight loss    Comparison: 1/22/2024.    Findings:  Heart and pulmonary vessels appear within normal limits. Lung fields are well inflated and clear of acute infiltrates or effusions. There is mild linear scar on the right. There is no pneumothorax. There are no noncalcified pulmonary nodules or masses.   There is eventration of portions of the right hemidiaphragm.      Impression:      Impression:  No acute process.      Electronically Signed: Pamela Lopes MD    2/2/2024 3:04 PM EST    Workstation ID: [id number]            Inpatient Meds:   Scheduled Meds:    Continuous Infusions:sodium chloride, 100 mL/hr, Last Rate: 100 mL/hr (02/02/24 1600)       PRN Meds:.      Assessment & Plan     Principal Problem:    Abdominal pain    Left flank pain that does not appear to be of urologic origin  Possible UTI though most recent culture was negative  History of urinary calculi but none present currently    Plan  No indication for urologic intervention  Will defer to primary for treatment of any possible urinary tract infection  Hopefully a urine culture was performed on the urine specimen obtained in the office today as the most recent culture is negative in the chart  I will sign off  Please let me know if we can be of " further assistance      I discussed the patient's findings and my recommendations with patient    Thank you for this  consult    Guillermo Morfin MD  02/02/24  16:37 EST

## 2024-02-02 NOTE — OUTREACH NOTE
Stroke Week 2 Survey      Flowsheet Row Responses   [facility] facility patient discharged from? [facility]   Does the patient have one of the following disease processes/diagnoses(primary or secondary)? Stroke   Week 2 attempt successful? No   Unsuccessful attempts Attempt 1            Dina Trinh Registered Nurse   65.8

## 2024-02-03 ENCOUNTER — INPATIENT HOSPITAL (OUTPATIENT)
Dept: URBAN - METROPOLITAN AREA HOSPITAL 84 | Facility: HOSPITAL | Age: 84
End: 2024-02-03

## 2024-02-03 DIAGNOSIS — R10.32 LEFT LOWER QUADRANT PAIN: ICD-10-CM

## 2024-02-03 LAB
ANION GAP SERPL CALCULATED.3IONS-SCNC: 13 MMOL/L (ref 5–15)
BASOPHILS # BLD MANUAL: 0.27 10*3/MM3 (ref 0–0.2)
BASOPHILS NFR BLD MANUAL: 4 % (ref 0–1.5)
BUN SERPL-MCNC: 21 MG/DL (ref 8–23)
BUN/CREAT SERPL: 23.6 (ref 7–25)
CALCIUM SPEC-SCNC: 8.6 MG/DL (ref 8.6–10.5)
CHLORIDE SERPL-SCNC: 106 MMOL/L (ref 98–107)
CO2 SERPL-SCNC: 19 MMOL/L (ref 22–29)
CREAT SERPL-MCNC: 0.89 MG/DL (ref 0.57–1)
DEPRECATED RDW RBC AUTO: 49 FL (ref 37–54)
EGFRCR SERPLBLD CKD-EPI 2021: 63.6 ML/MIN/1.73
EOSINOPHIL # BLD MANUAL: 0.14 10*3/MM3 (ref 0–0.4)
EOSINOPHIL NFR BLD MANUAL: 2 % (ref 0.3–6.2)
ERYTHROCYTE [DISTWIDTH] IN BLOOD BY AUTOMATED COUNT: 14.1 % (ref 12.3–15.4)
GLUCOSE BLDC GLUCOMTR-MCNC: 141 MG/DL (ref 70–105)
GLUCOSE BLDC GLUCOMTR-MCNC: 165 MG/DL (ref 70–105)
GLUCOSE BLDC GLUCOMTR-MCNC: 177 MG/DL (ref 70–105)
GLUCOSE SERPL-MCNC: 113 MG/DL (ref 65–99)
HCT VFR BLD AUTO: 39.6 % (ref 34–46.6)
HGB BLD-MCNC: 12.8 G/DL (ref 12–15.9)
LARGE PLATELETS: ABNORMAL
LYMPHOCYTES # BLD MANUAL: 3.13 10*3/MM3 (ref 0.7–3.1)
LYMPHOCYTES NFR BLD MANUAL: 10 % (ref 5–12)
MCH RBC QN AUTO: 30 PG (ref 26.6–33)
MCHC RBC AUTO-ENTMCNC: 32.3 G/DL (ref 31.5–35.7)
MCV RBC AUTO: 92.9 FL (ref 79–97)
MONOCYTES # BLD: 0.68 10*3/MM3 (ref 0.1–0.9)
NEUTROPHILS # BLD AUTO: 2.58 10*3/MM3 (ref 1.7–7)
NEUTROPHILS NFR BLD MANUAL: 38 % (ref 42.7–76)
PLATELET # BLD AUTO: 235 10*3/MM3 (ref 140–450)
PMV BLD AUTO: 10.4 FL (ref 6–12)
POTASSIUM SERPL-SCNC: 3.9 MMOL/L (ref 3.5–5.2)
RBC # BLD AUTO: 4.26 10*6/MM3 (ref 3.77–5.28)
RBC MORPH BLD: NORMAL
SCAN SLIDE: NORMAL
SODIUM SERPL-SCNC: 138 MMOL/L (ref 136–145)
VARIANT LYMPHS NFR BLD MANUAL: 2 % (ref 0–5)
VARIANT LYMPHS NFR BLD MANUAL: 44 % (ref 19.6–45.3)
WBC MORPH BLD: NORMAL
WBC NRBC COR # BLD AUTO: 6.8 10*3/MM3 (ref 3.4–10.8)

## 2024-02-03 PROCEDURE — 85025 COMPLETE CBC W/AUTO DIFF WBC: CPT

## 2024-02-03 PROCEDURE — 63710000001 INSULIN LISPRO (HUMAN) PER 5 UNITS

## 2024-02-03 PROCEDURE — 82948 REAGENT STRIP/BLOOD GLUCOSE: CPT

## 2024-02-03 PROCEDURE — 96374 THER/PROPH/DIAG INJ IV PUSH: CPT

## 2024-02-03 PROCEDURE — 25010000002 ONDANSETRON PER 1 MG

## 2024-02-03 PROCEDURE — 80048 BASIC METABOLIC PNL TOTAL CA: CPT

## 2024-02-03 PROCEDURE — 99222 1ST HOSP IP/OBS MODERATE 55: CPT | Performed by: NURSE PRACTITIONER

## 2024-02-03 PROCEDURE — 25010000002 METRONIDAZOLE 500 MG/100ML SOLUTION

## 2024-02-03 PROCEDURE — 85007 BL SMEAR W/DIFF WBC COUNT: CPT

## 2024-02-03 PROCEDURE — 96375 TX/PRO/DX INJ NEW DRUG ADDON: CPT

## 2024-02-03 PROCEDURE — G0378 HOSPITAL OBSERVATION PER HR: HCPCS

## 2024-02-03 RX ORDER — SORBITOL SOLUTION 70 %
100 SOLUTION, ORAL MISCELLANEOUS ONCE
Status: COMPLETED | OUTPATIENT
Start: 2024-02-03 | End: 2024-02-03

## 2024-02-03 RX ORDER — DOXYCYCLINE HYCLATE 100 MG/1
100 CAPSULE ORAL 2 TIMES DAILY
Qty: 8 CAPSULE | Refills: 0 | Status: SHIPPED | OUTPATIENT
Start: 2024-02-03

## 2024-02-03 RX ADMIN — INSULIN LISPRO 2 UNITS: 100 INJECTION, SOLUTION INTRAVENOUS; SUBCUTANEOUS at 21:39

## 2024-02-03 RX ADMIN — METRONIDAZOLE 500 MG: 500 INJECTION, SOLUTION INTRAVENOUS at 20:54

## 2024-02-03 RX ADMIN — TRAZODONE HYDROCHLORIDE 50 MG: 50 TABLET ORAL at 20:54

## 2024-02-03 RX ADMIN — AMLODIPINE BESYLATE 5 MG: 5 TABLET ORAL at 08:59

## 2024-02-03 RX ADMIN — INSULIN LISPRO 2 UNITS: 100 INJECTION, SOLUTION INTRAVENOUS; SUBCUTANEOUS at 17:47

## 2024-02-03 RX ADMIN — PANTOPRAZOLE SODIUM 40 MG: 40 INJECTION, POWDER, FOR SOLUTION INTRAVENOUS at 05:14

## 2024-02-03 RX ADMIN — CLOPIDOGREL BISULFATE 75 MG: 75 TABLET ORAL at 08:59

## 2024-02-03 RX ADMIN — ACETAMINOPHEN 650 MG: 325 TABLET, FILM COATED ORAL at 20:54

## 2024-02-03 RX ADMIN — DOCUSATE SODIUM 50MG AND SENNOSIDES 8.6MG 2 TABLET: 8.6; 5 TABLET, FILM COATED ORAL at 08:59

## 2024-02-03 RX ADMIN — METOPROLOL SUCCINATE 25 MG: 25 TABLET, EXTENDED RELEASE ORAL at 08:59

## 2024-02-03 RX ADMIN — DOXYCYCLINE 100 MG: 100 INJECTION, POWDER, LYOPHILIZED, FOR SOLUTION INTRAVENOUS at 08:59

## 2024-02-03 RX ADMIN — ONDANSETRON 4 MG: 2 INJECTION INTRAMUSCULAR; INTRAVENOUS at 12:16

## 2024-02-03 RX ADMIN — SORBITOL SOLUTION (BULK) 100 ML: 70 SOLUTION at 10:37

## 2024-02-03 RX ADMIN — ATORVASTATIN CALCIUM 10 MG: 10 TABLET, FILM COATED ORAL at 08:59

## 2024-02-03 RX ADMIN — METRONIDAZOLE 500 MG: 500 INJECTION, SOLUTION INTRAVENOUS at 12:16

## 2024-02-03 RX ADMIN — LEVOTHYROXINE SODIUM 100 MCG: 0.1 TABLET ORAL at 05:14

## 2024-02-03 RX ADMIN — METRONIDAZOLE 500 MG: 500 INJECTION, SOLUTION INTRAVENOUS at 05:16

## 2024-02-03 RX ADMIN — Medication 10 ML: at 21:00

## 2024-02-03 RX ADMIN — DOXYCYCLINE 100 MG: 100 INJECTION, POWDER, LYOPHILIZED, FOR SOLUTION INTRAVENOUS at 20:53

## 2024-02-03 NOTE — PLAN OF CARE
Problem: Adult Inpatient Plan of Care  Goal: Plan of Care Review  Outcome: Ongoing, Progressing  Goal: Patient-Specific Goal (Individualized)  Outcome: Ongoing, Progressing  Goal: Absence of Hospital-Acquired Illness or Injury  Outcome: Ongoing, Progressing  Intervention: Identify and Manage Fall Risk  Description: Perform standard risk assessment on admission using a validated tool or comprehensive approach appropriate to the patient; reassess fall risk frequently, with change in status or transfer to another level of care.  Communicate fall injury risk to interprofessional healthcare team.  Determine need for increased observation, equipment and environmental modification, such as low bed, signage and supportive, nonskid footwear.  Adjust safety measures to individual developmental age, stage and identified risk factors.  Reinforce the importance of safety and physical activity with patient and family.  Perform regular intentional rounding to assess need for position change, pain assessment and personal needs, including assistance with toileting.  Recent Flowsheet Documentation  Taken 2/3/2024 0400 by Kelli Grossman RN  Safety Promotion/Fall Prevention:   fall prevention program maintained   clutter free environment maintained   lighting adjusted   safety round/check completed  Taken 2/3/2024 0200 by Kelli Grossman RN  Safety Promotion/Fall Prevention:   activity supervised   fall prevention program maintained   clutter free environment maintained   lighting adjusted   safety round/check completed  Taken 2/3/2024 0000 by Kelli Grossman RN  Safety Promotion/Fall Prevention:   activity supervised   lighting adjusted   fall prevention program maintained   clutter free environment maintained   room organization consistent   safety round/check completed  Taken 2/2/2024 2200 by Kelli Grossman RN  Safety Promotion/Fall Prevention:   activity supervised   clutter free environment maintained   fall prevention  program maintained   lighting adjusted   room organization consistent   safety round/check completed  Taken 2/2/2024 2000 by Kelli Grossman RN  Safety Promotion/Fall Prevention:   activity supervised   fall prevention program maintained   clutter free environment maintained   lighting adjusted   safety round/check completed   room organization consistent  Intervention: Prevent Skin Injury  Description: Perform a screening for skin injury risk, such as pressure or moisture associated skin damage on admission and at regular intervals throughout hospital stay.  Keep all areas of skin (especially folds) clean and dry.  Maintain adequate skin hydration.  Relieve and redistribute pressure and protect bony prominences; implement measures based on patient-specific risk factors.  Match turning and repositioning schedule to clinical condition.  Encourage weight shift frequently; assist with reposition if unable to complete independently.  Float heels off bed; avoid pressure on the Achilles tendon.  Keep skin free from extended contact with medical devices.  Encourage functional activity and mobility, as early as tolerated.  Use aids (e.g., slide boards, mechanical lift) during transfer.  Recent Flowsheet Documentation  Taken 2/3/2024 0400 by Kelli Grossman RN  Skin Protection: adhesive use limited  Taken 2/3/2024 0000 by Kelli Grossman RN  Skin Protection: adhesive use limited  Taken 2/2/2024 2000 by Kelli Grossman RN  Skin Protection: adhesive use limited  Intervention: Prevent and Manage VTE (Venous Thromboembolism) Risk  Description: Assess for VTE (venous thromboembolism) risk.  Encourage and assist with early ambulation.  Initiate and maintain compression or other therapy, as indicated, based on identified risk in accordance with organizational protocol and provider order.  Encourage both active and passive leg exercises while in bed, if unable to ambulate.  Recent Flowsheet Documentation  Taken 2/2/2024 2000  by Kelli Grossman RN  Activity Management: ambulated outside room  Intervention: Prevent Infection  Description: Maintain skin and mucous membrane integrity; promote hand, oral and pulmonary hygiene.  Optimize fluid balance, nutrition, sleep and glycemic control to maximize infection resistance.  Identify potential sources of infection early to prevent or mitigate progression of infection (e.g., wound, lines, devices).  Evaluate ongoing need for invasive devices; remove promptly when no longer indicated.  Recent Flowsheet Documentation  Taken 2/3/2024 0400 by Kelli Grossman RN  Infection Prevention: hand hygiene promoted  Taken 2/3/2024 0000 by Kelli Grossman RN  Infection Prevention: hand hygiene promoted  Taken 2/2/2024 2200 by Kelli Grossman RN  Infection Prevention: hand hygiene promoted  Taken 2/2/2024 2000 by Kelli Grossman RN  Infection Prevention: hand hygiene promoted  Goal: Optimal Comfort and Wellbeing  Outcome: Ongoing, Progressing  Intervention: Monitor Pain and Promote Comfort  Description: Assess pain level, treatment efficacy and patient response at regular intervals using a consistent pain scale.  Consider the presence and impact of preexisting chronic pain.  Encourage patient and caregiver involvement in pain assessment, interventions and safety measures.  Recent Flowsheet Documentation  Taken 2/3/2024 0000 by Kelli Grossman RN  Pain Management Interventions:   quiet environment facilitated   no interventions per patient request  Taken 2/2/2024 2000 by Kelli Grossman RN  Pain Management Interventions:   see MAR   quiet environment facilitated  Intervention: Provide Person-Centered Care  Description: Use a family-focused approach to care.  Develop trust and rapport by proactively providing information, encouraging questions, addressing concerns and offering reassurance.  Acknowledge emotional response to hospitalization.  Recognize and utilize personal coping strategies.  Honor  spiritual and cultural preferences.  Recent Flowsheet Documentation  Taken 2/3/2024 0000 by Kelli Grossman RN  Trust Relationship/Rapport:   choices provided   care explained  Taken 2/2/2024 2000 by Kelli Grossman RN  Trust Relationship/Rapport:   choices provided   care explained  Goal: Readiness for Transition of Care  Outcome: Ongoing, Progressing     Problem: Asthma Comorbidity  Goal: Maintenance of Asthma Control  Outcome: Ongoing, Progressing  Intervention: Maintain Asthma Symptom Control  Description: Evaluate adherence to self-management (asthma action plan), such as medication, symptom-control, trigger-avoidance and self-monitoring.  Advocate for continuation of home regimen, including medication, method of delivery, schedule and symptom monitoring; acknowledge preferred modality and routine.  Minimize exposure to potential triggers, such as perfume, cleaning chemicals and all types of smoke.  Assess for proper use of inhaled medication and delivery technique; assist or reinstruct if needed.  Evaluate effectiveness of coping skills; encourage expression of feelings, expectations and concerns related to disease management and quality of life; reinforce education to enhance management plan and wellbeing.  Recent Flowsheet Documentation  Taken 2/3/2024 0400 by Kelli Grossman RN  Medication Review/Management: medications reviewed  Taken 2/3/2024 0200 by Kelli Grossman RN  Medication Review/Management: medications reviewed  Taken 2/3/2024 0000 by Kelli Grossman RN  Medication Review/Management: medications reviewed  Taken 2/2/2024 2200 by Kelli Grossman RN  Medication Review/Management: medications reviewed  Taken 2/2/2024 2000 by Kelli Grossman RN  Medication Review/Management: medications reviewed     Problem: Diabetes Comorbidity  Goal: Blood Glucose Level Within Targeted Range  Outcome: Ongoing, Progressing  Intervention: Monitor and Manage Glycemia  Description: Establish target blood  glucose levels based on patient-specific factors, such as age, diabetes-related complications and illness severity.  Document blood glucose levels and monitor trend; advocate for adjustment to keep within targeted range.  Provide pharmacologic therapy to maintain blood glucose levels within targeted range.  Check blood glucose level if there is a change in mental or cognitive status.  Recognize, treat and document hypoglycemia event and potential cause.  Avoid hypoglycemic episodes by advocating for insulin dose adjustment when there is a change in condition, such as illness severity, decreased oral intake, missed or refused meals and snacks, as well as medication change that may include steroid tape  Recent Flowsheet Documentation  Taken 2/3/2024 0000 by Kelli Grossman RN  Glycemic Management: blood glucose monitored     Problem: Pain Chronic (Persistent) (Comorbidity Management)  Goal: Acceptable Pain Control and Functional Ability  Outcome: Ongoing, Progressing  Intervention: Manage Persistent Pain  Description: Evaluate pain level, effect of treatment and patient response at regular intervals.  Minimize pain stimuli; coordinate care and adjust environment (e.g., light, noise, unnecessary movement); promote sleep/rest.  Match pharmacologic analgesia to severity and type of pain mechanism (e.g., neuropathic, muscle, inflammatory); consider multimodal approach (e.g., nonopioid, opioid, adjuvant).  Provide medication at regular intervals; titrate to patient response.  Manage breakthrough pain with additional doses; consider rotation or switching medication.  Monitor for signs of substance tolerance (increased dose to reach desired effect, decreased effect with same dose).  Avoid abrupt withdrawal of medication, especially agents capable of causing physical dependence.  Manage medication-induced effects, such as constipation, nausea, pruritus, urinary retention, somnolence and dizziness.  Provide multimodal  treatment interventions, such as physical activity, therapeutic exercise, yoga, TENS (transcutaneous electrical nerve stimulation) and manual therapy.  Train in functional activity modifications, such as body mechanics, posture, ergonomics, energy conservation and activity pacing.  Consider addition of complementary or alternative therapy, such as acupuncture, hypnosis or therapeutic touch.  Recent Flowsheet Documentation  Taken 2/3/2024 0400 by Kelli Grossman RN  Medication Review/Management: medications reviewed  Taken 2/3/2024 0200 by Kelli Grossman RN  Medication Review/Management: medications reviewed  Taken 2/3/2024 0000 by Kelli Grossman RN  Sleep/Rest Enhancement: awakenings minimized  Medication Review/Management: medications reviewed  Taken 2/2/2024 2200 by Kelli Grossman RN  Medication Review/Management: medications reviewed  Taken 2/2/2024 2000 by Kelli Grossman RN  Sleep/Rest Enhancement: awakenings minimized  Medication Review/Management: medications reviewed  Intervention: Develop Pain Management Plan  Description: Acknowledge patient as the expert in pain self-management.  Use a consistent, validated tool for pain assessment; include function and quality of life.  Evaluate risk for opioid use and dependence.  Set pain management goals; determine acceptable level of discomfort to allow for maximal functioning and quality of life.  Determine [id number] pain management plan, including both pharmacologic and nonpharmacologic measures.  Identify and integrate past successful treatment measures, if able.  Encourage patient and caregiver involvement in pain assessment, interventions and safety measures.  Re-evaluate plan regularly.  Recent Flowsheet Documentation  Taken 2/3/2024 0000 by Kelli Grossman RN  Pain Management Interventions:   quiet environment facilitated   no interventions per patient request  Taken 2/2/2024 2000 by Kelli Grossman RN  Pain Management  Interventions:   see MAR   quiet environment facilitated  Intervention: Optimize Psychosocial Wellbeing  Description: Facilitate patient’s self-control over pain by providing pain information and allowing choices in treatment.  Consider and address emotional response to pain.  Explore and promote use of coping strategies; address barriers to successful coping.  Evaluate and assist with psychosocial, cultural and spiritual factors impacting pain.  Modify pain perception by using techniques, such as distraction, mindfulness, guided imagery, meditation or music.  Assess and monitor for signs and symptoms of behavioral health concerns, such as unhealthy substance use, depression and suicidal ideation.  Consider referral for ongoing coping support, such as cognitive behavioral therapy and mindfulness-based stress reduction.  Recent Flowsheet Documentation  Taken 2/3/2024 0400 by Kelli Grossman RN  Supportive Measures: active listening utilized  Diversional Activities: television  Family/Support System Care: presence promoted  Taken 2/3/2024 0000 by Kelli Grossman RN  Supportive Measures:   active listening utilized   problem-solving facilitated  Diversional Activities: television  Family/Support System Care: presence promoted  Taken 2/2/2024 2000 by Kelli Grossman RN  Supportive Measures: active listening utilized  Diversional Activities: television  Family/Support System Care:   presence promoted   self-care encouraged   Goal Outcome Evaluation:

## 2024-02-03 NOTE — CONSULTS
"GI CONSULT  NOTE:    Referring Provider:   Marisol Rodgers NP     Chief complaint:    Abdominal pain, weight loss    Subjective   Left lower quadrant abdominal pain    History of present illness:     Patient is 83-year-old female with a history of CAD/stenting x 3 on Plavix, GERD, diabetes, hypothyroidism/hypothyroid cancer who was a direct admit from Dr. Elkins's office for left lower quadrant abdominal pain.  Patient supposedly has a history of diverticulitis.  Was seen in the ER on 1/30/2024 for the same complaints.  Was evaluated via CT that showed chronic findings no signs of diverticulitis.  Patient was discharged back to the [facility].  Patient was admitted to 2/2/2024 and GI was consulted for abdominal pain and weight loss.  Patient states she is takes a stool softener every day to help her bowel movements.  Patient was given sorbitol this morning and has had multiple bowel movements and is feeling better requesting to go home.  Patient's left lower quadrant abdominal pain has resolved.    Labs: LFTs are normal.  Sodium 138, potassium 3.9, creatinine 0.89.  CBC is normal WBCs are 6.8, hemoglobin is 12.8, platelets are 235.   is 12.1, CEA 1.49.  CT of the abdomen and pelvis with contrast 1/30/2024 small esophageal hiatal hernia.  Uncomplicated diverticular changes in the sigmoid colon.  1/19/2024 CT A/P without: No acute abnormalities within the abdomen or pelvis.  Colonic diverticulosis.  Bilateral renal cyst.  Moderate hiatal hernia.    Endo History:  Nothing in G med  Patient states colonoscopy \"a long time ago\" in [place]    Past Medical History:  Past Medical History:   Diagnosis Date    Anemia     Arthritis     CAD (coronary artery disease)     Chronic back pain     Diabetic neuropathy     Essential hypertension     GERD (gastroesophageal reflux disease)     History of gastric ulcer     History of stomach ulcers     HLD (hyperlipidemia)     Hypocalcemia     Hypoparathyroidism     " Hypothyroidism     Insomnia     Kidney stones     Osteoporosis     Recurrent sinusitis     Renal disease     Thyroid cancer     Type 2 diabetes mellitus        Past Surgical History:  Past Surgical History:   Procedure Laterality Date    CARDIAC CATHETERIZATION N/A 2023    Procedure: Left Heart Cath and coronary angiogram;  Surgeon: John Baldwin MD;  Location: [facility] CATH INVASIVE LOCATION;  Service: Cardiovascular;  Laterality: N/A;    CORONARY STENT PLACEMENT      x 3    CYSTOSCOPY, URETEROSCOPY, RETROGRADE PYELOGRAM, STENT INSERTION Left 10/18/2023    Procedure: CYSTOSCOPY URETEROSCOPY RETROGRADE PYELOGRAM HOLMIUM LASER STENT INSERTION;  Surgeon: Juan Alaniz MD;  Location: [facility] MAIN OR;  Service: Urology;  Laterality: Left;    HYSTERECTOMY      LUNG LOBECTOMY      THYROIDECTOMY         Social History:  Social History     Tobacco Use    Smoking status: Former     Packs/day: 2.00     Years: 30.00     Additional pack years: 0.00     Total pack years: 60.00     Types: Cigarettes     Quit date:      Years since quittin.     Passive exposure: Past    Smokeless tobacco: Never   Vaping Use    Vaping Use: Never used   Substance Use Topics    Alcohol use: Yes     Alcohol/week: 1.0 standard drink of alcohol     Types: 1 Glasses of wine per week    Drug use: Defer       Family History:  Family History   Problem Relation Age of Onset    Diabetes Mother     Cancer Father     Heart disease Sister     Diabetes Sister     Heart disease Maternal Grandmother        Medications:  Medications Prior to Admission   Medication Sig Dispense Refill Last Dose    amLODIPine (NORVASC) 5 MG tablet Take 1 tablet by mouth Daily. 30 tablet 0     atorvastatin (LIPITOR) 10 MG tablet Take 1 tablet by mouth Daily.       clopidogrel (PLAVIX) 75 MG tablet Take 1 tablet by mouth Daily.       Continuous Blood Gluc  (FreeStyle [place] 2 [place]) device USE 1 EACH SEE ADMIN INSTRUCTIONS. USE TO CHECK BLOOD SUGARS 4 TIMES DAILY.  DX: E11.65 1 each 0     Diclofenac Sodium (VOLTAREN) 1 % gel gel Apply 2 g topically to the appropriate area as directed Every 6 (Six) Hours As Needed (Knee pain).       famotidine (PEPCID) 40 MG tablet Take 1 tablet by mouth Daily.       fluticasone (FLONASE) 50 MCG/ACT nasal spray 1 spray into the nostril(s) as directed by provider 2 (Two) Times a Day.       folic acid (FOLVITE) 1 MG tablet Take 1 tablet by mouth Daily.       gabapentin (NEURONTIN) 300 MG capsule Take 1 capsule by mouth Every Night.       Insulin Lispro (humaLOG) 100 UNIT/ML injection See Admin Instructions. Inject subcutaneously three times daily before meals via sliding scale    100-150 = 2 units  151-200 = 3 units  201-250 = 4 units  251-300 = 5 units  301-350 = 6 units  351-400 = 7 units  401 = 8 units       Lantus SoloStar 100 UNIT/ML injection pen INJECT 15 UNITS SUBCUTANEOUSLY AS DIRECTED EVERY NIGHT 15 mL 4     levothyroxine (Synthroid) 100 MCG tablet Take 1 tablet by mouth Daily. 30 tablet 11     lisinopril (PRINIVIL,ZESTRIL) 5 MG tablet Take 1 tablet by mouth Daily. 90 tablet 3     metFORMIN (GLUMETZA) 1000 MG (MOD) 24 hr tablet Take 1 tablet by mouth 2 (Two) Times a Day.       metoprolol succinate XL (TOPROL-XL) 25 MG 24 hr tablet Take 1 tablet by mouth Daily.       multivitamin with minerals tablet tablet Take 1 tablet by mouth Daily.       ondansetron (ZOFRAN) 4 MG tablet Take 1 tablet by mouth Every 8 (Eight) Hours As Needed for Nausea or Vomiting.       oxybutynin (DITROPAN) 5 MG tablet Take 1 tablet by mouth 2 (Two) Times a Day.       pantoprazole (PROTONIX) 40 MG EC tablet Take 1 tablet by mouth 2 (Two) Times a Day. Take dos       polyethylene glycol (MIRALAX) 17 g packet Take 17 g by mouth Daily.       thiamine (VITAMIN B1) 100 MG tablet Take 1 tablet by mouth Daily.       traMADol (ULTRAM) 50 MG tablet Take 1 tablet by mouth 4 (Four) Times a Day As Needed for Moderate Pain. 20 tablet 0     traZODone (DESYREL) 50 MG tablet Take 1  tablet by mouth Every Night.          Scheduled Meds:amLODIPine, 5 mg, Oral, Q24H  atorvastatin, 10 mg, Oral, Daily  clopidogrel, 75 mg, Oral, Daily  doxycycline, 100 mg, Intravenous, Q12H  insulin lispro, 2-7 Units, Subcutaneous, 4x Daily AC & at Bedtime  levothyroxine, 100 mcg, Oral, Q AM  metoprolol succinate XL, 25 mg, Oral, Daily  metroNIDAZOLE, 500 mg, Intravenous, Q8H  pantoprazole, 40 mg, Intravenous, Q AM  senna-docusate sodium, 2 tablet, Oral, BID  sodium chloride, 10 mL, Intravenous, Q12H  traZODone, 50 mg, Oral, Nightly      Continuous Infusions:sodium chloride, 100 mL/hr, Last Rate: 100 mL/hr (02/02/24 1600)      PRN Meds:.  acetaminophen    senna-docusate sodium **AND** polyethylene glycol **AND** bisacodyl **AND** bisacodyl    Calcium Replacement - Follow Nurse / BPA Driven Protocol    dextrose    dextrose    glucagon (human recombinant)    hydrALAZINE    HYDROcodone-acetaminophen    Magnesium Standard Dose Replacement - Follow Nurse / BPA Driven Protocol    melatonin    Morphine    ondansetron    Phosphorus Replacement - Follow Nurse / BPA Driven Protocol    Potassium Replacement - Follow Nurse / BPA Driven Protocol    sodium chloride    sodium chloride    ALLERGIES:  Penicillins, Shellfish-derived products, Sulfamethizole, and Trimethoprim    ROS:  Review of Systems   Constitutional:  Positive for unexpected weight change.   Gastrointestinal:  Positive for abdominal pain.       The following systems were reviewed and negative;    Constitution:  No fevers, chills, no unintentional weight loss  Skin: no rash, no jaundice  Eyes:  No blurry vision, no eye pain  HENT:  No change in hearing or smell  Resp:  No dyspnea or cough  CV:  No chest pain or palpitations  :  No dysuria, hematuria  Musculoskeletal:  No leg cramps or arthralgias  Neuro:  No tremor, no numbness  Psych:  No depression or confusion    Objective sitting up on side of the bed.  NAD.  No family present.  Room 212.    Vital Signs:    Vitals:    02/02/24 1500 02/02/24 2054 02/02/24 2355 02/03/24 0835   BP: 122/62 111/63 107/56 139/68   BP Location:  Left arm Left arm Left arm   Patient Position:  Lying Lying Lying   Pulse: 88 85 82 76   Resp:  15 15 12   Temp:  97.9 °F (36.6 °C) 97.4 °F (36.3 °C) 97.6 °F (36.4 °C)   TempSrc:  Oral Oral Oral   SpO2: 100% 100% 100% 100%   Weight:       Height:           Physical Exam:    General Appearance:    Awake and alert, in no acute distress   Head:    Normocephalic, without obvious abnormality, atraumatic   Eyes:            Conjunctivae normal, anicteric sclerae, pupils equal   Ears:    Ears appear intact with no abnormalities noted   Throat:   No oral lesions, no thrush, oral mucosa moist   Neck:   Supple, no JVD   Lungs:     respirations regular, even and unlabored       Chest Wall:    No abnormalities observed   Abdomen:     Normal bowel sounds, soft, nontender, no rebound or guarding, nondistended, no hepatosplenomegaly   Rectal:     Deferred   Extremities:   Moves all extremities, no edema, no cyanosis   Pulses:   Pulses palpable and equal bilaterally   Skin:   No rash, no jaundice, normal palpation       Neurologic:   Cranial nerves 2 - 12 grossly intact, no asterixis       Results Review:   I reviewed the patient's labs and imaging.  CBC    Results from last 7 days   Lab Units 02/03/24  0533 01/30/24  1313   WBC 10*3/mm3 6.80 8.70   HEMOGLOBIN g/dL 12.8 14.1   PLATELETS 10*3/mm3 235 303     CMP   Results from last 7 days   Lab Units 02/03/24  0533 02/02/24  1522 01/30/24  1313   SODIUM mmol/L 138 135* 135*   POTASSIUM mmol/L 3.9 4.6 4.3   CHLORIDE mmol/L 106 100 103   CO2 mmol/L 19.0* 16.0* 21.0*   BUN mg/dL 21 26* 21   CREATININE mg/dL 0.89 1.24* 0.95   GLUCOSE mg/dL 113* 120* 119*   ALBUMIN g/dL  --  5.0 4.5   BILIRUBIN mg/dL  --  0.7 0.6   ALK PHOS U/L  --  59 51   AST (SGOT) U/L  --  19 14   ALT (SGPT) U/L  --  11 9   AMYLASE U/L  --  35  --    LIPASE U/L  --   --  12*     Cr Clearance Estimated  Creatinine Clearance: 34.4 mL/min (by C-G formula based on SCr of 0.89 mg/dL).  Coag     HbA1C   Lab Results   Component Value Date    HGBA1C 7.30 (H) 01/23/2024    HGBA1C 7.60 (H) 10/18/2023    HGBA1C 7.90 (H) 09/05/2023     Blood Glucose   Glucose   Date/Time Value Ref Range Status   02/03/2024 0845 141 (H) 70 - 105 mg/dL Final     Comment:     Serial Number: [id number]:  [id number]   02/02/2024 2055 152 (H) 70 - 105 mg/dL Final     Comment:     Serial Number: [id number]:  [id number]   02/02/2024 1636 177 (H) 70 - 105 mg/dL Final     Comment:     Serial Number: [id number]:  [id number]     Infection   Results from last 7 days   Lab Units 01/30/24  1334   URINECX  25,000 CFU/mL Normal Urogenital Erna     UA    Results from last 7 days   Lab Units 02/02/24  2140 01/30/24  1334   NITRITE UA  Negative Negative   WBC UA /HPF 6-10* 21-50*   BACTERIA UA /HPF None Seen None Seen   SQUAM EPITHEL UA /HPF 7-12* 7-12*   URINECX   --  25,000 CFU/mL Normal Urogenital Erna     Radiology(recent) XR Chest 1 View    Result Date: 2/2/2024  Impression: No acute process. Electronically Signed: Pamela Lopes MD  2/2/2024 3:04 PM EST  Workstation ID: [id number]       ASSESSMENT:  Left lower quadrant abdominal pain  Weight loss  CAD/stenting x 3 on Plavix  Diabetes  GERD  Hypothyroidism/thyroid cancer    PLAN:  No signs of diverticulitis on her last 2 CT scans in the month of January.  Patient underwent bowel purge and states she is no longer having left lower quadrant abdominal pain and would like to be discharged.    Would recommend she continue bowel regimen and may increase stool softener to twice a day as she does not like to take MiraLAX as it causes her diarrhea.  Continue PPI for history of reflux.  Appears as if patient is on Protonix 40 mg twice a day and Pepcid 40 mg.  Would consider consolidating therapy and try just Protonix twice a day for a month and then try and decrease that further to Protonix once a  day and completely cut out Pepcid.  I recommend patient follow-up with us in the office and we can consider endoscopies if she has persistent left lower quadrant abdominal pain.  Would recommend boost or Ensure twice a day for weight loss.      I discussed the patient's findings and my recommendations with the patient.  Lucy King, APRN  02/03/24  08:57 EST    Time:

## 2024-02-03 NOTE — PROGRESS NOTES
LOS: 1 day   Patient Care Team:  Marisol Rodgers APRN as PCP - General (Pulmonary Disease)    Subjective     Interval History:     Patient Complaints: pt is feeling better    History taken from: patient    Review of Systems   Constitutional:  Positive for activity change, appetite change and fatigue.   Respiratory: Negative.     Cardiovascular: Negative.    Gastrointestinal:  Positive for abdominal pain and constipation.   Neurological:  Positive for weakness.           Objective     Vital Signs  Temp:  [97.4 °F (36.3 °C)-97.9 °F (36.6 °C)] 97.6 °F (36.4 °C)  Heart Rate:  [76-88] 76  Resp:  [12-18] 12  BP: (107-139)/(56-68) 139/68    Physical Exam:     General Appearance:    Alert, cooperative, in no acute distress, thin   Head:    Normocephalic, without obvious abnormality, atraumatic   Eyes:            Lids and lashes normal, conjunctivae and sclerae normal, no   icterus, no pallor, corneas clear, PERRLA   Ears:    Ears appear intact with no abnormalities noted   Throat:   No oral lesions, no thrush, oral mucosa moist   Neck:   No adenopathy, supple, trachea midline, no thyromegaly, no   carotid bruit, no JVD   Lungs:     Clear to auscultation,respirations regular, even and                  unlabored    Heart:    Regular rhythm and normal rate, normal S1 and S2, no            murmur, no gallop, no rub, no click   Chest Wall:    No abnormalities observed   Abdomen:     Normal bowel sounds, no masses, no organomegaly, soft        non-tender, non-distended, no guarding, no rebound                tenderness   Extremities:   Moves all extremities well, no edema, no cyanosis, no             redness   Pulses:   Pulses palpable and equal bilaterally   Skin:   No bleeding, bruising or rash   Lymph nodes:   No palpable adenopathy   Neurologic:   Cranial nerves 2 - 12 grossly intact, sensation intact, DTR       present and equal bilaterally        Results Review:    Lab Results (last 24 hours)       Procedure Component  Value Units Date/Time    POC Glucose Once [[id number]]  (Abnormal) Collected: 02/03/24 0845    Specimen: Blood Updated: 02/03/24 0846     Glucose 141 mg/dL      Comment: Serial Number: [id number]:  [id number]       CBC & Differential [[id number]] Collected: 02/03/24 0533    Specimen: Blood Updated: 02/03/24 0649    Narrative:      The following orders were created for panel order CBC & Differential.  Procedure                               Abnormality         Status                     ---------                               -----------         ------                     CBC Auto Differential[[id number]]        Normal              Final result               Scan Slide[[id number]]                                       Final result                 Please view results for these tests on the individual orders.    CBC Auto Differential [[id number]]  (Normal) Collected: 02/03/24 0533    Specimen: Blood Updated: 02/03/24 0649     WBC 6.80 10*3/mm3      RBC 4.26 10*6/mm3      Hemoglobin 12.8 g/dL      Hematocrit 39.6 %      MCV 92.9 fL      MCH 30.0 pg      MCHC 32.3 g/dL      RDW 14.1 %      RDW-SD 49.0 fl      MPV 10.4 fL      Platelets 235 10*3/mm3     Narrative:      The previously reported component NRBC is no longer being reported. Previous result was 0.2 /100 WBC (Reference Range: 0.0-0.2 /100 WBC) on 2/3/2024 at 0609 EST.    Scan Slide [[id number]] Collected: 02/03/24 0533    Specimen: Blood Updated: 02/03/24 0649     Scan Slide --     Comment: See Manual Differential Results       Manual Differential [[id number]]  (Abnormal) Collected: 02/03/24 0533    Specimen: Blood Updated: 02/03/24 0649     Neutrophil % 38.0 %      Lymphocyte % 44.0 %      Monocyte % 10.0 %      Eosinophil % 2.0 %      Basophil % 4.0 %      Atypical Lymphocyte % 2.0 %      Neutrophils Absolute 2.58 10*3/mm3      Lymphocytes Absolute 3.13 10*3/mm3      Monocytes Absolute 0.68 10*3/mm3      Eosinophils Absolute 0.14 10*3/mm3      Basophils  Absolute 0.27 10*3/mm3      RBC Morphology Normal     WBC Morphology Normal     Large Platelets Slight/1+    Basic Metabolic Panel [[id number]]  (Abnormal) Collected: 02/03/24 0533    Specimen: Blood Updated: 02/03/24 0624     Glucose 113 mg/dL      BUN 21 mg/dL      Creatinine 0.89 mg/dL      Sodium 138 mmol/L      Potassium 3.9 mmol/L      Comment: Slight hemolysis detected by analyzer. Result may be falsely elevated.        Chloride 106 mmol/L      CO2 19.0 mmol/L      Calcium 8.6 mg/dL      BUN/Creatinine Ratio 23.6     Anion Gap 13.0 mmol/L      eGFR 63.6 mL/min/1.73     Narrative:      GFR Normal >60  Chronic Kidney Disease <60  Kidney Failure <15    The GFR formula is only valid for adults with stable renal function between ages 18 and 70.    Urinalysis, Microscopic Only - Urine, Clean Catch [[id number]]  (Abnormal) Collected: 02/02/24 2140    Specimen: Urine, Clean Catch Updated: 02/02/24 2303     RBC, UA 3-5 /HPF      WBC, UA 6-10 /HPF      Bacteria, UA None Seen /HPF      Squamous Epithelial Cells, UA 7-12 /HPF      Yeast, UA Moderate/2+ Budding Yeast /HPF      Hyaline Casts, UA 3-6 /LPF      Methodology Manual Light Microscopy    Urine Culture - Urine, Urine, Clean Catch [[id number]] Collected: 02/02/24 2140    Specimen: Urine, Clean Catch Updated: 02/02/24 2303    Urinalysis With Culture If Indicated - Urine, Clean Catch [[id number]]  (Abnormal) Collected: 02/02/24 2140    Specimen: Urine, Clean Catch Updated: 02/02/24 2217     Color, UA Yellow     Appearance, UA Clear     pH, UA <=5.0     Specific Gravity, UA 1.014     Glucose, UA Negative     Ketones, UA 15 mg/dL (1+)     Bilirubin, UA Negative     Blood, UA Negative     Protein, UA 30 mg/dL (1+)     Leuk Esterase, UA Small (1+)     Nitrite, UA Negative     Urobilinogen, UA 0.2 E.U./dL    Narrative:      In absence of clinical symptoms, the presence of pyuria, bacteria, and/or nitrites on the urinalysis result does not correlate with infection.    POC  Glucose Once [[id number]]  (Abnormal) Collected: 02/02/24 2055    Specimen: Blood Updated: 02/02/24 2057     Glucose 152 mg/dL      Comment: Serial Number: [id number]:  [id number]       CEA [[id number]] Collected: 02/02/24 1522    Specimen: Blood Updated: 02/02/24 2024     CEA 1.49 ng/mL     Narrative:      CEA Reference Range:    Non Smokers:   Less than 3 ng/mL  Smokers:       Less than 5 ng/mL  Results may be falsely decreased if patient taking Biotin.    Testing Method: [employer] Electrochemiluminescence Immunoassay(ECLIA)  Values obtained with different assay methods or kits cannot be used interchangeably.     [[id number]]  (Normal) Collected: 02/02/24 1522    Specimen: Blood Updated: 02/02/24 2024      12.1 U/mL     Narrative:      Results may be falsely decreased if patient taking Biotin.    Testing Method: [employer] Electrochemiluminescence Immunoassay(ECLIA)  Values obtained with different assay methods or kits cannot be used interchangeably.    Comprehensive Metabolic Panel [[id number]]  (Abnormal) Collected: 02/02/24 1522    Specimen: Blood Updated: 02/02/24 1657     Glucose 120 mg/dL      BUN 26 mg/dL      Creatinine 1.24 mg/dL      Sodium 135 mmol/L      Potassium 4.6 mmol/L      Comment: Slight hemolysis detected by analyzer. Result may be falsely elevated.        Chloride 100 mmol/L      CO2 16.0 mmol/L      Calcium 10.0 mg/dL      Total Protein 8.6 g/dL      Albumin 5.0 g/dL      ALT (SGPT) 11 U/L      AST (SGOT) 19 U/L      Comment: Slight hemolysis detected by analyzer. Result may be falsely elevated.        Alkaline Phosphatase 59 U/L      Total Bilirubin 0.7 mg/dL      Globulin 3.6 gm/dL      A/G Ratio 1.4 g/dL      BUN/Creatinine Ratio 21.0     Anion Gap 19.0 mmol/L      eGFR 42.7 mL/min/1.73     Narrative:      GFR Normal >60  Chronic Kidney Disease <60  Kidney Failure <15    The GFR formula is only valid for adults with stable renal function between ages 18 and  70.    TSH [[id number]]  (Abnormal) Collected: 02/02/24 1522    Specimen: Blood Updated: 02/02/24 1655     TSH 8.580 uIU/mL     T4, Free [[id number]]  (Abnormal) Collected: 02/02/24 1522    Specimen: Blood Updated: 02/02/24 1655     Free T4 1.88 ng/dL     Narrative:      Results may be falsely increased if patient taking Biotin.      Amylase [[id number]]  (Normal) Collected: 02/02/24 1522    Specimen: Blood Updated: 02/02/24 1648     Amylase 35 U/L     POC Glucose Once [[id number]]  (Abnormal) Collected: 02/02/24 1636    Specimen: Blood Updated: 02/02/24 1637     Glucose 177 mg/dL      Comment: Serial Number: [id number]:  [id number]                Imaging Results (Last 24 Hours)       Procedure Component Value Units Date/Time    XR Chest 1 View [[id number]] Collected: 02/02/24 1503     Updated: 02/02/24 1506    Narrative:      XR CHEST 1 VW    Date of Exam: 2/2/2024 2:30 PM EST    Indication: dyspnea, unintentional weight loss    Comparison: 1/22/2024.    Findings:  Heart and pulmonary vessels appear within normal limits. Lung fields are well inflated and clear of acute infiltrates or effusions. There is mild linear scar on the right. There is no pneumothorax. There are no noncalcified pulmonary nodules or masses.   There is eventration of portions of the right hemidiaphragm.      Impression:      Impression:  No acute process.      Electronically Signed: Pamela Lopes MD    2/2/2024 3:04 PM EST    Workstation ID: [id number]                 I reviewed the patient's new clinical results.    Medication Review:   Scheduled Meds:amLODIPine, 5 mg, Oral, Q24H  atorvastatin, 10 mg, Oral, Daily  clopidogrel, 75 mg, Oral, Daily  doxycycline, 100 mg, Intravenous, Q12H  insulin lispro, 2-7 Units, Subcutaneous, 4x Daily AC & at Bedtime  levothyroxine, 100 mcg, Oral, Q AM  metoprolol succinate XL, 25 mg, Oral, Daily  metroNIDAZOLE, 500 mg, Intravenous, Q8H  pantoprazole, 40 mg, Intravenous, Q AM  senna-docusate sodium, 2  tablet, Oral, BID  sodium chloride, 10 mL, Intravenous, Q12H  traZODone, 50 mg, Oral, Nightly      Continuous Infusions:sodium chloride, 100 mL/hr, Last Rate: 100 mL/hr (02/02/24 1600)      PRN Meds:.  acetaminophen    senna-docusate sodium **AND** polyethylene glycol **AND** bisacodyl **AND** bisacodyl    Calcium Replacement - Follow Nurse / BPA Driven Protocol    dextrose    dextrose    glucagon (human recombinant)    hydrALAZINE    HYDROcodone-acetaminophen    Magnesium Standard Dose Replacement - Follow Nurse / BPA Driven Protocol    melatonin    Morphine    ondansetron    Phosphorus Replacement - Follow Nurse / BPA Driven Protocol    Potassium Replacement - Follow Nurse / BPA Driven Protocol    sodium chloride    sodium chloride     Assessment & Plan       Abdominal pain  Abdominal pain  Unintentional weight loss  Left lower quadrant pain concerning for diverticulitis  - Doxycycline and Flagyl  - GI consulted    UTI  - urinalysis abnormal  - culture pending  - abx     Bilateral renal cyst, left 9.5 cm right 4.3 cm  -urology consulted.  No w/u needed     Acute kidney injury  Elevated creatinine, 1.24  - Gentle hydration, improved     Diabetes mellitus  - Hold home medications and treat with sliding scale     Hypothyroidism  - Continue home dose levothyroxine 100 mcg daily  - Will check TSH\free T4     Hypertension, continue home medication        Diet: Consistent carb  DVT prophylaxis: SCDs  GI prophylaxis: Protonix  Code status: Full code              Plan for disposition:home when able, soon    Nneka Elkins MD  02/03/24  09:02 EST

## 2024-02-03 NOTE — H&P
Patient Care Team:  Marisol Rodgers APRN as PCP - General (Pulmonary Disease)    Chief complaint: abdominal pain    Subjective     Pamela Cueto is an 83-year-old female who was a direct admit from PCP office.  Patient reported to office with severe abdominal pain, directly left lower quadrant pain.  She does have a history of diverticulitis.  Patient does report diarrhea.  She had a significant weight loss over the last month of 10 to 15 pounds.  No fever noted.  Patient did report to ED on 1/30/2024 and discharged same day for left lower quadrant abdominal pain.  She had abdominal CT that showed no acute findings.  Imaging did show chronic bilateral renal cyst, colonic diverticulosis, small right ovarian cyst.  Patient has had a significant decrease in oral intake.  She resides at [facility].    Review of Systems   Constitutional:  Positive for activity change, appetite change and fatigue.   HENT: Negative.     Respiratory: Negative.  Negative for choking and shortness of breath.    Cardiovascular:  Negative for chest pain, palpitations and leg swelling.   Gastrointestinal:  Positive for abdominal pain, diarrhea and nausea.   Genitourinary:  Positive for flank pain.   Skin: Negative.    Neurological:  Positive for weakness.          History  Past Medical History:   Diagnosis Date    Anemia     Arthritis     CAD (coronary artery disease)     Chronic back pain     Diabetic neuropathy     Essential hypertension     GERD (gastroesophageal reflux disease)     History of gastric ulcer     History of stomach ulcers     HLD (hyperlipidemia)     Hypocalcemia     Hypoparathyroidism     Hypothyroidism     Insomnia     Kidney stones     Osteoporosis     Recurrent sinusitis     Renal disease     Thyroid cancer     Type 2 diabetes mellitus      Past Surgical History:   Procedure Laterality Date    CARDIAC CATHETERIZATION N/A 05/03/2023    Procedure: Left Heart Cath and coronary angiogram;  Surgeon: John Baldwin MD;   Location: [facility] CATH INVASIVE LOCATION;  Service: Cardiovascular;  Laterality: N/A;    CORONARY STENT PLACEMENT      x 3    CYSTOSCOPY, URETEROSCOPY, RETROGRADE PYELOGRAM, STENT INSERTION Left 10/18/2023    Procedure: CYSTOSCOPY URETEROSCOPY RETROGRADE PYELOGRAM HOLMIUM LASER STENT INSERTION;  Surgeon: Juan Alaniz MD;  Location: [facility] MAIN OR;  Service: Urology;  Laterality: Left;    HYSTERECTOMY      LUNG LOBECTOMY      THYROIDECTOMY       Family History   Problem Relation Age of Onset    Diabetes Mother     Cancer Father     Heart disease Sister     Diabetes Sister     Heart disease Maternal Grandmother      Social History     Tobacco Use    Smoking status: Former     Packs/day: 2.00     Years: 30.00     Additional pack years: 0.00     Total pack years: 60.00     Types: Cigarettes     Quit date:      Years since quittin.1     Passive exposure: Past    Smokeless tobacco: Never   Vaping Use    Vaping Use: Never used   Substance Use Topics    Alcohol use: Yes     Alcohol/week: 1.0 standard drink of alcohol     Types: 1 Glasses of wine per week    Drug use: Defer     Medications Prior to Admission   Medication Sig Dispense Refill Last Dose    amLODIPine (NORVASC) 5 MG tablet Take 1 tablet by mouth Daily. 30 tablet 0     atorvastatin (LIPITOR) 10 MG tablet Take 1 tablet by mouth Daily.       clopidogrel (PLAVIX) 75 MG tablet Take 1 tablet by mouth Daily.       Continuous Blood Gluc  (FreeStyle [place] 2 Fajardo) device USE 1 EACH SEE ADMIN INSTRUCTIONS. USE TO CHECK BLOOD SUGARS 4 TIMES DAILY. DX: E11.65 1 each 0     Diclofenac Sodium (VOLTAREN) 1 % gel gel Apply 2 g topically to the appropriate area as directed Every 6 (Six) Hours As Needed (Knee pain).       famotidine (PEPCID) 40 MG tablet Take 1 tablet by mouth Daily.       fluticasone (FLONASE) 50 MCG/ACT nasal spray 1 spray into the nostril(s) as directed by provider 2 (Two) Times a Day.       folic acid (FOLVITE) 1 MG tablet Take 1 tablet by  mouth Daily.       gabapentin (NEURONTIN) 300 MG capsule Take 1 capsule by mouth Every Night.       Insulin Lispro (humaLOG) 100 UNIT/ML injection See Admin Instructions. Inject subcutaneously three times daily before meals via sliding scale    100-150 = 2 units  151-200 = 3 units  201-250 = 4 units  251-300 = 5 units  301-350 = 6 units  351-400 = 7 units  401 = 8 units       Lantus SoloStar 100 UNIT/ML injection pen INJECT 15 UNITS SUBCUTANEOUSLY AS DIRECTED EVERY NIGHT 15 mL 4     levothyroxine (Synthroid) 100 MCG tablet Take 1 tablet by mouth Daily. 30 tablet 11     lisinopril (PRINIVIL,ZESTRIL) 5 MG tablet Take 1 tablet by mouth Daily. 90 tablet 3     metFORMIN (GLUMETZA) 1000 MG (MOD) 24 hr tablet Take 1 tablet by mouth 2 (Two) Times a Day.       metoprolol succinate XL (TOPROL-XL) 25 MG 24 hr tablet Take 1 tablet by mouth Daily.       multivitamin with minerals tablet tablet Take 1 tablet by mouth Daily.       ondansetron (ZOFRAN) 4 MG tablet Take 1 tablet by mouth Every 8 (Eight) Hours As Needed for Nausea or Vomiting.       oxybutynin (DITROPAN) 5 MG tablet Take 1 tablet by mouth 2 (Two) Times a Day.       pantoprazole (PROTONIX) 40 MG EC tablet Take 1 tablet by mouth 2 (Two) Times a Day. Take dos       polyethylene glycol (MIRALAX) 17 g packet Take 17 g by mouth Daily.       thiamine (VITAMIN B1) 100 MG tablet Take 1 tablet by mouth Daily.       traMADol (ULTRAM) 50 MG tablet Take 1 tablet by mouth 4 (Four) Times a Day As Needed for Moderate Pain. 20 tablet 0     traZODone (DESYREL) 50 MG tablet Take 1 tablet by mouth Every Night.        Allergies:  Penicillins, Shellfish-derived products, Sulfamethizole, and Trimethoprim    Objective     Vital Signs  Temp:  [97.7 °F (36.5 °C)] 97.7 °F (36.5 °C)  Heart Rate:  [85-88] 88  Resp:  [18] 18  BP: (120-122)/(59-62) 122/62       Physical Exam  Vitals reviewed.   Constitutional:       Appearance: She is ill-appearing.   HENT:      Head: Normocephalic.      Right  Ear: External ear normal.      Left Ear: External ear normal.      Nose: Nose normal.   Eyes:      General:         Right eye: No discharge.         Left eye: No discharge.   Cardiovascular:      Rate and Rhythm: Normal rate.   Pulmonary:      Effort: Pulmonary effort is normal.   Abdominal:      Tenderness: There is abdominal tenderness. There is left CVA tenderness and guarding.   Musculoskeletal:         General: Normal range of motion.   Skin:     General: Skin is warm and dry.      Coloration: Skin is pale.   Neurological:      Mental Status: She is alert and oriented to person, place, and time.          Results Review:     Imaging Results (Last 24 Hours)       Procedure Component Value Units Date/Time    XR Chest 1 View [[id number]] Collected: 02/02/24 1503     Updated: 02/02/24 1506    Narrative:      XR CHEST 1 VW    Date of Exam: 2/2/2024 2:30 PM EST    Indication: dyspnea, unintentional weight loss    Comparison: 1/22/2024.    Findings:  Heart and pulmonary vessels appear within normal limits. Lung fields are well inflated and clear of acute infiltrates or effusions. There is mild linear scar on the right. There is no pneumothorax. There are no noncalcified pulmonary nodules or masses.   There is eventration of portions of the right hemidiaphragm.      Impression:      Impression:  No acute process.      Electronically Signed: Pamela Lopes MD    2/2/2024 3:04 PM EST    Workstation ID: [id number]             Lab Results (last 24 hours)       Procedure Component Value Units Date/Time    Comprehensive Metabolic Panel [[id number]]  (Abnormal) Collected: 02/02/24 1522    Specimen: Blood Updated: 02/02/24 1657     Glucose 120 mg/dL      BUN 26 mg/dL      Creatinine 1.24 mg/dL      Sodium 135 mmol/L      Potassium 4.6 mmol/L      Comment: Slight hemolysis detected by analyzer. Result may be falsely elevated.        Chloride 100 mmol/L      CO2 16.0 mmol/L      Calcium 10.0 mg/dL      Total Protein 8.6 g/dL       Albumin 5.0 g/dL      ALT (SGPT) 11 U/L      AST (SGOT) 19 U/L      Comment: Slight hemolysis detected by analyzer. Result may be falsely elevated.        Alkaline Phosphatase 59 U/L      Total Bilirubin 0.7 mg/dL      Globulin 3.6 gm/dL      A/G Ratio 1.4 g/dL      BUN/Creatinine Ratio 21.0     Anion Gap 19.0 mmol/L      eGFR 42.7 mL/min/1.73     Narrative:      GFR Normal >60  Chronic Kidney Disease <60  Kidney Failure <15    The GFR formula is only valid for adults with stable renal function between ages 18 and 70.    TSH [[id number]]  (Abnormal) Collected: 02/02/24 1522    Specimen: Blood Updated: 02/02/24 1655     TSH 8.580 uIU/mL     T4, Free [[id number]]  (Abnormal) Collected: 02/02/24 1522    Specimen: Blood Updated: 02/02/24 1655     Free T4 1.88 ng/dL     Narrative:      Results may be falsely increased if patient taking Biotin.      Amylase [[id number]]  (Normal) Collected: 02/02/24 1522    Specimen: Blood Updated: 02/02/24 1648     Amylase 35 U/L     POC Glucose Once [[id number]]  (Abnormal) Collected: 02/02/24 1636    Specimen: Blood Updated: 02/02/24 1637     Glucose 177 mg/dL      Comment: Serial Number: [id number]:  [id number]       CEA [[id number]] Collected: 02/02/24 1522    Specimen: Blood Updated: 02/02/24 1550     [[id number]] Collected: 02/02/24 1522    Specimen: Blood Updated: 02/02/24 1550             I reviewed the patient's new clinical results.    Assessment & Plan     Abdominal pain  Unintentional weight loss  Left lower quadrant pain concerning for diverticulitis  - Doxycycline and Flagyl  - Appreciate GI input  -Will check urinalysis and culture if indicated    Bilateral renal cyst, left 9.5 cm right 4.3 cm  -Will consult urology    Acute kidney injury  Elevated creatinine, 1.24  - Gentle hydration    Diabetes mellitus  - Hold home medications and treat with sliding scale    Hypothyroidism  - Continue home dose levothyroxine 100 mcg daily  - Will check TSH\free T4    Hypertension,  continue home medication      Diet: Consistent carb  DVT prophylaxis: SCDs  GI prophylaxis: Protonix  Code status: Full code      I discussed the patient's findings and my recommendations with patient.     CLAUDE Lehman  02/02/24  20:08 EST

## 2024-02-03 NOTE — OUTREACH NOTE
Stroke Week 2 Survey      Flowsheet Row Responses   [facility] facility patient discharged from? [facility]   Does the patient have one of the following disease processes/diagnoses(primary or secondary)? Stroke   Week 2 attempt successful? No   Unsuccessful attempts Attempt 1   Revoke Readmitted            Candi SHOEMAKER - Registered Nurse

## 2024-02-04 ENCOUNTER — READMISSION MANAGEMENT (OUTPATIENT)
Dept: CALL CENTER | Facility: HOSPITAL | Age: 84
End: 2024-02-04
Payer: MEDICARE

## 2024-02-04 VITALS
SYSTOLIC BLOOD PRESSURE: 133 MMHG | WEIGHT: 104 LBS | RESPIRATION RATE: 15 BRPM | DIASTOLIC BLOOD PRESSURE: 68 MMHG | BODY MASS INDEX: 17.33 KG/M2 | OXYGEN SATURATION: 97 % | HEART RATE: 84 BPM | TEMPERATURE: 97.8 F | HEIGHT: 65 IN

## 2024-02-04 LAB
ANION GAP SERPL CALCULATED.3IONS-SCNC: 13 MMOL/L (ref 5–15)
BACTERIA SPEC AEROBE CULT: NORMAL
BASOPHILS # BLD AUTO: 0.2 10*3/MM3 (ref 0–0.2)
BASOPHILS NFR BLD AUTO: 2.4 % (ref 0–1.5)
BUN SERPL-MCNC: 17 MG/DL (ref 8–23)
BUN/CREAT SERPL: 22.4 (ref 7–25)
CALCIUM SPEC-SCNC: 8.5 MG/DL (ref 8.6–10.5)
CHLORIDE SERPL-SCNC: 111 MMOL/L (ref 98–107)
CO2 SERPL-SCNC: 18 MMOL/L (ref 22–29)
CREAT SERPL-MCNC: 0.76 MG/DL (ref 0.57–1)
DEPRECATED RDW RBC AUTO: 45.9 FL (ref 37–54)
EGFRCR SERPLBLD CKD-EPI 2021: 76.9 ML/MIN/1.73
EOSINOPHIL # BLD AUTO: 0.3 10*3/MM3 (ref 0–0.4)
EOSINOPHIL NFR BLD AUTO: 4.2 % (ref 0.3–6.2)
ERYTHROCYTE [DISTWIDTH] IN BLOOD BY AUTOMATED COUNT: 14 % (ref 12.3–15.4)
GLUCOSE BLDC GLUCOMTR-MCNC: 170 MG/DL (ref 70–105)
GLUCOSE SERPL-MCNC: 138 MG/DL (ref 65–99)
HCT VFR BLD AUTO: 36.1 % (ref 34–46.6)
HGB BLD-MCNC: 11.7 G/DL (ref 12–15.9)
LYMPHOCYTES # BLD AUTO: 1.9 10*3/MM3 (ref 0.7–3.1)
LYMPHOCYTES NFR BLD AUTO: 30.7 % (ref 19.6–45.3)
MCH RBC QN AUTO: 30.5 PG (ref 26.6–33)
MCHC RBC AUTO-ENTMCNC: 32.5 G/DL (ref 31.5–35.7)
MCV RBC AUTO: 93.9 FL (ref 79–97)
MONOCYTES # BLD AUTO: 0.5 10*3/MM3 (ref 0.1–0.9)
MONOCYTES NFR BLD AUTO: 7.9 % (ref 5–12)
NEUTROPHILS NFR BLD AUTO: 3.4 10*3/MM3 (ref 1.7–7)
NEUTROPHILS NFR BLD AUTO: 54.8 % (ref 42.7–76)
NRBC BLD AUTO-RTO: 0 /100 WBC (ref 0–0.2)
PLATELET # BLD AUTO: 243 10*3/MM3 (ref 140–450)
PMV BLD AUTO: 10.4 FL (ref 6–12)
POTASSIUM SERPL-SCNC: 3.3 MMOL/L (ref 3.5–5.2)
RBC # BLD AUTO: 3.84 10*6/MM3 (ref 3.77–5.28)
SODIUM SERPL-SCNC: 142 MMOL/L (ref 136–145)
T3FREE SERPL-MCNC: 1.32 PG/ML (ref 2–4.4)
WBC NRBC COR # BLD AUTO: 6.3 10*3/MM3 (ref 3.4–10.8)

## 2024-02-04 PROCEDURE — 85025 COMPLETE CBC W/AUTO DIFF WBC: CPT

## 2024-02-04 PROCEDURE — 25010000002 METRONIDAZOLE 500 MG/100ML SOLUTION

## 2024-02-04 PROCEDURE — 80048 BASIC METABOLIC PNL TOTAL CA: CPT

## 2024-02-04 PROCEDURE — 63710000001 INSULIN LISPRO (HUMAN) PER 5 UNITS

## 2024-02-04 PROCEDURE — 84481 FREE ASSAY (FT-3): CPT | Performed by: FAMILY MEDICINE

## 2024-02-04 PROCEDURE — 82948 REAGENT STRIP/BLOOD GLUCOSE: CPT

## 2024-02-04 PROCEDURE — 96376 TX/PRO/DX INJ SAME DRUG ADON: CPT

## 2024-02-04 PROCEDURE — G0378 HOSPITAL OBSERVATION PER HR: HCPCS

## 2024-02-04 RX ORDER — POTASSIUM CHLORIDE 1.5 G/1.58G
40 POWDER, FOR SOLUTION ORAL EVERY 4 HOURS
Status: DISCONTINUED | OUTPATIENT
Start: 2024-02-04 | End: 2024-02-04

## 2024-02-04 RX ORDER — POTASSIUM CHLORIDE 20 MEQ/1
40 TABLET, EXTENDED RELEASE ORAL ONCE
Status: COMPLETED | OUTPATIENT
Start: 2024-02-04 | End: 2024-02-04

## 2024-02-04 RX ADMIN — METRONIDAZOLE 500 MG: 500 INJECTION, SOLUTION INTRAVENOUS at 05:25

## 2024-02-04 RX ADMIN — DOXYCYCLINE 100 MG: 100 INJECTION, POWDER, LYOPHILIZED, FOR SOLUTION INTRAVENOUS at 08:48

## 2024-02-04 RX ADMIN — LEVOTHYROXINE SODIUM 100 MCG: 0.1 TABLET ORAL at 05:25

## 2024-02-04 RX ADMIN — POTASSIUM CHLORIDE 40 MEQ: 1500 TABLET, EXTENDED RELEASE ORAL at 11:29

## 2024-02-04 RX ADMIN — POTASSIUM CHLORIDE 40 MEQ: 1.5 POWDER, FOR SOLUTION ORAL at 08:00

## 2024-02-04 RX ADMIN — METOPROLOL SUCCINATE 25 MG: 25 TABLET, EXTENDED RELEASE ORAL at 08:48

## 2024-02-04 RX ADMIN — CLOPIDOGREL BISULFATE 75 MG: 75 TABLET ORAL at 08:48

## 2024-02-04 RX ADMIN — AMLODIPINE BESYLATE 5 MG: 5 TABLET ORAL at 08:48

## 2024-02-04 RX ADMIN — INSULIN LISPRO 2 UNITS: 100 INJECTION, SOLUTION INTRAVENOUS; SUBCUTANEOUS at 07:36

## 2024-02-04 RX ADMIN — PANTOPRAZOLE SODIUM 40 MG: 40 INJECTION, POWDER, FOR SOLUTION INTRAVENOUS at 05:24

## 2024-02-04 RX ADMIN — ATORVASTATIN CALCIUM 10 MG: 10 TABLET, FILM COATED ORAL at 08:48

## 2024-02-04 RX ADMIN — Medication 10 ML: at 08:48

## 2024-02-04 NOTE — OUTREACH NOTE
Prep Survey      Flowsheet Row Responses   [facility] facility patient discharged from? [facility]   Is LACE score < 7 ? No   Eligibility Readm Mgmt   Discharge diagnosis abdominal pain   Does the patient have one of the following disease processes/diagnoses(primary or secondary)? Other   Does the patient have Home health ordered? No   Is there a DME ordered? No   Prep survey completed? Yes            SHELIA RAMOS - Registered Nurse

## 2024-02-05 LAB
QT INTERVAL: 372 MS
QTC INTERVAL: 442 MS

## 2024-02-07 ENCOUNTER — READMISSION MANAGEMENT (OUTPATIENT)
Dept: CALL CENTER | Facility: HOSPITAL | Age: 84
End: 2024-02-07
Payer: MEDICARE

## 2024-02-07 NOTE — OUTREACH NOTE
Medical Week 1 Survey      Flowsheet Row Responses   [facility] patient discharged from? [facility]   Does the patient have one of the following disease processes/diagnoses(primary or secondary)? Other   Week 1 attempt successful? Yes   Call start time 0923   Call end time 0926   Discharge diagnosis abdominal pain   Is patient permission given to speak with other caregiver? Yes   Person spoke with today (if not patient) and relationship Jess, friend   Meds reviewed with patient/caregiver? Yes   Does the patient have all medications ordered at discharge? Yes   Is the patient taking all medications as directed (includes completed medication regime)? Yes   Does the patient have a primary care provider?  Yes   Does the patient have an appointment with their PCP within 7 days of discharge? Greater than 7 days   Comments regarding PCP Follow up with Marisol Rodgers NP PCP. Reports appt 2/19   Nursing Interventions Verified appointment date/time/provider   Has the patient kept scheduled appointments due by today? N/A   Has home health visited the patient within 72 hours of discharge? N/A   Psychosocial issues? No   Comments Checking blood sugar 3 X a day   Did the patient receive a copy of their discharge instructions? Yes   Nursing interventions Reviewed instructions with patient   What is the patient's perception of their health status since discharge? Improving  [Report patient eating better, feeling better]   Is the patient/caregiver able to teach back signs and symptoms related to disease process for when to call PCP? Yes   Is the patient/caregiver able to teach back the hierarchy of who to call/visit for symptoms/problems? PCP, Specialist, Home health nurse, Urgent Care, ED, 911 Yes   If the patient is a current smoker, are they able to teach back resources for cessation? Not a smoker   Week 1 call completed? Yes   Graduated Yes   Would this patient benefit from a Referral to Amb Social Work? No   Is the patient  interested in additional calls from an ambulatory ? No   Graduated/Revoked comments Reports patient doing well, has PCP f/u in place. Denies any questions or needs. Patient resides at [facility] on [facility]. No further calls.   Call end time 0926            FRANDY MORRIS - Registered Nurse

## 2024-02-09 NOTE — DISCHARGE SUMMARY
Date of Discharge:  2/4/2024    Discharge Diagnosis:   Abdominal pain  Unintentional weight loss  LLQ abdominal pain  UTI  Bilateral renal cyst  MATTHEW  DM2   Hypothyroid  HTN  GERD      Presenting Problem/History of Present Illness  Active Hospital Problems    Diagnosis  POA    **Abdominal pain [R10.9]  Yes      Resolved Hospital Problems   No resolved problems to display.          Hospital Course    83-year-old female who was a direct admit from PCP office.  Patient reported to office with severe abdominal pain, directly left lower quadrant pain.  She does have a history of diverticulitis.  Patient does report diarrhea.  She had a significant weight loss over the last month of 10 to 15 pounds.  No fever noted.  Patient did report to ED on 1/30/2024 and discharged same day for left lower quadrant abdominal pain.  She had abdominal CT that showed no acute findings.  Imaging did show chronic bilateral renal cyst, colonic diverticulosis, small right ovarian cyst.  Patient has had a significant decrease in oral intake.  She resides at [facility] on [facility].   Pt was admitted and started on abx for possible early diverticulitis.  GI was consulted.  Urology was consulted related to enlarging renal cyst.  No work up needed at this time.  Her creatine was slightly elevated, 1.24 and she was started on IVF.  UA was abnormal and pt was treated for UTI.  GI recommended treatment of constipation with daily miralax and daily PPI for h/o GERD.  Pt improved over the course of her hospital stay.  Her creatine normalized.  She is stable for d/c    Procedures Performed         Consults:   Consults       Date and Time Order Name Status Description    2/2/2024  2:32 PM Inpatient Urology Consult Completed     1/22/2024  3:00 PM Inpatient Neurology Consult Stroke Completed     1/22/2024  1:49 PM Inpatient Neurology Consult Stroke Completed             Pertinent Test Results:    Lab Results (most recent)       Procedure Component Value Units  Date/Time    T3, Free [[id number]]  (Abnormal) Collected: 02/04/24 0321    Specimen: Blood Updated: 02/04/24 1300     T3, Free 1.32 pg/mL     Narrative:      Results may be falsely increased if patient taking Biotin.      Urine Culture - Urine, Urine, Clean Catch [[id number]] Collected: 02/02/24 2140    Specimen: Urine, Clean Catch Updated: 02/04/24 1029     Urine Culture 50,000 CFU/mL Mixed Erna Isolated    Narrative:      Specimen contains mixed organisms of questionable pathogenicity suggestive of contamination. If symptoms persist, suggest recollection.  Colonization of the urinary tract without infection is common. Treatment is discouraged unless the patient is symptomatic, pregnant, or undergoing an invasive urologic procedure.    POC Glucose 4x Daily Before Meals & at Bedtime [[id number]]  (Abnormal) Collected: 02/04/24 0719    Specimen: Blood Updated: 02/04/24 0720     Glucose 170 mg/dL      Comment: Serial Number: [id number]:  [id number]       Basic Metabolic Panel [[id number]]  (Abnormal) Collected: 02/04/24 0321    Specimen: Blood Updated: 02/04/24 0440     Glucose 138 mg/dL      BUN 17 mg/dL      Creatinine 0.76 mg/dL      Sodium 142 mmol/L      Potassium 3.3 mmol/L      Chloride 111 mmol/L      CO2 18.0 mmol/L      Calcium 8.5 mg/dL      BUN/Creatinine Ratio 22.4     Anion Gap 13.0 mmol/L      eGFR 76.9 mL/min/1.73     Narrative:      GFR Normal >60  Chronic Kidney Disease <60  Kidney Failure <15    The GFR formula is only valid for adults with stable renal function between ages 18 and 70.    CBC & Differential [[id number]]  (Abnormal) Collected: 02/04/24 0321    Specimen: Blood Updated: 02/04/24 0412    Narrative:      The following orders were created for panel order CBC & Differential.  Procedure                               Abnormality         Status                     ---------                               -----------         ------                     CBC Auto Differential[[id number]]         Abnormal            Final result                 Please view results for these tests on the individual orders.    CBC Auto Differential [[id number]]  (Abnormal) Collected: 02/04/24 0321    Specimen: Blood Updated: 02/04/24 0412     WBC 6.30 10*3/mm3      RBC 3.84 10*6/mm3      Hemoglobin 11.7 g/dL      Hematocrit 36.1 %      MCV 93.9 fL      MCH 30.5 pg      MCHC 32.5 g/dL      RDW 14.0 %      RDW-SD 45.9 fl      MPV 10.4 fL      Platelets 243 10*3/mm3      Neutrophil % 54.8 %      Lymphocyte % 30.7 %      Monocyte % 7.9 %      Eosinophil % 4.2 %      Basophil % 2.4 %      Neutrophils, Absolute 3.40 10*3/mm3      Lymphocytes, Absolute 1.90 10*3/mm3      Monocytes, Absolute 0.50 10*3/mm3      Eosinophils, Absolute 0.30 10*3/mm3      Basophils, Absolute 0.20 10*3/mm3      nRBC 0.0 /100 WBC     POC Glucose Once [[id number]]  (Abnormal) Collected: 02/03/24 2136    Specimen: Blood Updated: 02/03/24 2138     Glucose 177 mg/dL      Comment: Serial Number: [id number]:  [id number]       CBC & Differential [[id number]] Collected: 02/03/24 0533    Specimen: Blood Updated: 02/03/24 0649    Narrative:      The following orders were created for panel order CBC & Differential.  Procedure                               Abnormality         Status                     ---------                               -----------         ------                     CBC Auto Differential[[id number]]        Normal              Final result               Scan Slide[[id number]]                                       Final result                 Please view results for these tests on the individual orders.    CBC Auto Differential [[id number]]  (Normal) Collected: 02/03/24 0533    Specimen: Blood Updated: 02/03/24 0649     WBC 6.80 10*3/mm3      RBC 4.26 10*6/mm3      Hemoglobin 12.8 g/dL      Hematocrit 39.6 %      MCV 92.9 fL      MCH 30.0 pg      MCHC 32.3 g/dL      RDW 14.1 %      RDW-SD 49.0 fl      MPV 10.4 fL      Platelets 235 10*3/mm3      Narrative:      The previously reported component NRBC is no longer being reported. Previous result was 0.2 /100 WBC (Reference Range: 0.0-0.2 /100 WBC) on 2/3/2024 at 0609 EST.    Scan Slide [[id number]] Collected: 02/03/24 0533    Specimen: Blood Updated: 02/03/24 0649     Scan Slide --     Comment: See Manual Differential Results       Manual Differential [[id number]]  (Abnormal) Collected: 02/03/24 0533    Specimen: Blood Updated: 02/03/24 0649     Neutrophil % 38.0 %      Lymphocyte % 44.0 %      Monocyte % 10.0 %      Eosinophil % 2.0 %      Basophil % 4.0 %      Atypical Lymphocyte % 2.0 %      Neutrophils Absolute 2.58 10*3/mm3      Lymphocytes Absolute 3.13 10*3/mm3      Monocytes Absolute 0.68 10*3/mm3      Eosinophils Absolute 0.14 10*3/mm3      Basophils Absolute 0.27 10*3/mm3      RBC Morphology Normal     WBC Morphology Normal     Large Platelets Slight/1+    Basic Metabolic Panel [[id number]]  (Abnormal) Collected: 02/03/24 0533    Specimen: Blood Updated: 02/03/24 0624     Glucose 113 mg/dL      BUN 21 mg/dL      Creatinine 0.89 mg/dL      Sodium 138 mmol/L      Potassium 3.9 mmol/L      Comment: Slight hemolysis detected by analyzer. Result may be falsely elevated.        Chloride 106 mmol/L      CO2 19.0 mmol/L      Calcium 8.6 mg/dL      BUN/Creatinine Ratio 23.6     Anion Gap 13.0 mmol/L      eGFR 63.6 mL/min/1.73     Narrative:      GFR Normal >60  Chronic Kidney Disease <60  Kidney Failure <15    The GFR formula is only valid for adults with stable renal function between ages 18 and 70.    Urinalysis, Microscopic Only - Urine, Clean Catch [[id number]]  (Abnormal) Collected: 02/02/24 2140    Specimen: Urine, Clean Catch Updated: 02/02/24 2303     RBC, UA 3-5 /HPF      WBC, UA 6-10 /HPF      Bacteria, UA None Seen /HPF      Squamous Epithelial Cells, UA 7-12 /HPF      Yeast, UA Moderate/2+ Budding Yeast /HPF      Hyaline Casts, UA 3-6 /LPF      Methodology Manual Light Microscopy    Urinalysis With  Culture If Indicated - Urine, Clean Catch [[id number]]  (Abnormal) Collected: 02/02/24 2140    Specimen: Urine, Clean Catch Updated: 02/02/24 2217     Color, UA Yellow     Appearance, UA Clear     pH, UA <=5.0     Specific Gravity, UA 1.014     Glucose, UA Negative     Ketones, UA 15 mg/dL (1+)     Bilirubin, UA Negative     Blood, UA Negative     Protein, UA 30 mg/dL (1+)     Leuk Esterase, UA Small (1+)     Nitrite, UA Negative     Urobilinogen, UA 0.2 E.U./dL    Narrative:      In absence of clinical symptoms, the presence of pyuria, bacteria, and/or nitrites on the urinalysis result does not correlate with infection.    CEA [[id number]] Collected: 02/02/24 1522    Specimen: Blood Updated: 02/02/24 2024     CEA 1.49 ng/mL     Narrative:      CEA Reference Range:    Non Smokers:   Less than 3 ng/mL  Smokers:       Less than 5 ng/mL  Results may be falsely decreased if patient taking Biotin.    Testing Method: [employer] Electrochemiluminescence Immunoassay(ECLIA)  Values obtained with different assay methods or kits cannot be used interchangeably.     [[id number]]  (Normal) Collected: 02/02/24 1522    Specimen: Blood Updated: 02/02/24 2024      12.1 U/mL     Narrative:      Results may be falsely decreased if patient taking Biotin.    Testing Method: [employer] Electrochemiluminescence Immunoassay(ECLIA)  Values obtained with different assay methods or kits cannot be used interchangeably.    Comprehensive Metabolic Panel [[id number]]  (Abnormal) Collected: 02/02/24 1522    Specimen: Blood Updated: 02/02/24 1657     Glucose 120 mg/dL      BUN 26 mg/dL      Creatinine 1.24 mg/dL      Sodium 135 mmol/L      Potassium 4.6 mmol/L      Comment: Slight hemolysis detected by analyzer. Result may be falsely elevated.        Chloride 100 mmol/L      CO2 16.0 mmol/L      Calcium 10.0 mg/dL      Total Protein 8.6 g/dL      Albumin 5.0 g/dL      ALT (SGPT) 11 U/L      AST (SGOT) 19 U/L      Comment:  Slight hemolysis detected by analyzer. Result may be falsely elevated.        Alkaline Phosphatase 59 U/L      Total Bilirubin 0.7 mg/dL      Globulin 3.6 gm/dL      A/G Ratio 1.4 g/dL      BUN/Creatinine Ratio 21.0     Anion Gap 19.0 mmol/L      eGFR 42.7 mL/min/1.73     Narrative:      GFR Normal >60  Chronic Kidney Disease <60  Kidney Failure <15    The GFR formula is only valid for adults with stable renal function between ages 18 and 70.    TSH [[id number]]  (Abnormal) Collected: 02/02/24 1522    Specimen: Blood Updated: 02/02/24 1655     TSH 8.580 uIU/mL     T4, Free [[id number]]  (Abnormal) Collected: 02/02/24 1522    Specimen: Blood Updated: 02/02/24 1655     Free T4 1.88 ng/dL     Narrative:      Results may be falsely increased if patient taking Biotin.      Amylase [[id number]]  (Normal) Collected: 02/02/24 1522    Specimen: Blood Updated: 02/02/24 1648     Amylase 35 U/L              Results for orders placed during the hospital encounter of 01/22/24    Adult Transthoracic Echo Complete W/ Cont if Necessary Per Protocol (With Agitated Saline)    Interpretation Summary    Left ventricular ejection fraction appears to be 51 - 55%.    Left ventricular diastolic function is consistent with (grade I) impaired relaxation.    There are myxomatous changes of the mitral valve apparatus present.    Estimated right ventricular systolic pressure from tricuspid regurgitation is normal (<35 mmHg).    Reduced GLS of -10.9%              Condition on Discharge:  stable    Vital Signs       Physical Exam:     General Appearance:    Alert, cooperative, in no acute distress   Head:    Normocephalic, without obvious abnormality, atraumatic   Eyes:            Lids and lashes normal, conjunctivae and sclerae normal, no   icterus, no pallor, corneas clear, PERRLA   Ears:    Ears appear intact with no abnormalities noted   Throat:   No oral lesions, no thrush, oral mucosa moist   Neck:   No adenopathy, supple, trachea midline, no  thyromegaly, no   carotid bruit, no JVD   Lungs:     Clear to auscultation,respirations regular, even and                  unlabored    Heart:    Regular rhythm and normal rate, normal S1 and S2, no            murmur, no gallop, no rub, no click   Chest Wall:    No abnormalities observed   Abdomen:     Normal bowel sounds, no masses, no organomegaly, soft        non-tender, non-distended, no guarding, no rebound                tenderness   Extremities:   Moves all extremities well, no edema, no cyanosis, no             redness   Pulses:   Pulses palpable and equal bilaterally   Skin:   No bleeding, bruising or rash   Lymph nodes:   No palpable adenopathy   Neurologic:   Cranial nerves 2 - 12 grossly intact, sensation intact, DTR       present and equal bilaterally       Discharge Disposition  Home or Self Care    Discharge Medications     Discharge Medications        New Medications        Instructions Start Date   doxycycline 100 MG capsule  Commonly known as: VIBRAMYCIN   100 mg, Oral, 2 Times Daily             Continue These Medications        Instructions Start Date   amLODIPine 5 MG tablet  Commonly known as: NORVASC   5 mg, Oral, Every 24 Hours Scheduled      atorvastatin 10 MG tablet  Commonly known as: LIPITOR   10 mg, Oral, Daily      clopidogrel 75 MG tablet  Commonly known as: PLAVIX   75 mg, Oral, Daily      Diclofenac Sodium 1 % gel gel  Commonly known as: VOLTAREN   2 g, Topical, Every 6 Hours PRN      famotidine 40 MG tablet  Commonly known as: PEPCID   40 mg, Oral, Daily      fluticasone 50 MCG/ACT nasal spray  Commonly known as: FLONASE   1 spray, Nasal, 2 Times Daily      folic acid 1 MG tablet  Commonly known as: FOLVITE   1 mg, Oral, Daily      FreeStyle [place] 2 [place] device   1 each, Does not apply, See Admin Instructions, Use to check blood sugars 4 times daily. DX: E11.65      gabapentin 300 MG capsule  Commonly known as: NEURONTIN   300 mg, Oral, Nightly      Insulin Lispro 100 UNIT/ML  injection  Commonly known as: humaLOG   See Admin Instructions, Inject subcutaneously three times daily before meals via sliding scale  100-150 = 2 units 151-200 = 3 units 201-250 = 4 units 251-300 = 5 units 301-350 = 6 units 351-400 = 7 units 401 = 8 units      Lantus SoloStar 100 UNIT/ML injection pen  Generic drug: Insulin Glargine   INJECT 15 UNITS SUBCUTANEOUSLY AS DIRECTED EVERY NIGHT      levothyroxine 100 MCG tablet  Commonly known as: Synthroid   100 mcg, Oral, Daily      lisinopril 5 MG tablet  Commonly known as: PRINIVIL,ZESTRIL   5 mg, Oral, Daily      metFORMIN 1000 MG (MOD) 24 hr tablet  Commonly known as: GLUMETZA   1,000 mg, Oral, 2 Times Daily      metoprolol succinate XL 25 MG 24 hr tablet  Commonly known as: TOPROL-XL   25 mg, Oral, Daily      multivitamin with minerals tablet tablet   1 tablet, Oral, Daily      ondansetron 4 MG tablet  Commonly known as: ZOFRAN   4 mg, Oral, Every 8 Hours PRN      oxybutynin 5 MG tablet  Commonly known as: DITROPAN   5 mg, Oral, 2 Times Daily      pantoprazole 40 MG EC tablet  Commonly known as: PROTONIX   40 mg, Oral, 2 Times Daily, Take dos      polyethylene glycol 17 g packet  Commonly known as: MIRALAX   17 g, Oral, Daily      thiamine 100 MG tablet  Commonly known as: VITAMIN B1   100 mg, Oral, Daily      traMADol 50 MG tablet  Commonly known as: ULTRAM   50 mg, Oral, 4 Times Daily PRN      traZODone 50 MG tablet  Commonly known as: DESYREL   50 mg, Oral, Nightly               Discharge Diet:     Activity at Discharge:     Follow-up Appointments  Future Appointments   Date Time Provider Department Center   2/13/2024 10:00 AM LAB [facility] None   2/20/2024 10:20 AM Driss Dan MD [facility]   5/13/2024 11:30 AM [facility] US 2  [facility]   5/16/2024  2:00 PM Nuria Mcgraw MD [facility]   7/8/2024 11:30 AM [facility] 2 [facility]     Additional Instructions for the Follow-ups that You Need to Schedule       Discharge  Follow-up with PCP   As directed       Currently Documented PCP:    Marisol Rodgers APRN    PCP Phone Number:    [phone]     Follow Up Details: 1-2 weeks                Test Results Pending at Discharge       Nneka Elkins MD  02/09/24  14:02 EST

## 2024-02-13 ENCOUNTER — LAB (OUTPATIENT)
Dept: LAB | Facility: HOSPITAL | Age: 84
End: 2024-02-13
Payer: MEDICARE

## 2024-02-13 DIAGNOSIS — E89.0 POSTSURGICAL HYPOTHYROIDISM: ICD-10-CM

## 2024-02-13 DIAGNOSIS — E11.65 TYPE 2 DIABETES MELLITUS WITH HYPERGLYCEMIA, WITH LONG-TERM CURRENT USE OF INSULIN: ICD-10-CM

## 2024-02-13 DIAGNOSIS — Z79.4 TYPE 2 DIABETES MELLITUS WITH HYPERGLYCEMIA, WITH LONG-TERM CURRENT USE OF INSULIN: ICD-10-CM

## 2024-02-13 LAB
ALBUMIN SERPL-MCNC: 3.8 G/DL (ref 3.5–5.2)
ALBUMIN UR-MCNC: 1.6 MG/DL
ALBUMIN/GLOB SERPL: 1.5 G/DL
ALP SERPL-CCNC: 60 U/L (ref 39–117)
ALT SERPL W P-5'-P-CCNC: 8 U/L (ref 1–33)
ANION GAP SERPL CALCULATED.3IONS-SCNC: 12.7 MMOL/L (ref 5–15)
AST SERPL-CCNC: 11 U/L (ref 1–32)
BILIRUB SERPL-MCNC: 0.2 MG/DL (ref 0–1.2)
BUN SERPL-MCNC: 12 MG/DL (ref 8–23)
BUN/CREAT SERPL: 13.3 (ref 7–25)
CALCIUM SPEC-SCNC: 8.8 MG/DL (ref 8.6–10.5)
CHLORIDE SERPL-SCNC: 103 MMOL/L (ref 98–107)
CHOLEST SERPL-MCNC: 123 MG/DL (ref 0–200)
CO2 SERPL-SCNC: 25.3 MMOL/L (ref 22–29)
CREAT SERPL-MCNC: 0.9 MG/DL (ref 0.57–1)
CREAT UR-MCNC: 40.4 MG/DL
EGFRCR SERPLBLD CKD-EPI 2021: 62.8 ML/MIN/1.73
GLOBULIN UR ELPH-MCNC: 2.6 GM/DL
GLUCOSE SERPL-MCNC: 166 MG/DL (ref 65–99)
HBA1C MFR BLD: 7.3 % (ref 4.8–5.6)
HDLC SERPL-MCNC: 51 MG/DL (ref 40–60)
LDLC SERPL CALC-MCNC: 48 MG/DL (ref 0–100)
LDLC/HDLC SERPL: 0.87 {RATIO}
MICROALBUMIN/CREAT UR: 39.6 MG/G (ref 0–29)
POTASSIUM SERPL-SCNC: 4.2 MMOL/L (ref 3.5–5.2)
PROT SERPL-MCNC: 6.4 G/DL (ref 6–8.5)
SODIUM SERPL-SCNC: 141 MMOL/L (ref 136–145)
T4 FREE SERPL-MCNC: 1.65 NG/DL (ref 0.93–1.7)
TRIGL SERPL-MCNC: 139 MG/DL (ref 0–150)
TSH SERPL DL<=0.05 MIU/L-ACNC: 1.84 UIU/ML (ref 0.27–4.2)
VLDLC SERPL-MCNC: 24 MG/DL (ref 5–40)

## 2024-02-13 PROCEDURE — 80053 COMPREHEN METABOLIC PANEL: CPT

## 2024-02-13 PROCEDURE — 82043 UR ALBUMIN QUANTITATIVE: CPT

## 2024-02-13 PROCEDURE — 84439 ASSAY OF FREE THYROXINE: CPT

## 2024-02-13 PROCEDURE — 82570 ASSAY OF URINE CREATININE: CPT

## 2024-02-13 PROCEDURE — 84443 ASSAY THYROID STIM HORMONE: CPT

## 2024-02-13 PROCEDURE — 80061 LIPID PANEL: CPT

## 2024-02-13 PROCEDURE — 36415 COLL VENOUS BLD VENIPUNCTURE: CPT

## 2024-02-13 PROCEDURE — 83036 HEMOGLOBIN GLYCOSYLATED A1C: CPT

## 2024-02-20 ENCOUNTER — OFFICE VISIT (OUTPATIENT)
Dept: ENDOCRINOLOGY | Facility: CLINIC | Age: 84
End: 2024-02-20
Payer: MEDICARE

## 2024-02-20 VITALS
BODY MASS INDEX: 19.83 KG/M2 | OXYGEN SATURATION: 99 % | HEART RATE: 93 BPM | SYSTOLIC BLOOD PRESSURE: 130 MMHG | WEIGHT: 119 LBS | HEIGHT: 65 IN | DIASTOLIC BLOOD PRESSURE: 70 MMHG

## 2024-02-20 DIAGNOSIS — E11.65 TYPE 2 DIABETES MELLITUS WITH HYPERGLYCEMIA, WITH LONG-TERM CURRENT USE OF INSULIN: Primary | ICD-10-CM

## 2024-02-20 DIAGNOSIS — Z85.850 HISTORY OF THYROID CANCER: ICD-10-CM

## 2024-02-20 DIAGNOSIS — E89.0 POSTSURGICAL HYPOTHYROIDISM: ICD-10-CM

## 2024-02-20 DIAGNOSIS — Z79.4 TYPE 2 DIABETES MELLITUS WITH HYPERGLYCEMIA, WITH LONG-TERM CURRENT USE OF INSULIN: Primary | ICD-10-CM

## 2024-02-20 DIAGNOSIS — E78.5 HYPERLIPIDEMIA, UNSPECIFIED HYPERLIPIDEMIA TYPE: ICD-10-CM

## 2024-02-20 DIAGNOSIS — I25.10 CORONARY ARTERY DISEASE INVOLVING NATIVE CORONARY ARTERY OF NATIVE HEART WITHOUT ANGINA PECTORIS: ICD-10-CM

## 2024-02-20 DIAGNOSIS — I10 PRIMARY HYPERTENSION: ICD-10-CM

## 2024-02-20 DIAGNOSIS — R80.9 MICROALBUMINURIA: ICD-10-CM

## 2024-02-20 PROCEDURE — 99214 OFFICE O/P EST MOD 30 MIN: CPT | Performed by: INTERNAL MEDICINE

## 2024-02-20 PROCEDURE — 1160F RVW MEDS BY RX/DR IN RCRD: CPT | Performed by: INTERNAL MEDICINE

## 2024-02-20 PROCEDURE — 3075F SYST BP GE 130 - 139MM HG: CPT | Performed by: INTERNAL MEDICINE

## 2024-02-20 PROCEDURE — 1159F MED LIST DOCD IN RCRD: CPT | Performed by: INTERNAL MEDICINE

## 2024-02-20 PROCEDURE — 3078F DIAST BP <80 MM HG: CPT | Performed by: INTERNAL MEDICINE

## 2024-02-20 RX ORDER — PSEUDOEPHED/ACETAMINOPH/DIPHEN 30MG-500MG
TABLET ORAL
COMMUNITY
Start: 2024-01-25

## 2024-02-20 RX ORDER — LEVOTHYROXINE SODIUM 0.12 MG/1
125 TABLET ORAL DAILY
Qty: 30 TABLET | Refills: 5 | Status: SHIPPED | OUTPATIENT
Start: 2024-02-20

## 2024-02-20 RX ORDER — INSULIN LISPRO 100 [IU]/ML
INJECTION, SOLUTION INTRAVENOUS; SUBCUTANEOUS
Qty: 15 ML | Refills: 5 | Status: SHIPPED | OUTPATIENT
Start: 2024-02-20

## 2024-02-20 RX ORDER — PEN NEEDLE, DIABETIC, SAFETY 30 GX3/16"
NEEDLE, DISPOSABLE MISCELLANEOUS
COMMUNITY
Start: 2024-02-16

## 2024-02-20 RX ORDER — INSULIN GLARGINE 100 [IU]/ML
18 INJECTION, SOLUTION SUBCUTANEOUS NIGHTLY
Qty: 15 ML | Refills: 4 | Status: SHIPPED | OUTPATIENT
Start: 2024-02-20

## 2024-02-20 RX ORDER — LEVOTHYROXINE SODIUM 0.12 MG/1
TABLET ORAL
COMMUNITY
Start: 2024-02-01 | End: 2024-02-20 | Stop reason: SDUPTHER

## 2024-02-20 RX ORDER — ACYCLOVIR 400 MG/1
1 TABLET ORAL
Qty: 9 EACH | Refills: 3 | Status: SHIPPED | OUTPATIENT
Start: 2024-02-20

## 2024-02-20 NOTE — PROGRESS NOTES
-----------------------------------------------------------------  ENDOCRINE CLINIC NOTE  -----------------------------------------------------------------        PATIENT NAME: Pamela Zavala  PATIENT : 1940 AGE: 83 y.o.  MRN NUMBER: [id number]  PRIMARY CARE: Marisol Rodgers APRN    ==========================================================================    CHIEF COMPLAINT: Type 2 Diabetes Management  DATE OF SERVICE: 24    ==========================================================================    HPI / SUBJECTIVE    83 y.o. female is seen in the clinic today for follow up for T2DM.  Diagnosed with type 2 diabetes around age 58.  Last known A1c 7.3% - 2024.  Patient current therapy includes:  Insulin Lantus 18 units nightly  Humalog 3 units with each meal  Humalog sliding scale: 1: 50 > 150  Living in assisted living facility.  Consuming 3 meals a day along with bedtime snack.  Checking BG through finger sticks and CGM, dexcom G7.  Not seen eye doctor yet.  Neuropathy in both feet and hands currently on gabapentin therapy.  Also maintained on rosuvastatin therapy.  Underlying history of coronary artery disease s/p PCI.  History of thyroid cancer at age 33 currently on levothyroxine replacement therapy.    ==========================================================================                                                PAST MEDICAL HISTORY    Past Medical History:   Diagnosis Date    Anemia     Arthritis     CAD (coronary artery disease)     Chronic back pain     Diabetic neuropathy     Essential hypertension     GERD (gastroesophageal reflux disease)     History of gastric ulcer     History of stomach ulcers     HLD (hyperlipidemia)     Hypocalcemia     Hypoparathyroidism     Hypothyroidism     Insomnia     Kidney stones     Osteoporosis     Recurrent sinusitis     Renal disease     Thyroid cancer     Type 2 diabetes mellitus         ==========================================================================    PAST SURGICAL HISTORY    Past Surgical History:   Procedure Laterality Date    CARDIAC CATHETERIZATION N/A 2023    Procedure: Left Heart Cath and coronary angiogram;  Surgeon: John Baldwin MD;  Location: [facility] CATH INVASIVE LOCATION;  Service: Cardiovascular;  Laterality: N/A;    CORONARY STENT PLACEMENT      x 3    CYSTOSCOPY, URETEROSCOPY, RETROGRADE PYELOGRAM, STENT INSERTION Left 10/18/2023    Procedure: CYSTOSCOPY URETEROSCOPY RETROGRADE PYELOGRAM HOLMIUM LASER STENT INSERTION;  Surgeon: Juan Alaniz MD;  Location: [facility] MAIN OR;  Service: Urology;  Laterality: Left;    HYSTERECTOMY      LUNG LOBECTOMY      THYROIDECTOMY         ==========================================================================    FAMILY HISTORY    Family History   Problem Relation Age of Onset    Diabetes Mother     Cancer Father     Heart disease Sister     Diabetes Sister     Heart disease Maternal Grandmother        ==========================================================================    SOCIAL HISTORY    Social History     Socioeconomic History    Marital status:     Number of children: 2    Highest education level: 11th grade   Tobacco Use    Smoking status: Former     Packs/day: 2.00     Years: 30.00     Additional pack years: 0.00     Total pack years: 60.00     Types: Cigarettes     Quit date: 2000     Years since quittin.1     Passive exposure: Past    Smokeless tobacco: Never   Vaping Use    Vaping Use: Never used   Substance and Sexual Activity    Alcohol use: Yes     Alcohol/week: 1.0 standard drink of alcohol     Types: 1 Glasses of wine per week    Drug use: Defer    Sexual activity: Defer       ==========================================================================    MEDICATIONS      Current Outpatient Medications:     Acetaminophen Extra Strength 500 MG tablet, , Disp: , Rfl:     amLODIPine (NORVASC) 5 MG  tablet, Take 1 tablet by mouth Daily., Disp: 30 tablet, Rfl: 0    atorvastatin (LIPITOR) 10 MG tablet, Take 1 tablet by mouth Daily., Disp: , Rfl:     BD AutoShield Duo 30G X 5 MM misc, , Disp: , Rfl:     clopidogrel (PLAVIX) 75 MG tablet, Take 1 tablet by mouth Daily., Disp: , Rfl:     famotidine (PEPCID) 40 MG tablet, Take 1 tablet by mouth Daily., Disp: , Rfl:     fluticasone (FLONASE) 50 MCG/ACT nasal spray, 1 spray into the nostril(s) as directed by provider 2 (Two) Times a Day., Disp: , Rfl:     folic acid (FOLVITE) 1 MG tablet, Take 1 tablet by mouth Daily., Disp: , Rfl:     gabapentin (NEURONTIN) 300 MG capsule, Take 1 capsule by mouth Every Night., Disp: , Rfl:     Insulin Lispro (humaLOG) 100 UNIT/ML injection, See Admin Instructions. Inject subcutaneously three times daily before meals via sliding scale  100-150 = 2 units 151-200 = 3 units 201-250 = 4 units 251-300 = 5 units 301-350 = 6 units 351-400 = 7 units 401 = 8 units, Disp: , Rfl:     Lantus SoloStar 100 UNIT/ML injection pen, INJECT 15 UNITS SUBCUTANEOUSLY AS DIRECTED EVERY NIGHT, Disp: 15 mL, Rfl: 4    levothyroxine (SYNTHROID, LEVOTHROID) 125 MCG tablet, , Disp: , Rfl:     lisinopril (PRINIVIL,ZESTRIL) 5 MG tablet, Take 1 tablet by mouth Daily., Disp: 90 tablet, Rfl: 3    metoprolol succinate XL (TOPROL-XL) 25 MG 24 hr tablet, Take 1 tablet by mouth Daily., Disp: , Rfl:     multivitamin with minerals (THERA-M PO), Take 1 tablet by mouth Daily., Disp: , Rfl:     multivitamin with minerals tablet tablet, Take 1 tablet by mouth Daily., Disp: , Rfl:     ondansetron (ZOFRAN) 4 MG tablet, Take 1 tablet by mouth Every 8 (Eight) Hours As Needed for Nausea or Vomiting., Disp: , Rfl:     oxybutynin (DITROPAN) 5 MG tablet, Take 1 tablet by mouth 2 (Two) Times a Day., Disp: , Rfl:     pantoprazole (PROTONIX) 40 MG EC tablet, Take 1 tablet by mouth 2 (Two) Times a Day. Take dos, Disp: , Rfl:     polyethylene glycol (MIRALAX) 17 g packet, Take 17 g by mouth  Daily., Disp: , Rfl:     thiamine (VITAMIN B1) 100 MG tablet, Take 1 tablet by mouth Daily., Disp: , Rfl:     traMADol (ULTRAM) 50 MG tablet, Take 1 tablet by mouth 4 (Four) Times a Day As Needed for Moderate Pain., Disp: 20 tablet, Rfl: 0    traZODone (DESYREL) 50 MG tablet, Take 1 tablet by mouth Every Night., Disp: , Rfl:     ==========================================================================    ALLERGIES    Allergies   Allergen Reactions    Penicillins Unknown - High Severity     Lips swell and hives    Shellfish-Derived Products Unknown - Low Severity    Sulfamethizole Unknown - Low Severity    Trimethoprim Unknown - Low Severity       ==========================================================================    OBJECTIVE    Vitals:    02/20/24 1016   BP: 130/70   Pulse: 93   SpO2: 99%       Body mass index is 19.8 kg/m².     General: Alert, cooperative, no acute distress  Thyroid:  no enlargement/tenderness/palpable nodules  Lungs: Clear to auscultation bilaterally, respirations unlabored  Heart: Regular rate and rhythm, S1 and S2 normal, no murmur, rub or gallop  Abdomen: Soft, NT, ND and Bowel sounds Positive  Extremities:  Extremities normal, atraumatic, no cyanosis or edema    ==========================================================================    LAB EVALUATION    Lab Results   Component Value Date    GLUCOSE 166 (H) 02/13/2024    BUN 12 02/13/2024    CREATININE 0.90 02/13/2024    BCR 13.3 02/13/2024    K 4.2 02/13/2024    CO2 25.3 02/13/2024    CALCIUM 8.8 02/13/2024    ALBUMIN 3.8 02/13/2024    AST 11 02/13/2024    ALT 8 02/13/2024    CHOL 123 02/13/2024    TRIG 139 02/13/2024    HDL 51 02/13/2024    LDL 48 02/13/2024     Lab Results   Component Value Date    HGBA1C 7.30 (H) 02/13/2024    HGBA1C 7.30 (H) 01/23/2024    HGBA1C 7.60 (H) 10/18/2023     Lab Results   Component Value Date    MICROALBUR 1.6 02/13/2024    CREATININE 0.90 02/13/2024     Lab Results   Component Value Date    TSH  "1.840 02/13/2024    FREET4 1.65 02/13/2024       ==========================================================================    ASSESSMENT AND PLAN    # Type 2 diabetes with hyperglycemia but also concerns for hypoglycemia  # Hypertension  # Hyperlipidemia  # Coronary artery disease s/p PCI  # Microalbuminuria  - Reviewed blood sugar through fingerstick reading but unable to review CGM data as patient forgot to bring the right   - Patient have last A1c of 7.3%  - Given age 83 Target is to maintain A1c between 7 and 8% to avoid any hypoglycemia  - Plan to continue same insulin regimen without any changes  - But patient does not want to be on metformin therapy, will discontinue  - If needed in the future can uptitrate the dose of insulin if there is persistent hyperglycemia noted  - New adjusted therapy:  Insulin Lantus 18 units nightly  Humalog 3 units with each meal  Humalog sliding scale: 1: 50 > 150  -Patient to continue checking blood sugar through CGM  - Will coordinate with the staff to establish care visit with [facility]    # History of thyroid cancer s/p surgery  # Postsurgical hypothyroidism  - Patient is currently taking levothyroxine 125 mcg p.o. daily which was recently adjusted during hospitalization  - Patient have a history significant for iatrogenic hyperthyroidism  - There was evidence of hypothyroidism but patient was hospitalized during the evaluation  - Continue levothyroxine 125 mcg and repeat blood work before next visit    Return to clinic: 3 months    Entire assessment and plan was discussed and counseled the patient in detail to which patient verbalized understanding and agreed with care.  Answered all queries and concerns.    This note was created using voice recognition software and is inherently subject to errors including those of syntax and \"sound-alike\" substitutions which may escape proofreading.  In such instances, original meaning may be extrapolated by contextual " derivation.    Note: Portions of this note may have been copied from previous notes but documentation have been reviewed and edited as necessary to support clinical decision making for today's visit.    ==========================================================================    INFORMATION PROVIDED TO PATIENT    Patient Instructions   Please,    # Diabetes Management:    Please start insulin therapy as:    - Insulin Glargine (Slow acting insulin) 18 Units in the evening.  - Insulin lispro / aspart (Fast acting / meal time insulin): 3 Units with each meal.    Meal time insulin need to be taken 15 mins before meal. You can bring your insulin with you if you are planning to eat out.    If you plan to miss the meal please miss the meal time insulin as well.    - STOP METFORMIN THERAPY    Check your blood glucose four times a day or with Dexcom.    - If your blood glucose in the morning / fasting is less than 90 please decrease the dose of Insulin Glargine by 3 units.    Use correction insulin if your blood is high along with your meal time insulin. Use this correction insulin before meal only, not to be taken without meal.    Corrections for High Blood Sugar  Use the following guide to “correct” for pre-meal high blood sugar.  This insulin will help “correct” the high reading.  You will still need to take as per scheduled meal insulin.    If your   Blood Sugar Reading is…. Number of additional   units of Insulin Lispro to Take…  (Correction)    Take 0 additional unit   151-200 Take 1 additional unit   201-250 Take 2 additional unit   251-300 Take 3 additional unit   301-350 Take 4 additional unit   351-400 Take 5 additional unit   More than 400 Take 6 additional unit         Low Blood Glucose:    - If you feel sweaty, anxious, shakiness, feel weak, trouble walking, feels like passing out or trouble seeing thing, please check your blood glucose and if its less than 70 or if your feel symptoms of low blood glucose  then take one of the following or use Glucagon Injection:    *3 or 4 glucose tablets  *½ cup of juice or regular soda (not sugar-free)  *2 tablespoons of raisins  *4 or 5 saltine crackers  *1 tablespoon of sugar  *1 tablespoon of honey or corn syrup  *6 to 8 hard candies     #Levothyroxine Dosing:     - Change levothyroxine therapy to 100 mcg one pill by mouth daily at 6:00 AM  - Take it every day, on an empty stomach, 30 minutes before eating  - Avoid taking it with iron, calcium, other medications (blocks absorption), wait at least 4 hrs after taking levothyroxine to take these medications  - If you miss a pill, you can take 2 the next day and return to schedule from next day     Repeat lab work non-fasting before next visit.    Follow-up in my clinic in 3 months.    Thank you for your visit today.    If you have any questions or concerns please feel free to reach out of the office.       ==========================================================================  Driss Dan MD  Department of Endocrine, Diabetes and Metabolism  [facility], [place]  ==========================================================================

## 2024-02-20 NOTE — PATIENT INSTRUCTIONS
Please,    # Diabetes Management:    Please start insulin therapy as:    - Insulin Glargine (Slow acting insulin) 18 Units in the evening.  - Insulin lispro / aspart (Fast acting / meal time insulin): 3 Units with each meal.    Meal time insulin need to be taken 15 mins before meal. You can bring your insulin with you if you are planning to eat out.    If you plan to miss the meal please miss the meal time insulin as well.    - STOP METFORMIN THERAPY    Check your blood glucose four times a day or with Dexcom.    - If your blood glucose in the morning / fasting is less than 90 please decrease the dose of Insulin Glargine by 3 units.    Use correction insulin if your blood is high along with your meal time insulin. Use this correction insulin before meal only, not to be taken without meal.    Corrections for High Blood Sugar  Use the following guide to “correct” for pre-meal high blood sugar.  This insulin will help “correct” the high reading.  You will still need to take as per scheduled meal insulin.    If your   Blood Sugar Reading is…. Number of additional   units of Insulin Lispro to Take…  (Correction)    Take 0 additional unit   151-200 Take 1 additional unit   201-250 Take 2 additional unit   251-300 Take 3 additional unit   301-350 Take 4 additional unit   351-400 Take 5 additional unit   More than 400 Take 6 additional unit         Low Blood Glucose:    - If you feel sweaty, anxious, shakiness, feel weak, trouble walking, feels like passing out or trouble seeing thing, please check your blood glucose and if its less than 70 or if your feel symptoms of low blood glucose then take one of the following or use Glucagon Injection:    *3 or 4 glucose tablets  *½ cup of juice or regular soda (not sugar-free)  *2 tablespoons of raisins  *4 or 5 saltine crackers  *1 tablespoon of sugar  *1 tablespoon of honey or corn syrup  *6 to 8 hard candies     #Levothyroxine Dosing:     - Change levothyroxine therapy to  100 mcg one pill by mouth daily at 6:00 AM  - Take it every day, on an empty stomach, 30 minutes before eating  - Avoid taking it with iron, calcium, other medications (blocks absorption), wait at least 4 hrs after taking levothyroxine to take these medications  - If you miss a pill, you can take 2 the next day and return to schedule from next day     Repeat lab work non-fasting before next visit.    Follow-up in my clinic in 3 months.    Thank you for your visit today.    If you have any questions or concerns please feel free to reach out of the office.

## 2024-03-13 ENCOUNTER — TELEPHONE (OUTPATIENT)
Dept: ENDOCRINOLOGY | Facility: CLINIC | Age: 84
End: 2024-03-13
Payer: MEDICARE

## 2024-03-13 NOTE — TELEPHONE ENCOUNTER
Caller: Pamela Zavala    Relationship to patient: Self    Best call back number: [phone]    Patient is needing: DR LOZANO WANTS PT TO GET AN EYE EXAM AND PT IS UNABLE. NEED A REQUEST SENT OVER.

## 2024-03-13 NOTE — TELEPHONE ENCOUNTER
Left detailed message letting pt know a referral was sent to [facility] [place] in [place] and if pt would like it sent somewhere else to call us back and let us know. If pt is getting labs @ [facility] they will have labs in system, pt notified, if pt would like labs done at different facility let us know and we can fax over lab orders.

## 2024-03-13 NOTE — TELEPHONE ENCOUNTER
Caller: Jess Ramesh    Relationship to patient: Emergency Contact    Best call back number: [phone]    Patient is needing: PATIENT IS WANTING TO CANCEL HER LABS AND GET ORDERS SENT TO HOSPITAL INSTEAD

## 2024-03-25 ENCOUNTER — TELEPHONE (OUTPATIENT)
Dept: ENDOCRINOLOGY | Facility: CLINIC | Age: 84
End: 2024-03-25
Payer: MEDICARE

## 2024-03-25 NOTE — TELEPHONE ENCOUNTER
PATIENT CALLED, FLAKO CARTER WANTS TO KNOW WHICH EYE EXAM DR. LOZANO WANTS? CAN YOU SEND A REFERRAL AND OR CALL AND MAKE THE APPT FOR HER.

## 2024-03-26 NOTE — TELEPHONE ENCOUNTER
LVM to inform patient referral was sent to [facility] [place] in [place] for a Diabetic eye exam and gave [facility] phone number for patient to call and schedule appt.

## 2024-05-13 ENCOUNTER — TRANSCRIBE ORDERS (OUTPATIENT)
Dept: ADMINISTRATIVE | Facility: HOSPITAL | Age: 84
End: 2024-05-13
Payer: MEDICARE

## 2024-05-13 ENCOUNTER — LAB (OUTPATIENT)
Dept: LAB | Facility: HOSPITAL | Age: 84
End: 2024-05-13
Payer: MEDICARE

## 2024-05-13 ENCOUNTER — HOSPITAL ENCOUNTER (OUTPATIENT)
Dept: GENERAL RADIOLOGY | Facility: HOSPITAL | Age: 84
Discharge: HOME | End: 2024-05-13
Payer: MEDICARE

## 2024-05-13 ENCOUNTER — HOSPITAL ENCOUNTER (OUTPATIENT)
Dept: ULTRASOUND IMAGING | Facility: HOSPITAL | Age: 84
Discharge: HOME | End: 2024-05-13
Payer: MEDICARE

## 2024-05-13 DIAGNOSIS — Z79.4 TYPE 2 DIABETES MELLITUS WITH HYPERGLYCEMIA, WITH LONG-TERM CURRENT USE OF INSULIN: ICD-10-CM

## 2024-05-13 DIAGNOSIS — N39.0 URINARY TRACT INFECTION WITHOUT HEMATURIA, SITE UNSPECIFIED: ICD-10-CM

## 2024-05-13 DIAGNOSIS — N20.0 CALCULUS, KIDNEY: ICD-10-CM

## 2024-05-13 DIAGNOSIS — N20.0 URIC ACID NEPHROLITHIASIS: ICD-10-CM

## 2024-05-13 DIAGNOSIS — E11.65 TYPE 2 DIABETES MELLITUS WITH HYPERGLYCEMIA, WITH LONG-TERM CURRENT USE OF INSULIN: ICD-10-CM

## 2024-05-13 DIAGNOSIS — E89.0 POSTSURGICAL HYPOTHYROIDISM: ICD-10-CM

## 2024-05-13 DIAGNOSIS — N20.0 URIC ACID NEPHROLITHIASIS: Primary | ICD-10-CM

## 2024-05-13 LAB
ALBUMIN SERPL-MCNC: 4.2 G/DL (ref 3.5–5.2)
ALBUMIN/GLOB SERPL: 1.6 G/DL
ALP SERPL-CCNC: 94 U/L (ref 39–117)
ALT SERPL W P-5'-P-CCNC: 18 U/L (ref 1–33)
ANION GAP SERPL CALCULATED.3IONS-SCNC: 12.5 MMOL/L (ref 5–15)
AST SERPL-CCNC: 18 U/L (ref 1–32)
BILIRUB SERPL-MCNC: 0.3 MG/DL (ref 0–1.2)
BUN SERPL-MCNC: 26 MG/DL (ref 8–23)
BUN/CREAT SERPL: 21.1 (ref 7–25)
CALCIUM SPEC-SCNC: 8.2 MG/DL (ref 8.6–10.5)
CHLORIDE SERPL-SCNC: 102 MMOL/L (ref 98–107)
CO2 SERPL-SCNC: 26.5 MMOL/L (ref 22–29)
CREAT SERPL-MCNC: 1.23 MG/DL (ref 0.57–1)
EGFRCR SERPLBLD CKD-EPI 2021: 43.2 ML/MIN/1.73
GLOBULIN UR ELPH-MCNC: 2.7 GM/DL
GLUCOSE SERPL-MCNC: 311 MG/DL (ref 65–99)
HBA1C MFR BLD: 9.5 % (ref 4.8–5.6)
POTASSIUM SERPL-SCNC: 5.2 MMOL/L (ref 3.5–5.2)
PROT SERPL-MCNC: 6.9 G/DL (ref 6–8.5)
SODIUM SERPL-SCNC: 141 MMOL/L (ref 136–145)
T4 FREE SERPL-MCNC: 1.48 NG/DL (ref 0.93–1.7)
TSH SERPL DL<=0.05 MIU/L-ACNC: 6.36 UIU/ML (ref 0.27–4.2)

## 2024-05-13 PROCEDURE — 84439 ASSAY OF FREE THYROXINE: CPT

## 2024-05-13 PROCEDURE — 84443 ASSAY THYROID STIM HORMONE: CPT

## 2024-05-13 PROCEDURE — 83036 HEMOGLOBIN GLYCOSYLATED A1C: CPT

## 2024-05-13 PROCEDURE — 36415 COLL VENOUS BLD VENIPUNCTURE: CPT

## 2024-05-13 PROCEDURE — 80053 COMPREHEN METABOLIC PANEL: CPT

## 2024-05-13 PROCEDURE — 74018 RADEX ABDOMEN 1 VIEW: CPT

## 2024-05-13 PROCEDURE — 76775 US EXAM ABDO BACK WALL LIM: CPT

## 2024-05-14 NOTE — PROGRESS NOTES
Encounter Date:05/18/2023      Patient ID: Pamela Zavala is a 83 y.o. female.    Chief Complaint   Patient presents with    Atrial Fibrillation    Coronary Artery Disease          History of Present Illness  Pamela Zavala is a 82 y.o. female with a history of coronary artery disease status post PCI to OM1 and OM 2 along with recent PCI to the RCA, hypertension, hyperlipidemia, Type 2 diabetes mellitus, former tobacco abuse, thyroid cancer status post thyroidectomy, and postoperative hypothyroidism, and recently diagnosed atrial fibrillation who presents for follow-up.  From cardiac standpoint she is doing fairly well.  She continues to exercise at her living facility.  Occasionally gets episodes of chest pain which are random.  Not consistently related to activity.  Mentions that she did have an episode of numbness and tingling in her face which resolved quickly.  Blood pressure has been running a little high.  Of note her HbA1c did improve from 11-7.5 but is now back up to 9.5.  She admits to dietary indiscretion such as yesterday when she had to pop tarts for breakfast.  She is due to see endocrinology again    Previous note:   she had a positive stress test for which she underwent cardiac catheterization and PCI of the RCA with IVUS guidance with a Xience 2.25 x 33 mm stent postdilated with a 3.0 NC balloon.   The following portions of the patient's history were reviewed and updated as appropriate: allergies, current medications, past family history, past medical history, past social history, past surgical history, and problem list.    Review of Systems   Constitutional: Positive for malaise/fatigue.   Cardiovascular:  Positive for chest pain. Negative for dyspnea on exertion, leg swelling and palpitations.   Respiratory:  Positive for shortness of breath. Negative for cough.    Gastrointestinal:  Negative for abdominal pain, nausea and vomiting.   Neurological:  Positive for light-headedness. Negative for  dizziness, focal weakness, headaches and numbness.   All other systems reviewed and are negative.        Current Outpatient Medications:     Acetaminophen Extra Strength 500 MG tablet, , Disp: , Rfl:     atorvastatin (LIPITOR) 10 MG tablet, Take 1 tablet by mouth Daily., Disp: , Rfl:     BD AutoShield Duo 30G X 5 MM misc, , Disp: , Rfl:     clopidogrel (PLAVIX) 75 MG tablet, Take 1 tablet by mouth Daily., Disp: , Rfl:     Continuous Blood Gluc Sensor (Dexcom G7 Sensor) misc, Use 1 each 4 (Four) Times a Day Before Meals & at Bedtime., Disp: 9 each, Rfl: 3    famotidine (PEPCID) 40 MG tablet, Take 1 tablet by mouth Daily., Disp: , Rfl:     fluticasone (FLONASE) 50 MCG/ACT nasal spray, 1 spray into the nostril(s) as directed by provider 2 (Two) Times a Day., Disp: , Rfl:     folic acid (FOLVITE) 1 MG tablet, Take 1 tablet by mouth Daily., Disp: , Rfl:     gabapentin (NEURONTIN) 300 MG capsule, Take 1 capsule by mouth Every Night., Disp: , Rfl:     Insulin Lispro (humaLOG) 100 UNIT/ML injection, 3 Units with each meal and additional sliding scale               Take 0 additional unit 151-200 Take 1 additional unit 201-250 Take 2 additional unit 251-300 Take 3 additional unit 301-350 Take 4 additional unit 351-400 Take 5 additional unit More than 400 Take 6 additional unit  Maximum 9 units with each meal, Disp: 15 mL, Rfl: 5    Lantus SoloStar 100 UNIT/ML injection pen, Inject 18 Units under the skin into the appropriate area as directed Every Night., Disp: 15 mL, Rfl: 4    levothyroxine (SYNTHROID, LEVOTHROID) 125 MCG tablet, Take 1 tablet by mouth Daily., Disp: 30 tablet, Rfl: 5    lisinopril (PRINIVIL,ZESTRIL) 5 MG tablet, Take 1 tablet by mouth Daily., Disp: 90 tablet, Rfl: 3    metoprolol succinate XL (TOPROL-XL) 25 MG 24 hr tablet, Take 1 tablet by mouth Daily., Disp: , Rfl:     multivitamin with minerals (THERA-M PO), Take 1 tablet by mouth Daily., Disp: , Rfl:     multivitamin with minerals tablet tablet,  Take 1 tablet by mouth Daily., Disp: , Rfl:     ondansetron (ZOFRAN) 4 MG tablet, Take 1 tablet by mouth Every 8 (Eight) Hours As Needed for Nausea or Vomiting., Disp: , Rfl:     oxybutynin (DITROPAN) 5 MG tablet, Take 1 tablet by mouth 2 (Two) Times a Day., Disp: , Rfl:     pantoprazole (PROTONIX) 40 MG EC tablet, Take 1 tablet by mouth 2 (Two) Times a Day. Take dos, Disp: , Rfl:     polyethylene glycol (MIRALAX) 17 g packet, Take 17 g by mouth Daily., Disp: , Rfl:     thiamine (VITAMIN B1) 100 MG tablet, Take 1 tablet by mouth Daily., Disp: , Rfl:     traMADol (ULTRAM) 50 MG tablet, Take 1 tablet by mouth 4 (Four) Times a Day As Needed for Moderate Pain., Disp: 20 tablet, Rfl: 0    traZODone (DESYREL) 50 MG tablet, Take 1 tablet by mouth Every Night., Disp: , Rfl:     amLODIPine (NORVASC) 10 MG tablet, Take 1 tablet by mouth Daily., Disp: 90 tablet, Rfl: 3    apixaban (ELIQUIS) 5 MG tablet tablet, Take 1 tablet by mouth 2 (Two) Times a Day., Disp: 180 tablet, Rfl: 3    Allergies   Allergen Reactions    Penicillins Unknown - High Severity     Lips swell and hives    Shellfish-Derived Products Unknown - Low Severity    Sulfamethizole Unknown - Low Severity    Trimethoprim Unknown - Low Severity       Family History   Problem Relation Age of Onset    Diabetes Mother     Cancer Father     Heart disease Sister     Diabetes Sister     Heart disease Maternal Grandmother        Past Surgical History:   Procedure Laterality Date    CARDIAC CATHETERIZATION N/A 05/03/2023    Procedure: Left Heart Cath and coronary angiogram;  Surgeon: John Baldwin MD;  Location: [facility] CATH INVASIVE LOCATION;  Service: Cardiovascular;  Laterality: N/A;    CORONARY STENT PLACEMENT      x 3    CYSTOSCOPY, URETEROSCOPY, RETROGRADE PYELOGRAM, STENT INSERTION Left 10/18/2023    Procedure: CYSTOSCOPY URETEROSCOPY RETROGRADE PYELOGRAM HOLMIUM LASER STENT INSERTION;  Surgeon: Juan Alaniz MD;  Location: [facility] MAIN OR;  Service: Urology;   "Laterality: Left;    HYSTERECTOMY      LUNG LOBECTOMY      THYROIDECTOMY         Past Medical History:   Diagnosis Date    Anemia     Arthritis     CAD (coronary artery disease)     Chronic back pain     Diabetic neuropathy     Essential hypertension     GERD (gastroesophageal reflux disease)     History of gastric ulcer     History of stomach ulcers     HLD (hyperlipidemia)     Hypocalcemia     Hypoparathyroidism     Hypothyroidism     Insomnia     Kidney stones     Osteoporosis     Recurrent sinusitis     Renal disease     Thyroid cancer     Type 2 diabetes mellitus        Social History     Socioeconomic History    Marital status:     Number of children: 2    Highest education level: 11th grade   Tobacco Use    Smoking status: Former     Current packs/day: 0.00     Average packs/day: 2.0 packs/day for 30.0 years (60.0 ttl pk-yrs)     Types: Cigarettes     Start date:      Quit date:      Years since quittin.3     Passive exposure: Past    Smokeless tobacco: Never   Vaping Use    Vaping status: Never Used   Substance and Sexual Activity    Alcohol use: Yes     Alcohol/week: 1.0 standard drink of alcohol     Types: 1 Glasses of wine per week    Drug use: Defer    Sexual activity: Defer         Procedures      Objective:       Physical Exam    /67 (BP Location: Right arm, Patient Position: Sitting, Cuff Size: Adult)   Pulse 84   Ht 165.1 cm (65\")   Wt 61.2 kg (135 lb)   SpO2 97%   BMI 22.47 kg/m²   The patient is alert, oriented and in no distress.    Vital signs as noted above.    Head and neck revealed no carotid bruits or jugular venous distension.  No thyromegaly or lymphadenopathy is present.    Lungs clear.  No wheezing.  Breath sounds are normal bilaterally.    Heart normal first and second heart sounds.  No murmur..  No pericardial rub is present.  No gallop is present.    Abdomen soft and nontender.  No organomegaly is present.    Extremities revealed good peripheral pulses " without any pedal edema.    Skin warm and dry.    Musculoskeletal system is grossly normal.    CNS grossly normal.           Diagnosis Plan   1. Coronary artery disease involving native coronary artery of native heart without angina pectoris        2. Essential hypertension        3. Mixed hyperlipidemia        4. Type 2 diabetes mellitus with diabetic neuropathy, with long-term current use of insulin        5. Hematuria, unspecified type        6. Paroxysmal atrial fibrillation            LAB RESULTS (LAST 7 DAYS)    CBC        BMP  Results from last 7 days   Lab Units 05/13/24  1104   SODIUM mmol/L 141   POTASSIUM mmol/L 5.2   CHLORIDE mmol/L 102   CO2 mmol/L 26.5   BUN mg/dL 26*   CREATININE mg/dL 1.23*   GLUCOSE mg/dL 311*       CMP   Results from last 7 days   Lab Units 05/13/24  1104   SODIUM mmol/L 141   POTASSIUM mmol/L 5.2   CHLORIDE mmol/L 102   CO2 mmol/L 26.5   BUN mg/dL 26*   CREATININE mg/dL 1.23*   GLUCOSE mg/dL 311*   ALBUMIN g/dL 4.2   BILIRUBIN mg/dL 0.3   ALK PHOS U/L 94   AST (SGOT) U/L 18   ALT (SGPT) U/L 18         BNP        TROPONIN        CoAg        Creatinine Clearance  Estimated Creatinine Clearance: 32.3 mL/min (A) (by C-G formula based on SCr of 1.23 mg/dL (H)).    ABG        Radiology  No radiology results for the last day         Assessment and Plan       Diagnoses and all orders for this visit:    1. Coronary artery disease involving native coronary artery of native heart without angina pectoris (Primary)    2. Essential hypertension    3. Mixed hyperlipidemia    4. Type 2 diabetes mellitus with diabetic neuropathy, with long-term current use of insulin    5. Hematuria, unspecified type    6. Paroxysmal atrial fibrillation    Other orders  -     amLODIPine (NORVASC) 10 MG tablet; Take 1 tablet by mouth Daily.  Dispense: 90 tablet; Refill: 3  -     apixaban (ELIQUIS) 5 MG tablet tablet; Take 1 tablet by mouth 2 (Two) Times a Day.  Dispense: 180 tablet; Refill: 3           CAD  Status  post PCI of the RCA with IVUS guidance.  Xience 2.25 x 33 mm stent postdilated with a 3.0 NC balloon  Continue with Plavix  Continue statin  Not on aspirin to avoid triple therapy  She has had some episodes of chest pain although not consistently related to activity.  I will increase her amlodipine for OMT  If she continues to have episodes can consider stress test    Paroxysmal A-fib  In the setting of postop status with sepsis  Continue Eliquis  Continue with metoprolol    Hypertension  Currently on metoprolol and lisinopril  Blood pressure remains uncontrolled  Increase amlodipine to 10 mg     Hyperlipidemia  Currently on rosuvastatin  LDL is 48     Diabetes  Uncontrolled diabetes with an HbA1c of 9.5  On Lantus     History of hematuria  Status post cystoscopy  No recurrence    Nuria Mcgraw MD

## 2024-05-16 ENCOUNTER — OFFICE VISIT (OUTPATIENT)
Dept: CARDIOLOGY | Facility: CLINIC | Age: 84
End: 2024-05-16
Payer: MEDICARE

## 2024-05-16 VITALS
SYSTOLIC BLOOD PRESSURE: 152 MMHG | BODY MASS INDEX: 22.49 KG/M2 | HEART RATE: 84 BPM | WEIGHT: 135 LBS | DIASTOLIC BLOOD PRESSURE: 67 MMHG | OXYGEN SATURATION: 97 % | HEIGHT: 65 IN

## 2024-05-16 DIAGNOSIS — I25.10 CORONARY ARTERY DISEASE INVOLVING NATIVE CORONARY ARTERY OF NATIVE HEART WITHOUT ANGINA PECTORIS: Primary | ICD-10-CM

## 2024-05-16 DIAGNOSIS — I48.0 PAROXYSMAL ATRIAL FIBRILLATION: ICD-10-CM

## 2024-05-16 DIAGNOSIS — R31.9 HEMATURIA, UNSPECIFIED TYPE: ICD-10-CM

## 2024-05-16 DIAGNOSIS — Z79.4 TYPE 2 DIABETES MELLITUS WITH DIABETIC NEUROPATHY, WITH LONG-TERM CURRENT USE OF INSULIN: ICD-10-CM

## 2024-05-16 DIAGNOSIS — I10 ESSENTIAL HYPERTENSION: ICD-10-CM

## 2024-05-16 DIAGNOSIS — E11.40 TYPE 2 DIABETES MELLITUS WITH DIABETIC NEUROPATHY, WITH LONG-TERM CURRENT USE OF INSULIN: ICD-10-CM

## 2024-05-16 DIAGNOSIS — E78.2 MIXED HYPERLIPIDEMIA: ICD-10-CM

## 2024-05-16 RX ORDER — AMLODIPINE BESYLATE 10 MG/1
10 TABLET ORAL DAILY
Qty: 90 TABLET | Refills: 3 | Status: SHIPPED | OUTPATIENT
Start: 2024-05-16

## 2024-05-21 ENCOUNTER — OFFICE VISIT (OUTPATIENT)
Dept: ENDOCRINOLOGY | Facility: CLINIC | Age: 84
End: 2024-05-21
Payer: MEDICARE

## 2024-05-21 VITALS
SYSTOLIC BLOOD PRESSURE: 140 MMHG | HEART RATE: 87 BPM | DIASTOLIC BLOOD PRESSURE: 72 MMHG | WEIGHT: 133 LBS | OXYGEN SATURATION: 98 % | HEIGHT: 65 IN | BODY MASS INDEX: 22.16 KG/M2

## 2024-05-21 DIAGNOSIS — I10 PRIMARY HYPERTENSION: ICD-10-CM

## 2024-05-21 DIAGNOSIS — E89.0 POSTSURGICAL HYPOTHYROIDISM: ICD-10-CM

## 2024-05-21 DIAGNOSIS — I25.10 CORONARY ARTERY DISEASE INVOLVING NATIVE CORONARY ARTERY OF NATIVE HEART WITHOUT ANGINA PECTORIS: ICD-10-CM

## 2024-05-21 DIAGNOSIS — R80.9 MICROALBUMINURIA: ICD-10-CM

## 2024-05-21 DIAGNOSIS — Z85.850 HISTORY OF THYROID CANCER: ICD-10-CM

## 2024-05-21 DIAGNOSIS — E11.65 TYPE 2 DIABETES MELLITUS WITH HYPERGLYCEMIA, WITH LONG-TERM CURRENT USE OF INSULIN: Primary | ICD-10-CM

## 2024-05-21 DIAGNOSIS — E11.42 DIABETIC PERIPHERAL NEUROPATHY: ICD-10-CM

## 2024-05-21 DIAGNOSIS — Z79.4 TYPE 2 DIABETES MELLITUS WITH HYPERGLYCEMIA, WITH LONG-TERM CURRENT USE OF INSULIN: Primary | ICD-10-CM

## 2024-05-21 DIAGNOSIS — E78.5 HYPERLIPIDEMIA, UNSPECIFIED HYPERLIPIDEMIA TYPE: ICD-10-CM

## 2024-05-21 LAB — GLUCOSE BLDC GLUCOMTR-MCNC: 271 MG/DL (ref 70–105)

## 2024-05-21 PROCEDURE — 82948 REAGENT STRIP/BLOOD GLUCOSE: CPT | Performed by: INTERNAL MEDICINE

## 2024-05-21 RX ORDER — ACYCLOVIR 400 MG/1
1 TABLET ORAL
Qty: 9 EACH | Refills: 3 | Status: SHIPPED | OUTPATIENT
Start: 2024-05-21

## 2024-05-21 RX ORDER — METFORMIN HYDROCHLORIDE 500 MG/1
500 TABLET, EXTENDED RELEASE ORAL
Qty: 30 TABLET | Refills: 11 | Status: SHIPPED | OUTPATIENT
Start: 2024-05-21 | End: 2025-05-21

## 2024-05-21 RX ORDER — INSULIN GLARGINE 100 [IU]/ML
22 INJECTION, SOLUTION SUBCUTANEOUS NIGHTLY
Qty: 15 ML | Refills: 3 | Status: SHIPPED | OUTPATIENT
Start: 2024-05-21

## 2024-05-21 RX ORDER — INSULIN LISPRO 100 [IU]/ML
INJECTION, SOLUTION INTRAVENOUS; SUBCUTANEOUS
Qty: 30 ML | Refills: 5 | Status: SHIPPED | OUTPATIENT
Start: 2024-05-21

## 2024-05-21 RX ORDER — LEVOTHYROXINE SODIUM 137 UG/1
137 TABLET ORAL DAILY
Qty: 90 TABLET | Refills: 1 | Status: SHIPPED | OUTPATIENT
Start: 2024-05-21

## 2024-05-21 NOTE — PATIENT INSTRUCTIONS
Please,    # Diabetes Management:    Please adjust insulin therapy as:    - Insulin Glargine (Slow acting insulin) 22 Units in the evening.  - Insulin lispro / aspart (Fast acting / meal time insulin): 6 Units with each meal.    Meal time insulin need to be taken 15 mins before meal. You can bring your insulin with you if you are planning to eat out.    If you plan to miss the meal please miss the meal time insulin as well.    - Restart Metformin  mgs once a day    Check your blood glucose four times a day or with Dexcom.    Use correction insulin if your blood is high along with your meal time insulin. Use this correction insulin before meal only, not to be taken without meal.    Corrections for High Blood Sugar  Use the following guide to “correct” for pre-meal high blood sugar.  This insulin will help “correct” the high reading.  You will still need to take as per scheduled meal insulin.    If your   Blood Sugar Reading is…. Number of additional   units of Insulin Lispro to Take…  (Correction)    Take 0 additional unit   151-200 Take 1 additional unit   201-250 Take 2 additional unit   251-300 Take 3 additional unit   301-350 Take 4 additional unit   351-400 Take 5 additional unit   More than 400 Take 6 additional unit         Low Blood Glucose:    - If you feel sweaty, anxious, shakiness, feel weak, trouble walking, feels like passing out or trouble seeing thing, please check your blood glucose and if its less than 70 or if your feel symptoms of low blood glucose then take one of the following or use Glucagon Injection:    *3 or 4 glucose tablets  *½ cup of juice or regular soda (not sugar-free)  *2 tablespoons of raisins  *4 or 5 saltine crackers  *1 tablespoon of sugar  *1 tablespoon of honey or corn syrup  *6 to 8 hard candies     #Levothyroxine Dosing:     - Continue levothyroxine therapy 137 mcg one pill by mouth daily at 6:00 AM  - Take it every day, on an empty stomach, 30 minutes before  eating  - Avoid taking it with iron, calcium, other medications (blocks absorption), wait at least 4 hrs after taking levothyroxine to take these medications  - If you miss a pill, you can take 2 the next day and return to schedule from next day     Follow-up in my clinic in 6 weeks.    Thank you for your visit today.    If you have any questions or concerns please feel free to reach out of the office.

## 2024-05-21 NOTE — PROGRESS NOTES
-----------------------------------------------------------------  ENDOCRINE CLINIC NOTE  -----------------------------------------------------------------        PATIENT NAME: Pamela Zavala  PATIENT : 1940 AGE: 83 y.o.  MRN NUMBER: [id number]  PRIMARY CARE: Marisol Rodgers APRN    ==========================================================================    CHIEF COMPLAINT: Type 2 Diabetes Management  DATE OF SERVICE: 24    ==========================================================================    HPI / SUBJECTIVE    83 y.o. female is seen in the clinic today for follow up for T2DM.  Diagnosed with type 2 diabetes around age 58.  Patient current therapy includes:  Insulin Lantus 18 units nightly  Humalog 3 units with each meal  Humalog sliding scale: 1: 50 > 150  Living in assisted living facility, [facility] on [facility].  Consuming 3 meals a day along with bedtime snack.  Checking BG through finger sticks.  Neuropathy in both feet and hands currently on gabapentin therapy. Also have right third toe infection being managed by PCP with topical therapy.  Also maintained on rosuvastatin therapy.  Underlying history of coronary artery disease s/p PCI.  History of thyroid cancer at age 33 currently on levothyroxine replacement therapy 137 mcg PO Daily.  Hx of recurrent UTI's as well.    ==========================================================================                                                PAST MEDICAL HISTORY    Past Medical History:   Diagnosis Date    Anemia     Arthritis     CAD (coronary artery disease)     Chronic back pain     Diabetic neuropathy     Essential hypertension     GERD (gastroesophageal reflux disease)     History of gastric ulcer     History of stomach ulcers     HLD (hyperlipidemia)     Hypocalcemia     Hypoparathyroidism     Hypothyroidism     Insomnia     Kidney stones     Osteoporosis     Recurrent sinusitis     Renal disease     Thyroid cancer     Type 2  diabetes mellitus        ==========================================================================    PAST SURGICAL HISTORY    Past Surgical History:   Procedure Laterality Date    CARDIAC CATHETERIZATION N/A 2023    Procedure: Left Heart Cath and coronary angiogram;  Surgeon: John Baldwin MD;  Location: [facility] CATH INVASIVE LOCATION;  Service: Cardiovascular;  Laterality: N/A;    CORONARY STENT PLACEMENT      x 3    CYSTOSCOPY, URETEROSCOPY, RETROGRADE PYELOGRAM, STENT INSERTION Left 10/18/2023    Procedure: CYSTOSCOPY URETEROSCOPY RETROGRADE PYELOGRAM HOLMIUM LASER STENT INSERTION;  Surgeon: Juan Alaniz MD;  Location: [facility] MAIN OR;  Service: Urology;  Laterality: Left;    HYSTERECTOMY      LUNG LOBECTOMY      THYROIDECTOMY         ==========================================================================    FAMILY HISTORY    Family History   Problem Relation Age of Onset    Diabetes Mother     Cancer Father     Heart disease Sister     Diabetes Sister     Heart disease Maternal Grandmother        ==========================================================================    SOCIAL HISTORY    Social History     Socioeconomic History    Marital status:     Number of children: 2    Highest education level: 11th grade   Tobacco Use    Smoking status: Former     Current packs/day: 0.00     Average packs/day: 2.0 packs/day for 30.0 years (60.0 ttl pk-yrs)     Types: Cigarettes     Start date:      Quit date: 2000     Years since quittin.4     Passive exposure: Past    Smokeless tobacco: Never   Vaping Use    Vaping status: Never Used   Substance and Sexual Activity    Alcohol use: Yes     Alcohol/week: 1.0 standard drink of alcohol     Types: 1 Glasses of wine per week    Drug use: Defer    Sexual activity: Defer       ==========================================================================    MEDICATIONS      Current Outpatient Medications:     Acetaminophen Extra Strength 500 MG tablet, ,  Disp: , Rfl:     amLODIPine (NORVASC) 10 MG tablet, Take 1 tablet by mouth Daily., Disp: 90 tablet, Rfl: 3    apixaban (ELIQUIS) 5 MG tablet tablet, Take 1 tablet by mouth 2 (Two) Times a Day., Disp: 180 tablet, Rfl: 3    atorvastatin (LIPITOR) 10 MG tablet, Take 1 tablet by mouth Daily., Disp: , Rfl:     BD AutoShield Duo 30G X 5 MM misc, , Disp: , Rfl:     clopidogrel (PLAVIX) 75 MG tablet, Take 1 tablet by mouth Daily., Disp: , Rfl:     Continuous Blood Gluc Sensor (Dexcom G7 Sensor) misc, Use 1 each 4 (Four) Times a Day Before Meals & at Bedtime., Disp: 9 each, Rfl: 3    famotidine (PEPCID) 40 MG tablet, Take 1 tablet by mouth Daily., Disp: , Rfl:     fluticasone (FLONASE) 50 MCG/ACT nasal spray, 1 spray into the nostril(s) as directed by provider 2 (Two) Times a Day., Disp: , Rfl:     folic acid (FOLVITE) 1 MG tablet, Take 1 tablet by mouth Daily., Disp: , Rfl:     gabapentin (NEURONTIN) 300 MG capsule, Take 1 capsule by mouth Every Night., Disp: , Rfl:     Insulin Lispro (humaLOG) 100 UNIT/ML injection, 3 Units with each meal and additional sliding scale               Take 0 additional unit 151-200 Take 1 additional unit 201-250 Take 2 additional unit 251-300 Take 3 additional unit 301-350 Take 4 additional unit 351-400 Take 5 additional unit More than 400 Take 6 additional unit  Maximum 9 units with each meal, Disp: 15 mL, Rfl: 5    Lantus SoloStar 100 UNIT/ML injection pen, Inject 18 Units under the skin into the appropriate area as directed Every Night., Disp: 15 mL, Rfl: 4    lisinopril (PRINIVIL,ZESTRIL) 5 MG tablet, Take 1 tablet by mouth Daily., Disp: 90 tablet, Rfl: 3    metoprolol succinate XL (TOPROL-XL) 25 MG 24 hr tablet, Take 1 tablet by mouth Daily., Disp: , Rfl:     multivitamin with minerals (THERA-M PO), Take 1 tablet by mouth Daily., Disp: , Rfl:     multivitamin with minerals tablet tablet, Take 1 tablet by mouth Daily., Disp: , Rfl:     ondansetron (ZOFRAN) 4 MG tablet, Take 1 tablet  by mouth Every 8 (Eight) Hours As Needed for Nausea or Vomiting., Disp: , Rfl:     oxybutynin (DITROPAN) 5 MG tablet, Take 1 tablet by mouth 2 (Two) Times a Day., Disp: , Rfl:     pantoprazole (PROTONIX) 40 MG EC tablet, Take 1 tablet by mouth 2 (Two) Times a Day. Take dos, Disp: , Rfl:     polyethylene glycol (MIRALAX) 17 g packet, Take 17 g by mouth Daily., Disp: , Rfl:     thiamine (VITAMIN B1) 100 MG tablet, Take 1 tablet by mouth Daily., Disp: , Rfl:     traMADol (ULTRAM) 50 MG tablet, Take 1 tablet by mouth 4 (Four) Times a Day As Needed for Moderate Pain., Disp: 20 tablet, Rfl: 0    traZODone (DESYREL) 50 MG tablet, Take 1 tablet by mouth Every Night., Disp: , Rfl:     ==========================================================================    ALLERGIES    Allergies   Allergen Reactions    Penicillins Unknown - High Severity     Lips swell and hives    Shellfish-Derived Products Unknown - Low Severity    Sulfamethizole Unknown - Low Severity    Trimethoprim Unknown - Low Severity       ==========================================================================    OBJECTIVE    Vitals:    05/21/24 0940   BP: 140/72   Pulse: 87   SpO2: 98%       Body mass index is 22.13 kg/m².     General: Alert, cooperative, no acute distress  Thyroid:  no enlargement/tenderness/palpable nodules  Lungs: Clear to auscultation bilaterally  Heart: Regular rate and rhythm, S1 and S2 normal  Extremities:  Hypertrophic nail on the right 3rd toe    ==========================================================================    LAB EVALUATION    Lab Results   Component Value Date    GLUCOSE 311 (H) 05/13/2024    BUN 26 (H) 05/13/2024    CREATININE 1.23 (H) 05/13/2024    BCR 21.1 05/13/2024    K 5.2 05/13/2024    CO2 26.5 05/13/2024    CALCIUM 8.2 (L) 05/13/2024    ALBUMIN 4.2 05/13/2024    AST 18 05/13/2024    ALT 18 05/13/2024    CHOL 123 02/13/2024    TRIG 139 02/13/2024    HDL 51 02/13/2024    LDL 48 02/13/2024     Lab Results    Component Value Date    HGBA1C 9.50 (H) 05/13/2024    HGBA1C 7.30 (H) 02/13/2024    HGBA1C 7.30 (H) 01/23/2024     Lab Results   Component Value Date    MICROALBUR 1.6 02/13/2024    CREATININE 1.23 (H) 05/13/2024     Lab Results   Component Value Date    TSH 6.360 (H) 05/13/2024    FREET4 1.48 05/13/2024       ==========================================================================    ASSESSMENT AND PLAN    # Type 2 diabetes with hyperglycemia but also concerns for hypoglycemia  # Hypertension  # Hyperlipidemia  # Coronary artery disease s/p PCI  # Microalbuminuria  # Diabetic peripheral neuropathy    - Reviewed blood sugar and there is evidence of significant fasting and postprandial hyperglycemia  - Patient is currently taking insulin therapy and A1c have also increased to 9.5%  - There is also evidence of infection on the right third toe, will refer to podiatry care and patient to have topical therapy as per primary care  - In the meantime we will uptitrate the dose of insulin to 22 units at night and increase the Humalog to 6 units with each meal  -Will also introduce metformin therapy  - Patient have a history significant for chronic recurrent UTIs and therefore we will avoid any SGLT2 inhibitor therapy  - New adjusted therapy:  Insulin Lantus 22 units nightly  Humalog 6 units with each meal  Humalog sliding scale: 1: 50 > 150  Metformin  mg once a day  - Patient to continue checking blood sugar through fingersticks or CGM  - Have appointment scheduled for ophthalmology at [facility]    # History of thyroid cancer s/p surgery  # Postsurgical hypothyroidism    - Patient is currently taking levothyroxine 137 mcg p.o. daily which was recently adjusted during hospitalization  - Patient have a history significant for iatrogenic hyperthyroidism  - Continue levothyroxine 137 mcg     Return to clinic: 6 weeks    Entire assessment and plan was discussed and counseled the patient in detail to which  "patient verbalized understanding and agreed with care.  Answered all queries and concerns.    This note was created using voice recognition software and is inherently subject to errors including those of syntax and \"sound-alike\" substitutions which may escape proofreading.  In such instances, original meaning may be extrapolated by contextual derivation.    Note: Portions of this note may have been copied from previous notes but documentation have been reviewed and edited as necessary to support clinical decision making for today's visit.    ==========================================================================    INFORMATION PROVIDED TO PATIENT    Patient Instructions   Please,    # Diabetes Management:    Please adjust insulin therapy as:    - Insulin Glargine (Slow acting insulin) 22 Units in the evening.  - Insulin lispro / aspart (Fast acting / meal time insulin): 6 Units with each meal.    Meal time insulin need to be taken 15 mins before meal. You can bring your insulin with you if you are planning to eat out.    If you plan to miss the meal please miss the meal time insulin as well.    - Restart Metformin  mgs once a day    Check your blood glucose four times a day or with Dexcom.    Use correction insulin if your blood is high along with your meal time insulin. Use this correction insulin before meal only, not to be taken without meal.    Corrections for High Blood Sugar  Use the following guide to “correct” for pre-meal high blood sugar.  This insulin will help “correct” the high reading.  You will still need to take as per scheduled meal insulin.    If your   Blood Sugar Reading is…. Number of additional   units of Insulin Lispro to Take…  (Correction)    Take 0 additional unit   151-200 Take 1 additional unit   201-250 Take 2 additional unit   251-300 Take 3 additional unit   301-350 Take 4 additional unit   351-400 Take 5 additional unit   More than 400 Take 6 additional unit         Low " Blood Glucose:    - If you feel sweaty, anxious, shakiness, feel weak, trouble walking, feels like passing out or trouble seeing thing, please check your blood glucose and if its less than 70 or if your feel symptoms of low blood glucose then take one of the following or use Glucagon Injection:    *3 or 4 glucose tablets  *½ cup of juice or regular soda (not sugar-free)  *2 tablespoons of raisins  *4 or 5 saltine crackers  *1 tablespoon of sugar  *1 tablespoon of honey or corn syrup  *6 to 8 hard candies     #Levothyroxine Dosing:     - Continue levothyroxine therapy 137 mcg one pill by mouth daily at 6:00 AM  - Take it every day, on an empty stomach, 30 minutes before eating  - Avoid taking it with iron, calcium, other medications (blocks absorption), wait at least 4 hrs after taking levothyroxine to take these medications  - If you miss a pill, you can take 2 the next day and return to schedule from next day     Follow-up in my clinic in 6 weeks.    Thank you for your visit today.    If you have any questions or concerns please feel free to reach out of the office.       ==========================================================================  Driss Dan MD  Department of Endocrine, Diabetes and Metabolism  [facility], [place]  ==========================================================================

## 2024-05-29 ENCOUNTER — TELEPHONE (OUTPATIENT)
Dept: PODIATRY | Facility: CLINIC | Age: 84
End: 2024-05-29
Payer: MEDICARE

## 2024-05-29 NOTE — TELEPHONE ENCOUNTER
Caller: Pamela Zavala    Relationship to patient: Self    Best call back number: [phone]    Chief complaint: DFC, ISSUE WITH TOENAIL    Type of visit: NEW PATIENT    Requested date: ASAP     If rescheduling, when is the original appointment: 07/16/24     Additional notes:PATIENT STATES SHE WAS GIVING HERSELF A PEDICURE, AND ONE OF HER TOENAILS RIPPED OUT. SHE IS ASKING IF HER APPT CAN BE SCHEDULED FOR AN EARLIER DATE.

## 2024-06-11 ENCOUNTER — TELEPHONE (OUTPATIENT)
Dept: ENDOCRINOLOGY | Facility: CLINIC | Age: 84
End: 2024-06-11

## 2024-06-12 NOTE — TELEPHONE ENCOUNTER
Please reach out to the patient and inform that either she needs to go to an urgent care or ER or reach out to her primary care.

## 2024-07-08 ENCOUNTER — HOSPITAL ENCOUNTER (OUTPATIENT)
Dept: ULTRASOUND IMAGING | Facility: HOSPITAL | Age: 84
Discharge: HOME | End: 2024-07-08
Admitting: OBSTETRICS & GYNECOLOGY
Payer: MEDICARE

## 2024-07-08 DIAGNOSIS — N83.291 OTHER OVARIAN CYST, RIGHT SIDE: ICD-10-CM

## 2024-07-08 PROCEDURE — 93976 VASCULAR STUDY: CPT

## 2024-07-08 PROCEDURE — 76830 TRANSVAGINAL US NON-OB: CPT

## 2024-07-09 ENCOUNTER — OFFICE VISIT (OUTPATIENT)
Dept: ENDOCRINOLOGY | Facility: CLINIC | Age: 84
End: 2024-07-09
Payer: MEDICARE

## 2024-07-09 VITALS
HEIGHT: 65 IN | WEIGHT: 143 LBS | OXYGEN SATURATION: 97 % | DIASTOLIC BLOOD PRESSURE: 68 MMHG | HEART RATE: 77 BPM | SYSTOLIC BLOOD PRESSURE: 130 MMHG | BODY MASS INDEX: 23.82 KG/M2

## 2024-07-09 DIAGNOSIS — E11.65 TYPE 2 DIABETES MELLITUS WITH HYPERGLYCEMIA, WITH LONG-TERM CURRENT USE OF INSULIN: Primary | ICD-10-CM

## 2024-07-09 DIAGNOSIS — E78.5 HYPERLIPIDEMIA, UNSPECIFIED HYPERLIPIDEMIA TYPE: ICD-10-CM

## 2024-07-09 DIAGNOSIS — Z79.4 TYPE 2 DIABETES MELLITUS WITH HYPERGLYCEMIA, WITH LONG-TERM CURRENT USE OF INSULIN: Primary | ICD-10-CM

## 2024-07-09 DIAGNOSIS — E11.42 DIABETIC PERIPHERAL NEUROPATHY: ICD-10-CM

## 2024-07-09 DIAGNOSIS — Z85.850 HISTORY OF THYROID CANCER: ICD-10-CM

## 2024-07-09 DIAGNOSIS — I10 PRIMARY HYPERTENSION: ICD-10-CM

## 2024-07-09 DIAGNOSIS — E89.0 POSTSURGICAL HYPOTHYROIDISM: ICD-10-CM

## 2024-07-09 DIAGNOSIS — R80.9 MICROALBUMINURIA: ICD-10-CM

## 2024-07-09 DIAGNOSIS — I25.10 CORONARY ARTERY DISEASE INVOLVING NATIVE CORONARY ARTERY OF NATIVE HEART WITHOUT ANGINA PECTORIS: ICD-10-CM

## 2024-07-09 LAB — GLUCOSE BLDC GLUCOMTR-MCNC: 210 MG/DL (ref 70–105)

## 2024-07-09 PROCEDURE — 82948 REAGENT STRIP/BLOOD GLUCOSE: CPT | Performed by: INTERNAL MEDICINE

## 2024-07-09 RX ORDER — INSULIN GLARGINE 100 [IU]/ML
22 INJECTION, SOLUTION SUBCUTANEOUS NIGHTLY
Qty: 15 ML | Refills: 3 | Status: SHIPPED | OUTPATIENT
Start: 2024-07-09

## 2024-07-09 RX ORDER — INSULIN LISPRO 100 [IU]/ML
INJECTION, SOLUTION INTRAVENOUS; SUBCUTANEOUS
Qty: 30 ML | Refills: 5 | Status: SHIPPED | OUTPATIENT
Start: 2024-07-09

## 2024-07-09 NOTE — PROGRESS NOTES
-----------------------------------------------------------------  ENDOCRINE CLINIC NOTE  -----------------------------------------------------------------        PATIENT NAME: Pamela Zavala  PATIENT : 1940 AGE: 83 y.o.  MRN NUMBER: [id number]  PRIMARY CARE: Marisol Rodgers APRN    ==========================================================================    CHIEF COMPLAINT: Type 2 Diabetes Management  DATE OF SERVICE: 24    ==========================================================================    HPI / SUBJECTIVE    83 y.o. female is seen in the clinic today for follow up for T2DM.  Diagnosed with type 2 diabetes around age 58.  Patient current therapy includes:  Metformin  mg once a day  Insulin Lantus 18 units nightly  Humalog 3 units with each meal  Humalog sliding scale: 1: 50 > 150  Living in assisted living facility, [facility] on [facility].  Patient was suppose to be on higher dose of insulin but was no change since the last visit.  Consuming 3 meals a day along with bedtime snack.  Checking BG through finger sticks.  Neuropathy in both feet and hands currently on gabapentin therapy.   Patient have LE Cellulitis and being managed with Abx.  Patient is planned to be establish care with podiatry for onychomycosis.  Also maintained on rosuvastatin therapy.  Underlying history of coronary artery disease s/p PCI.  History of thyroid cancer at age 33 currently on levothyroxine replacement therapy 137 mcg PO Daily.  Hx of recurrent UTI's as well.  Following Jared Amin for eye exam.    ==========================================================================                                                PAST MEDICAL HISTORY    Past Medical History:   Diagnosis Date    Anemia     Arthritis     CAD (coronary artery disease)     Chronic back pain     Diabetic neuropathy     Essential hypertension     GERD (gastroesophageal reflux disease)     History of gastric ulcer     History of  stomach ulcers     HLD (hyperlipidemia)     Hypocalcemia     Hypoparathyroidism     Hypothyroidism     Insomnia     Kidney stones     Osteoporosis     Recurrent sinusitis     Renal disease     Thyroid cancer     Type 2 diabetes mellitus        ==========================================================================    PAST SURGICAL HISTORY    Past Surgical History:   Procedure Laterality Date    CARDIAC CATHETERIZATION N/A 2023    Procedure: Left Heart Cath and coronary angiogram;  Surgeon: John Baldwin MD;  Location: [facility] CATH INVASIVE LOCATION;  Service: Cardiovascular;  Laterality: N/A;    CORONARY STENT PLACEMENT      x 3    CYSTOSCOPY, URETEROSCOPY, RETROGRADE PYELOGRAM, STENT INSERTION Left 10/18/2023    Procedure: CYSTOSCOPY URETEROSCOPY RETROGRADE PYELOGRAM HOLMIUM LASER STENT INSERTION;  Surgeon: Juan Alaniz MD;  Location: [facility] MAIN OR;  Service: Urology;  Laterality: Left;    HYSTERECTOMY      LUNG LOBECTOMY      THYROIDECTOMY         ==========================================================================    FAMILY HISTORY    Family History   Problem Relation Age of Onset    Diabetes Mother     Cancer Father     Heart disease Sister     Diabetes Sister     Heart disease Maternal Grandmother        ==========================================================================    SOCIAL HISTORY    Social History     Socioeconomic History    Marital status:     Number of children: 2    Highest education level: 11th grade   Tobacco Use    Smoking status: Former     Current packs/day: 0.00     Average packs/day: 2.0 packs/day for 30.0 years (60.0 ttl pk-yrs)     Types: Cigarettes     Start date:      Quit date: 2000     Years since quittin.5     Passive exposure: Past    Smokeless tobacco: Never   Vaping Use    Vaping status: Never Used   Substance and Sexual Activity    Alcohol use: Yes     Alcohol/week: 1.0 standard drink of alcohol     Types: 1 Glasses of wine per week    Drug  use: Defer    Sexual activity: Defer       ==========================================================================    MEDICATIONS      Current Outpatient Medications:     Acetaminophen Extra Strength 500 MG tablet, , Disp: , Rfl:     amLODIPine (NORVASC) 10 MG tablet, Take 1 tablet by mouth Daily., Disp: 90 tablet, Rfl: 3    apixaban (ELIQUIS) 5 MG tablet tablet, Take 1 tablet by mouth 2 (Two) Times a Day., Disp: 180 tablet, Rfl: 3    atorvastatin (LIPITOR) 10 MG tablet, Take 1 tablet by mouth Daily., Disp: , Rfl:     BD AutoShield Duo 30G X 5 MM misc, , Disp: , Rfl:     clopidogrel (PLAVIX) 75 MG tablet, Take 1 tablet by mouth Daily., Disp: , Rfl:     Continuous Glucose Sensor (Dexcom G7 Sensor) misc, Use 1 each 4 (Four) Times a Day Before Meals & at Bedtime., Disp: 9 each, Rfl: 3    famotidine (PEPCID) 40 MG tablet, Take 1 tablet by mouth Daily., Disp: , Rfl:     fluticasone (FLONASE) 50 MCG/ACT nasal spray, 1 spray into the nostril(s) as directed by provider 2 (Two) Times a Day., Disp: , Rfl:     folic acid (FOLVITE) 1 MG tablet, Take 1 tablet by mouth Daily., Disp: , Rfl:     gabapentin (NEURONTIN) 300 MG capsule, Take 1 capsule by mouth Every Night., Disp: , Rfl:     Insulin Lispro (humaLOG) 100 UNIT/ML injection, 6 Units with each meal and additional sliding scale               Take 0 additional unit 151-200 Take 1 additional unit 201-250 Take 2 additional unit 251-300 Take 3 additional unit 301-350 Take 4 additional unit 351-400 Take 5 additional unit More than 400 Take 6 additional unit  Maximum 12 units with each meal, Disp: 30 mL, Rfl: 5    Lantus SoloStar 100 UNIT/ML injection pen, Inject 22 Units under the skin into the appropriate area as directed Every Night., Disp: 15 mL, Rfl: 3    levothyroxine (SYNTHROID, LEVOTHROID) 137 MCG tablet, Take 1 tablet by mouth Daily., Disp: 90 tablet, Rfl: 1    lisinopril (PRINIVIL,ZESTRIL) 5 MG tablet, Take 1 tablet by mouth Daily., Disp: 90 tablet, Rfl: 3     metFORMIN ER (GLUCOPHAGE-XR) 500 MG 24 hr tablet, Take 1 tablet by mouth Daily With Breakfast., Disp: 30 tablet, Rfl: 11    metoprolol succinate XL (TOPROL-XL) 25 MG 24 hr tablet, Take 1 tablet by mouth Daily., Disp: , Rfl:     multivitamin with minerals (THERA-M PO), Take 1 tablet by mouth Daily., Disp: , Rfl:     multivitamin with minerals tablet tablet, Take 1 tablet by mouth Daily., Disp: , Rfl:     ondansetron (ZOFRAN) 4 MG tablet, Take 1 tablet by mouth Every 8 (Eight) Hours As Needed for Nausea or Vomiting., Disp: , Rfl:     oxybutynin (DITROPAN) 5 MG tablet, Take 1 tablet by mouth 2 (Two) Times a Day., Disp: , Rfl:     pantoprazole (PROTONIX) 40 MG EC tablet, Take 1 tablet by mouth 2 (Two) Times a Day. Take dos, Disp: , Rfl:     polyethylene glycol (MIRALAX) 17 g packet, Take 17 g by mouth Daily., Disp: , Rfl:     thiamine (VITAMIN B1) 100 MG tablet, Take 1 tablet by mouth Daily., Disp: , Rfl:     traMADol (ULTRAM) 50 MG tablet, Take 1 tablet by mouth 4 (Four) Times a Day As Needed for Moderate Pain., Disp: 20 tablet, Rfl: 0    traZODone (DESYREL) 50 MG tablet, Take 1 tablet by mouth Every Night., Disp: , Rfl:     ==========================================================================    ALLERGIES    Allergies   Allergen Reactions    Penicillins Unknown - High Severity     Lips swell and hives    Shellfish-Derived Products Unknown - Low Severity    Sulfamethizole Unknown - Low Severity    Trimethoprim Unknown - Low Severity       ==========================================================================    OBJECTIVE    Vitals:    07/09/24 1120   BP: 130/68   Pulse: 77   SpO2: 97%         Body mass index is 23.8 kg/m².     General: Alert, cooperative, no acute distress  Thyroid:  no enlargement/tenderness/palpable nodules  Lungs: Clear to auscultation bilaterally  Heart: Regular rate and rhythm, S1 and S2 normal  Extremities:  Hypertrophic nail on the right 3rd  toe    ==========================================================================    LAB EVALUATION    Lab Results   Component Value Date    GLUCOSE 311 (H) 05/13/2024    BUN 26 (H) 05/13/2024    CREATININE 1.23 (H) 05/13/2024    BCR 21.1 05/13/2024    K 5.2 05/13/2024    CO2 26.5 05/13/2024    CALCIUM 8.2 (L) 05/13/2024    ALBUMIN 4.2 05/13/2024    AST 18 05/13/2024    ALT 18 05/13/2024    CHOL 123 02/13/2024    TRIG 139 02/13/2024    HDL 51 02/13/2024    LDL 48 02/13/2024     Lab Results   Component Value Date    HGBA1C 9.50 (H) 05/13/2024    HGBA1C 7.30 (H) 02/13/2024    HGBA1C 7.30 (H) 01/23/2024     Lab Results   Component Value Date    MICROALBUR 1.6 02/13/2024    CREATININE 1.23 (H) 05/13/2024     Lab Results   Component Value Date    TSH 6.360 (H) 05/13/2024    FREET4 1.48 05/13/2024       ==========================================================================    ASSESSMENT AND PLAN    # Type 2 diabetes with hyperglycemia but also concerns for hypoglycemia  # Hypertension  # Hyperlipidemia  # Coronary artery disease s/p PCI  # Microalbuminuria  # Diabetic peripheral neuropathy    - Reviewed blood sugar and there is evidence of significant fasting and postprandial hyperglycemia  - Patient continues to be on the same insulin dosing which she was maintained at prior visit  - Insulin doses were adjusted but no changes have been made to the regimen at assisted living  - Will suggest the same dose changes as last time and monitor response to it  - Patient have a history significant for chronic recurrent UTIs and therefore we will avoid any SGLT2 inhibitor therapy  - New adjusted therapy:  Insulin Lantus 22 units nightly  Humalog 6 units with each meal  Humalog sliding scale: 1: 50 > 150  Metformin  mg once a day  - Patient to continue checking blood sugar through fingersticks or CGM    # History of thyroid cancer s/p surgery  # Postsurgical hypothyroidism  - Continue levothyroxine 137 mcg     Return to  "clinic: 8 weeks    Entire assessment and plan was discussed and counseled the patient in detail to which patient verbalized understanding and agreed with care.  Answered all queries and concerns.    This note was created using voice recognition software and is inherently subject to errors including those of syntax and \"sound-alike\" substitutions which may escape proofreading.  In such instances, original meaning may be extrapolated by contextual derivation.    Note: Portions of this note may have been copied from previous notes but documentation have been reviewed and edited as necessary to support clinical decision making for today's visit.    ==========================================================================    INFORMATION PROVIDED TO PATIENT    Patient Instructions   Please,    # Diabetes Management:    Please adjust insulin therapy as:    - Insulin Glargine (Slow acting insulin) 22 Units in the evening.  - Insulin lispro / aspart (Fast acting / meal time insulin): 6 Units with each meal.    Meal time insulin need to be taken 15 mins before meal. You can bring your insulin with you if you are planning to eat out.    If you plan to miss the meal please miss the meal time insulin as well.    - Restart Metformin  mgs once a day    Check your blood glucose four times a day or with Dexcom.    Use correction insulin if your blood is high along with your meal time insulin. Use this correction insulin before meal only, not to be taken without meal.    Corrections for High Blood Sugar  Use the following guide to “correct” for pre-meal high blood sugar.  This insulin will help “correct” the high reading.  You will still need to take as per scheduled meal insulin.    If your   Blood Sugar Reading is…. Number of additional   units of Insulin Lispro to Take…  (Correction)    Take 0 additional unit   151-200 Take 1 additional unit   201-250 Take 2 additional unit   251-300 Take 3 additional unit   301-350 " Take 4 additional unit   351-400 Take 5 additional unit   More than 400 Take 6 additional unit         Low Blood Glucose:    - If you feel sweaty, anxious, shakiness, feel weak, trouble walking, feels like passing out or trouble seeing thing, please check your blood glucose and if its less than 70 or if your feel symptoms of low blood glucose then take one of the following or use Glucagon Injection:    *3 or 4 glucose tablets  *½ cup of juice or regular soda (not sugar-free)  *2 tablespoons of raisins  *4 or 5 saltine crackers  *1 tablespoon of sugar  *1 tablespoon of honey or corn syrup  *6 to 8 hard candies     #Levothyroxine Dosing:     - Continue levothyroxine therapy 137 mcg one pill by mouth daily at 6:00 AM  - Take it every day, on an empty stomach, 30 minutes before eating  - Avoid taking it with iron, calcium, other medications (blocks absorption), wait at least 4 hrs after taking levothyroxine to take these medications  - If you miss a pill, you can take 2 the next day and return to schedule from next day     Follow-up in my clinic in 2 months.    Blood work before next visit.    Thank you for your visit today.    If you have any questions or concerns please feel free to reach out of the office.       ==========================================================================  Driss Dan MD  Department of Endocrine, Diabetes and Metabolism  [facility] [place]  ==========================================================================

## 2024-07-09 NOTE — PATIENT INSTRUCTIONS
Please,    # Diabetes Management:    Please adjust insulin therapy as:    - Insulin Glargine (Slow acting insulin) 22 Units in the evening.  - Insulin lispro / aspart (Fast acting / meal time insulin): 6 Units with each meal.    Meal time insulin need to be taken 15 mins before meal. You can bring your insulin with you if you are planning to eat out.    If you plan to miss the meal please miss the meal time insulin as well.    - Restart Metformin  mgs once a day    Check your blood glucose four times a day or with Dexcom.    Use correction insulin if your blood is high along with your meal time insulin. Use this correction insulin before meal only, not to be taken without meal.    Corrections for High Blood Sugar  Use the following guide to “correct” for pre-meal high blood sugar.  This insulin will help “correct” the high reading.  You will still need to take as per scheduled meal insulin.    If your   Blood Sugar Reading is…. Number of additional   units of Insulin Lispro to Take…  (Correction)    Take 0 additional unit   151-200 Take 1 additional unit   201-250 Take 2 additional unit   251-300 Take 3 additional unit   301-350 Take 4 additional unit   351-400 Take 5 additional unit   More than 400 Take 6 additional unit         Low Blood Glucose:    - If you feel sweaty, anxious, shakiness, feel weak, trouble walking, feels like passing out or trouble seeing thing, please check your blood glucose and if its less than 70 or if your feel symptoms of low blood glucose then take one of the following or use Glucagon Injection:    *3 or 4 glucose tablets  *½ cup of juice or regular soda (not sugar-free)  *2 tablespoons of raisins  *4 or 5 saltine crackers  *1 tablespoon of sugar  *1 tablespoon of honey or corn syrup  *6 to 8 hard candies     #Levothyroxine Dosing:     - Continue levothyroxine therapy 137 mcg one pill by mouth daily at 6:00 AM  - Take it every day, on an empty stomach, 30 minutes before  eating  - Avoid taking it with iron, calcium, other medications (blocks absorption), wait at least 4 hrs after taking levothyroxine to take these medications  - If you miss a pill, you can take 2 the next day and return to schedule from next day     Follow-up in my clinic in 2 months.    Blood work before next visit.    Thank you for your visit today.    If you have any questions or concerns please feel free to reach out of the office.

## 2024-07-15 ENCOUNTER — TELEPHONE (OUTPATIENT)
Dept: PODIATRY | Facility: CLINIC | Age: 84
End: 2024-07-15
Payer: MEDICARE

## 2024-07-15 NOTE — TELEPHONE ENCOUNTER
Provider: BHARGAV WELLS    Caller: FRANDY CORTES    Relationship to Patient: SELF    Phone Number: [phone]    Reason for Call: PT WANTING TO KNOW IF SHE WILL BE ABLE TO HAVE HER TOENAILS CLIPPED WHEN SHE IS IN FOR HER APPT 8/6/24.

## 2024-07-19 ENCOUNTER — OFFICE VISIT (OUTPATIENT)
Dept: PODIATRY | Facility: CLINIC | Age: 84
End: 2024-07-19
Payer: MEDICARE

## 2024-07-19 DIAGNOSIS — E11.65 TYPE 2 DIABETES MELLITUS WITH HYPERGLYCEMIA, WITH LONG-TERM CURRENT USE OF INSULIN: Primary | ICD-10-CM

## 2024-07-19 DIAGNOSIS — E11.42 DIABETIC PERIPHERAL NEUROPATHY ASSOCIATED WITH TYPE 2 DIABETES MELLITUS: ICD-10-CM

## 2024-07-19 DIAGNOSIS — L85.3 XEROSIS OF SKIN: ICD-10-CM

## 2024-07-19 DIAGNOSIS — Z79.4 TYPE 2 DIABETES MELLITUS WITH HYPERGLYCEMIA, WITH LONG-TERM CURRENT USE OF INSULIN: Primary | ICD-10-CM

## 2024-07-19 DIAGNOSIS — B35.1 ONYCHOMYCOSIS: ICD-10-CM

## 2024-07-19 DIAGNOSIS — M20.41 HAMMERTOE OF RIGHT FOOT: ICD-10-CM

## 2024-07-19 DIAGNOSIS — M79.674 PAIN IN TOES OF BOTH FEET: ICD-10-CM

## 2024-07-19 DIAGNOSIS — M79.675 PAIN IN TOES OF BOTH FEET: ICD-10-CM

## 2024-07-19 DIAGNOSIS — R26.81 UNSTEADINESS ON FEET: ICD-10-CM

## 2024-07-19 DIAGNOSIS — L84 PRE-ULCERATIVE CALLUSES: ICD-10-CM

## 2024-07-21 NOTE — PROGRESS NOTES
07/19/2024  Foot and Ankle Surgery - New Patient   Provider: CLAUDE Santos   Location: [facility]    Subjective:  Pamela Zavala is a 83 y.o. female.     No chief complaint on file.      HPI: Patient presents to office today as new patient wanting to establish care for routine diabetic foot checks and care.  She patient denies any history of significant wounds or infections to her feet.  He reports pain to her right third toe with callus.  She reports she is unable to safely trim her nails independently.  She denies any significant tingling or burning to her feet.  Patient request prescription to [facility] pharmacy for diabetic shoes as she has used them before and goes there for other medical supplies.  Patient lives in a long-term care facility, [facility].    Allergies   Allergen Reactions    Penicillins Unknown - High Severity     Lips swell and hives    Shellfish-Derived Products Unknown - Low Severity    Sulfamethizole Unknown - Low Severity    Trimethoprim Unknown - Low Severity       Past Medical History:   Diagnosis Date    Anemia     Arthritis     CAD (coronary artery disease)     Chronic back pain     Diabetic neuropathy     Essential hypertension     GERD (gastroesophageal reflux disease)     History of gastric ulcer     History of stomach ulcers     HLD (hyperlipidemia)     Hypocalcemia     Hypoparathyroidism     Hypothyroidism     Insomnia     Kidney stones     Osteoporosis     Recurrent sinusitis     Renal disease     Thyroid cancer     Type 2 diabetes mellitus        Past Surgical History:   Procedure Laterality Date    CARDIAC CATHETERIZATION N/A 05/03/2023    Procedure: Left Heart Cath and coronary angiogram;  Surgeon: John Baldwin MD;  Location: [facility] INVASIVE LOCATION;  Service: Cardiovascular;  Laterality: N/A;    CORONARY STENT PLACEMENT      x 3    CYSTOSCOPY, URETEROSCOPY, RETROGRADE PYELOGRAM, STENT INSERTION Left 10/18/2023    Procedure: CYSTOSCOPY URETEROSCOPY  RETROGRADE PYELOGRAM HOLMIUM LASER STENT INSERTION;  Surgeon: Juan Alaniz MD;  Location: [facility] MAIN OR;  Service: Urology;  Laterality: Left;    HYSTERECTOMY      LUNG LOBECTOMY      THYROIDECTOMY         Family History   Problem Relation Age of Onset    Diabetes Mother     Cancer Father     Heart disease Sister     Diabetes Sister     Heart disease Maternal Grandmother        Social History     Socioeconomic History    Marital status:     Number of children: 2    Highest education level: 11th grade   Tobacco Use    Smoking status: Former     Current packs/day: 0.00     Average packs/day: 2.0 packs/day for 30.0 years (60.0 ttl pk-yrs)     Types: Cigarettes     Start date:      Quit date:      Years since quittin.5     Passive exposure: Past    Smokeless tobacco: Never   Vaping Use    Vaping status: Never Used   Substance and Sexual Activity    Alcohol use: Yes     Alcohol/week: 1.0 standard drink of alcohol     Types: 1 Glasses of wine per week    Drug use: Defer    Sexual activity: Defer        Current Outpatient Medications on File Prior to Visit   Medication Sig Dispense Refill    Acetaminophen Extra Strength 500 MG tablet       amLODIPine (NORVASC) 10 MG tablet Take 1 tablet by mouth Daily. 90 tablet 3    apixaban (ELIQUIS) 5 MG tablet tablet Take 1 tablet by mouth 2 (Two) Times a Day. 180 tablet 3    atorvastatin (LIPITOR) 10 MG tablet Take 1 tablet by mouth Daily.      BD AutoShield Duo 30G X 5 MM misc       clopidogrel (PLAVIX) 75 MG tablet Take 1 tablet by mouth Daily.      Continuous Glucose Sensor (Dexcom G7 Sensor) misc Use 1 each 4 (Four) Times a Day Before Meals & at Bedtime. 9 each 3    famotidine (PEPCID) 40 MG tablet Take 1 tablet by mouth Daily.      fluticasone (FLONASE) 50 MCG/ACT nasal spray 1 spray into the nostril(s) as directed by provider 2 (Two) Times a Day.      folic acid (FOLVITE) 1 MG tablet Take 1 tablet by mouth Daily.      gabapentin (NEURONTIN) 300 MG capsule  Take 1 capsule by mouth Every Night.      Insulin Lispro (humaLOG) 100 UNIT/ML injection 6 Units with each meal and additional sliding scale                 Take 0 additional unit  151-200 Take 1 additional unit  201-250 Take 2 additional unit  251-300 Take 3 additional unit  301-350 Take 4 additional unit  351-400 Take 5 additional unit  More than 400 Take 6 additional unit    Maximum 12 units with each meal 30 mL 5    Lantus SoloStar 100 UNIT/ML injection pen Inject 22 Units under the skin into the appropriate area as directed Every Night. 15 mL 3    levothyroxine (SYNTHROID, LEVOTHROID) 137 MCG tablet Take 1 tablet by mouth Daily. 90 tablet 1    lisinopril (PRINIVIL,ZESTRIL) 5 MG tablet Take 1 tablet by mouth Daily. 90 tablet 3    metFORMIN ER (GLUCOPHAGE-XR) 500 MG 24 hr tablet Take 1 tablet by mouth Daily With Breakfast. 30 tablet 11    metoprolol succinate XL (TOPROL-XL) 25 MG 24 hr tablet Take 1 tablet by mouth Daily.      multivitamin with minerals (THERA-M PO) Take 1 tablet by mouth Daily.      multivitamin with minerals tablet tablet Take 1 tablet by mouth Daily.      ondansetron (ZOFRAN) 4 MG tablet Take 1 tablet by mouth Every 8 (Eight) Hours As Needed for Nausea or Vomiting.      oxybutynin (DITROPAN) 5 MG tablet Take 1 tablet by mouth 2 (Two) Times a Day.      pantoprazole (PROTONIX) 40 MG EC tablet Take 1 tablet by mouth 2 (Two) Times a Day. Take dos      polyethylene glycol (MIRALAX) 17 g packet Take 17 g by mouth Daily.      thiamine (VITAMIN B1) 100 MG tablet Take 1 tablet by mouth Daily.      traMADol (ULTRAM) 50 MG tablet Take 1 tablet by mouth 4 (Four) Times a Day As Needed for Moderate Pain. 20 tablet 0    traZODone (DESYREL) 50 MG tablet Take 1 tablet by mouth Every Night.       No current facility-administered medications on file prior to visit.       Review of Systems:  General: Denies fever, chills, fatigue, and weakness.  Eyes: Denies vision loss, blurry vision, and excessive  redness.  ENT: Denies hearing issues and difficulty swallowing.  Cardiovascular: Denies palpitations, chest pain, or syncopal episodes.  Respiratory: Denies shortness of breath, wheezing, and coughing.  GI: Denies abdominal pain, nausea, and vomiting.   : Denies frequency, hematuria, and urgency.  Musculoskeletal: Denies muscle cramps, joint pains, and stiffness.  Derm: Denies rash, open wounds, or suspicious lesions.  Neuro: Denies headaches, numbness, loss of coordination, and tremors.  Psych: Denies anxiety and depression.  Endocrine: Denies temperature intolerance and changes in appetite.  Heme: Denies bleeding disorders or abnormal bruising.     Objective   There were no vitals taken for this visit.    Foot/Ankle Exam    GENERAL  Diabetic foot exam performed    Appearance:  appears stated age and elderly  Orientation:  AAOx3  Affect:  appropriate  Gait:  unimpaired  Assistance:  independent  Right shoe gear: casual shoe and sock  Left shoe gear: casual shoe and sock    VASCULAR     Right Foot Vascularity   Dorsalis pedis:  1+  Skin temperature:  warm  Edema grading:  None  CFT:  < 3 seconds  Pedal hair growth:  Present  Varicosities:  mild varicosities     Left Foot Vascularity   Dorsalis pedis:  1+  Skin temperature:  warm  Edema grading:  None  CFT:  < 3 seconds  Pedal hair growth:  Absent  Varicosities:  mild varicosities     NEUROLOGIC     Right Foot Neurologic   Light touch sensation: normal  Protective Sensation using [place] Monofilament:   Sites intact: 10  Sites tested: 10     Left Foot Neurologic   Light touch sensation: normal  Protective Sensation using [place] Monofilament:   Sites intact: 9  Sites tested: 10    MUSCULOSKELETAL     Right Foot Musculoskeletal    Amputation   Toes amputated: third toe  Ecchymosis:  right foot callus and toe 3  Tenderness:  toenail problem       Left Foot Musculoskeletal   Ecchymosis:  none  Tenderness:  toenail problem    MUSCLE STRENGTH     Right  Foot Muscle Strength   Foot dorsiflexion:  5  Foot plantar flexion:  5     Left Foot Muscle Strength   Foot dorsiflexion:  5  Foot plantar flexion:  5    DERMATOLOGIC      Right Foot Dermatologic   Skin  Positive for corn, dryness and skin changes.   Nails  1.  Positive for elongated, onychomycosis, abnormal thickness and dystrophic nail.  2.  Positive for elongated, onychomycosis, abnormal thickness and dystrophic nail.  3.  Positive for elongated, onychomycosis, abnormal thickness and dystrophic nail.  4.  Positive for elongated, onychomycosis, abnormal thickness and dystrophic nail.  5.  Positive for elongated, onychomycosis, abnormal thickness and dystrophic nail.     Left Foot Dermatologic   Skin  Positive for dryness and skin changes.   Nails  1.  Positive for elongated, onychomycosis, abnormal thickness and dystrophic nail.  2.  Positive for elongated, onychomycosis, abnormal thickness and dystrophic nail.  3.  Positive for elongated, onychomycosis, abnormal thickness and dystrophic nail.  4.  Positive for elongated, onychomycosis and dystrophic nail.  5.  Positive for elongated, onychomycosis, abnormally thick and dystrophic nail.      Assessment & Plan   Diagnoses and all orders for this visit:    1. Type 2 diabetes mellitus with hyperglycemia, with long-term current use of insulin (Primary)    2. Diabetic peripheral neuropathy associated with type 2 diabetes mellitus    3. Xerosis of skin    4. Onychomycosis    5. Pain in toes of both feet    6. Unsteadiness on feet    7. Hammertoe of right foot    8. Pre-ulcerative calluses      On exam bilateral feet appear stable with no open wounds or signs of active acute infection.  Through chart review patient's last A1c was 9.5% 2 months ago.  I do feel patient is at mild to moderate risk for pedal complications related to diabetes.Explained importance of diabetic foot care, daily foot checks, and glycemic control. Patient should check both feet on a daily basis,  monitor and control blood sugars, make sure that both feet and in between toes are towel dried after baths or showers. Avoid barefoot walking at all times.  I discussed wearing white socks and checking shoes before wearing them. Patient was given information on proper foot care. Call the office at the first signs of a wound or with signs of infection.     Patient has thickened dystrophic elongated toenails to bilateral feet.  Nail debridement: Both feet x10    Consent and time out was performed before proceeding with the procedure. Nails were debrided with a nail nipper without complication.  No anesthesia was required.  Indications for procedure were thickened, dystrophic, and fungal appearing nails which are difficult to trim.  Proper self-care and technique was discussed with patient.  Patient was stable after procedure.     Patient has painful preulcerative callus to right third toe related to hammertoe.  Discussed pathophysiology of hammertoe and risks of callus formation.  Callus was debrided today x 1 with curette patient tolerated well.  Advised patient to wear crest pad to help offload and straighten toe and prevent further callusing and future breakdown.    Prescription for diabetic shoes was given to patient today and will be faxed to [facility] pharmacy.    Will have patient follow-up in 2 months for routine diabetic foot check and call with any questions or concerns should they arise before scheduled appointment.    No orders of the defined types were placed in this encounter.       Note is dictated utilizing voice recognition software. Unfortunately this leads to occasional typographical errors. I apologize in advance if the situation occurs. If questions occur please do not hesitate to call our office.

## 2024-07-23 VITALS — WEIGHT: 163 LBS | BODY MASS INDEX: 26.2 KG/M2 | HEIGHT: 66 IN | OXYGEN SATURATION: 96 % | HEART RATE: 84 BPM

## 2024-07-29 ENCOUNTER — TELEPHONE (OUTPATIENT)
Dept: ENDOCRINOLOGY | Facility: CLINIC | Age: 84
End: 2024-07-29
Payer: MEDICARE

## 2024-07-29 NOTE — TELEPHONE ENCOUNTER
Received fax from [facility] pharmacy for diabetic shoes. Paperwork placed on providers desk. Once signed will fax.

## 2024-08-05 ENCOUNTER — TELEPHONE (OUTPATIENT)
Dept: ORTHOPEDIC SURGERY | Facility: CLINIC | Age: 84
End: 2024-08-05
Payer: MEDICAID

## 2024-08-05 NOTE — TELEPHONE ENCOUNTER
FRANDY CALLED STATING [facility] SENT A BILL FOR MEDICATION TO PUT ON HER TOES. SHE STATES THAT [facility] SAYS THAT SHE WAS TO RECEIVED MEDICATION IN THE OFFICE AND SHE NEVER DID. ASKED FRANDY IF SHE RECEIVED SHOE INSERTS AT [facility] AND WAS GETTING THE BILL FOR THOSE. SHE SAYS SHE DID NOT GET SHOE INSERTS SHE WAS TO GET MEDICATION. SHE WOULD LIKE TO BE CONTACTED [phone] TO RESOLVE THE ISSUE.

## 2024-08-05 NOTE — TELEPHONE ENCOUNTER
Patient states she received a bill for a crest pad but did not receive a crest pad. She will stop by one day and pick one up in near future

## 2024-08-29 ENCOUNTER — TELEPHONE (OUTPATIENT)
Dept: PODIATRY | Facility: CLINIC | Age: 84
End: 2024-08-29
Payer: MEDICARE

## 2024-08-29 NOTE — TELEPHONE ENCOUNTER
Caller: JOANNA    Relationship: [facility]    Best call back number:     What form or medical record are you requesting: JOANNA STATED SHE FAXED OVER AN INCIDENT TO REQUIREMENT FORM FOR THIS PATIENT'S DIABETIC SHOES    JOANNA STATED PER INSURANCE REQUIREMENT NP OR PA CANNOT SIGN OFF ON ORDERS FOR SHOES, THEIR OVERSEEING PROVIDER NEEDS TO SIGN OFF ON IT.     Who is requesting this form or medical record from you: [facility]    How would you like to receive the form or medical records (pick-up, mail, fax): FAX  If fax, what is the fax number: [phone]    Timeframe paperwork needed: ASAP

## 2024-09-09 ENCOUNTER — TELEPHONE (OUTPATIENT)
Age: 84
End: 2024-09-09
Payer: MEDICARE

## 2024-09-09 NOTE — TELEPHONE ENCOUNTER
Spoke with [facility] pharmacy. They are going to relay the message to mundo that dr. Dan has signed the documentation needed for dm shoes and aside from the rx, additional paperwork would need to go through the patients endo.

## 2024-09-09 NOTE — TELEPHONE ENCOUNTER
Caller: JOANNA    Relationship: [facility]     Best call back number:    What is the best time to reach you: ANY     Who are you requesting to speak with (clinical staff, provider,  specific staff member): CLINICAL      What was the call regarding: THEY ARE NEEDING TO GET INCIDENT TO REQUIREMENT FORM TO GET PATIENT FITTED.  NEED THIS SIGNED OFF ON.  THEY HAVE FAXED SEVERAL TIMES AND SENDING AGAIN TODAY     Is it okay if the provider responds through [employer]: PLEASE COMPLETE AND CALL

## 2024-11-22 ENCOUNTER — TELEPHONE (OUTPATIENT)
Dept: ORTHOPEDIC SURGERY | Facility: CLINIC | Age: 84
End: 2024-11-22
Payer: MEDICARE

## 2024-11-22 NOTE — TELEPHONE ENCOUNTER
Hub staff attempted to follow warm transfer process and was unsuccessful     Caller: Pamela Zavala    Relationship to patient: Self    Best call back number: [phone]    Patient is needing: PATIENT LIVES AT THE [facility] AND THE DOCTOR DEBBIE (NOT SURE OF LAST TIME) WHO VISITS THERE IS REQUESTING SHE SEE DR TURK DUE TO A TOE TURNING BLACK (4TH DIGIT) RIGHT FOOT.  THE PATIENT IS DIABETIC. PLEASE CALL FOR SCHEDULING.

## 2024-11-26 ENCOUNTER — OFFICE VISIT (OUTPATIENT)
Age: 84
End: 2024-11-26
Payer: MEDICARE

## 2024-11-26 VITALS — HEART RATE: 90 BPM | WEIGHT: 155 LBS | HEIGHT: 66 IN | BODY MASS INDEX: 24.91 KG/M2 | OXYGEN SATURATION: 97 %

## 2024-11-26 DIAGNOSIS — M79.674 PAIN IN TOES OF BOTH FEET: ICD-10-CM

## 2024-11-26 DIAGNOSIS — E11.65 TYPE 2 DIABETES MELLITUS WITH HYPERGLYCEMIA, WITH LONG-TERM CURRENT USE OF INSULIN: Primary | ICD-10-CM

## 2024-11-26 DIAGNOSIS — Z79.4 TYPE 2 DIABETES MELLITUS WITH HYPERGLYCEMIA, WITH LONG-TERM CURRENT USE OF INSULIN: Primary | ICD-10-CM

## 2024-11-26 DIAGNOSIS — L84 PRE-ULCERATIVE CALLUSES: ICD-10-CM

## 2024-11-26 DIAGNOSIS — M79.675 PAIN IN TOES OF BOTH FEET: ICD-10-CM

## 2024-11-26 DIAGNOSIS — E11.42 DIABETIC PERIPHERAL NEUROPATHY ASSOCIATED WITH TYPE 2 DIABETES MELLITUS: ICD-10-CM

## 2024-11-26 DIAGNOSIS — B35.1 ONYCHOMYCOSIS: ICD-10-CM

## 2024-11-26 DIAGNOSIS — L85.3 XEROSIS OF SKIN: ICD-10-CM

## 2024-11-26 DIAGNOSIS — M20.41 HAMMERTOE OF RIGHT FOOT: ICD-10-CM

## 2024-11-26 DIAGNOSIS — R26.81 UNSTEADINESS ON FEET: ICD-10-CM

## 2024-11-26 RX ORDER — NITROFURANTOIN 25; 75 MG/1; MG/1
CAPSULE ORAL
COMMUNITY
Start: 2024-11-19

## 2024-11-26 RX ORDER — CETIRIZINE HYDROCHLORIDE 10 MG/1
TABLET ORAL
COMMUNITY
Start: 2024-11-22

## 2024-11-26 NOTE — PROGRESS NOTES
11/26/2024  Foot and Ankle Surgery - Established Patient/Follow-up  Provider: CLAUDE Santos   Location: [facility]    Subjective:  Pamela Zavala is a 83 y.o. female.     Chief Complaint   Patient presents with    Left Foot - Establish Care, Follow-up     DM Foot Care  Last PCP appt with CARISA ARCE     Right Foot - Establish Care, Follow-up     DM Foot Care       Follow-Up     [facility] HOU[facility] DOCTOR-DEBBIE?        History of Present Illness  The patient is an 83-year-old female who is here today for a routine diabetic foot check.    She reports feeling drowsy and has a black line on her toe, which was noticed by her nurse at the [facility] care facility. She was referred here for further examination. She missed her last toenail trimming appointment at the facility.    Her blood sugar levels are generally well-controlled, but she has experienced episodes of hypoglycemia during the night. On one occasion, her cat woke her up and she found her blood sugar level to be 24. Another time, it was in the 60s.    She also mentions that a doctor visited her at 3:00 AM yesterday to draw blood, but she is unsure of the reason or who ordered the test.       Allergies   Allergen Reactions    Penicillins Unknown - High Severity     Lips swell and hives    Shellfish-Derived Products Unknown - Low Severity    Sulfamethizole Unknown - Low Severity    Trimethoprim Unknown - Low Severity       Current Outpatient Medications on File Prior to Visit   Medication Sig Dispense Refill    Acetaminophen Extra Strength 500 MG tablet       amLODIPine (NORVASC) 10 MG tablet Take 1 tablet by mouth Daily. 90 tablet 3    apixaban (ELIQUIS) 5 MG tablet tablet Take 1 tablet by mouth 2 (Two) Times a Day. 180 tablet 3    atorvastatin (LIPITOR) 10 MG tablet Take 1 tablet by mouth Daily.      BD AutoShield Duo 30G X 5 MM misc       cetirizine (zyrTEC) 10 MG tablet       clopidogrel (PLAVIX) 75 MG tablet Take 1 tablet by mouth Daily.       Continuous Glucose Sensor (Dexcom G7 Sensor) misc Use 1 each 4 (Four) Times a Day Before Meals & at Bedtime. 9 each 3    famotidine (PEPCID) 40 MG tablet Take 1 tablet by mouth Daily.      fluticasone (FLONASE) 50 MCG/ACT nasal spray Administer 1 spray into the nostril(s) as directed by provider 2 (Two) Times a Day.      folic acid (FOLVITE) 1 MG tablet Take 1 tablet by mouth Daily.      gabapentin (NEURONTIN) 300 MG capsule Take 1 capsule by mouth Every Night.      Insulin Lispro (humaLOG) 100 UNIT/ML injection 6 Units with each meal and additional sliding scale                 Take 0 additional unit  151-200 Take 1 additional unit  201-250 Take 2 additional unit  251-300 Take 3 additional unit  301-350 Take 4 additional unit  351-400 Take 5 additional unit  More than 400 Take 6 additional unit    Maximum 12 units with each meal 30 mL 5    Lantus SoloStar 100 UNIT/ML injection pen Inject 22 Units under the skin into the appropriate area as directed Every Night. 15 mL 3    levothyroxine (SYNTHROID, LEVOTHROID) 137 MCG tablet Take 1 tablet by mouth Daily. 90 tablet 1    lisinopril (PRINIVIL,ZESTRIL) 5 MG tablet Take 1 tablet by mouth Daily. 90 tablet 3    metFORMIN ER (GLUCOPHAGE-XR) 500 MG 24 hr tablet Take 1 tablet by mouth Daily With Breakfast. 30 tablet 11    metoprolol succinate XL (TOPROL-XL) 25 MG 24 hr tablet Take 1 tablet by mouth Daily.      multivitamin with minerals (THERA-M PO) Take 1 tablet by mouth Daily.      multivitamin with minerals tablet tablet Take 1 tablet by mouth Daily.      nitrofurantoin, macrocrystal-monohydrate, (MACROBID) 100 MG capsule       ondansetron (ZOFRAN) 4 MG tablet Take 1 tablet by mouth Every 8 (Eight) Hours As Needed for Nausea or Vomiting.      oxybutynin (DITROPAN) 5 MG tablet Take 1 tablet by mouth 2 (Two) Times a Day.      pantoprazole (PROTONIX) 40 MG EC tablet Take 1 tablet by mouth 2 (Two) Times a Day. Take dos      polyethylene glycol (MIRALAX) 17 g packet Take  "17 g by mouth Daily.      thiamine (VITAMIN B1) 100 MG tablet Take 1 tablet by mouth Daily.      traMADol (ULTRAM) 50 MG tablet Take 1 tablet by mouth 4 (Four) Times a Day As Needed for Moderate Pain. 20 tablet 0    traZODone (DESYREL) 50 MG tablet Take 1 tablet by mouth Every Night.       No current facility-administered medications on file prior to visit.       Objective   Pulse 90   Ht 167.6 cm (66\")   Wt 70.3 kg (155 lb)   SpO2 97%   BMI 25.02 kg/m²     Foot/Ankle Exam  Physical Exam  GENERAL  Diabetic foot exam performed    Appearance:  appears stated age and elderly  Orientation:  AAOx3  Affect:  appropriate  Gait:  unimpaired  Assistance:  independent  Right shoe gear: casual shoe and sock  Left shoe gear: casual shoe and sock     VASCULAR      Right Foot Vascularity   Dorsalis pedis:  1+  Skin temperature:  warm  Edema grading:  None  CFT:  < 3 seconds  Pedal hair growth:  Present  Varicosities:  mild varicosities      Left Foot Vascularity   Dorsalis pedis:  1+  Skin temperature:  warm  Edema grading:  None  CFT:  < 3 seconds  Pedal hair growth:  Absent  Varicosities:  mild varicosities     NEUROLOGIC      Right Foot Neurologic   Light touch sensation: normal  Protective Sensation using [place] Monofilament:   Sites intact: 10  Sites tested: 10      Left Foot Neurologic   Light touch sensation: normal  Protective Sensation using [place] Monofilament:   Sites intact: 9  Sites tested: 10     MUSCULOSKELETAL      Right Foot Musculoskeletal    Amputation   Toes amputated: third toe  Ecchymosis:  right foot callus and toe 3  Tenderness:  toenail problem        Left Foot Musculoskeletal   Ecchymosis:  none  Tenderness:  toenail problem     MUSCLE STRENGTH      Right Foot Muscle Strength   Foot dorsiflexion:  5  Foot plantar flexion:  5      Left Foot Muscle Strength   Foot dorsiflexion:  5  Foot plantar flexion:  5     DERMATOLOGIC       Right Foot Dermatologic   Skin  Positive for corn, " dryness and skin changes.   Nails  1.  Positive for elongated, onychomycosis, abnormal thickness and dystrophic nail.  2.  Positive for elongated, onychomycosis, abnormal thickness and dystrophic nail.  3.  Positive for elongated, onychomycosis, abnormal thickness and dystrophic nail.  4.  Positive for elongated, onychomycosis, abnormal thickness and dystrophic nail.  5.  Positive for elongated, onychomycosis, abnormal thickness and dystrophic nail.      Left Foot Dermatologic   Skin  Positive for dryness and skin changes.   Nails  1.  Positive for elongated, onychomycosis, abnormal thickness and dystrophic nail.  2.  Positive for elongated, onychomycosis, abnormal thickness and dystrophic nail.  3.  Positive for elongated, onychomycosis, abnormal thickness and dystrophic nail.  4.  Positive for elongated, onychomycosis and dystrophic nail.  5.  Positive for elongated, onychomycosis, abnormally thick and dystrophic nail.       Results  Laboratory Studies  Blood sugar was 24.     Assessment & Plan   Diagnoses and all orders for this visit:    1. Type 2 diabetes mellitus with hyperglycemia, with long-term current use of insulin (Primary)    2. Xerosis of skin    3. Unsteadiness on feet    4. Diabetic peripheral neuropathy associated with type 2 diabetes mellitus    5. Onychomycosis    6. Pain in toes of both feet    7. Hammertoe of right foot    8. Pre-ulcerative calluses      Assessment & Plan  1. Diabetic foot check.  The presence of a callus on her  right toe, likely due to long toenails rubbing against the skin, was noted. There is no open wound, but potential bleeding underneath the callus could occur. Debridement of all ten nails was performed. The preulcerative callus on the right second and third toes was also debrided. Betadine was applied to the area and a Band-Aid was placed, which should be removed the following day.    Explained importance of diabetic foot care, daily foot checks, and glycemic control.  Patient should check both feet on a daily basis, monitor and control blood sugars, make sure that both feet and in between toes are towel dried after baths or showers. Avoid barefoot walking at all times.  I discussed wearing white socks and checking shoes before wearing them. Patient was given information on proper foot care. Call the office at the first signs of a wound or with signs of infection.     Nail debridement: Both feet x10    Consent and time out was performed before proceeding with the procedure. Nails were debrided with a nail nipper without complication.  No anesthesia was required.  Indications for procedure were thickened, dystrophic, and fungal appearing nails which are difficult to trim.  Proper self-care and technique was discussed with patient.  Patient was stable after procedure.     Callus x 2 to right foot were pared down with curette. Pt tolerated well.     Follow-up  Return in 10 weeks for follow up.       No orders of the defined types were placed in this encounter.         Patient or patient representative verbalized consent for the use of Ambient Listening during the visit with  CLAUDE James for chart documentation. 11/26/2024  10:47 EST

## 2025-02-08 ENCOUNTER — HOSPITAL ENCOUNTER (INPATIENT)
Facility: HOSPITAL | Age: 85
LOS: 1 days | Discharge: HOME | DRG: 394 | End: 2025-02-09
Attending: STUDENT IN AN ORGANIZED HEALTH CARE EDUCATION/TRAINING PROGRAM | Admitting: INTERNAL MEDICINE
Payer: MEDICARE
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
## 2025-02-08 DIAGNOSIS — T18.128S FOOD IMPACTION OF ESOPHAGUS, SEQUELA: ICD-10-CM

## 2025-02-08 DIAGNOSIS — T18.128A FOOD IMPACTION OF ESOPHAGUS, INITIAL ENCOUNTER: Primary | ICD-10-CM
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
## 2025-02-08 PROCEDURE — 85025 COMPLETE CBC W/AUTO DIFF WBC: CPT | Performed by: STUDENT IN AN ORGANIZED HEALTH CARE EDUCATION/TRAINING PROGRAM

## 2025-02-08 PROCEDURE — 25010000002 GLUCAGON (RDNA) PER 1 MG: Performed by: NURSE PRACTITIONER

## 2025-02-08 PROCEDURE — 99285 EMERGENCY DEPT VISIT HI MDM: CPT

## 2025-02-08 PROCEDURE — 80053 COMPREHEN METABOLIC PANEL: CPT | Performed by: STUDENT IN AN ORGANIZED HEALTH CARE EDUCATION/TRAINING PROGRAM

## 2025-02-08 PROCEDURE — 84100 ASSAY OF PHOSPHORUS: CPT | Performed by: STUDENT IN AN ORGANIZED HEALTH CARE EDUCATION/TRAINING PROGRAM

## 2025-02-08 RX ORDER — SODIUM CHLORIDE 0.9 % (FLUSH) 0.9 %
10 SYRINGE (ML) INJECTION EVERY 12 HOURS SCHEDULED
Status: DISCONTINUED | OUTPATIENT
Start: 2025-02-09 | End: 2025-02-09 | Stop reason: HOSPADM

## 2025-02-08 RX ORDER — SODIUM CHLORIDE 9 MG/ML
40 INJECTION, SOLUTION INTRAVENOUS AS NEEDED
Status: DISCONTINUED | OUTPATIENT
Start: 2025-02-08 | End: 2025-02-09 | Stop reason: HOSPADM

## 2025-02-08 RX ORDER — SODIUM CHLORIDE 0.9 % (FLUSH) 0.9 %
10 SYRINGE (ML) INJECTION AS NEEDED
Status: DISCONTINUED | OUTPATIENT
Start: 2025-02-08 | End: 2025-02-09 | Stop reason: HOSPADM

## 2025-02-08 RX ORDER — IBUPROFEN 600 MG/1
1 TABLET ORAL ONCE
Status: COMPLETED | OUTPATIENT
Start: 2025-02-08 | End: 2025-02-08

## 2025-02-08 RX ADMIN — GLUCAGON 1 MG: KIT at 22:59

## 2025-02-08 RX ADMIN — Medication 10 ML: at 23:56

## 2025-02-09 ENCOUNTER — ANESTHESIA EVENT (OUTPATIENT)
Dept: GASTROENTEROLOGY | Facility: HOSPITAL | Age: 85
End: 2025-02-09
Payer: MEDICARE

## 2025-02-09 ENCOUNTER — INPATIENT HOSPITAL (OUTPATIENT)
Dept: URBAN - METROPOLITAN AREA HOSPITAL 84 | Facility: HOSPITAL | Age: 85
End: 2025-02-09
Payer: MEDICAID

## 2025-02-09 ENCOUNTER — APPOINTMENT (OUTPATIENT)
Dept: GENERAL RADIOLOGY | Facility: HOSPITAL | Age: 85
DRG: 394 | End: 2025-02-09
Payer: MEDICARE

## 2025-02-09 ENCOUNTER — READMISSION MANAGEMENT (OUTPATIENT)
Dept: CALL CENTER | Facility: HOSPITAL | Age: 85
End: 2025-02-09
Payer: MEDICARE

## 2025-02-09 ENCOUNTER — ANESTHESIA (OUTPATIENT)
Dept: GASTROENTEROLOGY | Facility: HOSPITAL | Age: 85
End: 2025-02-09
Payer: MEDICARE

## 2025-02-09 VITALS
WEIGHT: 154.32 LBS | DIASTOLIC BLOOD PRESSURE: 82 MMHG | TEMPERATURE: 98.2 F | RESPIRATION RATE: 14 BRPM | SYSTOLIC BLOOD PRESSURE: 148 MMHG | HEIGHT: 66 IN | OXYGEN SATURATION: 95 % | HEART RATE: 96 BPM | BODY MASS INDEX: 24.8 KG/M2

## 2025-02-09 DIAGNOSIS — K31.89 OTHER DISEASES OF STOMACH AND DUODENUM: ICD-10-CM

## 2025-02-09 DIAGNOSIS — K44.9 DIAPHRAGMATIC HERNIA WITHOUT OBSTRUCTION OR GANGRENE: ICD-10-CM

## 2025-02-09 DIAGNOSIS — T18.108A UNSPECIFIED FOREIGN BODY IN ESOPHAGUS CAUSING OTHER INJURY,: ICD-10-CM

## 2025-02-09 DIAGNOSIS — K22.2 ESOPHAGEAL OBSTRUCTION: ICD-10-CM

## 2025-02-09 DIAGNOSIS — T18.128A FOOD IN ESOPHAGUS CAUSING OTHER INJURY, INITIAL ENCOUNTER: ICD-10-CM

## 2025-02-09 DIAGNOSIS — K22.10 ULCER OF ESOPHAGUS WITHOUT BLEEDING: ICD-10-CM

## 2025-02-09 LAB
ALBUMIN SERPL-MCNC: 4.1 G/DL (ref 3.5–5.2)
ALBUMIN SERPL-MCNC: 4.3 G/DL (ref 3.5–5.2)
ALBUMIN/GLOB SERPL: 1.4 G/DL
ALBUMIN/GLOB SERPL: 1.5 G/DL
ALP SERPL-CCNC: 102 U/L (ref 39–117)
ALP SERPL-CCNC: 94 U/L (ref 39–117)
ALT SERPL W P-5'-P-CCNC: 14 U/L (ref 1–33)
ALT SERPL W P-5'-P-CCNC: 15 U/L (ref 1–33)
ANION GAP SERPL CALCULATED.3IONS-SCNC: 10.6 MMOL/L (ref 5–15)
ANION GAP SERPL CALCULATED.3IONS-SCNC: 10.6 MMOL/L (ref 5–15)
AST SERPL-CCNC: 14 U/L (ref 1–32)
AST SERPL-CCNC: 16 U/L (ref 1–32)
BASOPHILS # BLD AUTO: 0.08 10*3/MM3 (ref 0–0.2)
BASOPHILS # BLD AUTO: 0.08 10*3/MM3 (ref 0–0.2)
BASOPHILS NFR BLD AUTO: 0.8 % (ref 0–1.5)
BASOPHILS NFR BLD AUTO: 0.9 % (ref 0–1.5)
BILIRUB SERPL-MCNC: 0.4 MG/DL (ref 0–1.2)
BILIRUB SERPL-MCNC: 0.4 MG/DL (ref 0–1.2)
BUN SERPL-MCNC: 23 MG/DL (ref 8–23)
BUN SERPL-MCNC: 24 MG/DL (ref 8–23)
BUN/CREAT SERPL: 23.3 (ref 7–25)
BUN/CREAT SERPL: 23.5 (ref 7–25)
CALCIUM SPEC-SCNC: 8.6 MG/DL (ref 8.6–10.5)
CALCIUM SPEC-SCNC: 8.6 MG/DL (ref 8.6–10.5)
CHLORIDE SERPL-SCNC: 104 MMOL/L (ref 98–107)
CHLORIDE SERPL-SCNC: 105 MMOL/L (ref 98–107)
CO2 SERPL-SCNC: 24.4 MMOL/L (ref 22–29)
CO2 SERPL-SCNC: 25.4 MMOL/L (ref 22–29)
CREAT SERPL-MCNC: 0.98 MG/DL (ref 0.57–1)
CREAT SERPL-MCNC: 1.03 MG/DL (ref 0.57–1)
DEPRECATED RDW RBC AUTO: 43.8 FL (ref 37–54)
DEPRECATED RDW RBC AUTO: 44.1 FL (ref 37–54)
EGFRCR SERPLBLD CKD-EPI 2021: 53.1 ML/MIN/1.73
EGFRCR SERPLBLD CKD-EPI 2021: 56.3 ML/MIN/1.73
EOSINOPHIL # BLD AUTO: 0.11 10*3/MM3 (ref 0–0.4)
EOSINOPHIL # BLD AUTO: 0.16 10*3/MM3 (ref 0–0.4)
EOSINOPHIL NFR BLD AUTO: 1.2 % (ref 0.3–6.2)
EOSINOPHIL NFR BLD AUTO: 1.6 % (ref 0.3–6.2)
ERYTHROCYTE [DISTWIDTH] IN BLOOD BY AUTOMATED COUNT: 12.9 % (ref 12.3–15.4)
ERYTHROCYTE [DISTWIDTH] IN BLOOD BY AUTOMATED COUNT: 13 % (ref 12.3–15.4)
GLOBULIN UR ELPH-MCNC: 2.9 GM/DL
GLOBULIN UR ELPH-MCNC: 2.9 GM/DL
GLUCOSE BLDC GLUCOMTR-MCNC: 175 MG/DL (ref 70–105)
GLUCOSE BLDC GLUCOMTR-MCNC: 195 MG/DL (ref 70–105)
GLUCOSE BLDC GLUCOMTR-MCNC: 256 MG/DL (ref 70–105)
GLUCOSE SERPL-MCNC: 199 MG/DL (ref 65–99)
GLUCOSE SERPL-MCNC: 325 MG/DL (ref 65–99)
HCT VFR BLD AUTO: 37.8 % (ref 34–46.6)
HCT VFR BLD AUTO: 39.6 % (ref 34–46.6)
HGB BLD-MCNC: 11.9 G/DL (ref 12–15.9)
HGB BLD-MCNC: 12.3 G/DL (ref 12–15.9)
IMM GRANULOCYTES # BLD AUTO: 0.02 10*3/MM3 (ref 0–0.05)
IMM GRANULOCYTES # BLD AUTO: 0.03 10*3/MM3 (ref 0–0.05)
IMM GRANULOCYTES NFR BLD AUTO: 0.2 % (ref 0–0.5)
IMM GRANULOCYTES NFR BLD AUTO: 0.3 % (ref 0–0.5)
LYMPHOCYTES # BLD AUTO: 1.16 10*3/MM3 (ref 0.7–3.1)
LYMPHOCYTES # BLD AUTO: 2.13 10*3/MM3 (ref 0.7–3.1)
LYMPHOCYTES NFR BLD AUTO: 12.6 % (ref 19.6–45.3)
LYMPHOCYTES NFR BLD AUTO: 20.9 % (ref 19.6–45.3)
MAGNESIUM SERPL-MCNC: 2.1 MG/DL (ref 1.6–2.4)
MCH RBC QN AUTO: 29 PG (ref 26.6–33)
MCH RBC QN AUTO: 29.1 PG (ref 26.6–33)
MCHC RBC AUTO-ENTMCNC: 31.1 G/DL (ref 31.5–35.7)
MCHC RBC AUTO-ENTMCNC: 31.5 G/DL (ref 31.5–35.7)
MCV RBC AUTO: 92 FL (ref 79–97)
MCV RBC AUTO: 93.8 FL (ref 79–97)
MONOCYTES # BLD AUTO: 0.43 10*3/MM3 (ref 0.1–0.9)
MONOCYTES # BLD AUTO: 0.57 10*3/MM3 (ref 0.1–0.9)
MONOCYTES NFR BLD AUTO: 4.7 % (ref 5–12)
MONOCYTES NFR BLD AUTO: 5.6 % (ref 5–12)
NEUTROPHILS NFR BLD AUTO: 7.2 10*3/MM3 (ref 1.7–7)
NEUTROPHILS NFR BLD AUTO: 7.37 10*3/MM3 (ref 1.7–7)
NEUTROPHILS NFR BLD AUTO: 70.8 % (ref 42.7–76)
NEUTROPHILS NFR BLD AUTO: 80.4 % (ref 42.7–76)
NRBC BLD AUTO-RTO: 0 /100 WBC (ref 0–0.2)
NRBC BLD AUTO-RTO: 0 /100 WBC (ref 0–0.2)
PHOSPHATE SERPL-MCNC: 2.7 MG/DL (ref 2.5–4.5)
PHOSPHATE SERPL-MCNC: 3 MG/DL (ref 2.5–4.5)
PLATELET # BLD AUTO: 260 10*3/MM3 (ref 140–450)
PLATELET # BLD AUTO: 268 10*3/MM3 (ref 140–450)
PMV BLD AUTO: 11.7 FL (ref 6–12)
PMV BLD AUTO: 11.9 FL (ref 6–12)
POTASSIUM SERPL-SCNC: 3.9 MMOL/L (ref 3.5–5.2)
POTASSIUM SERPL-SCNC: 3.9 MMOL/L (ref 3.5–5.2)
PROT SERPL-MCNC: 7 G/DL (ref 6–8.5)
PROT SERPL-MCNC: 7.2 G/DL (ref 6–8.5)
RBC # BLD AUTO: 4.11 10*6/MM3 (ref 3.77–5.28)
RBC # BLD AUTO: 4.22 10*6/MM3 (ref 3.77–5.28)
SODIUM SERPL-SCNC: 139 MMOL/L (ref 136–145)
SODIUM SERPL-SCNC: 141 MMOL/L (ref 136–145)
WBC NRBC COR # BLD AUTO: 10.17 10*3/MM3 (ref 3.4–10.8)
WBC NRBC COR # BLD AUTO: 9.17 10*3/MM3 (ref 3.4–10.8)

## 2025-02-09 PROCEDURE — 71045 X-RAY EXAM CHEST 1 VIEW: CPT

## 2025-02-09 PROCEDURE — 85025 COMPLETE CBC W/AUTO DIFF WBC: CPT | Performed by: STUDENT IN AN ORGANIZED HEALTH CARE EDUCATION/TRAINING PROGRAM

## 2025-02-09 PROCEDURE — 25810000003 LACTATED RINGERS PER 1000 ML: Performed by: STUDENT IN AN ORGANIZED HEALTH CARE EDUCATION/TRAINING PROGRAM

## 2025-02-09 PROCEDURE — 25010000002 ESMOLOL 100 MG/10ML SOLUTION: Performed by: NURSE ANESTHETIST, CERTIFIED REGISTERED

## 2025-02-09 PROCEDURE — 25010000002 PROPOFOL 10 MG/ML EMULSION: Performed by: NURSE ANESTHETIST, CERTIFIED REGISTERED

## 2025-02-09 PROCEDURE — 25010000002 GLYCOPYRROLATE 1 MG/5ML SOLUTION: Performed by: NURSE ANESTHETIST, CERTIFIED REGISTERED

## 2025-02-09 PROCEDURE — 82948 REAGENT STRIP/BLOOD GLUCOSE: CPT | Performed by: STUDENT IN AN ORGANIZED HEALTH CARE EDUCATION/TRAINING PROGRAM

## 2025-02-09 PROCEDURE — 84100 ASSAY OF PHOSPHORUS: CPT | Performed by: STUDENT IN AN ORGANIZED HEALTH CARE EDUCATION/TRAINING PROGRAM

## 2025-02-09 PROCEDURE — 25010000002 LIDOCAINE 2% SOLUTION: Performed by: NURSE ANESTHETIST, CERTIFIED REGISTERED

## 2025-02-09 PROCEDURE — 25010000002 SUGAMMADEX 200 MG/2ML SOLUTION: Performed by: NURSE ANESTHETIST, CERTIFIED REGISTERED

## 2025-02-09 PROCEDURE — 43247 EGD REMOVE FOREIGN BODY: CPT | Performed by: INTERNAL MEDICINE

## 2025-02-09 PROCEDURE — 83735 ASSAY OF MAGNESIUM: CPT | Performed by: STUDENT IN AN ORGANIZED HEALTH CARE EDUCATION/TRAINING PROGRAM

## 2025-02-09 PROCEDURE — 82948 REAGENT STRIP/BLOOD GLUCOSE: CPT

## 2025-02-09 PROCEDURE — 25010000002 ONDANSETRON PER 1 MG: Performed by: NURSE ANESTHETIST, CERTIFIED REGISTERED

## 2025-02-09 PROCEDURE — 25010000002 DEXAMETHASONE PER 1 MG: Performed by: NURSE ANESTHETIST, CERTIFIED REGISTERED

## 2025-02-09 PROCEDURE — 63710000001 INSULIN LISPRO (HUMAN) PER 5 UNITS: Performed by: STUDENT IN AN ORGANIZED HEALTH CARE EDUCATION/TRAINING PROGRAM

## 2025-02-09 PROCEDURE — 80053 COMPREHEN METABOLIC PANEL: CPT | Performed by: STUDENT IN AN ORGANIZED HEALTH CARE EDUCATION/TRAINING PROGRAM

## 2025-02-09 PROCEDURE — 0DC58ZZ EXTIRPATION OF MATTER FROM ESOPHAGUS, VIA NATURAL OR ARTIFICIAL OPENING ENDOSCOPIC: ICD-10-PCS | Performed by: INTERNAL MEDICINE

## 2025-02-09 PROCEDURE — 25810000003 SODIUM CHLORIDE 0.9 % SOLUTION: Performed by: NURSE ANESTHETIST, CERTIFIED REGISTERED

## 2025-02-09 RX ORDER — DEXAMETHASONE SODIUM PHOSPHATE 4 MG/ML
INJECTION, SOLUTION INTRA-ARTICULAR; INTRALESIONAL; INTRAMUSCULAR; INTRAVENOUS; SOFT TISSUE AS NEEDED
Status: DISCONTINUED | OUTPATIENT
Start: 2025-02-09 | End: 2025-02-09 | Stop reason: SURG

## 2025-02-09 RX ORDER — MEPERIDINE HYDROCHLORIDE 25 MG/ML
12.5 INJECTION INTRAMUSCULAR; INTRAVENOUS; SUBCUTANEOUS
Status: CANCELLED | OUTPATIENT
Start: 2025-02-09 | End: 2025-02-10

## 2025-02-09 RX ORDER — METOPROLOL TARTRATE 1 MG/ML
2.5 INJECTION, SOLUTION INTRAVENOUS EVERY 8 HOURS SCHEDULED
Status: DISCONTINUED | OUTPATIENT
Start: 2025-02-09 | End: 2025-02-09 | Stop reason: HOSPADM

## 2025-02-09 RX ORDER — GLYCOPYRROLATE 0.2 MG/ML
INJECTION INTRAMUSCULAR; INTRAVENOUS AS NEEDED
Status: DISCONTINUED | OUTPATIENT
Start: 2025-02-09 | End: 2025-02-09 | Stop reason: SURG

## 2025-02-09 RX ORDER — MULTIPLE VITAMINS W/ MINERALS TAB 9MG-400MCG
1 TAB ORAL DAILY
Status: DISCONTINUED | OUTPATIENT
Start: 2025-02-09 | End: 2025-02-09 | Stop reason: HOSPADM

## 2025-02-09 RX ORDER — HYDRALAZINE HYDROCHLORIDE 20 MG/ML
10 INJECTION INTRAMUSCULAR; INTRAVENOUS EVERY 8 HOURS PRN
Status: DISCONTINUED | OUTPATIENT
Start: 2025-02-09 | End: 2025-02-09 | Stop reason: HOSPADM

## 2025-02-09 RX ORDER — ATORVASTATIN CALCIUM 10 MG/1
10 TABLET, FILM COATED ORAL DAILY
Status: DISCONTINUED | OUTPATIENT
Start: 2025-02-09 | End: 2025-02-09 | Stop reason: HOSPADM

## 2025-02-09 RX ORDER — DIPHENHYDRAMINE HYDROCHLORIDE 50 MG/ML
12.5 INJECTION INTRAMUSCULAR; INTRAVENOUS
Status: CANCELLED | OUTPATIENT
Start: 2025-02-09

## 2025-02-09 RX ORDER — CLOPIDOGREL BISULFATE 75 MG/1
75 TABLET ORAL DAILY
Status: DISCONTINUED | OUTPATIENT
Start: 2025-02-09 | End: 2025-02-09 | Stop reason: HOSPADM

## 2025-02-09 RX ORDER — HYDRALAZINE HYDROCHLORIDE 20 MG/ML
5 INJECTION INTRAMUSCULAR; INTRAVENOUS
Status: CANCELLED | OUTPATIENT
Start: 2025-02-09

## 2025-02-09 RX ORDER — LIDOCAINE HYDROCHLORIDE 20 MG/ML
INJECTION, SOLUTION INFILTRATION; PERINEURAL AS NEEDED
Status: DISCONTINUED | OUTPATIENT
Start: 2025-02-09 | End: 2025-02-09 | Stop reason: SURG

## 2025-02-09 RX ORDER — ESMOLOL HYDROCHLORIDE 10 MG/ML
INJECTION INTRAVENOUS AS NEEDED
Status: DISCONTINUED | OUTPATIENT
Start: 2025-02-09 | End: 2025-02-09 | Stop reason: SURG

## 2025-02-09 RX ORDER — IBUPROFEN 600 MG/1
1 TABLET ORAL
Status: DISCONTINUED | OUTPATIENT
Start: 2025-02-09 | End: 2025-02-09 | Stop reason: HOSPADM

## 2025-02-09 RX ORDER — DEXTROSE MONOHYDRATE 25 G/50ML
25 INJECTION, SOLUTION INTRAVENOUS
Status: DISCONTINUED | OUTPATIENT
Start: 2025-02-09 | End: 2025-02-09 | Stop reason: HOSPADM

## 2025-02-09 RX ORDER — PANTOPRAZOLE SODIUM 40 MG/10ML
40 INJECTION, POWDER, LYOPHILIZED, FOR SOLUTION INTRAVENOUS
Status: DISCONTINUED | OUTPATIENT
Start: 2025-02-09 | End: 2025-02-09 | Stop reason: HOSPADM

## 2025-02-09 RX ORDER — EPHEDRINE SULFATE 5 MG/ML
5 INJECTION INTRAVENOUS ONCE AS NEEDED
Status: CANCELLED | OUTPATIENT
Start: 2025-02-09

## 2025-02-09 RX ORDER — INSULIN LISPRO 100 [IU]/ML
2-7 INJECTION, SOLUTION INTRAVENOUS; SUBCUTANEOUS EVERY 6 HOURS SCHEDULED
Status: DISCONTINUED | OUTPATIENT
Start: 2025-02-09 | End: 2025-02-09 | Stop reason: HOSPADM

## 2025-02-09 RX ORDER — IPRATROPIUM BROMIDE AND ALBUTEROL SULFATE 2.5; .5 MG/3ML; MG/3ML
3 SOLUTION RESPIRATORY (INHALATION) ONCE AS NEEDED
Status: CANCELLED | OUTPATIENT
Start: 2025-02-09

## 2025-02-09 RX ORDER — PROPOFOL 10 MG/ML
VIAL (ML) INTRAVENOUS AS NEEDED
Status: DISCONTINUED | OUTPATIENT
Start: 2025-02-09 | End: 2025-02-09 | Stop reason: SURG

## 2025-02-09 RX ORDER — ONDANSETRON 2 MG/ML
4 INJECTION INTRAMUSCULAR; INTRAVENOUS ONCE AS NEEDED
Status: CANCELLED | OUTPATIENT
Start: 2025-02-09

## 2025-02-09 RX ORDER — LABETALOL HYDROCHLORIDE 5 MG/ML
5 INJECTION, SOLUTION INTRAVENOUS
Status: CANCELLED | OUTPATIENT
Start: 2025-02-09

## 2025-02-09 RX ORDER — ROCURONIUM BROMIDE 10 MG/ML
INJECTION, SOLUTION INTRAVENOUS AS NEEDED
Status: DISCONTINUED | OUTPATIENT
Start: 2025-02-09 | End: 2025-02-09 | Stop reason: SURG

## 2025-02-09 RX ORDER — METOPROLOL SUCCINATE 25 MG/1
25 TABLET, EXTENDED RELEASE ORAL DAILY
Status: DISCONTINUED | OUTPATIENT
Start: 2025-02-09 | End: 2025-02-09 | Stop reason: HOSPADM

## 2025-02-09 RX ORDER — NICOTINE POLACRILEX 4 MG
15 LOZENGE BUCCAL
Status: DISCONTINUED | OUTPATIENT
Start: 2025-02-09 | End: 2025-02-09 | Stop reason: HOSPADM

## 2025-02-09 RX ORDER — SODIUM CHLORIDE, SODIUM LACTATE, POTASSIUM CHLORIDE, CALCIUM CHLORIDE 600; 310; 30; 20 MG/100ML; MG/100ML; MG/100ML; MG/100ML
75 INJECTION, SOLUTION INTRAVENOUS CONTINUOUS
Status: DISPENSED | OUTPATIENT
Start: 2025-02-09 | End: 2025-02-09

## 2025-02-09 RX ORDER — ONDANSETRON 2 MG/ML
INJECTION INTRAMUSCULAR; INTRAVENOUS AS NEEDED
Status: DISCONTINUED | OUTPATIENT
Start: 2025-02-09 | End: 2025-02-09 | Stop reason: SURG

## 2025-02-09 RX ORDER — SODIUM CHLORIDE 9 MG/ML
INJECTION, SOLUTION INTRAVENOUS CONTINUOUS PRN
Status: DISCONTINUED | OUTPATIENT
Start: 2025-02-09 | End: 2025-02-09 | Stop reason: SURG

## 2025-02-09 RX ADMIN — SUGAMMADEX 300 MG: 100 INJECTION, SOLUTION INTRAVENOUS at 11:49

## 2025-02-09 RX ADMIN — PANTOPRAZOLE SODIUM 40 MG: 40 INJECTION, POWDER, FOR SOLUTION INTRAVENOUS at 05:28

## 2025-02-09 RX ADMIN — SODIUM CHLORIDE, POTASSIUM CHLORIDE, SODIUM LACTATE AND CALCIUM CHLORIDE 75 ML/HR: 600; 310; 30; 20 INJECTION, SOLUTION INTRAVENOUS at 02:07

## 2025-02-09 RX ADMIN — ROCURONIUM BROMIDE 40 MG: 10 INJECTION INTRAVENOUS at 11:32

## 2025-02-09 RX ADMIN — LIDOCAINE HYDROCHLORIDE 60 MG: 20 INJECTION, SOLUTION INFILTRATION; PERINEURAL at 11:32

## 2025-02-09 RX ADMIN — Medication 10 ML: at 08:55

## 2025-02-09 RX ADMIN — ESMOLOL HYDROCHLORIDE 20 MG: 10 INJECTION, SOLUTION INTRAVENOUS at 11:51

## 2025-02-09 RX ADMIN — INSULIN LISPRO 2 UNITS: 100 INJECTION, SOLUTION INTRAVENOUS; SUBCUTANEOUS at 05:28

## 2025-02-09 RX ADMIN — INSULIN LISPRO 4 UNITS: 100 INJECTION, SOLUTION INTRAVENOUS; SUBCUTANEOUS at 02:07

## 2025-02-09 RX ADMIN — SODIUM CHLORIDE: 9 INJECTION, SOLUTION INTRAVENOUS at 11:28

## 2025-02-09 RX ADMIN — PROPOFOL 180 MG: 10 INJECTION, EMULSION INTRAVENOUS at 11:32

## 2025-02-09 RX ADMIN — DEXAMETHASONE SODIUM PHOSPHATE 4 MG: 4 INJECTION, SOLUTION INTRAMUSCULAR; INTRAVENOUS at 11:36

## 2025-02-09 RX ADMIN — GLYCOPYRROLATE 0.1 MG: 0.2 INJECTION INTRAMUSCULAR; INTRAVENOUS at 11:31

## 2025-02-09 RX ADMIN — ONDANSETRON 4 MG: 2 INJECTION INTRAMUSCULAR; INTRAVENOUS at 11:36

## 2025-02-09 RX ADMIN — PANTOPRAZOLE SODIUM 40 MG: 40 INJECTION, POWDER, FOR SOLUTION INTRAVENOUS at 01:29

## 2025-02-09 RX ADMIN — METOPROLOL TARTRATE 2.5 MG: 5 INJECTION INTRAVENOUS at 02:05

## 2025-02-09 NOTE — ANESTHESIA PREPROCEDURE EVALUATION
Anesthesia Evaluation     Patient summary reviewed and Nursing notes reviewed                Airway   Mallampati: II  TM distance: >3 FB  Neck ROM: full  No difficulty expected  Dental    (+) edentulous    Pulmonary - normal exam   (+) a smoker Former,  Cardiovascular - normal exam    (+) hypertension well controlled, CAD      Neuro/Psych  GI/Hepatic/Renal/Endo    (+) GERD, PUD, renal disease- stones, diabetes mellitus type 2    Musculoskeletal     (+) chronic pain  Abdominal  - normal exam   Substance History      OB/GYN negative ob/gyn ROS         Other   arthritis,                   Anesthesia Plan    ASA 3     general       Anesthetic plan, risks, benefits, and alternatives have been provided, discussed and informed consent has been obtained with: patient.  Pre-procedure education provided  Plan discussed with CRNA.    CODE STATUS:    Level Of Support Discussed With: Patient  Code Status (Patient has no pulse and is not breathing): No CPR (Do Not Attempt to Resuscitate)  Medical Interventions (Patient has pulse or is breathing): Full Support  Comments: DNR/DNI, continue all medical management otherwise

## 2025-02-09 NOTE — OP NOTE
ESOPHAGOGASTRODUODENOSCOPY Procedure Report    Patient Name:  Pamela Zavala  YOB: 1940    Date of Surgery:  2/9/2025     Pre-Op Diagnosis:  Food impaction of esophagus, sequela [T18.128S, W44.F3XS]       Post-Op Diagnosis Codes:     * Food impaction of esophagus, sequela [T18.128S, W44.F3XS]    Post Op Diagnosis:  Esophageal foreign body  Esophageal stricture  Erosive esophagitis  Hiatal hernia  Gastropathy    Procedure/CPT® Codes:  MO ESOPHAGOGASTRODUODENOSCOPY TRANSORAL DIAGNOSTIC [04955]    Procedure(s):  ESOPHAGOGASTRODUODENOSCOPY WITH REMOVAL OF FOREIGN BODY FROM ESOPHAGUS    Staff:  Surgeon(s):  EDU Serrano MD      Anesthesia: General    Code status:  Code status: Patient was DNR but discussed with patient/family who elected to rescind DNR for the procedure.  She will be treated as full code for the procedure.  Okay to resume DNR afterwards.    Description of Procedure:  A description of the procedure as well as risks, benefits and alternative methods were explained to the patient who voiced understanding and signed the corresponding consent form. A physical exam was performed and vital signs were monitored throughout the procedure.    An upper GI endoscope was placed into the mouth and proceeded through the esophagus, stomach and second portion of the duodenum without difficulty. The scope was then retroflexed and the fundus was visualized. The procedure was not difficult and there were no immediate complications.  There was no blood loss.    EGD Findings:  1.  Esophageal foreign body with complete obstruction, unable to traverse with the gastroscope.  This was removed with snare and grasped and pulled out through the mouth successfully.  Afterwards was able to traverse the esophagus into the stomach.  2.  Esophageal stricture at the GE junction.  Was able to traverse.  There was significant erythema and irritation likely from esophageal foreign body/food impaction, therefore dilation  was not performed today.  3.  Erosive esophagitis at the GE junction and area of stricture with ulceration and erythema consistent with LA class B erosive esophagitis.  4.  Large size sliding hiatal hernia, 6 cm in length  5.  Mild patchy erythema and mosaic pattern mucosa of the stomach body consistent with possible gastropathy.  6.  Normal mucosa of the duodenal bulb and second portion of the duodenum    Recommendations:  -Okay to resume regular diet, recommend soft food diet, avoiding breads/meats, and using sauces/gravies to keep food slick  -Okay for discharge home today  -Recommend repeat EGD in 2 to 3 months with possible dilation at that time  -Recommend pantoprazole 40 mg twice daily for at least 3 months  -Okay to resume clopidogrel    GI will see inpatient as needed. Will sign off, but please feel free to call us back if further assistance is needed.  Thank you      SARKIS Serrano MD     Date: 2/9/2025    Time: 11:51 EST

## 2025-02-09 NOTE — PLAN OF CARE
Goal Outcome Evaluation:      Patient resting in bed- currently waiting on EGD.  Plan to discharge after EGD back to [facility].  [facility] transport to provide.

## 2025-02-09 NOTE — ED NOTES
Pt arrives to ED c/o turkey stuck in throat. Pt states she previously was unable to tolerate secretions, but is now able to. Pt states she still feels something stuck. Pt in no acute distress at this time. Pt able to speak clearly, tolerate secretions, with no coughing noted.

## 2025-02-09 NOTE — H&P
[facility] Medicine Services  History & Physical    Patient Name: Pamela Zavala  : 1940  MRN: [id number]  Primary Care Physician:  Provider, No Known  Date of admission: 2025  Date and Time of Service: 2025 at 12:05 am    Subjective      Chief Complaint: food impaction    History of Present Illness: Pamela Zavala is a 84 y.o. female with a CMH of CAD, SVTs, paroxysmal A-fib, thyroid cancer, type 2 diabetes, diabetic neuropathy, hyperlipidemia, former smoker, alcohol use, lives at assisted living facility who presented to [facility] on 2025 with food impaction.    Patient stated she was having dinner tonight, was eating turkey however suddenly developed difficulty swallowing, started to have nausea.  She stated she feels like part of the food has stuck down her throat and she has not been able to able to swallow her saliva and has been spitting up since.  Denies any history of previous dysphagia.  She did report history of thyroid cancer status post surgery in her 30s and 50s however no recent surgery reported.    In the ED, patient hemodynamically stable, CBC, CMP, mag and Phos pending, per ED report, patient was given 1 mg of glucagon, GI was consulted for food impaction awaiting to hear back, patient admitted to medicine team for further workup and management.    Review of Systems negative unless mentioned above    Personal History     Past Medical History:   Diagnosis Date    Anemia     Arthritis     CAD (coronary artery disease)     Chronic back pain     Diabetic neuropathy     Essential hypertension     GERD (gastroesophageal reflux disease)     History of gastric ulcer     History of stomach ulcers     HLD (hyperlipidemia)     Hypocalcemia     Hypoparathyroidism     Hypothyroidism     Insomnia     Kidney stones     Osteoporosis     Recurrent sinusitis     Renal disease     Thyroid cancer     Type 2 diabetes mellitus        Past Surgical History:   Procedure Laterality  Date    CARDIAC CATHETERIZATION N/A 05/03/2023    Procedure: Left Heart Cath and coronary angiogram;  Surgeon: John Baldwin MD;  Location: [facility] CATH INVASIVE LOCATION;  Service: Cardiovascular;  Laterality: N/A;    CORONARY STENT PLACEMENT      x 3    CYSTOSCOPY, URETEROSCOPY, RETROGRADE PYELOGRAM, STENT INSERTION Left 10/18/2023    Procedure: CYSTOSCOPY URETEROSCOPY RETROGRADE PYELOGRAM HOLMIUM LASER STENT INSERTION;  Surgeon: Juan Alaniz MD;  Location: [facility] MAIN OR;  Service: Urology;  Laterality: Left;    HYSTERECTOMY      LUNG LOBECTOMY      THYROIDECTOMY         Family History: family history includes Cancer in her father; Diabetes in her mother and sister; Heart disease in her maternal grandmother and sister. Otherwise pertinent FHx was reviewed and not pertinent to current issue.    Social History:  reports that she quit smoking about 25 years ago. Her smoking use included cigarettes. She started smoking about 55 years ago. She has a 60 pack-year smoking history. She has been exposed to tobacco smoke. She has never used smokeless tobacco. She reports current alcohol use of about 1.0 standard drink of alcohol per week. Drug use questions deferred to the physician.    Home Medications:  Prior to Admission Medications       Prescriptions Last Dose Informant Patient Reported? Taking?    Acetaminophen Extra Strength 500 MG tablet   Yes No    amLODIPine (NORVASC) 10 MG tablet   No No    Take 1 tablet by mouth Daily.    apixaban (ELIQUIS) 5 MG tablet tablet   No No    Take 1 tablet by mouth 2 (Two) Times a Day.    atorvastatin (LIPITOR) 10 MG tablet   Yes No    Take 1 tablet by mouth Daily.    BD AutoShield Duo 30G X 5 MM misc   Yes No    cetirizine (zyrTEC) 10 MG tablet   Yes No    clopidogrel (PLAVIX) 75 MG tablet   Yes No    Take 1 tablet by mouth Daily.    Continuous Glucose Sensor (Dexcom G7 Sensor) misc   No No    Use 1 each 4 (Four) Times a Day Before Meals & at Bedtime.    famotidine (PEPCID) 40 MG  tablet   Yes No    Take 1 tablet by mouth Daily.    fluticasone (FLONASE) 50 MCG/ACT nasal spray   Yes No    Administer 1 spray into the nostril(s) as directed by provider 2 (Two) Times a Day.    folic acid (FOLVITE) 1 MG tablet   No No    Take 1 tablet by mouth Daily.    gabapentin (NEURONTIN) 300 MG capsule   Yes No    Take 1 capsule by mouth Every Night.    Insulin Lispro (humaLOG) 100 UNIT/ML injection   No No    6 Units with each meal and additional sliding scale                 Take 0 additional unit  151-200 Take 1 additional unit  201-250 Take 2 additional unit  251-300 Take 3 additional unit  301-350 Take 4 additional unit  351-400 Take 5 additional unit  More than 400 Take 6 additional unit    Maximum 12 units with each meal    Lantus SoloStar 100 UNIT/ML injection pen   No No    Inject 22 Units under the skin into the appropriate area as directed Every Night.    levothyroxine (SYNTHROID, LEVOTHROID) 137 MCG tablet   No No    Take 1 tablet by mouth Daily.    lisinopril (PRINIVIL,ZESTRIL) 5 MG tablet   Yes No    Take 1 tablet by mouth Daily.    metFORMIN ER (GLUCOPHAGE-XR) 500 MG 24 hr tablet   No No    Take 1 tablet by mouth Daily With Breakfast.    metoprolol succinate XL (TOPROL-XL) 25 MG 24 hr tablet   Yes No    Take 1 tablet by mouth Daily.    multivitamin with minerals (THERA-M PO)   Yes No    Take 1 tablet by mouth Daily.    multivitamin with minerals tablet tablet   No No    Take 1 tablet by mouth Daily.    nitrofurantoin, macrocrystal-monohydrate, (MACROBID) 100 MG capsule   Yes No    ondansetron (ZOFRAN) 4 MG tablet   Yes No    Take 1 tablet by mouth Every 8 (Eight) Hours As Needed for Nausea or Vomiting.    oxybutynin (DITROPAN) 5 MG tablet   Yes No    Take 1 tablet by mouth 2 (Two) Times a Day.    pantoprazole (PROTONIX) 40 MG EC tablet   Yes No    Take 1 tablet by mouth 2 (Two) Times a Day. Take dos    polyethylene glycol (MIRALAX) 17 g packet   No No    Take 17 g by mouth Daily.     thiamine (VITAMIN B1) 100 MG tablet   No No    Take 1 tablet by mouth Daily.    traMADol (ULTRAM) 50 MG tablet   No No    Take 1 tablet by mouth 4 (Four) Times a Day As Needed for Moderate Pain.    traZODone (DESYREL) 50 MG tablet   Yes No    Take 1 tablet by mouth Every Night.              Allergies:  Allergies   Allergen Reactions    Penicillins Unknown - High Severity     Lips swell and hives    Shellfish-Derived Products Unknown - Low Severity    Sulfamethizole Unknown - Low Severity    Trimethoprim Unknown - Low Severity       Objective      Vitals:   Temp:  [98.5 °F (36.9 °C)] 98.5 °F (36.9 °C)  Heart Rate:  [76-87] 87  Resp:  [15] 15  BP: (138-166)/(62-78) 143/78  Body mass index is 24.91 kg/m².  Physical Exam  General: Awake alert Clarence x 3  HEENT: Atraumatic normocephalic  Respiratory: Clear to auscultation my exam, no wheezes noted  Cardio: Regular rhythm, heart rate normal  Abdomen: Soft, nontender, no rebound or guarding noted on my exam  Extremities: No remarkable edema on the bilateral extremities  Neuro: Awake alert Clarence x 3, no focal neurological deficit on my exam, moves all extremities  Diagnostic Data:  Lab Results (last 24 hours)       Procedure Component Value Units Date/Time    CBC & Differential [[id number]]  (Abnormal) Collected: 02/08/25 2352    Specimen: Blood Updated: 02/09/25 0000    Narrative:      The following orders were created for panel order CBC & Differential.  Procedure                               Abnormality         Status                     ---------                               -----------         ------                     CBC Auto Differential[[id number]]        Abnormal            Final result                 Please view results for these tests on the individual orders.    CBC Auto Differential [[id number]]  (Abnormal) Collected: 02/08/25 2352    Specimen: Blood Updated: 02/09/25 0000     WBC 9.17 10*3/mm3      RBC 4.22 10*6/mm3      Hemoglobin 12.3 g/dL      Hematocrit  39.6 %      MCV 93.8 fL      MCH 29.1 pg      MCHC 31.1 g/dL      RDW 12.9 %      RDW-SD 44.1 fl      MPV 11.7 fL      Platelets 260 10*3/mm3      Neutrophil % 80.4 %      Lymphocyte % 12.6 %      Monocyte % 4.7 %      Eosinophil % 1.2 %      Basophil % 0.9 %      Immature Grans % 0.2 %      Neutrophils, Absolute 7.37 10*3/mm3      Lymphocytes, Absolute 1.16 10*3/mm3      Monocytes, Absolute 0.43 10*3/mm3      Eosinophils, Absolute 0.11 10*3/mm3      Basophils, Absolute 0.08 10*3/mm3      Immature Grans, Absolute 0.02 10*3/mm3      nRBC 0.0 /100 WBC     Comprehensive Metabolic Panel [[id number]] Collected: 02/08/25 2352    Specimen: Blood Updated: 02/08/25 2357    Phosphorus [[id number]] Collected: 02/08/25 2352    Specimen: Blood Updated: 02/08/25 2357             Imaging Results (Last 24 Hours)       ** No results found for the last 24 hours. **              Assessment & Plan    Pamela Zavala is a 84 y.o. female with a CMH of CAD, SVTs, paroxysmal A-fib, thyroid cancer, type 2 diabetes, diabetic neuropathy, hyperlipidemia, former smoker, alcohol use, lives at assisted living facility who presented to [facility] on 2/8/2025 with food impaction.    Assessments  Dysphagia secondary due to Food impaction  CAD  History of supraventricular tachycardia  ?Paroxysmal A-fib, not on anticoagulation per home meds  Thyroid cancer status post surgical resection  Hypothyroidism  Type 2 diabetes  Diabetic neuropathy  Hyperlipidemia  Former smoker  Alcohol use    Plan  -Status post glucagon 1 g given in ED, will start patient on IV PPI twice daily, strict n.p.o.  - gentle IV hydration 75 cc an hour for 8 to 10 hours given she is n.p.o.  -GI consulted per ED, patient to be kept n.p.o. for now, pending evaluation per GI in the morning  -Patient has a history of SVTs, on metoprolol, will start on IV metoprolol 2.5  TID daily until she is n.p.o.  -Follow-up chest x-ray  -SSI NPO ordered  -Resume home meds once the evaluation  is completed, medicine to be reconciled per pharmacy  -AM labs    VTE Prophylaxis:  No VTE prophylaxis order currently exists.        The patient desires to be as follows:    CODE STATUS: DNR/DNI (per patient, son present at bedside)       I discussed the patient's findings and my recommendations with patient.      Signature:     This document has been electronically signed by Ciro Adkins MD on February 9, 2025 00:00 [facility] Team

## 2025-02-09 NOTE — ANESTHESIA PROCEDURE NOTES
Airway  Urgency: elective    Airway not difficult    General Information and Staff    Patient location during procedure: OR  CRNA/CAA: Idalia Nichols CRNA    Indications and Patient Condition  Indications for airway management: airway protection    Preoxygenated: yes  Mask difficulty assessment: 0 - not attempted    Final Airway Details  Final airway type: endotracheal airway      Successful airway: ETT  Cuffed: yes   Successful intubation technique: video laryngoscopy and RSI  Facilitating devices/methods: cricoid pressure and intubating stylet  Endotracheal tube insertion site: oral  Blade: Valle  Blade size: 3  ETT size (mm): 7.0  Cormack-Lehane Classification: grade I - full view of glottis  Placement verified by: chest auscultation and capnometry   Measured from: lips  Number of attempts at approach: 1  Assessment: lips, teeth, and gum same as pre-op and atraumatic intubation

## 2025-02-09 NOTE — PROGRESS NOTES
Nonbillable note  Patient seen and examined at bedside this morning.  States that she was eating turkey last night when she felt she had food stuck in her esophagus and was not able to swallow initially.  This morning states that she was able to swallow her saliva.  GI was consulted and patient underwent EGD and was seen to have esophageal foreign body with complete obstruction and esophageal stricture at GE junction.  No dilatation performed due to inflammation and erythema.  GI recommend continuing PPI twice daily for at least 3 months and recommend repeat EGD in 2 to 3 months with possible dilatation at that time.  Will continue to observe patient for today and see how she does with her diet

## 2025-02-09 NOTE — H&P
GI CONSULT  NOTE:    Referring Provider:    Provider, No Known  No Known Provider    Chief complaint: Food impaction of esophagus, sequela    Subjective .       Pre op diagnosis  Food impaction of esophagus, sequela [T18.128S, W44.F3XS]  Dysphagia    History of present illness:      Pamela Zavala is a 84 y.o. female who presents today for Procedure(s):  ESOPHAGOGASTRODUODENOSCOPY for the indications listed below.     The updated Patient Profile was reviewed prior to the procedure, in conjunction with the Physical Exam, including medical conditions, surgical procedures, medications, allergies, family history and social history.     Pre-operatively, I reviewed the indication(s) for the procedure, the risks of the procedure [including but not limited to: unexpected bleeding possibly requiring hospitalization and/or unplanned repeat procedures, perforation possibly requiring surgical treatment, missed lesions and complications of sedation/MAC (also explained by anesthesia staff)].     I have evaluated the patient for risks associated with the planned anesthesia and the procedure to be performed and find the patient an acceptable candidate for IV sedation.    Multiple opportunities were provided for any questions or concerns, and all questions were answered satisfactorily before any anesthesia was administered. We will proceed with the planned procedure.    Past Medical History:  Past Medical History:   Diagnosis Date    Anemia     Arthritis     CAD (coronary artery disease)     Chronic back pain     Diabetic neuropathy     Essential hypertension     GERD (gastroesophageal reflux disease)     History of gastric ulcer     History of stomach ulcers     HLD (hyperlipidemia)     Hypocalcemia     Hypoparathyroidism     Hypothyroidism     Insomnia     Kidney stones     Osteoporosis     Recurrent sinusitis     Renal disease     Thyroid cancer     Type 2 diabetes mellitus        Past Surgical History:  Past Surgical History:    Procedure Laterality Date    CARDIAC CATHETERIZATION N/A 2023    Procedure: Left Heart Cath and coronary angiogram;  Surgeon: John Baldwin MD;  Location: [facility] CATH INVASIVE LOCATION;  Service: Cardiovascular;  Laterality: N/A;    CORONARY STENT PLACEMENT      x 3    CYSTOSCOPY, URETEROSCOPY, RETROGRADE PYELOGRAM, STENT INSERTION Left 10/18/2023    Procedure: CYSTOSCOPY URETEROSCOPY RETROGRADE PYELOGRAM HOLMIUM LASER STENT INSERTION;  Surgeon: Juan Alaniz MD;  Location: [facility] MAIN OR;  Service: Urology;  Laterality: Left;    HYSTERECTOMY      LUNG LOBECTOMY      THYROIDECTOMY         Social History:  Social History     Tobacco Use    Smoking status: Former     Current packs/day: 0.00     Average packs/day: 2.0 packs/day for 30.0 years (60.0 ttl pk-yrs)     Types: Cigarettes     Start date:      Quit date:      Years since quittin.1     Passive exposure: Past    Smokeless tobacco: Never   Vaping Use    Vaping status: Never Used   Substance Use Topics    Alcohol use: Yes     Alcohol/week: 1.0 standard drink of alcohol     Types: 1 Glasses of wine per week    Drug use: Never       Family History:  Family History   Problem Relation Age of Onset    Diabetes Mother     Cancer Father     Heart disease Sister     Diabetes Sister     Heart disease Maternal Grandmother        Medications:  Medications Prior to Admission   Medication Sig Dispense Refill Last Dose/Taking    Acetaminophen Extra Strength 500 MG tablet Take 2 tablets by mouth Every 6 (Six) Hours As Needed.   Evening    atorvastatin (LIPITOR) 10 MG tablet Take 1 tablet by mouth Daily.   2025    cetirizine (zyrTEC) 10 MG tablet Take 1 tablet by mouth Every Night.   2025 Evening    clopidogrel (PLAVIX) 75 MG tablet Take 1 tablet by mouth Daily.   2025    famotidine (PEPCID) 40 MG tablet Take 1 tablet by mouth every night at bedtime.   2025    fluticasone (FLONASE) 50 MCG/ACT nasal spray Administer 1 spray into the nostril(s)  as directed by provider 2 (Two) Times a Day.   Taking    folic acid (FOLVITE) 1 MG tablet Take 1 tablet by mouth Daily.   2/7/2025    gabapentin (NEURONTIN) 300 MG capsule Take 1 capsule by mouth every night at bedtime.   2/7/2025    Insulin Lispro (humaLOG) 100 UNIT/ML injection 6 Units with each meal and additional sliding scale                 Take 0 additional unit  151-200 Take 1 additional unit  201-250 Take 2 additional unit  251-300 Take 3 additional unit  301-350 Take 4 additional unit  351-400 Take 5 additional unit  More than 400 Take 6 additional unit    Maximum 12 units with each meal (Patient taking differently: Inject 6 Units under the skin into the appropriate area as directed 3 (Three) Times a Day Before Meals. 6 Units with each meal and additional sliding scale                 Take 0 additional unit  151-200 Take 1 additional unit  201-250 Take 2 additional unit  251-300 Take 3 additional unit  301-350 Take 4 additional unit  351-400 Take 5 additional unit  More than 400 Take 6 additional unit    Maximum 12 units with each meal) 30 mL 5 Taking Differently    levothyroxine (SYNTHROID, LEVOTHROID) 137 MCG tablet Take 1 tablet by mouth Daily. 90 tablet 1 2/7/2025    lisinopril (PRINIVIL,ZESTRIL) 10 MG tablet Take 1 tablet by mouth Daily. 90 tablet 3 2/7/2025    magic mouthwash oral susp (nystatin - diphenhydrAMINE HCl - hydrocortisone) Swish and spit 5 mL 3 (Three) Times a Day As Needed.   Taking As Needed    metFORMIN ER (GLUCOPHAGE-XR) 500 MG 24 hr tablet Take 1 tablet by mouth Daily With Breakfast. 30 tablet 11 2/7/2025    metoprolol succinate XL (TOPROL-XL) 25 MG 24 hr tablet Take 1 tablet by mouth Daily.   2/7/2025    multivitamin with minerals (THERA-M PO) Take 1 tablet by mouth Daily.   2/7/2025    ondansetron (ZOFRAN) 4 MG tablet Take 1 tablet by mouth Every 8 (Eight) Hours As Needed for Nausea or Vomiting.   Taking As Needed    polyethylene glycol (MIRALAX) 17 g packet Take 17 g  by mouth Daily.   Taking    thiamine (VITAMIN B1) 100 MG tablet Take 1 tablet by mouth Daily.   Taking    traZODone (DESYREL) 50 MG tablet Take 1 tablet by mouth Every Night.   2/7/2025 Bedtime    Continuous Glucose Sensor (Dexcom G7 Sensor) misc Use 1 each 4 (Four) Times a Day Before Meals & at Bedtime. 9 each 3     Lantus SoloStar 100 UNIT/ML injection pen Inject 22 Units under the skin into the appropriate area as directed Every Night. 15 mL 3 2/7/2025 Evening    pantoprazole (PROTONIX) 40 MG EC tablet Take 1 tablet by mouth 2 (Two) Times a Day. Take dos          Scheduled Meds:[Held by provider] atorvastatin, 10 mg, Oral, Daily  [Held by provider] clopidogrel, 75 mg, Oral, Daily  insulin lispro, 2-7 Units, Subcutaneous, Q6H  levothyroxine, 137 mcg, Oral, Q AM  [Held by provider] metoprolol succinate XL, 25 mg, Oral, Daily  metoprolol tartrate, 2.5 mg, Intravenous, Q8H  [Held by provider] multivitamin with minerals, 1 tablet, Oral, Daily  pantoprazole, 40 mg, Intravenous, Q AM  sodium chloride, 10 mL, Intravenous, Q12H      Continuous Infusions:   PRN Meds:.  Calcium Replacement - Follow Nurse / BPA Driven Protocol    dextrose    dextrose    glucagon (human recombinant)    hydrALAZINE    Magnesium Standard Dose Replacement - Follow Nurse / BPA Driven Protocol    Phosphorus Replacement - Follow Nurse / BPA Driven Protocol    Potassium Replacement - Follow Nurse / BPA Driven Protocol    sodium chloride    sodium chloride    ALLERGIES:  Penicillins, Shellfish-derived products, Sulfamethizole, and Trimethoprim    ROS:  The following systems were reviewed and negative;  Constitution:  No fevers, chills, no unintentional weight loss  Skin: no rash, no jaundice  Eyes:  No blurry vision, no eye pain  HENT:  No change in hearing or smell  Resp:  No dyspnea or cough  CV:  No chest pain or palpitations  :  No dysuria, hematuria  Musculoskeletal:  No leg cramps or arthralgias  Neuro:  No tremor, no numbness  Psych:  No  depression or confsusion    Objective     Vital Signs:   Vitals:    02/09/25 0205 02/09/25 0257 02/09/25 0528 02/09/25 1110   BP: 133/57  140/59 170/72   BP Location:   Left arm Right arm   Patient Position:   Lying Lying   Pulse: 92 85 85 89   Resp:   14 22   Temp:   98.4 °F (36.9 °C)    TempSrc:   Oral    SpO2:  93% 95% 95%   Weight:       Height:           Physical Exam:       General Appearance:    Awake and alert, in no acute distress   Head:    Normocephalic, without obvious abnormality, atraumatic   Throat:   No oral lesions, no thrush, oral mucosa moist   Lungs:     respirations regular, even and unlabored   Skin:   No rash, no jaundice       Results Review:  Lab Results (last 24 hours)       Procedure Component Value Units Date/Time    POC Glucose 4x Daily Before Meals & at Bedtime [[id number]]  (Abnormal) Collected: 02/09/25 1112    Specimen: Blood Updated: 02/09/25 1115     Glucose 175 mg/dL      Comment: Serial Number: [id number]:  [id number]       Magnesium [[id number]]  (Normal) Collected: 02/09/25 0538    Specimen: Blood from Arm, Right Updated: 02/09/25 0654     Magnesium 2.1 mg/dL     Comprehensive Metabolic Panel [[id number]]  (Abnormal) Collected: 02/09/25 0538    Specimen: Blood from Arm, Right Updated: 02/09/25 0654     Glucose 199 mg/dL      BUN 23 mg/dL      Creatinine 0.98 mg/dL      Sodium 141 mmol/L      Potassium 3.9 mmol/L      Chloride 105 mmol/L      CO2 25.4 mmol/L      Calcium 8.6 mg/dL      Total Protein 7.0 g/dL      Albumin 4.1 g/dL      ALT (SGPT) 14 U/L      AST (SGOT) 14 U/L      Alkaline Phosphatase 94 U/L      Total Bilirubin 0.4 mg/dL      Globulin 2.9 gm/dL      A/G Ratio 1.4 g/dL      BUN/Creatinine Ratio 23.5     Anion Gap 10.6 mmol/L      eGFR 56.3 mL/min/1.73     Narrative:      GFR Categories in Chronic Kidney Disease (CKD)      GFR Category          GFR (mL/min/1.73)    Interpretation  G1                     90 or greater         Normal or high (1)  G2                       60-89                Mild decrease (1)  G3a                   45-59                Mild to moderate decrease  G3b                   30-44                Moderate to severe decrease  G4                    15-29                Severe decrease  G5                    14 or less           Kidney failure          (1)In the absence of evidence of kidney disease, neither GFR category G1 or G2 fulfill the criteria for CKD.    eGFR calculation 2021 CKD-EPI creatinine equation, which does not include race as a factor    Phosphorus [[id number]]  (Normal) Collected: 02/09/25 0538    Specimen: Blood from Arm, Right Updated: 02/09/25 0654     Phosphorus 3.0 mg/dL     CBC & Differential [[id number]]  (Abnormal) Collected: 02/09/25 0538    Specimen: Blood from Arm, Right Updated: 02/09/25 0627    Narrative:      The following orders were created for panel order CBC & Differential.  Procedure                               Abnormality         Status                     ---------                               -----------         ------                     CBC Auto Differential[[id number]]        Abnormal            Final result                 Please view results for these tests on the individual orders.    CBC Auto Differential [[id number]]  (Abnormal) Collected: 02/09/25 0538    Specimen: Blood from Arm, Right Updated: 02/09/25 0627     WBC 10.17 10*3/mm3      RBC 4.11 10*6/mm3      Hemoglobin 11.9 g/dL      Hematocrit 37.8 %      MCV 92.0 fL      MCH 29.0 pg      MCHC 31.5 g/dL      RDW 13.0 %      RDW-SD 43.8 fl      MPV 11.9 fL      Platelets 268 10*3/mm3      Neutrophil % 70.8 %      Lymphocyte % 20.9 %      Monocyte % 5.6 %      Eosinophil % 1.6 %      Basophil % 0.8 %      Immature Grans % 0.3 %      Neutrophils, Absolute 7.20 10*3/mm3      Lymphocytes, Absolute 2.13 10*3/mm3      Monocytes, Absolute 0.57 10*3/mm3      Eosinophils, Absolute 0.16 10*3/mm3      Basophils, Absolute 0.08 10*3/mm3      Immature Grans, Absolute  0.03 10*3/mm3      nRBC 0.0 /100 WBC     POC Glucose Once [[id number]]  (Abnormal) Collected: 02/09/25 0520    Specimen: Blood Updated: 02/09/25 0521     Glucose 195 mg/dL      Comment: Serial Number: [id number]:  [id number]       POC Glucose Once [[id number]]  (Abnormal) Collected: 02/09/25 0159    Specimen: Blood Updated: 02/09/25 0201     Glucose 256 mg/dL      Comment: Serial Number: [id number]:  [id number]       Comprehensive Metabolic Panel [[id number]]  (Abnormal) Collected: 02/08/25 2352    Specimen: Blood Updated: 02/09/25 0023     Glucose 325 mg/dL      BUN 24 mg/dL      Creatinine 1.03 mg/dL      Sodium 139 mmol/L      Potassium 3.9 mmol/L      Chloride 104 mmol/L      CO2 24.4 mmol/L      Calcium 8.6 mg/dL      Total Protein 7.2 g/dL      Albumin 4.3 g/dL      ALT (SGPT) 15 U/L      AST (SGOT) 16 U/L      Alkaline Phosphatase 102 U/L      Total Bilirubin 0.4 mg/dL      Globulin 2.9 gm/dL      A/G Ratio 1.5 g/dL      BUN/Creatinine Ratio 23.3     Anion Gap 10.6 mmol/L      eGFR 53.1 mL/min/1.73     Narrative:      GFR Categories in Chronic Kidney Disease (CKD)      GFR Category          GFR (mL/min/1.73)    Interpretation  G1                     90 or greater         Normal or high (1)  G2                      60-89                Mild decrease (1)  G3a                   45-59                Mild to moderate decrease  G3b                   30-44                Moderate to severe decrease  G4                    15-29                Severe decrease  G5                    14 or less           Kidney failure          (1)In the absence of evidence of kidney disease, neither GFR category G1 or G2 fulfill the criteria for CKD.    eGFR calculation 2021 CKD-EPI creatinine equation, which does not include race as a factor    Phosphorus [[id number]]  (Normal) Collected: 02/08/25 2352    Specimen: Blood Updated: 02/09/25 0023     Phosphorus 2.7 mg/dL     CBC & Differential [[id number]]  (Abnormal) Collected:  02/08/25 2352    Specimen: Blood Updated: 02/09/25 0000    Narrative:      The following orders were created for panel order CBC & Differential.  Procedure                               Abnormality         Status                     ---------                               -----------         ------                     CBC Auto Differential[[id number]]        Abnormal            Final result                 Please view results for these tests on the individual orders.    CBC Auto Differential [[id number]]  (Abnormal) Collected: 02/08/25 2352    Specimen: Blood Updated: 02/09/25 0000     WBC 9.17 10*3/mm3      RBC 4.22 10*6/mm3      Hemoglobin 12.3 g/dL      Hematocrit 39.6 %      MCV 93.8 fL      MCH 29.1 pg      MCHC 31.1 g/dL      RDW 12.9 %      RDW-SD 44.1 fl      MPV 11.7 fL      Platelets 260 10*3/mm3      Neutrophil % 80.4 %      Lymphocyte % 12.6 %      Monocyte % 4.7 %      Eosinophil % 1.2 %      Basophil % 0.9 %      Immature Grans % 0.2 %      Neutrophils, Absolute 7.37 10*3/mm3      Lymphocytes, Absolute 1.16 10*3/mm3      Monocytes, Absolute 0.43 10*3/mm3      Eosinophils, Absolute 0.11 10*3/mm3      Basophils, Absolute 0.08 10*3/mm3      Immature Grans, Absolute 0.02 10*3/mm3      nRBC 0.0 /100 WBC             Imaging Results (Last 24 Hours)       Procedure Component Value Units Date/Time    XR Chest 1 View [[id number]] Collected: 02/09/25 0707     Updated: 02/09/25 0710    Narrative:      XR CHEST 1 VW    Date of Exam: 2/9/2025 12:11 AM EST    Indication: Food impaction, vomiting/nausea    Comparison: Chest radiograph dated 2/2/2024.    Findings:  The cardiomediastinal silhouette is within normal limits. There is aortic arch atherosclerotic calcification. Pulmonary vascularity appears normal. There is no focal airspace consolidation, pleural effusion, or pneumothorax. There is no evidence of   aspiration. No free air under the diaphragm. Mildly elevated right hemidiaphragm.      Impression:       Impression:  1. No acute cardiopulmonary abnormality.  2. No free air under the diaphragm.      Electronically Signed: Butch Hoffman    2/9/2025 7:08 AM EST    Workstation ID: [id number]             I reviewed the patient's labs and imaging.    ASSESSMENT AND PLAN:    Code status: Patient was DNR but discussed with patient/family who elected to rescind DNR for the procedure.  She will be treated as full code for the procedure.  Okay to resume DNR afterwards.      Principal Problem:    Food impaction of esophagus, sequela   Dysphagia    Procedure(s):  ESOPHAGOGASTRODUODENOSCOPY      I discussed the patient's findings and my recommendations with the patient.    SARKIS Serrano MD  02/09/25  11:32 EST

## 2025-02-09 NOTE — ANESTHESIA POSTPROCEDURE EVALUATION
Patient: Pamela Zavala    Procedure Summary       Date: 02/09/25 Room / Location: [facility] ENDOSCOPY 1 / [facility] ENDOSCOPY    Anesthesia Start: 1128 Anesthesia Stop: 1158    Procedure: ESOPHAGOGASTRODUODENOSCOPY WITH REMOVAL OF FOREIGN BODY FROM ESOPHAGUS Diagnosis:       Food impaction of esophagus, sequela      (Food impaction of esophagus, sequela [T18.128S, W44.F3XS])    Surgeons: EDU Serrano MD Provider: Idalia Nichols CRNA    Anesthesia Type: general ASA Status: 3            Anesthesia Type: general    Vitals  Vitals Value Taken Time   /82 02/09/25 1224   Temp 98.2 °F (36.8 °C) 02/09/25 1224   Pulse 96 02/09/25 1224   Resp 14 02/09/25 1224   SpO2 95 % 02/09/25 1224           Post Anesthesia Care and Evaluation    Patient location during evaluation: PACU  Patient participation: complete - patient participated  Level of consciousness: awake and alert  Pain management: satisfactory to patient    Airway patency: patent  Anesthetic complications: No anesthetic complications  PONV Status: none  Cardiovascular status: acceptable  Respiratory status: acceptable  Hydration status: acceptable

## 2025-02-09 NOTE — DISCHARGE INSTRUCTIONS
A responsible adult should stay with you and you should rest quietly for the rest of the day.    Do not drink alcohol, drive, operate any heavy machinery or power tools or make any legal/important decisions for the next 24 hours.     Progress your diet as tolerated.  If you begin to experience severe pain, increased shortness of breath, racing heartbeat or a fever above 101 F, seek immediate medical attention.     Follow up with MD as instructed. Call office for results in 3 to 5 days if needed.      For further questions please call Dr. Serrano's office at [phone]    Recommendations:  -Okay to resume regular diet, recommend soft food diet, avoiding breads/meats, and using sauces/gravies to keep food slick  -Okay for discharge home today  -Recommend repeat EGD in 2 to 3 months with possible dilation at that time  -Recommend pantoprazole 40 mg twice daily for at least 3 months  -Okay to resume clopidogrel     GI will see inpatient as needed. Will sign off, but please feel free to call us back if further assistance is needed.  Thank you

## 2025-02-09 NOTE — ED PROVIDER NOTES
Subjective   Chief Complaint   Patient presents with    Difficulty Swallowing       History of Present Illness  Patient is an 84-year-old female presents the emergency department for evaluation of difficulty swallowing.  She was eating turkey tonight at home at the [facility], and suddenly had difficulty swallowing, she reports she had difficulty swallowing her saliva at this time.  She has not had any history of difficulty swallowing before.  She denies any history of esophageal dilatation in the past.  Review of Systems    Past Medical History:   Diagnosis Date    Anemia     Arthritis     CAD (coronary artery disease)     Chronic back pain     Diabetic neuropathy     Essential hypertension     GERD (gastroesophageal reflux disease)     History of gastric ulcer     History of stomach ulcers     HLD (hyperlipidemia)     Hypocalcemia     Hypoparathyroidism     Hypothyroidism     Insomnia     Kidney stones     Osteoporosis     Recurrent sinusitis     Renal disease     Thyroid cancer     Type 2 diabetes mellitus        Allergies   Allergen Reactions    Penicillins Unknown - High Severity     Lips swell and hives    Shellfish-Derived Products Unknown - Low Severity    Sulfamethizole Unknown - Low Severity    Trimethoprim Unknown - Low Severity       Past Surgical History:   Procedure Laterality Date    CARDIAC CATHETERIZATION N/A 05/03/2023    Procedure: Left Heart Cath and coronary angiogram;  Surgeon: John Baldwin MD;  Location: [facility] INVASIVE LOCATION;  Service: Cardiovascular;  Laterality: N/A;    CORONARY STENT PLACEMENT      x 3    CYSTOSCOPY, URETEROSCOPY, RETROGRADE PYELOGRAM, STENT INSERTION Left 10/18/2023    Procedure: CYSTOSCOPY URETEROSCOPY RETROGRADE PYELOGRAM HOLMIUM LASER STENT INSERTION;  Surgeon: Juan Alaniz MD;  Location: [facility] OR;  Service: Urology;  Laterality: Left;    HYSTERECTOMY      LUNG LOBECTOMY      THYROIDECTOMY         Family History   Problem Relation Age of Onset     Diabetes Mother     Cancer Father     Heart disease Sister     Diabetes Sister     Heart disease Maternal Grandmother        Social History     Socioeconomic History    Marital status:     Number of children: 2    Highest education level: 11th grade   Tobacco Use    Smoking status: Former     Current packs/day: 0.00     Average packs/day: 2.0 packs/day for 30.0 years (60.0 ttl pk-yrs)     Types: Cigarettes     Start date:      Quit date:      Years since quittin.1     Passive exposure: Past    Smokeless tobacco: Never   Vaping Use    Vaping status: Never Used   Substance and Sexual Activity    Alcohol use: Yes     Alcohol/week: 1.0 standard drink of alcohol     Types: 1 Glasses of wine per week    Drug use: Defer    Sexual activity: Defer           Objective   Physical Exam  Vitals and nursing note reviewed.   Constitutional:       Appearance: Normal appearance.   HENT:      Head: Normocephalic and atraumatic.      Jaw: There is normal jaw occlusion.      Nose: Nose normal.      Mouth/Throat:      Lips: Pink.      Mouth: Mucous membranes are moist.      Pharynx: Oropharynx is clear. Uvula midline.      Comments: Tolerating oral secretions normally without pooling.    Eyes:      Extraocular Movements: Extraocular movements intact.      Conjunctiva/sclera: Conjunctivae normal.      Pupils: Pupils are equal, round, and reactive to light.   Neck:      Trachea: Trachea and phonation normal.   Cardiovascular:      Rate and Rhythm: Normal rate and regular rhythm.      Heart sounds: Normal heart sounds. No murmur heard.     No friction rub. No gallop.   Pulmonary:      Effort: Pulmonary effort is normal. No respiratory distress.      Breath sounds: Normal breath sounds and air entry. No stridor.   Musculoskeletal:      Cervical back: Normal range of motion and neck supple.   Skin:     General: Skin is warm and dry.      Capillary Refill: Capillary refill takes less than 2 seconds.   Neurological:       General: No focal deficit present.      Mental Status: She is alert and oriented to person, place, and time.         Procedures           ED Course  ED Course as of 02/09/25 0054   Sat Feb 08, 2025   2343 Discussed with Dr. Adkins.  [LB]   Sun Feb 09, 2025   0028 2nd call placed to gastro.  [LB]   0029 Spoke with Dr. Serrano [LB]      ED Course User Index  [LB] Estephania Lechuga, CLAUDE                                                       Medical Decision Making  Risk  Prescription drug management.  Decision regarding hospitalization.    Appropriate PPE worn during patient interactions.    Pt was Placed on appropriate monitoring.  Differential diagnoses considered for patient presentation, this list is not all inclusive of diagnoses considered: Food impaction, choking  Patient presents to the ED for the above complaint, underwent the above, exam and workup.  Patient was given glucagon, intended to drink oral fluids.  She was doing well after glucagon, however continues to drink and had continued vomiting.  I suspect she has a food bolus from eating her turkey tonight.    She is not having any choking.  No respiratory distress.  She is trying her own secretions normally.  GI was consulted and will see patient tomorrow, patient related to hospital medicine.  Discussed with hospitalist    This is my findings and plan of care with the patient and her family at bedside.  They verbalized understanding      Note Disclaimer: At [facility], we believe that sharing information builds trust and better relationships. You are receiving this note because you recently visited [facility]. It is possible you will see health information before a provider has talked with you about it. This kind of information can be easy to misunderstand. To help you fully understand what it means for your health, we urge you to discuss this note with your provider  Note dictated utilizing Dragon Dictation.          Final diagnoses:    Food impaction of esophagus, initial encounter       ED Disposition  ED Disposition       ED Disposition   Decision to Admit    Condition   --    Comment   Level of Care: Med/Surg [1]   Diagnosis: Food impaction of esophagus, sequela [[id number]]   Admitting Physician: GLENNY BARAHONA [[id number]]   Certification: I Certify That Inpatient Hospital Services Are Medically Necessary For Greater Than 2 Midnights                 No follow-up provider specified.       Medication List      No changes were made to your prescriptions during this visit.            Estephania Lechuga, APRN  02/09/25 0054

## 2025-02-10 NOTE — PLAN OF CARE
Goal Outcome Evaluation:  Plan of Care Reviewed With: patient        Progress: no change  Outcome Evaluation: Pt is a new admit from ED to OU for chest pain, left-sided facial numbness, generalized weakness, and dizziness. She is currently a falls precaution because of her generalized weakness. She can ambulate to the bathroom using a walker and 1 assist. VS are WDL, chest rise and fall are noted per RN. Pt slept between care all night, she is A&Ox4 on roomair. She had a consult with PT and [facility] merchant. She is scheduled to have a myoview some time this morning. No orders are pending at this time and pt has remained NPO status since midnight for myoview.   NO medication changes today.  Follow up with Dr. Carias in 1 year.

## 2025-02-10 NOTE — OUTREACH NOTE
Prep Survey      Flowsheet Row Responses   [facility]? [facility]   Is LACE score < 7 ? No   Eligibility Readm Mgmt   Discharge diagnosis Food impaction of esophagus, sequela   Does the patient have one of the following disease processes/diagnoses(primary or secondary)? Other   Does the patient have Home health ordered? No   Is there a DME ordered? No   Prep survey completed? Yes            Bri IGNACIO - Registered Nurse

## 2025-02-12 ENCOUNTER — READMISSION MANAGEMENT (OUTPATIENT)
Dept: CALL CENTER | Facility: HOSPITAL | Age: 85
End: 2025-02-12
Payer: MEDICARE

## 2025-02-12 NOTE — OUTREACH NOTE
"Medical Week 1 Survey      Flowsheet Row Responses   [facility] [place] patient discharged from? [facility]   Does the patient have one of the following disease processes/diagnoses(primary or secondary)? Other   Week 1 attempt successful? Yes   Call start time 0818   Call end time 0821   Meds reviewed with patient/caregiver? Yes   Is the patient having any side effects they believe may be caused by any medication additions or changes? No   Does the patient have all medications ordered at discharge? N/A   Is the patient taking all medications as directed (includes completed medication regime)? Yes   Comments regarding appointments States her PCP at [facility] [address] visits every Tues and Thurs.   Does the patient have a primary care provider?  Yes   Does the patient have an appointment with their PCP within 7 days of discharge? Yes   Has the patient kept scheduled appointments due by today? Yes   Has home health visited the patient within 72 hours of discharge? N/A   Home health comments States will be receiving \"therapy for her throat\" through [facility] [address].   Psychosocial issues? No   Did the patient receive a copy of their discharge instructions? Yes   Nursing interventions Reviewed instructions with patient   What is the patient's perception of their health status since discharge? Improving   Is the patient/caregiver able to teach back signs and symptoms related to disease process for when to call PCP? Yes   Is the patient/caregiver able to teach back signs and symptoms related to disease process for when to call 911? Yes   Is the patient/caregiver able to teach back the hierarchy of who to call/visit for symptoms/problems? PCP, Specialist, Home health nurse, Urgent Care, ED, 911 Yes   If the patient is a current smoker, are they able to teach back resources for cessation? Not a smoker   Week 1 call completed? Yes   Graduated Yes   Would this patient benefit from a Referral to [facility] Social Work? No   Is the patient " interested in additional calls from an ambulatory ? No   Wrap up additional comments Patient states is doing well, and swallowing without difficulty. States is receiving thorough f/u at [facility] [address]. Has already been evaluated by her PCP. Denies any needs or concerns today.   Call end time 0821            Kath SHOEMAKER - Registered Nurse

## 2025-03-03 DIAGNOSIS — E11.65 TYPE 2 DIABETES MELLITUS WITH HYPERGLYCEMIA, WITH LONG-TERM CURRENT USE OF INSULIN: ICD-10-CM

## 2025-03-03 DIAGNOSIS — Z79.4 TYPE 2 DIABETES MELLITUS WITH HYPERGLYCEMIA, WITH LONG-TERM CURRENT USE OF INSULIN: ICD-10-CM

## 2025-03-05 RX ORDER — INSULIN GLARGINE 100 [IU]/ML
22 INJECTION, SOLUTION SUBCUTANEOUS NIGHTLY
Qty: 15 ML | Refills: 1 | Status: SHIPPED | OUTPATIENT
Start: 2025-03-05

## 2025-04-23 DIAGNOSIS — Z79.4 TYPE 2 DIABETES MELLITUS WITH HYPERGLYCEMIA, WITH LONG-TERM CURRENT USE OF INSULIN: ICD-10-CM

## 2025-04-23 DIAGNOSIS — E11.65 TYPE 2 DIABETES MELLITUS WITH HYPERGLYCEMIA, WITH LONG-TERM CURRENT USE OF INSULIN: ICD-10-CM

## 2025-04-29 RX ORDER — METFORMIN HYDROCHLORIDE 500 MG/1
500 TABLET, EXTENDED RELEASE ORAL
Qty: 30 TABLET | Refills: 10 | Status: SHIPPED | OUTPATIENT
Start: 2025-04-29

## 2025-06-12 DIAGNOSIS — E11.65 TYPE 2 DIABETES MELLITUS WITH HYPERGLYCEMIA, WITH LONG-TERM CURRENT USE OF INSULIN: ICD-10-CM

## 2025-06-12 DIAGNOSIS — Z79.4 TYPE 2 DIABETES MELLITUS WITH HYPERGLYCEMIA, WITH LONG-TERM CURRENT USE OF INSULIN: ICD-10-CM

## 2025-06-12 RX ORDER — ACYCLOVIR 400 MG/1
TABLET ORAL
Qty: 9 EACH | Refills: 0 | Status: SHIPPED | OUTPATIENT
Start: 2025-06-12

## 2025-06-12 RX ORDER — INSULIN LISPRO 100 [IU]/ML
INJECTION, SOLUTION INTRAVENOUS; SUBCUTANEOUS
Qty: 30 ML | Refills: 0 | Status: SHIPPED | OUTPATIENT
Start: 2025-06-12

## 2025-06-19 ENCOUNTER — TELEPHONE (OUTPATIENT)
Dept: CARDIOLOGY | Facility: CLINIC | Age: 85
End: 2025-06-19
Payer: MEDICARE

## 2025-06-19 ENCOUNTER — APPOINTMENT (OUTPATIENT)
Dept: CT IMAGING | Facility: HOSPITAL | Age: 85
End: 2025-06-19
Payer: MEDICARE

## 2025-06-19 ENCOUNTER — APPOINTMENT (OUTPATIENT)
Dept: GENERAL RADIOLOGY | Facility: HOSPITAL | Age: 85
End: 2025-06-19
Payer: MEDICARE

## 2025-06-19 ENCOUNTER — HOSPITAL ENCOUNTER (OUTPATIENT)
Facility: HOSPITAL | Age: 85
Setting detail: OBSERVATION
Discharge: HOME | End: 2025-06-21
Attending: EMERGENCY MEDICINE | Admitting: STUDENT IN AN ORGANIZED HEALTH CARE EDUCATION/TRAINING PROGRAM
Payer: MEDICARE

## 2025-06-19 DIAGNOSIS — R07.9 CHEST PAIN, UNSPECIFIED TYPE: ICD-10-CM

## 2025-06-19 DIAGNOSIS — I69.30 SEQUELA OF LACUNAR INFARCTION: ICD-10-CM

## 2025-06-19 DIAGNOSIS — R20.2 FACIAL PARESTHESIA: Primary | ICD-10-CM

## 2025-06-19 DIAGNOSIS — Z95.5 HISTORY OF CORONARY ANGIOPLASTY WITH INSERTION OF STENT: ICD-10-CM

## 2025-06-19 LAB
ABO GROUP BLD: NORMAL
ALBUMIN SERPL-MCNC: 4.4 G/DL (ref 3.5–5.2)
ALBUMIN/GLOB SERPL: 1.6 G/DL
ALP SERPL-CCNC: 91 U/L (ref 39–117)
ALT SERPL W P-5'-P-CCNC: 12 U/L (ref 1–33)
ANION GAP SERPL CALCULATED.3IONS-SCNC: 12.5 MMOL/L (ref 5–15)
APTT PPP: 26.3 SECONDS (ref 22.7–35.4)
AST SERPL-CCNC: 17 U/L (ref 1–32)
BASOPHILS # BLD AUTO: 0.1 10*3/MM3 (ref 0–0.2)
BASOPHILS NFR BLD AUTO: 1.1 % (ref 0–1.5)
BILIRUB SERPL-MCNC: 0.3 MG/DL (ref 0–1.2)
BLD GP AB SCN SERPL QL: NEGATIVE
BUN SERPL-MCNC: 22.7 MG/DL (ref 8–23)
BUN/CREAT SERPL: 20.6 (ref 7–25)
CALCIUM SPEC-SCNC: 8.9 MG/DL (ref 8.6–10.5)
CHLORIDE SERPL-SCNC: 106 MMOL/L (ref 98–107)
CO2 SERPL-SCNC: 22.5 MMOL/L (ref 22–29)
CREAT SERPL-MCNC: 1.1 MG/DL (ref 0.57–1)
DEPRECATED RDW RBC AUTO: 45.8 FL (ref 37–54)
EGFRCR SERPLBLD CKD-EPI 2021: 49 ML/MIN/1.73
EOSINOPHIL # BLD AUTO: 0.2 10*3/MM3 (ref 0–0.4)
EOSINOPHIL NFR BLD AUTO: 2.3 % (ref 0.3–6.2)
ERYTHROCYTE [DISTWIDTH] IN BLOOD BY AUTOMATED COUNT: 13.5 % (ref 12.3–15.4)
GEN 5 1HR TROPONIN T REFLEX: 10 NG/L
GLOBULIN UR ELPH-MCNC: 2.8 GM/DL
GLUCOSE BLDC GLUCOMTR-MCNC: 131 MG/DL (ref 70–105)
GLUCOSE BLDC GLUCOMTR-MCNC: 184 MG/DL (ref 70–105)
GLUCOSE BLDC GLUCOMTR-MCNC: 195 MG/DL (ref 70–105)
GLUCOSE SERPL-MCNC: 209 MG/DL (ref 65–99)
HBA1C MFR BLD: 8.47 % (ref 4.8–5.6)
HCT VFR BLD AUTO: 39.7 % (ref 34–46.6)
HGB BLD-MCNC: 12.5 G/DL (ref 12–15.9)
HOLD SPECIMEN: NORMAL
HOLD SPECIMEN: NORMAL
IMM GRANULOCYTES # BLD AUTO: 0.04 10*3/MM3 (ref 0–0.05)
IMM GRANULOCYTES NFR BLD AUTO: 0.5 % (ref 0–0.5)
INR PPP: 1 (ref 0.9–1.1)
LYMPHOCYTES # BLD AUTO: 1.99 10*3/MM3 (ref 0.7–3.1)
LYMPHOCYTES NFR BLD AUTO: 22.9 % (ref 19.6–45.3)
MCH RBC QN AUTO: 29.1 PG (ref 26.6–33)
MCHC RBC AUTO-ENTMCNC: 31.5 G/DL (ref 31.5–35.7)
MCV RBC AUTO: 92.3 FL (ref 79–97)
MONOCYTES # BLD AUTO: 0.54 10*3/MM3 (ref 0.1–0.9)
MONOCYTES NFR BLD AUTO: 6.2 % (ref 5–12)
NEUTROPHILS NFR BLD AUTO: 5.83 10*3/MM3 (ref 1.7–7)
NEUTROPHILS NFR BLD AUTO: 67 % (ref 42.7–76)
NRBC BLD AUTO-RTO: 0 /100 WBC (ref 0–0.2)
PLATELET # BLD AUTO: 298 10*3/MM3 (ref 140–450)
PMV BLD AUTO: 11.9 FL (ref 6–12)
POTASSIUM SERPL-SCNC: 4.3 MMOL/L (ref 3.5–5.2)
PROT SERPL-MCNC: 7.2 G/DL (ref 6–8.5)
PROTHROMBIN TIME: 13.1 SECONDS (ref 11.7–14.2)
RBC # BLD AUTO: 4.3 10*6/MM3 (ref 3.77–5.28)
RH BLD: POSITIVE
SODIUM SERPL-SCNC: 141 MMOL/L (ref 136–145)
T&S EXPIRATION DATE: NORMAL
TROPONIN T NUMERIC DELTA: -1 NG/L
TROPONIN T SERPL HS-MCNC: 11 NG/L
TSH SERPL DL<=0.05 MIU/L-ACNC: 1.86 UIU/ML (ref 0.27–4.2)
WBC NRBC COR # BLD AUTO: 8.7 10*3/MM3 (ref 3.4–10.8)
WHOLE BLOOD HOLD COAG: NORMAL
WHOLE BLOOD HOLD SPECIMEN: NORMAL

## 2025-06-19 PROCEDURE — 70498 CT ANGIOGRAPHY NECK: CPT

## 2025-06-19 PROCEDURE — 80053 COMPREHEN METABOLIC PANEL: CPT | Performed by: EMERGENCY MEDICINE

## 2025-06-19 PROCEDURE — 86901 BLOOD TYPING SEROLOGIC RH(D): CPT | Performed by: EMERGENCY MEDICINE

## 2025-06-19 PROCEDURE — 82607 VITAMIN B-12: CPT | Performed by: NURSE PRACTITIONER

## 2025-06-19 PROCEDURE — 82948 REAGENT STRIP/BLOOD GLUCOSE: CPT

## 2025-06-19 PROCEDURE — 25010000002 NITROGLYCERIN 200 MCG/ML SOLUTION: Performed by: EMERGENCY MEDICINE

## 2025-06-19 PROCEDURE — 93005 ELECTROCARDIOGRAM TRACING: CPT | Performed by: EMERGENCY MEDICINE

## 2025-06-19 PROCEDURE — G0378 HOSPITAL OBSERVATION PER HR: HCPCS

## 2025-06-19 PROCEDURE — 36415 COLL VENOUS BLD VENIPUNCTURE: CPT

## 2025-06-19 PROCEDURE — 63710000001 INSULIN LISPRO (HUMAN) PER 5 UNITS: Performed by: NURSE PRACTITIONER

## 2025-06-19 PROCEDURE — 85025 COMPLETE CBC W/AUTO DIFF WBC: CPT | Performed by: EMERGENCY MEDICINE

## 2025-06-19 PROCEDURE — 71045 X-RAY EXAM CHEST 1 VIEW: CPT

## 2025-06-19 PROCEDURE — 86900 BLOOD TYPING SEROLOGIC ABO: CPT | Performed by: EMERGENCY MEDICINE

## 2025-06-19 PROCEDURE — 84484 ASSAY OF TROPONIN QUANT: CPT | Performed by: EMERGENCY MEDICINE

## 2025-06-19 PROCEDURE — 83036 HEMOGLOBIN GLYCOSYLATED A1C: CPT | Performed by: NURSE PRACTITIONER

## 2025-06-19 PROCEDURE — 70450 CT HEAD/BRAIN W/O DYE: CPT

## 2025-06-19 PROCEDURE — 25510000001 IOPAMIDOL PER 1 ML: Performed by: EMERGENCY MEDICINE

## 2025-06-19 PROCEDURE — 86850 RBC ANTIBODY SCREEN: CPT | Performed by: EMERGENCY MEDICINE

## 2025-06-19 PROCEDURE — 84443 ASSAY THYROID STIM HORMONE: CPT | Performed by: NURSE PRACTITIONER

## 2025-06-19 PROCEDURE — 99285 EMERGENCY DEPT VISIT HI MDM: CPT

## 2025-06-19 PROCEDURE — 96366 THER/PROPH/DIAG IV INF ADDON: CPT

## 2025-06-19 PROCEDURE — 85730 THROMBOPLASTIN TIME PARTIAL: CPT | Performed by: EMERGENCY MEDICINE

## 2025-06-19 PROCEDURE — 70496 CT ANGIOGRAPHY HEAD: CPT

## 2025-06-19 PROCEDURE — 85610 PROTHROMBIN TIME: CPT | Performed by: EMERGENCY MEDICINE

## 2025-06-19 PROCEDURE — 96365 THER/PROPH/DIAG IV INF INIT: CPT

## 2025-06-19 RX ORDER — SODIUM CHLORIDE 9 MG/ML
40 INJECTION, SOLUTION INTRAVENOUS AS NEEDED
Status: DISCONTINUED | OUTPATIENT
Start: 2025-06-19 | End: 2025-06-21 | Stop reason: HOSPADM

## 2025-06-19 RX ORDER — INSULIN LISPRO 100 [IU]/ML
2-7 INJECTION, SOLUTION INTRAVENOUS; SUBCUTANEOUS
Status: DISCONTINUED | OUTPATIENT
Start: 2025-06-19 | End: 2025-06-21 | Stop reason: HOSPADM

## 2025-06-19 RX ORDER — ONDANSETRON 4 MG/1
4 TABLET, ORALLY DISINTEGRATING ORAL EVERY 6 HOURS PRN
Status: DISCONTINUED | OUTPATIENT
Start: 2025-06-19 | End: 2025-06-21 | Stop reason: HOSPADM

## 2025-06-19 RX ORDER — BISACODYL 5 MG/1
5 TABLET, DELAYED RELEASE ORAL DAILY PRN
Status: DISCONTINUED | OUTPATIENT
Start: 2025-06-19 | End: 2025-06-21 | Stop reason: HOSPADM

## 2025-06-19 RX ORDER — DEXTROSE MONOHYDRATE 25 G/50ML
25 INJECTION, SOLUTION INTRAVENOUS
Status: DISCONTINUED | OUTPATIENT
Start: 2025-06-19 | End: 2025-06-21 | Stop reason: HOSPADM

## 2025-06-19 RX ORDER — ACETAMINOPHEN 650 MG/1
650 SUPPOSITORY RECTAL EVERY 4 HOURS PRN
Status: DISCONTINUED | OUTPATIENT
Start: 2025-06-19 | End: 2025-06-21 | Stop reason: HOSPADM

## 2025-06-19 RX ORDER — MORPHINE SULFATE 2 MG/ML
2 INJECTION, SOLUTION INTRAMUSCULAR; INTRAVENOUS
Status: DISCONTINUED | OUTPATIENT
Start: 2025-06-19 | End: 2025-06-19

## 2025-06-19 RX ORDER — NITROGLYCERIN 20 MG/100ML
10-50 INJECTION INTRAVENOUS
Status: DISCONTINUED | OUTPATIENT
Start: 2025-06-19 | End: 2025-06-21 | Stop reason: HOSPADM

## 2025-06-19 RX ORDER — NICOTINE POLACRILEX 4 MG
15 LOZENGE BUCCAL
Status: DISCONTINUED | OUTPATIENT
Start: 2025-06-19 | End: 2025-06-21 | Stop reason: HOSPADM

## 2025-06-19 RX ORDER — SODIUM CHLORIDE 0.9 % (FLUSH) 0.9 %
10 SYRINGE (ML) INJECTION EVERY 12 HOURS SCHEDULED
Status: DISCONTINUED | OUTPATIENT
Start: 2025-06-19 | End: 2025-06-21 | Stop reason: HOSPADM

## 2025-06-19 RX ORDER — ASPIRIN 81 MG/1
81 TABLET ORAL ONCE
Status: COMPLETED | OUTPATIENT
Start: 2025-06-19 | End: 2025-06-19

## 2025-06-19 RX ORDER — SODIUM CHLORIDE 0.9 % (FLUSH) 0.9 %
10 SYRINGE (ML) INJECTION AS NEEDED
Status: DISCONTINUED | OUTPATIENT
Start: 2025-06-19 | End: 2025-06-21 | Stop reason: HOSPADM

## 2025-06-19 RX ORDER — POLYETHYLENE GLYCOL 3350 17 G/17G
17 POWDER, FOR SOLUTION ORAL DAILY PRN
Status: DISCONTINUED | OUTPATIENT
Start: 2025-06-19 | End: 2025-06-21 | Stop reason: HOSPADM

## 2025-06-19 RX ORDER — BISACODYL 10 MG
10 SUPPOSITORY, RECTAL RECTAL DAILY PRN
Status: DISCONTINUED | OUTPATIENT
Start: 2025-06-19 | End: 2025-06-21 | Stop reason: HOSPADM

## 2025-06-19 RX ORDER — IBUPROFEN 600 MG/1
1 TABLET ORAL
Status: DISCONTINUED | OUTPATIENT
Start: 2025-06-19 | End: 2025-06-21 | Stop reason: HOSPADM

## 2025-06-19 RX ORDER — IOPAMIDOL 755 MG/ML
100 INJECTION, SOLUTION INTRAVASCULAR
Status: COMPLETED | OUTPATIENT
Start: 2025-06-19 | End: 2025-06-19

## 2025-06-19 RX ORDER — ACETAMINOPHEN 325 MG/1
650 TABLET ORAL EVERY 4 HOURS PRN
Status: DISCONTINUED | OUTPATIENT
Start: 2025-06-19 | End: 2025-06-21 | Stop reason: HOSPADM

## 2025-06-19 RX ORDER — LORAZEPAM 1 MG/1
1 TABLET ORAL EVERY 6 HOURS PRN
Status: DISCONTINUED | OUTPATIENT
Start: 2025-06-19 | End: 2025-06-19

## 2025-06-19 RX ORDER — ACETAMINOPHEN 160 MG/5ML
650 SOLUTION ORAL EVERY 4 HOURS PRN
Status: DISCONTINUED | OUTPATIENT
Start: 2025-06-19 | End: 2025-06-21 | Stop reason: HOSPADM

## 2025-06-19 RX ORDER — AMOXICILLIN 250 MG
2 CAPSULE ORAL 2 TIMES DAILY PRN
Status: DISCONTINUED | OUTPATIENT
Start: 2025-06-19 | End: 2025-06-21 | Stop reason: HOSPADM

## 2025-06-19 RX ORDER — NITROGLYCERIN 0.4 MG/1
0.4 TABLET SUBLINGUAL
Status: DISCONTINUED | OUTPATIENT
Start: 2025-06-19 | End: 2025-06-21 | Stop reason: HOSPADM

## 2025-06-19 RX ORDER — ONDANSETRON 2 MG/ML
4 INJECTION INTRAMUSCULAR; INTRAVENOUS EVERY 6 HOURS PRN
Status: DISCONTINUED | OUTPATIENT
Start: 2025-06-19 | End: 2025-06-21 | Stop reason: HOSPADM

## 2025-06-19 RX ADMIN — IOPAMIDOL 100 ML: 755 INJECTION, SOLUTION INTRAVENOUS at 13:18

## 2025-06-19 RX ADMIN — ASPIRIN 81 MG: 81 TABLET, COATED ORAL at 18:19

## 2025-06-19 RX ADMIN — INSULIN LISPRO 2 UNITS: 100 INJECTION, SOLUTION INTRAVENOUS; SUBCUTANEOUS at 22:00

## 2025-06-19 RX ADMIN — Medication 10 ML: at 22:00

## 2025-06-19 RX ADMIN — NITROGLYCERIN 10 MCG/MIN: 20 INJECTION INTRAVENOUS at 14:19

## 2025-06-19 NOTE — H&P
[facility] Medicine Services  History & Physical    Patient Name: Pamela Zavala  : 1940  MRN: [id number]  Primary Care Physician:  Provider, No Known  Date of admission: 2025  Date and Time of Service: 2025 at 5:18 PM EDT      Subjective      Chief Complaint: Left facial numbness,, chest pain    History of Present Illness: Pamela Zavala is a 84 y.o. female with a CMH of CAD, SVTs, paroxysmal A-fib, thyroid cancer, type 2 diabetes, diabetic neuropathy, hyperlipidemia, former smoker, alcohol use, lives at assisted living facility  who presented to [facility] on 2025 with  complaining of chest pain the patient states that she had the onset of facial numbness that started last night and has had persisted today. She reports no associated change in taste. She states that the chest pain is associated with shortness of breath and she has had intermittent episodes of diaphoresis she states she took a sublingual nitroglycerin and that that improved her pain but left her feeling weak tired and lightheaded. The patient states she has had some diaphoresis with the episodes of severe pain. She reports that she is taking a blood thinner. She states that she has had no night sweats or hemoptysis MR reports no recent fever chills or cough .  States that her face feels numb, however she can feel sensation.      Review of Systems  Review of Systems   Respiratory:  Positive for chest tightness and shortness of breath.    Cardiovascular:  Positive for chest pain. Negative for palpitations and leg swelling.   Neurological:  Positive for facial asymmetry and weakness.   All other systems reviewed and are negative.  Personal History     Past Medical History:   Diagnosis Date    Anemia     Arthritis     CAD (coronary artery disease)     Chronic back pain     Diabetic neuropathy     Essential hypertension     GERD (gastroesophageal reflux disease)     History of gastric ulcer     History of stomach  ulcers     HLD (hyperlipidemia)     Hypocalcemia     Hypoparathyroidism     Hypothyroidism     Insomnia     Kidney stones     Osteoporosis     Recurrent sinusitis     Renal disease     Thyroid cancer     Type 2 diabetes mellitus        Past Surgical History:   Procedure Laterality Date    CARDIAC CATHETERIZATION N/A 05/03/2023    Procedure: Left Heart Cath and coronary angiogram;  Surgeon: John Baldwin MD;  Location: [facility] CATH INVASIVE LOCATION;  Service: Cardiovascular;  Laterality: N/A;    CORONARY STENT PLACEMENT      x 3    CYSTOSCOPY, URETEROSCOPY, RETROGRADE PYELOGRAM, STENT INSERTION Left 10/18/2023    Procedure: CYSTOSCOPY URETEROSCOPY RETROGRADE PYELOGRAM HOLMIUM LASER STENT INSERTION;  Surgeon: Juan Alaniz MD;  Location: [facility] MAIN OR;  Service: Urology;  Laterality: Left;    ENDOSCOPY N/A 2/9/2025    Procedure: ESOPHAGOGASTRODUODENOSCOPY WITH REMOVAL OF FOREIGN BODY FROM ESOPHAGUS;  Surgeon: EDU Serrano MD;  Location: [facility] ENDOSCOPY;  Service: Gastroenterology;  Laterality: N/A;  Post- FOREIGN BODY REMOVAL, ESOPHAGEAL STRICTURE, ESOPHAGITIS, HIATAL HERNIA    HYSTERECTOMY      LUNG LOBECTOMY      THYROIDECTOMY         Family History: family history includes Cancer in her father; Diabetes in her mother and sister; Heart disease in her maternal grandmother and sister. Otherwise pertinent FHx was reviewed and not pertinent to current issue.    Social History:  reports that she quit smoking about 25 years ago. Her smoking use included cigarettes. She started smoking about 55 years ago. She has a 60 pack-year smoking history. She has been exposed to tobacco smoke. She has never used smokeless tobacco. She reports current alcohol use of about 1.0 standard drink of alcohol per week. She reports that she does not use drugs.    Home Medications:  Prior to Admission Medications       Prescriptions Last Dose Informant Patient Reported? Taking?    Acetaminophen Extra Strength 500 MG tablet   Yes No     Take 2 tablets by mouth Every 6 (Six) Hours As Needed.    atorvastatin (LIPITOR) 10 MG tablet   Yes No    Take 1 tablet by mouth Daily.    cetirizine (zyrTEC) 10 MG tablet   Yes No    Take 1 tablet by mouth Every Night.    clopidogrel (PLAVIX) 75 MG tablet   Yes No    Take 1 tablet by mouth Daily.    Continuous Glucose Sensor (Dexcom G7 Sensor) misc   No No    USE BEFORE MEALS AND AT BEDTIME FOUR TIMES DAILY    famotidine (PEPCID) 40 MG tablet   Yes No    Take 1 tablet by mouth every night at bedtime.    fluticasone (FLONASE) 50 MCG/ACT nasal spray   Yes No    Administer 1 spray into the nostril(s) as directed by provider 2 (Two) Times a Day.    folic acid (FOLVITE) 1 MG tablet   No No    Take 1 tablet by mouth Daily.    gabapentin (NEURONTIN) 300 MG capsule   Yes No    Take 1 capsule by mouth every night at bedtime.    Insulin Lispro (humaLOG) 100 UNIT/ML injection   No No    6 Units with each meal and additional sliding scale                 Take 0 additional unit  151-200 Take 1 additional unit  201-250 Take 2 additional unit  251-300 Take 3 additional unit  301-350 Take 4 additional unit  351-400 Take 5 additional unit  More than 400 Take 6 additional unit    Maximum 12 units with each meal    Patient taking differently:  Inject 6 Units under the skin into the appropriate area as directed 3 (Three) Times a Day Before Meals. 6 Units with each meal and additional sliding scale                 Take 0 additional unit  151-200 Take 1 additional unit  201-250 Take 2 additional unit  251-300 Take 3 additional unit  301-350 Take 4 additional unit  351-400 Take 5 additional unit  More than 400 Take 6 additional unit    Maximum 12 units with each meal    Insulin Lispro, 1 Unit Dial, (HUMALOG) 100 UNIT/ML solution pen-injector   No No    6 UT WITH EACH MEAL AND SLIDING SCALE  0 -200 1 -250 2 -300 3 -350 4 -400 5 UT OVER 400 TAKE 6 UT (MAX 12 UT EACH MEAL-36UT/DAY MAX )     Lantus SoloStar 100 UNIT/ML injection pen   No No    INJECT 22 UNITS UNDER THE SKIN INTO THE APPROPRIATE AREA AS DIRECTED EVERY NIGHT.    levothyroxine (SYNTHROID, LEVOTHROID) 137 MCG tablet   No No    Take 1 tablet by mouth Daily.    lisinopril (PRINIVIL,ZESTRIL) 10 MG tablet   Yes No    Take 1 tablet by mouth Daily.    magic mouthwash oral susp (nystatin - diphenhydrAMINE HCl - hydrocortisone)   Yes No    Swish and spit 5 mL 3 (Three) Times a Day As Needed.    metFORMIN ER (GLUCOPHAGE-XR) 500 MG 24 hr tablet   No No    TAKE 1 TABLET BY MOUTH EVERY DAY WITH BREAKFAST    metoprolol succinate XL (TOPROL-XL) 25 MG 24 hr tablet   Yes No    Take 1 tablet by mouth Daily.    multivitamin with minerals (THERA-M PO)   Yes No    Take 1 tablet by mouth Daily.    ondansetron (ZOFRAN) 4 MG tablet   Yes No    Take 1 tablet by mouth Every 8 (Eight) Hours As Needed for Nausea or Vomiting.    pantoprazole (PROTONIX) 40 MG EC tablet   Yes No    Take 1 tablet by mouth 2 (Two) Times a Day. Take dos    polyethylene glycol (MIRALAX) 17 g packet   No No    Take 17 g by mouth Daily.    thiamine (VITAMIN B1) 100 MG tablet   No No    Take 1 tablet by mouth Daily.    traZODone (DESYREL) 50 MG tablet   Yes No    Take 1 tablet by mouth Every Night.              Allergies:  Allergies   Allergen Reactions    Penicillins Unknown - High Severity     Lips swell and hives    Shellfish-Derived Products Unknown - Low Severity    Sulfamethizole Unknown - Low Severity    Trimethoprim Unknown - Low Severity       Objective      Vitals:   Temp:  [97.8 °F (36.6 °C)] 97.8 °F (36.6 °C)  Heart Rate:  [75-94] (P) 82  Resp:  [17-18] 17  BP: (134-176)/(64-87) (P) 162/72  Body mass index is 25.36 kg/m².  Physical Exam  PHYSICAL EXAM  Constitutional:  Well-developed, well-nourished, no acute distress, nontoxic appearance   Eyes:  PERRL, conjunctivae normal, EOMI   HENT:  Atraumatic, external ears normal, nose normal, oropharynx moist, no pharyngeal exudates.  Neck-normal range of motion, no tenderness, supple, trachea midline  Respiratory: CTAB, nonlabored respirations without accessory muscle use  Cardiovascular:  Normal rate, normal rhythm, no murmurs, no gallops, no rubs   GI:  Soft, nondistended, normal bowel sounds, nontender, no organomegaly, no mass, no rebound, no guarding   :  No costovertebral angle tenderness   Musculoskeletal:  No edema, no tenderness, no deformities  Integument:  Well hydrated, no rash   Lymphatic:  No lymphadenopathy noted   Neurologic:  Alert & oriented x 3, CN 2-12 normal, normal motor function, normal sensory function, no focal deficits noted.  Smile symmetrical, no drift or leg and extremities.  Psychiatric:  Speech and behavior appropriate    Diagnostic Data:  Lab Results (last 24 hours)       Procedure Component Value Units Date/Time    High Sensitivity Troponin T 1Hr [[id number]]  (Normal) Collected: 06/19/25 1550    Specimen: Blood Updated: 06/19/25 1619     HS Troponin T 10 ng/L      Troponin T Numeric Delta -1 ng/L     Narrative:      High Sensitive Troponin T Reference Range:  <14.0 ng/L- Negative Female for AMI  <22.0 ng/L- Negative Male for AMI  >=14 - Abnormal Female indicating possible myocardial injury.  >=22 - Abnormal Male indicating possible myocardial injury.   Clinicians would have to utilize clinical acumen, EKG, Troponin, and serial changes to determine if it is an Acute Myocardial Infarction or myocardial injury due to an underlying chronic condition.         High Sensitivity Troponin T [[id number]]  (Normal) Collected: 06/19/25 1307    Specimen: Blood from Arm, Right Updated: 06/19/25 1519     HS Troponin T 11 ng/L     Narrative:      High Sensitive Troponin T Reference Range:  <14.0 ng/L- Negative Female for AMI  <22.0 ng/L- Negative Male for AMI  >=14 - Abnormal Female indicating possible myocardial injury.  >=22 - Abnormal Male indicating possible myocardial injury.   Clinicians would have to utilize clinical  acumen, EKG, Troponin, and serial changes to determine if it is an Acute Myocardial Infarction or myocardial injury due to an underlying chronic condition.         Comprehensive Metabolic Panel [[id number]]  (Abnormal) Collected: 06/19/25 1307    Specimen: Blood from Arm, Right Updated: 06/19/25 1334     Glucose 209 mg/dL      BUN 22.7 mg/dL      Creatinine 1.10 mg/dL      Sodium 141 mmol/L      Potassium 4.3 mmol/L      Chloride 106 mmol/L      CO2 22.5 mmol/L      Calcium 8.9 mg/dL      Total Protein 7.2 g/dL      Albumin 4.4 g/dL      ALT (SGPT) 12 U/L      AST (SGOT) 17 U/L      Alkaline Phosphatase 91 U/L      Total Bilirubin 0.3 mg/dL      Globulin 2.8 gm/dL      A/G Ratio 1.6 g/dL      BUN/Creatinine Ratio 20.6     Anion Gap 12.5 mmol/L      eGFR 49.0 mL/min/1.73     Narrative:      GFR Categories in Chronic Kidney Disease (CKD)              GFR Category          GFR (mL/min/1.73)    Interpretation  G1                    90 or greater        Normal or high (1)  G2                    60-89                Mild decrease (1)  G3a                   45-59                Mild to moderate decrease  G3b                   30-44                Moderate to severe decrease  G4                    15-29                Severe decrease  G5                    14 or less           Kidney failure    (1)In the absence of evidence of kidney disease, neither GFR category G1 or G2 fulfill the criteria for CKD.    eGFR calculation 2021 CKD-EPI creatinine equation, which does not include race as a factor    Protime-INR [[id number]]  (Normal) Collected: 06/19/25 1307    Specimen: Blood from Arm, Right Updated: 06/19/25 1321     Protime 13.1 Seconds      INR 1.00    aPTT [[id number]]  (Normal) Collected: 06/19/25 1307    Specimen: Blood from Arm, Right Updated: 06/19/25 1321     PTT 26.3 seconds     [place] Draw [[id number]] Collected: 06/19/25 1307    Specimen: Blood from Arm, Right Updated: 06/19/25 1315    Narrative:      The following  orders were created for panel order [place] Draw.  Procedure                               Abnormality         Status                     ---------                               -----------         ------                     Green Top (Gel)[[id number]]                                  Final result               Lavender Top[[id number]]                                     Final result               Gold Top - SST[[id number]]                                   Final result               Light Blue Top[[id number]]                                   Final result                 Please view results for these tests on the individual orders.    Green Top (Gel) [[id number]] Collected: 06/19/25 1307    Specimen: Blood from Arm, Right Updated: 06/19/25 1315     Extra Tube Hold for add-ons.     Comment: Auto resulted.       Lavender Top [[id number]] Collected: 06/19/25 1307    Specimen: Blood from Arm, Right Updated: 06/19/25 1315     Extra Tube hold for add-on     Comment: Auto resulted       Gold Top - SST [[id number]] Collected: 06/19/25 1307    Specimen: Blood from Arm, Right Updated: 06/19/25 1315     Extra Tube Hold for add-ons.     Comment: Auto resulted.       Light Blue Top [[id number]] Collected: 06/19/25 1307    Specimen: Blood from Arm, Right Updated: 06/19/25 1315     Extra Tube Hold for add-ons.     Comment: Auto resulted       CBC & Differential [[id number]]  (Normal) Collected: 06/19/25 1307    Specimen: Blood from Arm, Right Updated: 06/19/25 1313    Narrative:      The following orders were created for panel order CBC & Differential.  Procedure                               Abnormality         Status                     ---------                               -----------         ------                     CBC Auto Differential[[id number]]        Normal              Final result                 Please view results for these tests on the individual orders.    CBC Auto Differential [[id number]]  (Normal) Collected: 06/19/25  1307    Specimen: Blood from Arm, Right Updated: 06/19/25 1313     WBC 8.70 10*3/mm3      RBC 4.30 10*6/mm3      Hemoglobin 12.5 g/dL      Hematocrit 39.7 %      MCV 92.3 fL      MCH 29.1 pg      MCHC 31.5 g/dL      RDW 13.5 %      RDW-SD 45.8 fl      MPV 11.9 fL      Platelets 298 10*3/mm3      Neutrophil % 67.0 %      Lymphocyte % 22.9 %      Monocyte % 6.2 %      Eosinophil % 2.3 %      Basophil % 1.1 %      Immature Grans % 0.5 %      Neutrophils, Absolute 5.83 10*3/mm3      Lymphocytes, Absolute 1.99 10*3/mm3      Monocytes, Absolute 0.54 10*3/mm3      Eosinophils, Absolute 0.20 10*3/mm3      Basophils, Absolute 0.10 10*3/mm3      Immature Grans, Absolute 0.04 10*3/mm3      nRBC 0.0 /100 WBC     POC Glucose Once [[id number]]  (Abnormal) Collected: 06/19/25 1256    Specimen: Blood Updated: 06/19/25 1258     Glucose 184 mg/dL      Comment: Serial Number: [id number]:  [id number]                Imaging Results (Last 24 Hours)       Procedure Component Value Units Date/Time    CT Angiogram Head w AI Analysis of LVO [[id number]] Collected: 06/19/25 1414     Updated: 06/19/25 1428    Narrative:      CT ANGIOGRAM NECK, CT ANGIOGRAM HEAD W AI ANALYSIS OF LVO    Date of Exam: 6/19/2025 1:15 PM EDT    Indication: Stroke, follow up  Neuro deficit, acute, stroke suspected.    Comparison: 1/22/2024    Technique: CTA of the neck was performed before and after the uneventful intravenous administration of iodinated contrast. Reconstructed coronal and sagittal images were also obtained. In addition, a 3-D volume rendered image was created for   interpretation. Automated exposure control and iterative reconstruction methods were used.      Findings:  Nonvascular: There are emphysematous changes in the upper lobes. No nodules or masses are identified. There is no lymphadenopathy. Thyroid gland is surgically absent. There is no significant cervical lymphadenopathy. The aerodigestive tract appears   normal. The orbital  structures are unremarkable. The paranasal sinuses and mastoid air cells are clear. There is cervical degenerative disc disease from C5-C7 and there is anterolisthesis of C4 on C5 by approximately 3 mm. No intracranial enhancing   lesions are identified.    Vascular: There is diffuse plaque in the arch extending into the origins of the great vessels without significant narrowing. The vertebral arteries are relatively small caliber. The basilar artery likewise is of relatively small caliber. There is no   significant stenosis identified. There is calcified atherosclerotic plaque in the right and left carotid bifurcation. The degree of narrowing is less than 50% and is not hemodynamically significant. There is mild plaque deposition in the carotid siphons   without significant narrowing. There is normal filling of the anterior middle cerebral arteries bilaterally. There is no evidence of large vessel occlusion. There is filling of both posterior cerebral arteries. The left posterior cerebral artery arises   from the anterior circulation. There is no significant vertebrobasilar stenosis.      Impression:      Impression:  1.No hemodynamically significant carotid or vertebral artery stenosis.  2.No significant intracranial vascular stenosis or large vessel occlusion.  3.Emphysema.  4.Cervical degenerative disc disease with anterolisthesis of C4 on C5 by approximately 3 mm.        Electronically Signed: Jared Baker MD    6/19/2025 2:26 PM EDT    Workstation ID: [id number]    CT Angiogram Neck [[id number]] Collected: 06/19/25 1414     Updated: 06/19/25 1428    Narrative:      CT ANGIOGRAM NECK, CT ANGIOGRAM HEAD W AI ANALYSIS OF LVO    Date of Exam: 6/19/2025 1:15 PM EDT    Indication: Stroke, follow up  Neuro deficit, acute, stroke suspected.    Comparison: 1/22/2024    Technique: CTA of the neck was performed before and after the uneventful intravenous administration of iodinated contrast. Reconstructed coronal and  sagittal images were also obtained. In addition, a 3-D volume rendered image was created for   interpretation. Automated exposure control and iterative reconstruction methods were used.      Findings:  Nonvascular: There are emphysematous changes in the upper lobes. No nodules or masses are identified. There is no lymphadenopathy. Thyroid gland is surgically absent. There is no significant cervical lymphadenopathy. The aerodigestive tract appears   normal. The orbital structures are unremarkable. The paranasal sinuses and mastoid air cells are clear. There is cervical degenerative disc disease from C5-C7 and there is anterolisthesis of C4 on C5 by approximately 3 mm. No intracranial enhancing   lesions are identified.    Vascular: There is diffuse plaque in the arch extending into the origins of the great vessels without significant narrowing. The vertebral arteries are relatively small caliber. The basilar artery likewise is of relatively small caliber. There is no   significant stenosis identified. There is calcified atherosclerotic plaque in the right and left carotid bifurcation. The degree of narrowing is less than 50% and is not hemodynamically significant. There is mild plaque deposition in the carotid siphons   without significant narrowing. There is normal filling of the anterior middle cerebral arteries bilaterally. There is no evidence of large vessel occlusion. There is filling of both posterior cerebral arteries. The left posterior cerebral artery arises   from the anterior circulation. There is no significant vertebrobasilar stenosis.      Impression:      Impression:  1.No hemodynamically significant carotid or vertebral artery stenosis.  2.No significant intracranial vascular stenosis or large vessel occlusion.  3.Emphysema.  4.Cervical degenerative disc disease with anterolisthesis of C4 on C5 by approximately 3 mm.        Electronically Signed: Jared Baker MD    6/19/2025 2:26 PM EDT     Workstation ID: [id number]    XR Chest 1 View [[id number]] Collected: 06/19/25 1352     Updated: 06/19/25 1355    Narrative:      XR CHEST 1 VW    Date of Exam: 6/19/2025 1:35 PM EDT    Indication: Acute Stroke Protocol (onset < 12 hrs)    Comparison: 2/9/2025    Findings:  Cardiac size is normal. Pulmonary vascular pattern is normal and the lungs are clear. There is a retrocardiac density identified most likely reflecting an hiatal hernia.      Impression:      Impression:  Retrocardiac density most likely reflecting an hiatal hernia.        Electronically Signed: Jared Baker MD    6/19/2025 1:53 PM EDT    Workstation ID: [id number]    CT Head Without Contrast Stroke Protocol [[id number]] Collected: 06/19/25 1307     Updated: 06/19/25 1314    Narrative:      CT HEAD WO CONTRAST STROKE PROTOCOL    Date of Exam: 6/19/2025 1:05 PM EDT    Indication: Neuro deficit, acute, stroke suspected  Neuro deficit, acute, stroke suspected.    Comparison: 1/22/2024    Technique: Axial CT images were obtained of the head without contrast administration.  Reconstructed coronal and sagittal images were also obtained. Automated exposure control and iterative construction methods were used.    Scan Time: 1306  Results discussed with Dr. Tellez at 1308.      Findings:  There is generalized enlargement of the ventricles and CSF containing spaces. There is decreased attenuation in the periventricular deep white matter throughout both hemispheres, unchanged. There are chronic lacunar infarctions in the basal ganglia,   unchanged. No mass lesions, mass effect, acute hemorrhage or edema. No intra or extra-axial fluid collections are identified. Paranasal sinuses, mastoid air cells and the orbital structures remain normal.      Impression:      Impression:  1.Generalized atrophy. Chronic small vessel ischemic changes. Chronic lacunar infarctions in the basal ganglia.  2.No acute intracranial findings.        Electronically Signed: Jared Baker  MD    6/19/2025 1:12 PM EDT    Workstation ID: [id number]              Assessment & Plan        This is a 84 y.o. female with:    Active and Resolved Problems  Active Hospital Problems    Diagnosis  POA    **Chest pain [R07.9]  Yes      Resolved Hospital Problems   No resolved problems to display.     CVA / TIA Symptoms  Left facial paresthesia  - CT head: Generalized atrophy, no acute intracranial findings  - MRI brain w/o contrast: States patient has implant, however unable to locate the type of implant.  Would prefer patient to be evaluated by cardiology prior to MRI ordering.  - CTA head/neck w/contrast: No hemodynamically significant carotid or vertebral artery stenosis.  No significant intracranial vascular stenosis or large vessel occlusion.  Cervical DDD with Ascarel listhesis of C4 and C5  - TTE ordered  - CBC: Unremarkable  - CMP: Unremarkable  - Mg/Phos: Pending  - HgbA1c: pending;   - AM TSH, lipid panel, folate, Vitamin B12  - DAPT: start plavix 75 mg, aspirin 81mg (following day if received in ED)  - Continuous telemetry, pulse ox  - Neuro checks Q4H, NIHSS Q12H  - Bedrest until PT/OT evaluation  - POC glucose + SSI Q6H while NPO; AC/HS when eating  - Will allow permissive hypertension for 24-48 hours, then restart antihypertensives for goal BP <130/80 outpatient  - Bedside swallow evaluation, pending results consult ST  - Neurology consulted ([facility]    Acute coronary syndrome  - EKG: Normal sinus rhythm, probable anterior infarct  - CXR: Retrocardiac density most likely reflecting a hiatal hernia  - CBC: Unremarkable  - CMP: Unremarkable  - Serial Trops: 11, 10 respectively  - proBNP: Pending  - Hgb A1c: Pending  - Lipid panel: Pending  - TSH: Pending  - MON  - No NSAIDs except ASA  - Continuous cardiac monitor  - Repeat EKG in AM  - Titrate O2 to keep O2 sat >92%  - TTE ordered  - Cardiology consulted     DM2  - Hemoglobin A1c pending  - Accu-Cheks AC and at bedtime  - CCD once passes the RN dysphagia  screen and seen by cardiology    VTE Prophylaxis:  No VTE prophylaxis order currently exists.        The patient desires to be as follows:    CODE STATUS:           Markus Zavala, who can be contacted at [phone], is the designated person to make medical decisions on the patient's behalf if She is incapable of doing so. This was clarified with patient and/or next of kin on 6/19/2025 during the course of this H&P.    Admission Status:  I believe this patient meets inpatient status.    Expected Length of Stay: 2    PDMP and Medication Dispenses via Sidebar reviewed and consistent with patient reported medications.    I discussed the patient's findings and my recommendations with patient.      Signature:     This document has been electronically signed by CLAUDE Barbour on June 19, 2025 17:18 EDT   [facility] Hospitalist Team

## 2025-06-19 NOTE — TELEPHONE ENCOUNTER
Patient having chest pain, and numbness on left side of face. She is at [facility] [address]. Nurse there told her she need to go to ED. I did advise ER also. Patient understood.

## 2025-06-19 NOTE — ED NOTES
Pt to ED via EMS from [facility]. Pt states she started having chest pain with numbness and tingling on the L side of her face starting yesterday afternoon. Dr Brown and the nurses at the facility had the patient come in.     Pt uses walker/cane to get around.

## 2025-06-19 NOTE — PLAN OF CARE
Patient was seen earlier for code stroke in the CT scanner.  According to patient, she started having chest pain and tingling of the left side of the face that started yesterday afternoon.  Patient presented a little after 1 PM.  CT head negative for acute findings.  Patient not a TNK candidate.  Patient's biggest concern is chest pain.  Recommend MRI brain wo, if positive, we can address further workup.  Dr. Urban discussed with Dr. Tellez.

## 2025-06-19 NOTE — ED PROVIDER NOTES
Subjective   History of Present Illness  84-year-old female presents complaining of chest pain the patient states that she had the onset of facial numbness that started last night and has had persisted today.  She reports no associated change in taste.  She states that the chest pain is associated with shortness of breath and she has had intermittent episodes of diaphoresis she states she took a sublingual nitroglycerin and that that improved her pain but left her feeling weak tired and lightheaded.  The patient states she has had some diaphoresis with the episodes of severe pain.  She reports that she is taking a blood thinner.  She states that she has had no night sweats or hemoptysis MR reports no recent fever chills or cough      Review of Systems   Respiratory:  Positive for chest tightness and shortness of breath.    Cardiovascular:  Positive for chest pain. Negative for palpitations and leg swelling.   Neurological:  Positive for facial asymmetry and weakness.   All other systems reviewed and are negative.      Past Medical History:   Diagnosis Date    Anemia     Arthritis     CAD (coronary artery disease)     Chronic back pain     Diabetic neuropathy     Essential hypertension     GERD (gastroesophageal reflux disease)     History of gastric ulcer     History of stomach ulcers     HLD (hyperlipidemia)     Hypocalcemia     Hypoparathyroidism     Hypothyroidism     Insomnia     Kidney stones     Osteoporosis     Recurrent sinusitis     Renal disease     Thyroid cancer     Type 2 diabetes mellitus    Patient states she has had stents on 3 previous occasions    Allergies   Allergen Reactions    Penicillins Unknown - High Severity     Lips swell and hives    Shellfish-Derived Products Unknown - Low Severity    Sulfamethizole Unknown - Low Severity    Trimethoprim Unknown - Low Severity       Past Surgical History:   Procedure Laterality Date    CARDIAC CATHETERIZATION N/A 05/03/2023    Procedure: Left Heart Cath  and coronary angiogram;  Surgeon: John Baldwin MD;  Location: [facility] CATH INVASIVE LOCATION;  Service: Cardiovascular;  Laterality: N/A;    CORONARY STENT PLACEMENT      x 3    CYSTOSCOPY, URETEROSCOPY, RETROGRADE PYELOGRAM, STENT INSERTION Left 10/18/2023    Procedure: CYSTOSCOPY URETEROSCOPY RETROGRADE PYELOGRAM HOLMIUM LASER STENT INSERTION;  Surgeon: Juan Alaniz MD;  Location: [facility] MAIN OR;  Service: Urology;  Laterality: Left;    ENDOSCOPY N/A 2025    Procedure: ESOPHAGOGASTRODUODENOSCOPY WITH REMOVAL OF FOREIGN BODY FROM ESOPHAGUS;  Surgeon: EDU Serrano MD;  Location: [facility] ENDOSCOPY;  Service: Gastroenterology;  Laterality: N/A;  Post- FOREIGN BODY REMOVAL, ESOPHAGEAL STRICTURE, ESOPHAGITIS, HIATAL HERNIA    HYSTERECTOMY      LUNG LOBECTOMY      THYROIDECTOMY         Family History   Problem Relation Age of Onset    Diabetes Mother     Cancer Father     Heart disease Sister     Diabetes Sister     Heart disease Maternal Grandmother        Social History     Socioeconomic History    Marital status:     Number of children: 2    Highest education level: 11th grade   Tobacco Use    Smoking status: Former     Current packs/day: 0.00     Average packs/day: 2.0 packs/day for 30.0 years (60.0 ttl pk-yrs)     Types: Cigarettes     Start date:      Quit date:      Years since quittin.4     Passive exposure: Past    Smokeless tobacco: Never   Vaping Use    Vaping status: Never Used   Substance and Sexual Activity    Alcohol use: Yes     Alcohol/week: 1.0 standard drink of alcohol     Types: 1 Glasses of wine per week    Drug use: Never    Sexual activity: Defer       Lives at [facility] on [address]    Objective   Physical Exam  Alert [place] Coma Scale 15 NIHSS 0   HEENT: Pupils equal and reactive to light. Conjunctivae are not injected. Normal tympanic membranes. Oropharynx and nares are normal.   Neck: Supple. Midline trachea. No JVD. No goiter.   Chest: Clear and equal breath  sounds bilaterally, regular rate and rhythm without murmur or rub.   Abdomen: Positive bowel sounds, nontender, nondistended. No rebound or peritoneal signs. No CVA tenderness.   Extremities no clubbing. cyanosis or edema. Motor sensory exam is normal. The full range of motion is intact   Skin: Warm and dry, no rashes or petechia.   Lymphatic: No regional lymphadenopathy. No calf pain, swelling or Homans sign    Procedures       Patient was assessed as a team B code stroke patient    ED Course                                Total (NIH Stroke Scale): 0          Labs Reviewed   COMPREHENSIVE METABOLIC PANEL - Abnormal; Notable for the following components:       Result Value    Glucose 209 (*)     Creatinine 1.10 (*)     eGFR 49.0 (*)     All other components within normal limits    Narrative:     GFR Categories in Chronic Kidney Disease (CKD)              GFR Category          GFR (mL/min/1.73)    Interpretation  G1                    90 or greater        Normal or high (1)  G2                    60-89                Mild decrease (1)  G3a                   45-59                Mild to moderate decrease  G3b                   30-44                Moderate to severe decrease  G4                    15-29                Severe decrease  G5                    14 or less           Kidney failure    (1)In the absence of evidence of kidney disease, neither GFR category G1 or G2 fulfill the criteria for CKD.    eGFR calculation 2021 CKD-EPI creatinine equation, which does not include race as a factor   POCT GLUCOSE FINGERSTICK - Abnormal; Notable for the following components:    Glucose 184 (*)     All other components within normal limits   PROTIME-INR - Normal   APTT - Normal   CBC WITH AUTO DIFFERENTIAL - Normal   TROPONIN - Normal    Narrative:     High Sensitive Troponin T Reference Range:  <14.0 ng/L- Negative Female for AMI  <22.0 ng/L- Negative Male for AMI  >=14 - Abnormal Female indicating possible myocardial  injury.  >=22 - Abnormal Male indicating possible myocardial injury.   Clinicians would have to utilize clinical acumen, EKG, Troponin, and serial changes to determine if it is an Acute Myocardial Infarction or myocardial injury due to an underlying chronic condition.        HIGH SENSITIVITIY TROPONIN T 1HR - Normal    Narrative:     High Sensitive Troponin T Reference Range:  <14.0 ng/L- Negative Female for AMI  <22.0 ng/L- Negative Male for AMI  >=14 - Abnormal Female indicating possible myocardial injury.  >=22 - Abnormal Male indicating possible myocardial injury.   Clinicians would have to utilize clinical acumen, EKG, Troponin, and serial changes to determine if it is an Acute Myocardial Infarction or myocardial injury due to an underlying chronic condition.        RAINBOW DRAW    Narrative:     The following orders were created for panel order [place] Draw.  Procedure                               Abnormality         Status                     ---------                               -----------         ------                     Green Top (Gel)[[id number]]                                  Final result               Lavender Top[[id number]]                                     Final result               Gold Top - SST[[id number]]                                   Final result               Light Blue Top[[id number]]                                   Final result                 Please view results for these tests on the individual orders.   POCT GLUCOSE FINGERSTICK   TYPE AND SCREEN   CBC AND DIFFERENTIAL    Narrative:     The following orders were created for panel order CBC & Differential.  Procedure                               Abnormality         Status                     ---------                               -----------         ------                     CBC Auto Differential[[id number]]        Normal              Final result                 Please view results for these tests on the individual orders.    GREEN TOP   LAVENDER TOP   GOLD TOP - SST   LIGHT BLUE TOP     Medications   sodium chloride 0.9 % flush 10 mL (has no administration in time range)   nitroglycerin (TRIDIL) 200 mcg/ml infusion (10 mcg/min Intravenous Currently Infusing 6/19/25 1449)   aspirin EC tablet 81 mg (has no administration in time range)   iopamidol (ISOVUE-370) 76 % injection 100 mL (100 mL Intravenous Given 6/19/25 1318)     CT Angiogram Head w AI Analysis of LVO  Result Date: 6/19/2025  Impression: 1.No hemodynamically significant carotid or vertebral artery stenosis. 2.No significant intracranial vascular stenosis or large vessel occlusion. 3.Emphysema. 4.Cervical degenerative disc disease with anterolisthesis of C4 on C5 by approximately 3 mm. Electronically Signed: Jared Baker MD  6/19/2025 2:26 PM EDT  Workstation ID: [id number]    CT Angiogram Neck  Result Date: 6/19/2025  Impression: 1.No hemodynamically significant carotid or vertebral artery stenosis. 2.No significant intracranial vascular stenosis or large vessel occlusion. 3.Emphysema. 4.Cervical degenerative disc disease with anterolisthesis of C4 on C5 by approximately 3 mm. Electronically Signed: Jared Baker MD  6/19/2025 2:26 PM EDT  Workstation ID: [id number]    XR Chest 1 View  Result Date: 6/19/2025  Impression: Retrocardiac density most likely reflecting an hiatal hernia. Electronically Signed: Jared Baker MD  6/19/2025 1:53 PM EDT  Workstation ID: [id number]    CT Head Without Contrast Stroke Protocol  Result Date: 6/19/2025  Impression: 1.Generalized atrophy. Chronic small vessel ischemic changes. Chronic lacunar infarctions in the basal ganglia. 2.No acute intracranial findings. Electronically Signed: Jared Baker MD  6/19/2025 1:12 PM EDT  Workstation ID: [id number]                Medical Decision Making  Patient was evaluated emergency department the case was discussed with neurologist as well as the radiologist the patient will be admitted to the hospital.  The  patient is likely to need MRI and will need cardiology consultation the patient was agreeable to this plan of treatment    Amount and/or Complexity of Data Reviewed  Labs: ordered.  Radiology: ordered.  ECG/medicine tests: ordered.    Risk  Prescription drug management.        Final diagnoses:   Facial paresthesia   Chest pain, unspecified type   History of coronary angioplasty with insertion of stent   Sequela of lacunar infarction       ED Disposition  ED Disposition       ED Disposition   Decision to Admit    Condition   --    Comment   Level of Care: Telemetry [5]   Diagnosis: Chest pain [069004]   Admitting Physician: ALFA ALATORRE [[id number]]   Attending Physician: ALFA ALATORRE [[id number]]   Bed Request Comments: [facility]                 No follow-up provider specified.       Medication List      No changes were made to your prescriptions during this visit.            Angel Tellez MD  06/19/25 4519

## 2025-06-20 ENCOUNTER — APPOINTMENT (OUTPATIENT)
Dept: NUCLEAR MEDICINE | Facility: HOSPITAL | Age: 85
End: 2025-06-20
Payer: MEDICARE

## 2025-06-20 ENCOUNTER — APPOINTMENT (OUTPATIENT)
Dept: MRI IMAGING | Facility: HOSPITAL | Age: 85
End: 2025-06-20
Payer: MEDICARE

## 2025-06-20 ENCOUNTER — APPOINTMENT (OUTPATIENT)
Dept: CARDIOLOGY | Facility: HOSPITAL | Age: 85
End: 2025-06-20
Payer: MEDICARE

## 2025-06-20 LAB
ALBUMIN SERPL-MCNC: 4 G/DL (ref 3.5–5.2)
ALBUMIN/GLOB SERPL: 1.7 G/DL
ALP SERPL-CCNC: 76 U/L (ref 39–117)
ALT SERPL W P-5'-P-CCNC: 10 U/L (ref 1–33)
ANION GAP SERPL CALCULATED.3IONS-SCNC: 9.3 MMOL/L (ref 5–15)
AORTIC DIMENSIONLESS INDEX: 0.78 (DI)
AST SERPL-CCNC: 15 U/L (ref 1–32)
AV MEAN PRESS GRAD SYS DOP V1V2: 4 MMHG
AV VMAX SYS DOP: 130 CM/SEC
BASOPHILS # BLD AUTO: 0.11 10*3/MM3 (ref 0–0.2)
BASOPHILS NFR BLD AUTO: 1.4 % (ref 0–1.5)
BH CV ECHO MEAS - AO MAX PG: 6.8 MMHG
BH CV ECHO MEAS - AO V2 VTI: 28.1 CM
BH CV ECHO MEAS - AVA(I,D): 2.3 CM2
BH CV ECHO MEAS - EDV(CUBED): 50.7 ML
BH CV ECHO MEAS - EDV(MOD-SP4): 65.8 ML
BH CV ECHO MEAS - EF(MOD-SP4): 53.2 %
BH CV ECHO MEAS - ESV(CUBED): 19.7 ML
BH CV ECHO MEAS - ESV(MOD-SP4): 30.8 ML
BH CV ECHO MEAS - FS: 27 %
BH CV ECHO MEAS - IVS/LVPW: 1 CM
BH CV ECHO MEAS - IVSD: 1 CM
BH CV ECHO MEAS - LA DIMENSION: 3 CM
BH CV ECHO MEAS - LAT PEAK E' VEL: 5.7 CM/SEC
BH CV ECHO MEAS - LV DIASTOLIC VOL/BSA (35-75): 36.5 CM2
BH CV ECHO MEAS - LV MASS(C)D: 112.5 GRAMS
BH CV ECHO MEAS - LV MAX PG: 4.1 MMHG
BH CV ECHO MEAS - LV MEAN PG: 2 MMHG
BH CV ECHO MEAS - LV SYSTOLIC VOL/BSA (12-30): 17.1 CM2
BH CV ECHO MEAS - LV V1 MAX: 101 CM/SEC
BH CV ECHO MEAS - LV V1 VTI: 22.8 CM
BH CV ECHO MEAS - LVIDD: 3.7 CM
BH CV ECHO MEAS - LVIDS: 2.7 CM
BH CV ECHO MEAS - LVOT AREA: 2.8 CM2
BH CV ECHO MEAS - LVOT DIAM: 1.9 CM
BH CV ECHO MEAS - LVPWD: 1 CM
BH CV ECHO MEAS - MED PEAK E' VEL: 5.6 CM/SEC
BH CV ECHO MEAS - MV A DUR: 0.12 SEC
BH CV ECHO MEAS - MV A MAX VEL: 101 CM/SEC
BH CV ECHO MEAS - MV DEC SLOPE: 384 CM/SEC2
BH CV ECHO MEAS - MV DEC TIME: 0.23 SEC
BH CV ECHO MEAS - MV E MAX VEL: 72.3 CM/SEC
BH CV ECHO MEAS - MV E/A: 0.72
BH CV ECHO MEAS - MV MAX PG: 4.5 MMHG
BH CV ECHO MEAS - MV MEAN PG: 2 MMHG
BH CV ECHO MEAS - MV P1/2T: 48.4 MSEC
BH CV ECHO MEAS - MV V2 VTI: 27 CM
BH CV ECHO MEAS - MVA(P1/2T): 4.5 CM2
BH CV ECHO MEAS - MVA(VTI): 2.39 CM2
BH CV ECHO MEAS - PA ACC TIME: 0.07 SEC
BH CV ECHO MEAS - PA V2 MAX: 78.4 CM/SEC
BH CV ECHO MEAS - PULM A REVS DUR: 0.14 SEC
BH CV ECHO MEAS - PULM A REVS VEL: 34.3 CM/SEC
BH CV ECHO MEAS - PULM DIAS VEL: 26.1 CM/SEC
BH CV ECHO MEAS - PULM S/D: 1.44
BH CV ECHO MEAS - PULM SYS VEL: 37.7 CM/SEC
BH CV ECHO MEAS - RV MAX PG: 1.55 MMHG
BH CV ECHO MEAS - RV V1 MAX: 62.3 CM/SEC
BH CV ECHO MEAS - RV V1 VTI: 12.8 CM
BH CV ECHO MEAS - SV(LVOT): 64.6 ML
BH CV ECHO MEAS - SV(MOD-SP4): 35 ML
BH CV ECHO MEAS - SVI(LVOT): 35.8 ML/M2
BH CV ECHO MEAS - SVI(MOD-SP4): 19.4 ML/M2
BH CV ECHO MEAS - TAPSE (>1.6): 1.83 CM
BH CV ECHO MEAS - TR MAX PG: 19.5 MMHG
BH CV ECHO MEAS - TR MAX VEL: 221 CM/SEC
BH CV ECHO MEASUREMENTS AVERAGE E/E' RATIO: 12.8
BH CV REST NUCLEAR ISOTOPE DOSE: 11 MCI
BH CV STRESS BP STAGE 1: NORMAL
BH CV STRESS BP STAGE 2: NORMAL
BH CV STRESS COMMENTS STAGE 1: NORMAL
BH CV STRESS COMMENTS STAGE 2: NORMAL
BH CV STRESS DOSE REGADENOSON STAGE 1: 0.4
BH CV STRESS DURATION MIN STAGE 1: 0
BH CV STRESS DURATION MIN STAGE 2: 4
BH CV STRESS DURATION SEC STAGE 1: 10
BH CV STRESS DURATION SEC STAGE 2: 0
BH CV STRESS HR STAGE 1: 90
BH CV STRESS HR STAGE 2: 105
BH CV STRESS NUCLEAR ISOTOPE DOSE: 32.4 MCI
BH CV STRESS PROTOCOL 1: NORMAL
BH CV STRESS RECOVERY BP: NORMAL MMHG
BH CV STRESS RECOVERY HR: 105 BPM
BH CV STRESS STAGE 1: 1
BH CV STRESS STAGE 2: 2
BH CV XLRA - TDI S': 15.7 CM/SEC
BILIRUB SERPL-MCNC: 0.3 MG/DL (ref 0–1.2)
BUN SERPL-MCNC: 19.4 MG/DL (ref 8–23)
BUN/CREAT SERPL: 20.2 (ref 7–25)
CALCIUM SPEC-SCNC: 8.3 MG/DL (ref 8.6–10.5)
CHLORIDE SERPL-SCNC: 108 MMOL/L (ref 98–107)
CHOLEST SERPL-MCNC: 151 MG/DL (ref 0–200)
CO2 SERPL-SCNC: 22.7 MMOL/L (ref 22–29)
CREAT SERPL-MCNC: 0.96 MG/DL (ref 0.57–1)
DEPRECATED RDW RBC AUTO: 46.4 FL (ref 37–54)
EGFRCR SERPLBLD CKD-EPI 2021: 57.7 ML/MIN/1.73
EOSINOPHIL # BLD AUTO: 0.24 10*3/MM3 (ref 0–0.4)
EOSINOPHIL NFR BLD AUTO: 3.2 % (ref 0.3–6.2)
ERYTHROCYTE [DISTWIDTH] IN BLOOD BY AUTOMATED COUNT: 13.5 % (ref 12.3–15.4)
FOLATE SERPL-MCNC: >20 NG/ML (ref 4.78–24.2)
GLOBULIN UR ELPH-MCNC: 2.4 GM/DL
GLUCOSE BLDC GLUCOMTR-MCNC: 179 MG/DL (ref 70–105)
GLUCOSE BLDC GLUCOMTR-MCNC: 192 MG/DL (ref 70–105)
GLUCOSE BLDC GLUCOMTR-MCNC: 202 MG/DL (ref 70–105)
GLUCOSE BLDC GLUCOMTR-MCNC: 215 MG/DL (ref 70–105)
GLUCOSE SERPL-MCNC: 152 MG/DL (ref 65–99)
HCT VFR BLD AUTO: 37.8 % (ref 34–46.6)
HDLC SERPL-MCNC: 66 MG/DL (ref 40–60)
HGB BLD-MCNC: 11.7 G/DL (ref 12–15.9)
IMM GRANULOCYTES # BLD AUTO: 0.02 10*3/MM3 (ref 0–0.05)
IMM GRANULOCYTES NFR BLD AUTO: 0.3 % (ref 0–0.5)
IVRT: 102 MS
LDLC SERPL CALC-MCNC: 70 MG/DL (ref 0–100)
LDLC/HDLC SERPL: 1.05 {RATIO}
LEFT ATRIUM VOLUME INDEX: 34.8 ML/M2
LYMPHOCYTES # BLD AUTO: 2.29 10*3/MM3 (ref 0.7–3.1)
LYMPHOCYTES NFR BLD AUTO: 30.2 % (ref 19.6–45.3)
MAXIMAL PREDICTED HEART RATE: 134 BPM
MCH RBC QN AUTO: 29 PG (ref 26.6–33)
MCHC RBC AUTO-ENTMCNC: 31 G/DL (ref 31.5–35.7)
MCV RBC AUTO: 93.8 FL (ref 79–97)
MONOCYTES # BLD AUTO: 0.55 10*3/MM3 (ref 0.1–0.9)
MONOCYTES NFR BLD AUTO: 7.2 % (ref 5–12)
NEUTROPHILS NFR BLD AUTO: 4.38 10*3/MM3 (ref 1.7–7)
NEUTROPHILS NFR BLD AUTO: 57.7 % (ref 42.7–76)
NRBC BLD AUTO-RTO: 0 /100 WBC (ref 0–0.2)
PA ADP PRP-ACNC: 47 PRU
PERCENT MAX PREDICTED HR: 88.81 %
PLATELET # BLD AUTO: 272 10*3/MM3 (ref 140–450)
PMV BLD AUTO: 11.8 FL (ref 6–12)
POTASSIUM SERPL-SCNC: 3.8 MMOL/L (ref 3.5–5.2)
PROT SERPL-MCNC: 6.4 G/DL (ref 6–8.5)
QT INTERVAL: 391 MS
QTC INTERVAL: 476 MS
RBC # BLD AUTO: 4.03 10*6/MM3 (ref 3.77–5.28)
SINUS: 3.4 CM
SODIUM SERPL-SCNC: 140 MMOL/L (ref 136–145)
SPECT HRT GATED+EF W RNC IV: 66 %
STJ: 3.2 CM
STRESS BASELINE BP: NORMAL MMHG
STRESS BASELINE HR: 90 BPM
STRESS PERCENT HR: 104 %
STRESS POST PEAK BP: NORMAL MMHG
STRESS POST PEAK HR: 119 BPM
STRESS TARGET HR: 114 BPM
TRIGL SERPL-MCNC: 77 MG/DL (ref 0–150)
VIT B12 BLD-MCNC: 454 PG/ML (ref 211–946)
VLDLC SERPL-MCNC: 15 MG/DL (ref 5–40)
WBC NRBC COR # BLD AUTO: 7.59 10*3/MM3 (ref 3.4–10.8)

## 2025-06-20 PROCEDURE — 93306 TTE W/DOPPLER COMPLETE: CPT

## 2025-06-20 PROCEDURE — 25010000002 REGADENOSON 0.4 MG/5ML SOLUTION: Performed by: STUDENT IN AN ORGANIZED HEALTH CARE EDUCATION/TRAINING PROGRAM

## 2025-06-20 PROCEDURE — 82948 REAGENT STRIP/BLOOD GLUCOSE: CPT | Performed by: NURSE PRACTITIONER

## 2025-06-20 PROCEDURE — 93306 TTE W/DOPPLER COMPLETE: CPT | Performed by: INTERNAL MEDICINE

## 2025-06-20 PROCEDURE — 70551 MRI BRAIN STEM W/O DYE: CPT

## 2025-06-20 PROCEDURE — 85025 COMPLETE CBC W/AUTO DIFF WBC: CPT | Performed by: NURSE PRACTITIONER

## 2025-06-20 PROCEDURE — 93017 CV STRESS TEST TRACING ONLY: CPT

## 2025-06-20 PROCEDURE — 78452 HT MUSCLE IMAGE SPECT MULT: CPT

## 2025-06-20 PROCEDURE — G0378 HOSPITAL OBSERVATION PER HR: HCPCS

## 2025-06-20 PROCEDURE — 63710000001 INSULIN LISPRO (HUMAN) PER 5 UNITS: Performed by: NURSE PRACTITIONER

## 2025-06-20 PROCEDURE — 34310000005 TECHNETIUM TETROFOSMIN KIT: Performed by: STUDENT IN AN ORGANIZED HEALTH CARE EDUCATION/TRAINING PROGRAM

## 2025-06-20 PROCEDURE — 82948 REAGENT STRIP/BLOOD GLUCOSE: CPT

## 2025-06-20 PROCEDURE — 97165 OT EVAL LOW COMPLEX 30 MIN: CPT

## 2025-06-20 PROCEDURE — 93010 ELECTROCARDIOGRAM REPORT: CPT | Performed by: INTERNAL MEDICINE

## 2025-06-20 PROCEDURE — 80053 COMPREHEN METABOLIC PANEL: CPT | Performed by: NURSE PRACTITIONER

## 2025-06-20 PROCEDURE — 93005 ELECTROCARDIOGRAM TRACING: CPT | Performed by: NURSE PRACTITIONER

## 2025-06-20 PROCEDURE — 80061 LIPID PANEL: CPT | Performed by: NURSE PRACTITIONER

## 2025-06-20 PROCEDURE — 99214 OFFICE O/P EST MOD 30 MIN: CPT | Performed by: NURSE PRACTITIONER

## 2025-06-20 PROCEDURE — 78452 HT MUSCLE IMAGE SPECT MULT: CPT | Performed by: INTERNAL MEDICINE

## 2025-06-20 PROCEDURE — 85576 BLOOD PLATELET AGGREGATION: CPT | Performed by: NURSE PRACTITIONER

## 2025-06-20 PROCEDURE — 82746 ASSAY OF FOLIC ACID SERUM: CPT | Performed by: NURSE PRACTITIONER

## 2025-06-20 PROCEDURE — 93018 CV STRESS TEST I&R ONLY: CPT | Performed by: INTERNAL MEDICINE

## 2025-06-20 PROCEDURE — A9502 TC99M TETROFOSMIN: HCPCS | Performed by: STUDENT IN AN ORGANIZED HEALTH CARE EDUCATION/TRAINING PROGRAM

## 2025-06-20 RX ORDER — METOPROLOL SUCCINATE 25 MG/1
25 TABLET, EXTENDED RELEASE ORAL DAILY
Status: DISCONTINUED | OUTPATIENT
Start: 2025-06-20 | End: 2025-06-21 | Stop reason: HOSPADM

## 2025-06-20 RX ORDER — LISINOPRIL 5 MG/1
10 TABLET ORAL DAILY
Status: DISCONTINUED | OUTPATIENT
Start: 2025-06-20 | End: 2025-06-21 | Stop reason: HOSPADM

## 2025-06-20 RX ORDER — CLOPIDOGREL BISULFATE 75 MG/1
75 TABLET ORAL DAILY
Status: DISCONTINUED | OUTPATIENT
Start: 2025-06-20 | End: 2025-06-21 | Stop reason: HOSPADM

## 2025-06-20 RX ORDER — REGADENOSON 0.08 MG/ML
0.4 INJECTION, SOLUTION INTRAVENOUS
Status: COMPLETED | OUTPATIENT
Start: 2025-06-20 | End: 2025-06-20

## 2025-06-20 RX ORDER — ATORVASTATIN CALCIUM 10 MG/1
10 TABLET, FILM COATED ORAL DAILY
Status: DISCONTINUED | OUTPATIENT
Start: 2025-06-20 | End: 2025-06-21 | Stop reason: HOSPADM

## 2025-06-20 RX ORDER — ASPIRIN 81 MG/1
81 TABLET ORAL DAILY
Status: DISCONTINUED | OUTPATIENT
Start: 2025-06-20 | End: 2025-06-21 | Stop reason: HOSPADM

## 2025-06-20 RX ADMIN — TETROFOSMIN 1 DOSE: 1.38 INJECTION, POWDER, LYOPHILIZED, FOR SOLUTION INTRAVENOUS at 10:15

## 2025-06-20 RX ADMIN — REGADENOSON 0.4 MG: 0.08 INJECTION, SOLUTION INTRAVENOUS at 11:17

## 2025-06-20 RX ADMIN — ASPIRIN 81 MG: 81 TABLET, COATED ORAL at 13:34

## 2025-06-20 RX ADMIN — INSULIN LISPRO 2 UNITS: 100 INJECTION, SOLUTION INTRAVENOUS; SUBCUTANEOUS at 08:18

## 2025-06-20 RX ADMIN — Medication 10 ML: at 08:18

## 2025-06-20 RX ADMIN — CLOPIDOGREL BISULFATE 75 MG: 75 TABLET, FILM COATED ORAL at 08:17

## 2025-06-20 RX ADMIN — INSULIN LISPRO 3 UNITS: 100 INJECTION, SOLUTION INTRAVENOUS; SUBCUTANEOUS at 21:26

## 2025-06-20 RX ADMIN — TETROFOSMIN 1 DOSE: 1.38 INJECTION, POWDER, LYOPHILIZED, FOR SOLUTION INTRAVENOUS at 11:17

## 2025-06-20 RX ADMIN — LISINOPRIL 10 MG: 5 TABLET ORAL at 08:17

## 2025-06-20 RX ADMIN — METOPROLOL SUCCINATE 25 MG: 25 TABLET, EXTENDED RELEASE ORAL at 08:17

## 2025-06-20 RX ADMIN — ATORVASTATIN CALCIUM 10 MG: 10 TABLET ORAL at 08:17

## 2025-06-20 NOTE — PLAN OF CARE
Goal Outcome Evaluation:  Plan of Care Reviewed With: patient           Outcome Evaluation: Pt is an 85 yo F with PMH including but not limited CAD, HTN, hyperlipidemia, hypothyroidism, thyriod CA x2, DM2, TIA. Pt admit to hospital d/t chest pain with numbness/tingling on L side of face. Dx: CVA/TIA symptoms, L facial paresthesia. CT head demo generalized atrophy, no acute intracranial findings; MRI pending.         Pt reported prior to admit she was living at [facility] and independent with ADLs/mobility with exception to supervision for bathing. Use of cane in apartment and RW for longer distances/as needed (e.g. walk to dining room). Staff assists with IADLs and medication management. Pt does not drive, has transportation available through facility, and family still checks-in.         Pt's vitals stable throughout session, but pt cont to report ongoing L facial paresthesia. BUE/BLE MMT/AROM WFL. Supervision-independent with ADLs and bed mobility/functional mobility with RW. No cues for safety. Pt reported no questions/concerns for skilled OT at this time. Appears close to baseline. Will s/o based on pt reports and observations. Rec: Return to [facility].    Anticipated Discharge Disposition (OT): assisted living

## 2025-06-20 NOTE — PLAN OF CARE
Problem: Adult Inpatient Plan of Care  Goal: Plan of Care Review  Outcome: Progressing  Goal: Patient-Specific Goal (Individualized)  Outcome: Progressing  Goal: Absence of Hospital-Acquired Illness or Injury  Outcome: Progressing  Intervention: Identify and Manage Fall Risk  Recent Flowsheet Documentation  Taken 6/20/2025 0542 by Luis Eduardo Russell RN  Safety Promotion/Fall Prevention:   safety round/check completed   room organization consistent   lighting adjusted   fall prevention program maintained   clutter free environment maintained   assistive device/personal items within reach   activity supervised  Taken 6/20/2025 0414 by Drew, Luis Eduardo, RN  Safety Promotion/Fall Prevention:   safety round/check completed   room organization consistent   lighting adjusted   fall prevention program maintained   clutter free environment maintained   assistive device/personal items within reach   activity supervised  Taken 6/20/2025 0200 by Luis Eduardo Russell RN  Safety Promotion/Fall Prevention:   safety round/check completed   room organization consistent   lighting adjusted   fall prevention program maintained   clutter free environment maintained   assistive device/personal items within reach   activity supervised  Taken 6/20/2025 0000 by Drew, Luis Eduardo, RN  Safety Promotion/Fall Prevention:   safety round/check completed   room organization consistent   lighting adjusted   fall prevention program maintained   clutter free environment maintained   assistive device/personal items within reach   activity supervised  Taken 6/19/2025 2200 by Luis Eduardo Russell RN  Safety Promotion/Fall Prevention:   safety round/check completed   room organization consistent   lighting adjusted   fall prevention program maintained   clutter free environment maintained   assistive device/personal items within reach   activity supervised  Taken 6/19/2025 2000 by Drew, Luis Eduardo, RN  Safety Promotion/Fall Prevention:    safety round/check completed   room organization consistent   lighting adjusted   fall prevention program maintained   clutter free environment maintained   assistive device/personal items within reach   activity supervised  Intervention: Prevent Skin Injury  Recent Flowsheet Documentation  Taken 6/20/2025 0414 by Luis Eduardo Russell RN  Body Position: position changed independently  Skin Protection: transparent dressing maintained  Taken 6/20/2025 0000 by Luis Eduardo Russell RN  Body Position: position changed independently  Skin Protection: transparent dressing maintained  Taken 6/19/2025 2000 by Luis Eduardo Russell RN  Body Position: position changed independently  Skin Protection: transparent dressing maintained  Intervention: Prevent and Manage VTE (Venous Thromboembolism) Risk  Recent Flowsheet Documentation  Taken 6/20/2025 0414 by Luis Eduardo Russell RN  VTE Prevention/Management:   SCDs (sequential compression devices) off   patient refused intervention  Taken 6/20/2025 0000 by Luis Eduardo Russell RN  VTE Prevention/Management:   SCDs (sequential compression devices) off   patient refused intervention  Taken 6/19/2025 2000 by Luis Eduardo Russell RN  VTE Prevention/Management:   SCDs (sequential compression devices) off   patient refused intervention  Intervention: Prevent Infection  Recent Flowsheet Documentation  Taken 6/20/2025 0542 by Luis Eduardo Russell RN  Infection Prevention: single patient room provided  Taken 6/20/2025 0414 by Luis Eduardo Russell RN  Infection Prevention: single patient room provided  Taken 6/20/2025 0200 by Luis Eduardo Russell RN  Infection Prevention: single patient room provided  Taken 6/20/2025 0000 by Luis Eduardo Russell RN  Infection Prevention: single patient room provided  Taken 6/19/2025 2200 by Luis Eduardo Russell RN  Infection Prevention: single patient room provided  Taken 6/19/2025 2000 by Luis Eduardo Russell RN  Infection Prevention: single patient room  provided  Goal: Optimal Comfort and Wellbeing  Outcome: Progressing  Intervention: Provide Person-Centered Care  Recent Flowsheet Documentation  Taken 6/20/2025 0000 by Luis Eduardo Russell RN  Trust Relationship/Rapport: care explained  Taken 6/19/2025 2000 by Luis Eduardo Russell RN  Trust Relationship/Rapport:   care explained   choices provided   emotional support provided   empathic listening provided   questions answered   reassurance provided   questions encouraged  Goal: Readiness for Transition of Care  Outcome: Progressing  Intervention: Mutually Develop Transition Plan  Recent Flowsheet Documentation  Taken 6/19/2025 2209 by Luis Eduardo Russell RN  Transportation Anticipated: health plan transportation  Patient/Family Anticipated Services at Transition: none  Patient/Family Anticipates Transition to: inpatient rehabilitation facility  Taken 6/19/2025 2205 by Luis Eduardo Russell RN  Equipment Currently Used at Home:   glucometer   grab bar     Problem: Comorbidity Management  Goal: Maintenance of Asthma Control  Outcome: Progressing  Intervention: Maintain Asthma Symptom Control  Recent Flowsheet Documentation  Taken 6/20/2025 0542 by Luis Eduardo Russell, RN  Medication Review/Management: medications reviewed  Taken 6/20/2025 0414 by Luis Eduardo Russell RN  Medication Review/Management: medications reviewed  Taken 6/20/2025 0200 by Luis Eduardo Russell RN  Medication Review/Management: medications reviewed  Taken 6/20/2025 0000 by Luis Eduardo Russell RN  Medication Review/Management: medications reviewed  Taken 6/19/2025 2200 by Luis Eduardo Russell RN  Medication Review/Management: medications reviewed  Taken 6/19/2025 2000 by Luis Eduardo Russell, RN  Medication Review/Management: medications reviewed  Goal: Blood Glucose Level Within Target Range  Outcome: Progressing  Intervention: Monitor and Manage Glycemia  Recent Flowsheet Documentation  Taken 6/20/2025 0542 by Luis Eduardo Russell  RN  Medication Review/Management: medications reviewed  Taken 6/20/2025 0414 by Luis Eduardo Russell RN  Medication Review/Management: medications reviewed  Taken 6/20/2025 0200 by Luis Eduardo Russell RN  Medication Review/Management: medications reviewed  Taken 6/20/2025 0000 by Luis Eduardo Russell RN  Medication Review/Management: medications reviewed  Taken 6/19/2025 2200 by Luis Eduardo Russell RN  Medication Review/Management: medications reviewed  Taken 6/19/2025 2000 by Luis Eduardo Russell RN  Medication Review/Management: medications reviewed  Goal: Blood Pressure in Desired Range  Outcome: Progressing  Intervention: Maintain Blood Pressure Management  Recent Flowsheet Documentation  Taken 6/20/2025 0542 by Luis Eduardo Russell RN  Medication Review/Management: medications reviewed  Taken 6/20/2025 0414 by Luis Eduardo Russell RN  Medication Review/Management: medications reviewed  Taken 6/20/2025 0200 by Luis Eduardo Russell RN  Medication Review/Management: medications reviewed  Taken 6/20/2025 0000 by Luis Eduardo Russell RN  Medication Review/Management: medications reviewed  Taken 6/19/2025 2200 by Luis Eduardo Russell RN  Medication Review/Management: medications reviewed  Taken 6/19/2025 2000 by Luis Eduardo Russell RN  Medication Review/Management: medications reviewed   Goal Outcome Evaluation:                 No longer complaining of chest pain.

## 2025-06-20 NOTE — NURSING NOTE
Second nurse skin assessment completed with primary nurse at time of admission. Agree with primary nurse findings.

## 2025-06-20 NOTE — PROGRESS NOTES
[facility] MEDICINE SERVICE  DAILY PROGRESS NOTE    NAME: Pamela Zavala  : 1940  MRN: [id number]      LOS: 0 days     PROVIDER OF SERVICE: Santo Quintero MD    Chief Complaint: Chest pain    Subjective:     Interval History:  History taken from: patient    Anxious and asking to go home.        Review of Systems:   Review of Systems    Objective:     Vital Signs  Temp:  [97.8 °F (36.6 °C)-98 °F (36.7 °C)] 98 °F (36.7 °C)  Heart Rate:  [75-94] 84  Resp:  [14-24] 18  BP: (117-176)/(56-87) 147/79   Body mass index is 25.36 kg/m².    Physical Exam  Physical Exam       Diagnostic Data    Results from last 7 days   Lab Units 25  0023   WBC 10*3/mm3 7.59   HEMOGLOBIN g/dL 11.7*   HEMATOCRIT % 37.8   PLATELETS 10*3/mm3 272   GLUCOSE mg/dL 152*   CREATININE mg/dL 0.96   BUN mg/dL 19.4   SODIUM mmol/L 140   POTASSIUM mmol/L 3.8   AST (SGOT) U/L 15   ALT (SGPT) U/L 10   ALK PHOS U/L 76   BILIRUBIN mg/dL 0.3   ANION GAP mmol/L 9.3       CT Angiogram Head w AI Analysis of LVO  Result Date: 2025  Impression: 1.No hemodynamically significant carotid or vertebral artery stenosis. 2.No significant intracranial vascular stenosis or large vessel occlusion. 3.Emphysema. 4.Cervical degenerative disc disease with anterolisthesis of C4 on C5 by approximately 3 mm. Electronically Signed: Jared Baker MD  2025 2:26 PM EDT  Workstation ID: [id number]    CT Angiogram Neck  Result Date: 2025  Impression: 1.No hemodynamically significant carotid or vertebral artery stenosis. 2.No significant intracranial vascular stenosis or large vessel occlusion. 3.Emphysema. 4.Cervical degenerative disc disease with anterolisthesis of C4 on C5 by approximately 3 mm. Electronically Signed: Jared Baker MD  2025 2:26 PM EDT  Workstation ID: [id number]    XR Chest 1 View  Result Date: 2025  Impression: Retrocardiac density most likely reflecting an hiatal hernia. Electronically Signed: Jared Baker MD  2025  1:53 PM EDT  Workstation ID: [id number]    CT Head Without Contrast Stroke Protocol  Result Date: 6/19/2025  Impression: 1.Generalized atrophy. Chronic small vessel ischemic changes. Chronic lacunar infarctions in the basal ganglia. 2.No acute intracranial findings. Electronically Signed: Jared Baker MD  6/19/2025 1:12 PM EDT  Workstation ID: [id number]        I reviewed the patient's new clinical results.    Assessment/Plan:     Active and Resolved Problems  Active Hospital Problems    Diagnosis  POA    **Chest pain [R07.9]  Yes      Resolved Hospital Problems   No resolved problems to display.     Chest pain with radiating symptoms  - Serial EKGs do not appear grossly ischemic.  - CXR: Retrocardiac density most likely reflecting a hiatal hernia  - CBC: Unremarkable  - CMP: Unremarkable  - Serial Trops: 11, 10 respectively  - TTE with preserved ejection fraction at bileaflet mitral valve with thickening  - Had recurrence of chest pain this morning after rounds.  Now going down for stress test with cardiology.  - Consider health anxiety as the patient has reported this to me this morning as well if her cardiac workup is negative.    CVA / TIA Symptoms  Left facial paresthesia  - CT head: Generalized atrophy, no acute intracranial findings  - MRI brain w/o contrast: Reports in the notes from yesterday that patient claims she has an implant, denies that she has an implant to me this morning and looks like she has had MRIs last year without issue.  - CTA head/neck w/contrast: No hemodynamically significant carotid or vertebral artery stenosis.  No significant intracranial vascular stenosis or large vessel occlusion.  Cervical DDD with Ascarel listhesis of C4 and C5  - Patient is going down for MRI, ordered by neurology department.     DM2  - A1c is 8.47  - Accu-Cheks AC and at bedtime  - CCD once passes the RN dysphagia screen and seen by cardiology    VTE Prophylaxis:  Mechanical VTE prophylaxis orders are present.              Disposition Planning:     Barriers to Discharge: Cardiac workup, neurology workup  Anticipated Date of Discharge: 6/21/2025  Place of Discharge: Home?      Time: 39 minutes     Code Status and Medical Interventions: No CPR (Do Not Attempt to Resuscitate); Limited Support; No intubation (DNI)   Ordered at: 06/19/25 1727     Code Status (Patient has no pulse and is not breathing):    No CPR (Do Not Attempt to Resuscitate)     Medical Interventions (Patient has pulse or is breathing):    Limited Support     Medical Intervention Limits:    No intubation (DNI)       Signature: Electronically signed by Santo Quintero MD, 06/20/25, 11:21 EDT.  [facility] Hospitalist Team

## 2025-06-20 NOTE — PLAN OF CARE
Problem: Adult Inpatient Plan of Care  Goal: Plan of Care Review  Outcome: Progressing  Goal: Patient-Specific Goal (Individualized)  Outcome: Progressing  Goal: Absence of Hospital-Acquired Illness or Injury  Outcome: Progressing  Intervention: Identify and Manage Fall Risk  Recent Flowsheet Documentation  Taken 6/20/2025 0820 by Diane Bautista RN  Safety Promotion/Fall Prevention:   safety round/check completed   activity supervised   assistive device/personal items within reach   clutter free environment maintained   fall prevention program maintained   gait belt   nonskid shoes/slippers when out of bed  Intervention: Prevent Skin Injury  Recent Flowsheet Documentation  Taken 6/20/2025 0820 by Diane Bautista RN  Body Position: position changed independently  Intervention: Prevent and Manage VTE (Venous Thromboembolism) Risk  Recent Flowsheet Documentation  Taken 6/20/2025 0820 by Diane Bautista RN  VTE Prevention/Management: patient refused intervention  Intervention: Prevent Infection  Recent Flowsheet Documentation  Taken 6/20/2025 0820 by Diane Bautista RN  Infection Prevention:   hand hygiene promoted   rest/sleep promoted  Goal: Optimal Comfort and Wellbeing  Outcome: Progressing  Intervention: Provide Person-Centered Care  Recent Flowsheet Documentation  Taken 6/20/2025 0820 by Diane Bautista RN  Trust Relationship/Rapport:   care explained   choices provided  Goal: Readiness for Transition of Care  Outcome: Progressing   Goal Outcome Evaluation: Patient denies chest pain and is hoping to go home today.                                             I had a Nurse ( Lia ) from [facility] in [place] call, she says its important to call back since he is currently there at the facility. She says he is not getting better, Increased cough and conjestion and lowered o2 when he walks. Chest x-ray is stable. he does have an elevated WBC. She needs recommendation on what to do. her direct # is [phone]

## 2025-06-20 NOTE — CONSULTS
Primary Care Provider: Provider, No Known     Consult requested by:  Dr. Tellez     Reason for Consultation: Neurological evaluation      History taken from: patient chart RN    Chief complaint: Facial numbness       SUBJECTIVE:    History of present illness: Background per H&P: Pamela Zavala is a 84 y.o. female who presented to [facility] on 6/19/2025 with  complaining of chest pain the patient states that she had the onset of facial numbness that started last night (6/18) and has had persisted today (6/19). She reports no associated change in taste. She states that the chest pain is associated with shortness of breath and she has had intermittent episodes of diaphoresis she states she took a sublingual nitroglycerin and that that improved her pain but left her feeling weak tired and lightheaded. The patient states she has had some diaphoresis with the episodes of severe pain. She reports that she is taking a blood thinner. She states that she has had no night sweats or hemoptysis MR reports no recent fever chills or cough .  States that her face feels numb, however she can feel sensation.     CMH of CAD, SVTs, paroxysmal A-fib, thyroid cancer, type 2 diabetes, diabetic neuropathy, hyperlipidemia, former smoker, alcohol use, lives at assisted living facility   - Portions of the above HPI were copied from previous encounters and edited as appropriate. PMH as detailed below.     Pt denies any new complaints. She has some sensation changes in the left lower face at times, but states it is not really numbness, maybe mild tingling. No weakness or focal deficits otherwise.     Review of Systems   Constitutional:  Negative for chills, diaphoresis and fatigue.   Eyes:  Negative for photophobia and visual disturbance.   Neurological:  Positive for numbness. Negative for dizziness, tremors, seizures, syncope, facial asymmetry, speech difficulty, weakness, light-headedness and headaches.          PATIENT  HISTORY:  Past Medical History:   Diagnosis Date    Anemia     Arthritis     CAD (coronary artery disease)     Chronic back pain     Diabetic neuropathy     Essential hypertension     GERD (gastroesophageal reflux disease)     History of gastric ulcer     History of stomach ulcers     HLD (hyperlipidemia)     Hypocalcemia     Hypoparathyroidism     Hypothyroidism     Insomnia     Kidney stones     Osteoporosis     Recurrent sinusitis     Renal disease     Thyroid cancer     Type 2 diabetes mellitus    ,   Past Surgical History:   Procedure Laterality Date    CARDIAC CATHETERIZATION N/A 2023    Procedure: Left Heart Cath and coronary angiogram;  Surgeon: John Baldwin MD;  Location: [facility] CATH INVASIVE LOCATION;  Service: Cardiovascular;  Laterality: N/A;    CORONARY STENT PLACEMENT      x 3    CYSTOSCOPY, URETEROSCOPY, RETROGRADE PYELOGRAM, STENT INSERTION Left 10/18/2023    Procedure: CYSTOSCOPY URETEROSCOPY RETROGRADE PYELOGRAM HOLMIUM LASER STENT INSERTION;  Surgeon: Juan Alaniz MD;  Location: [facility] MAIN OR;  Service: Urology;  Laterality: Left;    ENDOSCOPY N/A 2025    Procedure: ESOPHAGOGASTRODUODENOSCOPY WITH REMOVAL OF FOREIGN BODY FROM ESOPHAGUS;  Surgeon: EDU Serrano MD;  Location: [facility] ENDOSCOPY;  Service: Gastroenterology;  Laterality: N/A;  Post- FOREIGN BODY REMOVAL, ESOPHAGEAL STRICTURE, ESOPHAGITIS, HIATAL HERNIA    HYSTERECTOMY      LUNG LOBECTOMY      THYROIDECTOMY     ,   Family History   Problem Relation Age of Onset    Diabetes Mother     Cancer Father     Heart disease Sister     Diabetes Sister     Heart disease Maternal Grandmother    ,   Social History     Tobacco Use    Smoking status: Former     Current packs/day: 0.00     Average packs/day: 2.0 packs/day for 30.0 years (60.0 ttl pk-yrs)     Types: Cigarettes     Start date:      Quit date:      Years since quittin.4     Passive exposure: Past    Smokeless tobacco: Never   Vaping Use    Vaping status:  Never Used   Substance Use Topics    Alcohol use: Yes     Alcohol/week: 1.0 standard drink of alcohol     Types: 1 Glasses of wine per week    Drug use: Never   ,   Prior to Admission medications    Medication Sig Start Date End Date Taking? Authorizing Provider   atorvastatin (LIPITOR) 10 MG tablet Take 1 tablet by mouth Daily.   Yes Andrade La MD   clopidogrel (PLAVIX) 75 MG tablet Take 1 tablet by mouth Daily.   Yes Andrade La MD   Continuous Glucose Sensor (Dexcom G7 Sensor) misc USE BEFORE MEALS AND AT BEDTIME FOUR TIMES DAILY 6/12/25  Yes Driss Dan MD   folic acid (FOLVITE) 1 MG tablet Take 1 tablet by mouth Daily. 10/26/23  Yes Sarah Beth Coronado MD   gabapentin (NEURONTIN) 300 MG capsule Take 1 capsule by mouth every night at bedtime.   Yes Andrade La MD   Insulin Lispro (humaLOG) 100 UNIT/ML injection 6 Units with each meal and additional sliding scale                 Take 0 additional unit  151-200 Take 1 additional unit  201-250 Take 2 additional unit  251-300 Take 3 additional unit  301-350 Take 4 additional unit  351-400 Take 5 additional unit  More than 400 Take 6 additional unit    Maximum 12 units with each meal  Patient taking differently: Inject 6 Units under the skin into the appropriate area as directed 3 (Three) Times a Day Before Meals. 6 Units with each meal and additional sliding scale                 Take 0 additional unit  151-200 Take 1 additional unit  201-250 Take 2 additional unit  251-300 Take 3 additional unit  301-350 Take 4 additional unit  351-400 Take 5 additional unit  More than 400 Take 6 additional unit    Maximum 12 units with each meal 7/9/24  Yes Driss Dan MD   Insulin Lispro, 1 Unit Dial, (HUMALOG) 100 UNIT/ML solution pen-injector 6 UT WITH EACH MEAL AND SLIDING SCALE  0 -200 1 -250 2 -300 3 -350 4 -400 5 UT OVER 400 TAKE 6 UT (MAX 12 UT EACH MEAL-36UT/DAY MAX ) 6/12/25  Yes Driss Dan MD    levothyroxine (SYNTHROID, LEVOTHROID) 137 MCG tablet Take 1 tablet by mouth Daily. 5/21/24  Yes Driss Dan MD   lisinopril (PRINIVIL,ZESTRIL) 10 MG tablet Take 1 tablet by mouth Daily. 5/18/23  Yes Nuria Mcgraw MD   metFORMIN ER (GLUCOPHAGE-XR) 500 MG 24 hr tablet TAKE 1 TABLET BY MOUTH EVERY DAY WITH BREAKFAST 4/29/25  Yes Driss Dan MD   metoprolol succinate XL (TOPROL-XL) 25 MG 24 hr tablet Take 1 tablet by mouth Daily.   Yes Andrade La MD   multivitamin with minerals (THERA-M PO) Take 1 tablet by mouth Daily.   Yes Andrade La MD   pantoprazole (PROTONIX) 40 MG EC tablet Take 1 tablet by mouth 2 (Two) Times a Day. Take dos   Yes Andrade La MD   polyethylene glycol (MIRALAX) 17 g packet Take 17 g by mouth Daily. 10/26/23  Yes Sarah Beth Coronado MD   thiamine (VITAMIN B1) 100 MG tablet Take 1 tablet by mouth Daily. 10/26/23  Yes Sarah Beth Coronado MD   Acetaminophen Extra Strength 500 MG tablet Take 2 tablets by mouth Every 6 (Six) Hours As Needed. 1/25/24   Andrade La MD   cetirizine (zyrTEC) 10 MG tablet Take 1 tablet by mouth Every Night. 11/22/24   Andrade La MD   famotidine (PEPCID) 40 MG tablet Take 1 tablet by mouth every night at bedtime.    Andrade La MD   fluticasone (FLONASE) 50 MCG/ACT nasal spray Administer 1 spray into the nostril(s) as directed by provider 2 (Two) Times a Day.    Andrade La MD   Lantus SoloStar 100 UNIT/ML injection pen INJECT 22 UNITS UNDER THE SKIN INTO THE APPROPRIATE AREA AS DIRECTED EVERY NIGHT. 3/5/25   Driss Dan MD   magic mouthwash oral susp (nystatin - diphenhydrAMINE HCl - hydrocortisone) Swish and spit 5 mL 3 (Three) Times a Day As Needed.    Andrade La MD   ondansetron (ZOFRAN) 4 MG tablet Take 1 tablet by mouth Every 8 (Eight) Hours As Needed for Nausea or Vomiting.    Andrade La MD   traZODone (DESYREL) 50 MG tablet Take 1 tablet by mouth Every Night.    Provider,  MD Andrade    Allergies:  Penicillins, Shellfish-derived products, Sulfamethizole, and Trimethoprim    Current Facility-Administered Medications   Medication Dose Route Frequency Provider Last Rate Last Admin    acetaminophen (TYLENOL) tablet 650 mg  650 mg Oral Q4H PRN Luz Maria Cobian APRN        Or    acetaminophen (TYLENOL) 160 MG/5ML oral solution 650 mg  650 mg Oral Q4H PRN Luz Maria Cobian APRN        Or    acetaminophen (TYLENOL) suppository 650 mg  650 mg Rectal Q4H PRN Luz Maria Cobian APRN        atorvastatin (LIPITOR) tablet 10 mg  10 mg Oral Daily Jacey Cordova APRN   10 mg at 06/20/25 0817    sennosides-docusate (PERICOLACE) 8.6-50 MG per tablet 2 tablet  2 tablet Oral BID PRN Luz Maria Cobian APRN        And    polyethylene glycol (MIRALAX) packet 17 g  17 g Oral Daily PRN Luz Maria Cobian APRN        And    bisacodyl (DULCOLAX) EC tablet 5 mg  5 mg Oral Daily PRN Luz Maria Cobian APRN        And    bisacodyl (DULCOLAX) suppository 10 mg  10 mg Rectal Daily PRN Luz Maria Cobian APRN        Calcium Replacement - Follow Nurse / BPA Driven Protocol   Not Applicable PRN Luz Maria Cobian APRN        clopidogrel (PLAVIX) tablet 75 mg  75 mg Oral Daily Jacey Cordova APRN   75 mg at 06/20/25 0817    dextrose (D50W) (25 g/50 mL) IV injection 25 g  25 g Intravenous Q15 Min PRN Luz Maria Cobian APRN        dextrose (GLUTOSE) oral gel 15 g  15 g Oral Q15 Min PRN Luz Maria Cobian APRN        glucagon (GLUCAGEN) injection 1 mg  1 mg Intramuscular Q15 Min PRN Luz Maria Cobian APRN        insulin lispro (HUMALOG/ADMELOG) injection 2-7 Units  2-7 Units Subcutaneous 4x Daily AC & at Bedtime Luz Maria Cobian APRN   2 Units at 06/20/25 0818    lisinopril (PRINIVIL,ZESTRIL) tablet 10 mg  10 mg Oral Daily Cordova, Jacey, APRN   10 mg at 06/20/25 0817    Magnesium Standard Dose Replacement - Follow Nurse / BPA Driven Protocol   Not Applicable Luz Maria Rasmussen, APRN         metoprolol succinate XL (TOPROL-XL) 24 hr tablet 25 mg  25 mg Oral Daily Jacey Cordova, APRN   25 mg at 06/20/25 0817    nitroglycerin (NITROSTAT) SL tablet 0.4 mg  0.4 mg Sublingual Q5 Min PRN GrSajan josée P, APRN        nitroglycerin (TRIDIL) 200 mcg/ml infusion  10-50 mcg/min Intravenous Titrated Angel Tellez MD   Stopped at 06/19/25 1940    ondansetron ODT (ZOFRAN-ODT) disintegrating tablet 4 mg  4 mg Oral Q6H PRN Sajan Cobiane P APRN        Or    ondansetron (ZOFRAN) injection 4 mg  4 mg Intravenous Q6H PRN Sajan Cobiane P, APRN        Phosphorus Replacement - Follow Nurse / BPA Driven Protocol   Not Applicable PRN Griten Luz Maria P, APRN        Potassium Replacement - Follow Nurse / BPA Driven Protocol   Not Applicable PRN GritenDanayLuz Maria P, APRN        sodium chloride 0.9 % flush 10 mL  10 mL Intravenous PRN Angel Tellez MD        sodium chloride 0.9 % flush 10 mL  10 mL Intravenous Q12H GritenDanayLuz Maria P, APRN   10 mL at 06/20/25 0818    sodium chloride 0.9 % flush 10 mL  10 mL Intravenous PRN GrDanay joséanne P, APRN        sodium chloride 0.9 % infusion 40 mL  40 mL Intravenous PRN GrDanay joséanne P, APRN            ________________________________________________________        OBJECTIVE:  Upon today's exam, pt is awake and alert     PHYSICAL EXAM:    CONSTITUTIONAL: The patient is in no apparent distress, bright awake and alert.      HEENT: There is no tenderness over the temporal arteries bilaterally. Normocephalic, atraumatic. TMJ open symmetrically without tenderness.    NECK: ROM normal, supple    RESPIRATORY: Breathing unlabored, Breath sounds are normal    CARDIAC: Regular rate and rhythm.     EXTREMITIES:  No clubbing, cyanosis or edema.    PSYCHIATRIC: Mood/affect normal, judgement normal, appropriate    NEUROLOGICAL:    Cognition:   Fully oriented.  Fund of knowledge excellent.  Concentration and attention normal.   Language normal with normal comprehension, fluent  speech, intact repetition and naming.   Short and long term memory appears intact    Cranial nerves;    II - pupils bilaterally equal reacting to light,  No new Visual field deficits;  Fundoscopic exam- Not able to be done, non-dilated exam  III,IV,VI: EOMI with no diplopia  V: Normal facial sensations except slightly decreased sensation in the left lower cheek  VII: No facial asymmetry,  VIII: No New hearing abnormality  IX, X, XI: normal gag and shoulder shrug;  XII: tongue is in the midline.    Sensory:  Intact to light touch in all extremities.     Motor: Strength 5/5 bilaterally upper and lower extremities. No involuntary movements present. Normal tone and bulk.  Deep tendon reflexes: 2/4 and symmetrical in biceps, brachioradialis, triceps, bilateral 2/4 knees and ankles. Both plantars are flexor.    Cerebellar: Finger to nose and mirror movements normal bilaterally.    Gait and balance: normal      NIH Stroke Scale  Interval: baseline  1a. Level of Consciousness: 0-->Alert, keenly responsive  1b. LOC Questions: 0-->Answers both questions correctly  1c. LOC Commands: 0-->Performs both tasks correctly  2. Best Gaze: 0-->Normal  3. Visual: 0-->No visual loss  4. Facial Palsy: 0-->Normal symmetrical movements  5a. Motor Arm, Left: 0-->No drift, limb holds 90 (or 45) degrees for full 10 secs  5b. Motor Arm, Right: 0-->No drift, limb holds 90 (or 45) degrees for full 10 secs  6a. Motor Leg, Left: 0-->No drift, leg holds 30 degree position for full 5 secs  6b. Motor Leg, Right: 0-->No drift, leg holds 30 degree position for full 5 secs  7. Limb Ataxia: 0-->Absent  8. Sensory: 0-->Normal, no sensory loss  9. Best Language: 0-->No aphasia, normal  10. Dysarthria: 0-->Normal  11. Extinction and Inattention (formerly Neglect): 0-->No abnormality  Total (NIH Stroke Scale): 0         ________________________________________________________   RESULTS REVIEW:    VITAL SIGNS:   Temp:  [97.8 °F (36.6 °C)-98 °F (36.7 °C)] 98 °F  (36.7 °C)  Heart Rate:  [75-94] 84  Resp:  [14-24] 18  BP: (117-176)/(56-87) 147/79     LABS:      Lab 06/20/25  0023 06/19/25  1307   WBC 7.59 8.70   HEMOGLOBIN 11.7* 12.5   HEMATOCRIT 37.8 39.7   PLATELETS 272 298   NEUTROS ABS 4.38 5.83   IMMATURE GRANS (ABS) 0.02 0.04   LYMPHS ABS 2.29 1.99   MONOS ABS 0.55 0.54   EOS ABS 0.24 0.20   MCV 93.8 92.3   PROTIME  --  13.1   APTT  --  26.3         Lab 06/20/25  0023 06/19/25  1550 06/19/25  1307   SODIUM 140  --  141   POTASSIUM 3.8  --  4.3   CHLORIDE 108*  --  106   CO2 22.7  --  22.5   ANION GAP 9.3  --  12.5   BUN 19.4  --  22.7   CREATININE 0.96  --  1.10*   EGFR 57.7*  --  49.0*   GLUCOSE 152*  --  209*   CALCIUM 8.3*  --  8.9   HEMOGLOBIN A1C  --   --  8.47*   TSH  --  1.860  --          Lab 06/20/25  0023 06/19/25  1307   TOTAL PROTEIN 6.4 7.2   ALBUMIN 4.0 4.4   GLOBULIN 2.4 2.8   ALT (SGPT) 10 12   AST (SGOT) 15 17   BILIRUBIN 0.3 0.3   ALK PHOS 76 91         Lab 06/19/25  1550 06/19/25  1307   HSTROP T 10 11   PROTIME  --  13.1   INR  --  1.00             Lab 06/19/25  1317 06/19/25  1307   VITAMIN B 12  --  454   ABO TYPING O  --    RH TYPING Positive  --    ANTIBODY SCREEN Negative  --              Lab Results   Component Value Date    TSH 1.860 06/19/2025    LDL 48 02/13/2024    HGBA1C 8.47 (H) 06/19/2025    SRUSLVUK01 454 06/19/2025       IMAGING STUDIES:  CT Angiogram Head w AI Analysis of LVO  Result Date: 6/19/2025  Impression: 1.No hemodynamically significant carotid or vertebral artery stenosis. 2.No significant intracranial vascular stenosis or large vessel occlusion. 3.Emphysema. 4.Cervical degenerative disc disease with anterolisthesis of C4 on C5 by approximately 3 mm. Electronically Signed: Jared Baker MD  6/19/2025 2:26 PM EDT  Workstation ID: [id number]    CT Angiogram Neck  Result Date: 6/19/2025  Impression: 1.No hemodynamically significant carotid or vertebral artery stenosis. 2.No significant intracranial vascular stenosis or large  vessel occlusion. 3.Emphysema. 4.Cervical degenerative disc disease with anterolisthesis of C4 on C5 by approximately 3 mm. Electronically Signed: Jared Baker MD  6/19/2025 2:26 PM EDT  Workstation ID: [id number]    XR Chest 1 View  Result Date: 6/19/2025  Impression: Retrocardiac density most likely reflecting an hiatal hernia. Electronically Signed: Jared Baker MD  6/19/2025 1:53 PM EDT  Workstation ID: [id number]    CT Head Without Contrast Stroke Protocol  Result Date: 6/19/2025  Impression: 1.Generalized atrophy. Chronic small vessel ischemic changes. Chronic lacunar infarctions in the basal ganglia. 2.No acute intracranial findings. Electronically Signed: Jared Baker MD  6/19/2025 1:12 PM EDT  Workstation ID: [id number]      I reviewed the patient's new clinical results.    ________________________________________________________     PROBLEM LIST:    Chest pain            ASSESSMENT/PLAN:  1. Facial numbness, intermittent. Seems to be associated with chest pain, possibly more cardiac in nature.   - CT head: Generalized atrophy. Chronic small vessel ischemic changes. Chronic lacunar infarctions in the basal ganglia. No acute intracranial findings.   - CTA head and neck: No hemodynamically significant carotid or vertebral artery stenosis. No significant intracranial vascular stenosis or large vessel occlusion. Emphysema.   Cervical degenerative disc disease with anterolisthesis of C4 on C5 by approximately 3 mm   - MRI brain: Pending   - Echo: 56 - 60%. Moderate, bileaflet mitral valve thickening present.  - EKG: Sinus rhythm. Probable  anteroseptal infarct, recent  - Labs: A1C: 8.47, B12: 454, LDL: 70, TSH: 1.86, P2Y12: P  - Antithrombotics: Continue home Plavix, ASA 81mg daily added   - Statin: Continue home Lipitor 10mg qhs for now, labs pending   - PT/OT/ST as appropriate, fall precautions as appropriate, Neuro checks per protocol, DVT prophylaxis, Stroke education    2. Chest pain  Acute coronary  syndrome  - Cardiology consulted     3. Type 2 diabetes mellitus  - Strict glycemic control, SSI per primary      Modification of stroke risk factors:   - Blood pressure should be less than 130/80 outpatient, HbA1c less than 6.5, LDL less than 70; b12>500 and smoking cessation if applicable. We would be grateful if the primary team / primary care physician would keep a close watch on the above targets.  - Stroke education  - Follow up with neurologist of choice      I discussed the patient's findings and my recommendations with patient, nursing staff, and consulting provider    CLAUDE Green  06/20/25  10:00 EDT

## 2025-06-20 NOTE — THERAPY EVALUATION
Patient Name: Pamela Zavala  : 1940    MRN: [id number]                              Today's Date: 2025       Admit Date: 2025    Visit Dx:     ICD-10-CM ICD-9-CM   1. Facial paresthesia  R20.2 782.0   2. Chest pain, unspecified type  R07.9 786.50   3. History of coronary angioplasty with insertion of stent  Z95.5 V45.82   4. Sequela of lacunar infarction  I69.30 438.89     Patient Active Problem List   Diagnosis    Chest pain    Coronary arteriosclerosis    Type 2 diabetes mellitus    Diabetic neuropathy    Essential hypertension    Hyperlipidemia    Postoperative hypothyroidism    Thallium stress test abnormal    Ex-smoker    Chest pain, unspecified type    History of thyroid cancer    Sepsis without acute organ dysfunction    Kidney stones    UTI (urinary tract infection), bacterial    Head injury, closed, without LOC, initial encounter    Abdominal pain    Food impaction of esophagus, sequela     Past Medical History:   Diagnosis Date    Anemia     Arthritis     CAD (coronary artery disease)     Chronic back pain     Diabetic neuropathy     Essential hypertension     GERD (gastroesophageal reflux disease)     History of gastric ulcer     History of stomach ulcers     HLD (hyperlipidemia)     Hypocalcemia     Hypoparathyroidism     Hypothyroidism     Insomnia     Kidney stones     Osteoporosis     Recurrent sinusitis     Renal disease     Thyroid cancer     Type 2 diabetes mellitus      Past Surgical History:   Procedure Laterality Date    CARDIAC CATHETERIZATION N/A 2023    Procedure: Left Heart Cath and coronary angiogram;  Surgeon: John Baldwin MD;  Location: [facility] CATH INVASIVE LOCATION;  Service: Cardiovascular;  Laterality: N/A;    CORONARY STENT PLACEMENT      x 3    CYSTOSCOPY, URETEROSCOPY, RETROGRADE PYELOGRAM, STENT INSERTION Left 10/18/2023    Procedure: CYSTOSCOPY URETEROSCOPY RETROGRADE PYELOGRAM HOLMIUM LASER STENT INSERTION;  Surgeon: Juan Alaniz MD;  Location: [facility]  MAIN OR;  Service: Urology;  Laterality: Left;    ENDOSCOPY N/A 2/9/2025    Procedure: ESOPHAGOGASTRODUODENOSCOPY WITH REMOVAL OF FOREIGN BODY FROM ESOPHAGUS;  Surgeon: EDU Serrano MD;  Location: [facility] ENDOSCOPY;  Service: Gastroenterology;  Laterality: N/A;  Post- FOREIGN BODY REMOVAL, ESOPHAGEAL STRICTURE, ESOPHAGITIS, HIATAL HERNIA    HYSTERECTOMY      LUNG LOBECTOMY      THYROIDECTOMY        General Information       Row Name 06/20/25 1515          OT Time and Intention    Subjective Information complains of  L facial paresthesia  -AN     Document Type evaluation  -AN     Mode of Treatment occupational therapy  -AN     Patient Effort excellent  -AN     Symptoms Noted During/After Treatment none  -AN       Row Name 06/20/25 1515          General Information    Patient Profile Reviewed yes  -AN     Prior Level of Function independent:;all household mobility;transfer;ADL's;bed mobility  -AN     Existing Precautions/Restrictions fall  -AN     Barriers to Rehab none identified  -AN       Row Name 06/20/25 1515          Occupational Profile    Reason for Services/Referral (Occupational Profile) Pt reported prior to admit she was living at [facility] and independent with ADLs/mobility with exception to supervision for bathing. Use of cane in apartment and RW for longer distances/as needed (e.g. walk to dining room). Staff assists with IADLs and medication management. Pt does not drive, has transportation available through facility, and family still checks-in.  -AN       Row Name 06/20/25 1515          Living Environment    Current Living Arrangements assisted living facility  -AN     People in Home facility resident  -AN       Row Name 06/20/25 1515          Home Main Entrance    Number of Stairs, Main Entrance none  -AN       Row Name 06/20/25 1515          Stairs Within Home, Primary    Number of Stairs, Within Home, Primary none  -AN       Row Name 06/20/25 1515          Cognition    Orientation Status (Cognition)  oriented x 4;other (see comments)  Good safety awareness  -AN               User Key  (r) = Recorded By, (t) = Taken By, (c) = Cosigned By      Initials Name Provider Type    AN Joseph Martin OT Occupational Therapist                     Mobility/ADL's       Row Name 06/20/25 1517          Bed Mobility    Bed Mobility supine-sit;sit-supine  -AN     Supine-Sit [place] (Bed Mobility) modified independence  -AN     Sit-Supine [place] (Bed Mobility) modified independence  -AN     Assistive Device (Bed Mobility) bed rails  -AN       Row Name 06/20/25 1517          Transfers    Transfers sit-stand transfer;stand-sit transfer  -AN       Row Name 06/20/25 1517          Sit-Stand Transfer    Sit-Stand [place] (Transfers) modified independence  -AN     Assistive Device (Sit-Stand Transfers) walker, front-wheeled  -AN       Row Name 06/20/25 1517          Stand-Sit Transfer    Stand-Sit [place] (Transfers) modified independence  -AN     Assistive Device (Stand-Sit Transfers) walker, front-wheeled  -AN       Row Name 06/20/25 151          Functional Mobility    Functional Mobility- Ind. Level conditional independence  -AN     Functional Mobility- Device walker, front-wheeled  -AN     Patient was able to Ambulate yes  -AN       Row Name 06/20/25 1517          Activities of Daily Living    BADL Assessment/Intervention upper body dressing;lower body dressing  -AN       Row Name 06/20/25 [facility]          Upper Body Dressing Assessment/Training    [place] Level (Upper Body Dressing) upper body dressing skills;independent  -AN     Position (Upper Body Dressing) unsupported sitting;edge of bed sitting  -AN       Row Name 06/20/25 1517          Lower Body Dressing Assessment/Training    [place] Level (Lower Body Dressing) don;doff;shoes/slippers;independent  -AN     Position (Lower Body Dressing) unsupported sitting;edge of bed sitting  -AN               User Key  (r) = Recorded By, (t) = Taken By, (c) =  Cosigned By      Initials Name Provider Type    Joseph Birmingham OT Occupational Therapist                   Obj/Interventions       Row Name 06/20/25 1519          Sensory Assessment (Somatosensory)    Sensory Subjective Reports paresthesia  -AN     Sensory Assessment L facial  -AN       Row Name 06/20/25 1519          Vision Assessment/Intervention    Visual Impairment/Limitations WFL;other (see comments)  Corrective lenses  -AN       [facility] Name 06/20/25 1519          Range of Motion Comprehensive    General Range of Motion no range of motion deficits identified  -AN       Row Name 06/20/25 1519          Strength Comprehensive (MMT)    General Manual Muscle Testing (MMT) Assessment no strength deficits identified  -AN       Row Name 06/20/25 1519          Balance    Balance Assessment sitting static balance;sitting dynamic balance;standing static balance;standing dynamic balance  -AN     Static Sitting Balance independent  -AN     Dynamic Sitting Balance independent  -AN     Static Standing Balance independent  -AN     Dynamic Standing Balance independent;standby assist  -AN     Position/Device Used, Standing Balance walker, rolling  -AN               User Key  (r) = Recorded By, (t) = Taken By, (c) = Cosigned By      Initials Name Provider Type    Joseph Birmingham OT Occupational Therapist                   Goals/Plan    No documentation.                  Clinical Impression       Row Name 06/20/25 1520          Pain Assessment    Pretreatment Pain Rating 0/10 - no pain  -AN     Posttreatment Pain Rating 0/10 - no pain  -AN       Row Name 06/20/25 1520          Plan of Care Review    Plan of Care Reviewed With patient  -AN     Outcome Evaluation Pt is an 83 yo F with PMH including but not limited CAD, HTN, hyperlipidemia, hypothyroidism, thyriod CA x2, DM2, TIA. Pt admit to hospital d/t chest pain with numbness/tingling on L side of face. Dx: CVA/TIA symptoms, L facial paresthesia. CT head demo generalized  atrophy, no acute intracranial findings; MRI pending.     Pt reported prior to admit she was living at [facility] and independent with ADLs/mobility with exception to supervision for bathing. Use of cane in apartment and RW for longer distances/as needed (e.g. walk to dining room). Staff assists with IADLs and medication management. Pt does not drive, has transportation available through facility, and family still checks-in.     Pt's vitals stable throughout session, but pt cont to report ongoing L facial paresthesia. BUE/BLE MMT/AROM WFL. Supervision-independent with ADLs and bed mobility/functional mobility with RW. No cues for safety. Pt reported no questions/concerns for skilled OT at this time. Appears close to baseline. Will s/o based on pt reports and observations. Rec: Return to [facility].  -AN       Row Name 06/20/25 1520          Therapy Assessment/Plan (OT)    Criteria for Skilled Therapeutic Interventions Met (OT) no problems identified which require skilled intervention  -AN       Row Name 06/20/25 1520          Therapy Plan Review/Discharge Plan (OT)    Anticipated Discharge Disposition (OT) assisted living  -AN       Row Name 06/20/25 1520          Vital Signs    Pre Systolic BP Rehab 151  -AN     Pre Treatment Diastolic BP 64  -AN     Pretreatment Heart Rate (beats/min) 79  -AN     Posttreatment Heart Rate (beats/min) 88  -AN     Pre SpO2 (%) 98  -AN     O2 Delivery Pre Treatment room air  -AN     O2 Delivery Intra Treatment room air  -AN     Post SpO2 (%) 99  -AN     O2 Delivery Post Treatment room air  -AN     Pre Patient Position Supine  -AN     Intra Patient Position Standing  -AN     Post Patient Position Supine  -AN       Row Name 06/20/25 1520          Positioning and Restraints    Pre-Treatment Position in bed  -AN     Post Treatment Position bed  -AN     In Bed notified nsg;fowlers;call light within reach;encouraged to call for assist;exit alarm on  -AN               User Key  (r) = Recorded By, (t) =  Taken By, (c) = Cosigned By      Initials Name Provider Type    Joseph Birmingham OT Occupational Therapist                   Outcome Measures       Row Name 06/20/25 1522          How much help from another is currently needed...    Putting on and taking off regular lower body clothing? 4  -AN     Bathing (including washing, rinsing, and drying) 3  -AN     Toileting (which includes using toilet bed pan or urinal) 4  -AN     Putting on and taking off regular upper body clothing 4  -AN     Taking care of personal grooming (such as brushing teeth) 4  -AN     Eating meals 4  -AN     AM-PAC 6 Clicks Score (OT) 23  -AN       Row Name 06/20/25 0820          How much help from another person do you currently need...    Turning from your back to your side while in flat bed without using bedrails? 4  -JL     Moving from lying on back to sitting on the side of a flat bed without bedrails? 4  -JL     Moving to and from a bed to a chair (including a wheelchair)? 4  -JL     Standing up from a chair using your arms (e.g., wheelchair, bedside chair)? 4  -JL     Climbing 3-5 steps with a railing? 3  -JL     To walk in hospital room? 3  -JL     AM-PAC 6 Clicks Score (PT) 22  -JL       Row Name 06/20/25 1522          Functional Assessment    Outcome Measure Options AM-PAC 6 Clicks Daily Activity (OT)  -AN               User Key  (r) = Recorded By, (t) = Taken By, (c) = Cosigned By      Initials Name Provider Type    Diane Lewis, RN Registered Nurse    Joseph Birmingham OT Occupational Therapist                    Occupational Therapy Education       Title: PT OT SLP Therapies (Done)       Topic: Occupational Therapy (Done)       Point: ADL training (Done)       Learning Progress Summary            Patient Acceptance, E,TB, VU by AN at 6/20/2025 1523                      Point: Home exercise program (Done)       Learning Progress Summary            Patient Acceptance, E,TB, VU by AN at 6/20/2025 1523                       Point: Precautions (Done)       Learning Progress Summary            Patient Acceptance, E,TB, VU by AN at 6/20/2025 1523                      Point: Body mechanics (Done)       Learning Progress Summary            Patient Acceptance, E,TB, VU by AN at 6/20/2025 1523                                      User Key       Initials Effective Dates Name Provider Type Discipline    AN 06/05/25 -  Joseph Martin, OT Occupational Therapist OT                  OT Recommendation and Plan     Plan of Care Review  Plan of Care Reviewed With: patient  Outcome Evaluation: Pt is an 85 yo F with PMH including but not limited CAD, HTN, hyperlipidemia, hypothyroidism, thyriod CA x2, DM2, TIA. Pt admit to hospital d/t chest pain with numbness/tingling on L side of face. Dx: CVA/TIA symptoms, L facial paresthesia. CT head demo generalized atrophy, no acute intracranial findings; MRI pending.     Pt reported prior to admit she was living at [facility] and independent with ADLs/mobility with exception to supervision for bathing. Use of cane in apartment and RW for longer distances/as needed (e.g. walk to dining room). Staff assists with IADLs and medication management. Pt does not drive, has transportation available through facility, and family still checks-in.     Pt's vitals stable throughout session, but pt cont to report ongoing L facial paresthesia. BUE/BLE MMT/AROM WFL. Supervision-independent with ADLs and bed mobility/functional mobility with RW. No cues for safety. Pt reported no questions/concerns for skilled OT at this time. Appears close to baseline. Will s/o based on pt reports and observations. Rec: Return to [facility].     Time Calculation:         Time Calculation- OT       Row Name 06/20/25 1524             Time Calculation- OT    OT Start Time 1338  -AN      OT Stop Time 1353  -AN      OT Time Calculation (min) 15 min  -AN      Total Timed Code Minutes- OT 0 minute(s)  -AN      OT Received On 06/20/25  -AN                User Key   (r) = Recorded By, (t) = Taken By, (c) = Cosigned By      Initials Name Provider Type    Joseph Birmingham OT Occupational Therapist                  Therapy Charges for Today       Code Description Service Date Service Provider Modifiers Qty    [id number]  OT EVAL LOW COMPLEXITY 4 6/20/2025 Joseph Martin OT GO 1                 Joseph Martin OT  6/20/2025

## 2025-06-20 NOTE — CONSULTS
Referring Provider: Santo Quintero MD    Reason for Consultation:  chest pain      Patient Care Team:  Provider, No Known as PCP - John Hudson MD as Cardiologist (Cardiology)      SUBJECTIVE     Chief Complaint:  chest pain, left facial and left arm numbness    History of present illness:  Pamela Zavala is a 84 y.o. female with a history of coronary artery disease status post PCI, hypertension, hyperlipidemia, hypothyroidism, and type 2 diabetes who presented to [facility] on 6/19/2025 complaining of chest pain and left facial and left arm numbness. She states she resides in assisted living at [facility]. She states her symptoms started yesterday, prior to arrival. She denies any associated shortness of breath, dizziness, palpitations, nausea or vomiting. Workup in the ER revealed negative serial high sensitivity troponin. Her A1c is elevated at 8.47%. Her EKG shows sinus rhythm with old anteroseptal infarct and unchanged compared to previous. She was admitted for further evaluation. Cardiology and neurology were consulted.         Review of systems:    Constitutional: No weakness, fatigue, fever, rigors, chills   Eyes: No vision changes, eye pain   ENT/oropharynx: No difficulty swallowing, sore throat, epistaxis, changes in hearing   Cardiovascular: + chest pain. No palpitations, paroxysmal nocturnal dyspnea, orthopnea, diaphoresis, dizziness / syncopal episode   Respiratory: No shortness of breath, dyspnea on exertion, cough, wheezing, hemoptysis   Gastrointestinal: No abdominal pain, nausea, vomiting, diarrhea, bloody stools   Genitourinary: No hematuria, dysuria   Neurological: + left facial and left arm numbness.  No headache, tremors, one-sided weakness   Musculoskeletal: No cramps, myalgias, joint pain, joint swelling   Integument: No rash, edema        Personal History:      Past Medical History:   Diagnosis Date    Anemia     Arthritis     CAD (coronary artery disease)      Chronic back pain     Diabetic neuropathy     Essential hypertension     GERD (gastroesophageal reflux disease)     History of gastric ulcer     History of stomach ulcers     HLD (hyperlipidemia)     Hypocalcemia     Hypoparathyroidism     Hypothyroidism     Insomnia     Kidney stones     Osteoporosis     Recurrent sinusitis     Renal disease     Thyroid cancer     Type 2 diabetes mellitus        Past Surgical History:   Procedure Laterality Date    CARDIAC CATHETERIZATION N/A 2023    Procedure: Left Heart Cath and coronary angiogram;  Surgeon: John Baldwin MD;  Location: [facility] CATH INVASIVE LOCATION;  Service: Cardiovascular;  Laterality: N/A;    CORONARY STENT PLACEMENT      x 3    CYSTOSCOPY, URETEROSCOPY, RETROGRADE PYELOGRAM, STENT INSERTION Left 10/18/2023    Procedure: CYSTOSCOPY URETEROSCOPY RETROGRADE PYELOGRAM HOLMIUM LASER STENT INSERTION;  Surgeon: Juan Alaniz MD;  Location: [facility] MAIN OR;  Service: Urology;  Laterality: Left;    ENDOSCOPY N/A 2025    Procedure: ESOPHAGOGASTRODUODENOSCOPY WITH REMOVAL OF FOREIGN BODY FROM ESOPHAGUS;  Surgeon: EDU Serrano MD;  Location: [facility] ENDOSCOPY;  Service: Gastroenterology;  Laterality: N/A;  Post- FOREIGN BODY REMOVAL, ESOPHAGEAL STRICTURE, ESOPHAGITIS, HIATAL HERNIA    HYSTERECTOMY      LUNG LOBECTOMY      THYROIDECTOMY         Family History   Problem Relation Age of Onset    Diabetes Mother     Cancer Father     Heart disease Sister     Diabetes Sister     Heart disease Maternal Grandmother        Social History     Tobacco Use    Smoking status: Former     Current packs/day: 0.00     Average packs/day: 2.0 packs/day for 30.0 years (60.0 ttl pk-yrs)     Types: Cigarettes     Start date:      Quit date:      Years since quittin.4     Passive exposure: Past    Smokeless tobacco: Never   Vaping Use    Vaping status: Never Used   Substance Use Topics    Alcohol use: Yes     Alcohol/week: 1.0 standard drink of alcohol     Types: 1  Glasses of wine per week    Drug use: Never        Home meds:  Prior to Admission medications    Medication Sig Start Date End Date Taking? Authorizing Provider   atorvastatin (LIPITOR) 10 MG tablet Take 1 tablet by mouth Daily.   Yes Andrade La MD   clopidogrel (PLAVIX) 75 MG tablet Take 1 tablet by mouth Daily.   Yes Andrade La MD   Continuous Glucose Sensor (Dexcom G7 Sensor) misc USE BEFORE MEALS AND AT BEDTIME FOUR TIMES DAILY 6/12/25  Yes Driss Dan MD   folic acid (FOLVITE) 1 MG tablet Take 1 tablet by mouth Daily. 10/26/23  Yes Sarah Beth Coronado MD   gabapentin (NEURONTIN) 300 MG capsule Take 1 capsule by mouth every night at bedtime.   Yes Andrade La MD   Insulin Lispro (humaLOG) 100 UNIT/ML injection 6 Units with each meal and additional sliding scale                 Take 0 additional unit  151-200 Take 1 additional unit  201-250 Take 2 additional unit  251-300 Take 3 additional unit  301-350 Take 4 additional unit  351-400 Take 5 additional unit  More than 400 Take 6 additional unit    Maximum 12 units with each meal  Patient taking differently: Inject 6 Units under the skin into the appropriate area as directed 3 (Three) Times a Day Before Meals. 6 Units with each meal and additional sliding scale                 Take 0 additional unit  151-200 Take 1 additional unit  201-250 Take 2 additional unit  251-300 Take 3 additional unit  301-350 Take 4 additional unit  351-400 Take 5 additional unit  More than 400 Take 6 additional unit    Maximum 12 units with each meal 7/9/24  Yes Driss Dan MD   Insulin Lispro, 1 Unit Dial, (HUMALOG) 100 UNIT/ML solution pen-injector 6 UT WITH EACH MEAL AND SLIDING SCALE  0 -200 1 -250 2 -300 3 -350 4 -400 5 UT OVER 400 TAKE 6 UT (MAX 12 UT EACH MEAL-36UT/DAY MAX ) 6/12/25  Yes Driss Dan MD   levothyroxine (SYNTHROID, LEVOTHROID) 137 MCG tablet Take 1 tablet by mouth Daily. 5/21/24  Yes Sy  MD Driss   lisinopril (PRINIVIL,ZESTRIL) 10 MG tablet Take 1 tablet by mouth Daily. 5/18/23  Yes Nuria Mcgraw MD   metFORMIN ER (GLUCOPHAGE-XR) 500 MG 24 hr tablet TAKE 1 TABLET BY MOUTH EVERY DAY WITH BREAKFAST 4/29/25  Yes Driss Dan MD   metoprolol succinate XL (TOPROL-XL) 25 MG 24 hr tablet Take 1 tablet by mouth Daily.   Yes Andrade La MD   multivitamin with minerals (THERA-M PO) Take 1 tablet by mouth Daily.   Yes Andrade La MD   pantoprazole (PROTONIX) 40 MG EC tablet Take 1 tablet by mouth 2 (Two) Times a Day. Take dos   Yes Andrade La MD   polyethylene glycol (MIRALAX) 17 g packet Take 17 g by mouth Daily. 10/26/23  Yes Sarah Beth Coronado MD   thiamine (VITAMIN B1) 100 MG tablet Take 1 tablet by mouth Daily. 10/26/23  Yes Sarah Beth Coronado MD   Acetaminophen Extra Strength 500 MG tablet Take 2 tablets by mouth Every 6 (Six) Hours As Needed. 1/25/24   Andrade La MD   cetirizine (zyrTEC) 10 MG tablet Take 1 tablet by mouth Every Night. 11/22/24   Andrade La MD   famotidine (PEPCID) 40 MG tablet Take 1 tablet by mouth every night at bedtime.    Andrade La MD   fluticasone (FLONASE) 50 MCG/ACT nasal spray Administer 1 spray into the nostril(s) as directed by provider 2 (Two) Times a Day.    Andrade La MD   Laneverardous SoloStar 100 UNIT/ML injection pen INJECT 22 UNITS UNDER THE SKIN INTO THE APPROPRIATE AREA AS DIRECTED EVERY NIGHT. 3/5/25   Driss Dan MD   magic mouthwash oral susp (nystatin - diphenhydrAMINE HCl - hydrocortisone) Swish and spit 5 mL 3 (Three) Times a Day As Needed.    Andrade La MD   ondansetron (ZOFRAN) 4 MG tablet Take 1 tablet by mouth Every 8 (Eight) Hours As Needed for Nausea or Vomiting.    Andrade La MD   traZODone (DESYREL) 50 MG tablet Take 1 tablet by mouth Every Night.    Andrade La MD       Allergies:     Penicillins, Shellfish-derived products, Sulfamethizole, and  "Trimethoprim    Scheduled Meds:atorvastatin, 10 mg, Oral, Daily  clopidogrel, 75 mg, Oral, Daily  insulin lispro, 2-7 Units, Subcutaneous, 4x Daily AC & at Bedtime  lisinopril, 10 mg, Oral, Daily  metoprolol succinate XL, 25 mg, Oral, Daily  sodium chloride, 10 mL, Intravenous, Q12H      Continuous Infusions:nitroglycerin, 10-50 mcg/min, Last Rate: Stopped (06/19/25 1940)      PRN Meds:  acetaminophen **OR** acetaminophen **OR** acetaminophen    senna-docusate sodium **AND** polyethylene glycol **AND** bisacodyl **AND** bisacodyl    Calcium Replacement - Follow Nurse / BPA Driven Protocol    dextrose    dextrose    glucagon (human recombinant)    Magnesium Standard Dose Replacement - Follow Nurse / BPA Driven Protocol    nitroglycerin    ondansetron ODT **OR** ondansetron    Phosphorus Replacement - Follow Nurse / BPA Driven Protocol    Potassium Replacement - Follow Nurse / BPA Driven Protocol    sodium chloride    sodium chloride    sodium chloride      OBJECTIVE    Vital Signs  Vitals:    06/19/25 1747 06/19/25 1900 06/19/25 1940 06/20/25 0020   BP: 117/84 151/63 143/56 123/60   BP Location:   Right arm Left arm   Patient Position:   Sitting Lying   Pulse: 79 79 81 79   Resp:   24 14   Temp:    97.8 °F (36.6 °C)   TempSrc:    Oral   SpO2: 98% 98% 98%    Weight:       Height:           Flowsheet Rows      Flowsheet Row First Filed Value   Admission Height 167.6 cm (66\") Documented at 06/19/2025 1320   Admission Weight 71.3 kg (157 lb 1.6 oz) Documented at 06/19/2025 1317            No intake or output data in the 24 hours ending 06/20/25 0734     Telemetry:  sinus rhythm    Physical Exam:  The patient is alert, oriented and in no distress.  Vital signs as noted above.  Head and neck revealed no carotid bruits or jugular venous distention.  No thyromegaly or lymphadenopathy is present  Lungs clear.  No wheezing.  Breath sounds are normal bilaterally.  Heart: Normal first and second heart sounds. No murmur.  No " precordial rub is present.  No gallop is present.  Abdomen: Soft and nontender.  No organomegaly is present.  Extremities with good peripheral pulses without any pedal edema.  Skin: Warm and dry.  Musculoskeletal system is grossly normal.  CNS grossly normal.       Results Review:  I have personally reviewed the results from the time of this admission to 6/20/2025 07:34 EDT and agree with these findings:  [x]  Laboratory  []  Microbiology  [x]  Radiology  [x]  EKG/Telemetry   [x]  Cardiology/Vascular   []  Pathology  [x]  Old records  []  Other:    Most notable findings include:     Lab Results (last 24 hours)       Procedure Component Value Units Date/Time    POC Glucose 4x Daily Before Meals & at Bedtime [[id number]]  (Abnormal) Collected: 06/20/25 0729    Specimen: Blood Updated: 06/20/25 0730     Glucose 192 mg/dL      Comment: Serial Number: [id number]:  [id number]       Comprehensive Metabolic Panel [[id number]]  (Abnormal) Collected: 06/20/25 0023    Specimen: Blood from Arm, Right Updated: 06/20/25 0107     Glucose 152 mg/dL      BUN 19.4 mg/dL      Creatinine 0.96 mg/dL      Sodium 140 mmol/L      Potassium 3.8 mmol/L      Chloride 108 mmol/L      CO2 22.7 mmol/L      Calcium 8.3 mg/dL      Total Protein 6.4 g/dL      Albumin 4.0 g/dL      ALT (SGPT) 10 U/L      AST (SGOT) 15 U/L      Alkaline Phosphatase 76 U/L      Total Bilirubin 0.3 mg/dL      Globulin 2.4 gm/dL      A/G Ratio 1.7 g/dL      BUN/Creatinine Ratio 20.2     Anion Gap 9.3 mmol/L      eGFR 57.7 mL/min/1.73     Narrative:      GFR Categories in Chronic Kidney Disease (CKD)              GFR Category          GFR (mL/min/1.73)    Interpretation  G1                    90 or greater        Normal or high (1)  G2                    60-89                Mild decrease (1)  G3a                   45-59                Mild to moderate decrease  G3b                   30-44                Moderate to severe decrease  G4                    15-29                 Severe decrease  G5                    14 or less           Kidney failure    (1)In the absence of evidence of kidney disease, neither GFR category G1 or G2 fulfill the criteria for CKD.    eGFR calculation 2021 CKD-EPI creatinine equation, which does not include race as a factor    CBC Auto Differential [[id number]]  (Abnormal) Collected: 06/20/25 0023    Specimen: Blood from Arm, Right Updated: 06/20/25 0042     WBC 7.59 10*3/mm3      RBC 4.03 10*6/mm3      Hemoglobin 11.7 g/dL      Hematocrit 37.8 %      MCV 93.8 fL      MCH 29.0 pg      MCHC 31.0 g/dL      RDW 13.5 %      RDW-SD 46.4 fl      MPV 11.8 fL      Platelets 272 10*3/mm3      Neutrophil % 57.7 %      Lymphocyte % 30.2 %      Monocyte % 7.2 %      Eosinophil % 3.2 %      Basophil % 1.4 %      Immature Grans % 0.3 %      Neutrophils, Absolute 4.38 10*3/mm3      Lymphocytes, Absolute 2.29 10*3/mm3      Monocytes, Absolute 0.55 10*3/mm3      Eosinophils, Absolute 0.24 10*3/mm3      Basophils, Absolute 0.11 10*3/mm3      Immature Grans, Absolute 0.02 10*3/mm3      nRBC 0.0 /100 WBC     Folate [[id number]] Collected: 06/20/25 0023    Specimen: Blood from Arm, Right Updated: 06/20/25 0039    Vitamin B12 [[id number]]  (Normal) Collected: 06/19/25 1307    Specimen: Blood from Arm, Right Updated: 06/20/25 0024     Vitamin B-12 454 pg/mL     Narrative:      Results may be falsely increased if patient taking Biotin.      POC Glucose Once [[id number]]  (Abnormal) Collected: 06/19/25 2149    Specimen: Blood Updated: 06/19/25 2151     Glucose 195 mg/dL      Comment: Serial Number: [id number]:  [id number]       POC Glucose Once [[id number]]  (Abnormal) Collected: 06/19/25 1815    Specimen: Blood Updated: 06/19/25 1817     Glucose 131 mg/dL      Comment: Serial Number: [id number]:  [id number]       TSH [[id number]]  (Normal) Collected: 06/19/25 1550    Specimen: Blood Updated: 06/19/25 1806     TSH 1.860 uIU/mL     Hemoglobin A1c [[id number]]  (Abnormal)  Collected: 06/19/25 1307    Specimen: Blood from Arm, Right Updated: 06/19/25 1752     Hemoglobin A1C 8.47 %     Narrative:      Hemoglobin A1C Ranges:    Increased Risk for Diabetes  5.7% to 6.4%  Diabetes                     >= 6.5%  Diabetic Goal                < 7.0%    High Sensitivity Troponin T 1Hr [[id number]]  (Normal) Collected: 06/19/25 1550    Specimen: Blood Updated: 06/19/25 1619     HS Troponin T 10 ng/L      Troponin T Numeric Delta -1 ng/L     Narrative:      High Sensitive Troponin T Reference Range:  <14.0 ng/L- Negative Female for AMI  <22.0 ng/L- Negative Male for AMI  >=14 - Abnormal Female indicating possible myocardial injury.  >=22 - Abnormal Male indicating possible myocardial injury.   Clinicians would have to utilize clinical acumen, EKG, Troponin, and serial changes to determine if it is an Acute Myocardial Infarction or myocardial injury due to an underlying chronic condition.         High Sensitivity Troponin T [[id number]]  (Normal) Collected: 06/19/25 1307    Specimen: Blood from Arm, Right Updated: 06/19/25 1519     HS Troponin T 11 ng/L     Narrative:      High Sensitive Troponin T Reference Range:  <14.0 ng/L- Negative Female for AMI  <22.0 ng/L- Negative Male for AMI  >=14 - Abnormal Female indicating possible myocardial injury.  >=22 - Abnormal Male indicating possible myocardial injury.   Clinicians would have to utilize clinical acumen, EKG, Troponin, and serial changes to determine if it is an Acute Myocardial Infarction or myocardial injury due to an underlying chronic condition.         Comprehensive Metabolic Panel [[id number]]  (Abnormal) Collected: 06/19/25 1307    Specimen: Blood from Arm, Right Updated: 06/19/25 1334     Glucose 209 mg/dL      BUN 22.7 mg/dL      Creatinine 1.10 mg/dL      Sodium 141 mmol/L      Potassium 4.3 mmol/L      Chloride 106 mmol/L      CO2 22.5 mmol/L      Calcium 8.9 mg/dL      Total Protein 7.2 g/dL      Albumin 4.4 g/dL      ALT (SGPT) 12 U/L       AST (SGOT) 17 U/L      Alkaline Phosphatase 91 U/L      Total Bilirubin 0.3 mg/dL      Globulin 2.8 gm/dL      A/G Ratio 1.6 g/dL      BUN/Creatinine Ratio 20.6     Anion Gap 12.5 mmol/L      eGFR 49.0 mL/min/1.73     Narrative:      GFR Categories in Chronic Kidney Disease (CKD)              GFR Category          GFR (mL/min/1.73)    Interpretation  G1                    90 or greater        Normal or high (1)  G2                    60-89                Mild decrease (1)  G3a                   45-59                Mild to moderate decrease  G3b                   30-44                Moderate to severe decrease  G4                    15-29                Severe decrease  G5                    14 or less           Kidney failure    (1)In the absence of evidence of kidney disease, neither GFR category G1 or G2 fulfill the criteria for CKD.    eGFR calculation 2021 CKD-EPI creatinine equation, which does not include race as a factor    Protime-INR [[id number]]  (Normal) Collected: 06/19/25 1307    Specimen: Blood from Arm, Right Updated: 06/19/25 1321     Protime 13.1 Seconds      INR 1.00    aPTT [[id number]]  (Normal) Collected: 06/19/25 1307    Specimen: Blood from Arm, Right Updated: 06/19/25 1321     PTT 26.3 seconds     [place] Draw [[id number]] Collected: 06/19/25 1307    Specimen: Blood from Arm, Right Updated: 06/19/25 1315    Narrative:      The following orders were created for panel order [place] Draw.  Procedure                               Abnormality         Status                     ---------                               -----------         ------                     Green Top (Gel)[[id number]]                                  Final result               Lavender Top[[id number]]                                     Final result               Gold Top - SST[[id number]]                                   Final result               Light Blue Top[[id number]]                                   Final result                  Please view results for these tests on the individual orders.    Green Top (Gel) [[id number]] Collected: 06/19/25 1307    Specimen: Blood from Arm, Right Updated: 06/19/25 1315     Extra Tube Hold for add-ons.     Comment: Auto resulted.       Lavender Top [[id number]] Collected: 06/19/25 1307    Specimen: Blood from Arm, Right Updated: 06/19/25 1315     Extra Tube hold for add-on     Comment: Auto resulted       Gold Top - SST [[id number]] Collected: 06/19/25 1307    Specimen: Blood from Arm, Right Updated: 06/19/25 1315     Extra Tube Hold for add-ons.     Comment: Auto resulted.       Light Blue Top [[id number]] Collected: 06/19/25 1307    Specimen: Blood from Arm, Right Updated: 06/19/25 1315     Extra Tube Hold for add-ons.     Comment: Auto resulted       CBC & Differential [[id number]]  (Normal) Collected: 06/19/25 1307    Specimen: Blood from Arm, Right Updated: 06/19/25 1313    Narrative:      The following orders were created for panel order CBC & Differential.  Procedure                               Abnormality         Status                     ---------                               -----------         ------                     CBC Auto Differential[[id number]]        Normal              Final result                 Please view results for these tests on the individual orders.    CBC Auto Differential [[id number]]  (Normal) Collected: 06/19/25 1307    Specimen: Blood from Arm, Right Updated: 06/19/25 1313     WBC 8.70 10*3/mm3      RBC 4.30 10*6/mm3      Hemoglobin 12.5 g/dL      Hematocrit 39.7 %      MCV 92.3 fL      MCH 29.1 pg      MCHC 31.5 g/dL      RDW 13.5 %      RDW-SD 45.8 fl      MPV 11.9 fL      Platelets 298 10*3/mm3      Neutrophil % 67.0 %      Lymphocyte % 22.9 %      Monocyte % 6.2 %      Eosinophil % 2.3 %      Basophil % 1.1 %      Immature Grans % 0.5 %      Neutrophils, Absolute 5.83 10*3/mm3      Lymphocytes, Absolute 1.99 10*3/mm3      Monocytes, Absolute 0.54 10*3/mm3       Eosinophils, Absolute 0.20 10*3/mm3      Basophils, Absolute 0.10 10*3/mm3      Immature Grans, Absolute 0.04 10*3/mm3      nRBC 0.0 /100 WBC     POC Glucose Once [[id number]]  (Abnormal) Collected: 06/19/25 1256    Specimen: Blood Updated: 06/19/25 1258     Glucose 184 mg/dL      Comment: Serial Number: [id number]:  [id number]               Imaging Results (Last 24 Hours)       Procedure Component Value Units Date/Time    CT Angiogram Head w AI Analysis of LVO [[id number]] Collected: 06/19/25 1414     Updated: 06/19/25 1428    Narrative:      CT ANGIOGRAM NECK, CT ANGIOGRAM HEAD W AI ANALYSIS OF LVO    Date of Exam: 6/19/2025 1:15 PM EDT    Indication: Stroke, follow up  Neuro deficit, acute, stroke suspected.    Comparison: 1/22/2024    Technique: CTA of the neck was performed before and after the uneventful intravenous administration of iodinated contrast. Reconstructed coronal and sagittal images were also obtained. In addition, a 3-D volume rendered image was created for   interpretation. Automated exposure control and iterative reconstruction methods were used.      Findings:  Nonvascular: There are emphysematous changes in the upper lobes. No nodules or masses are identified. There is no lymphadenopathy. Thyroid gland is surgically absent. There is no significant cervical lymphadenopathy. The aerodigestive tract appears   normal. The orbital structures are unremarkable. The paranasal sinuses and mastoid air cells are clear. There is cervical degenerative disc disease from C5-C7 and there is anterolisthesis of C4 on C5 by approximately 3 mm. No intracranial enhancing   lesions are identified.    Vascular: There is diffuse plaque in the arch extending into the origins of the great vessels without significant narrowing. The vertebral arteries are relatively small caliber. The basilar artery likewise is of relatively small caliber. There is no   significant stenosis identified. There is calcified  atherosclerotic plaque in the right and left carotid bifurcation. The degree of narrowing is less than 50% and is not hemodynamically significant. There is mild plaque deposition in the carotid siphons   without significant narrowing. There is normal filling of the anterior middle cerebral arteries bilaterally. There is no evidence of large vessel occlusion. There is filling of both posterior cerebral arteries. The left posterior cerebral artery arises   from the anterior circulation. There is no significant vertebrobasilar stenosis.      Impression:      Impression:  1.No hemodynamically significant carotid or vertebral artery stenosis.  2.No significant intracranial vascular stenosis or large vessel occlusion.  3.Emphysema.  4.Cervical degenerative disc disease with anterolisthesis of C4 on C5 by approximately 3 mm.        Electronically Signed: Jared Baker MD    6/19/2025 2:26 PM EDT    Workstation ID: [id number]    CT Angiogram Neck [[id number]] Collected: 06/19/25 1414     Updated: 06/19/25 1428    Narrative:      CT ANGIOGRAM NECK, CT ANGIOGRAM HEAD W AI ANALYSIS OF LVO    Date of Exam: 6/19/2025 1:15 PM EDT    Indication: Stroke, follow up  Neuro deficit, acute, stroke suspected.    Comparison: 1/22/2024    Technique: CTA of the neck was performed before and after the uneventful intravenous administration of iodinated contrast. Reconstructed coronal and sagittal images were also obtained. In addition, a 3-D volume rendered image was created for   interpretation. Automated exposure control and iterative reconstruction methods were used.      Findings:  Nonvascular: There are emphysematous changes in the upper lobes. No nodules or masses are identified. There is no lymphadenopathy. Thyroid gland is surgically absent. There is no significant cervical lymphadenopathy. The aerodigestive tract appears   normal. The orbital structures are unremarkable. The paranasal sinuses and mastoid air cells are clear. There is  cervical degenerative disc disease from C5-C7 and there is anterolisthesis of C4 on C5 by approximately 3 mm. No intracranial enhancing   lesions are identified.    Vascular: There is diffuse plaque in the arch extending into the origins of the great vessels without significant narrowing. The vertebral arteries are relatively small caliber. The basilar artery likewise is of relatively small caliber. There is no   significant stenosis identified. There is calcified atherosclerotic plaque in the right and left carotid bifurcation. The degree of narrowing is less than 50% and is not hemodynamically significant. There is mild plaque deposition in the carotid siphons   without significant narrowing. There is normal filling of the anterior middle cerebral arteries bilaterally. There is no evidence of large vessel occlusion. There is filling of both posterior cerebral arteries. The left posterior cerebral artery arises   from the anterior circulation. There is no significant vertebrobasilar stenosis.      Impression:      Impression:  1.No hemodynamically significant carotid or vertebral artery stenosis.  2.No significant intracranial vascular stenosis or large vessel occlusion.  3.Emphysema.  4.Cervical degenerative disc disease with anterolisthesis of C4 on C5 by approximately 3 mm.        Electronically Signed: Jared Baker MD    6/19/2025 2:26 PM EDT    Workstation ID: [id number]    XR Chest 1 View [[id number]] Collected: 06/19/25 1352     Updated: 06/19/25 1355    Narrative:      XR CHEST 1 VW    Date of Exam: 6/19/2025 1:35 PM EDT    Indication: Acute Stroke Protocol (onset < 12 hrs)    Comparison: 2/9/2025    Findings:  Cardiac size is normal. Pulmonary vascular pattern is normal and the lungs are clear. There is a retrocardiac density identified most likely reflecting an hiatal hernia.      Impression:      Impression:  Retrocardiac density most likely reflecting an hiatal hernia.        Electronically Signed: Jared MORRIS  MD Samuel    6/19/2025 1:53 PM EDT    Workstation ID: [id number]    CT Head Without Contrast Stroke Protocol [[id number]] Collected: 06/19/25 1307     Updated: 06/19/25 1314    Narrative:      CT HEAD WO CONTRAST STROKE PROTOCOL    Date of Exam: 6/19/2025 1:05 PM EDT    Indication: Neuro deficit, acute, stroke suspected  Neuro deficit, acute, stroke suspected.    Comparison: 1/22/2024    Technique: Axial CT images were obtained of the head without contrast administration.  Reconstructed coronal and sagittal images were also obtained. Automated exposure control and iterative construction methods were used.    Scan Time: 1306  Results discussed with Dr. Tellez at 1308.      Findings:  There is generalized enlargement of the ventricles and CSF containing spaces. There is decreased attenuation in the periventricular deep white matter throughout both hemispheres, unchanged. There are chronic lacunar infarctions in the basal ganglia,   unchanged. No mass lesions, mass effect, acute hemorrhage or edema. No intra or extra-axial fluid collections are identified. Paranasal sinuses, mastoid air cells and the orbital structures remain normal.      Impression:      Impression:  1.Generalized atrophy. Chronic small vessel ischemic changes. Chronic lacunar infarctions in the basal ganglia.  2.No acute intracranial findings.        Electronically Signed: Jared Baker MD    6/19/2025 1:12 PM EDT    Workstation ID: [id number]            LAB RESULTS (LAST 7 DAYS)    CBC  Results from last 7 days   Lab Units 06/20/25  0023 06/19/25  1307   WBC 10*3/mm3 7.59 8.70   RBC 10*6/mm3 4.03 4.30   HEMOGLOBIN g/dL 11.7* 12.5   HEMATOCRIT % 37.8 39.7   MCV fL 93.8 92.3   PLATELETS 10*3/mm3 272 298       BMP  Results from last 7 days   Lab Units 06/20/25  0023 06/19/25  1307   SODIUM mmol/L 140 141   POTASSIUM mmol/L 3.8 4.3   CHLORIDE mmol/L 108* 106   CO2 mmol/L 22.7 22.5   BUN mg/dL 19.4 22.7   CREATININE mg/dL 0.96 1.10*   GLUCOSE mg/dL 152* 209*        CMP   Results from last 7 days   Lab Units 06/20/25  0023 06/19/25  1307   SODIUM mmol/L 140 141   POTASSIUM mmol/L 3.8 4.3   CHLORIDE mmol/L 108* 106   CO2 mmol/L 22.7 22.5   BUN mg/dL 19.4 22.7   CREATININE mg/dL 0.96 1.10*   GLUCOSE mg/dL 152* 209*   ALBUMIN g/dL 4.0 4.4   BILIRUBIN mg/dL 0.3 0.3   ALK PHOS U/L 76 91   AST (SGOT) U/L 15 17   ALT (SGPT) U/L 10 12       BNP        TROPONIN  Results from last 7 days   Lab Units 06/19/25  1550   HSTROP T ng/L 10       CoAg  Results from last 7 days   Lab Units 06/19/25  1307   INR  1.00   APTT seconds 26.3       Creatinine Clearance  Estimated Creatinine Clearance: 42.6 mL/min (by C-G formula based on SCr of 0.96 mg/dL).    ABG          Radiology  CT Angiogram Head w AI Analysis of LVO  Result Date: 6/19/2025  Impression: 1.No hemodynamically significant carotid or vertebral artery stenosis. 2.No significant intracranial vascular stenosis or large vessel occlusion. 3.Emphysema. 4.Cervical degenerative disc disease with anterolisthesis of C4 on C5 by approximately 3 mm. Electronically Signed: Jared Baker MD  6/19/2025 2:26 PM EDT  Workstation ID: [id number]    CT Angiogram Neck  Result Date: 6/19/2025  Impression: 1.No hemodynamically significant carotid or vertebral artery stenosis. 2.No significant intracranial vascular stenosis or large vessel occlusion. 3.Emphysema. 4.Cervical degenerative disc disease with anterolisthesis of C4 on C5 by approximately 3 mm. Electronically Signed: Jared Baker MD  6/19/2025 2:26 PM EDT  Workstation ID: [id number]    XR Chest 1 View  Result Date: 6/19/2025  Impression: Retrocardiac density most likely reflecting an hiatal hernia. Electronically Signed: Jared Baker MD  6/19/2025 1:53 PM EDT  Workstation ID: [id number]    CT Head Without Contrast Stroke Protocol  Result Date: 6/19/2025  Impression: 1.Generalized atrophy. Chronic small vessel ischemic changes. Chronic lacunar infarctions in the basal ganglia. 2.No acute  intracranial findings. Electronically Signed: Jared Baker MD  6/19/2025 1:12 PM EDT  Workstation ID: [id number]        EKG  I personally viewed and interpreted the patient's EKG/Telemetry data:  Telemetry Scan   Final Result      Telemetry Scan   Final Result      Telemetry Scan   Final Result      Telemetry Scan   Final Result      Telemetry Scan   Final Result      ECG 12 Lead Stroke Evaluation   Final Result   HEART RATE=89  bpm   RR Lahgyfzb=339  ms   RI Qnfqbxhg=857  ms   P Horizontal Axis=30  deg   P Front Axis=80  deg   QRSD Aackwogu=294  ms   QT Fwndngqw=266  ms   OQhI=649  ms   QRS Axis=33  deg   T Wave Axis=55  deg   - ABNORMAL ECG -   Sinus rhythm   Probable  anteroseptal infarct, recent   When compared with ECG of 02-Feb-2024 14:44:39,   Significant axis, voltage or hypertrophy change   Electronically Signed By: Angel Tellez (Wale) 2025-06-20 06:08:06   Date and Time of Study:2025-06-19 13:23:47            Echocardiogram:    Results for orders placed during the hospital encounter of 01/22/24    Adult Transthoracic Echo Complete W/ Cont if Necessary Per Protocol (With Agitated Saline)    Interpretation Summary    Left ventricular ejection fraction appears to be 51 - 55%.    Left ventricular diastolic function is consistent with (grade I) impaired relaxation.    There are myxomatous changes of the mitral valve apparatus present.    Estimated right ventricular systolic pressure from tricuspid regurgitation is normal (<35 mmHg).    Reduced GLS of -10.9%        Stress Test:  Results for orders placed during the hospital encounter of 05/02/23    Stress Test With Myocardial Perfusion One Day    Interpretation Summary    Left ventricular ejection fraction is hyperdynamic (Calculated EF > 70%).    Abnormal LV wall motion consistent with moderate hypokinesis of the inferior wall.    Myocardial perfusion imaging indicates a large-sized infarct located in the inferior wall with moderate candy-infarct ischemia.     Impressions are consistent with a high risk study.    Findings consistent with an abnormal ECG stress test.        Cardiac Catheterization:  Results for orders placed during the hospital encounter of 05/02/23    Cardiac Catheterization/Vascular Study    Conclusion  OPERATORS  John Baldwin M.D. (Attending Cardiologist)      PROCEDURES PERFORMED  Ultrasound guided Vascular access  Left Heart Catheterization  Coronary Angiogram 15629  PCI with DIRK placement to RCA 99395  IVUS of RCA 08082  Moderate sedation 45mins    INDICATIONS FOR PROCEDURE  82 years old woman with multiple cardiovascular risk factors presented with chest pain/unstable angina.  She had a nuclear stress test which was abnormal in the inferior wall.  After discussing the risk and benefit of the procedure she was brought into the Cath Lab for cardiac catheterization.    PROCEDURE IN DETAIL  Informed consent was obtained from the patient after explaining the risks, benefits, and alternative options of the procedure. After obtaining informed consent, the patient was brought to the cath lab and was prepped in a sterile fashion. Lidocaine 2% was used for local anesthesia into the right femoral access site. The right femoral artery was accessed with a micropuncture needle via modified Seldinger technique under ultrasound guidance. A 6F was inserted successfully. Afterwards, 6F JR4 and JL4 diagnostic catheters were advanced over a wire into the ascending aorta and were used to engage the ostia of the left main and RCA respectively. JR4 used to cross the AV and obtain LV pressures and gradient across the AV measured via pullback technique. Images of the right and left coronary systems were obtained.    HEMODYNAMICS  LV: 151/2, 7 mmHg  AO: 150/64, 98 mmHg  No significant gradient across aortic valve during pullback of JR4 catheter.  LV gram was not performed due to echocardiogram being available.    FINDINGS    Coronary Angiogram    Right dominant  circulation    Left main: Left main is a large caliber vessel which gives rise to the Left Anterior Descending and the Left circumflex.  Left main is angiographically free from any significant disease.    Left Anterior Descending Artery: LAD is a medium caliber vessel which gives rise to several septal perforators and several diagonal branches.  LAD has diffuse luminal irregularities but no significant obstruction.    Left Circumflex: Patent stents in the mid left circumflex and first obtuse marginal branch.  Remaining left circumflex is angiographically free from any significant disease.    Right Coronary Artery: The RCA is a small caliber vessel gives rise to PDA and PLV.  Mid RCA has focal 80 to 85% stenosis with EDITH-3 flow.    Percutaneous coronary intervention  100 units/kg of heparin was administered and ACT of more than 250 was documented.  A 6 Korean JR4 guide catheter with sideholes was used to engage the ostium of the RCA.  0.014 run-through wire was advanced past the lesion in the mid RCA into the distal RCA.  I then predilated the lesion with 2 x 12 mm mini trek balloon.  This was followed by placement of 2.25 x 33 mm Xience miryam point stent.  This was followed by advancement of [employer] intravascular ultrasound into the distal RCA and a slow manual pullback was performed.  IVUS confirmed distal RCA size of 2.5 mm and proximal RCA 3.5 mm.  Mid RCA was 3.2 x 2.5 mm in dimension.  I then postdilated the stent with a 3 x 15 mm noncompliant balloon at 14 shima.  Final angiography showed EDITH-3 flow and 0% stenosis in the RCA.    All the catheters were exchanged over a wire and subsequently removed. Angiogram of the femoral access site was obtained and did not show complications. The patient tolerated the procedure well without any complications. The pictures were reviewed at the end of the procedure. A 6 Korean Angio-Seal closure device was applied to achieve hemostasis.    ESTIMATED BLOOD LOSS:  10  ml    COMPLICATIONS:  None    PROCEDURE DATA:  Contrast Used: 50 mL  Sedation Time: 45 minutes    IMPRESSIONS  Patent stents in the left circumflex and obtuse marginal 1.  IVUS guided PCI of mid RCA with placement of drug-eluting stent.  Normal LVEDP    RECOMMENDATIONS  -Start dual antiplatelet therapy.  -High intensity statin, beta-blocker.  - Referral to cardiac rehab        Other:      ASSESSMENT & PLAN:    Principal Problem:    Chest pain      Acute chest pain  Coronary artery disease   History of PCI  of the RCA (5/3/23)   Continue Plavix, high intensity statin, beta blocker and ACE-I  Start aspirin 81 mg daily  Serial high sensitivity troponin negative  EKG shows sinus rhythm with old anteroseptal infarct  I will obtain a nuclear stress test  Consider adding amlodipine   Better diabetes control recommended    Primary Hypertension, chronic  Continue lisinopril and Toprol XL  Monitor blood pressure      Hyperlipidemia  LDL today is 70  Continue atorvastatin  Goal LDL less than 70     Type 2 Diabetes mellitus  Uncontrolled with A1c 8.47%  On Lantus, Humalog and metformin  Followed by endocrinology  She is also on gabapentin for neuropathy.     Left facial and left arm numbness  Neurology consulted and planning MRI to rule out CVA  Continue Plavix and high intensity statin  Add aspirin as above      Electronically signed by CLAUDE Gonzales, 06/20/25, 12:56 PM EDT.

## 2025-06-21 VITALS
DIASTOLIC BLOOD PRESSURE: 99 MMHG | OXYGEN SATURATION: 96 % | BODY MASS INDEX: 25.25 KG/M2 | HEIGHT: 66 IN | HEART RATE: 97 BPM | RESPIRATION RATE: 21 BRPM | WEIGHT: 157.1 LBS | TEMPERATURE: 98.1 F | SYSTOLIC BLOOD PRESSURE: 116 MMHG

## 2025-06-21 LAB — GLUCOSE BLDC GLUCOMTR-MCNC: 181 MG/DL (ref 70–105)

## 2025-06-21 PROCEDURE — G0378 HOSPITAL OBSERVATION PER HR: HCPCS

## 2025-06-21 PROCEDURE — 63710000001 INSULIN LISPRO (HUMAN) PER 5 UNITS: Performed by: NURSE PRACTITIONER

## 2025-06-21 PROCEDURE — 82948 REAGENT STRIP/BLOOD GLUCOSE: CPT | Performed by: NURSE PRACTITIONER

## 2025-06-21 RX ORDER — ASPIRIN 81 MG/1
81 TABLET ORAL DAILY
Qty: 90 TABLET | Refills: 0 | Status: SHIPPED | OUTPATIENT
Start: 2025-06-21

## 2025-06-21 RX ADMIN — INSULIN LISPRO 2 UNITS: 100 INJECTION, SOLUTION INTRAVENOUS; SUBCUTANEOUS at 08:10

## 2025-06-21 RX ADMIN — CLOPIDOGREL BISULFATE 75 MG: 75 TABLET, FILM COATED ORAL at 08:10

## 2025-06-21 RX ADMIN — ATORVASTATIN CALCIUM 10 MG: 10 TABLET ORAL at 08:10

## 2025-06-21 RX ADMIN — ASPIRIN 81 MG: 81 TABLET, COATED ORAL at 08:10

## 2025-06-21 RX ADMIN — LISINOPRIL 10 MG: 5 TABLET ORAL at 08:09

## 2025-06-21 RX ADMIN — Medication 10 ML: at 08:10

## 2025-06-21 RX ADMIN — METOPROLOL SUCCINATE 25 MG: 25 TABLET, EXTENDED RELEASE ORAL at 08:10

## 2025-06-21 NOTE — DISCHARGE SUMMARY
"             [facility]  Discharge Summary    Date of Service: 2025  Patient Name: Pamela Zavala  : 1940  MRN: [id number]    Date of Admission: 2025  Discharge Diagnosis: Chest pain  Date of Discharge: 2025  Primary Care Physician: Provider, No Known      Presenting Problem:   Chest pain [R07.9]  Facial paresthesia [R20.2]  Chest pain, unspecified type [R07.9]  Sequela of lacunar infarction [I69.30]  History of coronary angioplasty with insertion of stent [Z95.5]    Active and Resolved Hospital Problems:  Active Hospital Problems    Diagnosis POA    **Chest pain [R07.9] Yes      Resolved Hospital Problems   No resolved problems to display.         Hospital Course     HPI:    \"Pamela Zavala is a 84 y.o. female with a CMH of CAD, SVTs, paroxysmal A-fib, thyroid cancer, type 2 diabetes, diabetic neuropathy, hyperlipidemia, former smoker, alcohol use, lives at assisted living facility  who presented to [facility] on 2025 with  complaining of chest pain the patient states that she had the onset of facial numbness that started last night and has had persisted today. She reports no associated change in taste. She states that the chest pain is associated with shortness of breath and she has had intermittent episodes of diaphoresis she states she took a sublingual nitroglycerin and that that improved her pain but left her feeling weak tired and lightheaded. The patient states she has had some diaphoresis with the episodes of severe pain. She reports that she is taking a blood thinner. She states that she has had no night sweats or hemoptysis MR reports no recent fever chills or cough .  States that her face feels numb, however she can feel sensation. \"    Hospital Course:    Patient was hospitalized with for evaluation of chest pain and transient sensory changes. Patient underwent extensive cardiac workup including negative troponins, nonischemic EKG, and low risk " nuclear stress test. She was recommended to continue Plavix, high intensity statin, beta blocker and ACE-I and start taking aspirin 81mg daily. Underwent neurological workup for her sensory changes which was essentially negative and ruled out from stroke via MRI.        DISCHARGE Follow Up Recommendations for labs and diagnostics:     PCP  Cardiology as scheduled  Neurologist of choice        Day of Discharge     Vital Signs:  Temp:  [97.8 °F (36.6 °C)-98.1 °F (36.7 °C)] 98.1 °F (36.7 °C)  Heart Rate:  [97] 97  BP: (116-144)/(78-99) 116/99    Physical Exam:  Physical Exam  Constitutional:       Appearance: Normal appearance.   Cardiovascular:      Rate and Rhythm: Normal rate and regular rhythm.      Pulses: Normal pulses.      Heart sounds: Normal heart sounds.   Pulmonary:      Effort: Pulmonary effort is normal.      Breath sounds: Normal breath sounds.   Abdominal:      General: Abdomen is flat.      Palpations: Abdomen is soft.   Neurological:      Mental Status: She is alert.            Pertinent  and/or Most Recent Results     LAB RESULTS:      Lab 06/20/25  0023 06/19/25  1307   WBC 7.59 8.70   HEMOGLOBIN 11.7* 12.5   HEMATOCRIT 37.8 39.7   PLATELETS 272 298   NEUTROS ABS 4.38 5.83   IMMATURE GRANS (ABS) 0.02 0.04   LYMPHS ABS 2.29 1.99   MONOS ABS 0.55 0.54   EOS ABS 0.24 0.20   MCV 93.8 92.3   PROTIME  --  13.1   APTT  --  26.3         Lab 06/20/25  0023 06/19/25  1550 06/19/25  1307   SODIUM 140  --  141   POTASSIUM 3.8  --  4.3   CHLORIDE 108*  --  106   CO2 22.7  --  22.5   ANION GAP 9.3  --  12.5   BUN 19.4  --  22.7   CREATININE 0.96  --  1.10*   EGFR 57.7*  --  49.0*   GLUCOSE 152*  --  209*   CALCIUM 8.3*  --  8.9   HEMOGLOBIN A1C  --   --  8.47*   TSH  --  1.860  --          Lab 06/20/25  0023 06/19/25  1307   TOTAL PROTEIN 6.4 7.2   ALBUMIN 4.0 4.4   GLOBULIN 2.4 2.8   ALT (SGPT) 10 12   AST (SGOT) 15 17   BILIRUBIN 0.3 0.3   ALK PHOS 76 91         Lab 06/19/25  1550 06/19/25  1307   HSTROP T 10  11   PROTIME  --  13.1   INR  --  1.00         Lab 06/20/25  0023   CHOLESTEROL 151   LDL CHOL 70   HDL CHOL 66*   TRIGLYCERIDES 77         Lab 06/20/25  0023 06/19/25  1317 06/19/25  1307   FOLATE >20.00  --   --    VITAMIN B 12  --   --  454   ABO TYPING  --  O  --    RH TYPING  --  Positive  --    ANTIBODY SCREEN  --  Negative  --          Brief Urine Lab Results       None          Microbiology Results (last 10 days)       ** No results found for the last 240 hours. **            MRI Brain Without Contrast  Result Date: 6/20/2025  Impression: Impression: No acute intracranial abnormality. Electronically Signed: Santi Leos MD  6/20/2025 9:32 PM EDT  Workstation ID: [id number]    CT Angiogram Head w AI Analysis of LVO  Result Date: 6/19/2025  Impression: Impression: 1.No hemodynamically significant carotid or vertebral artery stenosis. 2.No significant intracranial vascular stenosis or large vessel occlusion. 3.Emphysema. 4.Cervical degenerative disc disease with anterolisthesis of C4 on C5 by approximately 3 mm. Electronically Signed: Jared Baker MD  6/19/2025 2:26 PM EDT  Workstation ID: [id number]    CT Angiogram Neck  Result Date: 6/19/2025  Impression: Impression: 1.No hemodynamically significant carotid or vertebral artery stenosis. 2.No significant intracranial vascular stenosis or large vessel occlusion. 3.Emphysema. 4.Cervical degenerative disc disease with anterolisthesis of C4 on C5 by approximately 3 mm. Electronically Signed: Jared Baker MD  6/19/2025 2:26 PM EDT  Workstation ID: [id number]    XR Chest 1 View  Result Date: 6/19/2025  Impression: Impression: Retrocardiac density most likely reflecting an hiatal hernia. Electronically Signed: Jared Baker MD  6/19/2025 1:53 PM EDT  Workstation ID: [id number]    CT Head Without Contrast Stroke Protocol  Result Date: 6/19/2025  Impression: Impression: 1.Generalized atrophy. Chronic small vessel ischemic changes. Chronic lacunar infarctions in the basal  ganglia. 2.No acute intracranial findings. Electronically Signed: Jared Baker MD  6/19/2025 1:12 PM EDT  Workstation ID: [id number]              Results for orders placed during the hospital encounter of 06/19/25    Adult Transthoracic Echo Complete W/ Cont if Necessary Per Protocol    Interpretation Summary    Left ventricular ejection fraction appears to be 56 - 60%.    There is moderate, bileaflet mitral valve thickening present.      Labs Pending at Discharge:  Pending Results       None            Procedures Performed           Consults:   Consults       Date and Time Order Name Status Description    6/19/2025  5:27 PM Inpatient Cardiology Consult Completed     6/19/2025  1:00 PM Inpatient Neurology Consult Stroke Completed     6/19/2025  1:00 PM Inpatient Neurology Consult Stroke Completed               Discharge Details        Discharge Medications        New Medications        Instructions Start Date   aspirin 81 MG EC tablet   81 mg, Oral, Daily             Changes to Medications        Instructions Start Date   Insulin Lispro (1 Unit Dial) 100 UNIT/ML solution pen-injector  Commonly known as: HUMALOG  What changed: Another medication with the same name was removed. Continue taking this medication, and follow the directions you see here.   6 UT WITH EACH MEAL AND SLIDING SCALE  0 -200 1 -250 2 -300 3 -350 4 -400 5 UT OVER 400 TAKE 6 UT (MAX 12 UT EACH MEAL-36UT/DAY MAX )             Continue These Medications        Instructions Start Date   Acetaminophen Extra Strength 500 MG tablet   Take 2 tablets by mouth Every 6 (Six) Hours As Needed.      atorvastatin 10 MG tablet  Commonly known as: LIPITOR   10 mg, Daily      cetirizine 10 MG tablet  Commonly known as: zyrTEC   Take 1 tablet by mouth Every Night.      clopidogrel 75 MG tablet  Commonly known as: PLAVIX   75 mg, Daily      Dexcom G7 Sensor misc   USE BEFORE MEALS AND AT BEDTIME FOUR TIMES DAILY      famotidine 40  MG tablet  Commonly known as: PEPCID   40 mg, Every Night at Bedtime      fluticasone 50 MCG/ACT nasal spray  Commonly known as: FLONASE   1 spray, 2 Times Daily      folic acid 1 MG tablet  Commonly known as: FOLVITE   1 mg, Oral, Daily      gabapentin 300 MG capsule  Commonly known as: NEURONTIN   300 mg, Every Night at Bedtime      Lantus SoloStar 100 UNIT/ML injection pen  Generic drug: Insulin Glargine   22 Units, Subcutaneous, Nightly      levothyroxine 137 MCG tablet  Commonly known as: SYNTHROID, LEVOTHROID   137 mcg, Oral, Daily      lisinopril 10 MG tablet  Commonly known as: PRINIVIL,ZESTRIL   10 mg, Daily      magic mouthwash oral susp (nystatin - diphenhydrAMINE HCl - hydrocortisone)   5 mL, 3 Times Daily PRN      metFORMIN  MG 24 hr tablet  Commonly known as: GLUCOPHAGE-XR   500 mg, Oral, Daily With Breakfast      metoprolol succinate XL 25 MG 24 hr tablet  Commonly known as: TOPROL-XL   25 mg, Daily      multivitamin with minerals tablet tablet   1 tablet, Daily      ondansetron 4 MG tablet  Commonly known as: ZOFRAN   4 mg, Every 8 Hours PRN      pantoprazole 40 MG EC tablet  Commonly known as: PROTONIX   40 mg, 2 Times Daily      polyethylene glycol 17 g packet  Commonly known as: MIRALAX   17 g, Oral, Daily      thiamine 100 MG tablet  Commonly known as: VITAMIN B1   100 mg, Oral, Daily      traZODone 50 MG tablet  Commonly known as: DESYREL   50 mg, Nightly               Allergies   Allergen Reactions    Penicillins Unknown - High Severity     Lips swell and hives    Shellfish-Derived Products Unknown - Low Severity    Sulfamethizole Unknown - Low Severity    Trimethoprim Unknown - Low Severity         Discharge Disposition:   Home or Self Care    Diet:  Hospital:No active diet order        Discharge Activity:         CODE STATUS:  Code Status and Medical Interventions: No CPR (Do Not Attempt to Resuscitate); Limited Support; No intubation (DNI)   Ordered at: 06/19/25 7781     Code Status  (Patient has no pulse and is not breathing):    No CPR (Do Not Attempt to Resuscitate)     Medical Interventions (Patient has pulse or is breathing):    Limited Support     Medical Intervention Limits:    No intubation (DNI)         No future appointments.        Time spent on Discharge including face to face service:  55 minutes    Signature: Electronically signed by Santo Quintero MD, 06/21/25, 16:05 EDT.  [facility] Hospitalist Team

## 2025-06-21 NOTE — PLAN OF CARE
Goal Outcome Evaluation:                   Problem: Adult Inpatient Plan of Care  Goal: Plan of Care Review  Outcome: Progressing  Goal: Patient-Specific Goal (Individualized)  Outcome: Progressing  Goal: Absence of Hospital-Acquired Illness or Injury  Outcome: Progressing  Goal: Optimal Comfort and Wellbeing  Outcome: Progressing  Goal: Readiness for Transition of Care  Outcome: Progressing     Problem: Comorbidity Management  Goal: Maintenance of Asthma Control  Outcome: Progressing  Goal: Blood Glucose Level Within Target Range  Outcome: Progressing  Goal: Blood Pressure in Desired Range  Outcome: Progressing     Problem: Fall Injury Risk  Goal: Absence of Fall and Fall-Related Injury  Outcome: Progressing

## 2025-06-21 NOTE — PLAN OF CARE
Problem: Adult Inpatient Plan of Care  Goal: Plan of Care Review  Outcome: Progressing  Flowsheets (Taken 6/21/2025 0234)  Progress: no change  Plan of Care Reviewed With: patient  Goal: Patient-Specific Goal (Individualized)  Outcome: Progressing  Goal: Absence of Hospital-Acquired Illness or Injury  Outcome: Progressing  Intervention: Identify and Manage Fall Risk  Recent Flowsheet Documentation  Taken 6/21/2025 0200 by Meenu Smith RN  Safety Promotion/Fall Prevention:   assistive device/personal items within reach   clutter free environment maintained   lighting adjusted   mobility aid in reach   nonskid shoes/slippers when out of bed   safety round/check completed   room organization consistent  Taken 6/21/2025 0000 by Meenu Smith RN  Safety Promotion/Fall Prevention:   assistive device/personal items within reach   clutter free environment maintained   lighting adjusted   mobility aid in reach   nonskid shoes/slippers when out of bed   safety round/check completed   room organization consistent  Taken 6/20/2025 2200 by Meenu Smith RN  Safety Promotion/Fall Prevention:   assistive device/personal items within reach   clutter free environment maintained   lighting adjusted   mobility aid in reach   nonskid shoes/slippers when out of bed   safety round/check completed   room organization consistent  Taken 6/20/2025 2000 by Meenu Smith RN  Safety Promotion/Fall Prevention:   assistive device/personal items within reach   clutter free environment maintained   lighting adjusted   mobility aid in reach   nonskid shoes/slippers when out of bed   safety round/check completed   room organization consistent  Intervention: Prevent Skin Injury  Recent Flowsheet Documentation  Taken 6/21/2025 0200 by Meenu Smith RN  Body Position:   position changed independently   weight shifting  Taken 6/21/2025 0000 by Meenu Smith RN  Body Position:   position changed independently   weight shifting  Skin Protection:  transparent dressing maintained  Taken 6/20/2025 2200 by Meenu Smith RN  Body Position:   position changed independently   weight shifting  Taken 6/20/2025 2000 by Meenu Smith RN  Body Position:   position changed independently   weight shifting  Skin Protection: transparent dressing maintained  Intervention: Prevent Infection  Recent Flowsheet Documentation  Taken 6/21/2025 0200 by Meenu Smith RN  Infection Prevention:   equipment surfaces disinfected   hand hygiene promoted   personal protective equipment utilized   rest/sleep promoted   single patient room provided  Taken 6/21/2025 0000 by Meenu Smith RN  Infection Prevention:   equipment surfaces disinfected   hand hygiene promoted   personal protective equipment utilized   rest/sleep promoted   single patient room provided  Taken 6/20/2025 2200 by Meenu Smith RN  Infection Prevention:   equipment surfaces disinfected   hand hygiene promoted   personal protective equipment utilized   rest/sleep promoted   single patient room provided  Taken 6/20/2025 2000 by Meenu Smith RN  Infection Prevention:   equipment surfaces disinfected   hand hygiene promoted   personal protective equipment utilized   rest/sleep promoted   single patient room provided  Goal: Optimal Comfort and Wellbeing  Outcome: Progressing  Goal: Readiness for Transition of Care  Outcome: Progressing     Problem: Comorbidity Management  Goal: Maintenance of Asthma Control  Outcome: Progressing  Intervention: Maintain Asthma Symptom Control  Recent Flowsheet Documentation  Taken 6/21/2025 0200 by Meenu Smith RN  Medication Review/Management: medications reviewed  Taken 6/21/2025 0000 by Meenu Smith RN  Medication Review/Management: medications reviewed  Taken 6/20/2025 2200 by Meenu Smith RN  Medication Review/Management: medications reviewed  Taken 6/20/2025 2000 by Meenu Smith RN  Medication Review/Management: medications reviewed  Goal: Blood Glucose Level Within  Target Range  Outcome: Progressing  Intervention: Monitor and Manage Glycemia  Recent Flowsheet Documentation  Taken 6/21/2025 0200 by Meenu Smith RN  Medication Review/Management: medications reviewed  Taken 6/21/2025 0000 by Meenu Smith RN  Medication Review/Management: medications reviewed  Taken 6/20/2025 2200 by Meenu Smith RN  Medication Review/Management: medications reviewed  Taken 6/20/2025 2000 by Meenu Smith RN  Medication Review/Management: medications reviewed  Goal: Blood Pressure in Desired Range  Outcome: Progressing  Intervention: Maintain Blood Pressure Management  Recent Flowsheet Documentation  Taken 6/21/2025 0200 by Meenu Smith RN  Medication Review/Management: medications reviewed  Taken 6/21/2025 0000 by Meenu Smith RN  Medication Review/Management: medications reviewed  Taken 6/20/2025 2200 by Meenu Smith RN  Medication Review/Management: medications reviewed  Taken 6/20/2025 2000 by Meenu Smith RN  Medication Review/Management: medications reviewed     Problem: Fall Injury Risk  Goal: Absence of Fall and Fall-Related Injury  Outcome: Progressing  Intervention: Identify and Manage Contributors  Recent Flowsheet Documentation  Taken 6/21/2025 0200 by Meenu Smith RN  Medication Review/Management: medications reviewed  Taken 6/21/2025 0000 by Meenu Smith RN  Medication Review/Management: medications reviewed  Taken 6/20/2025 2200 by Meenu Smith RN  Medication Review/Management: medications reviewed  Taken 6/20/2025 2000 by Meenu Smith RN  Medication Review/Management: medications reviewed  Intervention: Promote Injury-Free Environment  Recent Flowsheet Documentation  Taken 6/21/2025 0200 by Meenu Smith RN  Safety Promotion/Fall Prevention:   assistive device/personal items within reach   clutter free environment maintained   lighting adjusted   mobility aid in reach   nonskid shoes/slippers when out of bed   safety round/check completed   room organization  consistent  Taken 6/21/2025 0000 by Meenu Smith RN  Safety Promotion/Fall Prevention:   assistive device/personal items within reach   clutter free environment maintained   lighting adjusted   mobility aid in reach   nonskid shoes/slippers when out of bed   safety round/check completed   room organization consistent  Taken 6/20/2025 2200 by Meenu Smith, RN  Safety Promotion/Fall Prevention:   assistive device/personal items within reach   clutter free environment maintained   lighting adjusted   mobility aid in reach   nonskid shoes/slippers when out of bed   safety round/check completed   room organization consistent  Taken 6/20/2025 2000 by Meenu Smith RN  Safety Promotion/Fall Prevention:   assistive device/personal items within reach   clutter free environment maintained   lighting adjusted   mobility aid in reach   nonskid shoes/slippers when out of bed   safety round/check completed   room organization consistent   Goal Outcome Evaluation:  Plan of Care Reviewed With: patient        Progress: no change

## 2025-06-22 ENCOUNTER — READMISSION MANAGEMENT (OUTPATIENT)
Dept: CALL CENTER | Facility: HOSPITAL | Age: 85
End: 2025-06-22
Payer: MEDICARE

## 2025-06-22 LAB
QT INTERVAL: 395 MS
QTC INTERVAL: 473 MS

## 2025-06-22 NOTE — OUTREACH NOTE
Prep Survey      Flowsheet Row Responses   [facility] facility patient discharged from? [facility]   Is LACE score < 7 ? No   Eligibility Readm Mgmt   Discharge diagnosis Left facial numbness,, chest pain   Does the patient have one of the following disease processes/diagnoses(primary or secondary)? Other   Does the patient have Home health ordered? No   Is there a DME ordered? No   Prep survey completed? Yes            SHELIA RAMOS - Registered Nurse

## 2025-06-26 ENCOUNTER — READMISSION MANAGEMENT (OUTPATIENT)
Dept: CALL CENTER | Facility: HOSPITAL | Age: 85
End: 2025-06-26
Payer: MEDICARE

## 2025-06-26 NOTE — OUTREACH NOTE
"Medical Week 1 Survey      Flowsheet Row Responses   [facility] patient discharged from? [facility]   Does the patient have one of the following disease processes/diagnoses(primary or secondary)? Other   Week 1 attempt successful? Yes   Call start time 1514   Call end time 1517   Discharge diagnosis Left facial numbness,, chest pain   Person spoke with today (if not patient) and relationship Jess   Does the patient have a primary care provider?  Yes   Comments regarding PCP pt sees an NP Beatriz Loyola, who comes to the facility   Has all DME been delivered? No   Comments Jess states patient has been \"really tired lately\"   Did the patient receive a copy of their discharge instructions? Yes   Nursing interventions Reviewed instructions with patient   What is the patient's perception of their health status since discharge? Same   If the patient is a current smoker, are they able to teach back resources for cessation? Not a smoker   Week 1 call completed? Yes   Would this patient benefit from a Referral to Amb Social Work? No   Is the patient interested in additional calls from an ambulatory ? No   Wrap up additional comments Jess states patient has been tired but is eating and denies any concerning symptoms.  No needs at this time.   Call end time 1517            ILANA BROUSSARD - Registered Nurse  "

## 2025-07-15 ENCOUNTER — READMISSION MANAGEMENT (OUTPATIENT)
Dept: CALL CENTER | Facility: HOSPITAL | Age: 85
End: 2025-07-15
Payer: MEDICARE

## 2025-07-15 NOTE — OUTREACH NOTE
Medical Week 3 Survey      Flowsheet Row Responses   [facility] patient discharged from? [facility]   Does the patient have one of the following disease processes/diagnoses(primary or secondary)? Other   Week 3 attempt successful? Yes   Call start time 1438   Call end time 1440   Discharge diagnosis Left facial numbness,, chest pain   Meds reviewed with patient/caregiver? Yes   Is the patient having any side effects they believe may be caused by any medication additions or changes? Yes   Does the patient have all medications ordered at discharge? N/A   Is the patient taking all medications as directed (includes completed medication regime)? Yes   Does the patient have a primary care provider?  Yes   Does the patient have an appointment with their PCP within 7 days of discharge? Yes   Has the patient kept scheduled appointments due by today? N/A   Psychosocial issues? No   What is the patient's perception of their health status since discharge? Improving   Is the patient/caregiver able to teach back signs and symptoms related to disease process for when to call PCP? Yes   Is the patient/caregiver able to teach back signs and symptoms related to disease process for when to call 911? Yes   Is the patient/caregiver able to teach back the hierarchy of who to call/visit for symptoms/problems? PCP, Specialist, Home health nurse, Urgent Care, ED, 911 Yes   Additional teach back comments States she is doing well and doing therapy.  She has an appt with a psychiatrist tomorrow so then will see a psychologist.   Week 3 Call Completed? Yes   Graduated Yes   Graduated/Revoked comments Doing well with no questions or needs at this time.   Call end time 1440            eDa GRIFFIN - Licensed Nurse

## 2025-08-18 DIAGNOSIS — E11.65 TYPE 2 DIABETES MELLITUS WITH HYPERGLYCEMIA, WITH LONG-TERM CURRENT USE OF INSULIN: ICD-10-CM

## 2025-08-18 DIAGNOSIS — Z79.4 TYPE 2 DIABETES MELLITUS WITH HYPERGLYCEMIA, WITH LONG-TERM CURRENT USE OF INSULIN: ICD-10-CM

## 2025-08-20 RX ORDER — INSULIN GLARGINE 100 [IU]/ML
22 INJECTION, SOLUTION SUBCUTANEOUS NIGHTLY
Qty: 15 ML | Refills: 0 | OUTPATIENT
Start: 2025-08-20

## (undated) DEVICE — NITINOL STONE RETRIEVAL BASKET: Brand: ZERO TIP

## (undated) DEVICE — GUIDE CATHETER: Brand: MACH1™

## (undated) DEVICE — BITEBLOCK ENDO W/STRAP 60F A/ LF DISP

## (undated) DEVICE — GW PTFE EMERALD HEPCOAT FC J TIP STD .035 3MM 150CM

## (undated) DEVICE — SOL IRRG H2O BG 3000ML STRL

## (undated) DEVICE — ELECTRD DEFIB M/FUNC PROPADZ RADIOL 2PK

## (undated) DEVICE — DEV INFL COMPAK W/ACCESSPLUS IN4530

## (undated) DEVICE — STPCK 3WY HP ROT

## (undated) DEVICE — GLV SURG SIGNATURE ESSENTIAL PF LTX SZ7

## (undated) DEVICE — CORONARY IMAGING CATHETER: Brand: OPTICROSS™ 6 HD

## (undated) DEVICE — CONTRST ISOVUE300 61PCT 50ML

## (undated) DEVICE — CATH DIAG IMPULSE FR4 6F 100CM

## (undated) DEVICE — FIBR LASR HOLMIUM 200 MICRON DISP

## (undated) DEVICE — CATH DIAG IMPULSE PIG .056 6F 110CM

## (undated) DEVICE — TBG NAMIC PRESS MONTR A/ F/M 12IN

## (undated) DEVICE — PK ENDO GI 50

## (undated) DEVICE — PK TRY HEART CATH 50

## (undated) DEVICE — PREP SPRY PVPI 10P 2OZ

## (undated) DEVICE — SNAR POLYP CAPTIVATOR HEX 27MM 240CM

## (undated) DEVICE — GW RUNTHROUGH NS HYPERCOAT .014 3X180CM

## (undated) DEVICE — PRT BIOP SEALS

## (undated) DEVICE — KT SURG TURNOVER 050

## (undated) DEVICE — BOWL PLSTC MD 16OZ BLU STRL

## (undated) DEVICE — CATH URETRL OPN/END 5F70CM

## (undated) DEVICE — NC TREK NEO™ CORONARY DILATATION CATHETER 3.00 MM X 15 MM / RAPID-EXCHANGE: Brand: NC TREK NEO™

## (undated) DEVICE — SOLUTION,WATER,IRRIGATION,1000ML,STERILE: Brand: MEDLINE

## (undated) DEVICE — PINNACLE INTRODUCER SHEATH: Brand: PINNACLE

## (undated) DEVICE — SYS IRR PUMP SGL ACTN VAC SYR 10CC

## (undated) DEVICE — PK CYSTO 50

## (undated) DEVICE — PROVE COVER: Brand: UNBRANDED

## (undated) DEVICE — KT PK ANGIOPLASTY ACC 9 MERIT

## (undated) DEVICE — SNAR POLYP HOTSNARE/BRAIDED OVL/MINI 7F 2.8X10MM 230CM 1P/U

## (undated) DEVICE — CATH DIAG IMPULSE FL4 6F 100CM

## (undated) DEVICE — MINI TREK CORONARY DILATATION CATHETER 2.0 MM X 12 MM / RAPID-EXCHANGE: Brand: MINI TREK

## (undated) DEVICE — ST ACC MICROPUNCTURE STFF/CANN PLAT/TP 4F 21G 40CM